# Patient Record
Sex: MALE | Race: BLACK OR AFRICAN AMERICAN | Employment: OTHER | URBAN - METROPOLITAN AREA
[De-identification: names, ages, dates, MRNs, and addresses within clinical notes are randomized per-mention and may not be internally consistent; named-entity substitution may affect disease eponyms.]

---

## 2018-03-30 ENCOUNTER — HOSPITAL ENCOUNTER (EMERGENCY)
Age: 33
Discharge: HOME | End: 2018-03-30
Attending: EMERGENCY MEDICINE
Payer: MEDICAID

## 2018-03-30 VITALS
TEMPERATURE: 97.9 F | HEART RATE: 125 BPM | OXYGEN SATURATION: 98 % | WEIGHT: 105 LBS | DIASTOLIC BLOOD PRESSURE: 125 MMHG | SYSTOLIC BLOOD PRESSURE: 162 MMHG | RESPIRATION RATE: 18 BRPM | BODY MASS INDEX: 16.88 KG/M2 | HEIGHT: 66 IN

## 2018-03-30 DIAGNOSIS — Z20.2 EXPOSURE TO STD: Primary | ICD-10-CM

## 2018-03-30 DIAGNOSIS — R07.89 ATYPICAL CHEST PAIN: ICD-10-CM

## 2018-03-30 LAB
ALBUMIN SERPL-MCNC: 4.4 G/DL (ref 3.5–5.2)
ALP BLD-CCNC: 187 U/L (ref 40–129)
ALT SERPL-CCNC: 13 U/L (ref 0–40)
ANION GAP SERPL CALCULATED.3IONS-SCNC: 17 MMOL/L (ref 7–16)
AST SERPL-CCNC: 25 U/L (ref 0–39)
BASOPHILS ABSOLUTE: 0.03 E9/L (ref 0–0.2)
BASOPHILS RELATIVE PERCENT: 0.6 % (ref 0–2)
BILIRUB SERPL-MCNC: 0.3 MG/DL (ref 0–1.2)
BUN BLDV-MCNC: 27 MG/DL (ref 6–20)
CALCIUM SERPL-MCNC: 9 MG/DL (ref 8.6–10.2)
CHLORIDE BLD-SCNC: 95 MMOL/L (ref 98–107)
CO2: 29 MMOL/L (ref 22–29)
CREAT SERPL-MCNC: 7.5 MG/DL (ref 0.7–1.2)
EKG ATRIAL RATE: 98 BPM
EKG P AXIS: 73 DEGREES
EKG P-R INTERVAL: 168 MS
EKG Q-T INTERVAL: 370 MS
EKG QRS DURATION: 90 MS
EKG QTC CALCULATION (BAZETT): 472 MS
EKG R AXIS: 91 DEGREES
EKG T AXIS: 56 DEGREES
EKG VENTRICULAR RATE: 98 BPM
EOSINOPHILS ABSOLUTE: 0.16 E9/L (ref 0.05–0.5)
EOSINOPHILS RELATIVE PERCENT: 3 % (ref 0–6)
GFR AFRICAN AMERICAN: 10
GFR AFRICAN AMERICAN: 7
GFR NON-AFRICAN AMERICAN: 10 ML/MIN/1.73
GFR NON-AFRICAN AMERICAN: 6 ML/MIN/1.73
GLUCOSE BLD-MCNC: 92 MG/DL (ref 74–109)
GLUCOSE BLD-MCNC: 93 MG/DL (ref 74–109)
HCT VFR BLD CALC: 32.6 % (ref 37–54)
HEMOGLOBIN: 10 G/DL (ref 12.5–16.5)
IMMATURE GRANULOCYTES #: 0.03 E9/L
IMMATURE GRANULOCYTES %: 0.6 % (ref 0–5)
LYMPHOCYTES ABSOLUTE: 1.73 E9/L (ref 1.5–4)
LYMPHOCYTES RELATIVE PERCENT: 32.1 % (ref 20–42)
MCH RBC QN AUTO: 30.7 PG (ref 26–35)
MCHC RBC AUTO-ENTMCNC: 30.7 % (ref 32–34.5)
MCV RBC AUTO: 100 FL (ref 80–99.9)
MONOCYTES ABSOLUTE: 0.4 E9/L (ref 0.1–0.95)
MONOCYTES RELATIVE PERCENT: 7.4 % (ref 2–12)
NEUTROPHILS ABSOLUTE: 3.04 E9/L (ref 1.8–7.3)
NEUTROPHILS RELATIVE PERCENT: 56.3 % (ref 43–80)
PDW BLD-RTO: 14.8 FL (ref 11.5–15)
PLATELET # BLD: 236 E9/L (ref 130–450)
PMV BLD AUTO: 8.9 FL (ref 7–12)
POC CHLORIDE: 104 MMOL/L (ref 100–108)
POC CREATININE: 10.2 MG/DL (ref 0.7–1.2)
POC POTASSIUM: 3.8 MMOL/L (ref 3.5–5)
POC SODIUM: 140 MMOL/L (ref 132–146)
POTASSIUM SERPL-SCNC: 4.3 MMOL/L (ref 3.5–5)
RBC # BLD: 3.26 E12/L (ref 3.8–5.8)
SODIUM BLD-SCNC: 141 MMOL/L (ref 132–146)
TOTAL PROTEIN: 8 G/DL (ref 6.4–8.3)
TROPONIN: <0.01 NG/ML (ref 0–0.03)
WBC # BLD: 5.4 E9/L (ref 4.5–11.5)

## 2018-03-30 PROCEDURE — 82947 ASSAY GLUCOSE BLOOD QUANT: CPT

## 2018-03-30 PROCEDURE — 84295 ASSAY OF SERUM SODIUM: CPT

## 2018-03-30 PROCEDURE — 84484 ASSAY OF TROPONIN QUANT: CPT

## 2018-03-30 PROCEDURE — 82435 ASSAY OF BLOOD CHLORIDE: CPT

## 2018-03-30 PROCEDURE — 93005 ELECTROCARDIOGRAM TRACING: CPT | Performed by: PHYSICIAN ASSISTANT

## 2018-03-30 PROCEDURE — 82565 ASSAY OF CREATININE: CPT

## 2018-03-30 PROCEDURE — 84132 ASSAY OF SERUM POTASSIUM: CPT

## 2018-03-30 PROCEDURE — 99284 EMERGENCY DEPT VISIT MOD MDM: CPT

## 2018-03-30 PROCEDURE — 80053 COMPREHEN METABOLIC PANEL: CPT

## 2018-03-30 PROCEDURE — 85025 COMPLETE CBC W/AUTO DIFF WBC: CPT

## 2018-03-30 PROCEDURE — 36415 COLL VENOUS BLD VENIPUNCTURE: CPT

## 2018-03-30 ASSESSMENT — PAIN DESCRIPTION - LOCATION: LOCATION: CHEST

## 2018-03-30 ASSESSMENT — PAIN SCALES - GENERAL: PAINLEVEL_OUTOF10: 5

## 2018-03-30 ASSESSMENT — PAIN DESCRIPTION - PAIN TYPE: TYPE: ACUTE PAIN

## 2018-04-30 ENCOUNTER — OFFICE VISIT (OUTPATIENT)
Dept: VASCULAR SURGERY | Age: 33
End: 2018-04-30
Payer: MEDICAID

## 2018-04-30 VITALS — SYSTOLIC BLOOD PRESSURE: 134 MMHG | DIASTOLIC BLOOD PRESSURE: 86 MMHG | HEART RATE: 72 BPM

## 2018-04-30 DIAGNOSIS — I77.0 AVF (ARTERIOVENOUS FISTULA) (HCC): Chronic | ICD-10-CM

## 2018-04-30 DIAGNOSIS — N18.6 ESRD ON HEMODIALYSIS (HCC): Primary | Chronic | ICD-10-CM

## 2018-04-30 DIAGNOSIS — Z99.2 ESRD ON HEMODIALYSIS (HCC): Primary | Chronic | ICD-10-CM

## 2018-04-30 DIAGNOSIS — N18.6 ENCOUNTER REGARDING VASCULAR ACCESS FOR DIALYSIS FOR ESRD (HCC): Chronic | ICD-10-CM

## 2018-04-30 DIAGNOSIS — Z99.2 ENCOUNTER REGARDING VASCULAR ACCESS FOR DIALYSIS FOR ESRD (HCC): Chronic | ICD-10-CM

## 2018-04-30 PROCEDURE — 99203 OFFICE O/P NEW LOW 30 MIN: CPT | Performed by: SURGERY

## 2018-04-30 PROCEDURE — G8428 CUR MEDS NOT DOCUMENT: HCPCS | Performed by: SURGERY

## 2018-04-30 PROCEDURE — G8419 CALC BMI OUT NRM PARAM NOF/U: HCPCS | Performed by: SURGERY

## 2018-04-30 PROCEDURE — 1036F TOBACCO NON-USER: CPT | Performed by: SURGERY

## 2018-10-02 ENCOUNTER — HOSPITAL ENCOUNTER (EMERGENCY)
Age: 33
Discharge: HOME | End: 2018-10-02
Attending: EMERGENCY MEDICINE
Payer: COMMERCIAL

## 2018-10-02 ENCOUNTER — APPOINTMENT (OUTPATIENT)
Dept: GENERAL RADIOLOGY | Age: 33
End: 2018-10-02
Payer: COMMERCIAL

## 2018-10-02 VITALS
HEART RATE: 82 BPM | WEIGHT: 150 LBS | BODY MASS INDEX: 24.11 KG/M2 | RESPIRATION RATE: 20 BRPM | DIASTOLIC BLOOD PRESSURE: 113 MMHG | TEMPERATURE: 98 F | OXYGEN SATURATION: 98 % | HEIGHT: 66 IN | SYSTOLIC BLOOD PRESSURE: 178 MMHG

## 2018-10-02 DIAGNOSIS — I99.8 POORLY CONTROLLED BLOOD PRESSURE: ICD-10-CM

## 2018-10-02 DIAGNOSIS — N18.6 ESRD (END STAGE RENAL DISEASE) ON DIALYSIS (HCC): Primary | ICD-10-CM

## 2018-10-02 DIAGNOSIS — Z99.2 ESRD (END STAGE RENAL DISEASE) ON DIALYSIS (HCC): Primary | ICD-10-CM

## 2018-10-02 DIAGNOSIS — D64.9 NORMOCYTIC ANEMIA: ICD-10-CM

## 2018-10-02 LAB
ALBUMIN SERPL-MCNC: 3.4 G/DL (ref 3.5–5.2)
ANION GAP SERPL CALCULATED.3IONS-SCNC: 13 MMOL/L (ref 7–16)
BASOPHILS ABSOLUTE: 0.02 E9/L (ref 0–0.2)
BASOPHILS RELATIVE PERCENT: 0.5 % (ref 0–2)
BUN BLDV-MCNC: 54 MG/DL (ref 6–20)
CALCIUM SERPL-MCNC: 6.8 MG/DL (ref 8.6–10.2)
CHLORIDE BLD-SCNC: 99 MMOL/L (ref 98–107)
CHP ED QC CHECK: YES
CO2: 26 MMOL/L (ref 22–29)
CREAT SERPL-MCNC: 13.2 MG/DL (ref 0.7–1.2)
EOSINOPHILS ABSOLUTE: 0.19 E9/L (ref 0.05–0.5)
EOSINOPHILS RELATIVE PERCENT: 4.3 % (ref 0–6)
GFR AFRICAN AMERICAN: 5
GFR NON-AFRICAN AMERICAN: 5 ML/MIN/1.73
GLUCOSE BLD-MCNC: 102 MG/DL
GLUCOSE BLD-MCNC: 104 MG/DL (ref 74–109)
HCT VFR BLD CALC: 28.2 % (ref 37–54)
HEMOGLOBIN: 8.9 G/DL (ref 12.5–16.5)
IMMATURE GRANULOCYTES #: 0.01 E9/L
IMMATURE GRANULOCYTES %: 0.2 % (ref 0–5)
LYMPHOCYTES ABSOLUTE: 1.41 E9/L (ref 1.5–4)
LYMPHOCYTES RELATIVE PERCENT: 32 % (ref 20–42)
MAGNESIUM: 3.2 MG/DL (ref 1.6–2.6)
MCH RBC QN AUTO: 29.3 PG (ref 26–35)
MCHC RBC AUTO-ENTMCNC: 31.6 % (ref 32–34.5)
MCV RBC AUTO: 92.8 FL (ref 80–99.9)
MONOCYTES ABSOLUTE: 0.53 E9/L (ref 0.1–0.95)
MONOCYTES RELATIVE PERCENT: 12 % (ref 2–12)
NEUTROPHILS ABSOLUTE: 2.25 E9/L (ref 1.8–7.3)
NEUTROPHILS RELATIVE PERCENT: 51 % (ref 43–80)
PDW BLD-RTO: 14.4 FL (ref 11.5–15)
PHOSPHORUS: 3.1 MG/DL (ref 2.5–4.5)
PLATELET # BLD: 109 E9/L (ref 130–450)
PMV BLD AUTO: 9.3 FL (ref 7–12)
POC ANION GAP: 18
POC BUN: 49
POC CHLORIDE: 99
POC CO2: 26
POC CREATININE: 13.4
POC POTASSIUM: NORMAL
POC SODIUM: 138
POTASSIUM SERPL-SCNC: 4.8 MMOL/L (ref 3.5–5)
PROCALCITONIN: 0.84 NG/ML (ref 0–0.08)
RBC # BLD: 3.04 E12/L (ref 3.8–5.8)
SODIUM BLD-SCNC: 138 MMOL/L (ref 132–146)
TROPONIN: <0.01 NG/ML (ref 0–0.03)
WBC # BLD: 4.4 E9/L (ref 4.5–11.5)

## 2018-10-02 PROCEDURE — 96375 TX/PRO/DX INJ NEW DRUG ADDON: CPT

## 2018-10-02 PROCEDURE — 84145 PROCALCITONIN (PCT): CPT

## 2018-10-02 PROCEDURE — 6370000000 HC RX 637 (ALT 250 FOR IP): Performed by: NURSE PRACTITIONER

## 2018-10-02 PROCEDURE — 80069 RENAL FUNCTION PANEL: CPT

## 2018-10-02 PROCEDURE — 71046 X-RAY EXAM CHEST 2 VIEWS: CPT

## 2018-10-02 PROCEDURE — 6360000002 HC RX W HCPCS: Performed by: NURSE PRACTITIONER

## 2018-10-02 PROCEDURE — 96374 THER/PROPH/DIAG INJ IV PUSH: CPT

## 2018-10-02 PROCEDURE — 93005 ELECTROCARDIOGRAM TRACING: CPT | Performed by: NURSE PRACTITIONER

## 2018-10-02 PROCEDURE — 85025 COMPLETE CBC W/AUTO DIFF WBC: CPT

## 2018-10-02 PROCEDURE — 2580000003 HC RX 258

## 2018-10-02 PROCEDURE — 84484 ASSAY OF TROPONIN QUANT: CPT

## 2018-10-02 PROCEDURE — 83735 ASSAY OF MAGNESIUM: CPT

## 2018-10-02 PROCEDURE — 99285 EMERGENCY DEPT VISIT HI MDM: CPT

## 2018-10-02 PROCEDURE — 36415 COLL VENOUS BLD VENIPUNCTURE: CPT

## 2018-10-02 PROCEDURE — 87040 BLOOD CULTURE FOR BACTERIA: CPT

## 2018-10-02 RX ORDER — CINACALCET 90 MG/1
180 TABLET, FILM COATED ORAL DAILY
COMMUNITY
End: 2019-01-22 | Stop reason: SDUPTHER

## 2018-10-02 RX ORDER — HYDRALAZINE HYDROCHLORIDE 20 MG/ML
10 INJECTION INTRAMUSCULAR; INTRAVENOUS ONCE
Status: COMPLETED | OUTPATIENT
Start: 2018-10-02 | End: 2018-10-02

## 2018-10-02 RX ORDER — ASPIRIN 81 MG/1
324 TABLET, CHEWABLE ORAL ONCE
Status: COMPLETED | OUTPATIENT
Start: 2018-10-02 | End: 2018-10-02

## 2018-10-02 RX ORDER — DIPHENHYDRAMINE HYDROCHLORIDE 50 MG/ML
25 INJECTION INTRAMUSCULAR; INTRAVENOUS ONCE
Status: COMPLETED | OUTPATIENT
Start: 2018-10-02 | End: 2018-10-02

## 2018-10-02 RX ORDER — SODIUM CHLORIDE 0.9 % (FLUSH) 0.9 %
SYRINGE (ML) INJECTION
Status: DISCONTINUED
Start: 2018-10-02 | End: 2018-10-02 | Stop reason: HOSPADM

## 2018-10-02 RX ORDER — DIPHENHYDRAMINE HCL 25 MG
25 TABLET ORAL ONCE
Status: COMPLETED | OUTPATIENT
Start: 2018-10-02 | End: 2018-10-02

## 2018-10-02 RX ORDER — TEMAZEPAM 30 MG/1
30 CAPSULE ORAL NIGHTLY PRN
COMMUNITY
End: 2019-01-23 | Stop reason: ALTCHOICE

## 2018-10-02 RX ORDER — SODIUM CHLORIDE 0.9 % (FLUSH) 0.9 %
SYRINGE (ML) INJECTION
Status: COMPLETED
Start: 2018-10-02 | End: 2018-10-02

## 2018-10-02 RX ORDER — HYDRALAZINE HYDROCHLORIDE 50 MG/1
50 TABLET, FILM COATED ORAL EVERY 8 HOURS
Status: ON HOLD | COMMUNITY
End: 2019-01-07 | Stop reason: HOSPADM

## 2018-10-02 RX ORDER — LOSARTAN POTASSIUM 50 MG/1
50 TABLET ORAL ONCE
Status: COMPLETED | OUTPATIENT
Start: 2018-10-02 | End: 2018-10-02

## 2018-10-02 RX ORDER — HYDRALAZINE HYDROCHLORIDE 25 MG/1
50 TABLET, FILM COATED ORAL ONCE
Status: COMPLETED | OUTPATIENT
Start: 2018-10-02 | End: 2018-10-02

## 2018-10-02 RX ORDER — DIPHENHYDRAMINE HCL 50 MG
50 CAPSULE ORAL 3 TIMES DAILY PRN
Status: ON HOLD | COMMUNITY
End: 2019-01-07 | Stop reason: HOSPADM

## 2018-10-02 RX ADMIN — CLONIDINE HYDROCHLORIDE 0.3 MG: 0.2 TABLET ORAL at 10:04

## 2018-10-02 RX ADMIN — HYDRALAZINE HYDROCHLORIDE 50 MG: 25 TABLET, FILM COATED ORAL at 10:04

## 2018-10-02 RX ADMIN — DIPHENHYDRAMINE HYDROCHLORIDE 25 MG: 50 INJECTION, SOLUTION INTRAMUSCULAR; INTRAVENOUS at 13:02

## 2018-10-02 RX ADMIN — DIPHENHYDRAMINE HCL 25 MG: 25 TABLET ORAL at 12:19

## 2018-10-02 RX ADMIN — ASPIRIN 81 MG CHEWABLE TABLET 324 MG: 81 TABLET CHEWABLE at 10:03

## 2018-10-02 RX ADMIN — Medication 30 ML: at 13:06

## 2018-10-02 RX ADMIN — LOSARTAN POTASSIUM 50 MG: 50 TABLET ORAL at 10:59

## 2018-10-02 RX ADMIN — HYDRALAZINE HYDROCHLORIDE 10 MG: 20 INJECTION INTRAMUSCULAR; INTRAVENOUS at 12:49

## 2018-10-02 ASSESSMENT — PAIN DESCRIPTION - LOCATION: LOCATION: CHEST

## 2018-10-02 ASSESSMENT — PAIN SCALES - GENERAL: PAINLEVEL_OUTOF10: 2

## 2018-10-05 LAB
EKG ATRIAL RATE: 72 BPM
EKG P AXIS: 65 DEGREES
EKG P-R INTERVAL: 192 MS
EKG Q-T INTERVAL: 456 MS
EKG QRS DURATION: 78 MS
EKG QTC CALCULATION (BAZETT): 499 MS
EKG R AXIS: 23 DEGREES
EKG T AXIS: 62 DEGREES
EKG VENTRICULAR RATE: 72 BPM

## 2018-10-07 LAB
BLOOD CULTURE, ROUTINE: NORMAL
CULTURE, BLOOD 2: NORMAL

## 2018-10-08 ENCOUNTER — APPOINTMENT (OUTPATIENT)
Dept: GENERAL RADIOLOGY | Age: 33
End: 2018-10-08
Payer: COMMERCIAL

## 2018-10-08 ENCOUNTER — HOSPITAL ENCOUNTER (EMERGENCY)
Age: 33
Discharge: HOME | End: 2018-10-08
Attending: EMERGENCY MEDICINE
Payer: COMMERCIAL

## 2018-10-08 VITALS
DIASTOLIC BLOOD PRESSURE: 92 MMHG | TEMPERATURE: 98.6 F | SYSTOLIC BLOOD PRESSURE: 178 MMHG | HEART RATE: 90 BPM | HEIGHT: 66 IN | OXYGEN SATURATION: 100 % | WEIGHT: 149.91 LBS | RESPIRATION RATE: 16 BRPM | BODY MASS INDEX: 24.09 KG/M2

## 2018-10-08 DIAGNOSIS — E87.5 HYPERKALEMIA: Primary | ICD-10-CM

## 2018-10-08 DIAGNOSIS — N18.6 ESRD (END STAGE RENAL DISEASE) (HCC): ICD-10-CM

## 2018-10-08 DIAGNOSIS — R07.9 CHEST PAIN, UNSPECIFIED TYPE: ICD-10-CM

## 2018-10-08 LAB
ANION GAP SERPL CALCULATED.3IONS-SCNC: 17 MMOL/L (ref 7–16)
BASOPHILS ABSOLUTE: 0.02 E9/L (ref 0–0.2)
BASOPHILS RELATIVE PERCENT: 0.3 % (ref 0–2)
BUN BLDV-MCNC: 58 MG/DL (ref 6–20)
CALCIUM SERPL-MCNC: 8.2 MG/DL (ref 8.6–10.2)
CHLORIDE BLD-SCNC: 100 MMOL/L (ref 98–107)
CO2: 25 MMOL/L (ref 22–29)
CREAT SERPL-MCNC: 20.5 MG/DL (ref 0.7–1.2)
EOSINOPHILS ABSOLUTE: 0.16 E9/L (ref 0.05–0.5)
EOSINOPHILS RELATIVE PERCENT: 2.7 % (ref 0–6)
GFR AFRICAN AMERICAN: 3
GFR AFRICAN AMERICAN: 5
GFR AFRICAN AMERICAN: 9
GFR NON-AFRICAN AMERICAN: 3 ML/MIN/1.73
GFR NON-AFRICAN AMERICAN: 4 ML/MIN/1.73
GFR NON-AFRICAN AMERICAN: 7 ML/MIN/1.73
GLUCOSE BLD-MCNC: 102 MG/DL (ref 74–109)
GLUCOSE BLD-MCNC: 92 MG/DL (ref 74–109)
GLUCOSE BLD-MCNC: 98 MG/DL (ref 74–109)
HCT VFR BLD CALC: 29.4 % (ref 37–54)
HEMOGLOBIN: 8.9 G/DL (ref 12.5–16.5)
IMMATURE GRANULOCYTES #: 0.02 E9/L
IMMATURE GRANULOCYTES %: 0.3 % (ref 0–5)
LYMPHOCYTES ABSOLUTE: 1.88 E9/L (ref 1.5–4)
LYMPHOCYTES RELATIVE PERCENT: 32.1 % (ref 20–42)
MCH RBC QN AUTO: 29.5 PG (ref 26–35)
MCHC RBC AUTO-ENTMCNC: 30.3 % (ref 32–34.5)
MCV RBC AUTO: 97.4 FL (ref 80–99.9)
MONOCYTES ABSOLUTE: 0.4 E9/L (ref 0.1–0.95)
MONOCYTES RELATIVE PERCENT: 6.8 % (ref 2–12)
NEUTROPHILS ABSOLUTE: 3.37 E9/L (ref 1.8–7.3)
NEUTROPHILS RELATIVE PERCENT: 57.8 % (ref 43–80)
PDW BLD-RTO: 14.6 FL (ref 11.5–15)
PLATELET # BLD: 201 E9/L (ref 130–450)
PMV BLD AUTO: 9.3 FL (ref 7–12)
POC CHLORIDE: 104 MMOL/L (ref 100–108)
POC CHLORIDE: 111 MMOL/L (ref 100–108)
POC CREATININE: 8.5 MG/DL (ref 0.7–1.2)
POC CREATININE: >15 MG/DL (ref 0.7–1.2)
POC POTASSIUM: 3.9 MMOL/L (ref 3.5–5)
POC POTASSIUM: 6.8 MMOL/L (ref 3.5–5)
POC SODIUM: 140 MMOL/L (ref 132–146)
POC SODIUM: 142 MMOL/L (ref 132–146)
POTASSIUM SERPL-SCNC: 6.5 MMOL/L (ref 3.5–5)
RBC # BLD: 3.02 E12/L (ref 3.8–5.8)
SODIUM BLD-SCNC: 142 MMOL/L (ref 132–146)
TROPONIN: <0.01 NG/ML (ref 0–0.03)
WBC # BLD: 5.9 E9/L (ref 4.5–11.5)

## 2018-10-08 PROCEDURE — 82947 ASSAY GLUCOSE BLOOD QUANT: CPT

## 2018-10-08 PROCEDURE — 84132 ASSAY OF SERUM POTASSIUM: CPT

## 2018-10-08 PROCEDURE — 6370000000 HC RX 637 (ALT 250 FOR IP): Performed by: INTERNAL MEDICINE

## 2018-10-08 PROCEDURE — 85025 COMPLETE CBC W/AUTO DIFF WBC: CPT

## 2018-10-08 PROCEDURE — 80074 ACUTE HEPATITIS PANEL: CPT

## 2018-10-08 PROCEDURE — 71045 X-RAY EXAM CHEST 1 VIEW: CPT

## 2018-10-08 PROCEDURE — 84484 ASSAY OF TROPONIN QUANT: CPT

## 2018-10-08 PROCEDURE — 96365 THER/PROPH/DIAG IV INF INIT: CPT

## 2018-10-08 PROCEDURE — 94664 DEMO&/EVAL PT USE INHALER: CPT

## 2018-10-08 PROCEDURE — 6360000002 HC RX W HCPCS: Performed by: EMERGENCY MEDICINE

## 2018-10-08 PROCEDURE — 96366 THER/PROPH/DIAG IV INF ADDON: CPT

## 2018-10-08 PROCEDURE — 36415 COLL VENOUS BLD VENIPUNCTURE: CPT

## 2018-10-08 PROCEDURE — 86706 HEP B SURFACE ANTIBODY: CPT

## 2018-10-08 PROCEDURE — 2580000003 HC RX 258: Performed by: EMERGENCY MEDICINE

## 2018-10-08 PROCEDURE — 99285 EMERGENCY DEPT VISIT HI MDM: CPT

## 2018-10-08 PROCEDURE — 82435 ASSAY OF BLOOD CHLORIDE: CPT

## 2018-10-08 PROCEDURE — 94640 AIRWAY INHALATION TREATMENT: CPT

## 2018-10-08 PROCEDURE — 80048 BASIC METABOLIC PNL TOTAL CA: CPT

## 2018-10-08 PROCEDURE — 84295 ASSAY OF SERUM SODIUM: CPT

## 2018-10-08 PROCEDURE — 82565 ASSAY OF CREATININE: CPT

## 2018-10-08 PROCEDURE — G0257 UNSCHED DIALYSIS ESRD PT HOS: HCPCS

## 2018-10-08 PROCEDURE — 90935 HEMODIALYSIS ONE EVALUATION: CPT | Performed by: INTERNAL MEDICINE

## 2018-10-08 RX ORDER — DEXTROSE MONOHYDRATE 25 G/50ML
12.5 INJECTION, SOLUTION INTRAVENOUS PRN
Status: DISCONTINUED | OUTPATIENT
Start: 2018-10-08 | End: 2018-10-08 | Stop reason: HOSPADM

## 2018-10-08 RX ORDER — SODIUM POLYSTYRENE SULFONATE 15 G/60ML
15 SUSPENSION ORAL; RECTAL ONCE
Status: DISCONTINUED | OUTPATIENT
Start: 2018-10-08 | End: 2018-10-08 | Stop reason: HOSPADM

## 2018-10-08 RX ORDER — HYDRALAZINE HYDROCHLORIDE 25 MG/1
50 TABLET, FILM COATED ORAL EVERY 8 HOURS SCHEDULED
Status: DISCONTINUED | OUTPATIENT
Start: 2018-10-08 | End: 2018-10-08 | Stop reason: HOSPADM

## 2018-10-08 RX ORDER — LOSARTAN POTASSIUM 50 MG/1
50 TABLET ORAL DAILY
Status: DISCONTINUED | OUTPATIENT
Start: 2018-10-08 | End: 2018-10-08 | Stop reason: HOSPADM

## 2018-10-08 RX ORDER — DEXTROSE MONOHYDRATE 25 G/50ML
25 INJECTION, SOLUTION INTRAVENOUS ONCE
Status: DISCONTINUED | OUTPATIENT
Start: 2018-10-08 | End: 2018-10-08 | Stop reason: HOSPADM

## 2018-10-08 RX ORDER — NICOTINE POLACRILEX 4 MG
15 LOZENGE BUCCAL PRN
Status: DISCONTINUED | OUTPATIENT
Start: 2018-10-08 | End: 2018-10-08 | Stop reason: HOSPADM

## 2018-10-08 RX ORDER — ALBUTEROL SULFATE 2.5 MG/3ML
10 SOLUTION RESPIRATORY (INHALATION) ONCE
Status: COMPLETED | OUTPATIENT
Start: 2018-10-08 | End: 2018-10-08

## 2018-10-08 RX ORDER — CLONIDINE HYDROCHLORIDE 0.1 MG/1
0.2 TABLET ORAL 2 TIMES DAILY
Status: DISCONTINUED | OUTPATIENT
Start: 2018-10-08 | End: 2018-10-08 | Stop reason: HOSPADM

## 2018-10-08 RX ORDER — DEXTROSE MONOHYDRATE 50 MG/ML
100 INJECTION, SOLUTION INTRAVENOUS PRN
Status: DISCONTINUED | OUTPATIENT
Start: 2018-10-08 | End: 2018-10-08 | Stop reason: HOSPADM

## 2018-10-08 RX ADMIN — CLONIDINE HYDROCHLORIDE 0.2 MG: 0.1 TABLET ORAL at 20:13

## 2018-10-08 RX ADMIN — CALCIUM GLUCONATE 1 G: 98 INJECTION, SOLUTION INTRAVENOUS at 12:19

## 2018-10-08 RX ADMIN — ALBUTEROL SULFATE 10 MG: 2.5 SOLUTION RESPIRATORY (INHALATION) at 20:38

## 2018-10-08 ASSESSMENT — ENCOUNTER SYMPTOMS
COUGH: 0
BACK PAIN: 0
VOMITING: 0
SHORTNESS OF BREATH: 1
NAUSEA: 1
SORE THROAT: 0
ABDOMINAL PAIN: 0

## 2018-10-08 ASSESSMENT — PAIN SCALES - GENERAL
PAINLEVEL_OUTOF10: 0
PAINLEVEL_OUTOF10: 0

## 2018-10-08 NOTE — ED PROVIDER NOTES
to person, place, and time. He appears well-developed and well-nourished. HENT:   Head: Normocephalic and atraumatic. Eyes: EOM are normal.   Neck: Normal range of motion. Neck supple. No JVD present. Cardiovascular: Normal rate, regular rhythm and intact distal pulses. 2/6 systolic murmur   Pulmonary/Chest: Effort normal and breath sounds normal. No respiratory distress. He has no wheezes. He has no rales. Abdominal: Soft. There is no tenderness. There is no rebound and no guarding. Musculoskeletal:   Fistula in left UE   Lymphadenopathy:     He has no cervical adenopathy. Neurological: He is alert and oriented to person, place, and time. No cranial nerve deficit. Skin: Skin is warm and dry. Psychiatric: He has a normal mood and affect. His behavior is normal. Thought content normal.   Nursing note and vitals reviewed. Procedures    MDM  Number of Diagnoses or Management Options  Diagnosis management comments: This is a 26year old male with a PMH of ESRD who presented to the ED with a complaint of chest pain. The patient had not been to dialysis for several days and was resting comfortably on examination. The patient had a wide differential include hyperkalemia, anemia and ACS to name just a few. The patient was taken to the Dialysis floor and had dialysis performed. The patient had negative cardiac enzymes and had a heart score of 3. The patient was PERC negative and the case was discussed with Dr. Frantz Hudson. The patient was appropriate for discharge and advised to return to the ED if he developed any increasing pain, shortness of breath or any other concerns. He was agreeable.       ED Course as of Oct 08 2027   Mon Oct 08, 2018   1311 Spoke with Dr. Frantz Hudson regarding patient's presentation  [BP]      ED Course User Index  [BP] Betsy Postal, DO         ED Course as of Oct 08 2027   Mon Oct 08, 2018   Raheem Rajeloisa 71 Spoke with Dr. Frantz Hudson regarding patient's presentation  [BP]      ED Course User

## 2018-10-08 NOTE — CARE COORDINATION
CM contacted Mary Lopez from [employer] to determine patient's dialysis schedule. Mr. Fern Reddy stated that patient received dialysis at [facility] on Wednesday, 10/03 at the [facility], and was scheduled to receive dialysis on 10/05 and 10/08 @ 6:25AM at [place] and the patient did not show up for his scheduled time. Mr. Fern Reddy stated that his staff will follow up with this patient to ensure that he is aware of his dialysis time and place. Patient is to receive dialysis on M-W-F at [facility] at 6:25am. Mr. Fern Reddy also stated that if the current schedule is not feasible for patient, they will work with him to set up a more convenient time and place.    Marlys Koch RN

## 2018-10-08 NOTE — CONSULTS
dm  Derm: no rash   Heme: no epistaxis, bruising  All other sx negative     Physical Exam:    Patient Vitals for the past 24 hrs:   BP Temp Pulse Resp SpO2 Height Weight   10/08/18 1530 (!) 183/118 - 73 - - - -   10/08/18 1501 (!) 187/117 - 69 - - - -   10/08/18 1435 (!) 196/117 - 71 - - - -   10/08/18 1400 (!) 201/123 - 81 - - - -   10/08/18 1321 (!) 203/118 - 77 - - - 149 lb 14.6 oz (68 kg)   10/08/18 1104 (!) 163/110 98.3 °F (36.8 °C) 87 16 91 % 5' 6\" (1.676 m) 150 lb (68 kg)       No intake or output data in the 24 hours ending 10/08/18 1549    Constitutional: Patient in no acute distress   Head: normocephalic, atraumatic   Neck: supple, no jvd  Cardiovascular: regular rate and rhythm, no murmurs, gallops, or rubs   Respiratory: Clear, no rales, rhochi, or wheezes,   Gastrointestinal: soft, nontender, nondistended, no hepatosplenomegaly  Ext: no edema  Neuro: aaox3  Skin: dry, no rash   Back: nontender    Data:    Recent Labs      10/08/18   1155   WBC  5.9   HGB  8.9*   HCT  29.4*   MCV  97.4   PLT  201       Recent Labs      10/08/18   1155  10/08/18   1203   NA  142   --    K  6.5*   --    CL  100   --    CO2  25   --    CREATININE  20.5*  >15.0*   BUN  58*   --    LABGLOM  3  4   GLUCOSE  102   --    CALCIUM  8.2*   --        No results for input(s): ALT, AST, ALKPHOS, BILITOT, BILIDIR in the last 72 hours. Lab Results   Component Value Date    FERRITIN 898 09/18/2015    IRON 86 09/18/2015    TIBC 166 (L) 09/18/2015       Lab Results   Component Value Date    MG 3.2 (H) 10/02/2018    PHOS 3.1 10/02/2018       Lab Results   Component Value Date    LABALBU 3.4 (L) 10/02/2018    LABALBU 4.4 03/30/2018    LABALBU 4.5 03/17/2017       Assessment and Plan:    1. esrd  Resume hd mwf  Dialysis noncompliance    2. Hyperkalemia  Emergent hd now  Renal diet  Due to dialysis noncompliance    3. htn  Not controlled  Resume outpt meds  Unclear if he was on them    4. Anemia  Start sierra    Erroll Button.  Ebony Hashimoto, MD

## 2018-10-09 LAB
HAV IGM SER IA-ACNC: NORMAL
HBV SURFACE AB TITR SER: REACTIVE {TITER}
HEPATITIS B CORE IGM ANTIBODY: NORMAL
HEPATITIS B SURFACE ANTIGEN INTERPRETATION: NORMAL
HEPATITIS C ANTIBODY INTERPRETATION: NORMAL

## 2018-10-12 LAB
EKG ATRIAL RATE: 94 BPM
EKG P AXIS: 77 DEGREES
EKG P-R INTERVAL: 180 MS
EKG Q-T INTERVAL: 388 MS
EKG QRS DURATION: 88 MS
EKG QTC CALCULATION (BAZETT): 485 MS
EKG R AXIS: 93 DEGREES
EKG T AXIS: 80 DEGREES
EKG VENTRICULAR RATE: 94 BPM

## 2018-10-18 ENCOUNTER — APPOINTMENT (OUTPATIENT)
Dept: GENERAL RADIOLOGY | Age: 33
End: 2018-10-18
Payer: COMMERCIAL

## 2018-10-18 ENCOUNTER — HOSPITAL ENCOUNTER (EMERGENCY)
Age: 33
Discharge: HOME | End: 2018-10-18
Attending: EMERGENCY MEDICINE
Payer: COMMERCIAL

## 2018-10-18 VITALS
HEART RATE: 92 BPM | OXYGEN SATURATION: 99 % | WEIGHT: 149 LBS | TEMPERATURE: 98.6 F | SYSTOLIC BLOOD PRESSURE: 130 MMHG | BODY MASS INDEX: 24.05 KG/M2 | DIASTOLIC BLOOD PRESSURE: 95 MMHG | RESPIRATION RATE: 20 BRPM

## 2018-10-18 DIAGNOSIS — N18.6 ESRD (END STAGE RENAL DISEASE) (HCC): Primary | ICD-10-CM

## 2018-10-18 LAB
ALBUMIN SERPL-MCNC: 3.9 G/DL (ref 3.5–5.2)
ALP BLD-CCNC: 191 U/L (ref 40–129)
ALT SERPL-CCNC: 12 U/L (ref 0–40)
ANION GAP SERPL CALCULATED.3IONS-SCNC: 20 MMOL/L (ref 7–16)
AST SERPL-CCNC: 15 U/L (ref 0–39)
BASOPHILS ABSOLUTE: 0.02 E9/L (ref 0–0.2)
BASOPHILS RELATIVE PERCENT: 0.3 % (ref 0–2)
BILIRUB SERPL-MCNC: 0.4 MG/DL (ref 0–1.2)
BUN BLDV-MCNC: 83 MG/DL (ref 6–20)
CALCIUM SERPL-MCNC: 7.8 MG/DL (ref 8.6–10.2)
CHLORIDE BLD-SCNC: 93 MMOL/L (ref 98–107)
CO2: 24 MMOL/L (ref 22–29)
CREAT SERPL-MCNC: 20.9 MG/DL (ref 0.7–1.2)
EOSINOPHILS ABSOLUTE: 0.19 E9/L (ref 0.05–0.5)
EOSINOPHILS RELATIVE PERCENT: 3.2 % (ref 0–6)
GFR AFRICAN AMERICAN: 3
GFR NON-AFRICAN AMERICAN: 3 ML/MIN/1.73
GLUCOSE BLD-MCNC: 88 MG/DL (ref 74–109)
HCT VFR BLD CALC: 29.6 % (ref 37–54)
HEMOGLOBIN: 9.3 G/DL (ref 12.5–16.5)
IMMATURE GRANULOCYTES #: 0.02 E9/L
IMMATURE GRANULOCYTES %: 0.3 % (ref 0–5)
LYMPHOCYTES ABSOLUTE: 2.21 E9/L (ref 1.5–4)
LYMPHOCYTES RELATIVE PERCENT: 36.9 % (ref 20–42)
MCH RBC QN AUTO: 29.5 PG (ref 26–35)
MCHC RBC AUTO-ENTMCNC: 31.4 % (ref 32–34.5)
MCV RBC AUTO: 94 FL (ref 80–99.9)
MONOCYTES ABSOLUTE: 0.56 E9/L (ref 0.1–0.95)
MONOCYTES RELATIVE PERCENT: 9.3 % (ref 2–12)
NEUTROPHILS ABSOLUTE: 2.99 E9/L (ref 1.8–7.3)
NEUTROPHILS RELATIVE PERCENT: 50 % (ref 43–80)
PDW BLD-RTO: 16 FL (ref 11.5–15)
PLATELET # BLD: 286 E9/L (ref 130–450)
PMV BLD AUTO: 8.7 FL (ref 7–12)
POTASSIUM SERPL-SCNC: 4.6 MMOL/L (ref 3.5–5)
RBC # BLD: 3.15 E12/L (ref 3.8–5.8)
SODIUM BLD-SCNC: 137 MMOL/L (ref 132–146)
TOTAL PROTEIN: 7.2 G/DL (ref 6.4–8.3)
TROPONIN: <0.01 NG/ML (ref 0–0.03)
WBC # BLD: 6 E9/L (ref 4.5–11.5)

## 2018-10-18 PROCEDURE — 71045 X-RAY EXAM CHEST 1 VIEW: CPT

## 2018-10-18 PROCEDURE — 84484 ASSAY OF TROPONIN QUANT: CPT

## 2018-10-18 PROCEDURE — 80053 COMPREHEN METABOLIC PANEL: CPT

## 2018-10-18 PROCEDURE — 90935 HEMODIALYSIS ONE EVALUATION: CPT | Performed by: INTERNAL MEDICINE

## 2018-10-18 PROCEDURE — 36415 COLL VENOUS BLD VENIPUNCTURE: CPT

## 2018-10-18 PROCEDURE — G0257 UNSCHED DIALYSIS ESRD PT HOS: HCPCS

## 2018-10-18 PROCEDURE — 99283 EMERGENCY DEPT VISIT LOW MDM: CPT

## 2018-10-18 PROCEDURE — 85025 COMPLETE CBC W/AUTO DIFF WBC: CPT

## 2018-10-18 ASSESSMENT — PAIN SCALES - GENERAL
PAINLEVEL_OUTOF10: 0
PAINLEVEL_OUTOF10: 0

## 2018-10-18 NOTE — ED PROVIDER NOTES
uvular edema  Neck: Supple, full ROM, non tender to palpation in the midline, no stridor, no crepitus, no meningeal signs  Respiratory: Lungs clear to auscultation bilaterally, no wheezes, rales, or rhonchi. Not in respiratory distress  Cardiovascular:  Regular rate. Regular rhythm. No murmurs, gallops, or rubs. 2+ distal pulses  Chest: No chest wall tenderness  GI:  Abdomen Soft, Non tender, Non distended. +BS. No organomegaly, no palpable masses,  No rebound, guarding, or rigidity. Musculoskeletal: Moves all extremities x 4. Warm and well perfused, no clubbing, cyanosis, or edema. Capillary refill <3 seconds  Integument: skin warm and dry. No rashes. Lymphatic: no lymphadenopathy noted  Neurologic: GCS 15, no focal deficits, symmetric strength 5/5 in the upper and lower extremities bilaterally  Psychiatric: Normal Affect    -------------------------------------------------- RESULTS -------------------------------------------------  I have personally reviewed all laboratory and imaging results for this patient. Results are listed below.      LABS:  Results for orders placed or performed during the hospital encounter of 10/18/18   CBC Auto Differential   Result Value Ref Range    WBC 6.0 4.5 - 11.5 E9/L    RBC 3.15 (L) 3.80 - 5.80 E12/L    Hemoglobin 9.3 (L) 12.5 - 16.5 g/dL    Hematocrit 29.6 (L) 37.0 - 54.0 %    MCV 94.0 80.0 - 99.9 fL    MCH 29.5 26.0 - 35.0 pg    MCHC 31.4 (L) 32.0 - 34.5 %    RDW 16.0 (H) 11.5 - 15.0 fL    Platelets 343 663 - 670 E9/L    MPV 8.7 7.0 - 12.0 fL    Neutrophils % 50.0 43.0 - 80.0 %    Immature Granulocytes % 0.3 0.0 - 5.0 %    Lymphocytes % 36.9 20.0 - 42.0 %    Monocytes % 9.3 2.0 - 12.0 %    Eosinophils % 3.2 0.0 - 6.0 %    Basophils % 0.3 0.0 - 2.0 %    Neutrophils # 2.99 1.80 - 7.30 E9/L    Immature Granulocytes # 0.02 E9/L    Lymphocytes # 2.21 1.50 - 4.00 E9/L    Monocytes # 0.56 0.10 - 0.95 E9/L    Eosinophils # 0.19 0.05 - 0.50 E9/L    Basophils # 0.02 0.00 - 0.20 E9/L Comprehensive Metabolic Panel   Result Value Ref Range    Sodium 137 132 - 146 mmol/L    Potassium 4.6 3.5 - 5.0 mmol/L    Chloride 93 (L) 98 - 107 mmol/L    CO2 24 22 - 29 mmol/L    Anion Gap 20 (H) 7 - 16 mmol/L    Glucose 88 74 - 109 mg/dL    BUN 83 (H) 6 - 20 mg/dL    CREATININE 20.9 (HH) 0.7 - 1.2 mg/dL    GFR Non-African American 3 >=60 mL/min/1.73    GFR African American 3     Calcium 7.8 (L) 8.6 - 10.2 mg/dL    Total Protein 7.2 6.4 - 8.3 g/dL    Alb 3.9 3.5 - 5.2 g/dL    Total Bilirubin 0.4 0.0 - 1.2 mg/dL    Alkaline Phosphatase 191 (H) 40 - 129 U/L    ALT 12 0 - 40 U/L    AST 15 0 - 39 U/L   Troponin   Result Value Ref Range    Troponin <0.01 0.00 - 0.03 ng/mL       RADIOLOGY:  Interpreted by Radiologist.  XR CHEST PORTABLE    (Results Pending)       EKG: This EKG is signed and interpreted by the EP.          ------------------------- NURSING NOTES AND VITALS REVIEWED ---------------------------   The nursing notes within the ED encounter and vital signs as below have been reviewed by myself. BP (!) 157/103   Pulse 99   Temp 98.6 °F (37 °C)   Resp 18   Wt 149 lb (67.6 kg)   SpO2 97%   BMI 24.05 kg/m²   Oxygen Saturation Interpretation: Normal    The patients available past medical records and past encounters were reviewed. ------------------------------ ED COURSE/MEDICAL DECISION MAKING----------------------  Medications - No data to display      ED COURSE:       Medical Decision Making:    Labs and imaging reviewed. Patient was dialyzed, reevaluation, had no complaints. We arranged for him to have outpatient dialysis as opposed to coming to the ER as he has done this multiple times. He voices understanding. He is educated on signs and symptoms that require emergent evaluation. Counseling:    The emergency provider has spoken with the patient and discussed todays results, in addition to providing specific details for the plan of care and counseling regarding the diagnosis and

## 2018-10-18 NOTE — CARE COORDINATION
CM contacted Amilcar Rocha from [employer] to verify patient's chair time. He is scheduled on M-W-F at 6:25AM at [facility]. Patient states he misses his dialysis d/t he does not have transportation. CM contacted patient's insurance company. The insurance company Agnieszka Mccallkristyn) stated they still have the patient's address as [place]. She stated that the patient can still receive transportation if he calls member services and ask for dialysis transport to be set up while he is getting his plan changed from [place] to [place]. CM did educate the patient on this process.    Flo Barnes, RN

## 2018-11-09 ENCOUNTER — TELEPHONE (OUTPATIENT)
Dept: VASCULAR SURGERY | Age: 33
End: 2018-11-09

## 2018-11-11 ENCOUNTER — HOSPITAL ENCOUNTER (INPATIENT)
Age: 33
LOS: 2 days | Discharge: HOME | DRG: 391 | End: 2018-11-14
Attending: EMERGENCY MEDICINE | Admitting: STUDENT IN AN ORGANIZED HEALTH CARE EDUCATION/TRAINING PROGRAM
Payer: COMMERCIAL

## 2018-11-11 ENCOUNTER — APPOINTMENT (OUTPATIENT)
Dept: GENERAL RADIOLOGY | Age: 33
DRG: 391 | End: 2018-11-11
Payer: COMMERCIAL

## 2018-11-11 ENCOUNTER — APPOINTMENT (OUTPATIENT)
Dept: CT IMAGING | Age: 33
DRG: 391 | End: 2018-11-11
Payer: COMMERCIAL

## 2018-11-11 DIAGNOSIS — R11.2 NAUSEA AND VOMITING, INTRACTABILITY OF VOMITING NOT SPECIFIED, UNSPECIFIED VOMITING TYPE: ICD-10-CM

## 2018-11-11 DIAGNOSIS — Z99.2 ESRD ON DIALYSIS (HCC): ICD-10-CM

## 2018-11-11 DIAGNOSIS — I10 ESSENTIAL HYPERTENSION: ICD-10-CM

## 2018-11-11 DIAGNOSIS — K52.9 COLITIS: Primary | ICD-10-CM

## 2018-11-11 DIAGNOSIS — R10.84 GENERALIZED ABDOMINAL PAIN: ICD-10-CM

## 2018-11-11 DIAGNOSIS — N18.6 ESRD ON DIALYSIS (HCC): ICD-10-CM

## 2018-11-11 LAB
ALBUMIN SERPL-MCNC: 4.2 G/DL (ref 3.5–5.2)
ALP BLD-CCNC: 276 U/L (ref 40–129)
ALT SERPL-CCNC: 10 U/L (ref 0–40)
ANION GAP SERPL CALCULATED.3IONS-SCNC: 19 MMOL/L (ref 7–16)
AST SERPL-CCNC: 17 U/L (ref 0–39)
BASOPHILS ABSOLUTE: 0.03 E9/L (ref 0–0.2)
BASOPHILS RELATIVE PERCENT: 0.5 % (ref 0–2)
BILIRUB SERPL-MCNC: 0.3 MG/DL (ref 0–1.2)
BUN BLDV-MCNC: 42 MG/DL (ref 6–20)
CALCIUM SERPL-MCNC: 8.6 MG/DL (ref 8.6–10.2)
CHLORIDE BLD-SCNC: 95 MMOL/L (ref 98–107)
CO2: 27 MMOL/L (ref 22–29)
CREAT SERPL-MCNC: 11.1 MG/DL (ref 0.7–1.2)
EKG ATRIAL RATE: 94 BPM
EKG P AXIS: 78 DEGREES
EKG P-R INTERVAL: 188 MS
EKG Q-T INTERVAL: 370 MS
EKG QRS DURATION: 92 MS
EKG QTC CALCULATION (BAZETT): 462 MS
EKG R AXIS: 79 DEGREES
EKG T AXIS: 77 DEGREES
EKG VENTRICULAR RATE: 94 BPM
EOSINOPHILS ABSOLUTE: 0.25 E9/L (ref 0.05–0.5)
EOSINOPHILS RELATIVE PERCENT: 4.2 % (ref 0–6)
GFR AFRICAN AMERICAN: 6
GFR NON-AFRICAN AMERICAN: 6 ML/MIN/1.73
GLUCOSE BLD-MCNC: 93 MG/DL (ref 74–99)
HCT VFR BLD CALC: 29.2 % (ref 37–54)
HEMOGLOBIN: 9.1 G/DL (ref 12.5–16.5)
IMMATURE GRANULOCYTES #: 0.03 E9/L
IMMATURE GRANULOCYTES %: 0.5 % (ref 0–5)
LACTIC ACID: 0.7 MMOL/L (ref 0.5–2.2)
LIPASE: 80 U/L (ref 13–60)
LYMPHOCYTES ABSOLUTE: 2.02 E9/L (ref 1.5–4)
LYMPHOCYTES RELATIVE PERCENT: 33.8 % (ref 20–42)
MCH RBC QN AUTO: 29.3 PG (ref 26–35)
MCHC RBC AUTO-ENTMCNC: 31.2 % (ref 32–34.5)
MCV RBC AUTO: 93.9 FL (ref 80–99.9)
MONOCYTES ABSOLUTE: 0.57 E9/L (ref 0.1–0.95)
MONOCYTES RELATIVE PERCENT: 9.5 % (ref 2–12)
NEUTROPHILS ABSOLUTE: 3.08 E9/L (ref 1.8–7.3)
NEUTROPHILS RELATIVE PERCENT: 51.5 % (ref 43–80)
PDW BLD-RTO: 16.6 FL (ref 11.5–15)
PHOSPHORUS: 1.5 MG/DL (ref 2.5–4.5)
PLATELET # BLD: 285 E9/L (ref 130–450)
PMV BLD AUTO: 9.2 FL (ref 7–12)
POTASSIUM REFLEX MAGNESIUM: 3.9 MMOL/L (ref 3.5–5)
RBC # BLD: 3.11 E12/L (ref 3.8–5.8)
SODIUM BLD-SCNC: 141 MMOL/L (ref 132–146)
TOTAL PROTEIN: 7.7 G/DL (ref 6.4–8.3)
TROPONIN: <0.01 NG/ML (ref 0–0.03)
WBC # BLD: 6 E9/L (ref 4.5–11.5)

## 2018-11-11 PROCEDURE — 36415 COLL VENOUS BLD VENIPUNCTURE: CPT

## 2018-11-11 PROCEDURE — G0378 HOSPITAL OBSERVATION PER HR: HCPCS

## 2018-11-11 PROCEDURE — 2580000003 HC RX 258: Performed by: EMERGENCY MEDICINE

## 2018-11-11 PROCEDURE — 84100 ASSAY OF PHOSPHORUS: CPT

## 2018-11-11 PROCEDURE — 96367 TX/PROPH/DG ADDL SEQ IV INF: CPT

## 2018-11-11 PROCEDURE — 6370000000 HC RX 637 (ALT 250 FOR IP): Performed by: EMERGENCY MEDICINE

## 2018-11-11 PROCEDURE — 96365 THER/PROPH/DIAG IV INF INIT: CPT

## 2018-11-11 PROCEDURE — 6370000000 HC RX 637 (ALT 250 FOR IP): Performed by: INTERNAL MEDICINE

## 2018-11-11 PROCEDURE — 96372 THER/PROPH/DIAG INJ SC/IM: CPT

## 2018-11-11 PROCEDURE — 80053 COMPREHEN METABOLIC PANEL: CPT

## 2018-11-11 PROCEDURE — 71045 X-RAY EXAM CHEST 1 VIEW: CPT

## 2018-11-11 PROCEDURE — 74176 CT ABD & PELVIS W/O CONTRAST: CPT

## 2018-11-11 PROCEDURE — 6360000002 HC RX W HCPCS: Performed by: STUDENT IN AN ORGANIZED HEALTH CARE EDUCATION/TRAINING PROGRAM

## 2018-11-11 PROCEDURE — 96374 THER/PROPH/DIAG INJ IV PUSH: CPT

## 2018-11-11 PROCEDURE — 87040 BLOOD CULTURE FOR BACTERIA: CPT

## 2018-11-11 PROCEDURE — 84484 ASSAY OF TROPONIN QUANT: CPT

## 2018-11-11 PROCEDURE — 93005 ELECTROCARDIOGRAM TRACING: CPT | Performed by: STUDENT IN AN ORGANIZED HEALTH CARE EDUCATION/TRAINING PROGRAM

## 2018-11-11 PROCEDURE — 94760 N-INVAS EAR/PLS OXIMETRY 1: CPT

## 2018-11-11 PROCEDURE — 96375 TX/PRO/DX INJ NEW DRUG ADDON: CPT

## 2018-11-11 PROCEDURE — 2500000003 HC RX 250 WO HCPCS: Performed by: EMERGENCY MEDICINE

## 2018-11-11 PROCEDURE — 6360000002 HC RX W HCPCS: Performed by: EMERGENCY MEDICINE

## 2018-11-11 PROCEDURE — 2580000003 HC RX 258: Performed by: STUDENT IN AN ORGANIZED HEALTH CARE EDUCATION/TRAINING PROGRAM

## 2018-11-11 PROCEDURE — 83605 ASSAY OF LACTIC ACID: CPT

## 2018-11-11 PROCEDURE — 99285 EMERGENCY DEPT VISIT HI MDM: CPT

## 2018-11-11 PROCEDURE — 85025 COMPLETE CBC W/AUTO DIFF WBC: CPT

## 2018-11-11 PROCEDURE — 83690 ASSAY OF LIPASE: CPT

## 2018-11-11 PROCEDURE — 6370000000 HC RX 637 (ALT 250 FOR IP): Performed by: STUDENT IN AN ORGANIZED HEALTH CARE EDUCATION/TRAINING PROGRAM

## 2018-11-11 RX ORDER — LOSARTAN POTASSIUM 50 MG/1
50 TABLET ORAL EVERY EVENING
Status: DISCONTINUED | OUTPATIENT
Start: 2018-11-11 | End: 2018-11-14 | Stop reason: HOSPADM

## 2018-11-11 RX ORDER — METOCLOPRAMIDE HYDROCHLORIDE 5 MG/ML
10 INJECTION INTRAMUSCULAR; INTRAVENOUS ONCE
Status: COMPLETED | OUTPATIENT
Start: 2018-11-11 | End: 2018-11-11

## 2018-11-11 RX ORDER — 0.9 % SODIUM CHLORIDE 0.9 %
500 INTRAVENOUS SOLUTION INTRAVENOUS ONCE
Status: COMPLETED | OUTPATIENT
Start: 2018-11-11 | End: 2018-11-11

## 2018-11-11 RX ORDER — DIPHENHYDRAMINE HCL 25 MG
12.5 TABLET ORAL ONCE
Status: COMPLETED | OUTPATIENT
Start: 2018-11-11 | End: 2018-11-11

## 2018-11-11 RX ORDER — METOCLOPRAMIDE HYDROCHLORIDE 5 MG/ML
10 INJECTION INTRAMUSCULAR; INTRAVENOUS ONCE
Status: DISCONTINUED | OUTPATIENT
Start: 2018-11-11 | End: 2018-11-14 | Stop reason: HOSPADM

## 2018-11-11 RX ORDER — SODIUM CHLORIDE 0.9 % (FLUSH) 0.9 %
10 SYRINGE (ML) INJECTION PRN
Status: DISCONTINUED | OUTPATIENT
Start: 2018-11-11 | End: 2018-11-14 | Stop reason: HOSPADM

## 2018-11-11 RX ORDER — CINACALCET 30 MG/1
180 TABLET, FILM COATED ORAL DAILY
Status: DISCONTINUED | OUTPATIENT
Start: 2018-11-12 | End: 2018-11-14 | Stop reason: HOSPADM

## 2018-11-11 RX ORDER — ONDANSETRON 2 MG/ML
4 INJECTION INTRAMUSCULAR; INTRAVENOUS ONCE
Status: COMPLETED | OUTPATIENT
Start: 2018-11-11 | End: 2018-11-11

## 2018-11-11 RX ORDER — ONDANSETRON 2 MG/ML
4 INJECTION INTRAMUSCULAR; INTRAVENOUS EVERY 6 HOURS PRN
Status: DISCONTINUED | OUTPATIENT
Start: 2018-11-11 | End: 2018-11-14 | Stop reason: HOSPADM

## 2018-11-11 RX ORDER — DEXTROSE AND SODIUM CHLORIDE 5; .45 G/100ML; G/100ML
INJECTION, SOLUTION INTRAVENOUS CONTINUOUS
Status: DISCONTINUED | OUTPATIENT
Start: 2018-11-11 | End: 2018-11-12

## 2018-11-11 RX ORDER — DIPHENHYDRAMINE HYDROCHLORIDE 50 MG/ML
25 INJECTION INTRAMUSCULAR; INTRAVENOUS ONCE
Status: COMPLETED | OUTPATIENT
Start: 2018-11-11 | End: 2018-11-11

## 2018-11-11 RX ORDER — FAMOTIDINE 20 MG/1
20 TABLET, FILM COATED ORAL ONCE
Status: COMPLETED | OUTPATIENT
Start: 2018-11-11 | End: 2018-11-11

## 2018-11-11 RX ORDER — SODIUM CHLORIDE 0.9 % (FLUSH) 0.9 %
10 SYRINGE (ML) INJECTION EVERY 12 HOURS SCHEDULED
Status: DISCONTINUED | OUTPATIENT
Start: 2018-11-11 | End: 2018-11-14 | Stop reason: HOSPADM

## 2018-11-11 RX ORDER — HYDRALAZINE HYDROCHLORIDE 20 MG/ML
10 INJECTION INTRAMUSCULAR; INTRAVENOUS ONCE
Status: COMPLETED | OUTPATIENT
Start: 2018-11-11 | End: 2018-11-11

## 2018-11-11 RX ORDER — HYDRALAZINE HYDROCHLORIDE 50 MG/1
50 TABLET, FILM COATED ORAL EVERY 8 HOURS
Status: DISCONTINUED | OUTPATIENT
Start: 2018-11-11 | End: 2018-11-14 | Stop reason: HOSPADM

## 2018-11-11 RX ADMIN — Medication 10 ML: at 21:58

## 2018-11-11 RX ADMIN — DIPHENHYDRAMINE HYDROCHLORIDE 25 MG: 50 INJECTION INTRAMUSCULAR; INTRAVENOUS at 14:33

## 2018-11-11 RX ADMIN — LOSARTAN POTASSIUM 50 MG: 50 TABLET, FILM COATED ORAL at 21:55

## 2018-11-11 RX ADMIN — METRONIDAZOLE 500 MG: 500 INJECTION, SOLUTION INTRAVENOUS at 18:29

## 2018-11-11 RX ADMIN — FAMOTIDINE 20 MG: 20 TABLET ORAL at 15:17

## 2018-11-11 RX ADMIN — HYDRALAZINE HYDROCHLORIDE 50 MG: 50 TABLET, FILM COATED ORAL at 21:56

## 2018-11-11 RX ADMIN — DEXTROSE AND SODIUM CHLORIDE: 5; 450 INJECTION, SOLUTION INTRAVENOUS at 22:09

## 2018-11-11 RX ADMIN — ONDANSETRON 4 MG: 2 INJECTION INTRAMUSCULAR; INTRAVENOUS at 15:17

## 2018-11-11 RX ADMIN — CEFTRIAXONE SODIUM 2 G: 2 INJECTION, POWDER, FOR SOLUTION INTRAMUSCULAR; INTRAVENOUS at 17:26

## 2018-11-11 RX ADMIN — METOCLOPRAMIDE 10 MG: 5 INJECTION, SOLUTION INTRAMUSCULAR; INTRAVENOUS at 13:37

## 2018-11-11 RX ADMIN — SODIUM CHLORIDE 500 ML: 9 INJECTION, SOLUTION INTRAVENOUS at 15:17

## 2018-11-11 RX ADMIN — SODIUM CHLORIDE 500 ML: 9 INJECTION, SOLUTION INTRAVENOUS at 13:37

## 2018-11-11 RX ADMIN — TRIMETHOBENZAMIDE HYDROCHLORIDE 200 MG: 100 INJECTION INTRAMUSCULAR at 17:31

## 2018-11-11 RX ADMIN — HYDRALAZINE HYDROCHLORIDE 10 MG: 20 INJECTION INTRAMUSCULAR; INTRAVENOUS at 13:37

## 2018-11-11 RX ADMIN — DIPHENHYDRAMINE HCL 12.5 MG: 25 TABLET ORAL at 21:56

## 2018-11-11 ASSESSMENT — PAIN DESCRIPTION - PAIN TYPE
TYPE: ACUTE PAIN
TYPE: ACUTE PAIN

## 2018-11-11 ASSESSMENT — PAIN SCALES - GENERAL
PAINLEVEL_OUTOF10: 9
PAINLEVEL_OUTOF10: 9

## 2018-11-11 ASSESSMENT — PAIN DESCRIPTION - DESCRIPTORS
DESCRIPTORS: SHARP
DESCRIPTORS: DISCOMFORT

## 2018-11-11 ASSESSMENT — PAIN DESCRIPTION - LOCATION
LOCATION: CHEST
LOCATION: ABDOMEN

## 2018-11-11 NOTE — ED PROVIDER NOTES
HPI:  11/11/18, Time: 12:39 PM       Coleman Linares is a 26 y.o. male w/ a hx of ESRD on HD presenting to the ED for chest pain, nausea vomiting, beginning a week ago. The complaint has been persistent, moderate in severity, and worsened by PO. No other changes but has not felt well and has had decreased PO. Patient denies fever/chills, cough, congestion, chest pain, shortness of breath, edema, headache, visual disturbances, focal paresthesias, focal weakness, diarrhea, constipation, dysuria, hematuria, trauma, neck or back pain or other complaints. ROS:   Review of Systems - Negative except as mentioned in HPI    --------------------------------------------- PAST HISTORY ---------------------------------------------  Past Medical History:  has a past medical history of Anxiety; AVF (arteriovenous fistula) ([address] Chronic kidney disease; Depression; Encounter regarding vascular access for dialysis for ESRD ([facility] Hypertension; Hypothyroidism; Intracranial hemorrhage ([facility] and Noncompliance w/medication treatment due to intermit use of medication. Past Surgical History:  has a past surgical history that includes Brain aneurysm surgery (Right, 3-4 years ago) and Dialysis fistula creation (Left, 11 years ago). Social History:  reports that he has quit smoking. His smoking use included Cigarettes. He has never used smokeless tobacco. He reports that he uses drugs, including Marijuana. He reports that he does not drink alcohol. Family History: family history includes Kidney Disease in his paternal grandmother. The patients home medications have been reviewed. Allergies: Tylenol [acetaminophen]    ---------------------------------------------------PHYSICAL EXAM--------------------------------------  Physical Exam   Constitutional: He is oriented to person, place, and time. He appears well-developed and well-nourished. He appears distressed (Retching on exam).    HENT:   Head: Normocephalic and E9/L    MPV 9.2 7.0 - 12.0 fL    Neutrophils % 51.5 43.0 - 80.0 %    Immature Granulocytes % 0.5 0.0 - 5.0 %    Lymphocytes % 33.8 20.0 - 42.0 %    Monocytes % 9.5 2.0 - 12.0 %    Eosinophils % 4.2 0.0 - 6.0 %    Basophils % 0.5 0.0 - 2.0 %    Neutrophils # 3.08 1.80 - 7.30 E9/L    Immature Granulocytes # 0.03 E9/L    Lymphocytes # 2.02 1.50 - 4.00 E9/L    Monocytes # 0.57 0.10 - 0.95 E9/L    Eosinophils # 0.25 0.05 - 0.50 E9/L    Basophils # 0.03 0.00 - 0.20 E9/L   Troponin   Result Value Ref Range    Troponin <0.01 0.00 - 0.03 ng/mL   Lipase   Result Value Ref Range    Lipase 80 (H) 13 - 60 U/L   Lactic Acid, Plasma   Result Value Ref Range    Lactic Acid 0.7 0.5 - 2.2 mmol/L   Phosphorus   Result Value Ref Range    Phosphorus 1.5 (L) 2.5 - 4.5 mg/dL   EKG 12 Lead   Result Value Ref Range    Ventricular Rate 94 BPM    Atrial Rate 94 BPM    P-R Interval 188 ms    QRS Duration 92 ms    Q-T Interval 370 ms    QTc Calculation (Bazett) 462 ms    P Axis 78 degrees    R Axis 79 degrees    T Axis 77 degrees       RADIOLOGY:  Interpreted by Radiologist.  CT ABDOMEN PELVIS WO CONTRAST Additional Contrast? None   Final Result      Possible colonic wall thickening/edema of the ascending and proximal   transverse colon which can be seen in the setting of colitis, though   evaluation is extremely limited without the use of intravenous   contrast.      Otherwise, no acute intra-abdominal/pelvic process. Renal osteodystrophy.         ==========               XR CHEST PORTABLE   Final Result      Lines, tubes, and devices:  Vascular stent in the left axilla. Lungs and pleura:  No consolidation. No lung mass. No pleural   effusion. Cardiomediastinal silhouette:  Unchanged, enlarged cardiomediastinal   silhouette. Other:  Bony structures are unremarkable.                  EKG Interpretation  Time: 1234  Rhythm: normal sinus   Rate: normal  Axis: normal  Conduction: normal  ST Segments: no acute change  T Waves: no acute change  Clinical Impression: no acute changes  Comparison to prior EKG: stable as compared to patient's most recent EKG      ------------------------- NURSING NOTES AND VITALS REVIEWED ---------------------------   The nursing notes within the ED encounter and vital signs as below have been reviewed by myself. BP (!) 151/84   Pulse 110   Temp 98.7 °F (37.1 °C) (Temporal)   Resp 16   Ht 5' 6\" (1.676 m)   Wt 149 lb (67.6 kg)   SpO2 98%   BMI 24.05 kg/m²   Oxygen Saturation Interpretation: Normal    The patients available past medical records and past encounters were reviewed.         ------------------------------ ED COURSE/MEDICAL DECISION MAKING----------------------  Medications   metoclopramide (REGLAN) injection 10 mg (10 mg Intravenous Not Given 11/11/18 1339)   metronidazole (FLAGYL) 500 mg in NaCl 100 mL IVPB premix (not administered)   0.9 % sodium chloride bolus (0 mLs Intravenous Stopped 11/11/18 1414)   hydrALAZINE (APRESOLINE) injection 10 mg (10 mg Intravenous Given 11/11/18 1337)   metoclopramide (REGLAN) injection 10 mg (10 mg Intravenous Given 11/11/18 1337)   diphenhydrAMINE (BENADRYL) injection 25 mg (25 mg Intravenous Given 11/11/18 1433)   famotidine (PEPCID) tablet 20 mg (20 mg Oral Given 11/11/18 1517)   ondansetron (ZOFRAN) injection 4 mg (4 mg Intravenous Given 11/11/18 1517)   0.9 % sodium chloride bolus (0 mLs Intravenous Stopped 11/11/18 1616)   trimethobenzamide (TIGAN) injection 200 mg (200 mg Intramuscular Given 11/11/18 1731)   cefTRIAXone (ROCEPHIN) 2 g IVPB in D5W 50ml minibag (0 g Intravenous Stopped 11/11/18 1801)     Procedures:  None     Medical Decision Making:   ED Course as of Nov 11 1815   Sun Nov 11, 2018   1750 Nausea, vomiting for a week with CT findings of possible colitis, started IV abx and will admit to obvs for further monitoring, will need dialysis tomorrow, nausea improved somewhat with zofran   [AO]      ED Course User Index  [AO] Mayito Hoskins MD     This patient's ED course included: IV medications, complex medical decision making and emergency management and a personal history and physicial eaxmination    This patient has remained hemodynamically stable during their ED course. Consultations:             hospitalist- spoke with Dr. Lauren Coughlin, will admit    nephro- made aware due to dialysis for tomorrow     Critical Care: none     Counseling: The emergency provider has spoken with the patient and discussed todays results, in addition to providing specific details for the plan of care and counseling regarding the diagnosis and prognosis. Questions are answered at this time and they are agreeable with the plan.       --------------------------------- IMPRESSION AND DISPOSITION ---------------------------------    IMPRESSION  1. Colitis    2. Generalized abdominal pain    3. ESRD on dialysis ([address]    4. Nausea and vomiting, intractability of vomiting not specified, unspecified vomiting type    5. Essential hypertension        DISPOSITION  Disposition: Admit to telemetry  Patient condition is stable                 Nestor Doan MD  Resident  11/11/18 9263      ATTENDING PROVIDER ATTESTATION:     Sonido Stapleton presented to the emergency department for evaluation of Chest Pain (chest pain and sob starting 1 week ago, pt is also dizzy )    I have reviewed and discussed the case, including pertinent history (medical, surgical, family and social) and exam findings with the Resident and the Nurse assigned to Sonido Baltazarce. I have personally performed and/or participated in the history, exam, medical decision making, and procedures and agree with all pertinent clinical information. I have reviewed my findings and recommendations with Sonido Stapleton and members of family present at the time of disposition. My findings/plan: The primary encounter diagnosis was Colitis.  Diagnoses of Generalized abdominal pain, ESRD on dialysis ([address] Nausea and

## 2018-11-11 NOTE — H&P
rate and rhythm with normal S1/S2 without murmurs, rubs or gallops. Brisk capillary refill. 2+ lower extremity pulses (dorsalis pedis). Abdomen: Soft, non-tender, non-distended with normal bowel sounds. Musculoskeletal: No clubbing, cyanosis or edema bilaterally. Full range of motion without deformity. Brisk capillary refill. 2+ lower extremity pulses (dorsalis pedis). Left arm fistula. Skin: Normal skin color. No rashes or lesions. Neurologic:  Neurovascularly intact without any focal sensory/motor deficits. Cranial nerves: II-XII intact, grossly non-focal.  Psychiatric: Alert and oriented, thought content appropriate, normal insight          Labs:     Recent Labs      11/11/18   1330   WBC  6.0   HGB  9.1*   HCT  29.2*   PLT  285     Recent Labs      11/11/18   1330   NA  141   K  3.9   CL  95*   CO2  27   BUN  42*   CREATININE  11.1*   CALCIUM  8.6   PHOS  1.5*     Recent Labs      11/11/18   1330   AST  17   ALT  10   BILITOT  0.3   ALKPHOS  276*     No results for input(s): INR in the last 72 hours. Recent Labs      11/11/18   1330   TROPONINI  <0.01       Urinalysis:    No results found for: NITRU, [address], BACTERIA, RBCUA, BLOODU, SPECGRAV, GLUCOSEU    Imaging:  CT ABDOMEN PELVIS WO CONTRAST Additional Contrast? None   Final Result      Possible colonic wall thickening/edema of the ascending and proximal   transverse colon which can be seen in the setting of colitis, though   evaluation is extremely limited without the use of intravenous   contrast.      Otherwise, no acute intra-abdominal/pelvic process. Renal osteodystrophy.         ==========               XR CHEST PORTABLE   Final Result      Lines, tubes, and devices:  Vascular stent in the left axilla. Lungs and pleura:  No consolidation. No lung mass. No pleural   effusion. Cardiomediastinal silhouette:  Unchanged, enlarged cardiomediastinal   silhouette. Other:  Bony structures are unremarkable.

## 2018-11-12 LAB
ALBUMIN SERPL-MCNC: 3.9 G/DL (ref 3.5–5.2)
ALP BLD-CCNC: 272 U/L (ref 40–129)
ALT SERPL-CCNC: 13 U/L (ref 0–40)
ANION GAP SERPL CALCULATED.3IONS-SCNC: 21 MMOL/L (ref 7–16)
AST SERPL-CCNC: 27 U/L (ref 0–39)
BILIRUB SERPL-MCNC: 0.4 MG/DL (ref 0–1.2)
BILIRUBIN DIRECT: <0.2 MG/DL (ref 0–0.3)
BILIRUBIN, INDIRECT: ABNORMAL MG/DL (ref 0–1)
BUN BLDV-MCNC: 46 MG/DL (ref 6–20)
CALCIUM SERPL-MCNC: 8.2 MG/DL (ref 8.6–10.2)
CHLORIDE BLD-SCNC: 95 MMOL/L (ref 98–107)
CO2: 24 MMOL/L (ref 22–29)
CREAT SERPL-MCNC: 12.9 MG/DL (ref 0.7–1.2)
GFR AFRICAN AMERICAN: 5
GFR NON-AFRICAN AMERICAN: 5 ML/MIN/1.73
GLUCOSE BLD-MCNC: 81 MG/DL (ref 74–99)
HCT VFR BLD CALC: 29.5 % (ref 37–54)
HEMOGLOBIN: 9.1 G/DL (ref 12.5–16.5)
MCH RBC QN AUTO: 29.4 PG (ref 26–35)
MCHC RBC AUTO-ENTMCNC: 30.8 % (ref 32–34.5)
MCV RBC AUTO: 95.5 FL (ref 80–99.9)
PDW BLD-RTO: 17.1 FL (ref 11.5–15)
PLATELET # BLD: 268 E9/L (ref 130–450)
PMV BLD AUTO: 9 FL (ref 7–12)
POTASSIUM REFLEX MAGNESIUM: 4 MMOL/L (ref 3.5–5)
RBC # BLD: 3.09 E12/L (ref 3.8–5.8)
SODIUM BLD-SCNC: 140 MMOL/L (ref 132–146)
TOTAL PROTEIN: 7.5 G/DL (ref 6.4–8.3)
WBC # BLD: 5.5 E9/L (ref 4.5–11.5)

## 2018-11-12 PROCEDURE — 6370000000 HC RX 637 (ALT 250 FOR IP): Performed by: STUDENT IN AN ORGANIZED HEALTH CARE EDUCATION/TRAINING PROGRAM

## 2018-11-12 PROCEDURE — 2140000000 HC CCU INTERMEDIATE R&B

## 2018-11-12 PROCEDURE — 2500000003 HC RX 250 WO HCPCS: Performed by: STUDENT IN AN ORGANIZED HEALTH CARE EDUCATION/TRAINING PROGRAM

## 2018-11-12 PROCEDURE — 2580000003 HC RX 258: Performed by: STUDENT IN AN ORGANIZED HEALTH CARE EDUCATION/TRAINING PROGRAM

## 2018-11-12 PROCEDURE — 6370000000 HC RX 637 (ALT 250 FOR IP): Performed by: INTERNAL MEDICINE

## 2018-11-12 PROCEDURE — 6360000002 HC RX W HCPCS: Performed by: STUDENT IN AN ORGANIZED HEALTH CARE EDUCATION/TRAINING PROGRAM

## 2018-11-12 PROCEDURE — 96366 THER/PROPH/DIAG IV INF ADDON: CPT

## 2018-11-12 PROCEDURE — 80076 HEPATIC FUNCTION PANEL: CPT

## 2018-11-12 PROCEDURE — 6370000000 HC RX 637 (ALT 250 FOR IP): Performed by: FAMILY MEDICINE

## 2018-11-12 PROCEDURE — 6360000002 HC RX W HCPCS: Performed by: INTERNAL MEDICINE

## 2018-11-12 PROCEDURE — 80048 BASIC METABOLIC PNL TOTAL CA: CPT

## 2018-11-12 PROCEDURE — 5A1D70Z PERFORMANCE OF URINARY FILTRATION, INTERMITTENT, LESS THAN 6 HOURS PER DAY: ICD-10-PCS | Performed by: INTERNAL MEDICINE

## 2018-11-12 PROCEDURE — 90935 HEMODIALYSIS ONE EVALUATION: CPT | Performed by: INTERNAL MEDICINE

## 2018-11-12 PROCEDURE — 36415 COLL VENOUS BLD VENIPUNCTURE: CPT

## 2018-11-12 PROCEDURE — 96376 TX/PRO/DX INJ SAME DRUG ADON: CPT

## 2018-11-12 PROCEDURE — 85027 COMPLETE CBC AUTOMATED: CPT

## 2018-11-12 RX ORDER — HYDRALAZINE HYDROCHLORIDE 20 MG/ML
10 INJECTION INTRAMUSCULAR; INTRAVENOUS
Status: COMPLETED | OUTPATIENT
Start: 2018-11-12 | End: 2018-11-12

## 2018-11-12 RX ORDER — CLONIDINE HYDROCHLORIDE 0.2 MG/1
0.2 TABLET ORAL 3 TIMES DAILY
Status: DISCONTINUED | OUTPATIENT
Start: 2018-11-12 | End: 2018-11-13

## 2018-11-12 RX ORDER — DIPHENHYDRAMINE HCL 25 MG
25 TABLET ORAL EVERY 6 HOURS PRN
Status: DISCONTINUED | OUTPATIENT
Start: 2018-11-12 | End: 2018-11-13

## 2018-11-12 RX ORDER — HYDROMORPHONE HYDROCHLORIDE 2 MG/1
0.5 TABLET ORAL ONCE
Status: COMPLETED | OUTPATIENT
Start: 2018-11-12 | End: 2018-11-12

## 2018-11-12 RX ORDER — HYDRALAZINE HYDROCHLORIDE 20 MG/ML
5 INJECTION INTRAMUSCULAR; INTRAVENOUS ONCE
Status: COMPLETED | OUTPATIENT
Start: 2018-11-12 | End: 2018-11-12

## 2018-11-12 RX ORDER — AZITHROMYCIN 500 MG/1
500 TABLET, FILM COATED ORAL DAILY
Qty: 3 TABLET | Refills: 0 | Status: SHIPPED | OUTPATIENT
Start: 2018-11-12 | End: 2018-11-15

## 2018-11-12 RX ORDER — AZITHROMYCIN 250 MG/1
500 TABLET, FILM COATED ORAL DAILY
Status: COMPLETED | OUTPATIENT
Start: 2018-11-12 | End: 2018-11-14

## 2018-11-12 RX ADMIN — CINACALCET HYDROCHLORIDE 180 MG: 30 TABLET, COATED ORAL at 08:25

## 2018-11-12 RX ADMIN — HYDRALAZINE HYDROCHLORIDE 50 MG: 50 TABLET, FILM COATED ORAL at 22:32

## 2018-11-12 RX ADMIN — DIPHENHYDRAMINE HCL 25 MG: 25 TABLET ORAL at 13:17

## 2018-11-12 RX ADMIN — HYDRALAZINE HYDROCHLORIDE 10 MG: 20 INJECTION INTRAMUSCULAR; INTRAVENOUS at 16:35

## 2018-11-12 RX ADMIN — METRONIDAZOLE 500 MG: 500 INJECTION, SOLUTION INTRAVENOUS at 20:12

## 2018-11-12 RX ADMIN — CALCIUM ACETATE 1334 MG: 667 CAPSULE ORAL at 08:25

## 2018-11-12 RX ADMIN — CLONIDINE HYDROCHLORIDE 0.2 MG: 0.2 TABLET ORAL at 19:46

## 2018-11-12 RX ADMIN — HYDRALAZINE HYDROCHLORIDE 5 MG: 20 INJECTION INTRAMUSCULAR; INTRAVENOUS at 11:47

## 2018-11-12 RX ADMIN — AZITHROMYCIN 500 MG: 250 TABLET, FILM COATED ORAL at 10:32

## 2018-11-12 RX ADMIN — METRONIDAZOLE 500 MG: 500 INJECTION, SOLUTION INTRAVENOUS at 02:38

## 2018-11-12 RX ADMIN — HYDROMORPHONE HYDROCHLORIDE 0.5 MG: 2 TABLET ORAL at 04:16

## 2018-11-12 RX ADMIN — CALCIUM ACETATE 1334 MG: 667 CAPSULE ORAL at 11:48

## 2018-11-12 RX ADMIN — Medication 1 MG: at 04:18

## 2018-11-12 RX ADMIN — ONDANSETRON 4 MG: 2 INJECTION INTRAMUSCULAR; INTRAVENOUS at 02:40

## 2018-11-12 RX ADMIN — HYDRALAZINE HYDROCHLORIDE 50 MG: 50 TABLET, FILM COATED ORAL at 16:18

## 2018-11-12 RX ADMIN — ONDANSETRON 4 MG: 2 INJECTION INTRAMUSCULAR; INTRAVENOUS at 11:47

## 2018-11-12 RX ADMIN — LOSARTAN POTASSIUM 50 MG: 50 TABLET, FILM COATED ORAL at 19:46

## 2018-11-12 RX ADMIN — HYDRALAZINE HYDROCHLORIDE 10 MG: 20 INJECTION INTRAMUSCULAR; INTRAVENOUS at 15:51

## 2018-11-12 RX ADMIN — HYDRALAZINE HYDROCHLORIDE 50 MG: 50 TABLET, FILM COATED ORAL at 06:36

## 2018-11-12 RX ADMIN — Medication 10 ML: at 20:15

## 2018-11-12 ASSESSMENT — PAIN DESCRIPTION - LOCATION: LOCATION: HEAD

## 2018-11-12 ASSESSMENT — PAIN DESCRIPTION - ONSET: ONSET: GRADUAL

## 2018-11-12 ASSESSMENT — PAIN SCALES - GENERAL
PAINLEVEL_OUTOF10: 9
PAINLEVEL_OUTOF10: 0
PAINLEVEL_OUTOF10: 10

## 2018-11-12 ASSESSMENT — PAIN DESCRIPTION - PAIN TYPE: TYPE: ACUTE PAIN

## 2018-11-12 ASSESSMENT — PAIN DESCRIPTION - DESCRIPTORS: DESCRIPTORS: ACHING

## 2018-11-12 ASSESSMENT — PAIN DESCRIPTION - FREQUENCY: FREQUENCY: CONTINUOUS

## 2018-11-12 NOTE — CONSULTS
11/12/18 0025 (!) 156/100 97.4 °F (36.3 °C) Temporal 91 15 - - -   11/11/18 2015 (!) 151/96 98.8 °F (37.1 °C) Temporal 93 16 96 % - -   11/11/18 1915 (!) 165/95 98.4 °F (36.9 °C) Temporal 102 16 97 % - -   11/11/18 1434 (!) 151/84 - - - - - - -   11/11/18 1414 (!) 151/91 - - 110 16 98 % - -   11/11/18 1229 (!) 164/116 98.7 °F (37.1 °C) Temporal 101 18 98 % 5' 6\" (1.676 m) 149 lb (67.6 kg)       No intake or output data in the 24 hours ending 11/12/18 1147    Constitutional: Patient in no acute distress   Head: normocephalic, atraumatic   Neck: supple, no jvd  Cardiovascular: regular rate and rhythm, no murmurs, gallops, or rubs   Respiratory: Clear, no rales, rhochi, or wheezes,   Gastrointestinal: soft, nontender, nondistended, no hepatosplenomegaly  Ext: no edema  Neuro: aaox3  Skin: dry, no rash   Back: nontender    Data:    Recent Labs      11/11/18   1330  11/12/18   0440   WBC  6.0  5.5   HGB  9.1*  9.1*   HCT  29.2*  29.5*   MCV  93.9  95.5   PLT  285  268       Recent Labs      11/11/18   1330  11/12/18   0440   NA  141  140   K  3.9  4.0   CL  95*  95*   CO2  27  24   CREATININE  11.1*  12.9*   BUN  42*  46*   LABGLOM  6  5   GLUCOSE  93  81   CALCIUM  8.6  8.2*       Recent Labs      11/11/18   1330  11/12/18   0440   ALT  10  13   AST  17  27   ALKPHOS  276*  272*   BILITOT  0.3  0.4   BILIDIR   --   <0.2       Lab Results   Component Value Date    FERRITIN 898 09/18/2015    IRON 86 09/18/2015    TIBC 166 (L) 09/18/2015       Lab Results   Component Value Date    MG 3.2 (H) 10/02/2018    PHOS 1.5 (L) 11/11/2018       Lab Results   Component Value Date    LABALBU 3.9 11/12/2018    LABALBU 4.2 11/11/2018    LABALBU 3.9 10/18/2018       Assessment and Plan:    1. esrd  For hd mwf per his usual schedule     2. Nausea. Vomiting  Colitis on ct  On empiric abx    3. htn  Follow on outpt meds including hydralazine, losartan    4. Hypophosphatemia  Hold phos binder    5. Anemia  Dose sierra    Caryl Marx.  Yash,

## 2018-11-12 NOTE — DISCHARGE SUMMARY
wall thickening/edema of the ascending and proximal   transverse colon which can be seen in the setting of colitis, though   evaluation is extremely limited without the use of intravenous   contrast.      Otherwise, no acute intra-abdominal/pelvic process. Renal osteodystrophy.         ==========               XR CHEST PORTABLE   Final Result      Lines, tubes, and devices:  Vascular stent in the left axilla. Lungs and pleura:  No consolidation. No lung mass. No pleural   effusion. Cardiomediastinal silhouette:  Unchanged, enlarged cardiomediastinal   silhouette. Other:  Bony structures are unremarkable. Discharge Medications:     Current Discharge Medication List           Details   azithromycin (ZITHROMAX) 500 MG tablet Take 1 tablet by mouth daily for 3 days  Qty: 3 tablet, Refills: 0      influenza quadrivalent split vaccine (FLUZONE;FLUARIX;FLULAVAL;AFLURIA) 0.5 ML injection Inject 0.5 mLs into the muscle once for 1 dose  Qty: 0.5 mL, Refills: 0              Details   hydrALAZINE (APRESOLINE) 50 MG tablet Take 50 mg by mouth every 8 hours      cinacalcet (SENSIPAR) 90 MG tablet Take 180 mg by mouth daily      temazepam (RESTORIL) 30 MG capsule Take 30 mg by mouth nightly as needed for Sleep. .      diphenhydrAMINE (BENADRYL) 50 MG capsule Take 50 mg by mouth 3 times daily as needed for Itching      calcium acetate (PHOSLO) 667 MG capsule Take 1,334 mg by mouth 3 times daily (with meals)      losartan (COZAAR) 50 MG tablet Take 50 mg by mouth every evening       nitroGLYCERIN (NITROSTAT) 0.4 MG SL tablet Place 0.4 mg under the tongue every 5 minutes as needed for Chest pain up to max of 3 total doses. If no relief after 1 dose, call 911. Time Spent on discharge is more than 31 min in the examination, evaluation, counseling and review of medications and discharge plan. Deepali Mcnair MD   11/12/2018      Thank you No primary care provider on file.

## 2018-11-12 NOTE — PROGRESS NOTES
non-tender, non-distended with normal bowel sounds. Musculoskeletal: No clubbing, cyanosis or edema bilaterally. Full range of motion without deformity. Brisk capillary refill. 2+ lower extremity pulses (dorsalis pedis). Left arm fistula. Skin: Normal skin color. No rashes or lesions. Neurologic:  Neurovascularly intact without any focal sensory/motor deficits. Cranial nerves: II-XII intact, grossly non-focal.  Psychiatric: Alert and oriented, thought content appropriate, normal insight      Labs:   Recent Labs      11/11/18   1330  11/12/18   0440   WBC  6.0  5.5   HGB  9.1*  9.1*   HCT  29.2*  29.5*   PLT  285  268     Recent Labs      11/11/18   1330  11/12/18   0440   NA  141  140   K  3.9  4.0   CL  95*  95*   CO2  27  24   BUN  42*  46*   CREATININE  11.1*  12.9*   CALCIUM  8.6  8.2*   PHOS  1.5*   --      Recent Labs      11/11/18   1330  11/12/18   0440   AST  17  27   ALT  10  13   BILIDIR   --   <0.2   BILITOT  0.3  0.4   ALKPHOS  276*  272*     No results for input(s): INR in the last 72 hours. Recent Labs      11/11/18   1330   TROPONINI  <0.01       Imaging:  CT ABDOMEN PELVIS WO CONTRAST Additional Contrast? None   Final Result      Possible colonic wall thickening/edema of the ascending and proximal   transverse colon which can be seen in the setting of colitis, though   evaluation is extremely limited without the use of intravenous   contrast.      Otherwise, no acute intra-abdominal/pelvic process. Renal osteodystrophy.         ==========               XR CHEST PORTABLE   Final Result      Lines, tubes, and devices:  Vascular stent in the left axilla. Lungs and pleura:  No consolidation. No lung mass. No pleural   effusion. Cardiomediastinal silhouette:  Unchanged, enlarged cardiomediastinal   silhouette. Other:  Bony structures are unremarkable.                      Assessment/Plan:  Active Hospital Problems    Diagnosis Date Noted    Colitis [K52.9] 11/11/2018

## 2018-11-12 NOTE — PROGRESS NOTES
Arely served Dr. Lavonne Schwartz regarding patients allergy to Tylenol, patient still complaining of pain, and requesting home medication Restoril.

## 2018-11-13 LAB — TROPONIN: <0.01 NG/ML (ref 0–0.03)

## 2018-11-13 PROCEDURE — 2580000003 HC RX 258: Performed by: STUDENT IN AN ORGANIZED HEALTH CARE EDUCATION/TRAINING PROGRAM

## 2018-11-13 PROCEDURE — 6370000000 HC RX 637 (ALT 250 FOR IP): Performed by: INTERNAL MEDICINE

## 2018-11-13 PROCEDURE — 36415 COLL VENOUS BLD VENIPUNCTURE: CPT

## 2018-11-13 PROCEDURE — 6370000000 HC RX 637 (ALT 250 FOR IP): Performed by: STUDENT IN AN ORGANIZED HEALTH CARE EDUCATION/TRAINING PROGRAM

## 2018-11-13 PROCEDURE — 2500000003 HC RX 250 WO HCPCS: Performed by: STUDENT IN AN ORGANIZED HEALTH CARE EDUCATION/TRAINING PROGRAM

## 2018-11-13 PROCEDURE — 2060000000 HC ICU INTERMEDIATE R&B

## 2018-11-13 PROCEDURE — 84484 ASSAY OF TROPONIN QUANT: CPT

## 2018-11-13 PROCEDURE — 6360000002 HC RX W HCPCS: Performed by: STUDENT IN AN ORGANIZED HEALTH CARE EDUCATION/TRAINING PROGRAM

## 2018-11-13 RX ORDER — PANTOPRAZOLE SODIUM 40 MG/1
40 TABLET, DELAYED RELEASE ORAL
Status: DISCONTINUED | OUTPATIENT
Start: 2018-11-13 | End: 2018-11-14 | Stop reason: HOSPADM

## 2018-11-13 RX ORDER — CLONIDINE HYDROCHLORIDE 0.1 MG/1
0.1 TABLET ORAL 3 TIMES DAILY
Status: DISCONTINUED | OUTPATIENT
Start: 2018-11-13 | End: 2018-11-14 | Stop reason: HOSPADM

## 2018-11-13 RX ORDER — DIPHENHYDRAMINE HYDROCHLORIDE 50 MG/ML
10 INJECTION INTRAMUSCULAR; INTRAVENOUS EVERY 6 HOURS PRN
Status: DISCONTINUED | OUTPATIENT
Start: 2018-11-13 | End: 2018-11-14 | Stop reason: HOSPADM

## 2018-11-13 RX ADMIN — DIPHENHYDRAMINE HCL 25 MG: 25 TABLET ORAL at 01:32

## 2018-11-13 RX ADMIN — CINACALCET HYDROCHLORIDE 180 MG: 30 TABLET, COATED ORAL at 11:00

## 2018-11-13 RX ADMIN — METRONIDAZOLE 500 MG: 500 INJECTION, SOLUTION INTRAVENOUS at 20:40

## 2018-11-13 RX ADMIN — ONDANSETRON 4 MG: 2 INJECTION INTRAMUSCULAR; INTRAVENOUS at 12:21

## 2018-11-13 RX ADMIN — DIPHENHYDRAMINE HYDROCHLORIDE 10 MG: 50 INJECTION, SOLUTION INTRAMUSCULAR; INTRAVENOUS at 11:25

## 2018-11-13 RX ADMIN — CLONIDINE HYDROCHLORIDE 0.1 MG: 0.1 TABLET ORAL at 11:00

## 2018-11-13 RX ADMIN — Medication 10 ML: at 20:40

## 2018-11-13 RX ADMIN — PANTOPRAZOLE SODIUM 40 MG: 40 TABLET, DELAYED RELEASE ORAL at 13:45

## 2018-11-13 RX ADMIN — HYDRALAZINE HYDROCHLORIDE 50 MG: 50 TABLET, FILM COATED ORAL at 20:39

## 2018-11-13 RX ADMIN — HYDRALAZINE HYDROCHLORIDE 50 MG: 50 TABLET, FILM COATED ORAL at 13:45

## 2018-11-13 RX ADMIN — CLONIDINE HYDROCHLORIDE 0.1 MG: 0.1 TABLET ORAL at 20:39

## 2018-11-13 RX ADMIN — METRONIDAZOLE 500 MG: 500 INJECTION, SOLUTION INTRAVENOUS at 11:01

## 2018-11-13 RX ADMIN — POTASSIUM & SODIUM PHOSPHATES POWDER PACK 280-160-250 MG 250 MG: 280-160-250 PACK at 14:02

## 2018-11-13 RX ADMIN — LOSARTAN POTASSIUM 50 MG: 50 TABLET, FILM COATED ORAL at 17:32

## 2018-11-13 RX ADMIN — AZITHROMYCIN 500 MG: 250 TABLET, FILM COATED ORAL at 11:00

## 2018-11-13 RX ADMIN — HYDRALAZINE HYDROCHLORIDE 50 MG: 50 TABLET, FILM COATED ORAL at 06:43

## 2018-11-13 RX ADMIN — POTASSIUM & SODIUM PHOSPHATES POWDER PACK 280-160-250 MG 250 MG: 280-160-250 PACK at 17:32

## 2018-11-13 RX ADMIN — METRONIDAZOLE 500 MG: 500 INJECTION, SOLUTION INTRAVENOUS at 03:42

## 2018-11-13 RX ADMIN — Medication 10 ML: at 11:25

## 2018-11-13 RX ADMIN — DIPHENHYDRAMINE HYDROCHLORIDE 10 MG: 50 INJECTION, SOLUTION INTRAMUSCULAR; INTRAVENOUS at 20:40

## 2018-11-13 RX ADMIN — Medication 10 ML: at 11:00

## 2018-11-13 RX ADMIN — Medication 1 MG: at 02:53

## 2018-11-13 ASSESSMENT — PAIN DESCRIPTION - PROGRESSION
CLINICAL_PROGRESSION: GRADUALLY IMPROVING
CLINICAL_PROGRESSION: GRADUALLY IMPROVING

## 2018-11-13 ASSESSMENT — PAIN SCALES - GENERAL
PAINLEVEL_OUTOF10: 8
PAINLEVEL_OUTOF10: 0
PAINLEVEL_OUTOF10: 0
PAINLEVEL_OUTOF10: 9

## 2018-11-13 ASSESSMENT — PAIN DESCRIPTION - FREQUENCY
FREQUENCY: CONTINUOUS
FREQUENCY: CONTINUOUS

## 2018-11-13 ASSESSMENT — PAIN DESCRIPTION - PAIN TYPE
TYPE: ACUTE PAIN

## 2018-11-13 ASSESSMENT — PAIN DESCRIPTION - DESCRIPTORS
DESCRIPTORS: PRESSURE
DESCRIPTORS: PRESSURE

## 2018-11-13 ASSESSMENT — PAIN DESCRIPTION - ORIENTATION: ORIENTATION: LEFT;UPPER;MID

## 2018-11-13 ASSESSMENT — PAIN DESCRIPTION - LOCATION
LOCATION: CHEST;ARM;HEAD
LOCATION: CHEST

## 2018-11-13 ASSESSMENT — PAIN DESCRIPTION - ONSET
ONSET: GRADUAL
ONSET: GRADUAL

## 2018-11-13 NOTE — PROGRESS NOTES
Department of Internal Medicine  Nephrology Attending Progress Note    SUBJECTIVE:  We are following this patient for end-stage renal failure .      11/13: sp hd yesterday, bp was running high    PROBLEM LIST:    Patient Active Problem List   Diagnosis    Hypertensive urgency    [facility] ([facility]    H/O intracranial hemorrhage and aneurysm clipping on the right side    Anxiety    History of ruptured [place] aneurysm ([facility]    Elevated C-reactive protein (CRP)    Fever and chills    Bradycardia    Noncompliance w/medication treatment due to intermit use of medication    Hypertensive urgency    Seizure ([facility]    [facility] for [facility]    AVF (arteriovenous fistula) ([facility]    Colitis        PAST MEDICAL HISTORY:    Past Medical History:   Diagnosis Date    Anxiety 9/19/2015    AVF (arteriovenous fistula) ([facility] 4/30/2018    Chronic kidney disease     CKD since early childhood per pt and on HD ~ 11 years    Depression     Encounter regarding vascular access for dialysis for ESRD ([facility] 4/30/2018    Hypertension     on meds for ~ 11 years    Hypothyroidism     Intracranial hemorrhage ([facility]     was d/t ruptured aneurysm - pt underwent neurosurgery for clipping of the aneurysm    Noncompliance w/medication treatment due to intermit use of medication 11/3/2015       MEDS (scheduled):   cloNIDine  0.1 mg Oral TID    azithromycin  500 mg Oral Daily    metoclopramide  10 mg Intravenous Once    cinacalcet  180 mg Oral Daily    hydrALAZINE  50 mg Oral Q8H    losartan  50 mg Oral QPM    sodium chloride flush  10 mL Intravenous 2 times per day    metroNIDAZOLE  500 mg Intravenous Q8H    influenza virus vaccine  0.5 mL Intramuscular Prior to discharge       MEDS (infusions):      MEDS (prn):  diphenhydrAMINE, sodium chloride flush, ondansetron    DIET:    DIET RENAL;      PHYSICAL EXAM:      Patient Vitals for the past 24 hrs:   BP Temp Temp src Pulse Resp SpO2 Weight   11/13/18 1045 (!) 134/94 97.6 °F (36.4 °C) Oral 88 18 99 % -   11/13/18 0745 126/70 99.4 °F (37.4 °C) Temporal 92 14 96 % -   11/13/18 0417 108/61 98.1 °F (36.7 °C) Temporal 80 16 - -   11/12/18 2230 (!) 138/90 - - - - - -   11/12/18 1951 - 99.4 °F (37.4 °C) Temporal - 16 95 % -   11/12/18 1900 (!) 160/100 - - - - - -   11/12/18 1732 (!) 188/116 98.5 °F (36.9 °C) - 93 - - 104 lb 4.4 oz (47.3 kg)   11/12/18 1703 (!) 161/104 - - 112 - - -   11/12/18 1631 (!) 201/126 - - 93 - - -   11/12/18 1602 (!) 180/121 - - 106 - - -   11/12/18 1530 (!) 179/118 - - 89 - - -   11/12/18 1500 (!) 176/111 - - 87 - - -   11/12/18 1430 (!) 172/104 - - 90 - - -   11/12/18 1406 (!) 184/108 - - 90 - - -   11/12/18 1332 (!) 185/111 - - 86 - - -   11/12/18 1327 (!) 174/109 98.1 °F (36.7 °C) - 92 - - 111 lb 1.8 oz (50.4 kg)          Intake/Output Summary (Last 24 hours) at 11/13/18 1242  Last data filed at 11/13/18 0855   Gross per 24 hour   Intake              540 ml   Output             3500 ml   Net            -2960 ml       Wt Readings from Last 3 Encounters:   11/12/18 104 lb 4.4 oz (47.3 kg)   10/18/18 149 lb (67.6 kg)   10/08/18 149 lb 14.6 oz (68 kg)       Constitutional:  Patient in no acute distress  Head: normocephalic, atraumatic  Cardiovascular: regular rate and rhythm, no murmurs, gallops, or rubs  Respiratory:  Clear, no rales, rhochi, or wheezes  Gastrointestinal: soft, nontender, nondistended  Ext: no edema  Neuro: aaox3  Skin: dry, no rash      DATA:      Recent Labs      11/11/18   1330  11/12/18   0440   WBC  6.0  5.5   HGB  9.1*  9.1*   HCT  29.2*  29.5*   MCV  93.9  95.5   PLT  285  268     Recent Labs      11/11/18   1330  11/12/18   0440   NA  141  140   K  3.9  4.0   CL  95*  95*   CO2  27  24   BUN  42*  46*   CREATININE  11.1*  12.9*   LABGLOM  6  5   GLUCOSE  93  81   CALCIUM  8.6  8.2*     Recent Labs      11/11/18   1330  11/12/18   0440   ALT  10  13       Iron studies:  Lab Results   Component Value Date    FERRITIN 898 09/18/2015    IRON 86 09/18/2015    TIBC 166 (L) 09/18/2015     Bone disease:  Lab Results   Component Value Date    MG 3.2 (H) 10/02/2018    PHOS 1.5 (L) 11/11/2018     Nutrition:  Lab Results   Component Value Date    LABALBU 3.9 11/12/2018    LABALBU 4.2 11/11/2018    LABALBU 3.9 10/18/2018         IMPRESSION/RECOMMENDATIONS:      1. esrd  For hd mwf per his usual schedule      2. Nausea. Vomiting  Colitis on ct  On empiric abx     3. htn  Follow on outpt meds including hydralazine, losartan     4. Hypophosphatemia  Hold phos binder  Replace prn     5. Anemia  Dose sierra      Pheobe Orange.  Eric Torres MD

## 2018-11-13 NOTE — PROGRESS NOTES
Hospitalist Progress Note      PCP: No primary care provider on file. Date of Admission: 11/11/2018  Days in the hospital: 1    Chief Complaint: vomiting     Hospital Course:     Pt was admitted for abdominal pain and possible colitis. His abdominal pain improved and he was not having no vomiting or nausea or diarrhea. Diet was advanced and patient was tolerating his food. He received dialysis but his BP remained elevated. Clonidine was added. Subjective  Patient seen and examined at bedside. He feels better, no pain, no changes in vision no nausea or vomiting or diarrhea. Patient denies any fevers, chills, chest pain, shortness of breath, nausea, vomiting. BP better controlled. Medications:  Reviewed    Infusion Medications   Scheduled Medications    cloNIDine  0.1 mg Oral TID    azithromycin  500 mg Oral Daily    metoclopramide  10 mg Intravenous Once    cinacalcet  180 mg Oral Daily    hydrALAZINE  50 mg Oral Q8H    losartan  50 mg Oral QPM    sodium chloride flush  10 mL Intravenous 2 times per day    metroNIDAZOLE  500 mg Intravenous Q8H    influenza virus vaccine  0.5 mL Intramuscular Prior to discharge     PRN Meds: diphenhydrAMINE, sodium chloride flush, ondansetron      Intake/Output Summary (Last 24 hours) at 11/13/18 0921  Last data filed at 11/13/18 0855   Gross per 24 hour   Intake              540 ml   Output             3500 ml   Net            -2960 ml       Exam:    /70 Comment: Manual  Pulse 92   Temp 99.4 °F (37.4 °C) (Temporal)   Resp 14   Ht 5' 6\" (1.676 m)   Wt 104 lb 4.4 oz (47.3 kg)   SpO2 96%   BMI 16.83 kg/m²     General appearance: No apparent distress, appears stated age and cooperative. HEENT: Normal cephalic, atraumatic without obvious deformity. Pupils equal, round, and reactive to light. Extra ocular muscles intact. Conjunctivae/corneas clear. Neck: Supple, with full range of motion. No jugular venous distention.  Trachea lung mass. No pleural   effusion. Cardiomediastinal silhouette:  Unchanged, enlarged cardiomediastinal   silhouette. Other:  Bony structures are unremarkable. Assessment/Plan:  Active Hospital Problems    Diagnosis Date Noted    Hypertensive urgency [I16.0] 09/17/2015     Priority: High    Colitis [K52.9] 11/11/2018     ·     Plan:  colitis  - IV antibitoics   - spetic work up   - follow up cultures  - IV fluids  -ABD/Pelvis CT - Possible colonic wall thickening/edema of the ascending and proximal  transverse colon which can be seen in the setting of colitis. - no diarrhea     ESRD on HD (M,W,F)  - nephrology for HD     HTN urgency   - continue home meds  - clonidine   - monitor      · DVT Prophylaxis  ? lovenox  · Diet  ? NPO  · Code Status  ? Prior  · PT/OT Eval Status  ? N/A  · Disposition  · inpt-tele         Marshall Hayden MD  [employer]  Please contact me through perfect serve    NOTE: This report was transcribed using voice recognition software. Every effort was made to ensure accuracy; however, inadvertent computerized transcription errors may be present.

## 2018-11-14 VITALS
WEIGHT: 104.28 LBS | TEMPERATURE: 97.9 F | RESPIRATION RATE: 18 BRPM | OXYGEN SATURATION: 96 % | HEART RATE: 108 BPM | SYSTOLIC BLOOD PRESSURE: 157 MMHG | BODY MASS INDEX: 16.76 KG/M2 | DIASTOLIC BLOOD PRESSURE: 101 MMHG | HEIGHT: 66 IN

## 2018-11-14 PROCEDURE — 90686 IIV4 VACC NO PRSV 0.5 ML IM: CPT | Performed by: STUDENT IN AN ORGANIZED HEALTH CARE EDUCATION/TRAINING PROGRAM

## 2018-11-14 PROCEDURE — 2580000003 HC RX 258: Performed by: STUDENT IN AN ORGANIZED HEALTH CARE EDUCATION/TRAINING PROGRAM

## 2018-11-14 PROCEDURE — 6370000000 HC RX 637 (ALT 250 FOR IP): Performed by: INTERNAL MEDICINE

## 2018-11-14 PROCEDURE — G0008 ADMIN INFLUENZA VIRUS VAC: HCPCS | Performed by: STUDENT IN AN ORGANIZED HEALTH CARE EDUCATION/TRAINING PROGRAM

## 2018-11-14 PROCEDURE — 2500000003 HC RX 250 WO HCPCS: Performed by: STUDENT IN AN ORGANIZED HEALTH CARE EDUCATION/TRAINING PROGRAM

## 2018-11-14 PROCEDURE — 90935 HEMODIALYSIS ONE EVALUATION: CPT | Performed by: INTERNAL MEDICINE

## 2018-11-14 PROCEDURE — 6360000002 HC RX W HCPCS: Performed by: STUDENT IN AN ORGANIZED HEALTH CARE EDUCATION/TRAINING PROGRAM

## 2018-11-14 PROCEDURE — 6370000000 HC RX 637 (ALT 250 FOR IP): Performed by: STUDENT IN AN ORGANIZED HEALTH CARE EDUCATION/TRAINING PROGRAM

## 2018-11-14 RX ORDER — PANTOPRAZOLE SODIUM 40 MG/1
40 TABLET, DELAYED RELEASE ORAL
Qty: 14 TABLET | Refills: 0 | Status: ON HOLD | OUTPATIENT
Start: 2018-11-15 | End: 2019-01-07 | Stop reason: HOSPADM

## 2018-11-14 RX ORDER — CLONIDINE HYDROCHLORIDE 0.1 MG/1
0.1 TABLET ORAL 3 TIMES DAILY
Qty: 60 TABLET | Refills: 0 | Status: ON HOLD | OUTPATIENT
Start: 2018-11-14 | End: 2019-01-07 | Stop reason: HOSPADM

## 2018-11-14 RX ADMIN — DIPHENHYDRAMINE HYDROCHLORIDE 10 MG: 50 INJECTION, SOLUTION INTRAMUSCULAR; INTRAVENOUS at 11:50

## 2018-11-14 RX ADMIN — PANTOPRAZOLE SODIUM 40 MG: 40 TABLET, DELAYED RELEASE ORAL at 06:33

## 2018-11-14 RX ADMIN — Medication 10 ML: at 11:51

## 2018-11-14 RX ADMIN — CINACALCET HYDROCHLORIDE 180 MG: 30 TABLET, COATED ORAL at 11:50

## 2018-11-14 RX ADMIN — AZITHROMYCIN 500 MG: 250 TABLET, FILM COATED ORAL at 11:50

## 2018-11-14 RX ADMIN — HYDRALAZINE HYDROCHLORIDE 50 MG: 50 TABLET, FILM COATED ORAL at 06:33

## 2018-11-14 RX ADMIN — POTASSIUM & SODIUM PHOSPHATES POWDER PACK 280-160-250 MG 250 MG: 280-160-250 PACK at 11:51

## 2018-11-14 RX ADMIN — DIPHENHYDRAMINE HYDROCHLORIDE 10 MG: 50 INJECTION, SOLUTION INTRAMUSCULAR; INTRAVENOUS at 03:53

## 2018-11-14 RX ADMIN — INFLUENZA A VIRUS A/MICHIGAN/45/2015 X-275 (H1N1) ANTIGEN (FORMALDEHYDE INACTIVATED), INFLUENZA A VIRUS A/SINGAPORE/INFIMH-16-0019/2016 IVR-186 (H3N2) ANTIGEN (FORMALDEHYDE INACTIVATED), INFLUENZA B VIRUS B/PHUKET/3073/2013 ANTIGEN (FORMALDEHYDE INACTIVATED), AND INFLUENZA B VIRUS B/MARYLAND/15/2016 BX-69A ANTIGEN (FORMALDEHYDE INACTIVATED) 0.5 ML: 15; 15; 15; 15 INJECTION, SUSPENSION INTRAMUSCULAR at 14:10

## 2018-11-14 RX ADMIN — CLONIDINE HYDROCHLORIDE 0.1 MG: 0.1 TABLET ORAL at 11:50

## 2018-11-14 RX ADMIN — METRONIDAZOLE 500 MG: 500 INJECTION, SOLUTION INTRAVENOUS at 11:50

## 2018-11-14 RX ADMIN — METRONIDAZOLE 500 MG: 500 INJECTION, SOLUTION INTRAVENOUS at 03:51

## 2018-11-14 ASSESSMENT — PAIN SCALES - GENERAL
PAINLEVEL_OUTOF10: 0

## 2018-11-14 NOTE — PROGRESS NOTES
flush, ondansetron    DIET:    DIET RENAL;      PHYSICAL EXAM:      Patient Vitals for the past 24 hrs:   BP Temp Temp src Pulse Resp SpO2 Weight   11/14/18 1122 (!) 157/101 97.9 °F (36.6 °C) - 108 - - 104 lb 4.4 oz (47.3 kg)   11/14/18 1030 (!) 144/88 - - 96 - - -   11/14/18 1000 (!) 156/92 - - 96 - - -   11/14/18 0933 (!) 165/105 - - 93 - - -   11/14/18 0930 (!) 165/105 - - 93 - - -   11/14/18 0900 (!) 147/98 - - 90 - - -   11/14/18 0834 (!) 159/101 - - 92 - - -   11/14/18 0805 (!) 144/94 - - 75 - - -   11/14/18 0714 (!) 151/103 98.1 °F (36.7 °C) - 73 - - -   11/14/18 0709 (!) 148/99 98.1 °F (36.7 °C) - 77 - - 110 lb 14.3 oz (50.3 kg)   11/13/18 2315 (!) 141/86 97.8 °F (36.6 °C) Temporal 91 18 96 % -   11/13/18 2039 (!) 141/89 - - - - - -   11/13/18 1530 123/83 99.4 °F (37.4 °C) Temporal 82 18 97 % -   11/13/18 1345 124/81 - - - - - -          Intake/Output Summary (Last 24 hours) at 11/14/18 1257  Last data filed at 11/14/18 1122   Gross per 24 hour   Intake             1700 ml   Output             3500 ml   Net            -1800 ml       Wt Readings from Last 3 Encounters:   11/14/18 104 lb 4.4 oz (47.3 kg)   10/18/18 149 lb (67.6 kg)   10/08/18 149 lb 14.6 oz (68 kg)       Constitutional:  Patient in no acute distress  Head: normocephalic, atraumatic  Cardiovascular: regular rate and rhythm, no murmurs, gallops, or rubs  Respiratory:  Clear, no rales, rhochi, or wheezes  Gastrointestinal: soft, nontender, nondistended  Ext: no edema  Neuro: aaox3  Skin: dry, no rash      DATA:      Recent Labs      11/11/18   1330  11/12/18   0440   WBC  6.0  5.5   HGB  9.1*  9.1*   HCT  29.2*  29.5*   MCV  93.9  95.5   PLT  285  268     Recent Labs      11/11/18   1330  11/12/18   0440   NA  141  140   K  3.9  4.0   CL  95*  95*   CO2  27  24   BUN  42*  46*   CREATININE  11.1*  12.9*   LABGLOM  6  5   GLUCOSE  93  81   CALCIUM  8.6  8.2*     Recent Labs      11/11/18   1330  11/12/18   0440   ALT  10  13       Iron

## 2018-11-14 NOTE — PLAN OF CARE
Problem: Fluid Volume:  Goal: Maintenance of adequate hydration will improve  Maintenance of adequate hydration will improve   Outcome: Met This Shift

## 2018-11-16 LAB
BLOOD CULTURE, ROUTINE: NORMAL
CULTURE, BLOOD 2: NORMAL

## 2019-01-01 ENCOUNTER — APPOINTMENT (OUTPATIENT)
Dept: CT IMAGING | Age: 34
DRG: 241 | End: 2019-01-01
Payer: MEDICAID

## 2019-01-01 ENCOUNTER — APPOINTMENT (OUTPATIENT)
Dept: GENERAL RADIOLOGY | Age: 34
DRG: 241 | End: 2019-01-01
Payer: MEDICAID

## 2019-01-01 ENCOUNTER — HOSPITAL ENCOUNTER (INPATIENT)
Age: 34
LOS: 5 days | Discharge: HOME | DRG: 241 | End: 2019-01-07
Attending: EMERGENCY MEDICINE | Admitting: HOSPITALIST
Payer: MEDICAID

## 2019-01-01 DIAGNOSIS — K81.9 CHOLECYSTITIS: ICD-10-CM

## 2019-01-01 DIAGNOSIS — R11.2 NAUSEA AND VOMITING, INTRACTABILITY OF VOMITING NOT SPECIFIED, UNSPECIFIED VOMITING TYPE: Primary | ICD-10-CM

## 2019-01-01 DIAGNOSIS — I10 HYPERTENSION, UNSPECIFIED TYPE: ICD-10-CM

## 2019-01-01 DIAGNOSIS — R07.9 CHEST PAIN, UNSPECIFIED TYPE: ICD-10-CM

## 2019-01-01 DIAGNOSIS — R10.13 ABDOMINAL PAIN, EPIGASTRIC: ICD-10-CM

## 2019-01-01 LAB
EKG ATRIAL RATE: 70 BPM
EKG P AXIS: 63 DEGREES
EKG P-R INTERVAL: 170 MS
EKG Q-T INTERVAL: 418 MS
EKG QRS DURATION: 82 MS
EKG QTC CALCULATION (BAZETT): 451 MS
EKG R AXIS: 52 DEGREES
EKG T AXIS: 71 DEGREES
EKG VENTRICULAR RATE: 70 BPM

## 2019-01-01 PROCEDURE — 93005 ELECTROCARDIOGRAM TRACING: CPT | Performed by: EMERGENCY MEDICINE

## 2019-01-01 PROCEDURE — 6360000002 HC RX W HCPCS: Performed by: EMERGENCY MEDICINE

## 2019-01-01 PROCEDURE — 83690 ASSAY OF LIPASE: CPT

## 2019-01-01 PROCEDURE — 6370000000 HC RX 637 (ALT 250 FOR IP): Performed by: EMERGENCY MEDICINE

## 2019-01-01 PROCEDURE — 87502 INFLUENZA DNA AMP PROBE: CPT

## 2019-01-01 PROCEDURE — 80053 COMPREHEN METABOLIC PANEL: CPT

## 2019-01-01 PROCEDURE — 70450 CT HEAD/BRAIN W/O DYE: CPT

## 2019-01-01 PROCEDURE — 71045 X-RAY EXAM CHEST 1 VIEW: CPT

## 2019-01-01 PROCEDURE — 84484 ASSAY OF TROPONIN QUANT: CPT

## 2019-01-01 PROCEDURE — 85027 COMPLETE CBC AUTOMATED: CPT

## 2019-01-01 PROCEDURE — 36415 COLL VENOUS BLD VENIPUNCTURE: CPT

## 2019-01-01 PROCEDURE — 74176 CT ABD & PELVIS W/O CONTRAST: CPT

## 2019-01-01 PROCEDURE — 99285 EMERGENCY DEPT VISIT HI MDM: CPT

## 2019-01-01 PROCEDURE — 96374 THER/PROPH/DIAG INJ IV PUSH: CPT

## 2019-01-01 PROCEDURE — 87040 BLOOD CULTURE FOR BACTERIA: CPT

## 2019-01-01 PROCEDURE — 96375 TX/PRO/DX INJ NEW DRUG ADDON: CPT

## 2019-01-01 RX ORDER — DIPHENHYDRAMINE HYDROCHLORIDE 50 MG/ML
INJECTION INTRAMUSCULAR; INTRAVENOUS
Status: COMPLETED
Start: 2019-01-01 | End: 2019-01-02

## 2019-01-01 RX ORDER — IBUPROFEN 400 MG/1
400 TABLET ORAL ONCE
Status: COMPLETED | OUTPATIENT
Start: 2019-01-01 | End: 2019-01-01

## 2019-01-01 RX ORDER — DIPHENHYDRAMINE HYDROCHLORIDE 50 MG/ML
25 INJECTION INTRAMUSCULAR; INTRAVENOUS ONCE
Status: COMPLETED | OUTPATIENT
Start: 2019-01-02 | End: 2019-01-02

## 2019-01-01 RX ORDER — ONDANSETRON 2 MG/ML
4 INJECTION INTRAMUSCULAR; INTRAVENOUS ONCE
Status: COMPLETED | OUTPATIENT
Start: 2019-01-01 | End: 2019-01-01

## 2019-01-01 RX ORDER — MORPHINE SULFATE 2 MG/ML
2 INJECTION, SOLUTION INTRAMUSCULAR; INTRAVENOUS ONCE
Status: COMPLETED | OUTPATIENT
Start: 2019-01-01 | End: 2019-01-01

## 2019-01-01 RX ORDER — HYDRALAZINE HYDROCHLORIDE 20 MG/ML
10 INJECTION INTRAMUSCULAR; INTRAVENOUS ONCE
Status: COMPLETED | OUTPATIENT
Start: 2019-01-01 | End: 2019-01-01

## 2019-01-01 RX ADMIN — MORPHINE SULFATE 2 MG: 2 INJECTION, SOLUTION INTRAMUSCULAR; INTRAVENOUS at 23:41

## 2019-01-01 RX ADMIN — IBUPROFEN 400 MG: 400 TABLET ORAL at 23:42

## 2019-01-01 RX ADMIN — ONDANSETRON 4 MG: 2 INJECTION INTRAMUSCULAR; INTRAVENOUS at 23:41

## 2019-01-01 RX ADMIN — HYDRALAZINE HYDROCHLORIDE 10 MG: 20 INJECTION INTRAMUSCULAR; INTRAVENOUS at 23:42

## 2019-01-01 ASSESSMENT — PAIN SCALES - GENERAL: PAINLEVEL_OUTOF10: 10

## 2019-01-01 ASSESSMENT — PAIN DESCRIPTION - LOCATION: LOCATION: ABDOMEN

## 2019-01-01 ASSESSMENT — PAIN DESCRIPTION - DESCRIPTORS: DESCRIPTORS: ACHING

## 2019-01-01 ASSESSMENT — PAIN DESCRIPTION - PAIN TYPE: TYPE: ACUTE PAIN

## 2019-01-02 ENCOUNTER — APPOINTMENT (OUTPATIENT)
Dept: ULTRASOUND IMAGING | Age: 34
DRG: 241 | End: 2019-01-02
Payer: MEDICAID

## 2019-01-02 PROBLEM — K81.9 CHOLECYSTITIS: Status: ACTIVE | Noted: 2019-01-02

## 2019-01-02 LAB
ALBUMIN SERPL-MCNC: 4 G/DL (ref 3.5–5.2)
ALP BLD-CCNC: 244 U/L (ref 40–129)
ALT SERPL-CCNC: 80 U/L (ref 0–40)
ANION GAP SERPL CALCULATED.3IONS-SCNC: 23 MMOL/L (ref 7–16)
AST SERPL-CCNC: 70 U/L (ref 0–39)
BILIRUB SERPL-MCNC: 0.4 MG/DL (ref 0–1.2)
BUN BLDV-MCNC: 70 MG/DL (ref 6–20)
CALCIUM SERPL-MCNC: 9.2 MG/DL (ref 8.6–10.2)
CHLORIDE BLD-SCNC: 99 MMOL/L (ref 98–107)
CO2: 22 MMOL/L (ref 22–29)
CREAT SERPL-MCNC: 11.1 MG/DL (ref 0.7–1.2)
GFR AFRICAN AMERICAN: 6
GFR NON-AFRICAN AMERICAN: 6 ML/MIN/1.73
GLUCOSE BLD-MCNC: 87 MG/DL (ref 74–99)
HCT VFR BLD CALC: 27.8 % (ref 37–54)
HEMOGLOBIN: 8.5 G/DL (ref 12.5–16.5)
INFLUENZA A BY PCR: NOT DETECTED
INFLUENZA B BY PCR: NOT DETECTED
LIPASE: 28 U/L (ref 13–60)
MCH RBC QN AUTO: 30.9 PG (ref 26–35)
MCHC RBC AUTO-ENTMCNC: 30.6 % (ref 32–34.5)
MCV RBC AUTO: 101.1 FL (ref 80–99.9)
PDW BLD-RTO: 17.8 FL (ref 11.5–15)
PLATELET # BLD: 229 E9/L (ref 130–450)
PMV BLD AUTO: 9.8 FL (ref 7–12)
POTASSIUM SERPL-SCNC: 5.9 MMOL/L (ref 3.5–5)
RBC # BLD: 2.75 E12/L (ref 3.8–5.8)
SODIUM BLD-SCNC: 144 MMOL/L (ref 132–146)
TOTAL PROTEIN: 7.4 G/DL (ref 6.4–8.3)
TROPONIN: <0.01 NG/ML (ref 0–0.03)
WBC # BLD: 8.1 E9/L (ref 4.5–11.5)

## 2019-01-02 PROCEDURE — 6360000002 HC RX W HCPCS: Performed by: HOSPITALIST

## 2019-01-02 PROCEDURE — 6370000000 HC RX 637 (ALT 250 FOR IP): Performed by: INTERNAL MEDICINE

## 2019-01-02 PROCEDURE — 6370000000 HC RX 637 (ALT 250 FOR IP): Performed by: HOSPITALIST

## 2019-01-02 PROCEDURE — 2140000000 HC CCU INTERMEDIATE R&B

## 2019-01-02 PROCEDURE — 76705 ECHO EXAM OF ABDOMEN: CPT

## 2019-01-02 PROCEDURE — 96375 TX/PRO/DX INJ NEW DRUG ADDON: CPT

## 2019-01-02 PROCEDURE — 90935 HEMODIALYSIS ONE EVALUATION: CPT | Performed by: INTERNAL MEDICINE

## 2019-01-02 PROCEDURE — 6360000002 HC RX W HCPCS

## 2019-01-02 PROCEDURE — 5A1D70Z PERFORMANCE OF URINARY FILTRATION, INTERMITTENT, LESS THAN 6 HOURS PER DAY: ICD-10-PCS | Performed by: INTERNAL MEDICINE

## 2019-01-02 PROCEDURE — 6360000002 HC RX W HCPCS: Performed by: SURGERY

## 2019-01-02 PROCEDURE — 6360000002 HC RX W HCPCS: Performed by: INTERNAL MEDICINE

## 2019-01-02 PROCEDURE — 6360000002 HC RX W HCPCS: Performed by: STUDENT IN AN ORGANIZED HEALTH CARE EDUCATION/TRAINING PROGRAM

## 2019-01-02 PROCEDURE — 2580000003 HC RX 258: Performed by: HOSPITALIST

## 2019-01-02 PROCEDURE — 6360000002 HC RX W HCPCS: Performed by: EMERGENCY MEDICINE

## 2019-01-02 PROCEDURE — 2580000003 HC RX 258: Performed by: SURGERY

## 2019-01-02 PROCEDURE — 2500000003 HC RX 250 WO HCPCS: Performed by: EMERGENCY MEDICINE

## 2019-01-02 PROCEDURE — C9113 INJ PANTOPRAZOLE SODIUM, VIA: HCPCS | Performed by: SURGERY

## 2019-01-02 PROCEDURE — 2500000003 HC RX 250 WO HCPCS: Performed by: INTERNAL MEDICINE

## 2019-01-02 RX ORDER — PANTOPRAZOLE SODIUM 40 MG/1
40 TABLET, DELAYED RELEASE ORAL
Status: DISCONTINUED | OUTPATIENT
Start: 2019-01-02 | End: 2019-01-02 | Stop reason: ALTCHOICE

## 2019-01-02 RX ORDER — METRONIDAZOLE 500 MG/1
500 TABLET ORAL EVERY 8 HOURS SCHEDULED
Status: DISCONTINUED | OUTPATIENT
Start: 2019-01-02 | End: 2019-01-03

## 2019-01-02 RX ORDER — PANTOPRAZOLE SODIUM 40 MG/10ML
40 INJECTION, POWDER, LYOPHILIZED, FOR SOLUTION INTRAVENOUS 2 TIMES DAILY
Status: DISCONTINUED | OUTPATIENT
Start: 2019-01-02 | End: 2019-01-03

## 2019-01-02 RX ORDER — HYDRALAZINE HYDROCHLORIDE 20 MG/ML
10 INJECTION INTRAMUSCULAR; INTRAVENOUS 4 TIMES DAILY
Status: DISCONTINUED | OUTPATIENT
Start: 2019-01-02 | End: 2019-01-02 | Stop reason: SDUPTHER

## 2019-01-02 RX ORDER — CINACALCET 30 MG/1
180 TABLET, FILM COATED ORAL DAILY
Status: DISCONTINUED | OUTPATIENT
Start: 2019-01-02 | End: 2019-01-05

## 2019-01-02 RX ORDER — LABETALOL HYDROCHLORIDE 5 MG/ML
10 INJECTION, SOLUTION INTRAVENOUS 4 TIMES DAILY
Status: DISCONTINUED | OUTPATIENT
Start: 2019-01-02 | End: 2019-01-07

## 2019-01-02 RX ORDER — LORAZEPAM 1 MG/1
0.5 TABLET ORAL NIGHTLY PRN
Status: DISCONTINUED | OUTPATIENT
Start: 2019-01-02 | End: 2019-01-07 | Stop reason: HOSPADM

## 2019-01-02 RX ORDER — SODIUM CHLORIDE 0.9 % (FLUSH) 0.9 %
10 SYRINGE (ML) INJECTION EVERY 12 HOURS SCHEDULED
Status: DISCONTINUED | OUTPATIENT
Start: 2019-01-02 | End: 2019-01-07 | Stop reason: HOSPADM

## 2019-01-02 RX ORDER — HYDRALAZINE HYDROCHLORIDE 20 MG/ML
10 INJECTION INTRAMUSCULAR; INTRAVENOUS EVERY 6 HOURS PRN
Status: DISCONTINUED | OUTPATIENT
Start: 2019-01-02 | End: 2019-01-07 | Stop reason: HOSPADM

## 2019-01-02 RX ORDER — HYDRALAZINE HYDROCHLORIDE 20 MG/ML
10 INJECTION INTRAMUSCULAR; INTRAVENOUS 4 TIMES DAILY
Status: DISCONTINUED | OUTPATIENT
Start: 2019-01-02 | End: 2019-01-03

## 2019-01-02 RX ORDER — LOSARTAN POTASSIUM 50 MG/1
50 TABLET ORAL EVERY EVENING
Status: DISCONTINUED | OUTPATIENT
Start: 2019-01-02 | End: 2019-01-04

## 2019-01-02 RX ORDER — CLONIDINE HYDROCHLORIDE 0.1 MG/1
0.1 TABLET ORAL
Status: COMPLETED | OUTPATIENT
Start: 2019-01-02 | End: 2019-01-02

## 2019-01-02 RX ORDER — 0.9 % SODIUM CHLORIDE 0.9 %
10 VIAL (ML) INJECTION 2 TIMES DAILY
Status: DISCONTINUED | OUTPATIENT
Start: 2019-01-02 | End: 2019-01-03

## 2019-01-02 RX ORDER — HYDRALAZINE HYDROCHLORIDE 20 MG/ML
10 INJECTION INTRAMUSCULAR; INTRAVENOUS ONCE
Status: COMPLETED | OUTPATIENT
Start: 2019-01-02 | End: 2019-01-02

## 2019-01-02 RX ORDER — SODIUM CHLORIDE 0.9 % (FLUSH) 0.9 %
10 SYRINGE (ML) INJECTION EVERY 12 HOURS SCHEDULED
Status: DISCONTINUED | OUTPATIENT
Start: 2019-01-02 | End: 2019-01-03

## 2019-01-02 RX ORDER — DIPHENHYDRAMINE HYDROCHLORIDE 50 MG/ML
25 INJECTION INTRAMUSCULAR; INTRAVENOUS EVERY 6 HOURS PRN
Status: DISCONTINUED | OUTPATIENT
Start: 2019-01-02 | End: 2019-01-06

## 2019-01-02 RX ORDER — SODIUM CHLORIDE 0.9 % (FLUSH) 0.9 %
10 SYRINGE (ML) INJECTION PRN
Status: DISCONTINUED | OUTPATIENT
Start: 2019-01-02 | End: 2019-01-07 | Stop reason: HOSPADM

## 2019-01-02 RX ORDER — ONDANSETRON 2 MG/ML
4 INJECTION INTRAMUSCULAR; INTRAVENOUS EVERY 6 HOURS PRN
Status: DISCONTINUED | OUTPATIENT
Start: 2019-01-02 | End: 2019-01-07 | Stop reason: HOSPADM

## 2019-01-02 RX ORDER — SODIUM CHLORIDE 0.9 % (FLUSH) 0.9 %
10 SYRINGE (ML) INJECTION PRN
Status: DISCONTINUED | OUTPATIENT
Start: 2019-01-02 | End: 2019-01-03

## 2019-01-02 RX ORDER — DIPHENHYDRAMINE HCL 25 MG
50 TABLET ORAL 3 TIMES DAILY PRN
Status: DISCONTINUED | OUTPATIENT
Start: 2019-01-02 | End: 2019-01-02

## 2019-01-02 RX ORDER — PANTOPRAZOLE SODIUM 40 MG/1
40 TABLET, DELAYED RELEASE ORAL
Status: DISCONTINUED | OUTPATIENT
Start: 2019-01-03 | End: 2019-01-02

## 2019-01-02 RX ORDER — HYDRALAZINE HYDROCHLORIDE 25 MG/1
50 TABLET, FILM COATED ORAL EVERY 8 HOURS
Status: DISCONTINUED | OUTPATIENT
Start: 2019-01-02 | End: 2019-01-02

## 2019-01-02 RX ORDER — CLONIDINE HYDROCHLORIDE 0.1 MG/1
0.1 TABLET ORAL 3 TIMES DAILY
Status: DISCONTINUED | OUTPATIENT
Start: 2019-01-02 | End: 2019-01-06

## 2019-01-02 RX ORDER — CIPROFLOXACIN 2 MG/ML
400 INJECTION, SOLUTION INTRAVENOUS ONCE
Status: COMPLETED | OUTPATIENT
Start: 2019-01-02 | End: 2019-01-02

## 2019-01-02 RX ORDER — NITROGLYCERIN 0.4 MG/1
0.4 TABLET SUBLINGUAL EVERY 5 MIN PRN
Status: DISCONTINUED | OUTPATIENT
Start: 2019-01-02 | End: 2019-01-07 | Stop reason: HOSPADM

## 2019-01-02 RX ADMIN — ONDANSETRON HYDROCHLORIDE 4 MG: 2 INJECTION, SOLUTION INTRAMUSCULAR; INTRAVENOUS at 08:48

## 2019-01-02 RX ADMIN — CLONIDINE HYDROCHLORIDE 0.1 MG: 0.1 TABLET ORAL at 09:45

## 2019-01-02 RX ADMIN — Medication 10 ML: at 14:08

## 2019-01-02 RX ADMIN — DIPHENHYDRAMINE HYDROCHLORIDE 25 MG: 50 INJECTION, SOLUTION INTRAMUSCULAR; INTRAVENOUS at 14:02

## 2019-01-02 RX ADMIN — CIPROFLOXACIN 400 MG: 2 INJECTION INTRAVENOUS at 02:25

## 2019-01-02 RX ADMIN — HYDRALAZINE HYDROCHLORIDE 50 MG: 25 TABLET, FILM COATED ORAL at 16:03

## 2019-01-02 RX ADMIN — HYDRALAZINE HYDROCHLORIDE 10 MG: 20 INJECTION INTRAMUSCULAR; INTRAVENOUS at 21:39

## 2019-01-02 RX ADMIN — HYDRALAZINE HYDROCHLORIDE 10 MG: 20 INJECTION INTRAMUSCULAR; INTRAVENOUS at 13:58

## 2019-01-02 RX ADMIN — DIPHENHYDRAMINE HCL 50 MG: 25 TABLET ORAL at 05:33

## 2019-01-02 RX ADMIN — HYDRALAZINE HYDROCHLORIDE 10 MG: 20 INJECTION INTRAMUSCULAR; INTRAVENOUS at 08:48

## 2019-01-02 RX ADMIN — LOSARTAN POTASSIUM 50 MG: 50 TABLET, FILM COATED ORAL at 19:07

## 2019-01-02 RX ADMIN — CLONIDINE HYDROCHLORIDE 0.1 MG: 0.1 TABLET ORAL at 21:39

## 2019-01-02 RX ADMIN — Medication 10 ML: at 17:11

## 2019-01-02 RX ADMIN — LABETALOL HYDROCHLORIDE 10 MG: 5 INJECTION, SOLUTION INTRAVENOUS at 17:12

## 2019-01-02 RX ADMIN — CLONIDINE HYDROCHLORIDE 0.1 MG: 0.1 TABLET ORAL at 11:46

## 2019-01-02 RX ADMIN — CLONIDINE HYDROCHLORIDE 0.1 MG: 0.1 TABLET ORAL at 14:24

## 2019-01-02 RX ADMIN — HYDRALAZINE HYDROCHLORIDE 10 MG: 20 INJECTION INTRAMUSCULAR; INTRAVENOUS at 03:24

## 2019-01-02 RX ADMIN — METRONIDAZOLE 500 MG: 500 TABLET, FILM COATED ORAL at 21:41

## 2019-01-02 RX ADMIN — HYDRALAZINE HYDROCHLORIDE 50 MG: 25 TABLET, FILM COATED ORAL at 05:33

## 2019-01-02 RX ADMIN — METRONIDAZOLE 500 MG: 500 INJECTION, SOLUTION INTRAVENOUS at 03:24

## 2019-01-02 RX ADMIN — SODIUM CHLORIDE 10 ML: 9 INJECTION, SOLUTION INTRAMUSCULAR; INTRAVENOUS; SUBCUTANEOUS at 21:39

## 2019-01-02 RX ADMIN — METRONIDAZOLE 500 MG: 500 TABLET, FILM COATED ORAL at 14:24

## 2019-01-02 RX ADMIN — Medication 10 ML: at 21:40

## 2019-01-02 RX ADMIN — DIPHENHYDRAMINE HYDROCHLORIDE 25 MG: 50 INJECTION, SOLUTION INTRAMUSCULAR; INTRAVENOUS at 00:20

## 2019-01-02 RX ADMIN — DIPHENHYDRAMINE HYDROCHLORIDE 25 MG: 50 INJECTION INTRAMUSCULAR; INTRAVENOUS at 00:20

## 2019-01-02 RX ADMIN — PANTOPRAZOLE SODIUM 40 MG: 40 INJECTION, POWDER, FOR SOLUTION INTRAVENOUS at 21:39

## 2019-01-02 RX ADMIN — CALCIUM ACETATE 1334 MG: 667 CAPSULE ORAL at 17:10

## 2019-01-02 ASSESSMENT — PAIN SCALES - GENERAL
PAINLEVEL_OUTOF10: 0

## 2019-01-03 ENCOUNTER — ANESTHESIA (OUTPATIENT)
Dept: ENDOSCOPY | Age: 34
DRG: 241 | End: 2019-01-03
Payer: MEDICAID

## 2019-01-03 ENCOUNTER — ANESTHESIA EVENT (OUTPATIENT)
Dept: ENDOSCOPY | Age: 34
DRG: 241 | End: 2019-01-03
Payer: MEDICAID

## 2019-01-03 VITALS
SYSTOLIC BLOOD PRESSURE: 163 MMHG | OXYGEN SATURATION: 100 % | RESPIRATION RATE: 24 BRPM | DIASTOLIC BLOOD PRESSURE: 97 MMHG

## 2019-01-03 LAB
ANION GAP SERPL CALCULATED.3IONS-SCNC: 20 MMOL/L (ref 7–16)
BUN BLDV-MCNC: 33 MG/DL (ref 6–20)
C-REACTIVE PROTEIN: 9 MG/DL (ref 0–0.4)
CALCIUM SERPL-MCNC: 8.7 MG/DL (ref 8.6–10.2)
CHLORIDE BLD-SCNC: 93 MMOL/L (ref 98–107)
CO2: 26 MMOL/L (ref 22–29)
CREAT SERPL-MCNC: 7.1 MG/DL (ref 0.7–1.2)
FERRITIN: 1967 NG/ML
FOLATE: 12.3 NG/ML (ref 4.8–24.2)
GFR AFRICAN AMERICAN: 11
GFR NON-AFRICAN AMERICAN: 11 ML/MIN/1.73
GLUCOSE BLD-MCNC: 90 MG/DL (ref 74–99)
HCT VFR BLD CALC: 27.5 % (ref 37–54)
HEMOGLOBIN: 8.5 G/DL (ref 12.5–16.5)
IRON SATURATION: 27 % (ref 20–55)
IRON: 41 MCG/DL (ref 59–158)
MAGNESIUM: 2.4 MG/DL (ref 1.6–2.6)
MCH RBC QN AUTO: 30.6 PG (ref 26–35)
MCHC RBC AUTO-ENTMCNC: 30.9 % (ref 32–34.5)
MCV RBC AUTO: 98.9 FL (ref 80–99.9)
PDW BLD-RTO: 17.5 FL (ref 11.5–15)
PLATELET # BLD: 206 E9/L (ref 130–450)
PMV BLD AUTO: 9.6 FL (ref 7–12)
POTASSIUM REFLEX MAGNESIUM: 4.7 MMOL/L (ref 3.5–5)
PROCALCITONIN: 1.43 NG/ML (ref 0–0.08)
RBC # BLD: 2.78 E12/L (ref 3.8–5.8)
SODIUM BLD-SCNC: 139 MMOL/L (ref 132–146)
TOTAL IRON BINDING CAPACITY: 152 MCG/DL (ref 250–450)
VITAMIN B-12: 723 PG/ML (ref 211–946)
WBC # BLD: 5.8 E9/L (ref 4.5–11.5)

## 2019-01-03 PROCEDURE — 88342 IMHCHEM/IMCYTCHM 1ST ANTB: CPT

## 2019-01-03 PROCEDURE — 6360000002 HC RX W HCPCS: Performed by: ANESTHESIOLOGIST ASSISTANT

## 2019-01-03 PROCEDURE — 6370000000 HC RX 637 (ALT 250 FOR IP): Performed by: HOSPITALIST

## 2019-01-03 PROCEDURE — 3700000001 HC ADD 15 MINUTES (ANESTHESIA): Performed by: SURGERY

## 2019-01-03 PROCEDURE — 2580000003 HC RX 258

## 2019-01-03 PROCEDURE — 6370000000 HC RX 637 (ALT 250 FOR IP): Performed by: INTERNAL MEDICINE

## 2019-01-03 PROCEDURE — 7100000001 HC PACU RECOVERY - ADDTL 15 MIN: Performed by: SURGERY

## 2019-01-03 PROCEDURE — 87040 BLOOD CULTURE FOR BACTERIA: CPT

## 2019-01-03 PROCEDURE — 3609012400 HC EGD TRANSORAL BIOPSY SINGLE/MULTIPLE: Performed by: SURGERY

## 2019-01-03 PROCEDURE — 36415 COLL VENOUS BLD VENIPUNCTURE: CPT

## 2019-01-03 PROCEDURE — 88305 TISSUE EXAM BY PATHOLOGIST: CPT

## 2019-01-03 PROCEDURE — 2580000003 HC RX 258: Performed by: HOSPITALIST

## 2019-01-03 PROCEDURE — 83550 IRON BINDING TEST: CPT

## 2019-01-03 PROCEDURE — 83735 ASSAY OF MAGNESIUM: CPT

## 2019-01-03 PROCEDURE — 43239 EGD BIOPSY SINGLE/MULTIPLE: CPT | Performed by: SURGERY

## 2019-01-03 PROCEDURE — 2709999900 HC NON-CHARGEABLE SUPPLY: Performed by: SURGERY

## 2019-01-03 PROCEDURE — 2140000000 HC CCU INTERMEDIATE R&B

## 2019-01-03 PROCEDURE — 2580000003 HC RX 258: Performed by: ANESTHESIOLOGIST ASSISTANT

## 2019-01-03 PROCEDURE — 6360000002 HC RX W HCPCS: Performed by: HOSPITALIST

## 2019-01-03 PROCEDURE — 6370000000 HC RX 637 (ALT 250 FOR IP): Performed by: FAMILY MEDICINE

## 2019-01-03 PROCEDURE — 82746 ASSAY OF FOLIC ACID SERUM: CPT

## 2019-01-03 PROCEDURE — 82728 ASSAY OF FERRITIN: CPT

## 2019-01-03 PROCEDURE — 0DB68ZX EXCISION OF STOMACH, VIA NATURAL OR ARTIFICIAL OPENING ENDOSCOPIC, DIAGNOSTIC: ICD-10-PCS | Performed by: SURGERY

## 2019-01-03 PROCEDURE — 6360000002 HC RX W HCPCS: Performed by: INTERNAL MEDICINE

## 2019-01-03 PROCEDURE — 85027 COMPLETE CBC AUTOMATED: CPT

## 2019-01-03 PROCEDURE — 7100000000 HC PACU RECOVERY - FIRST 15 MIN: Performed by: SURGERY

## 2019-01-03 PROCEDURE — 2500000003 HC RX 250 WO HCPCS: Performed by: INTERNAL MEDICINE

## 2019-01-03 PROCEDURE — 84145 PROCALCITONIN (PCT): CPT

## 2019-01-03 PROCEDURE — 2580000003 HC RX 258: Performed by: INTERNAL MEDICINE

## 2019-01-03 PROCEDURE — 83540 ASSAY OF IRON: CPT

## 2019-01-03 PROCEDURE — 80048 BASIC METABOLIC PNL TOTAL CA: CPT

## 2019-01-03 PROCEDURE — 3700000000 HC ANESTHESIA ATTENDED CARE: Performed by: SURGERY

## 2019-01-03 PROCEDURE — 82607 VITAMIN B-12: CPT

## 2019-01-03 PROCEDURE — 6370000000 HC RX 637 (ALT 250 FOR IP): Performed by: SURGERY

## 2019-01-03 PROCEDURE — 86140 C-REACTIVE PROTEIN: CPT

## 2019-01-03 RX ORDER — PROPOFOL 10 MG/ML
INJECTION, EMULSION INTRAVENOUS PRN
Status: DISCONTINUED | OUTPATIENT
Start: 2019-01-03 | End: 2019-01-03 | Stop reason: SDUPTHER

## 2019-01-03 RX ORDER — MEPERIDINE HYDROCHLORIDE 50 MG/ML
12.5 INJECTION INTRAMUSCULAR; INTRAVENOUS; SUBCUTANEOUS EVERY 5 MIN PRN
Status: DISCONTINUED | OUTPATIENT
Start: 2019-01-03 | End: 2019-01-03 | Stop reason: HOSPADM

## 2019-01-03 RX ORDER — FENTANYL CITRATE 50 UG/ML
25 INJECTION, SOLUTION INTRAMUSCULAR; INTRAVENOUS EVERY 5 MIN PRN
Status: DISCONTINUED | OUTPATIENT
Start: 2019-01-03 | End: 2019-01-03 | Stop reason: HOSPADM

## 2019-01-03 RX ORDER — ONDANSETRON 2 MG/ML
4 INJECTION INTRAMUSCULAR; INTRAVENOUS
Status: DISCONTINUED | OUTPATIENT
Start: 2019-01-03 | End: 2019-01-03 | Stop reason: HOSPADM

## 2019-01-03 RX ORDER — SODIUM CHLORIDE 9 MG/ML
INJECTION, SOLUTION INTRAVENOUS CONTINUOUS PRN
Status: DISCONTINUED | OUTPATIENT
Start: 2019-01-03 | End: 2019-01-03 | Stop reason: SDUPTHER

## 2019-01-03 RX ORDER — PANTOPRAZOLE SODIUM 40 MG/1
40 TABLET, DELAYED RELEASE ORAL
Status: DISCONTINUED | OUTPATIENT
Start: 2019-01-03 | End: 2019-01-07 | Stop reason: HOSPADM

## 2019-01-03 RX ORDER — DIPHENHYDRAMINE HYDROCHLORIDE 50 MG/ML
12.5 INJECTION INTRAMUSCULAR; INTRAVENOUS
Status: DISCONTINUED | OUTPATIENT
Start: 2019-01-03 | End: 2019-01-03 | Stop reason: HOSPADM

## 2019-01-03 RX ORDER — METRONIDAZOLE 500 MG/1
500 TABLET ORAL EVERY 8 HOURS SCHEDULED
Status: DISCONTINUED | OUTPATIENT
Start: 2019-01-03 | End: 2019-01-04

## 2019-01-03 RX ORDER — TRAMADOL HYDROCHLORIDE 50 MG/1
50 TABLET ORAL EVERY 6 HOURS PRN
Status: DISCONTINUED | OUTPATIENT
Start: 2019-01-03 | End: 2019-01-07 | Stop reason: HOSPADM

## 2019-01-03 RX ORDER — HYDRALAZINE HYDROCHLORIDE 25 MG/1
25 TABLET, FILM COATED ORAL EVERY 8 HOURS SCHEDULED
Status: DISCONTINUED | OUTPATIENT
Start: 2019-01-03 | End: 2019-01-04

## 2019-01-03 RX ORDER — LABETALOL HYDROCHLORIDE 5 MG/ML
5 INJECTION, SOLUTION INTRAVENOUS EVERY 10 MIN PRN
Status: DISCONTINUED | OUTPATIENT
Start: 2019-01-03 | End: 2019-01-03 | Stop reason: HOSPADM

## 2019-01-03 RX ORDER — MORPHINE SULFATE 2 MG/ML
2 INJECTION, SOLUTION INTRAMUSCULAR; INTRAVENOUS EVERY 4 HOURS PRN
Status: DISCONTINUED | OUTPATIENT
Start: 2019-01-03 | End: 2019-01-07 | Stop reason: HOSPADM

## 2019-01-03 RX ORDER — FENTANYL CITRATE 50 UG/ML
50 INJECTION, SOLUTION INTRAMUSCULAR; INTRAVENOUS EVERY 5 MIN PRN
Status: DISCONTINUED | OUTPATIENT
Start: 2019-01-03 | End: 2019-01-03 | Stop reason: HOSPADM

## 2019-01-03 RX ADMIN — HYDRALAZINE HYDROCHLORIDE 10 MG: 20 INJECTION INTRAMUSCULAR; INTRAVENOUS at 02:52

## 2019-01-03 RX ADMIN — PROPOFOL 50 MG: 10 INJECTION, EMULSION INTRAVENOUS at 08:49

## 2019-01-03 RX ADMIN — CALCIUM ACETATE 1334 MG: 667 CAPSULE ORAL at 10:35

## 2019-01-03 RX ADMIN — Medication 10 ML: at 10:36

## 2019-01-03 RX ADMIN — HYDRALAZINE HYDROCHLORIDE 10 MG: 20 INJECTION INTRAMUSCULAR; INTRAVENOUS at 09:30

## 2019-01-03 RX ADMIN — LABETALOL HYDROCHLORIDE 10 MG: 5 INJECTION, SOLUTION INTRAVENOUS at 12:34

## 2019-01-03 RX ADMIN — METRONIDAZOLE 500 MG: 500 TABLET, FILM COATED ORAL at 05:25

## 2019-01-03 RX ADMIN — Medication 10 ML: at 12:36

## 2019-01-03 RX ADMIN — CALCIUM ACETATE 1334 MG: 667 CAPSULE ORAL at 16:26

## 2019-01-03 RX ADMIN — CLONIDINE HYDROCHLORIDE 0.1 MG: 0.1 TABLET ORAL at 14:41

## 2019-01-03 RX ADMIN — LABETALOL HYDROCHLORIDE 10 MG: 5 INJECTION, SOLUTION INTRAVENOUS at 18:36

## 2019-01-03 RX ADMIN — CEFTRIAXONE SODIUM 2 G: 2 INJECTION, POWDER, FOR SOLUTION INTRAMUSCULAR; INTRAVENOUS at 16:27

## 2019-01-03 RX ADMIN — Medication 10 ML: at 16:29

## 2019-01-03 RX ADMIN — LOSARTAN POTASSIUM 50 MG: 50 TABLET, FILM COATED ORAL at 18:36

## 2019-01-03 RX ADMIN — Medication 10 ML: at 20:05

## 2019-01-03 RX ADMIN — PROPOFOL 50 MG: 10 INJECTION, EMULSION INTRAVENOUS at 08:48

## 2019-01-03 RX ADMIN — Medication 10 ML: at 17:26

## 2019-01-03 RX ADMIN — PROPOFOL 20 MG: 10 INJECTION, EMULSION INTRAVENOUS at 08:56

## 2019-01-03 RX ADMIN — WATER 10 ML: 1 INJECTION INTRAMUSCULAR; INTRAVENOUS; SUBCUTANEOUS at 18:54

## 2019-01-03 RX ADMIN — LABETALOL HYDROCHLORIDE 10 MG: 5 INJECTION, SOLUTION INTRAVENOUS at 00:26

## 2019-01-03 RX ADMIN — HYDRALAZINE HYDROCHLORIDE 25 MG: 25 TABLET, FILM COATED ORAL at 20:05

## 2019-01-03 RX ADMIN — SODIUM CHLORIDE: 9 INJECTION, SOLUTION INTRAVENOUS at 08:37

## 2019-01-03 RX ADMIN — PROPOFOL 50 MG: 10 INJECTION, EMULSION INTRAVENOUS at 08:52

## 2019-01-03 RX ADMIN — TRAMADOL HYDROCHLORIDE 50 MG: 50 TABLET, FILM COATED ORAL at 21:49

## 2019-01-03 RX ADMIN — LABETALOL HYDROCHLORIDE 10 MG: 5 INJECTION, SOLUTION INTRAVENOUS at 05:25

## 2019-01-03 RX ADMIN — METRONIDAZOLE 500 MG: 500 TABLET, FILM COATED ORAL at 16:27

## 2019-01-03 RX ADMIN — HYDRALAZINE HYDROCHLORIDE 25 MG: 25 TABLET, FILM COATED ORAL at 14:41

## 2019-01-03 RX ADMIN — CLONIDINE HYDROCHLORIDE 0.1 MG: 0.1 TABLET ORAL at 20:04

## 2019-01-03 RX ADMIN — PROPOFOL 100 MG: 10 INJECTION, EMULSION INTRAVENOUS at 08:47

## 2019-01-03 RX ADMIN — CLONIDINE HYDROCHLORIDE 0.1 MG: 0.1 TABLET ORAL at 10:35

## 2019-01-03 RX ADMIN — METRONIDAZOLE 500 MG: 500 TABLET, FILM COATED ORAL at 20:05

## 2019-01-03 RX ADMIN — PANTOPRAZOLE SODIUM 40 MG: 40 TABLET, DELAYED RELEASE ORAL at 16:27

## 2019-01-03 RX ADMIN — CINACALCET HYDROCHLORIDE 180 MG: 30 TABLET, COATED ORAL at 10:35

## 2019-01-03 RX ADMIN — DIPHENHYDRAMINE HYDROCHLORIDE 25 MG: 50 INJECTION, SOLUTION INTRAMUSCULAR; INTRAVENOUS at 10:36

## 2019-01-03 RX ADMIN — DIPHENHYDRAMINE HYDROCHLORIDE 25 MG: 50 INJECTION, SOLUTION INTRAMUSCULAR; INTRAVENOUS at 17:24

## 2019-01-03 RX ADMIN — Medication 10 ML: at 18:43

## 2019-01-03 RX ADMIN — PROPOFOL 20 MG: 10 INJECTION, EMULSION INTRAVENOUS at 08:54

## 2019-01-03 ASSESSMENT — PAIN SCALES - GENERAL
PAINLEVEL_OUTOF10: 0
PAINLEVEL_OUTOF10: 0
PAINLEVEL_OUTOF10: 8
PAINLEVEL_OUTOF10: 6
PAINLEVEL_OUTOF10: 0
PAINLEVEL_OUTOF10: 9
PAINLEVEL_OUTOF10: 10

## 2019-01-03 ASSESSMENT — PAIN DESCRIPTION - DESCRIPTORS
DESCRIPTORS: SHARP
DESCRIPTORS: ACHING;DISCOMFORT;DULL

## 2019-01-03 ASSESSMENT — PAIN DESCRIPTION - LOCATION
LOCATION: ARM
LOCATION: ARM

## 2019-01-03 ASSESSMENT — PAIN DESCRIPTION - ORIENTATION: ORIENTATION: RIGHT

## 2019-01-03 ASSESSMENT — PAIN DESCRIPTION - PAIN TYPE
TYPE: ACUTE PAIN
TYPE: ACUTE PAIN

## 2019-01-04 LAB
ALBUMIN SERPL-MCNC: 3.5 G/DL (ref 3.5–5.2)
ALP BLD-CCNC: 190 U/L (ref 40–129)
ALT SERPL-CCNC: 39 U/L (ref 0–40)
ANION GAP SERPL CALCULATED.3IONS-SCNC: 17 MMOL/L (ref 7–16)
AST SERPL-CCNC: 27 U/L (ref 0–39)
BILIRUB SERPL-MCNC: 0.2 MG/DL (ref 0–1.2)
BUN BLDV-MCNC: 50 MG/DL (ref 6–20)
CALCIUM SERPL-MCNC: 6.7 MG/DL (ref 8.6–10.2)
CHLORIDE BLD-SCNC: 95 MMOL/L (ref 98–107)
CO2: 25 MMOL/L (ref 22–29)
CREAT SERPL-MCNC: 10.6 MG/DL (ref 0.7–1.2)
GFR AFRICAN AMERICAN: 7
GFR NON-AFRICAN AMERICAN: 7 ML/MIN/1.73
GLUCOSE BLD-MCNC: 132 MG/DL (ref 74–99)
HCT VFR BLD CALC: 27 % (ref 37–54)
HEMOGLOBIN: 8.2 G/DL (ref 12.5–16.5)
MCH RBC QN AUTO: 30 PG (ref 26–35)
MCHC RBC AUTO-ENTMCNC: 30.4 % (ref 32–34.5)
MCV RBC AUTO: 98.9 FL (ref 80–99.9)
PDW BLD-RTO: 17.4 FL (ref 11.5–15)
PLATELET # BLD: 193 E9/L (ref 130–450)
PMV BLD AUTO: 9.6 FL (ref 7–12)
POTASSIUM SERPL-SCNC: 4.7 MMOL/L (ref 3.5–5)
RBC # BLD: 2.73 E12/L (ref 3.8–5.8)
SODIUM BLD-SCNC: 137 MMOL/L (ref 132–146)
TOTAL PROTEIN: 6.7 G/DL (ref 6.4–8.3)
WBC # BLD: 5 E9/L (ref 4.5–11.5)

## 2019-01-04 PROCEDURE — 6370000000 HC RX 637 (ALT 250 FOR IP): Performed by: SURGERY

## 2019-01-04 PROCEDURE — 2140000000 HC CCU INTERMEDIATE R&B

## 2019-01-04 PROCEDURE — 6370000000 HC RX 637 (ALT 250 FOR IP): Performed by: INTERNAL MEDICINE

## 2019-01-04 PROCEDURE — 6370000000 HC RX 637 (ALT 250 FOR IP): Performed by: HOSPITALIST

## 2019-01-04 PROCEDURE — 6360000002 HC RX W HCPCS: Performed by: STUDENT IN AN ORGANIZED HEALTH CARE EDUCATION/TRAINING PROGRAM

## 2019-01-04 PROCEDURE — 80053 COMPREHEN METABOLIC PANEL: CPT

## 2019-01-04 PROCEDURE — 2500000003 HC RX 250 WO HCPCS: Performed by: INTERNAL MEDICINE

## 2019-01-04 PROCEDURE — 6360000002 HC RX W HCPCS: Performed by: INTERNAL MEDICINE

## 2019-01-04 PROCEDURE — 2580000003 HC RX 258: Performed by: HOSPITALIST

## 2019-01-04 PROCEDURE — 2580000003 HC RX 258: Performed by: INTERNAL MEDICINE

## 2019-01-04 PROCEDURE — 6360000002 HC RX W HCPCS: Performed by: HOSPITALIST

## 2019-01-04 PROCEDURE — 36415 COLL VENOUS BLD VENIPUNCTURE: CPT

## 2019-01-04 PROCEDURE — 90935 HEMODIALYSIS ONE EVALUATION: CPT

## 2019-01-04 PROCEDURE — 85027 COMPLETE CBC AUTOMATED: CPT

## 2019-01-04 RX ORDER — HYDRALAZINE HYDROCHLORIDE 50 MG/1
50 TABLET, FILM COATED ORAL EVERY 8 HOURS SCHEDULED
Status: DISCONTINUED | OUTPATIENT
Start: 2019-01-04 | End: 2019-01-05

## 2019-01-04 RX ORDER — LOSARTAN POTASSIUM 50 MG/1
100 TABLET ORAL EVERY EVENING
Status: DISCONTINUED | OUTPATIENT
Start: 2019-01-04 | End: 2019-01-07 | Stop reason: HOSPADM

## 2019-01-04 RX ADMIN — LABETALOL HYDROCHLORIDE 10 MG: 5 INJECTION, SOLUTION INTRAVENOUS at 01:17

## 2019-01-04 RX ADMIN — CALCIUM GLUCONATE 1 G: 98 INJECTION, SOLUTION INTRAVENOUS at 11:59

## 2019-01-04 RX ADMIN — DIPHENHYDRAMINE HYDROCHLORIDE 25 MG: 50 INJECTION, SOLUTION INTRAMUSCULAR; INTRAVENOUS at 01:15

## 2019-01-04 RX ADMIN — CALCIUM ACETATE 1334 MG: 667 CAPSULE ORAL at 11:57

## 2019-01-04 RX ADMIN — HYDRALAZINE HYDROCHLORIDE 10 MG: 20 INJECTION INTRAMUSCULAR; INTRAVENOUS at 01:16

## 2019-01-04 RX ADMIN — DIPHENHYDRAMINE HYDROCHLORIDE 25 MG: 50 INJECTION, SOLUTION INTRAMUSCULAR; INTRAVENOUS at 12:00

## 2019-01-04 RX ADMIN — PANTOPRAZOLE SODIUM 40 MG: 40 TABLET, DELAYED RELEASE ORAL at 17:33

## 2019-01-04 RX ADMIN — HYDRALAZINE HYDROCHLORIDE 50 MG: 50 TABLET, FILM COATED ORAL at 14:44

## 2019-01-04 RX ADMIN — LOSARTAN POTASSIUM 100 MG: 50 TABLET, FILM COATED ORAL at 17:23

## 2019-01-04 RX ADMIN — METRONIDAZOLE 500 MG: 500 TABLET, FILM COATED ORAL at 06:33

## 2019-01-04 RX ADMIN — LABETALOL HYDROCHLORIDE 10 MG: 5 INJECTION, SOLUTION INTRAVENOUS at 06:24

## 2019-01-04 RX ADMIN — CINACALCET HYDROCHLORIDE 180 MG: 30 TABLET, COATED ORAL at 11:57

## 2019-01-04 RX ADMIN — CLONIDINE HYDROCHLORIDE 0.1 MG: 0.1 TABLET ORAL at 09:34

## 2019-01-04 RX ADMIN — HYDRALAZINE HYDROCHLORIDE 10 MG: 20 INJECTION INTRAMUSCULAR; INTRAVENOUS at 06:23

## 2019-01-04 RX ADMIN — HYDRALAZINE HYDROCHLORIDE 10 MG: 20 INJECTION INTRAMUSCULAR; INTRAVENOUS at 10:47

## 2019-01-04 RX ADMIN — Medication 10 ML: at 01:19

## 2019-01-04 RX ADMIN — CLONIDINE HYDROCHLORIDE 0.1 MG: 0.1 TABLET ORAL at 14:45

## 2019-01-04 RX ADMIN — CALCIUM ACETATE 1334 MG: 667 CAPSULE ORAL at 17:23

## 2019-01-04 RX ADMIN — HYDRALAZINE HYDROCHLORIDE 50 MG: 50 TABLET, FILM COATED ORAL at 22:05

## 2019-01-04 RX ADMIN — DIPHENHYDRAMINE HYDROCHLORIDE 25 MG: 50 INJECTION, SOLUTION INTRAMUSCULAR; INTRAVENOUS at 17:33

## 2019-01-04 RX ADMIN — LABETALOL HYDROCHLORIDE 10 MG: 5 INJECTION, SOLUTION INTRAVENOUS at 17:33

## 2019-01-04 RX ADMIN — CLONIDINE HYDROCHLORIDE 0.1 MG: 0.1 TABLET ORAL at 22:05

## 2019-01-04 RX ADMIN — HYDRALAZINE HYDROCHLORIDE 25 MG: 25 TABLET, FILM COATED ORAL at 09:35

## 2019-01-04 RX ADMIN — Medication 10 ML: at 11:59

## 2019-01-04 RX ADMIN — LABETALOL HYDROCHLORIDE 10 MG: 5 INJECTION, SOLUTION INTRAVENOUS at 11:58

## 2019-01-05 LAB
ALBUMIN SERPL-MCNC: 3.6 G/DL (ref 3.5–5.2)
ALP BLD-CCNC: 194 U/L (ref 40–129)
ALT SERPL-CCNC: 35 U/L (ref 0–40)
ANION GAP SERPL CALCULATED.3IONS-SCNC: 17 MMOL/L (ref 7–16)
AST SERPL-CCNC: 26 U/L (ref 0–39)
BILIRUB SERPL-MCNC: 0.3 MG/DL (ref 0–1.2)
BUN BLDV-MCNC: 21 MG/DL (ref 6–20)
CALCIUM SERPL-MCNC: 6.8 MG/DL (ref 8.6–10.2)
CHLORIDE BLD-SCNC: 95 MMOL/L (ref 98–107)
CO2: 28 MMOL/L (ref 22–29)
CREAT SERPL-MCNC: 6.7 MG/DL (ref 0.7–1.2)
GFR AFRICAN AMERICAN: 11
GFR NON-AFRICAN AMERICAN: 11 ML/MIN/1.73
GLUCOSE BLD-MCNC: 92 MG/DL (ref 74–99)
HCT VFR BLD CALC: 27.7 % (ref 37–54)
HEMOGLOBIN: 8.6 G/DL (ref 12.5–16.5)
MCH RBC QN AUTO: 30.6 PG (ref 26–35)
MCHC RBC AUTO-ENTMCNC: 31 % (ref 32–34.5)
MCV RBC AUTO: 98.6 FL (ref 80–99.9)
PARATHYROID HORMONE INTACT: 418 PG/ML (ref 15–65)
PDW BLD-RTO: 17.3 FL (ref 11.5–15)
PLATELET # BLD: 210 E9/L (ref 130–450)
PMV BLD AUTO: 9.7 FL (ref 7–12)
POTASSIUM SERPL-SCNC: 4.1 MMOL/L (ref 3.5–5)
RBC # BLD: 2.81 E12/L (ref 3.8–5.8)
SODIUM BLD-SCNC: 140 MMOL/L (ref 132–146)
TOTAL PROTEIN: 6.9 G/DL (ref 6.4–8.3)
WBC # BLD: 4.5 E9/L (ref 4.5–11.5)

## 2019-01-05 PROCEDURE — 6360000002 HC RX W HCPCS: Performed by: INTERNAL MEDICINE

## 2019-01-05 PROCEDURE — 6370000000 HC RX 637 (ALT 250 FOR IP): Performed by: HOSPITALIST

## 2019-01-05 PROCEDURE — 80053 COMPREHEN METABOLIC PANEL: CPT

## 2019-01-05 PROCEDURE — 83970 ASSAY OF PARATHORMONE: CPT

## 2019-01-05 PROCEDURE — 6370000000 HC RX 637 (ALT 250 FOR IP): Performed by: INTERNAL MEDICINE

## 2019-01-05 PROCEDURE — 6370000000 HC RX 637 (ALT 250 FOR IP): Performed by: FAMILY MEDICINE

## 2019-01-05 PROCEDURE — 36415 COLL VENOUS BLD VENIPUNCTURE: CPT

## 2019-01-05 PROCEDURE — 2580000003 HC RX 258: Performed by: HOSPITALIST

## 2019-01-05 PROCEDURE — 6360000002 HC RX W HCPCS: Performed by: HOSPITALIST

## 2019-01-05 PROCEDURE — 2140000000 HC CCU INTERMEDIATE R&B

## 2019-01-05 PROCEDURE — 6370000000 HC RX 637 (ALT 250 FOR IP): Performed by: SURGERY

## 2019-01-05 PROCEDURE — 85027 COMPLETE CBC AUTOMATED: CPT

## 2019-01-05 PROCEDURE — 2500000003 HC RX 250 WO HCPCS: Performed by: INTERNAL MEDICINE

## 2019-01-05 PROCEDURE — 6360000002 HC RX W HCPCS: Performed by: STUDENT IN AN ORGANIZED HEALTH CARE EDUCATION/TRAINING PROGRAM

## 2019-01-05 PROCEDURE — 2580000003 HC RX 258: Performed by: INTERNAL MEDICINE

## 2019-01-05 RX ADMIN — HYDRALAZINE HYDROCHLORIDE 75 MG: 50 TABLET, FILM COATED ORAL at 14:11

## 2019-01-05 RX ADMIN — Medication 10 ML: at 06:12

## 2019-01-05 RX ADMIN — LORAZEPAM 0.5 MG: 1 TABLET ORAL at 00:55

## 2019-01-05 RX ADMIN — PANTOPRAZOLE SODIUM 40 MG: 40 TABLET, DELAYED RELEASE ORAL at 06:12

## 2019-01-05 RX ADMIN — LABETALOL HYDROCHLORIDE 10 MG: 5 INJECTION, SOLUTION INTRAVENOUS at 23:00

## 2019-01-05 RX ADMIN — CALCIUM ACETATE 1334 MG: 667 CAPSULE ORAL at 08:45

## 2019-01-05 RX ADMIN — CALCIUM ACETATE 1334 MG: 667 CAPSULE ORAL at 16:11

## 2019-01-05 RX ADMIN — TRAMADOL HYDROCHLORIDE 50 MG: 50 TABLET, FILM COATED ORAL at 08:44

## 2019-01-05 RX ADMIN — DIPHENHYDRAMINE HYDROCHLORIDE 25 MG: 50 INJECTION, SOLUTION INTRAMUSCULAR; INTRAVENOUS at 12:59

## 2019-01-05 RX ADMIN — CALCIUM ACETATE 1334 MG: 667 CAPSULE ORAL at 12:01

## 2019-01-05 RX ADMIN — Medication 10 ML: at 21:36

## 2019-01-05 RX ADMIN — LABETALOL HYDROCHLORIDE 10 MG: 5 INJECTION, SOLUTION INTRAVENOUS at 17:15

## 2019-01-05 RX ADMIN — LABETALOL HYDROCHLORIDE 10 MG: 5 INJECTION, SOLUTION INTRAVENOUS at 06:11

## 2019-01-05 RX ADMIN — LABETALOL HYDROCHLORIDE 10 MG: 5 INJECTION, SOLUTION INTRAVENOUS at 00:53

## 2019-01-05 RX ADMIN — LOSARTAN POTASSIUM 100 MG: 50 TABLET, FILM COATED ORAL at 17:32

## 2019-01-05 RX ADMIN — DIPHENHYDRAMINE HYDROCHLORIDE 25 MG: 50 INJECTION, SOLUTION INTRAMUSCULAR; INTRAVENOUS at 06:12

## 2019-01-05 RX ADMIN — Medication 10 ML: at 00:53

## 2019-01-05 RX ADMIN — HYDRALAZINE HYDROCHLORIDE 75 MG: 50 TABLET, FILM COATED ORAL at 21:32

## 2019-01-05 RX ADMIN — Medication 10 ML: at 08:44

## 2019-01-05 RX ADMIN — HYDRALAZINE HYDROCHLORIDE 50 MG: 50 TABLET, FILM COATED ORAL at 06:11

## 2019-01-05 RX ADMIN — CLONIDINE HYDROCHLORIDE 0.1 MG: 0.1 TABLET ORAL at 14:11

## 2019-01-05 RX ADMIN — HYDRALAZINE HYDROCHLORIDE 10 MG: 20 INJECTION INTRAMUSCULAR; INTRAVENOUS at 16:26

## 2019-01-05 RX ADMIN — DIPHENHYDRAMINE HYDROCHLORIDE 25 MG: 50 INJECTION, SOLUTION INTRAMUSCULAR; INTRAVENOUS at 21:33

## 2019-01-05 RX ADMIN — CLONIDINE HYDROCHLORIDE 0.1 MG: 0.1 TABLET ORAL at 21:32

## 2019-01-05 RX ADMIN — Medication 10 ML: at 06:17

## 2019-01-05 RX ADMIN — PANTOPRAZOLE SODIUM 40 MG: 40 TABLET, DELAYED RELEASE ORAL at 16:11

## 2019-01-05 RX ADMIN — HYDRALAZINE HYDROCHLORIDE 10 MG: 20 INJECTION INTRAMUSCULAR; INTRAVENOUS at 02:57

## 2019-01-05 RX ADMIN — CALCIUM GLUCONATE 1 G: 98 INJECTION, SOLUTION INTRAVENOUS at 12:08

## 2019-01-05 RX ADMIN — DIPHENHYDRAMINE HYDROCHLORIDE 25 MG: 50 INJECTION, SOLUTION INTRAMUSCULAR; INTRAVENOUS at 00:54

## 2019-01-05 RX ADMIN — LABETALOL HYDROCHLORIDE 10 MG: 5 INJECTION, SOLUTION INTRAVENOUS at 12:01

## 2019-01-05 RX ADMIN — CLONIDINE HYDROCHLORIDE 0.1 MG: 0.1 TABLET ORAL at 08:44

## 2019-01-05 ASSESSMENT — PAIN DESCRIPTION - PAIN TYPE: TYPE: ACUTE PAIN

## 2019-01-05 ASSESSMENT — PAIN SCALES - GENERAL
PAINLEVEL_OUTOF10: 0
PAINLEVEL_OUTOF10: 9
PAINLEVEL_OUTOF10: 0

## 2019-01-05 ASSESSMENT — PAIN DESCRIPTION - DESCRIPTORS: DESCRIPTORS: ACHING;DISCOMFORT;HEADACHE

## 2019-01-05 ASSESSMENT — PAIN DESCRIPTION - LOCATION: LOCATION: HEAD

## 2019-01-06 LAB
ALBUMIN SERPL-MCNC: 3.4 G/DL (ref 3.5–5.2)
ALP BLD-CCNC: 190 U/L (ref 40–129)
ALT SERPL-CCNC: 31 U/L (ref 0–40)
ANION GAP SERPL CALCULATED.3IONS-SCNC: 17 MMOL/L (ref 7–16)
AST SERPL-CCNC: 24 U/L (ref 0–39)
BILIRUB SERPL-MCNC: 0.3 MG/DL (ref 0–1.2)
BUN BLDV-MCNC: 37 MG/DL (ref 6–20)
CALCIUM SERPL-MCNC: 7.5 MG/DL (ref 8.6–10.2)
CHLORIDE BLD-SCNC: 94 MMOL/L (ref 98–107)
CO2: 27 MMOL/L (ref 22–29)
CREAT SERPL-MCNC: 9.8 MG/DL (ref 0.7–1.2)
GFR AFRICAN AMERICAN: 7
GFR NON-AFRICAN AMERICAN: 7 ML/MIN/1.73
GLUCOSE BLD-MCNC: 86 MG/DL (ref 74–99)
POTASSIUM SERPL-SCNC: 4.8 MMOL/L (ref 3.5–5)
SODIUM BLD-SCNC: 138 MMOL/L (ref 132–146)
TOTAL PROTEIN: 6.7 G/DL (ref 6.4–8.3)

## 2019-01-06 PROCEDURE — 6360000002 HC RX W HCPCS: Performed by: STUDENT IN AN ORGANIZED HEALTH CARE EDUCATION/TRAINING PROGRAM

## 2019-01-06 PROCEDURE — 6370000000 HC RX 637 (ALT 250 FOR IP): Performed by: STUDENT IN AN ORGANIZED HEALTH CARE EDUCATION/TRAINING PROGRAM

## 2019-01-06 PROCEDURE — 80053 COMPREHEN METABOLIC PANEL: CPT

## 2019-01-06 PROCEDURE — 2140000000 HC CCU INTERMEDIATE R&B

## 2019-01-06 PROCEDURE — 6370000000 HC RX 637 (ALT 250 FOR IP): Performed by: FAMILY MEDICINE

## 2019-01-06 PROCEDURE — 6370000000 HC RX 637 (ALT 250 FOR IP): Performed by: HOSPITALIST

## 2019-01-06 PROCEDURE — 6370000000 HC RX 637 (ALT 250 FOR IP): Performed by: SURGERY

## 2019-01-06 PROCEDURE — 6360000002 HC RX W HCPCS: Performed by: HOSPITALIST

## 2019-01-06 PROCEDURE — 2580000003 HC RX 258: Performed by: HOSPITALIST

## 2019-01-06 PROCEDURE — 6370000000 HC RX 637 (ALT 250 FOR IP): Performed by: INTERNAL MEDICINE

## 2019-01-06 PROCEDURE — 36415 COLL VENOUS BLD VENIPUNCTURE: CPT

## 2019-01-06 RX ORDER — DIPHENHYDRAMINE HCL 25 MG
25 TABLET ORAL EVERY 6 HOURS PRN
Status: DISCONTINUED | OUTPATIENT
Start: 2019-01-06 | End: 2019-01-07 | Stop reason: HOSPADM

## 2019-01-06 RX ORDER — NIFEDIPINE 90 MG/1
90 TABLET, FILM COATED, EXTENDED RELEASE ORAL DAILY
Status: ON HOLD | COMMUNITY
End: 2019-01-07 | Stop reason: HOSPADM

## 2019-01-06 RX ORDER — HYDRALAZINE HYDROCHLORIDE 50 MG/1
100 TABLET, FILM COATED ORAL EVERY 8 HOURS SCHEDULED
Status: DISCONTINUED | OUTPATIENT
Start: 2019-01-06 | End: 2019-01-06

## 2019-01-06 RX ADMIN — DIPHENHYDRAMINE HYDROCHLORIDE 25 MG: 50 INJECTION, SOLUTION INTRAMUSCULAR; INTRAVENOUS at 04:34

## 2019-01-06 RX ADMIN — Medication 10 ML: at 09:49

## 2019-01-06 RX ADMIN — PANTOPRAZOLE SODIUM 40 MG: 40 TABLET, DELAYED RELEASE ORAL at 06:02

## 2019-01-06 RX ADMIN — CLONIDINE HYDROCHLORIDE 0.3 MG: 0.2 TABLET ORAL at 13:00

## 2019-01-06 RX ADMIN — HYDRALAZINE HYDROCHLORIDE 75 MG: 50 TABLET, FILM COATED ORAL at 22:06

## 2019-01-06 RX ADMIN — CALCIUM ACETATE 1334 MG: 667 CAPSULE ORAL at 11:00

## 2019-01-06 RX ADMIN — LABETALOL HYDROCHLORIDE 10 MG: 5 INJECTION, SOLUTION INTRAVENOUS at 04:34

## 2019-01-06 RX ADMIN — TRAMADOL HYDROCHLORIDE 50 MG: 50 TABLET, FILM COATED ORAL at 09:49

## 2019-01-06 RX ADMIN — PANTOPRAZOLE SODIUM 40 MG: 40 TABLET, DELAYED RELEASE ORAL at 17:41

## 2019-01-06 RX ADMIN — LABETALOL HYDROCHLORIDE 10 MG: 5 INJECTION, SOLUTION INTRAVENOUS at 11:00

## 2019-01-06 RX ADMIN — DIPHENHYDRAMINE HYDROCHLORIDE 25 MG: 50 INJECTION, SOLUTION INTRAMUSCULAR; INTRAVENOUS at 10:44

## 2019-01-06 RX ADMIN — HYDRALAZINE HYDROCHLORIDE 75 MG: 50 TABLET, FILM COATED ORAL at 13:00

## 2019-01-06 RX ADMIN — CLONIDINE HYDROCHLORIDE 0.1 MG: 0.1 TABLET ORAL at 08:36

## 2019-01-06 RX ADMIN — LABETALOL HYDROCHLORIDE 10 MG: 5 INJECTION, SOLUTION INTRAVENOUS at 23:09

## 2019-01-06 RX ADMIN — CLONIDINE HYDROCHLORIDE 0.3 MG: 0.2 TABLET ORAL at 22:07

## 2019-01-06 RX ADMIN — HYDRALAZINE HYDROCHLORIDE 75 MG: 50 TABLET, FILM COATED ORAL at 06:02

## 2019-01-06 RX ADMIN — LORAZEPAM 0.5 MG: 1 TABLET ORAL at 23:09

## 2019-01-06 RX ADMIN — Medication 10 ML: at 22:07

## 2019-01-06 RX ADMIN — HYDRALAZINE HYDROCHLORIDE 10 MG: 20 INJECTION INTRAMUSCULAR; INTRAVENOUS at 15:30

## 2019-01-06 RX ADMIN — HYDRALAZINE HYDROCHLORIDE 10 MG: 20 INJECTION INTRAMUSCULAR; INTRAVENOUS at 05:33

## 2019-01-06 RX ADMIN — DIPHENHYDRAMINE HCL 25 MG: 25 TABLET ORAL at 22:07

## 2019-01-06 RX ADMIN — CALCIUM ACETATE 1334 MG: 667 CAPSULE ORAL at 08:36

## 2019-01-06 ASSESSMENT — PAIN SCALES - GENERAL
PAINLEVEL_OUTOF10: 9
PAINLEVEL_OUTOF10: 0
PAINLEVEL_OUTOF10: 2

## 2019-01-06 ASSESSMENT — PAIN DESCRIPTION - PAIN TYPE: TYPE: ACUTE PAIN

## 2019-01-06 ASSESSMENT — PAIN DESCRIPTION - LOCATION: LOCATION: HEAD

## 2019-01-06 ASSESSMENT — PAIN DESCRIPTION - DESCRIPTORS: DESCRIPTORS: ACHING;DISCOMFORT;HEADACHE

## 2019-01-07 VITALS
TEMPERATURE: 99.8 F | HEART RATE: 80 BPM | OXYGEN SATURATION: 98 % | HEIGHT: 66 IN | BODY MASS INDEX: 17.33 KG/M2 | WEIGHT: 107.81 LBS | SYSTOLIC BLOOD PRESSURE: 160 MMHG | DIASTOLIC BLOOD PRESSURE: 96 MMHG | RESPIRATION RATE: 14 BRPM

## 2019-01-07 LAB
ALBUMIN SERPL-MCNC: 3.6 G/DL (ref 3.5–5.2)
ALP BLD-CCNC: 202 U/L (ref 40–129)
ALT SERPL-CCNC: 26 U/L (ref 0–40)
ANION GAP SERPL CALCULATED.3IONS-SCNC: 20 MMOL/L (ref 7–16)
AST SERPL-CCNC: 23 U/L (ref 0–39)
BILIRUB SERPL-MCNC: 0.3 MG/DL (ref 0–1.2)
BLOOD CULTURE, ROUTINE: NORMAL
BUN BLDV-MCNC: 57 MG/DL (ref 6–20)
CALCIUM SERPL-MCNC: 7.9 MG/DL (ref 8.6–10.2)
CHLORIDE BLD-SCNC: 93 MMOL/L (ref 98–107)
CO2: 25 MMOL/L (ref 22–29)
CREAT SERPL-MCNC: 13 MG/DL (ref 0.7–1.2)
CULTURE, BLOOD 2: NORMAL
GFR AFRICAN AMERICAN: 5
GFR NON-AFRICAN AMERICAN: 5 ML/MIN/1.73
GLUCOSE BLD-MCNC: 89 MG/DL (ref 74–99)
POTASSIUM SERPL-SCNC: 4.7 MMOL/L (ref 3.5–5)
SODIUM BLD-SCNC: 138 MMOL/L (ref 132–146)
TOTAL PROTEIN: 6.8 G/DL (ref 6.4–8.3)

## 2019-01-07 PROCEDURE — 90935 HEMODIALYSIS ONE EVALUATION: CPT

## 2019-01-07 PROCEDURE — 6370000000 HC RX 637 (ALT 250 FOR IP): Performed by: STUDENT IN AN ORGANIZED HEALTH CARE EDUCATION/TRAINING PROGRAM

## 2019-01-07 PROCEDURE — 36415 COLL VENOUS BLD VENIPUNCTURE: CPT

## 2019-01-07 PROCEDURE — 80053 COMPREHEN METABOLIC PANEL: CPT

## 2019-01-07 PROCEDURE — 6370000000 HC RX 637 (ALT 250 FOR IP): Performed by: HOSPITALIST

## 2019-01-07 PROCEDURE — 6370000000 HC RX 637 (ALT 250 FOR IP): Performed by: SURGERY

## 2019-01-07 PROCEDURE — 6370000000 HC RX 637 (ALT 250 FOR IP): Performed by: INTERNAL MEDICINE

## 2019-01-07 PROCEDURE — 2580000003 HC RX 258: Performed by: HOSPITALIST

## 2019-01-07 RX ORDER — PANTOPRAZOLE SODIUM 40 MG/1
40 TABLET, DELAYED RELEASE ORAL
Qty: 30 TABLET | Refills: 0 | Status: SHIPPED | OUTPATIENT
Start: 2019-01-07 | End: 2019-01-22 | Stop reason: SDUPTHER

## 2019-01-07 RX ORDER — LOSARTAN POTASSIUM 100 MG/1
100 TABLET ORAL EVERY EVENING
Qty: 30 TABLET | Refills: 0 | Status: SHIPPED | OUTPATIENT
Start: 2019-01-07 | End: 2019-01-22 | Stop reason: SDUPTHER

## 2019-01-07 RX ORDER — CLONIDINE HYDROCHLORIDE 0.3 MG/1
0.3 TABLET ORAL 3 TIMES DAILY
Qty: 60 TABLET | Refills: 0 | Status: SHIPPED | OUTPATIENT
Start: 2019-01-07 | End: 2019-01-07

## 2019-01-07 RX ORDER — CLONIDINE HYDROCHLORIDE 0.3 MG/1
0.3 TABLET ORAL 3 TIMES DAILY
Qty: 60 TABLET | Refills: 0 | Status: SHIPPED | OUTPATIENT
Start: 2019-01-07 | End: 2019-01-22 | Stop reason: SDUPTHER

## 2019-01-07 RX ORDER — DIPHENHYDRAMINE HCL 25 MG
25 TABLET ORAL EVERY 6 HOURS PRN
Qty: 28 TABLET | Refills: 0 | Status: SHIPPED | OUTPATIENT
Start: 2019-01-07 | End: 2019-01-14

## 2019-01-07 RX ORDER — DIPHENHYDRAMINE HCL 25 MG
25 TABLET ORAL EVERY 6 HOURS PRN
Qty: 28 TABLET | Refills: 0 | Status: SHIPPED | OUTPATIENT
Start: 2019-01-07 | End: 2019-01-07

## 2019-01-07 RX ORDER — HYDRALAZINE HYDROCHLORIDE 25 MG/1
75 TABLET, FILM COATED ORAL EVERY 8 HOURS SCHEDULED
Qty: 90 TABLET | Refills: 0 | Status: SHIPPED | OUTPATIENT
Start: 2019-01-07 | End: 2019-01-07

## 2019-01-07 RX ORDER — HYDRALAZINE HYDROCHLORIDE 25 MG/1
75 TABLET, FILM COATED ORAL EVERY 8 HOURS SCHEDULED
Qty: 90 TABLET | Refills: 0 | Status: SHIPPED | OUTPATIENT
Start: 2019-01-07 | End: 2019-01-22 | Stop reason: DRUGHIGH

## 2019-01-07 RX ORDER — LOSARTAN POTASSIUM 100 MG/1
100 TABLET ORAL EVERY EVENING
Qty: 30 TABLET | Refills: 0 | Status: SHIPPED | OUTPATIENT
Start: 2019-01-07 | End: 2019-01-07

## 2019-01-07 RX ADMIN — PANTOPRAZOLE SODIUM 40 MG: 40 TABLET, DELAYED RELEASE ORAL at 06:19

## 2019-01-07 RX ADMIN — HYDRALAZINE HYDROCHLORIDE 75 MG: 50 TABLET, FILM COATED ORAL at 13:07

## 2019-01-07 RX ADMIN — Medication 10 ML: at 11:34

## 2019-01-07 RX ADMIN — DIPHENHYDRAMINE HCL 25 MG: 25 TABLET ORAL at 06:19

## 2019-01-07 RX ADMIN — CLONIDINE HYDROCHLORIDE 0.3 MG: 0.2 TABLET ORAL at 13:07

## 2019-01-07 RX ADMIN — DIPHENHYDRAMINE HCL 25 MG: 25 TABLET ORAL at 12:37

## 2019-01-07 RX ADMIN — CALCIUM ACETATE 1334 MG: 667 CAPSULE ORAL at 11:34

## 2019-01-07 ASSESSMENT — PAIN SCALES - GENERAL
PAINLEVEL_OUTOF10: 0
PAINLEVEL_OUTOF10: 0

## 2019-01-09 LAB
BLOOD CULTURE, ROUTINE: NORMAL
CULTURE, BLOOD 2: NORMAL

## 2019-01-22 ENCOUNTER — OFFICE VISIT (OUTPATIENT)
Dept: INTERNAL MEDICINE | Age: 34
End: 2019-01-22
Payer: MEDICAID

## 2019-01-22 VITALS
DIASTOLIC BLOOD PRESSURE: 100 MMHG | HEART RATE: 67 BPM | TEMPERATURE: 98.1 F | HEIGHT: 66 IN | RESPIRATION RATE: 18 BRPM | SYSTOLIC BLOOD PRESSURE: 156 MMHG | BODY MASS INDEX: 17.95 KG/M2 | WEIGHT: 111.7 LBS

## 2019-01-22 DIAGNOSIS — Z23 NEED FOR PROPHYLACTIC VACCINATION AGAINST STREPTOCOCCUS PNEUMONIAE (PNEUMOCOCCUS): ICD-10-CM

## 2019-01-22 DIAGNOSIS — N18.6 ESRD ON HEMODIALYSIS (HCC): Primary | Chronic | ICD-10-CM

## 2019-01-22 DIAGNOSIS — R53.82 CHRONIC FATIGUE: ICD-10-CM

## 2019-01-22 DIAGNOSIS — Z11.4 ENCOUNTER FOR SCREENING FOR HIV: ICD-10-CM

## 2019-01-22 DIAGNOSIS — Z99.2 ESRD ON HEMODIALYSIS (HCC): Primary | Chronic | ICD-10-CM

## 2019-01-22 DIAGNOSIS — I10 HYPERTENSION, UNSPECIFIED TYPE: ICD-10-CM

## 2019-01-22 DIAGNOSIS — F41.9 ANXIETY: Chronic | ICD-10-CM

## 2019-01-22 DIAGNOSIS — Z23 NEED FOR PROPHYLACTIC VACCINATION AGAINST DIPHTHERIA-TETANUS-PERTUSSIS (DTP): ICD-10-CM

## 2019-01-22 DIAGNOSIS — K29.00 ACUTE SUPERFICIAL GASTRITIS WITHOUT HEMORRHAGE: ICD-10-CM

## 2019-01-22 DIAGNOSIS — N25.81 SECONDARY HYPERPARATHYROIDISM (HCC): ICD-10-CM

## 2019-01-22 PROCEDURE — 90471 IMMUNIZATION ADMIN: CPT

## 2019-01-22 PROCEDURE — G0009 ADMIN PNEUMOCOCCAL VACCINE: HCPCS

## 2019-01-22 PROCEDURE — 1036F TOBACCO NON-USER: CPT | Performed by: INTERNAL MEDICINE

## 2019-01-22 PROCEDURE — G8427 DOCREV CUR MEDS BY ELIG CLIN: HCPCS | Performed by: INTERNAL MEDICINE

## 2019-01-22 PROCEDURE — G8419 CALC BMI OUT NRM PARAM NOF/U: HCPCS | Performed by: INTERNAL MEDICINE

## 2019-01-22 PROCEDURE — 90715 TDAP VACCINE 7 YRS/> IM: CPT

## 2019-01-22 PROCEDURE — 90670 PCV13 VACCINE IM: CPT

## 2019-01-22 PROCEDURE — 99212 OFFICE O/P EST SF 10 MIN: CPT | Performed by: INTERNAL MEDICINE

## 2019-01-22 PROCEDURE — 6360000002 HC RX W HCPCS

## 2019-01-22 PROCEDURE — G8482 FLU IMMUNIZE ORDER/ADMIN: HCPCS | Performed by: INTERNAL MEDICINE

## 2019-01-22 PROCEDURE — 99214 OFFICE O/P EST MOD 30 MIN: CPT | Performed by: INTERNAL MEDICINE

## 2019-01-22 RX ORDER — CINACALCET 90 MG/1
180 TABLET, FILM COATED ORAL DAILY
Qty: 60 TABLET | Refills: 2 | Status: SHIPPED | OUTPATIENT
Start: 2019-01-22 | End: 2019-01-23 | Stop reason: ALTCHOICE

## 2019-01-22 RX ORDER — PANTOPRAZOLE SODIUM 40 MG/1
40 TABLET, DELAYED RELEASE ORAL
Qty: 30 TABLET | Refills: 2 | Status: SHIPPED | OUTPATIENT
Start: 2019-01-22 | End: 2019-05-30 | Stop reason: SDUPTHER

## 2019-01-22 RX ORDER — DIPHENHYDRAMINE HCL 25 MG
25 CAPSULE ORAL 3 TIMES DAILY PRN
COMMUNITY
End: 2019-08-08 | Stop reason: SDUPTHER

## 2019-01-22 RX ORDER — HYDRALAZINE HYDROCHLORIDE 50 MG/1
50 TABLET, FILM COATED ORAL 3 TIMES DAILY
COMMUNITY
End: 2019-01-22 | Stop reason: SDUPTHER

## 2019-01-22 RX ORDER — CLONIDINE HYDROCHLORIDE 0.3 MG/1
0.3 TABLET ORAL 3 TIMES DAILY
Qty: 90 TABLET | Refills: 2 | Status: SHIPPED | OUTPATIENT
Start: 2019-01-22 | End: 2019-08-08 | Stop reason: SDUPTHER

## 2019-01-22 RX ORDER — HYDRALAZINE HYDROCHLORIDE 100 MG/1
100 TABLET, FILM COATED ORAL 3 TIMES DAILY
Qty: 90 TABLET | Refills: 2 | Status: SHIPPED | OUTPATIENT
Start: 2019-01-22 | End: 2019-08-08 | Stop reason: SDUPTHER

## 2019-01-22 RX ORDER — LOSARTAN POTASSIUM 100 MG/1
100 TABLET ORAL EVERY EVENING
Qty: 30 TABLET | Refills: 2 | Status: SHIPPED | OUTPATIENT
Start: 2019-01-22 | End: 2019-08-08 | Stop reason: SDUPTHER

## 2019-01-22 RX ORDER — NIFEDIPINE 90 MG/1
90 TABLET, FILM COATED, EXTENDED RELEASE ORAL DAILY
COMMUNITY
End: 2019-01-25

## 2019-01-22 ASSESSMENT — PATIENT HEALTH QUESTIONNAIRE - PHQ9
2. FEELING DOWN, DEPRESSED OR HOPELESS: 1
1. LITTLE INTEREST OR PLEASURE IN DOING THINGS: 1
SUM OF ALL RESPONSES TO PHQ QUESTIONS 1-9: 2
SUM OF ALL RESPONSES TO PHQ9 QUESTIONS 1 & 2: 2
SUM OF ALL RESPONSES TO PHQ QUESTIONS 1-9: 2

## 2019-01-22 ASSESSMENT — ENCOUNTER SYMPTOMS
NAUSEA: 0
ABDOMINAL PAIN: 0
VOMITING: 0
COUGH: 0
DIARRHEA: 0
SHORTNESS OF BREATH: 0
SORE THROAT: 0

## 2019-01-23 ENCOUNTER — APPOINTMENT (OUTPATIENT)
Dept: CT IMAGING | Age: 34
End: 2019-01-23
Payer: MEDICAID

## 2019-01-23 ENCOUNTER — HOSPITAL ENCOUNTER (EMERGENCY)
Age: 34
Discharge: HOME | End: 2019-01-23
Attending: EMERGENCY MEDICINE
Payer: MEDICAID

## 2019-01-23 VITALS
HEIGHT: 66 IN | OXYGEN SATURATION: 98 % | RESPIRATION RATE: 16 BRPM | HEART RATE: 80 BPM | WEIGHT: 150 LBS | SYSTOLIC BLOOD PRESSURE: 150 MMHG | DIASTOLIC BLOOD PRESSURE: 99 MMHG | TEMPERATURE: 98.6 F | BODY MASS INDEX: 24.11 KG/M2

## 2019-01-23 DIAGNOSIS — R51.9 NONINTRACTABLE HEADACHE, UNSPECIFIED CHRONICITY PATTERN, UNSPECIFIED HEADACHE TYPE: Primary | ICD-10-CM

## 2019-01-23 PROCEDURE — 99284 EMERGENCY DEPT VISIT MOD MDM: CPT

## 2019-01-23 PROCEDURE — 6370000000 HC RX 637 (ALT 250 FOR IP): Performed by: EMERGENCY MEDICINE

## 2019-01-23 PROCEDURE — 70450 CT HEAD/BRAIN W/O DYE: CPT

## 2019-01-23 RX ORDER — HYDRALAZINE HYDROCHLORIDE 50 MG/1
50 TABLET, FILM COATED ORAL 3 TIMES DAILY
COMMUNITY
End: 2019-07-31

## 2019-01-23 RX ORDER — CLONIDINE HYDROCHLORIDE 0.1 MG/1
0.3 TABLET ORAL ONCE
Status: COMPLETED | OUTPATIENT
Start: 2019-01-23 | End: 2019-01-23

## 2019-01-23 RX ORDER — HYDRALAZINE HYDROCHLORIDE 25 MG/1
50 TABLET, FILM COATED ORAL ONCE
Status: COMPLETED | OUTPATIENT
Start: 2019-01-23 | End: 2019-01-23

## 2019-01-23 RX ADMIN — CLONIDINE HYDROCHLORIDE 0.3 MG: 0.1 TABLET ORAL at 19:23

## 2019-01-23 RX ADMIN — HYDRALAZINE HYDROCHLORIDE 50 MG: 25 TABLET, FILM COATED ORAL at 19:23

## 2019-01-25 ENCOUNTER — HOSPITAL ENCOUNTER (EMERGENCY)
Age: 34
Discharge: HOME | End: 2019-01-25
Attending: EMERGENCY MEDICINE
Payer: MEDICAID

## 2019-01-25 VITALS
BODY MASS INDEX: 24.11 KG/M2 | HEART RATE: 70 BPM | SYSTOLIC BLOOD PRESSURE: 190 MMHG | DIASTOLIC BLOOD PRESSURE: 110 MMHG | RESPIRATION RATE: 14 BRPM | HEIGHT: 66 IN | OXYGEN SATURATION: 98 % | WEIGHT: 150 LBS | TEMPERATURE: 98.3 F

## 2019-01-25 DIAGNOSIS — I10 ESSENTIAL HYPERTENSION: Primary | ICD-10-CM

## 2019-01-25 LAB
ANION GAP SERPL CALCULATED.3IONS-SCNC: 12 MMOL/L (ref 7–16)
BASOPHILS ABSOLUTE: 0.02 E9/L (ref 0–0.2)
BASOPHILS RELATIVE PERCENT: 0.5 % (ref 0–2)
BUN BLDV-MCNC: 7 MG/DL (ref 6–20)
CALCIUM SERPL-MCNC: 8.5 MG/DL (ref 8.6–10.2)
CHLORIDE BLD-SCNC: 93 MMOL/L (ref 98–107)
CO2: 31 MMOL/L (ref 22–29)
CREAT SERPL-MCNC: 3.6 MG/DL (ref 0.7–1.2)
EOSINOPHILS ABSOLUTE: 0.14 E9/L (ref 0.05–0.5)
EOSINOPHILS RELATIVE PERCENT: 3.6 % (ref 0–6)
GFR AFRICAN AMERICAN: 23
GFR NON-AFRICAN AMERICAN: 23 ML/MIN/1.73
GLUCOSE BLD-MCNC: 90 MG/DL (ref 74–99)
HCT VFR BLD CALC: 32.9 % (ref 37–54)
HEMOGLOBIN: 10.5 G/DL (ref 12.5–16.5)
IMMATURE GRANULOCYTES #: 0.03 E9/L
IMMATURE GRANULOCYTES %: 0.8 % (ref 0–5)
LYMPHOCYTES ABSOLUTE: 1.18 E9/L (ref 1.5–4)
LYMPHOCYTES RELATIVE PERCENT: 30.3 % (ref 20–42)
MCH RBC QN AUTO: 30.5 PG (ref 26–35)
MCHC RBC AUTO-ENTMCNC: 31.9 % (ref 32–34.5)
MCV RBC AUTO: 95.6 FL (ref 80–99.9)
MONOCYTES ABSOLUTE: 0.38 E9/L (ref 0.1–0.95)
MONOCYTES RELATIVE PERCENT: 9.7 % (ref 2–12)
NEUTROPHILS ABSOLUTE: 2.15 E9/L (ref 1.8–7.3)
NEUTROPHILS RELATIVE PERCENT: 55.1 % (ref 43–80)
PDW BLD-RTO: 16.9 FL (ref 11.5–15)
PLATELET # BLD: 162 E9/L (ref 130–450)
PMV BLD AUTO: 10.7 FL (ref 7–12)
POTASSIUM REFLEX MAGNESIUM: 3.6 MMOL/L (ref 3.5–5)
RBC # BLD: 3.44 E12/L (ref 3.8–5.8)
SODIUM BLD-SCNC: 136 MMOL/L (ref 132–146)
WBC # BLD: 3.9 E9/L (ref 4.5–11.5)

## 2019-01-25 PROCEDURE — 96376 TX/PRO/DX INJ SAME DRUG ADON: CPT

## 2019-01-25 PROCEDURE — 6370000000 HC RX 637 (ALT 250 FOR IP): Performed by: EMERGENCY MEDICINE

## 2019-01-25 PROCEDURE — 36415 COLL VENOUS BLD VENIPUNCTURE: CPT

## 2019-01-25 PROCEDURE — 85025 COMPLETE CBC W/AUTO DIFF WBC: CPT

## 2019-01-25 PROCEDURE — 80048 BASIC METABOLIC PNL TOTAL CA: CPT

## 2019-01-25 PROCEDURE — 6370000000 HC RX 637 (ALT 250 FOR IP): Performed by: INTERNAL MEDICINE

## 2019-01-25 PROCEDURE — 6360000002 HC RX W HCPCS: Performed by: EMERGENCY MEDICINE

## 2019-01-25 PROCEDURE — 99284 EMERGENCY DEPT VISIT MOD MDM: CPT

## 2019-01-25 PROCEDURE — 96374 THER/PROPH/DIAG INJ IV PUSH: CPT

## 2019-01-25 RX ORDER — HYDRALAZINE HYDROCHLORIDE 20 MG/ML
10 INJECTION INTRAMUSCULAR; INTRAVENOUS ONCE
Status: COMPLETED | OUTPATIENT
Start: 2019-01-25 | End: 2019-01-25

## 2019-01-25 RX ORDER — DIPHENHYDRAMINE HCL 25 MG
25 TABLET ORAL ONCE
Status: COMPLETED | OUTPATIENT
Start: 2019-01-25 | End: 2019-01-25

## 2019-01-25 RX ORDER — NIFEDIPINE 30 MG/1
30 TABLET, FILM COATED, EXTENDED RELEASE ORAL DAILY
Status: DISCONTINUED | OUTPATIENT
Start: 2019-01-25 | End: 2019-01-25 | Stop reason: HOSPADM

## 2019-01-25 RX ORDER — NIFEDIPINE 30 MG/1
30 TABLET, EXTENDED RELEASE ORAL DAILY
Qty: 30 TABLET | Refills: 0 | Status: SHIPPED | OUTPATIENT
Start: 2019-01-25 | End: 2019-02-22

## 2019-01-25 RX ADMIN — HYDRALAZINE HYDROCHLORIDE 10 MG: 20 INJECTION INTRAMUSCULAR; INTRAVENOUS at 14:15

## 2019-01-25 RX ADMIN — HYDRALAZINE HYDROCHLORIDE 10 MG: 20 INJECTION INTRAMUSCULAR; INTRAVENOUS at 13:25

## 2019-01-25 RX ADMIN — DIPHENHYDRAMINE HCL 25 MG: 25 TABLET ORAL at 14:14

## 2019-01-25 RX ADMIN — NIFEDIPINE 30 MG: 30 TABLET, EXTENDED RELEASE ORAL at 16:03

## 2019-01-25 ASSESSMENT — PAIN DESCRIPTION - LOCATION: LOCATION: ABDOMEN

## 2019-01-25 ASSESSMENT — PAIN SCALES - GENERAL: PAINLEVEL_OUTOF10: 8

## 2019-02-19 ENCOUNTER — TELEPHONE (OUTPATIENT)
Dept: INTERNAL MEDICINE | Age: 34
End: 2019-02-19

## 2019-02-22 ENCOUNTER — HOSPITAL ENCOUNTER (EMERGENCY)
Age: 34
Discharge: HOME | End: 2019-02-22
Attending: EMERGENCY MEDICINE
Payer: MEDICAID

## 2019-02-22 ENCOUNTER — APPOINTMENT (OUTPATIENT)
Dept: GENERAL RADIOLOGY | Age: 34
End: 2019-02-22
Payer: MEDICAID

## 2019-02-22 VITALS
OXYGEN SATURATION: 97 % | DIASTOLIC BLOOD PRESSURE: 90 MMHG | HEART RATE: 88 BPM | BODY MASS INDEX: 16.88 KG/M2 | WEIGHT: 105 LBS | SYSTOLIC BLOOD PRESSURE: 147 MMHG | HEIGHT: 66 IN | TEMPERATURE: 98.5 F | RESPIRATION RATE: 16 BRPM

## 2019-02-22 DIAGNOSIS — I16.0 HYPERTENSIVE URGENCY: Primary | ICD-10-CM

## 2019-02-22 LAB
ANION GAP SERPL CALCULATED.3IONS-SCNC: 12 MMOL/L (ref 7–16)
BASOPHILS ABSOLUTE: 0.02 E9/L (ref 0–0.2)
BASOPHILS RELATIVE PERCENT: 0.6 % (ref 0–2)
BUN BLDV-MCNC: 6 MG/DL (ref 6–20)
CALCIUM SERPL-MCNC: 8.1 MG/DL (ref 8.6–10.2)
CHLORIDE BLD-SCNC: 95 MMOL/L (ref 98–107)
CO2: 32 MMOL/L (ref 22–29)
CREAT SERPL-MCNC: 3.6 MG/DL (ref 0.7–1.2)
EOSINOPHILS ABSOLUTE: 0.11 E9/L (ref 0.05–0.5)
EOSINOPHILS RELATIVE PERCENT: 3.5 % (ref 0–6)
GFR AFRICAN AMERICAN: 23
GFR NON-AFRICAN AMERICAN: 23 ML/MIN/1.73
GLUCOSE BLD-MCNC: 79 MG/DL (ref 74–99)
HCT VFR BLD CALC: 40.6 % (ref 37–54)
HEMOGLOBIN: 12.6 G/DL (ref 12.5–16.5)
IMMATURE GRANULOCYTES #: 0.01 E9/L
IMMATURE GRANULOCYTES %: 0.3 % (ref 0–5)
LYMPHOCYTES ABSOLUTE: 1.33 E9/L (ref 1.5–4)
LYMPHOCYTES RELATIVE PERCENT: 42.5 % (ref 20–42)
MCH RBC QN AUTO: 29.4 PG (ref 26–35)
MCHC RBC AUTO-ENTMCNC: 31 % (ref 32–34.5)
MCV RBC AUTO: 94.9 FL (ref 80–99.9)
MONOCYTES ABSOLUTE: 0.26 E9/L (ref 0.1–0.95)
MONOCYTES RELATIVE PERCENT: 8.3 % (ref 2–12)
NEUTROPHILS ABSOLUTE: 1.4 E9/L (ref 1.8–7.3)
NEUTROPHILS RELATIVE PERCENT: 44.8 % (ref 43–80)
PDW BLD-RTO: 17.6 FL (ref 11.5–15)
PLATELET # BLD: 191 E9/L (ref 130–450)
PMV BLD AUTO: 10.3 FL (ref 7–12)
POTASSIUM SERPL-SCNC: 3.2 MMOL/L (ref 3.5–5)
RBC # BLD: 4.28 E12/L (ref 3.8–5.8)
SODIUM BLD-SCNC: 139 MMOL/L (ref 132–146)
TROPONIN: <0.01 NG/ML (ref 0–0.03)
WBC # BLD: 3.1 E9/L (ref 4.5–11.5)

## 2019-02-22 PROCEDURE — 6370000000 HC RX 637 (ALT 250 FOR IP): Performed by: STUDENT IN AN ORGANIZED HEALTH CARE EDUCATION/TRAINING PROGRAM

## 2019-02-22 PROCEDURE — 6360000002 HC RX W HCPCS: Performed by: STUDENT IN AN ORGANIZED HEALTH CARE EDUCATION/TRAINING PROGRAM

## 2019-02-22 PROCEDURE — 71045 X-RAY EXAM CHEST 1 VIEW: CPT

## 2019-02-22 PROCEDURE — 36415 COLL VENOUS BLD VENIPUNCTURE: CPT

## 2019-02-22 PROCEDURE — 84484 ASSAY OF TROPONIN QUANT: CPT

## 2019-02-22 PROCEDURE — 96376 TX/PRO/DX INJ SAME DRUG ADON: CPT

## 2019-02-22 PROCEDURE — 80048 BASIC METABOLIC PNL TOTAL CA: CPT

## 2019-02-22 PROCEDURE — 85025 COMPLETE CBC W/AUTO DIFF WBC: CPT

## 2019-02-22 PROCEDURE — 96375 TX/PRO/DX INJ NEW DRUG ADDON: CPT

## 2019-02-22 PROCEDURE — 99284 EMERGENCY DEPT VISIT MOD MDM: CPT

## 2019-02-22 PROCEDURE — 96374 THER/PROPH/DIAG INJ IV PUSH: CPT

## 2019-02-22 RX ORDER — NIFEDIPINE 30 MG/1
30 TABLET, EXTENDED RELEASE ORAL DAILY
Qty: 30 TABLET | Refills: 0 | Status: ON HOLD | OUTPATIENT
Start: 2019-02-22 | End: 2019-08-01 | Stop reason: HOSPADM

## 2019-02-22 RX ORDER — NIFEDIPINE 30 MG/1
30 TABLET, FILM COATED, EXTENDED RELEASE ORAL ONCE
Status: COMPLETED | OUTPATIENT
Start: 2019-02-22 | End: 2019-02-22

## 2019-02-22 RX ORDER — DIPHENHYDRAMINE HYDROCHLORIDE 50 MG/ML
50 INJECTION INTRAMUSCULAR; INTRAVENOUS ONCE
Status: COMPLETED | OUTPATIENT
Start: 2019-02-22 | End: 2019-02-22

## 2019-02-22 RX ORDER — HYDRALAZINE HYDROCHLORIDE 20 MG/ML
20 INJECTION INTRAMUSCULAR; INTRAVENOUS ONCE
Status: COMPLETED | OUTPATIENT
Start: 2019-02-22 | End: 2019-02-22

## 2019-02-22 RX ORDER — HYDRALAZINE HYDROCHLORIDE 20 MG/ML
10 INJECTION INTRAMUSCULAR; INTRAVENOUS ONCE
Status: COMPLETED | OUTPATIENT
Start: 2019-02-22 | End: 2019-02-22

## 2019-02-22 RX ADMIN — NIFEDIPINE 30 MG: 30 TABLET, EXTENDED RELEASE ORAL at 13:00

## 2019-02-22 RX ADMIN — HYDRALAZINE HYDROCHLORIDE 20 MG: 20 INJECTION INTRAMUSCULAR; INTRAVENOUS at 13:34

## 2019-02-22 RX ADMIN — HYDRALAZINE HYDROCHLORIDE 10 MG: 20 INJECTION INTRAMUSCULAR; INTRAVENOUS at 12:42

## 2019-02-22 RX ADMIN — DIPHENHYDRAMINE HYDROCHLORIDE 50 MG: 50 INJECTION, SOLUTION INTRAMUSCULAR; INTRAVENOUS at 12:41

## 2019-02-22 ASSESSMENT — ENCOUNTER SYMPTOMS
COLOR CHANGE: 0
SHORTNESS OF BREATH: 0
DIARRHEA: 0
COUGH: 0
VOMITING: 0
ABDOMINAL PAIN: 0
WHEEZING: 0
NAUSEA: 0
CONSTIPATION: 0

## 2019-03-02 LAB
EKG ATRIAL RATE: 60 BPM
EKG P AXIS: 65 DEGREES
EKG P-R INTERVAL: 192 MS
EKG Q-T INTERVAL: 462 MS
EKG QRS DURATION: 90 MS
EKG QTC CALCULATION (BAZETT): 462 MS
EKG R AXIS: 41 DEGREES
EKG T AXIS: 79 DEGREES
EKG VENTRICULAR RATE: 60 BPM

## 2019-05-30 ENCOUNTER — TELEPHONE (OUTPATIENT)
Dept: INTERNAL MEDICINE | Age: 34
End: 2019-05-30

## 2019-05-30 DIAGNOSIS — K29.00 ACUTE SUPERFICIAL GASTRITIS WITHOUT HEMORRHAGE: ICD-10-CM

## 2019-05-30 RX ORDER — PANTOPRAZOLE SODIUM 40 MG/1
40 TABLET, DELAYED RELEASE ORAL
Qty: 30 TABLET | Refills: 0 | Status: SHIPPED | OUTPATIENT
Start: 2019-05-30 | End: 2019-08-08 | Stop reason: SDUPTHER

## 2019-05-30 NOTE — TELEPHONE ENCOUNTER
Pt last seen in Jan. He canceled his Feb appointment. Voicemail message left for patient to call and reschedule appt here in the office. Only 30 day supply until seen in office.

## 2019-05-30 NOTE — TELEPHONE ENCOUNTER
Pharmacist from [facility] called in at 2:10. Refill needed of pantoprazole. Pharmacy phone # [phone].

## 2019-06-07 ENCOUNTER — TELEPHONE (OUTPATIENT)
Dept: FAMILY MEDICINE CLINIC | Age: 34
End: 2019-06-07

## 2019-07-31 ENCOUNTER — APPOINTMENT (OUTPATIENT)
Dept: GENERAL RADIOLOGY | Age: 34
DRG: 198 | End: 2019-07-31
Payer: MEDICAID

## 2019-07-31 ENCOUNTER — HOSPITAL ENCOUNTER (INPATIENT)
Age: 34
LOS: 1 days | Discharge: HOME | DRG: 198 | End: 2019-08-02
Attending: EMERGENCY MEDICINE | Admitting: INTERNAL MEDICINE
Payer: MEDICAID

## 2019-07-31 DIAGNOSIS — N18.6 ESRD (END STAGE RENAL DISEASE) ON DIALYSIS (HCC): ICD-10-CM

## 2019-07-31 DIAGNOSIS — R07.9 CHEST PAIN, UNSPECIFIED TYPE: Primary | ICD-10-CM

## 2019-07-31 DIAGNOSIS — Z99.2 ESRD (END STAGE RENAL DISEASE) ON DIALYSIS (HCC): ICD-10-CM

## 2019-07-31 LAB
ALBUMIN SERPL-MCNC: 4.7 G/DL (ref 3.5–5.2)
ALP BLD-CCNC: 148 U/L (ref 40–129)
ALT SERPL-CCNC: 18 U/L (ref 0–40)
ANION GAP SERPL CALCULATED.3IONS-SCNC: 12 MMOL/L (ref 7–16)
AST SERPL-CCNC: 37 U/L (ref 0–39)
BILIRUB SERPL-MCNC: 0.3 MG/DL (ref 0–1.2)
BUN BLDV-MCNC: 8 MG/DL (ref 6–20)
CALCIUM SERPL-MCNC: 10 MG/DL (ref 8.6–10.2)
CHLORIDE BLD-SCNC: 97 MMOL/L (ref 98–107)
CO2: 32 MMOL/L (ref 22–29)
CREAT SERPL-MCNC: 3.2 MG/DL (ref 0.7–1.2)
GFR AFRICAN AMERICAN: 27
GFR NON-AFRICAN AMERICAN: 27 ML/MIN/1.73
GLUCOSE BLD-MCNC: 129 MG/DL (ref 74–99)
HCT VFR BLD CALC: 41.7 % (ref 37–54)
HEMOGLOBIN: 13.2 G/DL (ref 12.5–16.5)
MCH RBC QN AUTO: 30.8 PG (ref 26–35)
MCHC RBC AUTO-ENTMCNC: 31.7 % (ref 32–34.5)
MCV RBC AUTO: 97.2 FL (ref 80–99.9)
PDW BLD-RTO: 17.4 FL (ref 11.5–15)
PLATELET # BLD: 307 E9/L (ref 130–450)
PMV BLD AUTO: 9.7 FL (ref 7–12)
POTASSIUM SERPL-SCNC: 4.1 MMOL/L (ref 3.5–5)
RBC # BLD: 4.29 E12/L (ref 3.8–5.8)
REASON FOR REJECTION: NORMAL
REJECTED TEST: NORMAL
SODIUM BLD-SCNC: 141 MMOL/L (ref 132–146)
TOTAL PROTEIN: 8.6 G/DL (ref 6.4–8.3)
TROPONIN: <0.01 NG/ML (ref 0–0.03)
TROPONIN: <0.01 NG/ML (ref 0–0.03)
WBC # BLD: 7.1 E9/L (ref 4.5–11.5)

## 2019-07-31 PROCEDURE — 6360000002 HC RX W HCPCS: Performed by: INTERNAL MEDICINE

## 2019-07-31 PROCEDURE — 6370000000 HC RX 637 (ALT 250 FOR IP): Performed by: INTERNAL MEDICINE

## 2019-07-31 PROCEDURE — 87040 BLOOD CULTURE FOR BACTERIA: CPT

## 2019-07-31 PROCEDURE — 2580000003 HC RX 258: Performed by: INTERNAL MEDICINE

## 2019-07-31 PROCEDURE — 36415 COLL VENOUS BLD VENIPUNCTURE: CPT

## 2019-07-31 PROCEDURE — 99285 EMERGENCY DEPT VISIT HI MDM: CPT

## 2019-07-31 PROCEDURE — 71045 X-RAY EXAM CHEST 1 VIEW: CPT

## 2019-07-31 PROCEDURE — 96374 THER/PROPH/DIAG INJ IV PUSH: CPT

## 2019-07-31 PROCEDURE — 84484 ASSAY OF TROPONIN QUANT: CPT

## 2019-07-31 PROCEDURE — 6370000000 HC RX 637 (ALT 250 FOR IP): Performed by: NURSE PRACTITIONER

## 2019-07-31 PROCEDURE — 6360000002 HC RX W HCPCS: Performed by: NURSE PRACTITIONER

## 2019-07-31 PROCEDURE — 96376 TX/PRO/DX INJ SAME DRUG ADON: CPT

## 2019-07-31 PROCEDURE — 85027 COMPLETE CBC AUTOMATED: CPT

## 2019-07-31 PROCEDURE — G0378 HOSPITAL OBSERVATION PER HR: HCPCS

## 2019-07-31 PROCEDURE — 80053 COMPREHEN METABOLIC PANEL: CPT

## 2019-07-31 PROCEDURE — 93005 ELECTROCARDIOGRAM TRACING: CPT | Performed by: NURSE PRACTITIONER

## 2019-07-31 PROCEDURE — 2500000003 HC RX 250 WO HCPCS: Performed by: INTERNAL MEDICINE

## 2019-07-31 PROCEDURE — 96375 TX/PRO/DX INJ NEW DRUG ADDON: CPT

## 2019-07-31 RX ORDER — CLONIDINE HYDROCHLORIDE 0.1 MG/1
0.3 TABLET ORAL 3 TIMES DAILY
Status: DISCONTINUED | OUTPATIENT
Start: 2019-07-31 | End: 2019-08-03 | Stop reason: HOSPADM

## 2019-07-31 RX ORDER — ASPIRIN 81 MG/1
324 TABLET, CHEWABLE ORAL ONCE
Status: COMPLETED | OUTPATIENT
Start: 2019-07-31 | End: 2019-07-31

## 2019-07-31 RX ORDER — SODIUM CHLORIDE 0.9 % (FLUSH) 0.9 %
10 SYRINGE (ML) INJECTION EVERY 12 HOURS SCHEDULED
Status: DISCONTINUED | OUTPATIENT
Start: 2019-07-31 | End: 2019-08-03 | Stop reason: HOSPADM

## 2019-07-31 RX ORDER — SODIUM CHLORIDE 0.9 % (FLUSH) 0.9 %
10 SYRINGE (ML) INJECTION PRN
Status: DISCONTINUED | OUTPATIENT
Start: 2019-07-31 | End: 2019-08-03 | Stop reason: HOSPADM

## 2019-07-31 RX ORDER — DIPHENHYDRAMINE HCL 25 MG
25 TABLET ORAL EVERY 6 HOURS PRN
Status: DISCONTINUED | OUTPATIENT
Start: 2019-07-31 | End: 2019-08-03 | Stop reason: HOSPADM

## 2019-07-31 RX ORDER — HEPARIN SODIUM 10000 [USP'U]/ML
5000 INJECTION, SOLUTION INTRAVENOUS; SUBCUTANEOUS EVERY 8 HOURS
Status: DISCONTINUED | OUTPATIENT
Start: 2019-07-31 | End: 2019-08-03 | Stop reason: HOSPADM

## 2019-07-31 RX ORDER — NIFEDIPINE 30 MG/1
30 TABLET, EXTENDED RELEASE ORAL DAILY
Status: DISCONTINUED | OUTPATIENT
Start: 2019-08-01 | End: 2019-08-01

## 2019-07-31 RX ORDER — LOSARTAN POTASSIUM 25 MG/1
100 TABLET ORAL EVERY EVENING
Status: DISCONTINUED | OUTPATIENT
Start: 2019-07-31 | End: 2019-08-03 | Stop reason: HOSPADM

## 2019-07-31 RX ORDER — HYDRALAZINE HYDROCHLORIDE 25 MG/1
100 TABLET, FILM COATED ORAL 3 TIMES DAILY
Status: DISCONTINUED | OUTPATIENT
Start: 2019-07-31 | End: 2019-08-03 | Stop reason: HOSPADM

## 2019-07-31 RX ORDER — ONDANSETRON 2 MG/ML
4 INJECTION INTRAMUSCULAR; INTRAVENOUS EVERY 6 HOURS PRN
Status: DISCONTINUED | OUTPATIENT
Start: 2019-07-31 | End: 2019-08-03 | Stop reason: HOSPADM

## 2019-07-31 RX ORDER — DIPHENHYDRAMINE HYDROCHLORIDE 50 MG/ML
25 INJECTION INTRAMUSCULAR; INTRAVENOUS ONCE
Status: COMPLETED | OUTPATIENT
Start: 2019-07-31 | End: 2019-07-31

## 2019-07-31 RX ORDER — HYDRALAZINE HYDROCHLORIDE 20 MG/ML
10 INJECTION INTRAMUSCULAR; INTRAVENOUS EVERY 4 HOURS PRN
Status: DISCONTINUED | OUTPATIENT
Start: 2019-07-31 | End: 2019-08-01

## 2019-07-31 RX ORDER — PANTOPRAZOLE SODIUM 40 MG/1
40 TABLET, DELAYED RELEASE ORAL
Status: DISCONTINUED | OUTPATIENT
Start: 2019-08-01 | End: 2019-08-03 | Stop reason: HOSPADM

## 2019-07-31 RX ORDER — LABETALOL HYDROCHLORIDE 5 MG/ML
10 INJECTION, SOLUTION INTRAVENOUS ONCE
Status: COMPLETED | OUTPATIENT
Start: 2019-07-31 | End: 2019-07-31

## 2019-07-31 RX ADMIN — ASPIRIN 81 MG 324 MG: 81 TABLET ORAL at 14:30

## 2019-07-31 RX ADMIN — Medication 10 ML: at 21:11

## 2019-07-31 RX ADMIN — CLONIDINE HYDROCHLORIDE 0.3 MG: 0.1 TABLET ORAL at 21:10

## 2019-07-31 RX ADMIN — DIPHENHYDRAMINE HYDROCHLORIDE 25 MG: 50 INJECTION, SOLUTION INTRAMUSCULAR; INTRAVENOUS at 14:45

## 2019-07-31 RX ADMIN — HYDRALAZINE HYDROCHLORIDE 100 MG: 25 TABLET, FILM COATED ORAL at 21:10

## 2019-07-31 RX ADMIN — DIPHENHYDRAMINE HCL 25 MG: 25 TABLET ORAL at 21:10

## 2019-07-31 RX ADMIN — LABETALOL HYDROCHLORIDE 10 MG: 5 INJECTION INTRAVENOUS at 17:02

## 2019-07-31 RX ADMIN — LOSARTAN POTASSIUM 100 MG: 25 TABLET, FILM COATED ORAL at 18:50

## 2019-07-31 RX ADMIN — CALCIUM ACETATE 1334 MG: 667 CAPSULE ORAL at 18:50

## 2019-07-31 RX ADMIN — HYDRALAZINE HYDROCHLORIDE 10 MG: 20 INJECTION INTRAMUSCULAR; INTRAVENOUS at 23:19

## 2019-07-31 RX ADMIN — HYDRALAZINE HYDROCHLORIDE 10 MG: 20 INJECTION INTRAMUSCULAR; INTRAVENOUS at 18:56

## 2019-07-31 RX ADMIN — Medication 10 ML: at 23:19

## 2019-07-31 SDOH — HEALTH STABILITY: MENTAL HEALTH: HOW OFTEN DO YOU HAVE A DRINK CONTAINING ALCOHOL?: NEVER

## 2019-07-31 ASSESSMENT — PAIN DESCRIPTION - LOCATION
LOCATION: CHEST
LOCATION: CHEST

## 2019-07-31 ASSESSMENT — HEART SCORE: ECG: 1

## 2019-07-31 ASSESSMENT — PAIN SCALES - GENERAL
PAINLEVEL_OUTOF10: 0
PAINLEVEL_OUTOF10: 6
PAINLEVEL_OUTOF10: 5

## 2019-07-31 ASSESSMENT — PAIN DESCRIPTION - FREQUENCY: FREQUENCY: CONTINUOUS

## 2019-07-31 ASSESSMENT — PAIN DESCRIPTION - PAIN TYPE: TYPE: ACUTE PAIN

## 2019-07-31 ASSESSMENT — PAIN DESCRIPTION - ORIENTATION: ORIENTATION: MID

## 2019-07-31 ASSESSMENT — PAIN DESCRIPTION - DESCRIPTORS: DESCRIPTORS: HEAVINESS

## 2019-07-31 NOTE — H&P
[facility]  Internal Medicine Residency Program  History and Physical    Patient:  Shaquille De Guzman 26 y.o. male MRN: [id number]     Date of Service: 7/31/2019    Hospital Day: 1      Chief complaint: had concerns including Chest Pain; Hypertension (Had high BP at dialysis; BP is now 149/98); and Fever (said it was 99). History of Present Illness   The patient is a 26 y.o. male with PMH of ESRD on HD, brain aneurysms s/p clipping, hypertension who presented to the ED from his dialysis centre because of elevated BP and chest pain. He doesn't know what his blood pressure were like in the dialysis centre. The patient had states that he had severe chest pain towards the end of his dialysis session with elevated BP. He could not finish the dialysis due to chest pain. He said the pain was substernal,pressure like, no radiation, 9/10 in severity, with no associated SOB or palpitations and not relieved by any posture or anything. He did not complain of blurring of vision, no weakness and numbness anywhere. He is anuric. He says he is compliant with antihypertensives and his dialysis. He gets dialysis 3 days a week and mentions he is compliant with it. ED Course: . In the ED after getting aspirin his BP was 179/112mm of HG. He was given aspirin which relieved his chest pain to some extent. Labs were drawn. Troponin was negative, CXR negative for  pulmonary edema. EKG negative for ischemia and ST elevation.   ED Meds: Patient was given aspirin   ED Fluids: Patient was given     Past Medical History:      Diagnosis Date    Anxiety 9/19/2015    [facility]F [facility]arteriovenous [facility]) ([facility] 4/30/2018    Chronic kidney disease     CKD since early childhood per pt and on HD ~ 11 years    Depression     Encounter regarding vascular access for dialysis for ESRD ([facility] 4/30/2018    Hypertension     on meds for ~ 11 years    Hypothyroidism     Intracranial hemorrhage ([facility])     was d/t ruptured aneurysm - pt underwent neurosurgery for clipping of the aneurysm    Noncompliance w/medication treatment due to intermit use of medication 11/3/2015       Past Surgical History:        Procedure Laterality Date    BRAIN ANEURYSM SURGERY Right 3-4 years ago    Intracranial aneurysm clipping surgery 3-4 years ago, after rupture of aneurysm causing ICH    DIALYSIS FISTULA CREATION Left 11 years ago    UPPER GASTROINTESTINAL ENDOSCOPY N/A 1/3/2019    EGD BIOPSY performed by Saroj Haywood MD at [facility]       Medications Prior to Admission:    Prior to Admission medications    Medication Sig Start Date End Date Taking? Authorizing Provider   pantoprazole (PROTONIX) 40 MG tablet Take 1 tablet by mouth every morning (before breakfast) 5/30/19  Yes Charlotte Maddox MD   NIFEdipine (PROCARDIA XL) 30 MG extended release tablet Take 1 tablet by mouth daily 2/22/19  Yes Alem Bianchi DO   diphenhydrAMINE (BENADRYL) 25 MG capsule Take 25 mg by mouth 3 times daily as needed for Itching   Yes Historical Provider, MD   hydrALAZINE (APRESOLINE) 100 MG tablet Take 1 tablet by mouth 3 times daily 1/22/19  Yes Sapphire Ruelas MD   losartan (COZAAR) 100 MG tablet Take 1 tablet by mouth every evening 1/22/19  Yes Sapphire Ruelas MD   cloNIDine (CATAPRES) 0.3 MG tablet Take 1 tablet by mouth 3 times daily 1/22/19  Yes Sapphire Ruelas MD   calcium acetate (PHOSLO) 667 MG capsule Take 1,334 mg by mouth 3 times daily (with meals)   Yes Historical Provider, MD       Allergies:  Tylenol [acetaminophen]    Social History:   TOBACCO:   reports that he has quit smoking. His smoking use included cigarettes. He has never used smokeless tobacco.  ETOH:   reports that he does not drink alcohol.        Family History:       Problem Relation Age of Onset    Kidney Disease Paternal Grandmother     Cancer Neg Hx     Heart Disease Neg Hx        REVIEW OF SYSTEMS:    · Constitutional: No fever, no chills, no change in weight; good appetite  · HEENT: No blurred vision, no ear problems, no sore throat, no rhinorrhea.   · Respiratory: No cough, no sputum production, no pleuritic chest pain, no shortness of breath  · Cardiology: chest pain 9/10 on presentation,no palpitation  · Gastroenterology: no abdominal pain, bloating sensation, or heartburn  · Genitourinary: Anuric on HD  · Musculoskeletal: no joint pain, no myalgia, no change in range of movement  · Neurology: no focal weakness in extremities, no slurred speech, no double vision, no tingling or numbness sensation      Physical Exam   · Vitals: BP (!) 180/116   Pulse 80   Temp 98.6 °F (37 °C) (Oral)   Resp 20   Ht 5' 6\" (1.676 m)   Wt 99 lb 11.2 oz (45.2 kg)   SpO2 98%   BMI 16.09 kg/m²     · General Appearance: alert and oriented to person, place and time, well-developed and well-nourished, in no acute distress  · Skin: warm and dry, no rash or erythema  · Head: normocephalic and atraumatic  · Eyes: pupils equal, round, and reactive to light, extraocular eye movements intact, conjunctivae normal  · Pulmonary/Chest: clear to auscultation bilaterally- no wheezes, rales or rhonchi, normal air movement, no respiratory distress  · Cardiovascular: normal rate, normal S1 and S2, Systolic murmur present in the tricuspid area and the apex, 3/6, intact distal pulses and no carotid bruits  · Abdomen: soft, non-tender, non-distended, normal bowel sounds, no masses or organomegaly  · Extremities: no edema   · Neurologic: speech normal   Labs and Imaging Studies   Basic Labs  Recent Labs     07/31/19  1320      K 4.1   CL 97*   CO2 32*   BUN 8   CREATININE 3.2*   GLUCOSE 129*   CALCIUM 10.0       Recent Labs     07/31/19  1200   WBC 7.1   RBC 4.29   HGB 13.2   HCT 41.7   MCV 97.2   MCH 30.8   MCHC 31.7*   RDW 17.4*      MPV 9.7       CBC:   Lab Results   Component Value Date    WBC 7.1 07/31/2019    RBC 4.29 07/31/2019    HGB 13.2 07/31/2019    HCT 41.7 07/31/2019    MCV 97.2 07/31/2019    RDW 17.4 07/31/2019     2019     BMP:    Lab Results   Component Value Date     2019    K 4.1 2019    K 3.6 2019    CL 97 2019    CO2 32 2019    BUN 8 2019       Imaging Studies:     Xr Chest Portable    Result Date: 2019  Patient MRN: [id number] : 1985 Age:  26 years Gender: Male Order Date: 2019 11:45 AM Exam: XR CHEST PORTABLE Number of Images: 1 view Indication:   fever fever Comparison: 2019 Findings: The heart is enlarged. The lung fields demonstrate no significant pulmonary vascular congestion or edema. The aorta is tortuous ectatic and calcified. .     Cardiomegaly Findings compatible with atherosclerotic disease of the aorta. EKG:     Resident's Assessment and Plan     Gaurang Fontenot is a 26 y.o. male with PMH of ESRD on HD, brain aneurysms s/p clipping, hypertension who presented to the ED from his dialysis centre because of elevated BP and chest pain. 1. Hypertension Emergency  - BP>180/110 on presentation   -Likely 2/2 to missed home medication   -With atypical anginal pain   -Resumed his home meds- clonodine, hydralazine and procardia  -Added cozaar  -Hydralazine PRN  -Monitor BP  -Follow troponin*2  -Stress test tomorrow       2.  ESRD on HD  -Cause of kidney failure unknown  -Dialysis 3 days/week  -work up for amyloidosis was negative  -Fibrotic kidneys in CT scan      PT/OT evaluation:  DVT prophylaxis/ GI prophylaxis: Heparin   Disposition: Narendra Arthur MD, PGY-1   Attending physician: Dr. Marilyn Luna

## 2019-07-31 NOTE — H&P
4.29   HGB 13.2   HCT 41.7   MCV 97.2   MCH 30.8   MCHC 31.7*   RDW 17.4*      MPV 9.7       CMP:  Lab Results   Component Value Date     2019    K 4.1 2019    K 3.6 2019    CL 97 2019    CO2 32 2019    BUN 8 2019    PROT 8.6 2019       Imaging Studies:     Xr Chest Portable    Result Date: 2019  Patient MRN: [id number] : 1985 Age:  26 years Gender: Male Order Date: 2019 11:45 AM Exam: XR CHEST PORTABLE Number of Images: 1 view Indication:   fever fever Comparison: 2019 Findings: The heart is enlarged. The lung fields demonstrate no significant pulmonary vascular congestion or edema. The aorta is tortuous ectatic and calcified. .     Cardiomegaly Findings compatible with atherosclerotic disease of the aorta. Resident's Assessment and Plan     Issac Bowen is a 26 y.o. male who presented with chest pain and hypertensive emergency.  He has a history of ESRD with dialysis MWF and intracranial bleeding with clipped aneurysm     Atypical Angina   -Troponins <0.01   - Follow with Stress test tomorrow    Hypertensive Emergency   - BP of 180/116 with chest pain, history of hypertensive urgency , presented with /98   - On clonidine, hydralazine, procardia   - added cozaar    - potentially missed medication this morning    - Hx of intracranial hemorrhage with aneurysm, s/p neurosurgical clipping     CKD   - ESRD on dialysis 3 times weekly, MWF    - Creatinine 3.2, baseline is  BUN    - GFR 27     Acid reflux    - will continue Protonix 40mg daily             PT/OT evaluation:  DVT prophylaxis/ GI prophylaxis:   Disposition: home +/- home health / Rafa Alford / Kailee Mccarthy / Chrissy Grant, PGY-1   Attending physician: Dr. Sj Poe

## 2019-07-31 NOTE — PROGRESS NOTES
Message to internal med [address] [place] team 2 re: patients complaints of itchy skin and requesting benadryl.  Awaiting orders

## 2019-08-01 ENCOUNTER — APPOINTMENT (OUTPATIENT)
Dept: NON INVASIVE DIAGNOSTICS | Age: 34
DRG: 198 | End: 2019-08-01
Payer: MEDICAID

## 2019-08-01 ENCOUNTER — APPOINTMENT (OUTPATIENT)
Dept: NUCLEAR MEDICINE | Age: 34
DRG: 198 | End: 2019-08-01
Payer: MEDICAID

## 2019-08-01 PROBLEM — E43 SEVERE PROTEIN-CALORIE MALNUTRITION (HCC): Chronic | Status: ACTIVE | Noted: 2019-08-01

## 2019-08-01 PROBLEM — N18.6 ESRD (END STAGE RENAL DISEASE) (HCC): Status: ACTIVE | Noted: 2019-08-01

## 2019-08-01 LAB
EKG ATRIAL RATE: 99 BPM
EKG P AXIS: 70 DEGREES
EKG P-R INTERVAL: 186 MS
EKG Q-T INTERVAL: 362 MS
EKG QRS DURATION: 86 MS
EKG QTC CALCULATION (BAZETT): 464 MS
EKG R AXIS: 65 DEGREES
EKG T AXIS: 67 DEGREES
EKG VENTRICULAR RATE: 99 BPM
LV EF: 58 %
LVEF MODALITY: NORMAL
TROPONIN: <0.01 NG/ML (ref 0–0.03)
TROPONIN: <0.01 NG/ML (ref 0–0.03)

## 2019-08-01 PROCEDURE — 99219 PR INITIAL OBSERVATION CARE/DAY 50 MINUTES: CPT | Performed by: INTERNAL MEDICINE

## 2019-08-01 PROCEDURE — 2140000000 HC CCU INTERMEDIATE R&B

## 2019-08-01 PROCEDURE — 93016 CV STRESS TEST SUPVJ ONLY: CPT | Performed by: INTERNAL MEDICINE

## 2019-08-01 PROCEDURE — A9500 TC99M SESTAMIBI: HCPCS | Performed by: RADIOLOGY

## 2019-08-01 PROCEDURE — 3430000000 HC RX DIAGNOSTIC RADIOPHARMACEUTICAL: Performed by: RADIOLOGY

## 2019-08-01 PROCEDURE — 2500000003 HC RX 250 WO HCPCS: Performed by: INTERNAL MEDICINE

## 2019-08-01 PROCEDURE — 93017 CV STRESS TEST TRACING ONLY: CPT

## 2019-08-01 PROCEDURE — 6370000000 HC RX 637 (ALT 250 FOR IP): Performed by: INTERNAL MEDICINE

## 2019-08-01 PROCEDURE — 2580000003 HC RX 258: Performed by: INTERNAL MEDICINE

## 2019-08-01 PROCEDURE — 84484 ASSAY OF TROPONIN QUANT: CPT

## 2019-08-01 PROCEDURE — 93010 ELECTROCARDIOGRAM REPORT: CPT | Performed by: INTERNAL MEDICINE

## 2019-08-01 PROCEDURE — G0378 HOSPITAL OBSERVATION PER HR: HCPCS

## 2019-08-01 PROCEDURE — 78452 HT MUSCLE IMAGE SPECT MULT: CPT

## 2019-08-01 PROCEDURE — 93018 CV STRESS TEST I&R ONLY: CPT | Performed by: INTERNAL MEDICINE

## 2019-08-01 PROCEDURE — 6360000002 HC RX W HCPCS: Performed by: INTERNAL MEDICINE

## 2019-08-01 PROCEDURE — 96376 TX/PRO/DX INJ SAME DRUG ADON: CPT

## 2019-08-01 PROCEDURE — 36415 COLL VENOUS BLD VENIPUNCTURE: CPT

## 2019-08-01 RX ORDER — HYDRALAZINE HYDROCHLORIDE 20 MG/ML
20 INJECTION INTRAMUSCULAR; INTRAVENOUS EVERY 4 HOURS PRN
Status: DISCONTINUED | OUTPATIENT
Start: 2019-08-01 | End: 2019-08-01

## 2019-08-01 RX ORDER — NIFEDIPINE 60 MG/1
60 TABLET, FILM COATED, EXTENDED RELEASE ORAL DAILY
Qty: 30 TABLET | Refills: 3 | Status: SHIPPED | OUTPATIENT
Start: 2019-08-02 | End: 2019-11-20 | Stop reason: SDUPTHER

## 2019-08-01 RX ORDER — NIFEDIPINE 60 MG/1
60 TABLET, EXTENDED RELEASE ORAL DAILY
Status: DISCONTINUED | OUTPATIENT
Start: 2019-08-01 | End: 2019-08-03 | Stop reason: HOSPADM

## 2019-08-01 RX ORDER — LABETALOL HYDROCHLORIDE 5 MG/ML
10 INJECTION, SOLUTION INTRAVENOUS EVERY 4 HOURS PRN
Status: DISCONTINUED | OUTPATIENT
Start: 2019-08-01 | End: 2019-08-03 | Stop reason: HOSPADM

## 2019-08-01 RX ORDER — SODIUM CHLORIDE 0.9 % (FLUSH) 0.9 %
10 SYRINGE (ML) INJECTION PRN
Status: DISCONTINUED | OUTPATIENT
Start: 2019-08-01 | End: 2019-08-03 | Stop reason: HOSPADM

## 2019-08-01 RX ORDER — HYDRALAZINE HYDROCHLORIDE 20 MG/ML
10 INJECTION INTRAMUSCULAR; INTRAVENOUS EVERY 4 HOURS PRN
Status: DISCONTINUED | OUTPATIENT
Start: 2019-08-01 | End: 2019-08-03 | Stop reason: HOSPADM

## 2019-08-01 RX ADMIN — HYDRALAZINE HYDROCHLORIDE 10 MG: 20 INJECTION INTRAMUSCULAR; INTRAVENOUS at 14:31

## 2019-08-01 RX ADMIN — CLONIDINE HYDROCHLORIDE 0.3 MG: 0.1 TABLET ORAL at 20:55

## 2019-08-01 RX ADMIN — CALCIUM ACETATE 1334 MG: 667 CAPSULE ORAL at 16:21

## 2019-08-01 RX ADMIN — Medication 10 ML: at 23:52

## 2019-08-01 RX ADMIN — LOSARTAN POTASSIUM 100 MG: 25 TABLET, FILM COATED ORAL at 19:07

## 2019-08-01 RX ADMIN — NIFEDIPINE 60 MG: 60 TABLET, FILM COATED, EXTENDED RELEASE ORAL at 13:00

## 2019-08-01 RX ADMIN — Medication 30 MILLICURIE: at 11:08

## 2019-08-01 RX ADMIN — Medication 10 ML: at 12:58

## 2019-08-01 RX ADMIN — Medication 10 MILLICURIE: at 08:24

## 2019-08-01 RX ADMIN — LABETALOL HYDROCHLORIDE 10 MG: 5 INJECTION INTRAVENOUS at 23:52

## 2019-08-01 RX ADMIN — DIPHENHYDRAMINE HCL 25 MG: 25 TABLET ORAL at 14:31

## 2019-08-01 RX ADMIN — HYDRALAZINE HYDROCHLORIDE 100 MG: 25 TABLET, FILM COATED ORAL at 12:58

## 2019-08-01 RX ADMIN — HYDRALAZINE HYDROCHLORIDE 100 MG: 25 TABLET, FILM COATED ORAL at 20:56

## 2019-08-01 RX ADMIN — PANTOPRAZOLE SODIUM 40 MG: 40 TABLET, DELAYED RELEASE ORAL at 06:59

## 2019-08-01 RX ADMIN — DIPHENHYDRAMINE HCL 25 MG: 25 TABLET ORAL at 20:56

## 2019-08-01 RX ADMIN — Medication 10 ML: at 20:57

## 2019-08-01 RX ADMIN — CALCIUM ACETATE 1334 MG: 667 CAPSULE ORAL at 13:00

## 2019-08-01 RX ADMIN — HYDRALAZINE HYDROCHLORIDE 10 MG: 20 INJECTION INTRAMUSCULAR; INTRAVENOUS at 05:23

## 2019-08-01 RX ADMIN — CLONIDINE HYDROCHLORIDE 0.3 MG: 0.1 TABLET ORAL at 12:59

## 2019-08-01 NOTE — PROCEDURES
Exercise Nuclear Stress Test:    Treadmill stress completed. Achieved 89% of THR and 13.3 METS, DTS 10,  without chest pain; No ischemia on ECG. Hypertensive response. Nuclear SPECT images pending.     Electronically signed by Jacob Telles MD on 8/1/2019 at 11:52 AM

## 2019-08-01 NOTE — PROGRESS NOTES
Found patient taking a shower, he said he was told to wash up. I told him it should have been at bedside, not in shower without physician order.  Ashly Mercado

## 2019-08-01 NOTE — ED PROVIDER NOTES
monitoring  This patient has remained hemodynamically stable during their ED course. Plan   Discharge to home. Patient condition is good. New Medications     Current Discharge Medication List        Electronically signed by KENDRA Walker CNP   DD: 7/31/19  **This report was transcribed using voice recognition software. Every effort was made to ensure accuracy; however, inadvertent computerized transcription errors may be present.   END OF PROVIDER NOTE      KENDRA Peck CNP  07/31/19 [id number]

## 2019-08-01 NOTE — CONSULTS
07/31/2019    ALT 18 07/31/2019    AST 37 07/31/2019    PROT 8.6 07/31/2019    BILITOT 0.3 07/31/2019    BILIDIR <0.2 11/12/2018    IBILI see below 11/12/2018    LABALBU 4.7 07/31/2019     Ionized Calcium:  No results found for: IONCA  Magnesium:    Lab Results   Component Value Date    MG 2.4 01/03/2019     Phosphorus:    Lab Results   Component Value Date    PHOS 1.5 11/11/2018     LDH:  No results found for: LDH  Uric Acid:    Lab Results   Component Value Date    LABURIC 3.8 09/19/2015     Warfarin PT/INR:  No components found for: Rekha Cole  Last 3 Troponin:    Lab Results   Component Value Date    TROPONINI <0.01 08/01/2019    TROPONINI <0.01 08/01/2019    TROPONINI <0.01 07/31/2019     U/A:  No results found for: NITRITE, COLORU, PROTEINU, PHUR, LABCAST, WBCUA, RBCUA, MUCUS, TRICHOMONAS, YEAST, BACTERIA, CLARITYU, SPECGRAV, LEUKOCYTESUR, UROBILINOGEN, BILIRUBINUR, BLOODU, GLUCOSEU, AMORPHOUS  ABG:    Lab Results   Component Value Date    PH 7.325 03/15/2016    PCO2 55.4 03/15/2016    PO2 218.5 03/15/2016    HCO3 28.2 03/15/2016    BE 1.2 03/15/2016    O2SAT 99.5 03/15/2016     HgBA1c:  No results found for: LABA1C  Microalbumen/Creatinine ratio:  No components found for: RUCREAT  FLP:    Lab Results   Component Value Date    TRIG 82 10/21/2015    HDL 52 10/21/2015    LDLCALC 89 10/21/2015    LABVLDL 16 10/21/2015     TSH:    Lab Results   Component Value Date    TSH 1.870 03/20/2016     VITAMIN B12: No components found for: B12  FOLATE:    Lab Results   Component Value Date    FOLATE 12.3 01/03/2019     IRON:    Lab Results   Component Value Date    IRON 41 01/03/2019     Iron Saturation:  No components found for: PERCENTFE  TIBC:    Lab Results   Component Value Date    TIBC 152 01/03/2019     FERRITIN:    Lab Results   Component Value Date    FERRITIN 1,967 01/03/2019     CLAY:  No results found for: ANATITER, CLAY  DSDNA:  No components found for: DNA  AMYLASE:    Lab Results   Component Value Date AMYLASE 243 2017     LIPASE:    Lab Results   Component Value Date    LIPASE 28 2019     Fibrinogen Level:  No components found for: FIB  Urine Toxicology:  No components found for: IAMMENTA, IBARBIT, IBENZO, ICOCAINE, IMARTHC, IOPIATES, IPHENCYC  24 Hour Urine for Protein:  No components found for: RAWUPRO, UHRS3, CBUG94VF, UTV3  24 Hour Urine for Creatinine Clearance:  No components found for: Efrain Tuttle     Imaging:  NM Cardiac Stress Test Nuclear Imaging [[id number]] Resulted: 19 124      Order Status: Completed Updated: 19     Narrative:       Patient MRN:  [id number]  : 1985  Age: 26 years  Gender: Male  Order Date:  2019 4:15 PM  EXAM: NM MYOCARDIAL SPECT REST EXERCISE OR RX  Number of Images: 8 views  INDICATION:  Chest pain. Hypertension. Reason for Exam?->Chest pain  Procedure Type->Exercise   COMPARISON: None    TECHNIQUE:  11 mCi of Tc-99m MIBI was injected intravenously at rest and cardiac  SPECT images were performed. In addition 34.8 mCi of Tc-99m MIBI was  injected intravenously at maximum stress by using treadmill.  Stress  SPECT images and gated study were performed. FINDINGS:  Perfusion images demonstrate no reversible perfusion defect. Wall motion is within normal limits. The end diastolic volume is 929 ml. The end systolic volume is 69 ml. The estimated ejection fraction is 58%.       Impression:       1. No reversible perfusion defect  2. Ejection fraction is 58%. 3. No significant wall motion abnormality     XR CHEST PORTABLE [[id number]] Resulted: 19 1154     Order Status: Completed Updated: 19 115     Narrative:       Patient MRN: [id number]  : 1985  Age:  33 years  Gender: Male  Order Date: 2019 11:45 AM  Exam: XR CHEST PORTABLE  Number of Images: 1 view  Indication:   fever   fever  Comparison: 2019    Findings: The heart is enlarged.   The lung fields demonstrate no significant pulmonary

## 2019-08-01 NOTE — PROGRESS NOTES
usual  -Assessed it- Soft, b/l, no erythema, tenderness, no oral lesions/toothache   -Will assess tomorrow    PT/OT evaluation:  DVT prophylaxis/ GI prophylaxis: protonix  Disposition: Inpatient     Angelica Rush MD, PGY-1  Attending physician: Dr. Susana Carreon

## 2019-08-02 VITALS
OXYGEN SATURATION: 99 % | TEMPERATURE: 98.4 F | HEART RATE: 90 BPM | HEIGHT: 66 IN | BODY MASS INDEX: 16.51 KG/M2 | RESPIRATION RATE: 16 BRPM | SYSTOLIC BLOOD PRESSURE: 168 MMHG | DIASTOLIC BLOOD PRESSURE: 113 MMHG | WEIGHT: 102.73 LBS

## 2019-08-02 LAB
ANION GAP SERPL CALCULATED.3IONS-SCNC: 16 MMOL/L (ref 7–16)
BUN BLDV-MCNC: 41 MG/DL (ref 6–20)
CALCIUM SERPL-MCNC: 9.6 MG/DL (ref 8.6–10.2)
CHLORIDE BLD-SCNC: 91 MMOL/L (ref 98–107)
CO2: 30 MMOL/L (ref 22–29)
CREAT SERPL-MCNC: 9.5 MG/DL (ref 0.7–1.2)
GFR AFRICAN AMERICAN: 8
GFR NON-AFRICAN AMERICAN: 8 ML/MIN/1.73
GLUCOSE BLD-MCNC: 144 MG/DL (ref 74–99)
POTASSIUM SERPL-SCNC: 3.8 MMOL/L (ref 3.5–5)
SODIUM BLD-SCNC: 137 MMOL/L (ref 132–146)

## 2019-08-02 PROCEDURE — G0378 HOSPITAL OBSERVATION PER HR: HCPCS

## 2019-08-02 PROCEDURE — 6370000000 HC RX 637 (ALT 250 FOR IP): Performed by: INTERNAL MEDICINE

## 2019-08-02 PROCEDURE — 90935 HEMODIALYSIS ONE EVALUATION: CPT | Performed by: INTERNAL MEDICINE

## 2019-08-02 PROCEDURE — 2500000003 HC RX 250 WO HCPCS: Performed by: INTERNAL MEDICINE

## 2019-08-02 PROCEDURE — 5A1D70Z PERFORMANCE OF URINARY FILTRATION, INTERMITTENT, LESS THAN 6 HOURS PER DAY: ICD-10-PCS | Performed by: INTERNAL MEDICINE

## 2019-08-02 PROCEDURE — 2580000003 HC RX 258: Performed by: INTERNAL MEDICINE

## 2019-08-02 PROCEDURE — 80048 BASIC METABOLIC PNL TOTAL CA: CPT

## 2019-08-02 PROCEDURE — 96376 TX/PRO/DX INJ SAME DRUG ADON: CPT

## 2019-08-02 PROCEDURE — 99238 HOSP IP/OBS DSCHRG MGMT 30/<: CPT | Performed by: INTERNAL MEDICINE

## 2019-08-02 RX ADMIN — LOSARTAN POTASSIUM 100 MG: 25 TABLET, FILM COATED ORAL at 18:31

## 2019-08-02 RX ADMIN — CALCIUM ACETATE 1334 MG: 667 CAPSULE ORAL at 08:03

## 2019-08-02 RX ADMIN — CALCIUM ACETATE 1334 MG: 667 CAPSULE ORAL at 18:31

## 2019-08-02 RX ADMIN — LABETALOL HYDROCHLORIDE 10 MG: 5 INJECTION INTRAVENOUS at 05:47

## 2019-08-02 RX ADMIN — DIPHENHYDRAMINE HCL 25 MG: 25 TABLET ORAL at 18:32

## 2019-08-02 RX ADMIN — CLONIDINE HYDROCHLORIDE 0.3 MG: 0.1 TABLET ORAL at 08:03

## 2019-08-02 RX ADMIN — PANTOPRAZOLE SODIUM 40 MG: 40 TABLET, DELAYED RELEASE ORAL at 05:43

## 2019-08-02 RX ADMIN — DIPHENHYDRAMINE HCL 25 MG: 25 TABLET ORAL at 05:50

## 2019-08-02 RX ADMIN — NIFEDIPINE 60 MG: 60 TABLET, FILM COATED, EXTENDED RELEASE ORAL at 08:03

## 2019-08-02 RX ADMIN — HYDRALAZINE HYDROCHLORIDE 100 MG: 25 TABLET, FILM COATED ORAL at 08:03

## 2019-08-02 RX ADMIN — CLONIDINE HYDROCHLORIDE 0.3 MG: 0.1 TABLET ORAL at 21:54

## 2019-08-02 RX ADMIN — Medication 10 ML: at 08:02

## 2019-08-02 RX ADMIN — CALCIUM ACETATE 1334 MG: 667 CAPSULE ORAL at 11:37

## 2019-08-02 RX ADMIN — HYDRALAZINE HYDROCHLORIDE 100 MG: 25 TABLET, FILM COATED ORAL at 22:03

## 2019-08-02 ASSESSMENT — PAIN SCALES - GENERAL
PAINLEVEL_OUTOF10: 0

## 2019-08-02 NOTE — PROGRESS NOTES
is normal  · Extremities:  No edema in lower extremeties  · Neurologic: Mental status: Alert; CN II-XII intact,    Pertinent Labs & Imaging Studies     No new labs or imaging studies. Student's Assessment and Plan     Usha Pinto is a 26 y.o. male who presented with chest pain and hypertensive emergency. He has a history of ESRD with dialysis MWF and intercranial bleeding with clipped aneurysm. Atypical Angina -  - Chest pain still remains 5/10, comes and goes throughout the day  - troponin was neg  - exercise stress test was negative  - EKG shows normal sinus rhythm with LV hypertrophy and biatrial enlargement   -  Hypertensive Emergency  - BP of 134/81  -Medications              clonidine 0.03 mg 3 times daily  -           Hydralazine 100mg 3 times daily  -           losartan 100mg once daily  -           nifedipine 60mg once daily  -           Hydralazine injection 10mg every 4 hours PRN  - Labetalol 10mg every 4 hours PRN   - Patient will be discharged later today as blood pressure has come down    ESRD  - dialysis 3 days/week MWF  - will undergo dialysis prior to discharge  - Dr. Kasia Huerta consultation (8/1)   - continue scheduled meds and hydralazine and labetalol PRN   - Low sodium intake, tobacco cessation   -reassess for possible parotitis     Malnutrition  - Dietician consulted   - presence of muscle and fat wasting, 8% weight loss in 7  Months, diet history of poor intake   - will start Nepro BID and monitor    Parotid Swelling  - Parotid glands are swollen bilaterally but patient does not complain of discomfort or tenderness  - parotid glands are soft, non tender    Depression/Anxiety  -has been on medication in the past about 7 years ago  -symptoms are very acute (<24hours)  - not starting him on medication, advised him to discuss with PCP    PT/OT evaluation:  DVT prophylaxis/ GI prophylaxis: Protonix  Disposition: home     Patient will be discharged later today.  Instructions on medication and will be given to the patient.  patient on medication compliance.     Marlin Dent  MS3  Attending physician: Dr. Devora Olivares

## 2019-08-02 NOTE — PROGRESS NOTES
Nephrology Progress Note  Patient's Name: Lacey Chaney  11:47 AM  8/2/2019    History of Present Ilness:    Lacey Chaney is a 26 y.o. male with  ESRD on HD at [facility] MWF, HTN, and anxiety and depression. He came to ED from HD clinic for CP with tachycardiac (+), and elevated BP (180/100+) with associated HA. He was also found to have low grade fever () at HD. He received all but 4 minutes of dialysis session. In ED, EKG SR, LVH, no acute change. Trop <0.01 x4. Na 141, K+ 4.1, Hgb 13.2, /110. He was admitted for further workup, including cardiac stress test    8/1/19: Pt lying in bed talking on phone; no acute distress. He denies current Cp, SOB, HA, blurred vision. BP remains elevated, but pt asymptomatic. He admits to eating high sodium foods. 8/2/19: Pt sitting in bed eating lunch. He reports feeling slightly \"down\"; he admits he recently stopped smoking marijuana, and now he is able to [facility] things that were suppressed. \" Encouraged him to talk to  in dialysis clinic to get set up with mental health routine. Reports current \"mild\" HA, no CP, sob. D/W pt he is liely taking in more sodium than he thinks as it is present in the foods he is eating (microwave popcorn, chips, processed foods) even thought he isnt adding extra salt.  For HD later this afternoon          Past Medical History:   Diagnosis Date    Anxiety 9/19/2015    AVF (arteriovenous fistula) ([address] 4/30/2018    Chronic kidney disease     CKD since early childhood per pt and on HD ~ 11 years    Depression     Encounter regarding vascular access for dialysis for ESRD ([address] 4/30/2018    Hypertension     on meds for ~ 11 years    Hypothyroidism     Intracranial hemorrhage ([address]     was d/t ruptured aneurysm - pt underwent neurosurgery for clipping of the aneurysm    Noncompliance w/medication treatment due to intermit use of medication 11/3/2015       Past Surgical History:   Procedure Laterality 07/31/2019    K 4.1 07/31/2019    K 3.6 01/25/2019    CL 97 07/31/2019    CO2 32 07/31/2019    BUN 8 07/31/2019    LABALBU 4.7 07/31/2019    CREATININE 3.2 07/31/2019    CALCIUM 10.0 07/31/2019    GFRAA 27 07/31/2019    LABGLOM 27 07/31/2019    GLUCOSE 129 07/31/2019     BUN/Creatinine:    Lab Results   Component Value Date    BUN 8 07/31/2019    CREATININE 3.2 07/31/2019     Hepatic Function Panel:    Lab Results   Component Value Date    ALKPHOS 148 07/31/2019    ALT 18 07/31/2019    AST 37 07/31/2019    PROT 8.6 07/31/2019    BILITOT 0.3 07/31/2019    BILIDIR <0.2 11/12/2018    IBILI see below 11/12/2018    LABALBU 4.7 07/31/2019     Ionized Calcium:  No results found for: IONCA  Magnesium:    Lab Results   Component Value Date    MG 2.4 01/03/2019     Phosphorus:    Lab Results   Component Value Date    PHOS 1.5 11/11/2018     LDH:  No results found for: LDH  Uric Acid:    Lab Results   Component Value Date    LABURIC 3.8 09/19/2015     Warfarin PT/INR:  No components found for: Chris Blake  Last 3 Troponin:    Lab Results   Component Value Date    TROPONINI <0.01 08/01/2019    TROPONINI <0.01 08/01/2019    TROPONINI <0.01 07/31/2019     U/A:  No results found for: NITRITE, COLORU, PROTEINU, PHUR, LABCAST, WBCUA, RBCUA, MUCUS, TRICHOMONAS, YEAST, BACTERIA, CLARITYU, SPECGRAV, LEUKOCYTESUR, UROBILINOGEN, BILIRUBINUR, BLOODU, GLUCOSEU, AMORPHOUS  ABG:    Lab Results   Component Value Date    PH 7.325 03/15/2016    PCO2 55.4 03/15/2016    PO2 218.5 03/15/2016    HCO3 28.2 03/15/2016    BE 1.2 03/15/2016    O2SAT 99.5 03/15/2016     HgBA1c:  No results found for: LABA1C  Microalbumen/Creatinine ratio:  No components found for: RUCREAT  FLP:    Lab Results   Component Value Date    TRIG 82 10/21/2015    HDL 52 10/21/2015    LDLCALC 89 10/21/2015    LABVLDL 16 10/21/2015     TSH:    Lab Results   Component Value Date    TSH 1.870 03/20/2016     VITAMIN B12: No components found for: B12  FOLATE:    Lab Results significant wall motion abnormality     XR CHEST PORTABLE [[id number]] Resulted: 19 1154     Order Status: Completed Updated: 19 1156     Narrative:       Patient MRN: [id number]  : 1985  Age:  33 years  Gender: Male  Order Date: 2019 11:45 AM  Exam: XR CHEST PORTABLE  Number of Images: 1 view  Indication:   fever   fever  Comparison: 2019    Findings: The heart is enlarged. The lung fields demonstrate no significant pulmonary vascular  congestion or edema. The aorta is tortuous ectatic and calcified. .     Impression:       Cardiomegaly  Findings compatible with atherosclerotic disease of the aorta. Assessment and Plan     1. ESRD on HD--per chronic orders from [employer] [facility]  --for HD today    2. Hypertensive urgency--BP near goal today, but improved with PRN meds. Follow post HD today  --BP has recently been controlled outpatient  --pt does have some history of medical noncompliance, but has been improving  --low sodium intake, tobacco cessation  --continue scheduled meds; hydralazine and labetalol IV as needed for coverage. 3. Chest Pain--atypical  --Stress test neg, trop neg  --benign cardiac workup aside from LVH    4. Sec HPTH--follow on phoslo    5. Bilateral facial swelling (L>R)--possible parotitis? --per primary    Thank you Dr. Brook Hernandez for allowing us to participate in care of Dc PachecoKENDRA - CNP  19     Patient seen and examined all key components of the physical performed independently , case discussed with NP, all pertinent labs and radiologic tests personally reviewed agree with above.       Darío Saxena MD

## 2019-08-05 LAB
BLOOD CULTURE, ROUTINE: NORMAL
CULTURE, BLOOD 2: NORMAL

## 2019-08-08 ENCOUNTER — OFFICE VISIT (OUTPATIENT)
Dept: INTERNAL MEDICINE | Age: 34
End: 2019-08-08
Payer: MEDICAID

## 2019-08-08 VITALS
HEART RATE: 84 BPM | TEMPERATURE: 98.2 F | DIASTOLIC BLOOD PRESSURE: 74 MMHG | RESPIRATION RATE: 18 BRPM | HEIGHT: 66 IN | SYSTOLIC BLOOD PRESSURE: 117 MMHG | BODY MASS INDEX: 16.65 KG/M2 | WEIGHT: 103.6 LBS

## 2019-08-08 DIAGNOSIS — I10 HYPERTENSION, UNSPECIFIED TYPE: Primary | ICD-10-CM

## 2019-08-08 DIAGNOSIS — N18.6 ESRD ON HEMODIALYSIS (HCC): ICD-10-CM

## 2019-08-08 DIAGNOSIS — L29.9 ITCH OF SKIN: ICD-10-CM

## 2019-08-08 DIAGNOSIS — Z99.2 ESRD ON HEMODIALYSIS (HCC): ICD-10-CM

## 2019-08-08 DIAGNOSIS — H61.21 IMPACTED CERUMEN OF RIGHT EAR: ICD-10-CM

## 2019-08-08 DIAGNOSIS — K21.9 GASTROESOPHAGEAL REFLUX DISEASE WITHOUT ESOPHAGITIS: ICD-10-CM

## 2019-08-08 DIAGNOSIS — K29.00 ACUTE SUPERFICIAL GASTRITIS WITHOUT HEMORRHAGE: ICD-10-CM

## 2019-08-08 PROCEDURE — 69210 REMOVE IMPACTED EAR WAX UNI: CPT | Performed by: INTERNAL MEDICINE

## 2019-08-08 PROCEDURE — 69209 REMOVE IMPACTED EAR WAX UNI: CPT | Performed by: INTERNAL MEDICINE

## 2019-08-08 PROCEDURE — 1111F DSCHRG MED/CURRENT MED MERGE: CPT | Performed by: INTERNAL MEDICINE

## 2019-08-08 PROCEDURE — 99212 OFFICE O/P EST SF 10 MIN: CPT | Performed by: INTERNAL MEDICINE

## 2019-08-08 PROCEDURE — 99213 OFFICE O/P EST LOW 20 MIN: CPT | Performed by: INTERNAL MEDICINE

## 2019-08-08 RX ORDER — DIPHENHYDRAMINE HCL 25 MG
25 CAPSULE ORAL 3 TIMES DAILY PRN
Qty: 90 CAPSULE | Refills: 2 | Status: SHIPPED | OUTPATIENT
Start: 2019-08-08 | End: 2019-10-14 | Stop reason: SDUPTHER

## 2019-08-08 RX ORDER — LOSARTAN POTASSIUM 100 MG/1
100 TABLET ORAL EVERY EVENING
Qty: 30 TABLET | Refills: 2 | Status: SHIPPED | OUTPATIENT
Start: 2019-08-08 | End: 2019-11-20 | Stop reason: SDUPTHER

## 2019-08-08 RX ORDER — CLONIDINE HYDROCHLORIDE 0.2 MG/1
0.2 TABLET ORAL 3 TIMES DAILY
Qty: 90 TABLET | Refills: 2 | Status: SHIPPED | OUTPATIENT
Start: 2019-08-08 | End: 2019-10-14 | Stop reason: SDUPTHER

## 2019-08-08 RX ORDER — HYDRALAZINE HYDROCHLORIDE 50 MG/1
50 TABLET, FILM COATED ORAL 3 TIMES DAILY
Qty: 90 TABLET | Refills: 2 | Status: SHIPPED | OUTPATIENT
Start: 2019-08-08 | End: 2019-11-20 | Stop reason: SDUPTHER

## 2019-08-08 RX ORDER — PANTOPRAZOLE SODIUM 40 MG/1
40 TABLET, DELAYED RELEASE ORAL
Qty: 30 TABLET | Refills: 2 | Status: SHIPPED | OUTPATIENT
Start: 2019-08-08 | End: 2019-10-14 | Stop reason: SDUPTHER

## 2019-08-08 ASSESSMENT — ENCOUNTER SYMPTOMS
PHOTOPHOBIA: 0
SORE THROAT: 0
NAUSEA: 0
DIARRHEA: 0
SHORTNESS OF BREATH: 0
ABDOMINAL PAIN: 0
BACK PAIN: 0
COUGH: 0

## 2019-08-08 NOTE — PROGRESS NOTES
°F (36.8 °C) (Oral)   Resp 18   Ht 5' 6\" (1.676 m)   Wt 103 lb 9.6 oz (47 kg)   BMI 16.72 kg/m²   Physical Exam   Constitutional: He is oriented to person, place, and time. He appears well-developed and well-nourished. No distress. HENT:   Head: Normocephalic and atraumatic. Right ear cerumen impaction   Eyes: Pupils are equal, round, and reactive to light. EOM are normal.   Neck: Normal range of motion. Neck supple. Cardiovascular: Normal rate, regular rhythm and normal heart sounds. Pulmonary/Chest: Effort normal and breath sounds normal. No respiratory distress. He has no wheezes. He has no rales. Abdominal: Soft. Bowel sounds are normal. He exhibits no distension. There is no tenderness. There is no rebound and no guarding. Musculoskeletal: Normal range of motion. He exhibits no edema, tenderness or deformity. LUE fistula, bruit, no sign of infection   Neurological: He is alert and oriented to person, place, and time. No cranial nerve deficit. Skin: Skin is warm and dry. No rash noted. Nursing note and vitals reviewed. ASSESSMENT/PLAN:    I have reviewed all pertient PMHx, PSHx, FamHx, Social Hx, medications, and allergies andupdated history as appropriate. Lisandra Beal was seen today for follow-up from hospital.    Diagnoses and all orders for this visit:    Hypertension-controlled  -     Continue Nifedipine 60 mg daily   -     hydrALAZINE (APRESOLINE) 50 MG tablet; Take 1 tablet by mouth 3 times daily  -     losartan (COZAAR) 100 MG tablet; Take 1 tablet by mouth every evening  -    Reduced cloNIDine (CATAPRES) from 0.3 to 0.2 MG tablet; Take 1 tablet by mouth 3 times daily  -    BP today 117/74 without taking morning medication, afraid of hypotensive events, reduced clonidine dose, asked to keep a BP log and call for high or low BP readings.     Impacted cerumen of right ear  -     92574 - RI REMOVE IMPACTED EAR WAX    Gastroesophageal reflux disease without esophagitis  -

## 2019-09-04 ENCOUNTER — HOSPITAL ENCOUNTER (EMERGENCY)
Age: 34
Discharge: HOME | End: 2019-09-04
Attending: EMERGENCY MEDICINE
Payer: MEDICAID

## 2019-09-04 ENCOUNTER — APPOINTMENT (OUTPATIENT)
Dept: GENERAL RADIOLOGY | Age: 34
End: 2019-09-04
Payer: MEDICAID

## 2019-09-04 VITALS
BODY MASS INDEX: 16.72 KG/M2 | HEART RATE: 80 BPM | DIASTOLIC BLOOD PRESSURE: 133 MMHG | HEIGHT: 66 IN | TEMPERATURE: 97.7 F | RESPIRATION RATE: 17 BRPM | SYSTOLIC BLOOD PRESSURE: 199 MMHG | OXYGEN SATURATION: 99 %

## 2019-09-04 DIAGNOSIS — Z99.2 ESRD ON DIALYSIS (HCC): Primary | ICD-10-CM

## 2019-09-04 DIAGNOSIS — R07.9 CHEST PAIN, UNSPECIFIED TYPE: ICD-10-CM

## 2019-09-04 DIAGNOSIS — I10 HYPERTENSION, UNSPECIFIED TYPE: ICD-10-CM

## 2019-09-04 DIAGNOSIS — N18.6 ESRD ON DIALYSIS (HCC): Primary | ICD-10-CM

## 2019-09-04 LAB
ANION GAP SERPL CALCULATED.3IONS-SCNC: 10 MMOL/L (ref 7–16)
ANISOCYTOSIS: ABNORMAL
BASOPHILS ABSOLUTE: 0.04 E9/L (ref 0–0.2)
BASOPHILS RELATIVE PERCENT: 0.9 % (ref 0–2)
BUN BLDV-MCNC: 46 MG/DL (ref 6–20)
CALCIUM SERPL-MCNC: 9.5 MG/DL (ref 8.6–10.2)
CHLORIDE BLD-SCNC: 99 MMOL/L (ref 98–107)
CO2: 33 MMOL/L (ref 22–29)
CREAT SERPL-MCNC: 10.1 MG/DL (ref 0.7–1.2)
EKG ATRIAL RATE: 90 BPM
EKG P AXIS: 77 DEGREES
EKG P-R INTERVAL: 192 MS
EKG Q-T INTERVAL: 376 MS
EKG QRS DURATION: 92 MS
EKG QTC CALCULATION (BAZETT): 459 MS
EKG R AXIS: 86 DEGREES
EKG T AXIS: 69 DEGREES
EKG VENTRICULAR RATE: 90 BPM
EOSINOPHILS ABSOLUTE: 0.07 E9/L (ref 0.05–0.5)
EOSINOPHILS RELATIVE PERCENT: 1.7 % (ref 0–6)
GFR AFRICAN AMERICAN: 7
GFR AFRICAN AMERICAN: 7
GFR NON-AFRICAN AMERICAN: 6 ML/MIN/1.73
GFR NON-AFRICAN AMERICAN: 7 ML/MIN/1.73
GLUCOSE BLD-MCNC: 83 MG/DL (ref 74–99)
GLUCOSE BLD-MCNC: 84 MG/DL (ref 74–99)
HCT VFR BLD CALC: 40.8 % (ref 37–54)
HEMOGLOBIN: 12.6 G/DL (ref 12.5–16.5)
LYMPHOCYTES ABSOLUTE: 1.63 E9/L (ref 1.5–4)
LYMPHOCYTES RELATIVE PERCENT: 37.1 % (ref 20–42)
MAGNESIUM: 3.7 MG/DL (ref 1.6–2.6)
MCH RBC QN AUTO: 30.9 PG (ref 26–35)
MCHC RBC AUTO-ENTMCNC: 30.9 % (ref 32–34.5)
MCV RBC AUTO: 100 FL (ref 80–99.9)
MONOCYTES ABSOLUTE: 0.26 E9/L (ref 0.1–0.95)
MONOCYTES RELATIVE PERCENT: 6 % (ref 2–12)
NEUTROPHILS ABSOLUTE: 2.38 E9/L (ref 1.8–7.3)
NEUTROPHILS RELATIVE PERCENT: 54.3 % (ref 43–80)
OVALOCYTES: ABNORMAL
PDW BLD-RTO: 14.8 FL (ref 11.5–15)
PERFORMED ON: ABNORMAL
PLATELET # BLD: 90 E9/L (ref 130–450)
PLATELET CONFIRMATION: NORMAL
PMV BLD AUTO: 11 FL (ref 7–12)
POC CHLORIDE: 109 MMOL/L (ref 100–108)
POC CREATININE: 10.2 MG/DL (ref 0.7–1.2)
POC POTASSIUM: 4.2 MMOL/L (ref 3.5–5)
POC SODIUM: 144 MMOL/L (ref 132–146)
POIKILOCYTES: ABNORMAL
POTASSIUM SERPL-SCNC: 4.7 MMOL/L (ref 3.5–5)
RBC # BLD: 4.08 E12/L (ref 3.8–5.8)
SODIUM BLD-SCNC: 142 MMOL/L (ref 132–146)
TROPONIN: <0.01 NG/ML (ref 0–0.03)
WBC # BLD: 4.4 E9/L (ref 4.5–11.5)

## 2019-09-04 PROCEDURE — 6370000000 HC RX 637 (ALT 250 FOR IP): Performed by: EMERGENCY MEDICINE

## 2019-09-04 PROCEDURE — 84132 ASSAY OF SERUM POTASSIUM: CPT

## 2019-09-04 PROCEDURE — 99285 EMERGENCY DEPT VISIT HI MDM: CPT

## 2019-09-04 PROCEDURE — 96374 THER/PROPH/DIAG INJ IV PUSH: CPT

## 2019-09-04 PROCEDURE — 36415 COLL VENOUS BLD VENIPUNCTURE: CPT

## 2019-09-04 PROCEDURE — 80048 BASIC METABOLIC PNL TOTAL CA: CPT

## 2019-09-04 PROCEDURE — 71045 X-RAY EXAM CHEST 1 VIEW: CPT

## 2019-09-04 PROCEDURE — 85025 COMPLETE CBC W/AUTO DIFF WBC: CPT

## 2019-09-04 PROCEDURE — 6360000002 HC RX W HCPCS: Performed by: EMERGENCY MEDICINE

## 2019-09-04 PROCEDURE — 93010 ELECTROCARDIOGRAM REPORT: CPT | Performed by: INTERNAL MEDICINE

## 2019-09-04 PROCEDURE — 84295 ASSAY OF SERUM SODIUM: CPT

## 2019-09-04 PROCEDURE — 96375 TX/PRO/DX INJ NEW DRUG ADDON: CPT

## 2019-09-04 PROCEDURE — 82435 ASSAY OF BLOOD CHLORIDE: CPT

## 2019-09-04 PROCEDURE — 82565 ASSAY OF CREATININE: CPT

## 2019-09-04 PROCEDURE — 83735 ASSAY OF MAGNESIUM: CPT

## 2019-09-04 PROCEDURE — 93005 ELECTROCARDIOGRAM TRACING: CPT | Performed by: EMERGENCY MEDICINE

## 2019-09-04 PROCEDURE — 82947 ASSAY GLUCOSE BLOOD QUANT: CPT

## 2019-09-04 PROCEDURE — 84484 ASSAY OF TROPONIN QUANT: CPT

## 2019-09-04 RX ORDER — CLONIDINE HYDROCHLORIDE 0.1 MG/1
0.1 TABLET ORAL ONCE
Status: COMPLETED | OUTPATIENT
Start: 2019-09-04 | End: 2019-09-04

## 2019-09-04 RX ORDER — ONDANSETRON 2 MG/ML
4 INJECTION INTRAMUSCULAR; INTRAVENOUS ONCE
Status: COMPLETED | OUTPATIENT
Start: 2019-09-04 | End: 2019-09-04

## 2019-09-04 RX ORDER — CLONIDINE HYDROCHLORIDE 0.1 MG/1
0.1 TABLET ORAL ONCE
Status: DISCONTINUED | OUTPATIENT
Start: 2019-09-04 | End: 2019-09-04 | Stop reason: HOSPADM

## 2019-09-04 RX ORDER — DIPHENHYDRAMINE HYDROCHLORIDE 50 MG/ML
12.5 INJECTION INTRAMUSCULAR; INTRAVENOUS ONCE
Status: COMPLETED | OUTPATIENT
Start: 2019-09-04 | End: 2019-09-04

## 2019-09-04 RX ORDER — HYDRALAZINE HYDROCHLORIDE 20 MG/ML
10 INJECTION INTRAMUSCULAR; INTRAVENOUS ONCE
Status: COMPLETED | OUTPATIENT
Start: 2019-09-04 | End: 2019-09-04

## 2019-09-04 RX ORDER — HYDRALAZINE HYDROCHLORIDE 20 MG/ML
10 INJECTION INTRAMUSCULAR; INTRAVENOUS ONCE
Status: DISCONTINUED | OUTPATIENT
Start: 2019-09-04 | End: 2019-09-04 | Stop reason: HOSPADM

## 2019-09-04 RX ADMIN — CLONIDINE HYDROCHLORIDE 0.1 MG: 0.1 TABLET ORAL at 08:21

## 2019-09-04 RX ADMIN — HYDRALAZINE HYDROCHLORIDE 10 MG: 20 INJECTION, SOLUTION INTRAMUSCULAR; INTRAVENOUS at 09:48

## 2019-09-04 RX ADMIN — DIPHENHYDRAMINE HYDROCHLORIDE 12.5 MG: 50 INJECTION, SOLUTION INTRAMUSCULAR; INTRAVENOUS at 09:46

## 2019-09-04 RX ADMIN — ONDANSETRON 4 MG: 2 INJECTION INTRAMUSCULAR; INTRAVENOUS at 09:46

## 2019-09-04 ASSESSMENT — PAIN SCALES - GENERAL: PAINLEVEL_OUTOF10: 6

## 2019-09-04 ASSESSMENT — PAIN DESCRIPTION - PAIN TYPE: TYPE: ACUTE PAIN

## 2019-09-04 ASSESSMENT — PAIN DESCRIPTION - DESCRIPTORS: DESCRIPTORS: DISCOMFORT

## 2019-09-04 ASSESSMENT — PAIN DESCRIPTION - LOCATION: LOCATION: CHEST

## 2019-09-04 ASSESSMENT — PAIN DESCRIPTION - ORIENTATION: ORIENTATION: LEFT

## 2019-09-04 NOTE — ED PROVIDER NOTES
Platelet Confirmation   Result Value Ref Range    Platelet Confirmation CONFIRMED    POCT Venous   Result Value Ref Range    POC Sodium 144 132 - 146 mmol/L    POC Potassium 4.2 3.5 - 5.0 mmol/L    POC Chloride 109 (H) 100 - 108 mmol/L    POC Glucose 83 74 - 99 mg/dl    POC Creatinine 10.2 (HH) 0.7 - 1.2 mg/dL    GFR Non-African American 6 >=60 mL/min/1.73    GFR  7     Performed on SEE BELOW    EKG 12 Lead   Result Value Ref Range    Ventricular Rate 90 BPM    Atrial Rate 90 BPM    P-R Interval 192 ms    QRS Duration 92 ms    Q-T Interval 376 ms    QTc Calculation (Bazett) 459 ms    P Axis 77 degrees    R Axis 86 degrees    T Axis 69 degrees       RADIOLOGY:  Interpreted by Radiologist.  XR CHEST PORTABLE   Final Result      Cardiomegaly. No pulmonary airspace consolidation.                ------------------------- NURSING NOTES AND VITALS REVIEWED ---------------------------   The nursing notes within the ED encounter and vital signs as below have been reviewed. BP (!) 199/133   Pulse 80   Temp 97.7 °F (36.5 °C)   Resp 17   Ht 5' 6\" (1.676 m)   SpO2 99%   BMI 16.72 kg/m²   Oxygen Saturation Interpretation: Normal      ---------------------------------------------------PHYSICAL EXAM--------------------------------------       Constitutional/General: Alert and oriented x3,    Head: NC/AT  Eyes: PERRL, EOMI  Mouth: Oropharynx clear, handling secretions, no trismus  Neck: Supple, full ROM, no meningeal signs left sided JVD   Pulmonary: Lungs clear to auscultation bilaterally,  Not in respiratory distress  Cardiovascular:  Regular rate and rhythm,   2+ distal pulses  Abdomen: Soft, non tender, non distended,   Extremities: Moves all extremities x 4. Warm and well perfused there is an AV fistula on the left upper extremity with good bruit and thrill.  Good distal pulse There is no pretibial edema nor calf tenderness bilaterally     Skin: warm and dry without rash  Neurologic: GCS 15,  Psych:

## 2019-09-04 NOTE — ED NOTES
IV team in room at this time unable to obtain bp due to only accessible arm being used      Kasie Abdul RN  09/04/19 [id number]

## 2019-10-14 DIAGNOSIS — N18.6 ESRD ON HEMODIALYSIS (HCC): ICD-10-CM

## 2019-10-14 DIAGNOSIS — I10 HYPERTENSION, UNSPECIFIED TYPE: ICD-10-CM

## 2019-10-14 DIAGNOSIS — L29.9 ITCH OF SKIN: ICD-10-CM

## 2019-10-14 DIAGNOSIS — K21.9 GASTROESOPHAGEAL REFLUX DISEASE WITHOUT ESOPHAGITIS: ICD-10-CM

## 2019-10-14 DIAGNOSIS — Z99.2 ESRD ON HEMODIALYSIS (HCC): ICD-10-CM

## 2019-10-14 RX ORDER — DIPHENHYDRAMINE HYDROCHLORIDE 25 MG/1
CAPSULE ORAL
Qty: 90 CAPSULE | Refills: 1 | Status: SHIPPED | OUTPATIENT
Start: 2019-10-14 | End: 2019-12-10 | Stop reason: SDUPTHER

## 2019-10-14 RX ORDER — CLONIDINE HYDROCHLORIDE 0.2 MG/1
TABLET ORAL
Qty: 90 TABLET | Refills: 1 | Status: SHIPPED | OUTPATIENT
Start: 2019-10-14 | End: 2019-11-20 | Stop reason: SDUPTHER

## 2019-10-14 RX ORDER — PANTOPRAZOLE SODIUM 40 MG/1
TABLET, DELAYED RELEASE ORAL
Qty: 30 TABLET | Refills: 1 | Status: SHIPPED | OUTPATIENT
Start: 2019-10-14 | End: 2019-12-12 | Stop reason: SDUPTHER

## 2019-11-20 ENCOUNTER — OFFICE VISIT (OUTPATIENT)
Dept: INTERNAL MEDICINE | Age: 34
End: 2019-11-20
Payer: MEDICAID

## 2019-11-20 VITALS
SYSTOLIC BLOOD PRESSURE: 125 MMHG | TEMPERATURE: 98.6 F | HEART RATE: 79 BPM | BODY MASS INDEX: 16.71 KG/M2 | WEIGHT: 104 LBS | DIASTOLIC BLOOD PRESSURE: 82 MMHG | HEIGHT: 66 IN | OXYGEN SATURATION: 98 % | RESPIRATION RATE: 16 BRPM

## 2019-11-20 DIAGNOSIS — Z23 NEED FOR 23-POLYVALENT PNEUMOCOCCAL POLYSACCHARIDE VACCINE: ICD-10-CM

## 2019-11-20 DIAGNOSIS — Z11.4 ENCOUNTER FOR SCREENING FOR HIV: ICD-10-CM

## 2019-11-20 DIAGNOSIS — Z23 NEEDS FLU SHOT: ICD-10-CM

## 2019-11-20 DIAGNOSIS — I10 HYPERTENSION, UNSPECIFIED TYPE: Primary | ICD-10-CM

## 2019-11-20 PROCEDURE — 90686 IIV4 VACC NO PRSV 0.5 ML IM: CPT

## 2019-11-20 PROCEDURE — 90732 PPSV23 VACC 2 YRS+ SUBQ/IM: CPT

## 2019-11-20 PROCEDURE — 99213 OFFICE O/P EST LOW 20 MIN: CPT | Performed by: INTERNAL MEDICINE

## 2019-11-20 PROCEDURE — 4004F PT TOBACCO SCREEN RCVD TLK: CPT | Performed by: INTERNAL MEDICINE

## 2019-11-20 PROCEDURE — G8482 FLU IMMUNIZE ORDER/ADMIN: HCPCS | Performed by: INTERNAL MEDICINE

## 2019-11-20 PROCEDURE — 6360000002 HC RX W HCPCS

## 2019-11-20 PROCEDURE — G8419 CALC BMI OUT NRM PARAM NOF/U: HCPCS | Performed by: INTERNAL MEDICINE

## 2019-11-20 PROCEDURE — G8427 DOCREV CUR MEDS BY ELIG CLIN: HCPCS | Performed by: INTERNAL MEDICINE

## 2019-11-20 PROCEDURE — G0009 ADMIN PNEUMOCOCCAL VACCINE: HCPCS

## 2019-11-20 PROCEDURE — G0008 ADMIN INFLUENZA VIRUS VAC: HCPCS

## 2019-11-20 RX ORDER — HYDRALAZINE HYDROCHLORIDE 50 MG/1
50 TABLET, FILM COATED ORAL 3 TIMES DAILY
Qty: 270 TABLET | Refills: 0 | Status: SHIPPED
Start: 2019-11-20 | End: 2020-03-03 | Stop reason: SDUPTHER

## 2019-11-20 RX ORDER — CLONIDINE HYDROCHLORIDE 0.2 MG/1
TABLET ORAL
Qty: 270 TABLET | Refills: 0 | Status: SHIPPED | OUTPATIENT
Start: 2019-11-20 | End: 2019-12-11 | Stop reason: SDUPTHER

## 2019-11-20 RX ORDER — LIDOCAINE AND PRILOCAINE 25; 25 MG/G; MG/G
1 CREAM TOPICAL PRN
COMMUNITY
Start: 2019-11-18 | End: 2021-02-01

## 2019-11-20 RX ORDER — LEVETIRACETAM 500 MG/1
500 TABLET ORAL
COMMUNITY
End: 2019-11-20 | Stop reason: ALTCHOICE

## 2019-11-20 RX ORDER — SEVELAMER CARBONATE 800 MG/1
2 TABLET, FILM COATED ORAL 3 TIMES DAILY
COMMUNITY

## 2019-11-20 RX ORDER — LOSARTAN POTASSIUM 100 MG/1
100 TABLET ORAL EVERY EVENING
Qty: 90 TABLET | Refills: 0 | Status: SHIPPED
Start: 2019-11-20 | End: 2020-03-03 | Stop reason: SDUPTHER

## 2019-11-20 RX ORDER — CINACALCET 30 MG/1
90 TABLET, FILM COATED ORAL 2 TIMES DAILY
Status: ON HOLD | COMMUNITY
End: 2020-01-21

## 2019-11-20 RX ORDER — NIFEDIPINE 60 MG/1
60 TABLET, FILM COATED, EXTENDED RELEASE ORAL DAILY
Qty: 90 TABLET | Refills: 0 | Status: ON HOLD | OUTPATIENT
Start: 2019-11-20 | End: 2020-01-21

## 2019-11-20 RX ORDER — MINOXIDIL 2.5 MG/1
2.5 TABLET ORAL
Status: ON HOLD | COMMUNITY
End: 2020-01-21

## 2019-11-20 RX ORDER — CALCIUM ACETATE 667 MG
TABLET ORAL
Refills: 2 | Status: ON HOLD | COMMUNITY
Start: 2019-09-02 | End: 2020-01-21

## 2019-11-20 ASSESSMENT — ENCOUNTER SYMPTOMS
BACK PAIN: 0
SHORTNESS OF BREATH: 0
SORE THROAT: 0
COUGH: 0
NAUSEA: 0
DIARRHEA: 0
PHOTOPHOBIA: 0
ABDOMINAL PAIN: 0

## 2019-12-12 DIAGNOSIS — K21.9 GASTROESOPHAGEAL REFLUX DISEASE WITHOUT ESOPHAGITIS: ICD-10-CM

## 2019-12-13 RX ORDER — PANTOPRAZOLE SODIUM 40 MG/1
TABLET, DELAYED RELEASE ORAL
Qty: 90 TABLET | Refills: 0 | Status: SHIPPED
Start: 2019-12-13 | End: 2020-03-23 | Stop reason: SDUPTHER

## 2020-01-16 ENCOUNTER — TELEPHONE (OUTPATIENT)
Dept: INTERNAL MEDICINE | Age: 35
End: 2020-01-16

## 2020-01-16 RX ORDER — DIPHENHYDRAMINE HCL 25 MG
CAPSULE ORAL
Qty: 90 CAPSULE | Refills: 0 | Status: SHIPPED | OUTPATIENT
Start: 2020-01-16 | End: 2020-01-29 | Stop reason: SDUPTHER

## 2020-01-16 NOTE — TELEPHONE ENCOUNTER
Pharmacist LM at 10:47. Refill needed Benadryl capsules 27 mg.   [facility] [phone]

## 2020-01-20 ENCOUNTER — APPOINTMENT (OUTPATIENT)
Dept: CT IMAGING | Age: 35
DRG: 199 | End: 2020-01-20
Payer: MEDICAID

## 2020-01-20 ENCOUNTER — APPOINTMENT (OUTPATIENT)
Dept: ULTRASOUND IMAGING | Age: 35
DRG: 199 | End: 2020-01-20
Payer: MEDICAID

## 2020-01-20 ENCOUNTER — HOSPITAL ENCOUNTER (INPATIENT)
Age: 35
LOS: 2 days | Discharge: HOME | DRG: 199 | End: 2020-01-22
Attending: EMERGENCY MEDICINE | Admitting: INTERNAL MEDICINE
Payer: MEDICAID

## 2020-01-20 ENCOUNTER — APPOINTMENT (OUTPATIENT)
Dept: GENERAL RADIOLOGY | Age: 35
DRG: 199 | End: 2020-01-20
Payer: MEDICAID

## 2020-01-20 LAB
ANION GAP SERPL CALCULATED.3IONS-SCNC: 13 MMOL/L (ref 7–16)
BASOPHILS ABSOLUTE: 0.02 E9/L (ref 0–0.2)
BASOPHILS RELATIVE PERCENT: 0.2 % (ref 0–2)
BUN BLDV-MCNC: 18 MG/DL (ref 6–20)
CALCIUM SERPL-MCNC: 8.4 MG/DL (ref 8.6–10.2)
CHLORIDE BLD-SCNC: 95 MMOL/L (ref 98–107)
CO2: 33 MMOL/L (ref 22–29)
CREAT SERPL-MCNC: 4.5 MG/DL (ref 0.7–1.2)
EKG ATRIAL RATE: 98 BPM
EKG P AXIS: 77 DEGREES
EKG P-R INTERVAL: 182 MS
EKG Q-T INTERVAL: 372 MS
EKG QRS DURATION: 84 MS
EKG QTC CALCULATION (BAZETT): 474 MS
EKG R AXIS: 45 DEGREES
EKG T AXIS: 82 DEGREES
EKG VENTRICULAR RATE: 98 BPM
EOSINOPHILS ABSOLUTE: 0.16 E9/L (ref 0.05–0.5)
EOSINOPHILS RELATIVE PERCENT: 1.8 % (ref 0–6)
GFR AFRICAN AMERICAN: 18
GFR NON-AFRICAN AMERICAN: 18 ML/MIN/1.73
GLUCOSE BLD-MCNC: 87 MG/DL (ref 74–99)
HCT VFR BLD CALC: 30.2 % (ref 37–54)
HEMOGLOBIN: 9.4 G/DL (ref 12.5–16.5)
IMMATURE GRANULOCYTES #: 0.04 E9/L
IMMATURE GRANULOCYTES %: 0.4 % (ref 0–5)
INFLUENZA A BY PCR: NOT DETECTED
INFLUENZA B BY PCR: NOT DETECTED
LIPASE: 35 U/L (ref 13–60)
LYMPHOCYTES ABSOLUTE: 1.6 E9/L (ref 1.5–4)
LYMPHOCYTES RELATIVE PERCENT: 17.5 % (ref 20–42)
MAGNESIUM: 2.7 MG/DL (ref 1.6–2.6)
MCH RBC QN AUTO: 30.7 PG (ref 26–35)
MCHC RBC AUTO-ENTMCNC: 31.1 % (ref 32–34.5)
MCV RBC AUTO: 98.7 FL (ref 80–99.9)
MONOCYTES ABSOLUTE: 0.79 E9/L (ref 0.1–0.95)
MONOCYTES RELATIVE PERCENT: 8.7 % (ref 2–12)
NEUTROPHILS ABSOLUTE: 6.52 E9/L (ref 1.8–7.3)
NEUTROPHILS RELATIVE PERCENT: 71.4 % (ref 43–80)
PDW BLD-RTO: 15.4 FL (ref 11.5–15)
PLATELET # BLD: 282 E9/L (ref 130–450)
PMV BLD AUTO: 9.3 FL (ref 7–12)
POTASSIUM SERPL-SCNC: 3.4 MMOL/L (ref 3.5–5)
RBC # BLD: 3.06 E12/L (ref 3.8–5.8)
SODIUM BLD-SCNC: 141 MMOL/L (ref 132–146)
STREP GRP A PCR: NEGATIVE
TROPONIN: 0.02 NG/ML (ref 0–0.03)
WBC # BLD: 9.1 E9/L (ref 4.5–11.5)

## 2020-01-20 PROCEDURE — 71275 CT ANGIOGRAPHY CHEST: CPT

## 2020-01-20 PROCEDURE — 83690 ASSAY OF LIPASE: CPT

## 2020-01-20 PROCEDURE — 96366 THER/PROPH/DIAG IV INF ADDON: CPT

## 2020-01-20 PROCEDURE — 2580000003 HC RX 258: Performed by: EMERGENCY MEDICINE

## 2020-01-20 PROCEDURE — 71046 X-RAY EXAM CHEST 2 VIEWS: CPT

## 2020-01-20 PROCEDURE — 6360000002 HC RX W HCPCS: Performed by: EMERGENCY MEDICINE

## 2020-01-20 PROCEDURE — 93010 ELECTROCARDIOGRAM REPORT: CPT | Performed by: INTERNAL MEDICINE

## 2020-01-20 PROCEDURE — 36415 COLL VENOUS BLD VENIPUNCTURE: CPT

## 2020-01-20 PROCEDURE — 83735 ASSAY OF MAGNESIUM: CPT

## 2020-01-20 PROCEDURE — 87081 CULTURE SCREEN ONLY: CPT

## 2020-01-20 PROCEDURE — 94760 N-INVAS EAR/PLS OXIMETRY 1: CPT

## 2020-01-20 PROCEDURE — 2580000003 HC RX 258: Performed by: GENERAL PRACTICE

## 2020-01-20 PROCEDURE — 84484 ASSAY OF TROPONIN QUANT: CPT

## 2020-01-20 PROCEDURE — 2500000003 HC RX 250 WO HCPCS: Performed by: GENERAL PRACTICE

## 2020-01-20 PROCEDURE — 87502 INFLUENZA DNA AMP PROBE: CPT

## 2020-01-20 PROCEDURE — 6370000000 HC RX 637 (ALT 250 FOR IP): Performed by: INTERNAL MEDICINE

## 2020-01-20 PROCEDURE — 85025 COMPLETE CBC W/AUTO DIFF WBC: CPT

## 2020-01-20 PROCEDURE — 6360000002 HC RX W HCPCS: Performed by: INTERNAL MEDICINE

## 2020-01-20 PROCEDURE — 93005 ELECTROCARDIOGRAM TRACING: CPT | Performed by: EMERGENCY MEDICINE

## 2020-01-20 PROCEDURE — 6370000000 HC RX 637 (ALT 250 FOR IP): Performed by: EMERGENCY MEDICINE

## 2020-01-20 PROCEDURE — 96375 TX/PRO/DX INJ NEW DRUG ADDON: CPT

## 2020-01-20 PROCEDURE — 2580000003 HC RX 258

## 2020-01-20 PROCEDURE — 87880 STREP A ASSAY W/OPTIC: CPT

## 2020-01-20 PROCEDURE — 99291 CRITICAL CARE FIRST HOUR: CPT | Performed by: INTERNAL MEDICINE

## 2020-01-20 PROCEDURE — 6360000004 HC RX CONTRAST MEDICATION: Performed by: RADIOLOGY

## 2020-01-20 PROCEDURE — 87040 BLOOD CULTURE FOR BACTERIA: CPT

## 2020-01-20 PROCEDURE — 2000000000 HC ICU R&B

## 2020-01-20 PROCEDURE — 80048 BASIC METABOLIC PNL TOTAL CA: CPT

## 2020-01-20 PROCEDURE — 2500000003 HC RX 250 WO HCPCS: Performed by: EMERGENCY MEDICINE

## 2020-01-20 PROCEDURE — 93971 EXTREMITY STUDY: CPT

## 2020-01-20 PROCEDURE — 96365 THER/PROPH/DIAG IV INF INIT: CPT

## 2020-01-20 PROCEDURE — 70450 CT HEAD/BRAIN W/O DYE: CPT

## 2020-01-20 PROCEDURE — 99285 EMERGENCY DEPT VISIT HI MDM: CPT

## 2020-01-20 RX ORDER — ACETAMINOPHEN 325 MG/1
650 TABLET ORAL EVERY 4 HOURS PRN
Status: DISCONTINUED | OUTPATIENT
Start: 2020-01-20 | End: 2020-01-22 | Stop reason: HOSPADM

## 2020-01-20 RX ORDER — HYDRALAZINE HYDROCHLORIDE 50 MG/1
50 TABLET, FILM COATED ORAL 3 TIMES DAILY
Status: DISCONTINUED | OUTPATIENT
Start: 2020-01-20 | End: 2020-01-21

## 2020-01-20 RX ORDER — DIPHENHYDRAMINE HYDROCHLORIDE 50 MG/ML
25 INJECTION INTRAMUSCULAR; INTRAVENOUS ONCE
Status: COMPLETED | OUTPATIENT
Start: 2020-01-20 | End: 2020-01-20

## 2020-01-20 RX ORDER — LABETALOL HYDROCHLORIDE 5 MG/ML
20 INJECTION, SOLUTION INTRAVENOUS ONCE
Status: DISCONTINUED | OUTPATIENT
Start: 2020-01-20 | End: 2020-01-22 | Stop reason: HOSPADM

## 2020-01-20 RX ORDER — HEPARIN SODIUM 10000 [USP'U]/ML
5000 INJECTION, SOLUTION INTRAVENOUS; SUBCUTANEOUS EVERY 8 HOURS SCHEDULED
Status: DISCONTINUED | OUTPATIENT
Start: 2020-01-20 | End: 2020-01-22 | Stop reason: HOSPADM

## 2020-01-20 RX ORDER — LABETALOL HYDROCHLORIDE 5 MG/ML
10 INJECTION, SOLUTION INTRAVENOUS ONCE
Status: COMPLETED | OUTPATIENT
Start: 2020-01-20 | End: 2020-01-20

## 2020-01-20 RX ORDER — POTASSIUM CHLORIDE 20 MEQ/1
20 TABLET, EXTENDED RELEASE ORAL ONCE
Status: COMPLETED | OUTPATIENT
Start: 2020-01-20 | End: 2020-01-20

## 2020-01-20 RX ORDER — POTASSIUM CHLORIDE 750 MG/1
10 TABLET, EXTENDED RELEASE ORAL ONCE
Status: COMPLETED | OUTPATIENT
Start: 2020-01-20 | End: 2020-01-20

## 2020-01-20 RX ORDER — ACETAMINOPHEN 325 MG/1
650 TABLET ORAL ONCE
Status: COMPLETED | OUTPATIENT
Start: 2020-01-20 | End: 2020-01-20

## 2020-01-20 RX ORDER — SODIUM CHLORIDE 0.9 % (FLUSH) 0.9 %
10 SYRINGE (ML) INJECTION PRN
Status: DISCONTINUED | OUTPATIENT
Start: 2020-01-20 | End: 2020-01-22 | Stop reason: HOSPADM

## 2020-01-20 RX ORDER — LOSARTAN POTASSIUM 50 MG/1
100 TABLET ORAL EVERY EVENING
Status: DISCONTINUED | OUTPATIENT
Start: 2020-01-20 | End: 2020-01-22 | Stop reason: HOSPADM

## 2020-01-20 RX ORDER — MORPHINE SULFATE 2 MG/ML
1 INJECTION, SOLUTION INTRAMUSCULAR; INTRAVENOUS EVERY 4 HOURS PRN
Status: DISCONTINUED | OUTPATIENT
Start: 2020-01-20 | End: 2020-01-22 | Stop reason: HOSPADM

## 2020-01-20 RX ORDER — MORPHINE SULFATE 4 MG/ML
4 INJECTION, SOLUTION INTRAMUSCULAR; INTRAVENOUS ONCE
Status: COMPLETED | OUTPATIENT
Start: 2020-01-20 | End: 2020-01-20

## 2020-01-20 RX ORDER — PANTOPRAZOLE SODIUM 40 MG/1
40 TABLET, DELAYED RELEASE ORAL
Status: DISCONTINUED | OUTPATIENT
Start: 2020-01-21 | End: 2020-01-22 | Stop reason: HOSPADM

## 2020-01-20 RX ORDER — ONDANSETRON 2 MG/ML
4 INJECTION INTRAMUSCULAR; INTRAVENOUS EVERY 6 HOURS PRN
Status: DISCONTINUED | OUTPATIENT
Start: 2020-01-20 | End: 2020-01-22 | Stop reason: HOSPADM

## 2020-01-20 RX ORDER — SODIUM CHLORIDE 0.9 % (FLUSH) 0.9 %
SYRINGE (ML) INJECTION
Status: COMPLETED
Start: 2020-01-20 | End: 2020-01-20

## 2020-01-20 RX ORDER — CLONIDINE HYDROCHLORIDE 0.2 MG/1
0.2 TABLET ORAL 2 TIMES DAILY
Status: DISCONTINUED | OUTPATIENT
Start: 2020-01-20 | End: 2020-01-22 | Stop reason: HOSPADM

## 2020-01-20 RX ORDER — HYDRALAZINE HYDROCHLORIDE 20 MG/ML
10 INJECTION INTRAMUSCULAR; INTRAVENOUS ONCE
Status: COMPLETED | OUTPATIENT
Start: 2020-01-20 | End: 2020-01-20

## 2020-01-20 RX ORDER — HYDRALAZINE HYDROCHLORIDE 20 MG/ML
10 INJECTION INTRAMUSCULAR; INTRAVENOUS EVERY 4 HOURS PRN
Status: DISCONTINUED | OUTPATIENT
Start: 2020-01-20 | End: 2020-01-22 | Stop reason: HOSPADM

## 2020-01-20 RX ORDER — SODIUM CHLORIDE 0.9 % (FLUSH) 0.9 %
10 SYRINGE (ML) INJECTION EVERY 12 HOURS SCHEDULED
Status: DISCONTINUED | OUTPATIENT
Start: 2020-01-20 | End: 2020-01-22 | Stop reason: HOSPADM

## 2020-01-20 RX ORDER — LABETALOL HYDROCHLORIDE 5 MG/ML
10 INJECTION, SOLUTION INTRAVENOUS EVERY 4 HOURS PRN
Status: DISCONTINUED | OUTPATIENT
Start: 2020-01-20 | End: 2020-01-22 | Stop reason: HOSPADM

## 2020-01-20 RX ORDER — LIDOCAINE HYDROCHLORIDE 10 MG/ML
INJECTION, SOLUTION INFILTRATION; PERINEURAL
Status: DISPENSED
Start: 2020-01-20 | End: 2020-01-21

## 2020-01-20 RX ADMIN — LABETALOL HYDROCHLORIDE 1 MG/MIN: 5 INJECTION, SOLUTION INTRAVENOUS at 16:38

## 2020-01-20 RX ADMIN — Medication 10 ML: at 22:59

## 2020-01-20 RX ADMIN — MORPHINE SULFATE 1 MG: 2 INJECTION, SOLUTION INTRAMUSCULAR; INTRAVENOUS at 21:26

## 2020-01-20 RX ADMIN — ONDANSETRON 4 MG: 2 INJECTION INTRAMUSCULAR; INTRAVENOUS at 23:07

## 2020-01-20 RX ADMIN — CLONIDINE HYDROCHLORIDE 0.2 MG: 0.2 TABLET ORAL at 22:58

## 2020-01-20 RX ADMIN — SODIUM CHLORIDE 5 MG/HR: 9 INJECTION, SOLUTION INTRAVENOUS at 19:55

## 2020-01-20 RX ADMIN — HYDRALAZINE HYDROCHLORIDE 50 MG: 50 TABLET, FILM COATED ORAL at 22:58

## 2020-01-20 RX ADMIN — POTASSIUM CHLORIDE 10 MEQ: 10 TABLET, EXTENDED RELEASE ORAL at 14:29

## 2020-01-20 RX ADMIN — CEFEPIME HYDROCHLORIDE 2 G: 2 INJECTION, POWDER, FOR SOLUTION INTRAVENOUS at 19:58

## 2020-01-20 RX ADMIN — MORPHINE SULFATE 4 MG: 4 INJECTION, SOLUTION INTRAMUSCULAR; INTRAVENOUS at 14:44

## 2020-01-20 RX ADMIN — VANCOMYCIN HYDROCHLORIDE 1250 MG: 10 INJECTION, POWDER, LYOPHILIZED, FOR SOLUTION INTRAVENOUS at 22:00

## 2020-01-20 RX ADMIN — POTASSIUM CHLORIDE 20 MEQ: 20 TABLET, EXTENDED RELEASE ORAL at 22:58

## 2020-01-20 RX ADMIN — IOPAMIDOL 80 ML: 755 INJECTION, SOLUTION INTRAVENOUS at 19:25

## 2020-01-20 RX ADMIN — LOSARTAN POTASSIUM 100 MG: 50 TABLET, FILM COATED ORAL at 22:58

## 2020-01-20 RX ADMIN — DIPHENHYDRAMINE HYDROCHLORIDE 25 MG: 50 INJECTION, SOLUTION INTRAMUSCULAR; INTRAVENOUS at 18:37

## 2020-01-20 RX ADMIN — ACETAMINOPHEN 650 MG: 325 TABLET ORAL at 14:29

## 2020-01-20 RX ADMIN — HYDRALAZINE HYDROCHLORIDE 10 MG: 20 INJECTION INTRAMUSCULAR; INTRAVENOUS at 13:20

## 2020-01-20 RX ADMIN — DIPHENHYDRAMINE HYDROCHLORIDE 25 MG: 50 INJECTION, SOLUTION INTRAMUSCULAR; INTRAVENOUS at 13:20

## 2020-01-20 RX ADMIN — LABETALOL HYDROCHLORIDE 10 MG: 5 INJECTION INTRAVENOUS at 14:44

## 2020-01-20 ASSESSMENT — ENCOUNTER SYMPTOMS
EYE REDNESS: 0
SORE THROAT: 0
SINUS PRESSURE: 0
EYE DISCHARGE: 0
VOMITING: 0
BLOOD IN STOOL: 0
DIARRHEA: 0
RHINORRHEA: 0
HEARTBURN: 0
BACK PAIN: 0
CONSTIPATION: 0
WHEEZING: 0
COUGH: 1
SHORTNESS OF BREATH: 0
NAUSEA: 0
EYE PAIN: 0
ABDOMINAL PAIN: 0

## 2020-01-20 ASSESSMENT — PAIN DESCRIPTION - PAIN TYPE
TYPE: ACUTE PAIN
TYPE: ACUTE PAIN

## 2020-01-20 ASSESSMENT — PAIN SCALES - GENERAL
PAINLEVEL_OUTOF10: 10
PAINLEVEL_OUTOF10: 9
PAINLEVEL_OUTOF10: 10

## 2020-01-20 ASSESSMENT — PAIN DESCRIPTION - LOCATION: LOCATION: CHEST;HEAD

## 2020-01-20 NOTE — CONSULTS
Diagnosis Date    Anxiety 9/19/2015    AVF [facility]) [address] 4/30/2018    Chronic kidney disease     CKD since early childhood per pt and on HD ~ 11 years    Depression     [facility] 4[facility]30/[facility]    Hypertension     on meds for ~ 11 years    Hypothyroidism     Intracranial hemorrhage ([facility]     was d/t ruptured aneurysm - pt underwent neurosurgery for clipping of the aneurysm    Noncompliance w/medication treatment due to intermit use of medication 11/3/2015        Past Surgical History           Procedure Laterality Date    BRAIN ANEURYSM SURGERY Right 3-4 years ago    Intracranial aneurysm clipping surgery 3-4 years ago, after rupture of aneurysm causing ICH    DIALYSIS FISTULA CREATION Left 11 years ago    UPPER GASTROINTESTINAL ENDOSCOPY N/A 1/3/2019    EGD BIOPSY performed by Blossom Bullard MD at [facility] ENDOSCOPY       Current Medications   Current Medications    lidocaine        labetalol  20 mg Intravenous Once    vancomycin  1,250 mg Intravenous Once    cefepime  2 g Intravenous Once     iopamidol  IV Drips/Infusions   niCARdipene (CARDENE) 50 mg in 100 mL 0.9 % sodium chloride infusion       Home Medications  Not in a hospital admission. Diet/Nutrition   No diet orders on file    Allergies   Patient has no known allergies. Social History   Tobacco   reports that he has been smoking cigars. He has never used smokeless tobacco.    Alcohol     reports no history of alcohol use.     Occupational history :    Family History         Problem Relation Age of Onset    Kidney Disease Paternal Grandmother     Cancer Neg Hx     Heart Disease Neg Hx        ROS     REVIEW OF SYSTEMS:  CONSTITUTIONAL:  positive for  Headache, negative for  fevers, chills and sweats  EYES:  negative for  double vision, blurred vision, blind spots and visual disturbance  HEENT: negative for  hearing loss, nasal congestion and epistaxis  RESPIRATORY: 98 % 5' 6\" (1.676 m) --     No intake or output data in the 24 hours ending 01/20/20 1716  No intake/output data recorded. Patient Vitals for the past 96 hrs (Last 3 readings):   Weight   01/20/20 1519 110 lb (49.9 kg)         Drains/Tubes Outputs    Exam         PHYSICAL EXAM:  CONSTITUTIONAL:  awake, alert, cooperative, no apparent distress, and appears stated age  EYES:  Lids and lashes normal, pupils equal, round and reactive to light, extra ocular muscles intact, sclera clear, conjunctiva normal  ENT:  Normocephalic, without obvious abnormality, atraumatic, sinuses nontender on palpation, external ears without lesions, oral pharynx with moist mucus membranes, tonsils without erythema or exudates, gums normal and good dentition. NECK:  Supple, symmetrical, trachea midline, no adenopathy, thyroid symmetric, not enlarged and no tenderness, skin normal  HEMATOLOGIC/LYMPHATICS:  no cervical lymphadenopathy  LUNGS:  No increased work of breathing, good air exchange, clear to auscultation bilaterally, no crackles or wheezing  CARDIOVASCULAR:  Normal apical impulse, regular rate and rhythm, normal S1 and S2, no S3 or S4, and no murmur noted  ABDOMEN:  No scars, normal bowel sounds, soft, non-distended, non-tender, no masses palpated, no hepatosplenomegally  CHEST/BREASTS:  Breasts symmetrical, skin without lesion(s), no nipple retraction or dimpling, no nipple discharge, no masses palpated, no axillary or supraclavicular adenopathy  MUSCULOSKELETAL:  There is no redness, warmth, or swelling of the joints. Full range of motion noted. Motor strength is 5 out of 5 all extremities bilaterally. Tone is normal.  NEUROLOGIC:  Awake, alert, oriented to name, place and time. Cranial nerves II-XII are grossly intact. Motor is 5 out of 5 bilaterally. Cerebellar finger to nose, heel to shin intact. Sensory is intact.   Babinski down going, Romberg negative, and gait is normal.    Data   Old records and images have been reviewed    Lab Results   CBC     Lab Results   Component Value Date    WBC 9.1 2020    RBC 3.06 2020    HGB 9.4 2020    HCT 30.2 2020     2020    MCV 98.7 2020    MCH 30.7 2020    MCHC 31.1 2020    RDW 15.4 2020    LYMPHOPCT 17.5 2020    MONOPCT 8.7 2020    BASOPCT 0.2 2020    MONOSABS 0.79 2020    LYMPHSABS 1.60 2020    EOSABS 0.16 2020    BASOSABS 0.02 2020       BMP   Lab Results   Component Value Date     2020    K 3.4 2020    K 3.6 2019    CL 95 2020    CO2 33 2020    BUN 18 2020    CREATININE 4.5 2020    GLUCOSE 87 2020    CALCIUM 8.4 2020       LFTS  Lab Results   Component Value Date    ALKPHOS 148 2019    ALT 18 2019    AST 37 2019    PROT 8.6 2019    BILITOT 0.3 2019    BILIDIR <0.2 2018    IBILI see below 2018    LABALBU 4.7 2019       INR  No results for input(s): PROTIME, INR in the last 72 hours. APTT  No results for input(s): APTT in the last 72 hours. Lactic Acid  Lab Results   Component Value Date    LACTA 0.7 2018    LACTA 0.7 2017    LACTA 0.9 03/15/2016        BNP   No results for input(s): BNP in the last 72 hours. Cultures     No results for input(s): BC in the last 72 hours. No results for input(s): Nuno Saint in the last 72 hours. No results for input(s): LABURIN in the last 72 hours. Radiology   Xr Chest Standard (2 Vw)    Result Date: 2020  Patient MRN: [id number] : 1985 Age:  37 years Gender: Male Order Date: 2020 12:15 PM Exam: XR CHEST (2 VW) Number of Images: 2 view Indication:  Chest pain and cough Comparison: Previous anterior upright chest studies 2019 and 2019 Findings: The lungs are symmetrically expanded, and are hyperinflated. There is no definite evidence of effusion or consolidation.  Cardiovascular shadows show slightly increased prominence of the left ventricular cardiac border and indistinct congestion of the pulmonary vasculature in the central and dependent chest, suggesting early cardiac decompensation and mild pulmonary edema. There is no prominent septal edema. . Skeletal structures show no evidence of acute pathology. The lower thoracic vertebrae have a somewhat dense appearance, which may be normal physiologic change, but can also be seen in hyperparathyroidism. Increasing cardiomegaly and central vascular congestion is suspicious for early cardiac decompensation in this young patient. Density in the spine appears slightly increased, and may be physiologic, such as with hyperparathyroidism. Us Dup Upper Extremity Left Venous    Result Date: 2020  Patient MRN:  [id number] : 1985 Age: 37 years Gender: Male Order Date:  2020 12:45 PM EXAM: US DUP UPPER EXTREMITY LEFT VENOUS NUMBER OF IMAGES:  22 INDICATION: Left upper extremity pain and swelling COMPARISON: None TECHNIQUE: Venous structures were evaluated for patency with color Doppler imaging, and compressibility was evaluated with 2-D grayscale ultrasound imaging. Response to augmentation maneuvers and respiratory variation was assessed with spectral Doppler. FINDINGS: The left upper extremity was evaluated ultrasonographically. No intraluminal thrombus was identified, and there is good compressibility, appropriate response to augmentation maneuvers and respiratory variation, and color flow confirm patency of venous structures. Negative for evidence of deep venous thrombosis in the  left upper extremity by color and spectral Doppler, as well as 2-D grayscale ultrasound imaging.      SYSTEMS ASSESSMENT    Neuro   Alert and Oriented  History of ruptured intracranial aneurysm now with hypertension and headache  Monitor for mental status changes  Avoid sedating agents  Will decrease blood pressure    Respiratory   No hypoxia or

## 2020-01-20 NOTE — ED NOTES
Bed: 25  Expected date:   Expected time:   Means of arrival:   Comments:  Triage, ruth ann Barron RN  01/20/20 [id number]

## 2020-01-20 NOTE — ED PROVIDER NOTES
Patient is a 37years old male on hemodialysis on Mondays, Wednesdays, and Fridays here with left arm and generalized chest pain. Patient states pain is sharp in nature and worse with movement. Patient states yesterday he had pain in his left arm where he receives dialysis. Patient states today after dialysis, he had some pain in his chest as well. Patient states he has dull headache and intermittent dizziness which have improved but not resolved. Patient states he has had some productive cough for the past few days. He denies taking anything for his symptoms. He denies shortness of breath, fever, chills, sweats, falls, injuries, lightheadedness, LOC,  nausea/vomiting, neck pain/stiffness, change in vision/hearing, numbness/tingling, focal weakness, abdominal pain, diarrhea, constipation, blood in stool, or use of recreational drugs/alcohol. Chest Pain   Pain location:  Unable to specify  Pain quality: sharp    Chronicity:  New  Associated symptoms: cough, dizziness and headache    Associated symptoms: no abdominal pain, no back pain, no diaphoresis, no fever, no heartburn, no nausea, no near-syncope, no numbness, no shortness of breath, no syncope, no vomiting and no weakness         Review of Systems   Constitutional: Negative for chills, diaphoresis and fever. HENT: Negative for ear pain, hearing loss, rhinorrhea, sinus pressure and sore throat. Eyes: Negative for pain, discharge, redness and visual disturbance. Respiratory: Positive for cough. Negative for shortness of breath and wheezing. Cardiovascular: Positive for chest pain. Negative for syncope and near-syncope. Gastrointestinal: Negative for abdominal pain, blood in stool, constipation, diarrhea, heartburn, nausea and vomiting. Genitourinary: Negative for dysuria, flank pain, frequency and hematuria. Musculoskeletal: Positive for myalgias. Negative for arthralgias, back pain, neck pain and neck stiffness.    Skin: Negative Due to loss of peripheral IVs, and necessity for dual peripheral IVs for admission to the ICU, a central line was placed in the right femoral vein for appropriate access. CTA was completed at request of admitting physician to evaluate for possible dissection, which was negative. After confirmation of negative CTA the patient was then admitted to the ICU for hypertensive issues. [KS]      ED Course User Index  [KS] Read Speak, DO      EKG: This EKG is signed and interpreted by me. Rate: 98  Rhythm: Sinus  Interpretation: non-specific T-wave changes, prolonged QT interval and 1st degree AV block  Comparison: changes compared to previous EKG      Radiology Procedure Waiver   Name: Perla Jackson  : 1985  MRN: [id number]    Date:  20    Time: 4:07 PM    Benefits of immediately proceeding with Radiology exam(s) without pre-testing outweigh the risks or are not indicated as specified below and therefore the following is/are being waived:    [] Pregnancy test   [] Patients LMP on-time and regular.   [] Patient had Tubal Ligation or has other Contraception Device. [] Patient  is Menopausal or Premenarcheal.    [] Patient had Full or Partial Hysterectomy. [] Protocol for Iodine allergy    [] MRI Questionnaire     [x] BUN/Creatinine   [] Patient age w/no hx of renal dysfunction. [x] Patient on Dialysis. [] Recent Normal Labs. Electronically signed by Lakshmi Mcmahon DO on 20 at 4:07 PM          --------------------------------------------- PAST HISTORY ---------------------------------------------  Past Medical History:  has a past medical history of Anxiety, AVF (arteriovenous fistula) ([facility] [address] Chronic kidney disease, Depression, Encounter regarding vascular access for dialysis for ESRD ([facility] [address] Hypertension, Hypothyroidism, Intracranial hemorrhage ([facility] [address] and Noncompliance w/medication treatment due to intermit use of medication.     Past Surgical History:  has a past surgical history that (L) 98 - 107 mmol/L    CO2 33 (H) 22 - 29 mmol/L    Anion Gap 13 7 - 16 mmol/L    Glucose 87 74 - 99 mg/dL    BUN 18 6 - 20 mg/dL    CREATININE 4.5 (H) 0.7 - 1.2 mg/dL    GFR Non-African American 18 >=60 mL/min/1.73    GFR African American 18     Calcium 8.4 (L) 8.6 - 10.2 mg/dL   Lipase   Result Value Ref Range    Lipase 35 13 - 60 U/L   Troponin   Result Value Ref Range    Troponin 0.02 0.00 - 0.03 ng/mL   EKG 12 Lead   Result Value Ref Range    Ventricular Rate 98 BPM    Atrial Rate 98 BPM    P-R Interval 182 ms    QRS Duration 84 ms    Q-T Interval 372 ms    QTc Calculation (Bazett) 474 ms    P Axis 77 degrees    R Axis 45 degrees    T Axis 82 degrees       RADIOLOGY:  Xr Chest Standard (2 Vw)    Result Date: 2020  Patient MRN: [id number] : 1985 Age:  37 years Gender: Male Order Date: 2020 12:15 PM Exam: XR CHEST (2 VW) Number of Images: 2 view Indication:  Chest pain and cough Comparison: Previous anterior upright chest studies 2019 and 2019 Findings: The lungs are symmetrically expanded, and are hyperinflated. There is no definite evidence of effusion or consolidation. Cardiovascular shadows show slightly increased prominence of the left ventricular cardiac border and indistinct congestion of the pulmonary vasculature in the central and dependent chest, suggesting early cardiac decompensation and mild pulmonary edema. There is no prominent septal edema. . Skeletal structures show no evidence of acute pathology. The lower thoracic vertebrae have a somewhat dense appearance, which may be normal physiologic change, but can also be seen in hyperparathyroidism. Increasing cardiomegaly and central vascular congestion is suspicious for early cardiac decompensation in this young patient. Density in the spine appears slightly increased, and may be physiologic, such as with hyperparathyroidism.     Cta Chest W Contrast    Result Date: 2020  Patient MRN:  [id number] : 01/20/20 1311 (!) 216/130 -- -- 117 -- 98 % -- --   01/20/20 1158 (!) 193/121 -- -- 99 14 99 % -- --   01/20/20 1136 (!) 189/120 99.3 °F (37.4 °C) Oral 76 18 98 % 5' 6\" (1.676 m) --       Oxygen Saturation Interpretation: Normal    ------------------------------------------ PROGRESS NOTES ------------------------------------------  ED Course as of Jan 20 2008 Mon Jan 20, 2020   1824 Central Line Placement Procedure Note    Indication: vascular access, poor peripheral access, and centrally administered medications    Consent: The patient was counseled regarding the procedure, its indications, risks, potential complications and alternatives, and any questions were answered. Consent was obtained to proceed. Procedure: The patient was positioned appropriately and the skin over the right femoral vein was prepped with betadine and draped in a sterile fashion. Local anesthesia was obtained by infiltration using 1% Lidocaine without epinephrine. A large bore needle was used to identify the vein. A guide wire was then inserted into the vein through the needle. A triple lumen catheter was then inserted into the vessel over the guide wire using the Seldinger technique. All ports showed good, free flowing blood return and were flushed with saline solution. The catheter was then securely fastened to the skin with suture at 18 cm. Two sutures were placed into the kit included tube clamp\", \"proximal eyelets\", \"a suture end from each of the securing sutures was extended around the catheter and tied to the proximal eyelets as an added measure to prevent dislodgement. An antibiotic disk was placed and the site was then covered with a sterile dressing. A post procedure X-ray was not indicated. The patient tolerated the procedure well.     Complications: None          [KS]   1900 Due to loss of peripheral IVs, and necessity for dual peripheral IVs for admission to the ICU, a central line was placed in the right femoral

## 2020-01-21 ENCOUNTER — APPOINTMENT (OUTPATIENT)
Dept: GENERAL RADIOLOGY | Age: 35
DRG: 199 | End: 2020-01-21
Payer: MEDICAID

## 2020-01-21 PROBLEM — T82.590A DIALYSIS AV FISTULA MALFUNCTION (HCC): Status: ACTIVE | Noted: 2020-01-21

## 2020-01-21 LAB
ANION GAP SERPL CALCULATED.3IONS-SCNC: 12 MMOL/L (ref 7–16)
BASOPHILS ABSOLUTE: 0.03 E9/L (ref 0–0.2)
BASOPHILS RELATIVE PERCENT: 0.4 % (ref 0–2)
BUN BLDV-MCNC: 31 MG/DL (ref 6–20)
CALCIUM SERPL-MCNC: 8.5 MG/DL (ref 8.6–10.2)
CHLORIDE BLD-SCNC: 94 MMOL/L (ref 98–107)
CO2: 32 MMOL/L (ref 22–29)
CREAT SERPL-MCNC: 7.2 MG/DL (ref 0.7–1.2)
EOSINOPHILS ABSOLUTE: 0.1 E9/L (ref 0.05–0.5)
EOSINOPHILS RELATIVE PERCENT: 1.2 % (ref 0–6)
GFR AFRICAN AMERICAN: 10
GFR NON-AFRICAN AMERICAN: 10 ML/MIN/1.73
GLUCOSE BLD-MCNC: 90 MG/DL (ref 74–99)
HCT VFR BLD CALC: 25.4 % (ref 37–54)
HEMOGLOBIN: 7.8 G/DL (ref 12.5–16.5)
IMMATURE GRANULOCYTES #: 0.05 E9/L
IMMATURE GRANULOCYTES %: 0.6 % (ref 0–5)
LV EF: 58 %
LVEF MODALITY: NORMAL
LYMPHOCYTES ABSOLUTE: 1.58 E9/L (ref 1.5–4)
LYMPHOCYTES RELATIVE PERCENT: 19.3 % (ref 20–42)
MAGNESIUM: 2.5 MG/DL (ref 1.6–2.6)
MCH RBC QN AUTO: 31.2 PG (ref 26–35)
MCHC RBC AUTO-ENTMCNC: 30.7 % (ref 32–34.5)
MCV RBC AUTO: 101.6 FL (ref 80–99.9)
MONOCYTES ABSOLUTE: 0.92 E9/L (ref 0.1–0.95)
MONOCYTES RELATIVE PERCENT: 11.2 % (ref 2–12)
NEUTROPHILS ABSOLUTE: 5.5 E9/L (ref 1.8–7.3)
NEUTROPHILS RELATIVE PERCENT: 67.3 % (ref 43–80)
PDW BLD-RTO: 15.8 FL (ref 11.5–15)
PHOSPHORUS: 5.2 MG/DL (ref 2.5–4.5)
PLATELET # BLD: 221 E9/L (ref 130–450)
PMV BLD AUTO: 9.3 FL (ref 7–12)
POTASSIUM SERPL-SCNC: 4.4 MMOL/L (ref 3.5–5)
RBC # BLD: 2.5 E12/L (ref 3.8–5.8)
SODIUM BLD-SCNC: 138 MMOL/L (ref 132–146)
WBC # BLD: 8.2 E9/L (ref 4.5–11.5)

## 2020-01-21 PROCEDURE — 6370000000 HC RX 637 (ALT 250 FOR IP): Performed by: INTERNAL MEDICINE

## 2020-01-21 PROCEDURE — 2580000003 HC RX 258: Performed by: INTERNAL MEDICINE

## 2020-01-21 PROCEDURE — 2700000000 HC OXYGEN THERAPY PER DAY

## 2020-01-21 PROCEDURE — 36415 COLL VENOUS BLD VENIPUNCTURE: CPT

## 2020-01-21 PROCEDURE — 5A1D70Z PERFORMANCE OF URINARY FILTRATION, INTERMITTENT, LESS THAN 6 HOURS PER DAY: ICD-10-PCS | Performed by: INTERNAL MEDICINE

## 2020-01-21 PROCEDURE — 6360000002 HC RX W HCPCS: Performed by: INTERNAL MEDICINE

## 2020-01-21 PROCEDURE — 99221 1ST HOSP IP/OBS SF/LOW 40: CPT | Performed by: SURGERY

## 2020-01-21 PROCEDURE — 84100 ASSAY OF PHOSPHORUS: CPT

## 2020-01-21 PROCEDURE — 2500000003 HC RX 250 WO HCPCS: Performed by: INTERNAL MEDICINE

## 2020-01-21 PROCEDURE — 2060000000 HC ICU INTERMEDIATE R&B

## 2020-01-21 PROCEDURE — 90935 HEMODIALYSIS ONE EVALUATION: CPT

## 2020-01-21 PROCEDURE — 85025 COMPLETE CBC W/AUTO DIFF WBC: CPT

## 2020-01-21 PROCEDURE — 83735 ASSAY OF MAGNESIUM: CPT

## 2020-01-21 PROCEDURE — 80048 BASIC METABOLIC PNL TOTAL CA: CPT

## 2020-01-21 PROCEDURE — 99233 SBSQ HOSP IP/OBS HIGH 50: CPT | Performed by: INTERNAL MEDICINE

## 2020-01-21 PROCEDURE — 93306 TTE W/DOPPLER COMPLETE: CPT

## 2020-01-21 RX ORDER — CLONIDINE 0.2 MG/24H
1 PATCH, EXTENDED RELEASE TRANSDERMAL WEEKLY
Status: DISCONTINUED | OUTPATIENT
Start: 2020-01-21 | End: 2020-01-22 | Stop reason: HOSPADM

## 2020-01-21 RX ORDER — NIFEDIPINE 90 MG/1
90 TABLET, EXTENDED RELEASE ORAL DAILY
Status: DISCONTINUED | OUTPATIENT
Start: 2020-01-22 | End: 2020-01-21

## 2020-01-21 RX ORDER — NIFEDIPINE 60 MG/1
60 TABLET, FILM COATED, EXTENDED RELEASE ORAL DAILY
COMMUNITY
End: 2020-02-10 | Stop reason: ALTCHOICE

## 2020-01-21 RX ORDER — DIPHENHYDRAMINE HCL 25 MG
25 TABLET ORAL EVERY 8 HOURS PRN
Status: DISCONTINUED | OUTPATIENT
Start: 2020-01-21 | End: 2020-01-21

## 2020-01-21 RX ORDER — DIPHENHYDRAMINE HYDROCHLORIDE 50 MG/ML
12.5 INJECTION INTRAMUSCULAR; INTRAVENOUS EVERY 6 HOURS PRN
Status: DISCONTINUED | OUTPATIENT
Start: 2020-01-21 | End: 2020-01-22 | Stop reason: HOSPADM

## 2020-01-21 RX ORDER — NIFEDIPINE 60 MG/1
60 TABLET, EXTENDED RELEASE ORAL DAILY
Status: DISCONTINUED | OUTPATIENT
Start: 2020-01-21 | End: 2020-01-21

## 2020-01-21 RX ORDER — HYDRALAZINE HYDROCHLORIDE 50 MG/1
100 TABLET, FILM COATED ORAL 3 TIMES DAILY
Status: DISCONTINUED | OUTPATIENT
Start: 2020-01-21 | End: 2020-01-21

## 2020-01-21 RX ORDER — MINOXIDIL 2.5 MG/1
5 TABLET ORAL 2 TIMES DAILY
Status: DISCONTINUED | OUTPATIENT
Start: 2020-01-21 | End: 2020-01-22

## 2020-01-21 RX ORDER — NIFEDIPINE 30 MG/1
90 TABLET, EXTENDED RELEASE ORAL DAILY
Status: DISCONTINUED | OUTPATIENT
Start: 2020-01-21 | End: 2020-01-22

## 2020-01-21 RX ADMIN — CALCIUM ACETATE 667 MG: 667 CAPSULE ORAL at 18:21

## 2020-01-21 RX ADMIN — Medication 10 ML: at 21:30

## 2020-01-21 RX ADMIN — HYDRALAZINE HYDROCHLORIDE 50 MG: 50 TABLET, FILM COATED ORAL at 08:01

## 2020-01-21 RX ADMIN — MORPHINE SULFATE 1 MG: 2 INJECTION, SOLUTION INTRAMUSCULAR; INTRAVENOUS at 13:25

## 2020-01-21 RX ADMIN — LABETALOL HYDROCHLORIDE 10 MG: 5 INJECTION, SOLUTION INTRAVENOUS at 15:52

## 2020-01-21 RX ADMIN — HEPARIN SODIUM 5000 UNITS: 10000 INJECTION, SOLUTION INTRAVENOUS; SUBCUTANEOUS at 13:18

## 2020-01-21 RX ADMIN — DIPHENHYDRAMINE HCL 25 MG: 25 TABLET ORAL at 11:13

## 2020-01-21 RX ADMIN — MELATONIN 3 MG ORAL TABLET 10 MG: 3 TABLET ORAL at 22:52

## 2020-01-21 RX ADMIN — DIPHENHYDRAMINE HCL 25 MG: 25 TABLET ORAL at 18:49

## 2020-01-21 RX ADMIN — LOSARTAN POTASSIUM 100 MG: 50 TABLET, FILM COATED ORAL at 18:21

## 2020-01-21 RX ADMIN — NIFEDIPINE 90 MG: 30 TABLET, EXTENDED RELEASE ORAL at 13:18

## 2020-01-21 RX ADMIN — MORPHINE SULFATE 1 MG: 2 INJECTION, SOLUTION INTRAMUSCULAR; INTRAVENOUS at 18:49

## 2020-01-21 RX ADMIN — MINOXIDIL 5 MG: 2.5 TABLET ORAL at 21:25

## 2020-01-21 RX ADMIN — PANTOPRAZOLE SODIUM 40 MG: 40 TABLET, DELAYED RELEASE ORAL at 08:01

## 2020-01-21 RX ADMIN — MINOXIDIL 5 MG: 2.5 TABLET ORAL at 15:36

## 2020-01-21 RX ADMIN — CLONIDINE HYDROCHLORIDE 0.2 MG: 0.2 TABLET ORAL at 21:25

## 2020-01-21 RX ADMIN — HYDRALAZINE HYDROCHLORIDE 100 MG: 50 TABLET, FILM COATED ORAL at 13:17

## 2020-01-21 RX ADMIN — MELATONIN 3 MG ORAL TABLET 10 MG: 3 TABLET ORAL at 00:11

## 2020-01-21 RX ADMIN — CLONIDINE HYDROCHLORIDE 0.2 MG: 0.2 TABLET ORAL at 09:02

## 2020-01-21 RX ADMIN — DARBEPOETIN ALFA 40 MCG: 40 INJECTION, SOLUTION INTRAVENOUS; SUBCUTANEOUS at 17:15

## 2020-01-21 RX ADMIN — MORPHINE SULFATE 1 MG: 2 INJECTION, SOLUTION INTRAMUSCULAR; INTRAVENOUS at 01:47

## 2020-01-21 ASSESSMENT — PAIN SCALES - GENERAL
PAINLEVEL_OUTOF10: 0
PAINLEVEL_OUTOF10: 0
PAINLEVEL_OUTOF10: 6
PAINLEVEL_OUTOF10: 0
PAINLEVEL_OUTOF10: 10
PAINLEVEL_OUTOF10: 8

## 2020-01-21 NOTE — PROGRESS NOTES
Medical Intensive Care Unit      Ghada Choudhuryjasondarvin [address]  Resident Progress Note    Date and time: 1/21/2020 9:34 AM  Patient's name:  Kristal Ruiz  Medical Record Number: [id number]  Patient's YOB: 1985  Age: 37 y.o. Date of Admission: 1/20/2020 11:33 AM  Length of stay during current admission: 1  Primary Care Physician: Yovana Crow MD    Code Status: Full Code    Reason for ICU admission: hypertensive urgency    SUBJECTIVE:     Pt is a 37 y.o man with hx of ESRD who presented with increased BP and chest pain which started after emotional stress during HD. Also c/o swelling of L arm whre his fistula is. In ED no sign of DVT, PE, aortic dissection or brain bleed. OVERNIGHT EVENTS:         Patient was started on nicardipine drip and bp was controlled. No more CP or SOB  Scheduled to get HD today. HTN meds needs to be reviewed. Po meds started.     CURRENT VENTILATION STATUS:     [] Ventilator  [] BIPAP  [] Nasal Cannula [x] Room Air      IF INTUBATED, ET TUBE MARKING AT LOWER LIP:       cms    SECRETIONS   Amount:  [] Small [] Moderate  [] Large [x] None    Color:     [] White [] Colored  [] Bloody    SEDATION:  RAAS Score:  [] Propofol gtt  [] Versed gtt  [] Ativan gtt   [x] No Sedation    PARALYZED:  [x] No    [] Yes    VASOPRESSORS:  [x] No    [] Yes    If yes -   [] Levophed       [] Dopamine     [] Vasopressin       [] Dobutamine  [] Phenylephrine         [] Epinephrine    CENTRAL LINES:     [] No   [x] Yes   (Date of Insertion:   )           If yes -     [] Right IJ     [] Left IJ [x] Right Femoral [] Left Femoral                   [] Right Subclavian [] Left Subclavian       LEAL'S CATHETER:   [x] No   [] Yes  (Date of Insertion:   )     URINE OUTPUT:            [] Good   [] Low              [] Anuric      OBJECTIVE:     VITAL SIGNS:  BP (!) 167/112 Comment: arm is bent talking on phone  Pulse 97   Temp 98.8 °F (37.1 °C) (Oral)   Resp 24   Ht 5' 6\" (1.676 m)   Wt 110 lb (49.9 kg)   SpO2 98%   BMI 17.75 kg/m²   Tmax over 24 hours:  Temp (24hrs), Av.7 °F (37.6 °C), Min:98.8 °F (37.1 °C), Max:100.5 °F (38.1 °C)      Patient Vitals for the past 6 hrs:   BP Temp Temp src Pulse Resp SpO2   20 0800 -- 98.8 °F (37.1 °C) Oral -- -- --   20 0633 (!) 167/112 -- -- 97 24 98 %   20 0603 (!) 153/102 -- -- 98 23 100 %   20 0533 (!) 138/90 -- -- 85 13 --   20 0503 (!) 144/88 100.5 °F (38.1 °C) Oral 92 18 (!) 83 %   20 0436 -- -- -- -- 13 --   20 0433 137/85 -- -- 91 13 --   20 0403 128/78 -- -- 92 16 100 %       No intake or output data in the 24 hours ending 20 0934  Wt Readings from Last 2 Encounters:   20 110 lb (49.9 kg)   19 104 lb (47.2 kg)     Body mass index is 17.75 kg/m². PHYSICAL EXAMINATION:    CONSTITUTIONAL:  awake, alert, cooperative, no apparent distress, and appears stated age  EYES:  Lids and lashes normal, pupils equal, round and reactive to light, extra ocular muscles intact, sclera clear, conjunctiva normal  ENT:  Normocephalic, without obvious abnormality, atraumatic, sinuses nontender on palpation, external ears without lesions, oral pharynx with moist mucus membranes, tonsils without erythema or exudates, gums normal and good dentition. NECK:  Supple, symmetrical, trachea midline, no adenopathy, thyroid symmetric, not enlarged and no tenderness, skin normal  LUNGS: crackles at the base bilat, normal air entery and work of breathing  CARDIOVASCULAR:  Normal apical impulse, regular rate and rhythm, normal S1 and S2, no S3 or S4, and no murmur noted  ABDOMEN:  No scars, normal bowel sounds, soft, non-distended, non-tender, no masses palpated, no hepatosplenomegally  CHEST/BREASTS:  no axillary or supraclavicular adenopathy  MUSCULOSKELETAL:  there is no redness, warmth, or swelling of the joints  NEUROLOGIC:  Awake, alert, oriented to name, place and time. Cranial nerves II-XII are grossly intact. Motor is 5 out of 5 bilaterally. Cerebellar finger to nose, heel to shin intact. Sensory is intact. Babinski down going, Romberg negative, and gait is normal.     Any additional physical findings:    MEDICATIONS:    Scheduled Meds:   hydrALAZINE  100 mg Oral TID    NIFEdipine  60 mg Oral Daily    labetalol  20 mg Intravenous Once    cloNIDine  0.2 mg Oral BID    losartan  100 mg Oral QPM    pantoprazole  40 mg Oral QAM AC    sodium chloride flush  10 mL Intravenous 2 times per day    heparin (porcine)  5,000 Units Subcutaneous 3 times per day     Continuous Infusions:    PRN Meds:   diphenhydrAMINE, 25 mg, Q8H PRN  sodium chloride flush, 10 mL, PRN  magnesium hydroxide, 30 mL, Daily PRN  ondansetron, 4 mg, Q6H PRN  acetaminophen, 650 mg, Q4H PRN  hydrALAZINE, 10 mg, Q4H PRN  labetalol, 10 mg, Q4H PRN  perflutren lipid microspheres, 1.5 mL, ONCE PRN  morphine, 1 mg, Q4H PRN  melatonin, 10 mg, Nightly PRN          VENT SETTINGS (Comprehensive) (if applicable): Additional Respiratory  Assessments  Pulse: 97  Resp: 24  SpO2: 98 %    ABGs:   No results for input(s): PH, PCO2, PO2, HCO3, BE, O2SAT in the last 72 hours.     Laboratory findings:    Complete Blood Count:   Recent Labs     01/20/20  1310 01/21/20  0610   WBC 9.1 8.2   HGB 9.4* 7.8*   HCT 30.2* 25.4*    221        Last 3 Blood Glucose:   Recent Labs     01/20/20  1310 01/21/20  0610   GLUCOSE 87 90        PT/INR:    Lab Results   Component Value Date    PROTIME 11.6 03/15/2016    INR 1.1 03/15/2016     PTT:    Lab Results   Component Value Date    APTT 34.3 03/15/2016       Comprehensive Metabolic Profile:   Recent Labs     01/20/20  1310 01/21/20  0610    138   K 3.4* 4.4   CL 95* 94*   CO2 33* 32*   BUN 18 31*   CREATININE 4.5* 7.2*   GLUCOSE 87 90   CALCIUM 8.4* 8.5*      Magnesium:   Lab Results   Component Value Date    MG 2.5 01/21/2020     Phosphorus:   Lab Results   Component Value Date    PHOS 5.2 01/21/2020     Ionized Calcium:   Lab Results   Component Value Date    CAION 1.15 09/22/2015          Troponin:   Recent Labs     01/20/20  1310   TROPONINI 0.02       Microbiology:  Cultures during this admission:     Blood cultures:                 [x] None drawn      [] Negative             []  Positive (Details:  )  Urine Culture:                   [] None drawn      [] Negative             []  Positive (Details:  )  Sputum Culture:               [] None drawn       [] Negative             []  Positive (Details:  )   Endotracheal aspirate:     [] None drawn       [] Negative             []  Positive (Details:  )          ASSESSMENT  and PLAN:       1. Hypertensive urgency  - controlled  - stop nicardipine drip  - review and resume home meds    2. ESRD   - MWF HD  - follows dr Hong, consulted    3. LUE swelling  - likely related to HD and fistula  - vascular consulted  - No DVT on US    4. Acute CP and dyspnea  - no EKG or troponin change  - resolved  - CTA negative for PE or dissection  - V/Q not indicated since CXR is abnormal      ICU PROPHYLAXIS:  Stress ulcer:  [x] PPI Agent  [] Q3Tplbg [] Sucralfate  [] Other:  VTE:   [] Enoxaparin  [x] Unfract. Heparin Subcut  [] EPC Cuffs    NUTRITION:  [] NPO [] Tube Feeding (Specify: ) [] TPN  [x] PO (Diet: DIET LOW SODIUM 2 GM;  Diet NPO, After Midnight)    HOME MEDICATIONS RECONCILED: [] No  [x] Yes    INSULIN DRIP:   [x] No   [] Yes    CONSULTATION NEEDED:  [] No   [x] Yes    FAMILY UPDATED:    [x] No   [] Yes    TRANSFER OUT OF ICU:   [] No   [x] Yes        Jaimee Becerra M.D. Internal Medicine, PGY-2            1/21/2020, 9:34 AM    I personally saw, examined and provided care for the patient. Radiographs, labs and medication list were reviewed by me independently. I spoke with bedside nursing, therapists and consultants. Critical care services and times documented are independent of procedures and multidisciplinary rounds with Residents.  Additionally comprehensive, multidisciplinary rounds were conducted with the MICU team. The case was discussed in detail and plans for care were established. Review of Residents documentation was conducted and revisions were made as appropriate.  I agree with the above documented exam, problem list and plan of care with the following additions:    Please see my addendum to the consult note    Electronically signed by Franchesca Rios MD on 1/21/2020 at 6:23 PM

## 2020-01-21 NOTE — CONSULTS
nausea, vomiting and abdominal pain. A CT showed gallbladder wall thickening seen by Dr Nilda Curiel for cholecystitis/ascending cholangitis. An ultrasound did not reveal pericholecystic fluid. He was treated with Ceftriaxone and Metronidazole. Antibiotics were stopped by surgery so ID deferred to further treatment or surgery hand signed off. Past Surgical History:        Procedure Laterality Date    BRAIN ANEURYSM SURGERY Right 3-4 years ago    Intracranial aneurysm clipping surgery 3-4 years ago, after rupture of aneurysm causing ICH    DIALYSIS FISTULA CREATION Left 11 years ago    UPPER GASTROINTESTINAL ENDOSCOPY N/A 1/3/2019    EGD BIOPSY performed by Marlo Fleischer, MD at [facility]     Current Medications:   Scheduled Meds:   NIFEdipine  90 mg Oral Daily    calcium acetate  667 mg Oral TID WC    darbepoetin katlyn-polysorbate  40 mcg Subcutaneous Weekly    cloNIDine  1 patch Transdermal Weekly    minoxidil  5 mg Oral BID    labetalol  20 mg Intravenous Once    cloNIDine  0.2 mg Oral BID    losartan  100 mg Oral QPM    pantoprazole  40 mg Oral QAM AC    sodium chloride flush  10 mL Intravenous 2 times per day    heparin (porcine)  5,000 Units Subcutaneous 3 times per day     Continuous Infusions:  PRN Meds:diphenhydrAMINE, sodium chloride flush, magnesium hydroxide, ondansetron, acetaminophen, hydrALAZINE, labetalol, perflutren lipid microspheres, morphine, melatonin    Allergies:  Patient has no known allergies.     Social History:   Social History     Socioeconomic History    Marital status:      Spouse name: None    Number of children: None    Years of education: None    Highest education level: None   Occupational History    None   Social Needs    Financial resource strain: None    Food insecurity:     Worry: None     Inability: None    Transportation needs:     Medical: None     Non-medical: None   Tobacco Use    Smoking status: Current Every Day Smoker     Types:

## 2020-01-21 NOTE — ED NOTES
Albert Montoya from ICU was given report.  Will come down to get pt in approx 10 min     Nita Mcpherson RN  01/20/20 2017

## 2020-01-21 NOTE — PROGRESS NOTES
Attention Dr Tyrel Dangelo:  The patient's current CrCl is 15.948 mL/min and is currently ordered Lovenox 40 mg daily. According to official package labeling, Lovenox is not recommended in patient's on dialysis or in end stage renal disease (ESRD). Pharmacy recommends using sub-q Heparin in these patients for DVT prophylaxis.   Dunia Rai RPh  1/20/2020  9:05 PM

## 2020-01-21 NOTE — CONSULTS
Nephrology Consult Note  Patient's Name: Markie Fuentes  9:38 AM  1/21/2020    Nephrologist: Dr. Oj Pink    Reason for Consult: ESRD on dialysis   Requesting Physician:  Karmen Simmons MD    Chief Complaint: Left arm and chest pain     History Obtained From:  EMR    History of Present Ilness:    Markie Fuentes is a 37 y.o. male with a PMHx of intracranial hemorrhage secondary to aneurysm, hypothyroidism, HTN, ESRD on dialysis M/W/F, anxiety, and depression who presented to [facility] ED on 01/20/2020 for left-sided arm pain and chest pain. Patient reported to ED physicians that left arm pain was located at his AV fistula, which he had placed about 10 years ago for dialysis. Patient receives dialysis M/W/F. His Nephrologist is Dr. Oj Pink. Patient also reported dizziness, headache, and chest pain in the ED. Upon arrival to the ED, patient's BP was 189/120. Highest BP was 225/136. EKG showed NSR with left atrial enlargement. CXR showed cardiomegaly and central vascular congestion. A CTA chest was done to r/o aortic dissection, and results showed an enlarged pulmonary artery concerning for pulmonary HTN. Bibasilar infiltrates vs CHF was noted along with enlarged mediastinal and axillary lymph nodes. Incidentally, atrophy of the kidney was noted as well. CT head was negative for acute pathology. Labs were notable for K+ 3.4, CO2 33, BUN 18, Cr 4.5, Mg++ 2.7, Ca++ 8.4, troponin 0.02, Hgb 9.4. He received 10 mg hydralazine and multiple doses of labetalol in the ED with little improvement in his BP. He was started on a nicardipine drip and admitted to the ICU for hypertensive urgency. Today, patient reports that he still has a headache, lightheadedness, dizziness, and left arm pain. Reports fevers overnight. Admits to blurry vision. Denies chest pain and abdominal pain.         Past Medical History:   Diagnosis Date    Anxiety 9/19/2015    AVF (arteriovenous fistula) ([facility] [address] 4/30/2018    Chronic kidney disease     CKD since early childhood per pt and on HD ~ 11 years    Depression     Encounter regarding vascular access for dialysis for ESRD ([facility] [address] 4/30/2018    Hypertension     on meds for ~ 11 years    Hypothyroidism     Intracranial hemorrhage (HCC)     was d/t ruptured aneurysm - pt underwent neurosurgery for clipping of the aneurysm    Noncompliance w/medication treatment due to intermit use of medication 11/3/2015       Past Surgical History:   Procedure Laterality Date    BRAIN ANEURYSM SURGERY Right 3-4 years ago    Intracranial aneurysm clipping surgery 3-4 years ago, after rupture of aneurysm causing ICH    DIALYSIS FISTULA CREATION Left 11 years ago    UPPER GASTROINTESTINAL ENDOSCOPY N/A 1/3/2019    [facility]       Family History   Problem Relation Age of Onset    Kidney Disease Paternal Grandmother     Cancer Neg Hx     Heart Disease Neg Hx         reports that he has been smoking cigars. He has never used smokeless tobacco. He reports current drug use. Drug: Marijuana. He reports that he does not drink alcohol. Allergies:  Patient has no known allergies.     Current Medications:    diphenhydrAMINE (BENADRYL) tablet 25 mg, Q8H PRN  hydrALAZINE (APRESOLINE) tablet 100 mg, TID  NIFEdipine (PROCARDIA XL) extended release tablet 60 mg, Daily  labetalol (NORMODYNE;TRANDATE) injection 20 mg, Once  cloNIDine (CATAPRES) tablet 0.2 mg, BID  losartan (COZAAR) tablet 100 mg, QPM  pantoprazole (PROTONIX) tablet 40 mg, QAM AC  sodium chloride flush 0.9 % injection 10 mL, 2 times per day  sodium chloride flush 0.9 % injection 10 mL, PRN  magnesium hydroxide (MILK OF MAGNESIA) 400 MG/5ML suspension 30 mL, Daily PRN  ondansetron (ZOFRAN) injection 4 mg, Q6H PRN  acetaminophen (TYLENOL) tablet 650 mg, Q4H PRN  hydrALAZINE (APRESOLINE) injection 10 mg, Q4H PRN  labetalol (NORMODYNE;TRANDATE) injection 10 mg, Q4H PRN  perflutren lipid microspheres (DEFINITY) injection 1.65 mg, ONCE

## 2020-01-21 NOTE — H&P
Hospitalist Group   History and Physical      CHIEF COMPLAINT: Chest pain    History of Present Illness:  37 y.o. male with a history of hypertension, end-stage renal disease on dialysis, brain aneurysm rupture status post clipping presents with chest pain. Patient was having dialysis earlier today when he started having severe chest pain and headache. His symptoms started after a stressing phone call with his brother. Pain has been continuous on the left side. Patient blood pressure was checked in dialysis and was over 980 systolic. Patient was complaining of mild shortness of breath but no cough. He denied nausea or vomiting. No abdominal pain. Patient mentioned that he noticed swelling in his left arm where his fistula is 3 days ago. He is complaining of pain in the area. He denies fever. No history of blood clots. Patient states that he has been taking his medication regularly. He also said that he changed his diet and now is eating low-salt diet. REVIEW OF SYSTEMS:  no fevers, chills, has cp, has sob, no n/v, has ha, no vision/hearing changes, wt changes, hot/cold flashes, other open skin lesions, diarrhea, constipation, dysuria/hematuria unless noted in HPI. Complete ROS performed with the patient and is otherwise negative.       PMH:  Past Medical History:   Diagnosis Date    Anxiety 9/19/2015    AVF (arteriovenous fistula) ([facility] 4/30/2018    Chronic kidney disease     CKD since early childhood per pt and on HD ~ 11 years    Depression     Encounter regarding vascular access for dialysis for ESRD ([facility] [facility] 4/30/2018    Hypertension     on meds for ~ 11 years    Hypothyroidism     Intracranial hemorrhage ([address]     was d/t ruptured aneurysm - pt underwent neurosurgery for clipping of the aneurysm    Noncompliance w/medication treatment due to intermit use of medication 11/3/2015       Surgical History:  Past Surgical History:   Procedure Laterality Date    BRAIN ANEURYSM SURGERY Right 3-4 years ago    Intracranial aneurysm clipping surgery 3-4 years ago, after rupture of aneurysm causing ICH    DIALYSIS FISTULA CREATION Left 11 years ago    UPPER GASTROINTESTINAL ENDOSCOPY N/A 1/3/2019    EGD BIOPSY performed by Bassam Denis MD at [address] [place] [address]       Medications Prior to Admission:    Prior to Admission medications    Medication Sig Start Date End Date Taking? Authorizing Provider   diphenhydrAMINE (BANOPHEN) 25 MG capsule TAKE 1 CAPSULE BY MOUTH THREE TIMES DAILY AS NEEDED FOR ITCHING 1/16/20   Marcela Salinas MD   pantoprazole (PROTONIX) 40 MG tablet TAKE 1 TABLET BY MOUTH EVERY MORNING BEFORE BREAKFAST 12/13/19   Lashonda Judge DO   cloNIDine (CATAPRES) 0.2 MG tablet Take 1 tablet by mouth 2 times daily 12/11/19   Rosey Gordon DO   CALPHRON 667 MG TABS TAKE 2 TABLETS BY MOUTH THREE TIMES DAILY WITH MEALS 9/2/19   Historical Provider, MD   cinacalcet (SENSIPAR) 30 MG tablet Take 30 mg by mouth    Historical Provider, MD   lidocaine-prilocaine (EMLA) 2.5-2.5 % cream  11/18/19   Historical Provider, MD   minoxidil (LONITEN) 2.5 MG tablet Take 2.5 mg by mouth    Historical Provider, MD   sevelamer (RENVELA) 800 MG tablet Take 800 mg by mouth    Historical Provider, MD   hydrALAZINE (APRESOLINE) 50 MG tablet Take 1 tablet by mouth 3 times daily 11/20/19   Roberta Boogie MD   losartan (COZAAR) 100 MG tablet Take 1 tablet by mouth every evening 11/20/19   Roberta Boogie MD   NIFEdipine (ADALAT CC) 60 MG extended release tablet Take 1 tablet by mouth daily 11/20/19   Roberta Boogie MD       Allergies:    Patient has no known allergies. Social History:    reports that he has been smoking cigars. He has never used smokeless tobacco. He reports current drug use. Drug: Marijuana. He reports that he does not drink alcohol.     Family History:       Problem Relation Age of Onset    Kidney Disease Paternal Grandmother     Cancer Neg Hx     Heart Disease Neg Hx        PHYSICAL EXAM:  Vitals:  BP [facility] ) 212/131   Pulse 118   Temp 99.3 °F (37.4 °C) (Oral)   Resp 18   Ht 5' 6\" (1.676 m)   Wt 110 lb (49.9 kg)   SpO2 92%   BMI 17.75 kg/m²   General Appearance: alert and oriented to person, place and time, well developed and well- nourished, in no acute distress  Skin: warm and dry  Head: normocephalic and atraumatic  Eyes: pupils equal, round, and reactive to light, extraocular eye movements intact, conjunctivae normal  ENT: tympanic membrane, external ear and ear canal normal bilaterally, nose without deformity, nasal mucosa and turbinates normal without polyps  Neck: supple and non-tender without mass, no thyromegaly or thyroid nodules, no cervical lymphadenopathy  Pulmonary/Chest: clear to auscultation bilaterally- no wheezes   Cardiovascular: normal rate, regular rhythm, normal S1 and S2, no murmurs, positive for gallop  Extremities: no cyanosis, clubbing, left arm swelling, tenderness, warmth  Musculoskeletal: normal range of motion, no joint swelling, fistula on the left arm, left arm swelling, tenderness, warmth  Neurologic: reflexes normal and symmetric, no cranial nerve deficit, coordination and speech normal      LABS:  Recent Labs     01/20/20  1310      K 3.4*   CL 95*   CO2 33*   BUN 18   CREATININE 4.5*   GLUCOSE 87   CALCIUM 8.4*       Recent Labs     01/20/20  1310   WBC 9.1   RBC 3.06*   HGB 9.4*   HCT 30.2*   MCV 98.7   MCH 30.7   MCHC 31.1*   RDW 15.4*      MPV 9.3       No results for input(s): POCGLU in the last 72 hours.     CBC with Differential:    Lab Results   Component Value Date    WBC 9.1 01/20/2020    RBC 3.06 01/20/2020    HGB 9.4 01/20/2020    HCT 30.2 01/20/2020     01/20/2020    MCV 98.7 01/20/2020    MCH 30.7 01/20/2020    MCHC 31.1 01/20/2020    RDW 15.4 01/20/2020    LYMPHOPCT 17.5 01/20/2020    MONOPCT 8.7 01/20/2020    BASOPCT 0.2 01/20/2020    MONOSABS 0.79 01/20/2020    LYMPHSABS 1.60 01/20/2020    EOSABS 0.16 01/20/2020    BASOSABS 0.02 01/20/2020 CMP:    Lab Results   Component Value Date     2020    K 3.4 2020    K 3.6 2019    CL 95 2020    CO2 33 2020    BUN 18 2020    CREATININE 4.5 2020    GFRAA 18 2020    LABGLOM 18 2020    GLUCOSE 87 2020    PROT 8.6 2019    LABALBU 4.7 2019    CALCIUM 8.4 2020    BILITOT 0.3 2019    ALKPHOS 148 2019    AST 37 2019    ALT 18 2019       Radiology: Xr Chest Standard (2 Vw)    Result Date: 2020  Patient MRN: [id number] : 1985 Age:  37 years Gender: Male Order Date: 2020 12:15 PM Exam: XR CHEST (2 VW) Number of Images: 2 view Indication:  Chest pain and cough Comparison: Previous anterior upright chest studies 2019 and 2019 Findings: The lungs are symmetrically expanded, and are hyperinflated. There is no definite evidence of effusion or consolidation. Cardiovascular shadows show slightly increased prominence of the left ventricular cardiac border and indistinct congestion of the pulmonary vasculature in the central and dependent chest, suggesting early cardiac decompensation and mild pulmonary edema. There is no prominent septal edema. . Skeletal structures show no evidence of acute pathology. The lower thoracic vertebrae have a somewhat dense appearance, which may be normal physiologic change, but can also be seen in hyperparathyroidism. Increasing cardiomegaly and central vascular congestion is suspicious for early cardiac decompensation in this young patient. Density in the spine appears slightly increased, and may be physiologic, such as with hyperparathyroidism. Cta Chest W Contrast    Result Date: 2020  Patient MRN:  [id number] : 1985 Age: 37 years Gender: Male Order Date:  2020 4:15 PM EXAM: CTA CHEST W CONTRAST number of images 559 including MIP coronal and sagittal reconstruction images, and 3-D reconstruction images the aorta. . Contrast. Isovue-370, 80 mL intravenously. Technique: Low-dose CT  acquisition technique included one of following options; 1 . Automated exposure control, 2. Adjustment of MA and or KV according to patient's size or 3. Use of iterative reconstruction. INDICATION:  r/o Dissection r/o Dissection COMPARISON: None FINDINGS: There is significant cardiomegaly. The great vessels are unremarkable. Moderately enlarged lymph nodes are identified in the mediastinum with the lymph nodes measuring up to 1.1 cm in the paratracheal region, AP window and  subcarinal region. Moderately enlarged axillary lymph nodes also noted more on the left-sided with the lymph nodes measuring up to 1.2 cm. The trachea and major bronchi are patent. There is patchy and hazy bibasilar infiltrates more on the right side. Tiny pleural effusions are present. 3 to 5 mm nonspecific pulmonary nodules are identified in the upper lobes bilaterally. The liver is of normal architecture. There is atrophic left kidney with a large cystic lesion measuring up to 3 cm. There is diffuse sclerosis of the thoracolumbar spine likely due to renal osteodystrophy. CTA. The thoracic aorta is normal with ascending thoracic aorta measuring 3.1 x 3.1 cm and descending thoracic aorta measuring 2.2 cm without aneurysm or dissection. The origin of the great vessels appear normal. There is  enlarged pulmonary artery concerning for pulmonary artery hypertension. There is no central pulmonary embolism. Normal aorta without aortic dissection or aneurysm. Enlarged pulmonary artery concerning for pulmonary artery hypertension. There is no pulmonary embolism. Significant cardiomegaly with  patchy bibasilar infiltrates and pleural effusion likely CHF and/or pneumonia. Moderately enlarged mediastinal and axillary lymph nodes likely inflammatory and 3 to 5 mm nonspecific pulmonary nodules. Consider surveillance as clinically indicated.  Atrophy of kidney with  suggestion of renal osteodystrophy. Us Dup Upper Extremity Left Venous    Result Date: 2020  Patient MRN:  [id number] : 1985 Age: 37 years Gender: Male Order Date:  2020 12:45 PM EXAM: US DUP UPPER EXTREMITY LEFT VENOUS NUMBER OF IMAGES:  22 INDICATION: Left upper extremity pain and swelling COMPARISON: None TECHNIQUE: Venous structures were evaluated for patency with color Doppler imaging, and compressibility was evaluated with 2-D grayscale ultrasound imaging. Response to augmentation maneuvers and respiratory variation was assessed with spectral Doppler. FINDINGS: The left upper extremity was evaluated ultrasonographically. No intraluminal thrombus was identified, and there is good compressibility, appropriate response to augmentation maneuvers and respiratory variation, and color flow confirm patency of venous structures. Negative for evidence of deep venous thrombosis in the  left upper extremity by color and spectral Doppler, as well as 2-D grayscale ultrasound imaging. EKG: Normal sinus rhythm with signs of left ventricular hypertrophy    ASSESSMENT/ PLAN[de-identified]      Active Problems:    Hypertensive urgency  Resolved Problems:    * No resolved hospital problems. *      1. Hypertensive urgency   History of resistant hypertension on multiple medication   Patient said he is compliant with his medication   Symptoms of chest pain and headache   CTA ruled out dissection   Received labetalol and hydralazine in the ER    Blood pressure over 626 systolic on nicardipine   Plan for ICU admission   Continue nicardipine drip   Hydralazine and labetalol PRN   Resume home medication   2. Chest pain   Severe left-sided chest pain nonradiating and nonexertional   Stress test negative done 2 months ago   Troponin 0 0.02-we will cycle troponin   EKG with no changes from prior   CTA rule out PE/dissection   Repeat EKG   Monitor under telemetry    Control blood pressure  3.  Left arm edema   Fistula site   DVT ultrasound negative    Questionable infection versus vascular problem   Infectious disease and vascular surgery consulted   Blood culture sent   Given a dose of vancomycin and cefepime   We will hold antibiotic until seen by ID   4. Shortness of breath   Mild and worsening after the chest pain   CTA ruled out PE. However, concern for pulmonary hypertension and significant cardiomegaly   Given the left arm swelling and patient symptoms, there is risk for chronic PE which might not be diagnosed with CTA. Will order VQ scan   We will also order echo to assess the cardiomegaly and the pulmonary hypertension  5. Headache   History of brain aneurysm rupture status post clipping   Hypertensive urgency with headache   Will do CT scan stat   Control blood pressure  6. Hypokalemia   Will check magnesium and replace  7. End-stage renal disease on dialysis   Dialysis Monday, Wednesday, Friday   Patient received contrast for CTA   Nephrology consulted   Patient is going to need dialysis in the morning     Code Status: Full  DVT prophylaxis: Heparin subcu    I spent 40 minutes reviewing the case and taking care of the patient not including any billable procedures. Electronically signed by Markie Buckley MD on 1/20/2020 at 8:45 PM      NOTE: This report was transcribed using voice recognition software. Every effort was made to ensure accuracy; however, inadvertent computerized transcription errors may be present.

## 2020-01-21 NOTE — PROGRESS NOTES
organomegaly  Extremities: no cyanosis, no clubbing and no edema  Neurologic: no cranial nerve deficit and speech normal        Recent Labs     01/20/20  1310 01/21/20  0610    138   K 3.4* 4.4   CL 95* 94*   CO2 33* 32*   BUN 18 31*   CREATININE 4.5* 7.2*   GLUCOSE 87 90   CALCIUM 8.4* 8.5*       Recent Labs     01/20/20  1310 01/21/20  0610   WBC 9.1 8.2   RBC 3.06* 2.50*   HGB 9.4* 7.8*   HCT 30.2* 25.4*   MCV 98.7 101.6*   MCH 30.7 31.2   MCHC 31.1* 30.7*   RDW 15.4* 15.8*    221   MPV 9.3 9.3         Assessment:    Active Problems:    Hypertensive urgency    Dialysis AV fistula malfunction (HCC)  Resolved Problems:    * No resolved hospital problems. *      Plan:  1. Hypertensive urgency- Initially,he was in nicardipine drip, now off from the drip. Now Losartan 100 mg daily and Nifedipine 90mg PO daily, Clonidine 0.2 mg PO BID. monitor BP. 2.  Left  Arm A-V fistula redness and hot-- vascular surgery saw the patient and recommended to use it for dialysis,  Fistula gram  Is the next step to evaluate it. 3. ESRD- Nephrology consult appreciated        NOTE: This report was transcribed using voice recognition software. Every effort was made to ensure accuracy; however, inadvertent computerized transcription errors may be present.   Electronically signed by Esther Flower MD on 1/21/2020 at 2:29 PM

## 2020-01-21 NOTE — CONSULTS
Once, Mac Tinoco DO    niCARdipine (CARDENE) 50 mg in sodium chloride 0.9 % 100 mL infusion, 5 mg/hr, Intravenous, Continuous, Shalom Oakes DO, Stopped at 01/21/20 0218    cloNIDine (CATAPRES) tablet 0.2 mg, 0.2 mg, Oral, BID, Maikel Galeano MD, 0.2 mg at 01/21/20 0902    hydrALAZINE (APRESOLINE) tablet 50 mg, 50 mg, Oral, TID, Maikel Galeano MD, 50 mg at 01/21/20 0801    losartan (COZAAR) tablet 100 mg, 100 mg, Oral, QPM, Maikel Galeano MD, 100 mg at 01/20/20 2258    pantoprazole (PROTONIX) tablet 40 mg, 40 mg, Oral, QAM AC, Maikel Galeano MD, 40 mg at 01/21/20 0801    sodium chloride flush 0.9 % injection 10 mL, 10 mL, Intravenous, 2 times per day, Maikel Galeano MD, 10 mL at 01/20/20 2259    sodium chloride flush 0.9 % injection 10 mL, 10 mL, Intravenous, PRN, Maikel Galeano MD    magnesium hydroxide (MILK OF MAGNESIA) 400 MG/5ML suspension 30 mL, 30 mL, Oral, Daily PRN, Maikel Galeano MD    ondansetron [facility]) injection 4 mg, 4 mg, Intravenous, Q6H PRN, Maikel Galeano MD, 4 mg at 01/20/20 2307    acetaminophen (TYLENOL) tablet 650 mg, 650 mg, Oral, Q4H PRN, Maikel Galeano MD    hydrALAZINE (APRESOLINE) injection 10 mg, 10 mg, Intravenous, Q4H PRN, Maikel Galeano MD    labetalol (NORMODYNE;TRANDATE) injection 10 mg, 10 mg, Intravenous, Q4H PRN, Maikel Galeano MD    perflutren lipid microspheres (DEFINITY) injection 1.65 mg, 1.5 mL, Intravenous, ONCE PRN, Maikel Galeano MD    heparin (porcine) injection 5,000 Units, 5,000 Units, Subcutaneous, 3 times per day, Maikel Galeano MD, Stopped at 01/21/20 0600    morphine (PF) injection 1 mg, 1 mg, Intravenous, Q4H PRN, Maikel Galeano MD, 1 mg at 01/21/20 0147    melatonin tablet 10 mg, 10 mg, Oral, Nightly PRN, Maikel Galeano MD, 10 mg at 01/21/20 0011    Allergies:  Patient has no known allergies.     Social History     Socioeconomic History    Marital status:      Spouse name: Not on file    Number of children: Not on file    Years of dysuria, hematuria, increased frequency or urgency  Endo: Negative for polydipsia, polyuria, or heat or cold intolerances. Heme: Negative leg swelling, blood or bleeding disorders  Psych: Negative for Depression or anxiety  Ortho: Negative for pain in the joints, arthritis or gout  Vascular: See history of present illness        PHYSICAL EXAM:    Vitals:    01/21/20 0800   BP:    Pulse:    Resp:    Temp: 98.8 °F (37.1 °C)   SpO2:      GENERAL APPEARANCE:  Well-developed well-nourished alert and oriented answers questions appropriately the patient does not appear in any acute distress. HEAD: Head is normocephalic atraumatic, with Normal range of motion. EYES: Inspection of the conjunctiva and lids demonstrated no abnormalities, no jaundice, no scleral icterus, PERRL, EOMI, and vision are grossly intact. EARS:  External auditory canals demonstrate no abnormalities. Ears are well set hearing is grossly intact. SKIN:, Left arm is swollen. There is no thinning of the skin there is no signs of cellulitis normal in color, texture, and turgor, no visible lesions, no jaundice. NECK: Supple, nontender no lymphadenopathy trachea is midline no jugular venous distention no carotid bruits auscultated. LUNGS:  Clear to auscultation bilaterally no wheezes rales or rhonchi good respiratory changes noted. CARDIOVASCULAR: Currently regular rate and rhythm no murmur rub or gallop that I could appreciate. ABDOMEN: Soft nontender no rebound or guarding, positive bowel sounds no pulsatile abdominal masses. No organosplenomegaly that I can appreciate. EXTREMITIES: Bilateral palpable brachial and radial pulses. Left radial pulses slightly decreased compared to contralateral right side. There is also thrill that present in the left arm arteriovenous access. The left arm is swollen consistent with venous hypertension.   Lower extremities demonstrate palpable DP and PTs at 1+  MUSKULOSKELETAL: See above adequately aligned color and spectral Doppler, as well as 2-D grayscale   ultrasound imaging. XR CHEST STANDARD (2 VW)   Final Result   Increasing cardiomegaly and central vascular congestion is suspicious   for early cardiac decompensation in this young patient. Density in the spine appears slightly increased, and may be   physiologic, such as with hyperparathyroidism. NM LUNG VENT/PERFUSION W QUANTITATIVE DIFF FUNCTION    (Results Pending)       IMPRESSION:   Active Hospital Problems    Diagnosis    Hypertensive urgency [I16.0]     Priority: High    Dialysis AV fistula malfunction ([address] [T82.590A]       PLAN: #1 left arm venous hypertension. At this point they can use his access. He has not had any significant prolonged bleeding other than left arm swelling. We will schedule him for a fistulogram and be transferred down to [address] [place] [address] once he is improving from his hypertensive crisis. I reviewed the procedure with the patient. I discussed the risks, benefits, and alternatives of the procedure. The patient understands and consents. All questions were answered.       Electronically signed by José Miguel Burgos MD on 1/21/2020 at 9:13 AM

## 2020-01-21 NOTE — FLOWSHEET NOTE
Patient admitted from ER bed 25 to ICU bed 217, placed on monitor, patient oriented to room and unit visiting hours. Patient guide at bedside, reviewed patient rights and responsibilities. MRSA nasal swab obtained. Bed alarm on. Call light within reach.

## 2020-01-22 ENCOUNTER — APPOINTMENT (OUTPATIENT)
Dept: GENERAL RADIOLOGY | Age: 35
DRG: 182 | End: 2020-01-22
Attending: INTERNAL MEDICINE
Payer: MEDICAID

## 2020-01-22 ENCOUNTER — HOSPITAL ENCOUNTER (INPATIENT)
Age: 35
LOS: 2 days | Discharge: HOME | DRG: 182 | End: 2020-01-24
Attending: INTERNAL MEDICINE | Admitting: INTERNAL MEDICINE
Payer: MEDICAID

## 2020-01-22 VITALS
TEMPERATURE: 98.6 F | OXYGEN SATURATION: 94 % | BODY MASS INDEX: 17.61 KG/M2 | RESPIRATION RATE: 18 BRPM | DIASTOLIC BLOOD PRESSURE: 83 MMHG | HEIGHT: 66 IN | WEIGHT: 109.57 LBS | HEART RATE: 128 BPM | SYSTOLIC BLOOD PRESSURE: 141 MMHG

## 2020-01-22 PROBLEM — N19 RENAL FAILURE: Status: ACTIVE | Noted: 2020-01-22

## 2020-01-22 LAB
ALBUMIN SERPL-MCNC: 3.5 G/DL (ref 3.5–5.2)
ALP BLD-CCNC: 146 U/L (ref 40–129)
ALT SERPL-CCNC: 11 U/L (ref 0–40)
ANION GAP SERPL CALCULATED.3IONS-SCNC: 12 MMOL/L (ref 7–16)
AST SERPL-CCNC: 18 U/L (ref 0–39)
BILIRUB SERPL-MCNC: 0.3 MG/DL (ref 0–1.2)
BUN BLDV-MCNC: 23 MG/DL (ref 6–20)
CALCIUM SERPL-MCNC: 8.9 MG/DL (ref 8.6–10.2)
CHLORIDE BLD-SCNC: 97 MMOL/L (ref 98–107)
CO2: 30 MMOL/L (ref 22–29)
CREAT SERPL-MCNC: 5.9 MG/DL (ref 0.7–1.2)
GFR AFRICAN AMERICAN: 13
GFR NON-AFRICAN AMERICAN: 13 ML/MIN/1.73
GLUCOSE BLD-MCNC: 87 MG/DL (ref 74–99)
HCT VFR BLD CALC: 25.4 % (ref 37–54)
HEMOGLOBIN: 7.7 G/DL (ref 12.5–16.5)
MAGNESIUM: 2.5 MG/DL (ref 1.6–2.6)
MCH RBC QN AUTO: 30.9 PG (ref 26–35)
MCHC RBC AUTO-ENTMCNC: 30.3 % (ref 32–34.5)
MCV RBC AUTO: 102 FL (ref 80–99.9)
MRSA CULTURE ONLY: NORMAL
PDW BLD-RTO: 15.9 FL (ref 11.5–15)
PHOSPHORUS: 3.5 MG/DL (ref 2.5–4.5)
PLATELET # BLD: 243 E9/L (ref 130–450)
PMV BLD AUTO: 9 FL (ref 7–12)
POTASSIUM SERPL-SCNC: 4.6 MMOL/L (ref 3.5–5)
RBC # BLD: 2.49 E12/L (ref 3.8–5.8)
SODIUM BLD-SCNC: 139 MMOL/L (ref 132–146)
TOTAL PROTEIN: 7 G/DL (ref 6.4–8.3)
WBC # BLD: 10.3 E9/L (ref 4.5–11.5)

## 2020-01-22 PROCEDURE — 80053 COMPREHEN METABOLIC PANEL: CPT

## 2020-01-22 PROCEDURE — 84100 ASSAY OF PHOSPHORUS: CPT

## 2020-01-22 PROCEDURE — 6360000002 HC RX W HCPCS: Performed by: INTERNAL MEDICINE

## 2020-01-22 PROCEDURE — 6370000000 HC RX 637 (ALT 250 FOR IP): Performed by: INTERNAL MEDICINE

## 2020-01-22 PROCEDURE — 2500000003 HC RX 250 WO HCPCS: Performed by: INTERNAL MEDICINE

## 2020-01-22 PROCEDURE — 85027 COMPLETE CBC AUTOMATED: CPT

## 2020-01-22 PROCEDURE — 71045 X-RAY EXAM CHEST 1 VIEW: CPT

## 2020-01-22 PROCEDURE — 99239 HOSP IP/OBS DSCHRG MGMT >30: CPT | Performed by: INTERNAL MEDICINE

## 2020-01-22 PROCEDURE — 36415 COLL VENOUS BLD VENIPUNCTURE: CPT

## 2020-01-22 PROCEDURE — 2580000003 HC RX 258

## 2020-01-22 PROCEDURE — 83735 ASSAY OF MAGNESIUM: CPT

## 2020-01-22 PROCEDURE — 1200000000 HC SEMI PRIVATE

## 2020-01-22 PROCEDURE — 90935 HEMODIALYSIS ONE EVALUATION: CPT

## 2020-01-22 RX ORDER — ONDANSETRON 2 MG/ML
4 INJECTION INTRAMUSCULAR; INTRAVENOUS EVERY 6 HOURS PRN
Status: CANCELLED | OUTPATIENT
Start: 2020-01-22

## 2020-01-22 RX ORDER — NIFEDIPINE 60 MG/1
60 TABLET, EXTENDED RELEASE ORAL
Status: DISCONTINUED | OUTPATIENT
Start: 2020-01-23 | End: 2020-01-22 | Stop reason: HOSPADM

## 2020-01-22 RX ORDER — LABETALOL 100 MG/1
50 TABLET, FILM COATED ORAL EVERY 12 HOURS SCHEDULED
Status: DISCONTINUED | OUTPATIENT
Start: 2020-01-22 | End: 2020-01-22

## 2020-01-22 RX ORDER — LABETALOL 200 MG/1
200 TABLET, FILM COATED ORAL EVERY 12 HOURS SCHEDULED
Status: DISCONTINUED | OUTPATIENT
Start: 2020-01-22 | End: 2020-01-23

## 2020-01-22 RX ORDER — HEPARIN SODIUM 10000 [USP'U]/ML
5000 INJECTION, SOLUTION INTRAVENOUS; SUBCUTANEOUS EVERY 8 HOURS SCHEDULED
Status: CANCELLED | OUTPATIENT
Start: 2020-01-22

## 2020-01-22 RX ORDER — MORPHINE SULFATE 2 MG/ML
1 INJECTION, SOLUTION INTRAMUSCULAR; INTRAVENOUS EVERY 4 HOURS PRN
Status: CANCELLED | OUTPATIENT
Start: 2020-01-22

## 2020-01-22 RX ORDER — HEPARIN SODIUM 10000 [USP'U]/ML
5000 INJECTION, SOLUTION INTRAVENOUS; SUBCUTANEOUS EVERY 8 HOURS SCHEDULED
Status: DISCONTINUED | OUTPATIENT
Start: 2020-01-22 | End: 2020-01-24 | Stop reason: HOSPADM

## 2020-01-22 RX ORDER — MORPHINE SULFATE 2 MG/ML
1 INJECTION, SOLUTION INTRAMUSCULAR; INTRAVENOUS EVERY 4 HOURS PRN
Status: DISCONTINUED | OUTPATIENT
Start: 2020-01-22 | End: 2020-01-24 | Stop reason: HOSPADM

## 2020-01-22 RX ORDER — LABETALOL HYDROCHLORIDE 5 MG/ML
10 INJECTION, SOLUTION INTRAVENOUS EVERY 4 HOURS PRN
Status: CANCELLED | OUTPATIENT
Start: 2020-01-22

## 2020-01-22 RX ORDER — ACETAMINOPHEN 325 MG/1
650 TABLET ORAL EVERY 4 HOURS PRN
Status: DISCONTINUED | OUTPATIENT
Start: 2020-01-22 | End: 2020-01-24 | Stop reason: HOSPADM

## 2020-01-22 RX ORDER — PANTOPRAZOLE SODIUM 40 MG/1
40 TABLET, DELAYED RELEASE ORAL
Status: CANCELLED | OUTPATIENT
Start: 2020-01-23

## 2020-01-22 RX ORDER — LABETALOL HYDROCHLORIDE 5 MG/ML
10 INJECTION, SOLUTION INTRAVENOUS EVERY 4 HOURS PRN
Status: DISCONTINUED | OUTPATIENT
Start: 2020-01-22 | End: 2020-01-24 | Stop reason: HOSPADM

## 2020-01-22 RX ORDER — MINOXIDIL 2.5 MG/1
2.5 TABLET ORAL 2 TIMES DAILY
Status: DISCONTINUED | OUTPATIENT
Start: 2020-01-22 | End: 2020-01-22 | Stop reason: HOSPADM

## 2020-01-22 RX ORDER — MINOXIDIL 2.5 MG/1
2.5 TABLET ORAL 2 TIMES DAILY
Status: DISCONTINUED | OUTPATIENT
Start: 2020-01-22 | End: 2020-01-23

## 2020-01-22 RX ORDER — LABETALOL 200 MG/1
200 TABLET, FILM COATED ORAL EVERY 12 HOURS SCHEDULED
Status: CANCELLED | OUTPATIENT
Start: 2020-01-22

## 2020-01-22 RX ORDER — CHOLECALCIFEROL (VITAMIN D3) 125 MCG
10 CAPSULE ORAL NIGHTLY PRN
Status: DISCONTINUED | OUTPATIENT
Start: 2020-01-22 | End: 2020-01-24 | Stop reason: HOSPADM

## 2020-01-22 RX ORDER — NIFEDIPINE 60 MG/1
60 TABLET, EXTENDED RELEASE ORAL
Status: CANCELLED | OUTPATIENT
Start: 2020-01-23

## 2020-01-22 RX ORDER — LOSARTAN POTASSIUM 50 MG/1
100 TABLET ORAL EVERY EVENING
Status: CANCELLED | OUTPATIENT
Start: 2020-01-22

## 2020-01-22 RX ORDER — HYDRALAZINE HYDROCHLORIDE 20 MG/ML
10 INJECTION INTRAMUSCULAR; INTRAVENOUS EVERY 4 HOURS PRN
Status: CANCELLED | OUTPATIENT
Start: 2020-01-22

## 2020-01-22 RX ORDER — HYDRALAZINE HYDROCHLORIDE 20 MG/ML
10 INJECTION INTRAMUSCULAR; INTRAVENOUS EVERY 4 HOURS PRN
Status: DISCONTINUED | OUTPATIENT
Start: 2020-01-22 | End: 2020-01-24 | Stop reason: HOSPADM

## 2020-01-22 RX ORDER — LABETALOL 200 MG/1
200 TABLET, FILM COATED ORAL EVERY 12 HOURS SCHEDULED
Status: DISCONTINUED | OUTPATIENT
Start: 2020-01-22 | End: 2020-01-22 | Stop reason: HOSPADM

## 2020-01-22 RX ORDER — LOSARTAN POTASSIUM 50 MG/1
100 TABLET ORAL EVERY EVENING
Status: DISCONTINUED | OUTPATIENT
Start: 2020-01-22 | End: 2020-01-24 | Stop reason: HOSPADM

## 2020-01-22 RX ORDER — POTASSIUM CHLORIDE 7.45 MG/ML
10 INJECTION INTRAVENOUS
Status: DISCONTINUED | OUTPATIENT
Start: 2020-01-22 | End: 2020-01-22

## 2020-01-22 RX ORDER — CLONIDINE 0.2 MG/24H
1 PATCH, EXTENDED RELEASE TRANSDERMAL WEEKLY
Status: DISCONTINUED | OUTPATIENT
Start: 2020-01-28 | End: 2020-01-24 | Stop reason: HOSPADM

## 2020-01-22 RX ORDER — ACETAMINOPHEN 325 MG/1
650 TABLET ORAL EVERY 4 HOURS PRN
Status: CANCELLED | OUTPATIENT
Start: 2020-01-22

## 2020-01-22 RX ORDER — ONDANSETRON 2 MG/ML
4 INJECTION INTRAMUSCULAR; INTRAVENOUS EVERY 6 HOURS PRN
Status: DISCONTINUED | OUTPATIENT
Start: 2020-01-22 | End: 2020-01-24 | Stop reason: HOSPADM

## 2020-01-22 RX ORDER — DIPHENHYDRAMINE HYDROCHLORIDE 50 MG/ML
12.5 INJECTION INTRAMUSCULAR; INTRAVENOUS EVERY 6 HOURS PRN
Status: DISCONTINUED | OUTPATIENT
Start: 2020-01-22 | End: 2020-01-24 | Stop reason: HOSPADM

## 2020-01-22 RX ORDER — SODIUM CHLORIDE 0.9 % (FLUSH) 0.9 %
SYRINGE (ML) INJECTION
Status: COMPLETED
Start: 2020-01-22 | End: 2020-01-22

## 2020-01-22 RX ORDER — PANTOPRAZOLE SODIUM 40 MG/1
40 TABLET, DELAYED RELEASE ORAL
Status: DISCONTINUED | OUTPATIENT
Start: 2020-01-23 | End: 2020-01-24 | Stop reason: HOSPADM

## 2020-01-22 RX ORDER — NIFEDIPINE 60 MG/1
60 TABLET, EXTENDED RELEASE ORAL
Status: DISCONTINUED | OUTPATIENT
Start: 2020-01-23 | End: 2020-01-24 | Stop reason: HOSPADM

## 2020-01-22 RX ORDER — MINOXIDIL 2.5 MG/1
2.5 TABLET ORAL 2 TIMES DAILY
Status: CANCELLED | OUTPATIENT
Start: 2020-01-22

## 2020-01-22 RX ORDER — CLONIDINE 0.2 MG/24H
1 PATCH, EXTENDED RELEASE TRANSDERMAL WEEKLY
Status: CANCELLED | OUTPATIENT
Start: 2020-01-28

## 2020-01-22 RX ORDER — DIPHENHYDRAMINE HYDROCHLORIDE 50 MG/ML
12.5 INJECTION INTRAMUSCULAR; INTRAVENOUS EVERY 6 HOURS PRN
Status: CANCELLED | OUTPATIENT
Start: 2020-01-22

## 2020-01-22 RX ADMIN — HEPARIN SODIUM 5000 UNITS: 10000 INJECTION, SOLUTION INTRAVENOUS; SUBCUTANEOUS at 23:00

## 2020-01-22 RX ADMIN — CLONIDINE HYDROCHLORIDE 0.2 MG: 0.2 TABLET ORAL at 09:52

## 2020-01-22 RX ADMIN — DIPHENHYDRAMINE HYDROCHLORIDE 12.5 MG: 50 INJECTION, SOLUTION INTRAMUSCULAR; INTRAVENOUS at 19:54

## 2020-01-22 RX ADMIN — MINOXIDIL 5 MG: 2.5 TABLET ORAL at 09:52

## 2020-01-22 RX ADMIN — HEPARIN SODIUM 5000 UNITS: 10000 INJECTION, SOLUTION INTRAVENOUS; SUBCUTANEOUS at 14:47

## 2020-01-22 RX ADMIN — Medication 10 MG: at 23:03

## 2020-01-22 RX ADMIN — CALCIUM ACETATE 667 MG: 667 CAPSULE ORAL at 18:22

## 2020-01-22 RX ADMIN — DIPHENHYDRAMINE HYDROCHLORIDE 12.5 MG: 50 INJECTION, SOLUTION INTRAMUSCULAR; INTRAVENOUS at 00:11

## 2020-01-22 RX ADMIN — LABETALOL HYDROCHLORIDE 10 MG: 5 INJECTION, SOLUTION INTRAVENOUS at 07:42

## 2020-01-22 RX ADMIN — DIPHENHYDRAMINE HYDROCHLORIDE 12.5 MG: 50 INJECTION, SOLUTION INTRAMUSCULAR; INTRAVENOUS at 11:52

## 2020-01-22 RX ADMIN — MINOXIDIL 2.5 MG: 2.5 TABLET ORAL at 19:53

## 2020-01-22 RX ADMIN — HEPARIN SODIUM 5000 UNITS: 10000 INJECTION, SOLUTION INTRAVENOUS; SUBCUTANEOUS at 06:07

## 2020-01-22 RX ADMIN — PANTOPRAZOLE SODIUM 40 MG: 40 TABLET, DELAYED RELEASE ORAL at 09:51

## 2020-01-22 RX ADMIN — MORPHINE SULFATE 1 MG: 2 INJECTION, SOLUTION INTRAMUSCULAR; INTRAVENOUS at 17:01

## 2020-01-22 RX ADMIN — CALCIUM ACETATE 667 MG: 667 CAPSULE ORAL at 11:55

## 2020-01-22 RX ADMIN — LABETALOL HYDROCHLORIDE 200 MG: 200 TABLET, FILM COATED ORAL at 19:54

## 2020-01-22 RX ADMIN — LOSARTAN POTASSIUM 100 MG: 50 TABLET, FILM COATED ORAL at 18:22

## 2020-01-22 RX ADMIN — NIFEDIPINE 90 MG: 30 TABLET, EXTENDED RELEASE ORAL at 09:51

## 2020-01-22 RX ADMIN — MORPHINE SULFATE 1 MG: 2 INJECTION, SOLUTION INTRAMUSCULAR; INTRAVENOUS at 23:00

## 2020-01-22 RX ADMIN — CALCIUM ACETATE 667 MG: 667 CAPSULE ORAL at 09:51

## 2020-01-22 RX ADMIN — DIPHENHYDRAMINE HYDROCHLORIDE 12.5 MG: 50 INJECTION, SOLUTION INTRAMUSCULAR; INTRAVENOUS at 06:08

## 2020-01-22 RX ADMIN — MORPHINE SULFATE 1 MG: 2 INJECTION, SOLUTION INTRAMUSCULAR; INTRAVENOUS at 06:08

## 2020-01-22 RX ADMIN — SODIUM CHLORIDE, PRESERVATIVE FREE 10 ML: 5 INJECTION INTRAVENOUS at 20:02

## 2020-01-22 RX ADMIN — ACETAMINOPHEN 650 MG: 325 TABLET ORAL at 11:52

## 2020-01-22 ASSESSMENT — PAIN DESCRIPTION - PAIN TYPE
TYPE: ACUTE PAIN
TYPE: ACUTE PAIN

## 2020-01-22 ASSESSMENT — PAIN SCALES - GENERAL
PAINLEVEL_OUTOF10: 5
PAINLEVEL_OUTOF10: 0
PAINLEVEL_OUTOF10: 10
PAINLEVEL_OUTOF10: 0
PAINLEVEL_OUTOF10: 8
PAINLEVEL_OUTOF10: 4
PAINLEVEL_OUTOF10: 0
PAINLEVEL_OUTOF10: 0
PAINLEVEL_OUTOF10: 5
PAINLEVEL_OUTOF10: 4
PAINLEVEL_OUTOF10: 0

## 2020-01-22 ASSESSMENT — PAIN - FUNCTIONAL ASSESSMENT
PAIN_FUNCTIONAL_ASSESSMENT: PREVENTS OR INTERFERES SOME ACTIVE ACTIVITIES AND ADLS
PAIN_FUNCTIONAL_ASSESSMENT: PREVENTS OR INTERFERES SOME ACTIVE ACTIVITIES AND ADLS

## 2020-01-22 ASSESSMENT — PAIN DESCRIPTION - DESCRIPTORS
DESCRIPTORS: THROBBING;CONSTANT;DISCOMFORT
DESCRIPTORS: THROBBING;CONSTANT;DISCOMFORT

## 2020-01-22 ASSESSMENT — PAIN DESCRIPTION - PROGRESSION
CLINICAL_PROGRESSION: NOT CHANGED
CLINICAL_PROGRESSION: NOT CHANGED

## 2020-01-22 ASSESSMENT — PAIN DESCRIPTION - ONSET
ONSET: ON-GOING
ONSET: ON-GOING

## 2020-01-22 ASSESSMENT — PAIN DESCRIPTION - LOCATION
LOCATION: ARM
LOCATION: ARM

## 2020-01-22 ASSESSMENT — PAIN DESCRIPTION - FREQUENCY
FREQUENCY: CONTINUOUS
FREQUENCY: CONTINUOUS

## 2020-01-22 ASSESSMENT — PAIN DESCRIPTION - ORIENTATION
ORIENTATION: LEFT
ORIENTATION: LEFT

## 2020-01-22 NOTE — PROGRESS NOTES
Progress Note  1/22/2020 1:32 PM  Subjective:   Admit Date: 1/20/2020  PCP: Martin Brooks MD  Interval History: Pt being seen for ESRD and HTN    1/22/20: Pt states yesterday initially when the BP came down he felt lightheaded and dizzy as well as nauseated, but today feels well eating lunch at the time fo my visit    Diet: DIET LOW SODIUM 2 GM;  Diet NPO, After Midnight    Data:   Scheduled Meds:   labetalol  50 mg Oral 2 times per day    NIFEdipine  90 mg Oral Daily    calcium acetate  667 mg Oral TID WC    darbepoetin katlyn-polysorbate  40 mcg Subcutaneous Weekly    cloNIDine  1 patch Transdermal Weekly    minoxidil  5 mg Oral BID    labetalol  20 mg Intravenous Once    cloNIDine  0.2 mg Oral BID    losartan  100 mg Oral QPM    pantoprazole  40 mg Oral QAM AC    sodium chloride flush  10 mL Intravenous 2 times per day    heparin (porcine)  5,000 Units Subcutaneous 3 times per day     Continuous Infusions:  PRN Meds:diphenhydrAMINE, sodium chloride flush, magnesium hydroxide, ondansetron, acetaminophen, hydrALAZINE, labetalol, perflutren lipid microspheres, morphine, melatonin  I/O last 3 completed shifts: In: 300   Out: 1800   I/O this shift:  In: 200   Out: 1900     Intake/Output Summary (Last 24 hours) at 1/22/2020 1332  Last data filed at 1/22/2020 1005  Gross per 24 hour   Intake 500 ml   Output 3700 ml   Net -3200 ml     CBC:   Recent Labs     01/20/20  1310 01/21/20  0610 01/22/20  0600   WBC 9.1 8.2 10.3   HGB 9.4* 7.8* 7.7*    221 243     BMP:    Recent Labs     01/20/20  1310 01/21/20  0610 01/22/20  0600    138 139   K 3.4* 4.4 4.6   CL 95* 94* 97*   CO2 33* 32* 30*   BUN 18 31* 23*   CREATININE 4.5* 7.2* 5.9*   GLUCOSE 87 90 87     Hepatic:   Recent Labs     01/22/20  0600   AST 18   ALT 11   BILITOT 0.3   ALKPHOS 146*     Troponin:   Recent Labs     01/20/20  1310   TROPONINI 0.02     BNP: No results for input(s): BNP in the last 72 hours.   Lipids: No results for input(s): CHOL, HDL in the last 72 hours. Invalid input(s): LDLCALCU  ABGs: No results found for: PHART, PO2ART, INP1EHV  INR: No results for input(s): INR in the last 72 hours. -----------------------------------------------------------------  RAD:   Order Date:  1/20/2020 12:45 PM       EXAM: US DUP UPPER EXTREMITY LEFT VENOUS       NUMBER OF IMAGES:  22       INDICATION: Left upper extremity pain and swelling       COMPARISON: None       TECHNIQUE: Venous structures were evaluated for patency with color   Doppler imaging, and compressibility was evaluated with 2-D grayscale   ultrasound imaging. Response to augmentation maneuvers and respiratory   variation was assessed with spectral Doppler.       FINDINGS:   The left upper extremity was evaluated ultrasonographically. No intraluminal thrombus was identified, and there is good   compressibility, appropriate response to augmentation maneuvers and   respiratory variation, and color flow confirm patency of venous   structures.           Impression   Negative for evidence of deep venous thrombosis in the  left upper   extremity by color and spectral Doppler, as well as 2-D grayscale   ultrasound imaging. Order Date: 1/20/2020 12:15 PM       Exam: XR CHEST (2 VW)       Number of Images: 2 view       Indication:  Chest pain and cough       Comparison: Previous anterior upright chest studies September 4, 2019   and July 31, 2019       Findings: The lungs are symmetrically expanded, and are hyperinflated. There is   no definite evidence of effusion or consolidation.       Cardiovascular shadows show slightly increased prominence of the left   ventricular cardiac border and indistinct congestion of the pulmonary   vasculature in the central and dependent chest, suggesting early   cardiac decompensation and mild pulmonary edema. There is no prominent   septal edema. .       Skeletal structures show no evidence of acute pathology.  The lower   thoracic vertebrae have a somewhat dense appearance, which may be   normal physiologic change, but can also be seen in   hyperparathyroidism.           Impression   Increasing cardiomegaly and central vascular congestion is suspicious   for early cardiac decompensation in this young patient.       Density in the spine appears slightly increased, and may be   physiologic, such as with hyperparathyroidism. Order Date:  1/20/2020 4:15 PM       EXAM: CTA CHEST W CONTRAST number of images 559 including MIP coronal   and sagittal reconstruction images, and 3-D reconstruction images the   aorta. .       Contrast. Isovue-370, 80 mL intravenously.       Technique: Low-dose CT  acquisition technique included one of   following options; 1 . Automated exposure control, 2. Adjustment of MA   and or KV according to patient's size or 3. Use of iterative   reconstruction.       INDICATION:  r/o Dissection    r/o Dissection        COMPARISON: None       FINDINGS:   There is significant cardiomegaly. The great vessels are unremarkable. Moderately enlarged lymph nodes are identified in the mediastinum with   the lymph nodes measuring up to 1.1 cm in the paratracheal region, AP   window and  subcarinal region. Moderately enlarged axillary lymph   nodes also noted more on the left-sided with the lymph nodes measuring   up to 1.2 cm. The trachea and major bronchi are patent. There is   patchy and hazy bibasilar infiltrates more on the right side. Tiny   pleural effusions are present. 3 to 5 mm nonspecific pulmonary nodules   are identified in the upper lobes bilaterally. The liver is of normal   architecture. There is atrophic left kidney with a large cystic lesion   measuring up to 3 cm. There is diffuse sclerosis of the thoracolumbar   spine likely due to renal osteodystrophy.       CTA. The thoracic aorta is normal with ascending thoracic aorta   measuring 3.1 x 3.1 cm and descending thoracic aorta measuring 2.2 cm   without aneurysm or dissection.  The origin of the great vessels appear   normal. There is  enlarged pulmonary artery concerning for pulmonary   artery hypertension. There is no central pulmonary embolism.           Impression   Normal aorta without aortic dissection or aneurysm.       Enlarged pulmonary artery concerning for pulmonary artery   hypertension. There is no pulmonary embolism.       Significant cardiomegaly with  patchy bibasilar infiltrates and   pleural effusion likely CHF and/or pneumonia.       Moderately enlarged mediastinal and axillary lymph nodes likely   inflammatory and 3 to 5 mm nonspecific pulmonary nodules. Consider   surveillance as clinically indicated.       Atrophy of kidney with  suggestion of renal osteodystrophy. Objective:   Vitals: BP (!) 141/83   Pulse 128   Temp 98.6 °F (37 °C) (Oral)   Resp 18   Ht 5' 6\" (1.676 m)   Wt 109 lb 9.1 oz (49.7 kg)   SpO2 94%   BMI 17.68 kg/m²   Skin: No rashes. No ecchymosis. Cardiovascular: Tachy. Normal S1 and S2. No murmur. Respiratory: Faint inspiratory wheezing. No rhonci or rales. Abdomen: Bowel sounds normoactive. Soft. Nontender. Nondistended. Ext: No lower extremity edema. Left upper extremity more edematous than right upper extremity. LUE AVF good bruit and thrill  Psychiatric: Mood and affect appropriate.  Speech is normal.     Assessment:   Patient Active Problem List:     Hypertensive urgency     ESRD on hemodialysis ([address]     H/O intracranial hemorrhage and aneurysm clipping on the right side     Anxiety     History of ruptured Berry aneurysm (HCC)     Elevated C-reactive protein (CRP)     Fever and chills     Bradycardia     Noncompliance w/medication treatment due to intermit use of medication     Hypertensive urgency     Seizure ([address]     ESRD (end stage renal disease) on dialysis ([address]     AVF (arteriovenous fistula) ([address]     Colitis     Cholecystitis     Chest pain     Renovascular hypertension     Severe protein-calorie malnutrition ([address]     ESRD (end stage renal disease) [facility]     Dialysis AV fistula malfunction (HCC)     Renal failure    Plan:   1. ESRD on MWF Schedule -S/P IHD this AM with 1.5L vol removal     2. Edema of the LUE possible venous HTN vs outflow obstruction for fistulogram later this week     3. HTN with CKD G5/ESRD-with the titration nifedipine to 90mg QD  and start of  minoxidil 5mg and po bid now with tachycardia which maybe a reflex tachycardia-will reduce the minoxidil and  Add BB     4. Sec HPTH of Renal Origin- with HyperphosphatemiaPO4 WNL on  binder     5.  Anemia in CKD-HgB down from 9.4-->7.8-->7.7 last 24hr-started PREETI     Thank you for allowing me to participate in in [place]'s care    Bijan Oliva

## 2020-01-22 NOTE — H&P
surgery 3-4 years ago, after rupture of aneurysm causing ICH    DIALYSIS FISTULA CREATION Left 11 years ago    UPPER GASTROINTESTINAL ENDOSCOPY N/A 1/3/2019    EGD BIOPSY performed by Rick Thurston MD at [address] [place] [address]     Medications Prior to Admission:      Prior to Admission medications    Medication Sig Start Date End Date Taking? Authorizing Provider   NIFEdipine (ADALAT CC) 60 MG extended release tablet Take 60 mg by mouth daily    Historical Provider, MD   diphenhydrAMINE (BANOPHEN) 25 MG capsule TAKE 1 CAPSULE BY MOUTH THREE TIMES DAILY AS NEEDED FOR ITCHING 1/16/20   Marcela Wu MD   pantoprazole (PROTONIX) 40 MG tablet TAKE 1 TABLET BY MOUTH EVERY MORNING BEFORE BREAKFAST 12/13/19   Joysimoshe Mac,    cloNIDine (CATAPRES) 0.2 MG tablet Take 1 tablet by mouth 2 times daily 12/11/19   Rosey Gordon,    lidocaine-prilocaine (EMLA) 2.5-2.5 % cream Apply to AVF site prior to dialysis 11/18/19   Historical Provider, MD   sevelamer (RENVELA) 800 MG tablet Take 1,600 mg by mouth 3 times daily     Historical Provider, MD   hydrALAZINE (APRESOLINE) 50 MG tablet Take 1 tablet by mouth 3 times daily 11/20/19   Landon Gamble MD   losartan (COZAAR) 100 MG tablet Take 1 tablet by mouth every evening 11/20/19   Landon Gamble MD     Allergies:  Tylenol [acetaminophen]    Social History:      The patient currently lives home     TOBACCO:   reports that he has been smoking cigars. He has never used smokeless tobacco.  ETOH:   reports no history of alcohol use. Family History:      Reviewed in detail and negative for DM, CAD, Cancer, CVA. Positive as follows:        Problem Relation Age of Onset    Kidney Disease Paternal Grandmother     Cancer Neg Hx     Heart Disease Neg Hx        REVIEW OF SYSTEMS:   Pertinent positives as noted in the HPI. All other systems reviewed and negative.     PHYSICAL EXAM:    BP (!) 144/60 Comment: manual  Pulse 114   Temp 98.7 °F (37.1 °C) (Temporal)   Resp 16   Ht 5' 6\" (1.676 m)   Wt 111 lb 6.4 oz (50.5 kg)   SpO2 97%   BMI 17.98 kg/m²     General appearance:  No apparent distress, appears stated age and cooperative. HEENT:  Normal cephalic, atraumatic without obvious deformity. Pupils equal, round, and reactive to light. Extra ocular muscles intact. Conjunctivae/corneas clear. Neck: Supple, with full range of motion. No jugular venous distention. Trachea midline. Respiratory:  Normal respiratory effort. Clear to auscultation, bilaterally without Rales/Wheezes/Rhonchi. Cardiovascular:  Regular rate and rhythm with normal S1/S2 without murmurs, rubs or gallops. Abdomen: Soft, non-tender, non-distended with normal bowel sounds. Musculoskeletal:  No clubbing, cyanosis or edema bilaterally. Full range of motion without deformity. Skin: Skin color, texture, turgor normal.  No rashes or lesions. Neurologic:  Neurovascularly intact without any focal sensory/motor deficits. Psychiatric:  Alert and oriented, thought content appropriate, normal insight  Capillary Refill: Brisk,< 3 seconds   Peripheral Pulses: +2 palpable, equal bilaterally     Xr Chest Standard (2 Vw)  Result Date: 1/20/2020  Increasing cardiomegaly and central vascular congestion is suspicious for early cardiac decompensation in this young patient. Density in the spine appears slightly increased, and may be physiologic, such as with hyperparathyroidism. Ct Head Wo Contrast  Result Date: 1/20/2020  1. There is IV contrast enhancement of the vessels in the meningeal surfaces of the patient had a recent CTA of the chest. 2. There appears to be no significant interval changes since the previous study of January 2023 2018. 3. No indication for an acute intracranial event accounts some limitations to the previous IV contrast as above discussed. Cta Chest W Contrast  Result Date: 1/20/2020  Normal aorta without aortic dissection or aneurysm.  Enlarged pulmonary artery concerning for pulmonary artery hypertension. There is no pulmonary embolism. Significant cardiomegaly with  patchy bibasilar infiltrates and pleural effusion likely CHF and/or pneumonia. Moderately enlarged mediastinal and axillary lymph nodes likely inflammatory and 3 to 5 mm nonspecific pulmonary nodules. Consider surveillance as clinically indicated. Atrophy of kidney with  suggestion of renal osteodystrophy. Us Dup Upper Extremity Left Venous  Result Date: 1/20/2020  Negative for evidence of deep venous thrombosis in the  left upper extremity by color and spectral Doppler, as well as 2-D grayscale ultrasound imaging. Labs:     Recent Labs     01/20/20  1310 01/21/20  0610 01/22/20  0600   WBC 9.1 8.2 10.3   HGB 9.4* 7.8* 7.7*   HCT 30.2* 25.4* 25.4*    221 243     Recent Labs     01/20/20  1310 01/21/20  0610 01/22/20  0600    138 139   K 3.4* 4.4 4.6   CL 95* 94* 97*   CO2 33* 32* 30*   BUN 18 31* 23*   CREATININE 4.5* 7.2* 5.9*   CALCIUM 8.4* 8.5* 8.9   PHOS  --  5.2* 3.5     Recent Labs     01/22/20  0600   AST 18   ALT 11   BILITOT 0.3   ALKPHOS 146*     No results for input(s): INR in the last 72 hours.   Recent Labs     01/20/20  1310   TROPONINI 0.02       Urinalysis:    No results found for: NITRU, [address], BACTERIA, RBCUA, BLOODU, SPECGRAV, GLUCOSEU      ASSESSMENT:    Active Hospital Problems    Diagnosis Date Noted    ESRD on hemodialysis ([facility] [N18.6, Z99.2] 09/17/2015     Priority: Medium    H/O intracranial hemorrhage and aneurysm clipping on the right side [Z86.79] 09/17/2015     Priority: Medium    Renal failure [N19] 01/22/2020    Dialysis AV fistula malfunction ([facility] [T82.590A] 01/21/2020    ESRD (end stage renal disease) ([facility] [N18.6] 08/01/2019    Noncompliance w/medication treatment due to intermit use of medication [Z91.14] 11/03/2015       PLAN:  BP control  Monitor anemia   meds continued from previous stay  Consult nephrology and vascular     DVT Prophylaxis: heaprin SQ   Diet: DIET LOW SODIUM 2 GM;  Diet NPO, After Midnight  Code Status: Full Code    PT/OT Eval Status: na     Dispo - general floor admit     Brittany Lam  01/22/20  10:26 PM    Thank you Miguel Elias MD for the opportunity to be involved in this patient's care. If you have any questions or concerns please feel free to contact me through [employer].

## 2020-01-22 NOTE — FLOWSHEET NOTE
01/22/20 1005   Vital Signs   BP (!) 140/90   Temp 99.2 °F (37.3 °C)   Pulse 101   Resp 18   Weight 109 lb 9.1 oz (49.7 kg)   Weight Method Bed scale   Percent Weight Change -2.74   Pain Assessment   Pain Assessment 0-10   Pain Level 0   Post-Hemodialysis Assessment   Post-Treatment Procedures Blood returned; Access bleeding time > 10 minutes   Machine Disinfection Process Acid/Vinegar Clean;Exterior Machine Disinfection; Heat Disinfect   Rinseback Volume (ml) 200 ml   Total Liters Processed (l/min) 53 l/min   Dialyzer Clearance Clear   Duration of Treatment (minutes) 180 minutes   Heparin amount administered during treatment (units) 0 units   Hemodialysis Intake (ml) 200 ml   Hemodialysis Output (ml) 1900 ml   NET Removed (ml) 1500 ml   Tolerated Treatment Good   Patient Response to Treatment gabriele hd well eating breakfast now   Bilateral Breath Sounds Clear   Edema None   LUE Edema +3;Non-pitting   Physician Notified?  No

## 2020-01-22 NOTE — DISCHARGE SUMMARY
[facility] Physician Discharge Summary       No follow-up provider specified. Activity level:  As tolerated    Dispo: [facility]    Condition on discharge: Stable    Patient ID:  Ramandeep Phipps  [id number]  9 y.o.  1985    Admit date: 1/22/2020    Discharge date and time:  1/22/2020  4:18 PM    Admission Diagnoses: Active Problems:    Renal failure  Resolved Problems:    * No resolved hospital problems. *      Discharge Diagnoses: Active Problems:    Renal failure  Resolved Problems:    * No resolved hospital problems. *      Consults:  None    Procedures:  None    Hospital Course:   Patient Ramandeep Phipps is a 37 y.o. presented with Renal failure [N19]   This is a  37 y.o. male with a history of hypertension, end-stage renal disease on dialysis, brain aneurysm rupture status post clipping presents with chest pain. Patient was having dialysis earlier today when he started having severe chest pain and headache. His symptoms started after a stressing phone call with his brother. Pain has been continuous on the left side. Patient blood pressure was checked in dialysis and was over 238 systolic. He is started on Nicardipine drip in ICU. His Left arm A-V fistula was red and tender. His BP controlled  And reconciled oral medication. Vascular surgery consulted  And Recommended for A-V fistulagram. He is transferred to [facility]. His Hear rate was high, now started on Labetalol 200mg PO BID.     Discharge Exam:    General Appearance: alert and oriented to person, place and time and in no acute distress  Skin: warm and dry  Head: normocephalic and atraumatic  Eyes: pupils equal, round, and reactive to light, extraocular eye movements intact, conjunctivae normal  Neck: neck supple and non tender without mass   Pulmonary/Chest: clear to auscultation bilaterally- no wheezes, rales or rhonchi, normal air movement, no respiratory distress  Cardiovascular: normal rate, normal S1 and S2 and no carotid bruits  Abdomen: soft, non-tender, non-distended, normal bowel sounds, no masses or organomegaly  Extremities: no cyanosis, no clubbing and no edema  Neurologic: no cranial nerve deficit and speech normal    No intake/output data recorded. No intake/output data recorded. LABS:  Recent Labs     01/20/20  1310 01/21/20  0610 01/22/20  0600    138 139   K 3.4* 4.4 4.6   CL 95* 94* 97*   CO2 33* 32* 30*   BUN 18 31* 23*   CREATININE 4.5* 7.2* 5.9*   GLUCOSE 87 90 87   CALCIUM 8.4* 8.5* 8.9       Recent Labs     01/20/20  1310 01/21/20  0610 01/22/20  0600   WBC 9.1 8.2 10.3   RBC 3.06* 2.50* 2.49*   HGB 9.4* 7.8* 7.7*   HCT 30.2* 25.4* 25.4*   MCV 98.7 101.6* 102.0*   MCH 30.7 31.2 30.9   MCHC 31.1* 30.7* 30.3*   RDW 15.4* 15.8* 15.9*    221 243   MPV 9.3 9.3 9.0       No results for input(s): POCGLU in the last 72 hours. Imaging:  No results found.     Patient Instructions:      Medication List      ASK your doctor about these medications    cloNIDine 0.2 MG tablet  Commonly known as:  CATAPRES  Take 1 tablet by mouth 2 times daily     diphenhydrAMINE 25 MG capsule  Commonly known as:  Banophen  TAKE 1 CAPSULE BY MOUTH THREE TIMES DAILY AS NEEDED FOR ITCHING     hydrALAZINE 50 MG tablet  Commonly known as:  APRESOLINE  Take 1 tablet by mouth 3 times daily     lidocaine-prilocaine 2.5-2.5 % cream  Commonly known as:  EMLA     losartan 100 MG tablet  Commonly known as:  COZAAR  Take 1 tablet by mouth every evening     NIFEdipine 60 MG extended release tablet  Commonly known as:  ADALAT CC     pantoprazole 40 MG tablet  Commonly known as:  PROTONIX  TAKE 1 TABLET BY MOUTH EVERY MORNING BEFORE BREAKFAST     sevelamer 800 MG tablet  Commonly known as:  RENVELA              Note that more than 30 minutes was spent in preparing discharge papers, discussing discharge with patient, medication review, etc.    Signed:  Electronically signed by Paul Jennings MD on 1/22/2020 at 4:18 PM

## 2020-01-22 NOTE — FLOWSHEET NOTE
01/21/20 1750   Vital Signs   BP (!) 168/112   Temp 98 °F (36.7 °C)   Pulse 111   Resp 20   Weight 107 lb 12.9 oz (48.9 kg)   Weight Method Bed scale   Percent Weight Change -2   Pain Assessment   Pain Level 0   Post-Hemodialysis Assessment   Post-Treatment Procedures Blood returned; Access bleeding time < 10 minutes   Machine Disinfection Process Acid/Vinegar Clean;Exterior Machine Disinfection; Heat Disinfect   Rinseback Volume (ml) 300 ml   Total Liters Processed (l/min) 66 l/min   Dialyzer Clearance Heavily streaked   Duration of Treatment (minutes) 180 minutes   Hemodialysis Intake (ml) 300 ml   Hemodialysis Output (ml) 1800 ml   NET Removed (ml) 1500 ml   Tolerated Treatment Good   Patient Response to Treatment tolerated tx well   Bilateral Breath Sounds Diminished   LUE Edema +2

## 2020-01-23 LAB
ALBUMIN SERPL-MCNC: 3.4 G/DL (ref 3.5–5.2)
ALP BLD-CCNC: 146 U/L (ref 40–129)
ALT SERPL-CCNC: 10 U/L (ref 0–40)
ANION GAP SERPL CALCULATED.3IONS-SCNC: 15 MMOL/L (ref 7–16)
AST SERPL-CCNC: 18 U/L (ref 0–39)
BILIRUB SERPL-MCNC: 0.3 MG/DL (ref 0–1.2)
BUN BLDV-MCNC: 27 MG/DL (ref 6–20)
CALCIUM SERPL-MCNC: 8.8 MG/DL (ref 8.6–10.2)
CHLORIDE BLD-SCNC: 97 MMOL/L (ref 98–107)
CO2: 26 MMOL/L (ref 22–29)
CREAT SERPL-MCNC: 5.8 MG/DL (ref 0.7–1.2)
GFR AFRICAN AMERICAN: 13
GFR NON-AFRICAN AMERICAN: 13 ML/MIN/1.73
GLUCOSE BLD-MCNC: 91 MG/DL (ref 74–99)
HCT VFR BLD CALC: 26.5 % (ref 37–54)
HEMOGLOBIN: 8.1 G/DL (ref 12.5–16.5)
MAGNESIUM: 2.7 MG/DL (ref 1.6–2.6)
MCH RBC QN AUTO: 30.1 PG (ref 26–35)
MCHC RBC AUTO-ENTMCNC: 30.6 % (ref 32–34.5)
MCV RBC AUTO: 98.5 FL (ref 80–99.9)
PDW BLD-RTO: 15.8 FL (ref 11.5–15)
PHOSPHORUS: 3.1 MG/DL (ref 2.5–4.5)
PLATELET # BLD: 280 E9/L (ref 130–450)
PMV BLD AUTO: 8.9 FL (ref 7–12)
POTASSIUM SERPL-SCNC: 4.2 MMOL/L (ref 3.5–5)
RBC # BLD: 2.69 E12/L (ref 3.8–5.8)
SODIUM BLD-SCNC: 138 MMOL/L (ref 132–146)
TOTAL PROTEIN: 7.1 G/DL (ref 6.4–8.3)
WBC # BLD: 8.2 E9/L (ref 4.5–11.5)

## 2020-01-23 PROCEDURE — 84100 ASSAY OF PHOSPHORUS: CPT

## 2020-01-23 PROCEDURE — 2500000003 HC RX 250 WO HCPCS

## 2020-01-23 PROCEDURE — 6360000002 HC RX W HCPCS

## 2020-01-23 PROCEDURE — 36901 INTRO CATH DIALYSIS CIRCUIT: CPT | Performed by: SURGERY

## 2020-01-23 PROCEDURE — 2580000003 HC RX 258: Performed by: INTERNAL MEDICINE

## 2020-01-23 PROCEDURE — 6360000002 HC RX W HCPCS: Performed by: INTERNAL MEDICINE

## 2020-01-23 PROCEDURE — 6370000000 HC RX 637 (ALT 250 FOR IP): Performed by: INTERNAL MEDICINE

## 2020-01-23 PROCEDURE — 99231 SBSQ HOSP IP/OBS SF/LOW 25: CPT | Performed by: SURGERY

## 2020-01-23 PROCEDURE — 80053 COMPREHEN METABOLIC PANEL: CPT

## 2020-01-23 PROCEDURE — 2709999900 HC NON-CHARGEABLE SUPPLY

## 2020-01-23 PROCEDURE — 85027 COMPLETE CBC AUTOMATED: CPT

## 2020-01-23 PROCEDURE — 83735 ASSAY OF MAGNESIUM: CPT

## 2020-01-23 PROCEDURE — B51W1ZZ FLUOROSCOPY OF DIALYSIS SHUNT/FISTULA USING LOW OSMOLAR CONTRAST: ICD-10-PCS | Performed by: SURGERY

## 2020-01-23 PROCEDURE — 05763ZZ DILATION OF LEFT SUBCLAVIAN VEIN, PERCUTANEOUS APPROACH: ICD-10-PCS | Performed by: SURGERY

## 2020-01-23 PROCEDURE — 36907 BALO ANGIOP CTR DIALYSIS SEG: CPT | Performed by: SURGERY

## 2020-01-23 PROCEDURE — 36415 COLL VENOUS BLD VENIPUNCTURE: CPT

## 2020-01-23 PROCEDURE — 2580000003 HC RX 258

## 2020-01-23 PROCEDURE — C1769 GUIDE WIRE: HCPCS

## 2020-01-23 PROCEDURE — 6360000002 HC RX W HCPCS: Performed by: SURGERY

## 2020-01-23 PROCEDURE — C1725 CATH, TRANSLUMIN NON-LASER: HCPCS

## 2020-01-23 PROCEDURE — C1894 INTRO/SHEATH, NON-LASER: HCPCS

## 2020-01-23 PROCEDURE — 2500000003 HC RX 250 WO HCPCS: Performed by: INTERNAL MEDICINE

## 2020-01-23 PROCEDURE — 1200000000 HC SEMI PRIVATE

## 2020-01-23 RX ORDER — SODIUM CHLORIDE 0.9 % (FLUSH) 0.9 %
10 SYRINGE (ML) INJECTION PRN
Status: DISCONTINUED | OUTPATIENT
Start: 2020-01-23 | End: 2020-01-24 | Stop reason: HOSPADM

## 2020-01-23 RX ORDER — DIPHENHYDRAMINE HYDROCHLORIDE 50 MG/ML
50 INJECTION INTRAMUSCULAR; INTRAVENOUS ONCE
Status: COMPLETED | OUTPATIENT
Start: 2020-01-23 | End: 2020-01-23

## 2020-01-23 RX ORDER — LABETALOL 200 MG/1
200 TABLET, FILM COATED ORAL EVERY 8 HOURS SCHEDULED
Status: DISCONTINUED | OUTPATIENT
Start: 2020-01-23 | End: 2020-01-24 | Stop reason: HOSPADM

## 2020-01-23 RX ORDER — SODIUM CHLORIDE 0.9 % (FLUSH) 0.9 %
SYRINGE (ML) INJECTION
Status: COMPLETED
Start: 2020-01-23 | End: 2020-01-23

## 2020-01-23 RX ORDER — MINOXIDIL 2.5 MG/1
5 TABLET ORAL 2 TIMES DAILY
Status: DISCONTINUED | OUTPATIENT
Start: 2020-01-23 | End: 2020-01-24 | Stop reason: HOSPADM

## 2020-01-23 RX ADMIN — Medication 10 MG: at 21:19

## 2020-01-23 RX ADMIN — CALCIUM ACETATE 667 MG: 667 CAPSULE ORAL at 18:07

## 2020-01-23 RX ADMIN — ONDANSETRON HYDROCHLORIDE 4 MG: 2 INJECTION, SOLUTION INTRAMUSCULAR; INTRAVENOUS at 10:24

## 2020-01-23 RX ADMIN — Medication: at 05:07

## 2020-01-23 RX ADMIN — MORPHINE SULFATE 1 MG: 2 INJECTION, SOLUTION INTRAMUSCULAR; INTRAVENOUS at 06:51

## 2020-01-23 RX ADMIN — LABETALOL HYDROCHLORIDE 200 MG: 200 TABLET, FILM COATED ORAL at 21:17

## 2020-01-23 RX ADMIN — DIPHENHYDRAMINE HYDROCHLORIDE 12.5 MG: 50 INJECTION, SOLUTION INTRAMUSCULAR; INTRAVENOUS at 03:23

## 2020-01-23 RX ADMIN — HYDRALAZINE HYDROCHLORIDE 10 MG: 20 INJECTION INTRAMUSCULAR; INTRAVENOUS at 12:08

## 2020-01-23 RX ADMIN — LABETALOL HYDROCHLORIDE 10 MG: 5 INJECTION INTRAVENOUS at 14:54

## 2020-01-23 RX ADMIN — LABETALOL HYDROCHLORIDE 200 MG: 200 TABLET, FILM COATED ORAL at 08:26

## 2020-01-23 RX ADMIN — SODIUM CHLORIDE, PRESERVATIVE FREE 10 ML: 5 INJECTION INTRAVENOUS at 08:26

## 2020-01-23 RX ADMIN — LOSARTAN POTASSIUM 100 MG: 50 TABLET, FILM COATED ORAL at 18:07

## 2020-01-23 RX ADMIN — DIPHENHYDRAMINE HYDROCHLORIDE 50 MG: 50 INJECTION, SOLUTION INTRAMUSCULAR; INTRAVENOUS at 11:25

## 2020-01-23 RX ADMIN — LABETALOL HYDROCHLORIDE 10 MG: 5 INJECTION INTRAVENOUS at 08:26

## 2020-01-23 RX ADMIN — DIPHENHYDRAMINE HYDROCHLORIDE 12.5 MG: 50 INJECTION, SOLUTION INTRAMUSCULAR; INTRAVENOUS at 18:06

## 2020-01-23 RX ADMIN — MORPHINE SULFATE 1 MG: 2 INJECTION, SOLUTION INTRAMUSCULAR; INTRAVENOUS at 18:06

## 2020-01-23 RX ADMIN — NIFEDIPINE 60 MG: 60 TABLET, FILM COATED, EXTENDED RELEASE ORAL at 18:07

## 2020-01-23 RX ADMIN — MORPHINE SULFATE 1 MG: 2 INJECTION, SOLUTION INTRAMUSCULAR; INTRAVENOUS at 12:08

## 2020-01-23 RX ADMIN — MINOXIDIL 5 MG: 2.5 TABLET ORAL at 21:17

## 2020-01-23 ASSESSMENT — PAIN DESCRIPTION - PROGRESSION: CLINICAL_PROGRESSION: GRADUALLY WORSENING

## 2020-01-23 ASSESSMENT — PAIN SCALES - GENERAL
PAINLEVEL_OUTOF10: 10
PAINLEVEL_OUTOF10: 10
PAINLEVEL_OUTOF10: 6
PAINLEVEL_OUTOF10: 10
PAINLEVEL_OUTOF10: 10
PAINLEVEL_OUTOF10: 5
PAINLEVEL_OUTOF10: 0

## 2020-01-23 ASSESSMENT — PAIN DESCRIPTION - PAIN TYPE: TYPE: ACUTE PAIN

## 2020-01-23 ASSESSMENT — PAIN DESCRIPTION - LOCATION: LOCATION: ARM

## 2020-01-23 ASSESSMENT — PAIN DESCRIPTION - ONSET: ONSET: ON-GOING

## 2020-01-23 ASSESSMENT — PAIN DESCRIPTION - DESCRIPTORS: DESCRIPTORS: THROBBING;CONSTANT;DISCOMFORT

## 2020-01-23 ASSESSMENT — PAIN DESCRIPTION - FREQUENCY: FREQUENCY: CONTINUOUS

## 2020-01-23 ASSESSMENT — PAIN DESCRIPTION - ORIENTATION: ORIENTATION: LEFT

## 2020-01-23 NOTE — PROGRESS NOTES
Order Date:  1/20/2020 12:45 PM       EXAM: US DUP UPPER EXTREMITY LEFT VENOUS       NUMBER OF IMAGES:  22       INDICATION: Left upper extremity pain and swelling       COMPARISON: None       TECHNIQUE: Venous structures were evaluated for patency with color   Doppler imaging, and compressibility was evaluated with 2-D grayscale   ultrasound imaging. Response to augmentation maneuvers and respiratory   variation was assessed with spectral Doppler.       FINDINGS:   The left upper extremity was evaluated ultrasonographically. No intraluminal thrombus was identified, and there is good   compressibility, appropriate response to augmentation maneuvers and   respiratory variation, and color flow confirm patency of venous   structures.           Impression   Negative for evidence of deep venous thrombosis in the  left upper   extremity by color and spectral Doppler, as well as 2-D grayscale   ultrasound imaging. Order Date: 1/20/2020 12:15 PM       Exam: XR CHEST (2 VW)       Number of Images: 2 view       Indication:  Chest pain and cough       Comparison: Previous anterior upright chest studies September 4, 2019   and July 31, 2019       Findings: The lungs are symmetrically expanded, and are hyperinflated. There is   no definite evidence of effusion or consolidation.       Cardiovascular shadows show slightly increased prominence of the left   ventricular cardiac border and indistinct congestion of the pulmonary   vasculature in the central and dependent chest, suggesting early   cardiac decompensation and mild pulmonary edema. There is no prominent   septal edema. .       Skeletal structures show no evidence of acute pathology.  The lower   thoracic vertebrae have a somewhat dense appearance, which may be   normal physiologic change, but can also be seen in   hyperparathyroidism.           Impression   Increasing cardiomegaly and central vascular congestion is suspicious   for early cardiac decompensation in this young patient.       Density in the spine appears slightly increased, and may be   physiologic, such as with hyperparathyroidism. Order Date:  1/20/2020 4:15 PM       EXAM: CTA CHEST W CONTRAST number of images 559 including MIP coronal   and sagittal reconstruction images, and 3-D reconstruction images the   aorta. .       Contrast. Isovue-370, 80 mL intravenously.       Technique: Low-dose CT  acquisition technique included one of   following options; 1 . Automated exposure control, 2. Adjustment of MA   and or KV according to patient's size or 3. Use of iterative   reconstruction.       INDICATION:  r/o Dissection    r/o Dissection        COMPARISON: None       FINDINGS:   There is significant cardiomegaly. The great vessels are unremarkable. Moderately enlarged lymph nodes are identified in the mediastinum with   the lymph nodes measuring up to 1.1 cm in the paratracheal region, AP   window and  subcarinal region. Moderately enlarged axillary lymph   nodes also noted more on the left-sided with the lymph nodes measuring   up to 1.2 cm. The trachea and major bronchi are patent. There is   patchy and hazy bibasilar infiltrates more on the right side. Tiny   pleural effusions are present. 3 to 5 mm nonspecific pulmonary nodules   are identified in the upper lobes bilaterally. The liver is of normal   architecture. There is atrophic left kidney with a large cystic lesion   measuring up to 3 cm. There is diffuse sclerosis of the thoracolumbar   spine likely due to renal osteodystrophy.       CTA. The thoracic aorta is normal with ascending thoracic aorta   measuring 3.1 x 3.1 cm and descending thoracic aorta measuring 2.2 cm   without aneurysm or dissection. The origin of the great vessels appear   normal. There is  enlarged pulmonary artery concerning for pulmonary   artery hypertension.  There is no central pulmonary embolism.           Impression   Normal aorta without aortic dissection or aneurysm.     Enlarged pulmonary artery concerning for pulmonary artery   hypertension. There is no pulmonary embolism.       Significant cardiomegaly with  patchy bibasilar infiltrates and   pleural effusion likely CHF and/or pneumonia.       Moderately enlarged mediastinal and axillary lymph nodes likely   inflammatory and 3 to 5 mm nonspecific pulmonary nodules. Consider   surveillance as clinically indicated.       Atrophy of kidney with  suggestion of renal osteodystrophy. Objective:   Vitals: BP (!) 190/119 Comment: manual!!  Pulse 100   Temp 99.1 °F (37.3 °C) (Temporal)   Resp 16   Ht 5' 6\" (1.676 m)   Wt 113 lb 3.2 oz (51.3 kg)   SpO2 98%   BMI 18.27 kg/m²   Skin: No rashes. No ecchymosis. Cardiovascular: Tachy. Normal S1 and S2. No murmur. Respiratory: Faint inspiratory wheezing. No rhonci or rales. Abdomen: Bowel sounds normoactive. Soft. Nontender. Nondistended. Ext: No lower extremity edema. Left upper extremity more edematous than right upper extremity. LUE AVF good bruit and thrill  Psychiatric: Mood and affect appropriate. Speech is normal.     Assessment:   Patient Active Problem List:     Hypertensive urgency     ESRD on hemodialysis ([address]     H/O intracranial hemorrhage and aneurysm clipping on the right side     Anxiety     History of ruptured Berry aneurysm (HCC)     Elevated C-reactive protein (CRP)     Fever and chills     Bradycardia     Noncompliance w/medication treatment due to intermit use of medication     Hypertensive urgency     Seizure ([address]     ESRD (end stage renal disease) on dialysis ([address]     AVF (arteriovenous fistula) (HCC)     Colitis     Cholecystitis     Chest pain     Renovascular hypertension     Severe protein-calorie malnutrition (HCC)     ESRD (end stage renal disease) ([address]     Dialysis AV fistula malfunction ([address]     Renal failure    Plan:   1. ESRD on MWF Schedule -For HD in AM     2.  Edema of the LUE possible venous HTN vs outflow obstruction-s/p fistulagram and angioplasty today.      3. HTN with CKD G5/ESRD- nifedipine to 60mg QD, minoxidil 2.5mg bid now BB due to tachycardia- will follow as pt did not get some of his medications this AM before surgery     4. Sec HPTH of Renal Origin- with Hyperphosphatemia- PO4 WNL on  binder     5. Anemia in CKD-HgB down from 9.4-->7.8-->7.7-->8.1 last 24hr-started PREETI       Magaly Fishman NP-C    Patient seen and examined all key components of the physical performed independently , case discussed with NP, all pertinent labs and radiologic tests personally reviewed agree with above.   -OK to discharge tomorrow once BP improves    Hiram Rebolledo MD

## 2020-01-23 NOTE — OP NOTE
Date of procedure: 1/23/2020    Preoperative diagnosis: Left upper extremity venous hypertension with central venous stenosis    Postoperative diagnosis: Same with identification of a subclavian vein stenosis of the left upper extremity. Findings: Tortuous left aneurysmal fistula. There is an indwelling what appears to be distal brachial vein versus axillary stent this appears to be occluded. There is patency of the proximal axillary vein subclavian vein up to the level of the internal jugular vein where there is a 80% stenosis. There is refluxing into the jugular system and down into the left arm    Surgeon: Carmen Black MD    Anesthesia: Local lidocaine and fentanyl    Estimated contrast: Approximately 50 cc    Estimated blood loss: Less than 20 cc    Procedure:  #1 left arm fistulogram with central venous imaging  #2 balloon angioplasty of the subclavian vein with a 9 x 4 balloon x2. Description of the procedure: After risk benefits and alternatives were discussed with the patient preoperatively and consent was achieved. The day of the procedure is brought to the Cath Lab and placed in supine position. He was prepped and draped in the standard sterile fashion. A timeout was taken to verify the person the the operating site as well as the procedure. Lidocaine was used infiltrate the skin and subcutaneous tissue overlying the left arm arteriovenous fistula. A micropuncture needle was inserted followed by micropuncture wire and a micropuncture sheath. Images were taken demonstrating central venous stenosis. There is refluxing into the left upper extremity. There is also refluxing into the jugular vein. Collaterals were noted throughout the neck. At this point we then used a Glidewire advantage to cross over the stenosis and placed a Glidewire advantage into the inferior vena cava. We then upsized to a 6 Kathleen Riis sheath flashed and flushed with heparinized saline solution.   A 9 x 4 balloon was delivered to the stenosis. Balloon angioplasty was performed at the subclavian x2. Long inflations were performed. Completion images demonstrating near resolution of the stenosis. There was still some refluxing into the jugular venous system. This had restored the thrill in the left arm arteriovenous access. At this point all the catheters and wires were removed. Figure-of-eight stitch was placed at the exit site. And pressure was held. There was no evidence of complicating features and he had a palpable radial pulse at the end of the case. Please note reflux in the arterial system was not performed secondary to the size of the fistula.

## 2020-01-23 NOTE — CARE COORDINATION
Patient admitted from [facility] for an A-V fistulagram for central venous stenosis versus occlusion. Per Ivis , he [facility]- 7:25 am chair time confirmed w/ [facility] @ [employer] [phone]. Pt. does not drive- needed transportation is provided by [employer]. Cm/Paul following for any discharge needs.   Gianna Ye RN CM

## 2020-01-23 NOTE — PROGRESS NOTES
WBC 8.2 01/23/2020    HGB 8.1 (L) 01/23/2020    HCT 26.5 (L) 01/23/2020     01/23/2020    PROTIME 11.6 03/15/2016    INR 1.1 03/15/2016    APTT 34.3 03/15/2016    K 4.2 01/23/2020    BUN 27 (H) 01/23/2020    CREATININE 5.8 (H) 01/23/2020       RADIOLOGY:  XR CHEST PORTABLE   Final Result   Resolving edema or pneumonia in the right greater than left base when   compared to prior CT from 1/20/2020. No new focal consolidation. ASSESSMENT/PLAN:   · #1 left arm venous hypertension. He has had prior interventions of the left arm. He has what appears to be a cephalic vein that possibly has had a cephalic vein turndown. He has a pulsatile access. He has venous hypertension and distended veins on the chest.  My suspicion is most likely has central venous stenosis versus occlusion. I discussed risk benefits and alternatives with the patient including but not limited to bleeding, infection, arteriovenous nerve injury, myocardial infarction, respiratory failure, anesthetic reaction, pain swelling or tenderness, emergency surgery, distal embolization loss of the access limb loss and or death.   He understands and wishes to proceed        Electronically signed by José Miguel Burgos MD on 1/23/2020 at 10:38 AM

## 2020-01-24 VITALS
HEIGHT: 66 IN | RESPIRATION RATE: 18 BRPM | WEIGHT: 108.69 LBS | OXYGEN SATURATION: 97 % | DIASTOLIC BLOOD PRESSURE: 78 MMHG | TEMPERATURE: 99.7 F | BODY MASS INDEX: 17.47 KG/M2 | SYSTOLIC BLOOD PRESSURE: 142 MMHG | HEART RATE: 95 BPM

## 2020-01-24 LAB
ALBUMIN SERPL-MCNC: 3.9 G/DL (ref 3.5–5.2)
ALP BLD-CCNC: 159 U/L (ref 40–129)
ALT SERPL-CCNC: 11 U/L (ref 0–40)
ANION GAP SERPL CALCULATED.3IONS-SCNC: 21 MMOL/L (ref 7–16)
AST SERPL-CCNC: 21 U/L (ref 0–39)
BILIRUB SERPL-MCNC: 0.3 MG/DL (ref 0–1.2)
BUN BLDV-MCNC: 51 MG/DL (ref 6–20)
CALCIUM SERPL-MCNC: 9.5 MG/DL (ref 8.6–10.2)
CHLORIDE BLD-SCNC: 94 MMOL/L (ref 98–107)
CO2: 23 MMOL/L (ref 22–29)
CREAT SERPL-MCNC: 8.9 MG/DL (ref 0.7–1.2)
GFR AFRICAN AMERICAN: 8
GFR NON-AFRICAN AMERICAN: 8 ML/MIN/1.73
GLUCOSE BLD-MCNC: 107 MG/DL (ref 74–99)
HCT VFR BLD CALC: 27 % (ref 37–54)
HEMOGLOBIN: 8.5 G/DL (ref 12.5–16.5)
MAGNESIUM: 3.1 MG/DL (ref 1.6–2.6)
MCH RBC QN AUTO: 31.1 PG (ref 26–35)
MCHC RBC AUTO-ENTMCNC: 31.5 % (ref 32–34.5)
MCV RBC AUTO: 98.9 FL (ref 80–99.9)
METER GLUCOSE: 126 MG/DL (ref 74–99)
PDW BLD-RTO: 15.9 FL (ref 11.5–15)
PHOSPHORUS: 3.5 MG/DL (ref 2.5–4.5)
PLATELET # BLD: 358 E9/L (ref 130–450)
PMV BLD AUTO: 9 FL (ref 7–12)
POTASSIUM SERPL-SCNC: 5.1 MMOL/L (ref 3.5–5)
RBC # BLD: 2.73 E12/L (ref 3.8–5.8)
SODIUM BLD-SCNC: 138 MMOL/L (ref 132–146)
TOTAL PROTEIN: 8 G/DL (ref 6.4–8.3)
WBC # BLD: 11.6 E9/L (ref 4.5–11.5)

## 2020-01-24 PROCEDURE — 6360000002 HC RX W HCPCS: Performed by: INTERNAL MEDICINE

## 2020-01-24 PROCEDURE — 90935 HEMODIALYSIS ONE EVALUATION: CPT | Performed by: INTERNAL MEDICINE

## 2020-01-24 PROCEDURE — 5A1D70Z PERFORMANCE OF URINARY FILTRATION, INTERMITTENT, LESS THAN 6 HOURS PER DAY: ICD-10-PCS | Performed by: INTERNAL MEDICINE

## 2020-01-24 PROCEDURE — 80053 COMPREHEN METABOLIC PANEL: CPT

## 2020-01-24 PROCEDURE — 6370000000 HC RX 637 (ALT 250 FOR IP): Performed by: INTERNAL MEDICINE

## 2020-01-24 PROCEDURE — 85027 COMPLETE CBC AUTOMATED: CPT

## 2020-01-24 PROCEDURE — 83735 ASSAY OF MAGNESIUM: CPT

## 2020-01-24 PROCEDURE — 82962 GLUCOSE BLOOD TEST: CPT

## 2020-01-24 PROCEDURE — 36415 COLL VENOUS BLD VENIPUNCTURE: CPT

## 2020-01-24 PROCEDURE — 84100 ASSAY OF PHOSPHORUS: CPT

## 2020-01-24 PROCEDURE — 2500000003 HC RX 250 WO HCPCS: Performed by: INTERNAL MEDICINE

## 2020-01-24 RX ORDER — LABETALOL 200 MG/1
200 TABLET, FILM COATED ORAL EVERY 8 HOURS SCHEDULED
Qty: 60 TABLET | Refills: 3 | Status: SHIPPED | OUTPATIENT
Start: 2020-01-24 | End: 2020-02-10 | Stop reason: ALTCHOICE

## 2020-01-24 RX ORDER — MINOXIDIL 2.5 MG/1
5 TABLET ORAL 2 TIMES DAILY
Qty: 30 TABLET | Refills: 3 | Status: SHIPPED | OUTPATIENT
Start: 2020-01-24 | End: 2020-02-05 | Stop reason: SDUPTHER

## 2020-01-24 RX ADMIN — PANTOPRAZOLE SODIUM 40 MG: 40 TABLET, DELAYED RELEASE ORAL at 06:51

## 2020-01-24 RX ADMIN — MORPHINE SULFATE 1 MG: 2 INJECTION, SOLUTION INTRAMUSCULAR; INTRAVENOUS at 00:48

## 2020-01-24 RX ADMIN — DIPHENHYDRAMINE HYDROCHLORIDE 12.5 MG: 50 INJECTION, SOLUTION INTRAMUSCULAR; INTRAVENOUS at 10:22

## 2020-01-24 RX ADMIN — LABETALOL HYDROCHLORIDE 200 MG: 200 TABLET, FILM COATED ORAL at 06:50

## 2020-01-24 RX ADMIN — LABETALOL HYDROCHLORIDE 200 MG: 200 TABLET, FILM COATED ORAL at 13:50

## 2020-01-24 RX ADMIN — MORPHINE SULFATE 1 MG: 2 INJECTION, SOLUTION INTRAMUSCULAR; INTRAVENOUS at 06:51

## 2020-01-24 RX ADMIN — LABETALOL HYDROCHLORIDE 10 MG: 5 INJECTION INTRAVENOUS at 00:46

## 2020-01-24 RX ADMIN — DIPHENHYDRAMINE HYDROCHLORIDE 12.5 MG: 50 INJECTION, SOLUTION INTRAMUSCULAR; INTRAVENOUS at 00:43

## 2020-01-24 RX ADMIN — MORPHINE SULFATE 1 MG: 2 INJECTION, SOLUTION INTRAMUSCULAR; INTRAVENOUS at 12:30

## 2020-01-24 RX ADMIN — ACETAMINOPHEN 650 MG: 325 TABLET, FILM COATED ORAL at 12:30

## 2020-01-24 RX ADMIN — HYDRALAZINE HYDROCHLORIDE 10 MG: 20 INJECTION INTRAMUSCULAR; INTRAVENOUS at 07:53

## 2020-01-24 RX ADMIN — CALCIUM ACETATE 667 MG: 667 CAPSULE ORAL at 12:30

## 2020-01-24 ASSESSMENT — PAIN SCALES - GENERAL
PAINLEVEL_OUTOF10: 10
PAINLEVEL_OUTOF10: 10
PAINLEVEL_OUTOF10: 0
PAINLEVEL_OUTOF10: 0
PAINLEVEL_OUTOF10: 10
PAINLEVEL_OUTOF10: 9

## 2020-01-24 ASSESSMENT — PAIN DESCRIPTION - PROGRESSION: CLINICAL_PROGRESSION: GRADUALLY WORSENING

## 2020-01-24 NOTE — CARE COORDINATION
Discharge order noted. I attempted to meet with patient in room to explain role and discuss transition of care but he was out of room at dialysis. He had a fistulagram and angioplasty to ELIU AVF done yesterday. Per Ivis , he receives dialysis [facility] @ [facility]- 7:25 am chair time confirmed w/ Larue Epley @ [employer] [phone]. Pt. does not drive- needed transportation is provided by [employer]. Mika Romero from [employer] notified of patient discharging today.    Tommy Wogn RN CM

## 2020-01-24 NOTE — FLOWSHEET NOTE
SNF Clinical Update faxed to [facility].   TLC removed   Pressure held for 10 minutes   No complications   Pressure dressing applied

## 2020-01-24 NOTE — PROGRESS NOTES
Impression   Normal aorta without aortic dissection or aneurysm.       Enlarged pulmonary artery concerning for pulmonary artery   hypertension. There is no pulmonary embolism.       Significant cardiomegaly with  patchy bibasilar infiltrates and   pleural effusion likely CHF and/or pneumonia.       Moderately enlarged mediastinal and axillary lymph nodes likely   inflammatory and 3 to 5 mm nonspecific pulmonary nodules. Consider   surveillance as clinically indicated.       Atrophy of kidney with  suggestion of renal osteodystrophy. Objective:   Vitals: BP (!) 189/106   Pulse 106   Temp 98.9 °F (37.2 °C) (Oral)   Resp 16   Ht 5' 6\" (1.676 m)   Wt 113 lb 3.2 oz (51.3 kg)   SpO2 97%   BMI 18.27 kg/m²   Skin: No rashes. No ecchymosis. Cardiovascular: Tachy. Normal S1 and S2. No murmur. Respiratory: Faint inspiratory wheezing. No rhonci or rales. Abdomen: Bowel sounds normoactive. Soft. Nontender. Nondistended. Ext: No lower extremity edema. Left upper extremity more edematous than right upper extremity. LUE AVF good bruit and thrill  Psychiatric: Mood and affect appropriate. Speech is normal.        Patient Active Problem List:     Hypertensive urgency     ESRD on hemodialysis ([address]     H/O intracranial hemorrhage and aneurysm clipping on the right side     Anxiety     History of ruptured Berry aneurysm (HCC)     Elevated C-reactive protein (CRP)     Fever and chills     Bradycardia     Noncompliance w/medication treatment due to intermit use of medication     Hypertensive urgency     Seizure ([address]     ESRD (end stage renal disease) on dialysis ([address]     AVF (arteriovenous fistula) (HCC)     Colitis     Cholecystitis     Chest pain     Renovascular hypertension     Severe protein-calorie malnutrition (HCC)     ESRD (end stage renal disease) ([address]     Dialysis AV fistula malfunction (HCC)     Renal failure    Assessment/Plans:   1.  ESRD on MWF Schedule -HD in progress     2. Edema of the LUE possible venous HTN vs outflow obstruction-s/p fistulagram and angioplasty with improvement     3. HTN with CKD G5/ESRD- minoxidil dose increased     4. Sec HPTH of Renal Origin- with Hyperphosphatemia- PO4 WNL on  binder     5. Anemia in CKD-HgB down from 9.4-->7.8-->7.7-->8.1 last 24hr-started PREETI    OK to discharge from a Renal standpoint, later today , observe FANNIE after he takes his meds post dialysis       Jose D Culver MD     Department of Internal Medicine  Section of Nephrology  Dialysis Note        PROCEDURE:  Patient seen on hemodialysis at 7:58 AM    PHYSICIAN:  Radha Ramirez M.D., [facility]    INDICATION:  End-stage renal disease    RX:  See dialysis flowsheet for specifics on access, blood flow rate, dialysate baths, duration of dialysis, anticoagulation and other technical information.     COMMENTS:  Procedure in progress and tolerated       Jose D Culver MD

## 2020-01-24 NOTE — PROGRESS NOTES
Patient still off floor for dialysis will obtain patient's vitals and give medications when patient is on the floor.

## 2020-01-24 NOTE — PROGRESS NOTES
All discharge instructions reviewed with patient at bedside. Patient verbalizes understanding of all medications and importance of follow up appointments. Patient's TLC removed. Dressing and pressure applied.

## 2020-01-25 LAB
BLOOD CULTURE, ROUTINE: NORMAL
CULTURE, BLOOD 2: NORMAL

## 2020-01-27 ENCOUNTER — TELEPHONE (OUTPATIENT)
Dept: INTERNAL MEDICINE | Age: 35
End: 2020-01-27

## 2020-01-28 ENCOUNTER — TELEPHONE (OUTPATIENT)
Dept: INTERNAL MEDICINE | Age: 35
End: 2020-01-28

## 2020-01-29 ENCOUNTER — TELEPHONE (OUTPATIENT)
Dept: INTERNAL MEDICINE | Age: 35
End: 2020-01-29

## 2020-01-29 RX ORDER — DIPHENHYDRAMINE HCL 25 MG
CAPSULE ORAL
Qty: 90 CAPSULE | Refills: 1 | Status: SHIPPED
Start: 2020-01-29 | End: 2020-02-10 | Stop reason: ALTCHOICE

## 2020-02-04 NOTE — CONSULTS
Facility-Administered Medications:     labetalol (NORMODYNE;TRANDATE) injection 20 mg, 20 mg, Intravenous, Once, Mac Tinoco DO    niCARdipine (CARDENE) 50 mg in sodium chloride 0.9 % 100 mL infusion, 5 mg/hr, Intravenous, Continuous, Shalom Oakes DO, Stopped at 01/21/20 0218    cloNIDine (CATAPRES) tablet 0.2 mg, 0.2 mg, Oral, BID, Daquan Beckford MD, 0.2 mg at 01/21/20 0902    hydrALAZINE (APRESOLINE) tablet 50 mg, 50 mg, Oral, TID, Daquan Beckford MD, 50 mg at 01/21/20 0801    losartan (COZAAR) tablet 100 mg, 100 mg, Oral, QPM, Daquan Beckford MD, 100 mg at 01/20/20 2258    pantoprazole (PROTONIX) tablet 40 mg, 40 mg, Oral, QAM AC, Daquan Beckford MD, 40 mg at 01/21/20 0801    sodium chloride flush 0.9 % injection 10 mL, 10 mL, Intravenous, 2 times per day, Daquan Beckford MD, 10 mL at 01/20/20 2259    sodium chloride flush 0.9 % injection 10 mL, 10 mL, Intravenous, PRN, Daquan Beckford MD    magnesium hydroxide (MILK OF MAGNESIA) 400 MG/5ML suspension 30 mL, 30 mL, Oral, Daily PRN, Daquan Beckford MD    ondansetron [facility]) injection 4 mg, 4 mg, Intravenous, Q6H PRN, Daquan Beckford MD, 4 mg at 01/20/20 2307    acetaminophen (TYLENOL) tablet 650 mg, 650 mg, Oral, Q4H PRN, Daquan Beckford MD    hydrALAZINE (APRESOLINE) injection 10 mg, 10 mg, Intravenous, Q4H PRN, Daquan Beckford MD    labetalol (NORMODYNE;TRANDATE) injection 10 mg, 10 mg, Intravenous, Q4H PRN, Daquan Beckford MD    perflutren lipid microspheres (DEFINITY) injection 1.65 mg, 1.5 mL, Intravenous, ONCE PRN, Daquan Beckford MD    heparin (porcine) injection 5,000 Units, 5,000 Units, Subcutaneous, 3 times per day, Daquan Beckford MD, Stopped at 01/21/20 0600    morphine (PF) injection 1 mg, 1 mg, Intravenous, Q4H PRN, Daquan Beckford MD, 1 mg at 01/21/20 0147    melatonin tablet 10 mg, 10 mg, Oral, Nightly PRN, Daquan Beckford MD, 10 mg at 01/21/20 0011        Allergies:  Patient has no known allergies.     Social History        Socioeconomic History    Marital status:        Spouse name: Not on file    Number of children: Not on file    Years of education: Not on file    Highest education level: Not on file   Occupational History    Not on file   Social Needs    Financial resource strain: Not on file    Food insecurity:       Worry: Not on file       Inability: Not on file    Transportation needs:       Medical: Not on file       Non-medical: Not on file   Tobacco Use    Smoking status: Current Every Day Smoker       Types: Cigars    Smokeless tobacco: Never Used    Tobacco comment: only smokes marijuana daily   Substance and Sexual Activity    Alcohol use: No       Frequency: Never    Drug use:  Yes       Types: Marijuana       Comment: smokes daily    Sexual activity: Yes       Partners: Female   Lifestyle    Physical activity:       Days per week: Not on file       Minutes per session: Not on file    Stress: Not on file   Relationships    Social connections:       Talks on phone: Not on file       Gets together: Not on file       Attends Jewish service: Not on file       Active member of club or organization: Not on file       Attends meetings of clubs or organizations: Not on file       Relationship status: Not on file    Intimate partner violence:       Fear of current or ex partner: Not on file       Emotionally abused: Not on file       Physically abused: Not on file       Forced sexual activity: Not on file   Other Topics Concern    Not on file   Social History Narrative    Not on file            Family History         Family History   Problem Relation Age of Onset    Kidney Disease Paternal Grandmother      Cancer Neg Hx      Heart Disease Neg Hx                 REVIEW OF SYSTEMS:    Gen: Negative for nausea, vomiting, diarrhea, fever, chills, night sweats, no weight loss or weight gain  HEENT: Negative for double vision, blurred vision, sore throat   Heart: Negative for HTN, palpitations, chest pain, or pain radiating to the arm, jaw or teeth  Lungs: Negative for wheezes, shortness of breath while at rest or lying down  GI: Negative for nausea, vomiting, diarrhea, or constipation  : Negative for dysuria, hematuria, increased frequency or urgency  Endo: Negative for polydipsia, polyuria, or heat or cold intolerances. Heme: Negative leg swelling, blood or bleeding disorders  Psych: Negative for Depression or anxiety  Ortho: Negative for pain in the joints, arthritis or gout  Vascular: See history of present illness           PHYSICAL EXAM:        Vitals:     01/21/20 0800   BP:     Pulse:     Resp:     Temp: 98.8 °F (37.1 °C)   SpO2:        GENERAL APPEARANCE:  Well-developed well-nourished alert and oriented answers questions appropriately the patient does not appear in any acute distress. HEAD: Head is normocephalic atraumatic, with Normal range of motion. EYES: Inspection of the conjunctiva and lids demonstrated no abnormalities, no jaundice, no scleral icterus, PERRL, EOMI, and vision are grossly intact. EARS:  External auditory canals demonstrate no abnormalities. Ears are well set hearing is grossly intact. SKIN:, Left arm is swollen. There is no thinning of the skin there is no signs of cellulitis normal in color, texture, and turgor, no visible lesions, no jaundice. NECK: Supple, nontender no lymphadenopathy trachea is midline no jugular venous distention no carotid bruits auscultated. LUNGS:  Clear to auscultation bilaterally no wheezes rales or rhonchi good respiratory changes noted. CARDIOVASCULAR: Currently regular rate and rhythm no murmur rub or gallop that I could appreciate. ABDOMEN: Soft nontender no rebound or guarding, positive bowel sounds no pulsatile abdominal masses. No organosplenomegaly that I can appreciate. EXTREMITIES: Bilateral palpable brachial and radial pulses. Left radial pulses slightly decreased compared to contralateral right side.   There is also thrill that present in the left arm arteriovenous access. The left arm is swollen consistent with venous hypertension. Lower extremities demonstrate palpable DP and PTs at 1+  MUSKULOSKELETAL: See above adequately aligned spine, range of motion appears to be intact with regards to the spine and upper and lower extremities. No joint tenderness or erythema. Normal muscular development. NEURO: Cranial nerves II through XII grossly intact. Strength and sensation are symmetric and intact throughout. Psychiatric: Mental examination revealed the patient was oriented to person, place, and time. The patient was able to demonstrate adequate judgment and reason, without any hallucinations abnormal affect or abnormal behavior on today's exam.        LABS:          Lab Results   Component Value Date     WBC 8.2 01/21/2020     HGB 7.8 (L) 01/21/2020     HCT 25.4 (L) 01/21/2020      01/21/2020     PROTIME 11.6 03/15/2016     INR 1.1 03/15/2016     APTT 34.3 03/15/2016     K 4.4 01/21/2020     BUN 31 (H) 01/21/2020     CREATININE 7.2 (H) 01/21/2020         RADIOLOGY:  CT HEAD WO CONTRAST   Final Result   1. There is IV contrast enhancement of the vessels in the meningeal   surfaces of the patient had a recent CTA of the chest.       2. There appears to be no significant interval changes since the   previous study of January 2023 2018.       3. No indication for an acute intracranial event accounts some   limitations to the previous IV contrast as above discussed.       CTA CHEST W CONTRAST   Final Result   Normal aorta without aortic dissection or aneurysm.       Enlarged pulmonary artery concerning for pulmonary artery   hypertension. There is no pulmonary embolism.       Significant cardiomegaly with  patchy bibasilar infiltrates and   pleural effusion likely CHF and/or pneumonia.       Moderately enlarged mediastinal and axillary lymph nodes likely   inflammatory and 3 to 5 mm nonspecific pulmonary nodules.  Consider surveillance as clinically indicated.       Atrophy of kidney with  suggestion of renal osteodystrophy.               US DUP UPPER EXTREMITY LEFT VENOUS   Final Result   Negative for evidence of deep venous thrombosis in the  left upper   extremity by color and spectral Doppler, as well as 2-D grayscale   ultrasound imaging.                   XR CHEST STANDARD (2 VW)   Final Result   Increasing cardiomegaly and central vascular congestion is suspicious   for early cardiac decompensation in this young patient.       Density in the spine appears slightly increased, and may be   physiologic, such as with hyperparathyroidism.       NM LUNG VENT/PERFUSION W QUANTITATIVE DIFF FUNCTION    (Results Pending)         IMPRESSION:        Active Hospital Problems     Diagnosis    Hypertensive urgency [I16.0]       Priority: High    Dialysis AV fistula malfunction ([facility] [address] [T82.590A]         PLAN: #1 left arm venous hypertension. At this point they can use his access. He has not had any significant prolonged bleeding other than left arm swelling. We will schedule him for a fistulogram and be transferred down to [address] [place] [address] once he is improving from his hypertensive crisis.     I reviewed the procedure with the patient. I discussed the risks, benefits, and alternatives of the procedure. The patient understands and consents.   All questions were answered.        Electronically signed by Mary Warner MD on 1/21/2020 at 9:13 AM

## 2020-02-05 ENCOUNTER — OFFICE VISIT (OUTPATIENT)
Dept: INTERNAL MEDICINE | Age: 35
End: 2020-02-05
Payer: MEDICAID

## 2020-02-05 VITALS
RESPIRATION RATE: 16 BRPM | WEIGHT: 112.1 LBS | HEIGHT: 67 IN | BODY MASS INDEX: 17.6 KG/M2 | SYSTOLIC BLOOD PRESSURE: 140 MMHG | TEMPERATURE: 97.5 F | HEART RATE: 94 BPM | DIASTOLIC BLOOD PRESSURE: 66 MMHG

## 2020-02-05 PROCEDURE — G8419 CALC BMI OUT NRM PARAM NOF/U: HCPCS | Performed by: INTERNAL MEDICINE

## 2020-02-05 PROCEDURE — 99212 OFFICE O/P EST SF 10 MIN: CPT | Performed by: INTERNAL MEDICINE

## 2020-02-05 PROCEDURE — 1111F DSCHRG MED/CURRENT MED MERGE: CPT | Performed by: INTERNAL MEDICINE

## 2020-02-05 PROCEDURE — 4004F PT TOBACCO SCREEN RCVD TLK: CPT | Performed by: INTERNAL MEDICINE

## 2020-02-05 PROCEDURE — G8482 FLU IMMUNIZE ORDER/ADMIN: HCPCS | Performed by: INTERNAL MEDICINE

## 2020-02-05 PROCEDURE — G8427 DOCREV CUR MEDS BY ELIG CLIN: HCPCS | Performed by: INTERNAL MEDICINE

## 2020-02-05 RX ORDER — MINOXIDIL 2.5 MG/1
5 TABLET ORAL 2 TIMES DAILY
Qty: 30 TABLET | Refills: 3 | Status: SHIPPED
Start: 2020-02-05 | End: 2020-03-10 | Stop reason: ALTCHOICE

## 2020-02-05 RX ORDER — HYDRALAZINE HYDROCHLORIDE 50 MG/1
50 TABLET, FILM COATED ORAL 3 TIMES DAILY
Qty: 270 TABLET | Refills: 0 | Status: CANCELLED | OUTPATIENT
Start: 2020-02-05

## 2020-02-05 ASSESSMENT — PATIENT HEALTH QUESTIONNAIRE - PHQ9
SUM OF ALL RESPONSES TO PHQ QUESTIONS 1-9: 0
SUM OF ALL RESPONSES TO PHQ9 QUESTIONS 1 & 2: 0
1. LITTLE INTEREST OR PLEASURE IN DOING THINGS: 0
SUM OF ALL RESPONSES TO PHQ QUESTIONS 1-9: 0
2. FEELING DOWN, DEPRESSED OR HOPELESS: 0

## 2020-02-05 ASSESSMENT — ENCOUNTER SYMPTOMS
NAUSEA: 0
SHORTNESS OF BREATH: 0
SINUS PAIN: 0
CONSTIPATION: 0
RHINORRHEA: 0
VOMITING: 0
STRIDOR: 0
COUGH: 0
DIARRHEA: 0
ABDOMINAL PAIN: 0
WHEEZING: 0
BLOOD IN STOOL: 0

## 2020-02-05 NOTE — PROGRESS NOTES
All instructions reviewed with pt by dr Gaurang Bella  Pt instructed to stop at the  to schedule his fu appt and  his printed avs
MD Ghada Hernandez [facility]  [facility]    Attending Physician's Statement  I have discussed the case, including pertinent history and physical exam findings with the medical resident. I agree with the assessment, plan and orders as documented by the resident. I have reviewed all the pertinent PMHx, PSHx, Family Hx, Social Hx, medications and allergies, and updated history as appropriate. Patient here for follow up of recent hospitalization for hypertensive urgency and malfunctioning AV fistula. He was found to have left subclavian vein stenosis on - underwent balloon angioplasty. Vital signs, pertinent lab results and visit related imaging studies have been reviewed. Patient doing fine now. Blood pressure well controlled. Patient gets HD three times a week. Medical problems, physical exam, assessment and plan per medical resident's note.     Shen Ni MD  [facility]  2/5/2020
hours             lidocaine-prilocaine (EMLA) 2.5-2.5 % cream  Apply to AVF site prior to dialysis             losartan (COZAAR) 100 MG tablet  Take 1 tablet by mouth every evening             minoxidil (LONITEN) 2.5 MG tablet  Take 2 tablets by mouth 2 times daily             NIFEdipine (ADALAT CC) 60 MG extended release tablet  Take 60 mg by mouth daily             pantoprazole (PROTONIX) 40 MG tablet  TAKE 1 TABLET BY MOUTH EVERY MORNING BEFORE BREAKFAST             sevelamer (RENVELA) 800 MG tablet  Take 1,600 mg by mouth 3 times daily                    Medications marked \"taking\" at this time  Outpatient Medications Marked as Taking for the 2/5/20 encounter (Office Visit) with Guilherme Rich MD   Medication Sig Dispense Refill    diphenhydrAMINE (BANOPHEN) 25 MG capsule TAKE 1 CAPSULE BY MOUTH THREE TIMES DAILY AS NEEDED FOR ITCHING 90 capsule 1    minoxidil (LONITEN) 2.5 MG tablet Take 2 tablets by mouth 2 times daily 30 tablet 3    labetalol (NORMODYNE) 200 MG tablet Take 1 tablet by mouth every 8 hours 60 tablet 3    pantoprazole (PROTONIX) 40 MG tablet TAKE 1 TABLET BY MOUTH EVERY MORNING BEFORE BREAKFAST 90 tablet 0    cloNIDine (CATAPRES) 0.2 MG tablet Take 1 tablet by mouth 2 times daily 180 tablet 1    lidocaine-prilocaine (EMLA) 2.5-2.5 % cream Apply to AVF site prior to dialysis      sevelamer (RENVELA) 800 MG tablet Take 1,600 mg by mouth 3 times daily       hydrALAZINE (APRESOLINE) 50 MG tablet Take 1 tablet by mouth 3 times daily 270 tablet 0        Medications patient taking as of now reconciled against medications ordered at time of hospital discharge: Yes    Chief Complaint   Patient presents with    Follow-Up from [facility] Hypertension       [facility]    Inpatient course: Discharge summary reviewed- see chart. Patient Matilde Mc is a 37 y. o. male with a history HTN, ESRD on HD, brain aneurysm rupture s/p clipping. Admitted 1/20/20 -1/24/20 with chest pains and headache.  BP was 200

## 2020-02-05 NOTE — PATIENT INSTRUCTIONS
Patient Education   Discharge instructions    Continue current medication  Have lab work done before next visit  Follow up with us in 3 months  Follow up with Dr. Amie Rudolph: Care Instructions  Your Care Instructions    Getting around your home safely can be a challenge if you have injuries or health problems that make it easy for you to fall. Loose rugs and furniture in walkways are among the dangers for many older people who have problems walking or who have poor eyesight. People who have conditions such as arthritis, osteoporosis, or dementia also have to be careful not to fall. You can make your home safer with a few simple measures. Follow-up care is a key part of your treatment and safety. Be sure to make and go to all appointments, and call your doctor if you are having problems. It's also a good idea to know your test results and keep a list of the medicines you take. How can you care for yourself at home? Taking care of yourself  · You may get dizzy if you do not drink enough water. To prevent dehydration, drink plenty of fluids, enough so that your urine is light yellow or clear like water. Choose water and other caffeine-free clear liquids. If you have kidney, heart, or liver disease and have to limit fluids, talk with your doctor before you increase the amount of fluids you drink. · Exercise regularly to improve your strength, muscle tone, and balance. Walk if you can. Swimming may be a good choice if you cannot walk easily. · Have your vision and hearing checked each year or any time you notice a change. If you have trouble seeing and hearing, you might not be able to avoid objects and could lose your balance. · Know the side effects of the medicines you take. Ask your doctor or pharmacist whether the medicines you take can affect your balance. Sleeping pills or sedatives can affect your balance. · Limit the amount of alcohol you drink.  Alcohol can impair your balance and other

## 2020-02-10 ENCOUNTER — APPOINTMENT (OUTPATIENT)
Dept: ULTRASOUND IMAGING | Age: 35
DRG: 466 | End: 2020-02-10
Payer: MEDICAID

## 2020-02-10 ENCOUNTER — TELEPHONE (OUTPATIENT)
Dept: OTHER | Facility: CLINIC | Age: 35
End: 2020-02-10

## 2020-02-10 ENCOUNTER — APPOINTMENT (OUTPATIENT)
Dept: CT IMAGING | Age: 35
DRG: 466 | End: 2020-02-10
Payer: MEDICAID

## 2020-02-10 ENCOUNTER — APPOINTMENT (OUTPATIENT)
Dept: GENERAL RADIOLOGY | Age: 35
DRG: 466 | End: 2020-02-10
Payer: MEDICAID

## 2020-02-10 ENCOUNTER — HOSPITAL ENCOUNTER (INPATIENT)
Age: 35
LOS: 4 days | Discharge: HOME | DRG: 466 | End: 2020-02-14
Attending: EMERGENCY MEDICINE | Admitting: INTERNAL MEDICINE
Payer: MEDICAID

## 2020-02-10 PROBLEM — J96.01 ACUTE RESPIRATORY FAILURE WITH HYPOXEMIA (HCC): Status: ACTIVE | Noted: 2020-02-10

## 2020-02-10 LAB
ABO/RH: NORMAL
ALBUMIN SERPL-MCNC: 4.1 G/DL (ref 3.5–5.2)
ALP BLD-CCNC: 148 U/L (ref 40–129)
ALT SERPL-CCNC: 26 U/L (ref 0–40)
ANION GAP SERPL CALCULATED.3IONS-SCNC: 10 MMOL/L (ref 7–16)
ANTIBODY SCREEN: NORMAL
APTT: 36.9 SEC (ref 24.5–35.1)
AST SERPL-CCNC: 44 U/L (ref 0–39)
BASOPHILS ABSOLUTE: 0.02 E9/L (ref 0–0.2)
BASOPHILS RELATIVE PERCENT: 0.3 % (ref 0–2)
BILIRUB SERPL-MCNC: 0.5 MG/DL (ref 0–1.2)
BUN BLDV-MCNC: 31 MG/DL (ref 6–20)
CALCIUM SERPL-MCNC: 9.1 MG/DL (ref 8.6–10.2)
CHLORIDE BLD-SCNC: 98 MMOL/L (ref 98–107)
CO2: 32 MMOL/L (ref 22–29)
CREAT SERPL-MCNC: 7 MG/DL (ref 0.7–1.2)
EKG ATRIAL RATE: 96 BPM
EKG P AXIS: 79 DEGREES
EKG P-R INTERVAL: 182 MS
EKG Q-T INTERVAL: 356 MS
EKG QRS DURATION: 86 MS
EKG QTC CALCULATION (BAZETT): 449 MS
EKG R AXIS: 79 DEGREES
EKG T AXIS: 91 DEGREES
EKG VENTRICULAR RATE: 96 BPM
EOSINOPHILS ABSOLUTE: 0.14 E9/L (ref 0.05–0.5)
EOSINOPHILS RELATIVE PERCENT: 2 % (ref 0–6)
GFR AFRICAN AMERICAN: 11
GFR NON-AFRICAN AMERICAN: 11 ML/MIN/1.73
GLUCOSE BLD-MCNC: 87 MG/DL (ref 74–99)
HCT VFR BLD CALC: 30.9 % (ref 37–54)
HEMOGLOBIN: 9.3 G/DL (ref 12.5–16.5)
IMMATURE GRANULOCYTES #: 0.02 E9/L
IMMATURE GRANULOCYTES %: 0.3 % (ref 0–5)
INR BLD: 1.1
LACTIC ACID, SEPSIS: 1.1 MMOL/L (ref 0.5–1.9)
LYMPHOCYTES ABSOLUTE: 1.2 E9/L (ref 1.5–4)
LYMPHOCYTES RELATIVE PERCENT: 16.9 % (ref 20–42)
MCH RBC QN AUTO: 30.7 PG (ref 26–35)
MCHC RBC AUTO-ENTMCNC: 30.1 % (ref 32–34.5)
MCV RBC AUTO: 102 FL (ref 80–99.9)
MONOCYTES ABSOLUTE: 0.47 E9/L (ref 0.1–0.95)
MONOCYTES RELATIVE PERCENT: 6.6 % (ref 2–12)
NEUTROPHILS ABSOLUTE: 5.24 E9/L (ref 1.8–7.3)
NEUTROPHILS RELATIVE PERCENT: 73.9 % (ref 43–80)
PDW BLD-RTO: 17.7 FL (ref 11.5–15)
PLATELET # BLD: 212 E9/L (ref 130–450)
PMV BLD AUTO: 9.6 FL (ref 7–12)
POTASSIUM SERPL-SCNC: 6.1 MMOL/L (ref 3.5–5)
PRO-BNP: ABNORMAL PG/ML (ref 0–125)
PROCALCITONIN: 0.58 NG/ML (ref 0–0.08)
PROTHROMBIN TIME: 12.6 SEC (ref 9.3–12.4)
RBC # BLD: 3.03 E12/L (ref 3.8–5.8)
SODIUM BLD-SCNC: 140 MMOL/L (ref 132–146)
TOTAL PROTEIN: 8.3 G/DL (ref 6.4–8.3)
TROPONIN: <0.01 NG/ML (ref 0–0.03)
WBC # BLD: 7.1 E9/L (ref 4.5–11.5)

## 2020-02-10 PROCEDURE — 6370000000 HC RX 637 (ALT 250 FOR IP): Performed by: EMERGENCY MEDICINE

## 2020-02-10 PROCEDURE — 2500000003 HC RX 250 WO HCPCS: Performed by: INTERNAL MEDICINE

## 2020-02-10 PROCEDURE — 85730 THROMBOPLASTIN TIME PARTIAL: CPT

## 2020-02-10 PROCEDURE — 86901 BLOOD TYPING SEROLOGIC RH(D): CPT

## 2020-02-10 PROCEDURE — 6370000000 HC RX 637 (ALT 250 FOR IP): Performed by: INTERNAL MEDICINE

## 2020-02-10 PROCEDURE — 6360000004 HC RX CONTRAST MEDICATION: Performed by: RADIOLOGY

## 2020-02-10 PROCEDURE — 5A1D70Z PERFORMANCE OF URINARY FILTRATION, INTERMITTENT, LESS THAN 6 HOURS PER DAY: ICD-10-PCS | Performed by: INTERNAL MEDICINE

## 2020-02-10 PROCEDURE — 84145 PROCALCITONIN (PCT): CPT

## 2020-02-10 PROCEDURE — 2580000003 HC RX 258: Performed by: EMERGENCY MEDICINE

## 2020-02-10 PROCEDURE — 87040 BLOOD CULTURE FOR BACTERIA: CPT

## 2020-02-10 PROCEDURE — 96365 THER/PROPH/DIAG IV INF INIT: CPT

## 2020-02-10 PROCEDURE — 71275 CT ANGIOGRAPHY CHEST: CPT

## 2020-02-10 PROCEDURE — 82746 ASSAY OF FOLIC ACID SERUM: CPT

## 2020-02-10 PROCEDURE — 85025 COMPLETE CBC W/AUTO DIFF WBC: CPT

## 2020-02-10 PROCEDURE — 2500000003 HC RX 250 WO HCPCS: Performed by: EMERGENCY MEDICINE

## 2020-02-10 PROCEDURE — 96368 THER/DIAG CONCURRENT INF: CPT

## 2020-02-10 PROCEDURE — 84484 ASSAY OF TROPONIN QUANT: CPT

## 2020-02-10 PROCEDURE — 80053 COMPREHEN METABOLIC PANEL: CPT

## 2020-02-10 PROCEDURE — 2060000000 HC ICU INTERMEDIATE R&B

## 2020-02-10 PROCEDURE — 93971 EXTREMITY STUDY: CPT

## 2020-02-10 PROCEDURE — 96366 THER/PROPH/DIAG IV INF ADDON: CPT

## 2020-02-10 PROCEDURE — 6360000002 HC RX W HCPCS: Performed by: EMERGENCY MEDICINE

## 2020-02-10 PROCEDURE — 36415 COLL VENOUS BLD VENIPUNCTURE: CPT

## 2020-02-10 PROCEDURE — 2580000003 HC RX 258: Performed by: INTERNAL MEDICINE

## 2020-02-10 PROCEDURE — 83605 ASSAY OF LACTIC ACID: CPT

## 2020-02-10 PROCEDURE — 99222 1ST HOSP IP/OBS MODERATE 55: CPT | Performed by: SURGERY

## 2020-02-10 PROCEDURE — 99285 EMERGENCY DEPT VISIT HI MDM: CPT

## 2020-02-10 PROCEDURE — 96375 TX/PRO/DX INJ NEW DRUG ADDON: CPT

## 2020-02-10 PROCEDURE — 71045 X-RAY EXAM CHEST 1 VIEW: CPT

## 2020-02-10 PROCEDURE — 82607 VITAMIN B-12: CPT

## 2020-02-10 PROCEDURE — 86850 RBC ANTIBODY SCREEN: CPT

## 2020-02-10 PROCEDURE — 6360000002 HC RX W HCPCS: Performed by: INTERNAL MEDICINE

## 2020-02-10 PROCEDURE — 86900 BLOOD TYPING SEROLOGIC ABO: CPT

## 2020-02-10 PROCEDURE — 83880 ASSAY OF NATRIURETIC PEPTIDE: CPT

## 2020-02-10 PROCEDURE — 93005 ELECTROCARDIOGRAM TRACING: CPT | Performed by: EMERGENCY MEDICINE

## 2020-02-10 PROCEDURE — 85610 PROTHROMBIN TIME: CPT

## 2020-02-10 PROCEDURE — 90935 HEMODIALYSIS ONE EVALUATION: CPT

## 2020-02-10 RX ORDER — MINOXIDIL 2.5 MG/1
5 TABLET ORAL 2 TIMES DAILY
Status: DISCONTINUED | OUTPATIENT
Start: 2020-02-10 | End: 2020-02-14

## 2020-02-10 RX ORDER — ONDANSETRON 2 MG/ML
4 INJECTION INTRAMUSCULAR; INTRAVENOUS EVERY 6 HOURS PRN
Status: DISCONTINUED | OUTPATIENT
Start: 2020-02-10 | End: 2020-02-14 | Stop reason: HOSPADM

## 2020-02-10 RX ORDER — HYDRALAZINE HYDROCHLORIDE 50 MG/1
50 TABLET, FILM COATED ORAL 3 TIMES DAILY
Status: DISCONTINUED | OUTPATIENT
Start: 2020-02-10 | End: 2020-02-14 | Stop reason: HOSPADM

## 2020-02-10 RX ORDER — CLONIDINE HYDROCHLORIDE 0.3 MG/1
0.3 TABLET ORAL 3 TIMES DAILY
COMMUNITY
End: 2021-03-30 | Stop reason: SDUPTHER

## 2020-02-10 RX ORDER — LABETALOL HYDROCHLORIDE 5 MG/ML
20 INJECTION, SOLUTION INTRAVENOUS ONCE
Status: COMPLETED | OUTPATIENT
Start: 2020-02-10 | End: 2020-02-10

## 2020-02-10 RX ORDER — DIPHENHYDRAMINE HYDROCHLORIDE 50 MG/ML
12.5 INJECTION INTRAMUSCULAR; INTRAVENOUS ONCE
Status: COMPLETED | OUTPATIENT
Start: 2020-02-10 | End: 2020-02-10

## 2020-02-10 RX ORDER — DIPHENHYDRAMINE HCL 25 MG
25 CAPSULE ORAL EVERY 6 HOURS PRN
COMMUNITY
End: 2020-03-17

## 2020-02-10 RX ORDER — LANOLIN ALCOHOL/MO/W.PET/CERES
3 CREAM (GRAM) TOPICAL NIGHTLY PRN
Status: DISCONTINUED | OUTPATIENT
Start: 2020-02-10 | End: 2020-02-14 | Stop reason: HOSPADM

## 2020-02-10 RX ORDER — CLONIDINE HYDROCHLORIDE 0.1 MG/1
0.2 TABLET ORAL EVERY 4 HOURS PRN
Status: DISCONTINUED | OUTPATIENT
Start: 2020-02-10 | End: 2020-02-14 | Stop reason: HOSPADM

## 2020-02-10 RX ORDER — SODIUM CHLORIDE 0.9 % (FLUSH) 0.9 %
10 SYRINGE (ML) INJECTION EVERY 12 HOURS SCHEDULED
Status: DISCONTINUED | OUTPATIENT
Start: 2020-02-10 | End: 2020-02-14 | Stop reason: HOSPADM

## 2020-02-10 RX ORDER — HYDRALAZINE HYDROCHLORIDE 20 MG/ML
10 INJECTION INTRAMUSCULAR; INTRAVENOUS EVERY 4 HOURS PRN
Status: DISCONTINUED | OUTPATIENT
Start: 2020-02-10 | End: 2020-02-10

## 2020-02-10 RX ORDER — NIFEDIPINE 60 MG/1
60 TABLET, EXTENDED RELEASE ORAL DAILY
Status: DISCONTINUED | OUTPATIENT
Start: 2020-02-11 | End: 2020-02-14 | Stop reason: HOSPADM

## 2020-02-10 RX ORDER — HEPARIN SODIUM 10000 [USP'U]/ML
5000 INJECTION, SOLUTION INTRAVENOUS; SUBCUTANEOUS EVERY 12 HOURS SCHEDULED
Status: DISCONTINUED | OUTPATIENT
Start: 2020-02-10 | End: 2020-02-14 | Stop reason: HOSPADM

## 2020-02-10 RX ORDER — LABETALOL HYDROCHLORIDE 5 MG/ML
10 INJECTION, SOLUTION INTRAVENOUS EVERY 4 HOURS PRN
Status: DISCONTINUED | OUTPATIENT
Start: 2020-02-10 | End: 2020-02-14 | Stop reason: HOSPADM

## 2020-02-10 RX ORDER — CALCITRIOL 0.25 UG/1
1 CAPSULE, LIQUID FILLED ORAL
COMMUNITY
End: 2021-03-15

## 2020-02-10 RX ORDER — CLONIDINE HYDROCHLORIDE 0.2 MG/1
0.2 TABLET ORAL 2 TIMES DAILY
Status: DISCONTINUED | OUTPATIENT
Start: 2020-02-10 | End: 2020-02-14 | Stop reason: HOSPADM

## 2020-02-10 RX ORDER — SODIUM CHLORIDE 0.9 % (FLUSH) 0.9 %
10 SYRINGE (ML) INJECTION PRN
Status: DISCONTINUED | OUTPATIENT
Start: 2020-02-10 | End: 2020-02-14 | Stop reason: HOSPADM

## 2020-02-10 RX ORDER — ASPIRIN 325 MG
325 TABLET ORAL ONCE
Status: COMPLETED | OUTPATIENT
Start: 2020-02-10 | End: 2020-02-10

## 2020-02-10 RX ORDER — IBUPROFEN 400 MG/1
400 TABLET ORAL EVERY 8 HOURS PRN
Status: DISCONTINUED | OUTPATIENT
Start: 2020-02-10 | End: 2020-02-14 | Stop reason: HOSPADM

## 2020-02-10 RX ORDER — CALCIUM CARBONATE 200(500)MG
1 TABLET,CHEWABLE ORAL 2 TIMES DAILY
COMMUNITY
End: 2021-02-01

## 2020-02-10 RX ORDER — LABETALOL 100 MG/1
200 TABLET, FILM COATED ORAL EVERY 8 HOURS SCHEDULED
Status: DISCONTINUED | OUTPATIENT
Start: 2020-02-10 | End: 2020-02-12

## 2020-02-10 RX ORDER — PANTOPRAZOLE SODIUM 40 MG/1
40 TABLET, DELAYED RELEASE ORAL
Status: DISCONTINUED | OUTPATIENT
Start: 2020-02-11 | End: 2020-02-14 | Stop reason: HOSPADM

## 2020-02-10 RX ORDER — DIPHENHYDRAMINE HYDROCHLORIDE 50 MG/ML
25 INJECTION INTRAMUSCULAR; INTRAVENOUS ONCE
Status: COMPLETED | OUTPATIENT
Start: 2020-02-10 | End: 2020-02-10

## 2020-02-10 RX ORDER — HYDRALAZINE HYDROCHLORIDE 20 MG/ML
20 INJECTION INTRAMUSCULAR; INTRAVENOUS EVERY 4 HOURS PRN
Status: DISCONTINUED | OUTPATIENT
Start: 2020-02-10 | End: 2020-02-14 | Stop reason: HOSPADM

## 2020-02-10 RX ORDER — FENTANYL CITRATE 50 UG/ML
25 INJECTION, SOLUTION INTRAMUSCULAR; INTRAVENOUS ONCE
Status: COMPLETED | OUTPATIENT
Start: 2020-02-10 | End: 2020-02-10

## 2020-02-10 RX ORDER — IPRATROPIUM BROMIDE AND ALBUTEROL SULFATE 2.5; .5 MG/3ML; MG/3ML
1 SOLUTION RESPIRATORY (INHALATION)
Status: DISCONTINUED | OUTPATIENT
Start: 2020-02-11 | End: 2020-02-13

## 2020-02-10 RX ADMIN — ASPIRIN 325 MG: 325 TABLET, FILM COATED ORAL at 10:41

## 2020-02-10 RX ADMIN — SODIUM CHLORIDE, PRESERVATIVE FREE 10 ML: 5 INJECTION INTRAVENOUS at 23:21

## 2020-02-10 RX ADMIN — DIPHENHYDRAMINE HYDROCHLORIDE 25 MG: 50 INJECTION, SOLUTION INTRAMUSCULAR; INTRAVENOUS at 10:39

## 2020-02-10 RX ADMIN — DIPHENHYDRAMINE HYDROCHLORIDE 12.5 MG: 50 INJECTION INTRAMUSCULAR; INTRAVENOUS at 18:29

## 2020-02-10 RX ADMIN — IBUPROFEN 400 MG: 400 TABLET ORAL at 20:01

## 2020-02-10 RX ADMIN — CLONIDINE HYDROCHLORIDE 0.2 MG: 0.1 TABLET ORAL at 22:09

## 2020-02-10 RX ADMIN — IOPAMIDOL 60 ML: 755 INJECTION, SOLUTION INTRAVENOUS at 12:20

## 2020-02-10 RX ADMIN — LABETALOL HYDROCHLORIDE 200 MG: 100 TABLET, FILM COATED ORAL at 16:31

## 2020-02-10 RX ADMIN — FENTANYL CITRATE 25 MCG: 50 INJECTION, SOLUTION INTRAMUSCULAR; INTRAVENOUS at 10:39

## 2020-02-10 RX ADMIN — DOXYCYCLINE 100 MG: 100 INJECTION, POWDER, LYOPHILIZED, FOR SOLUTION INTRAVENOUS at 10:40

## 2020-02-10 RX ADMIN — CEFTRIAXONE SODIUM 1 G: 1 INJECTION, POWDER, FOR SOLUTION INTRAMUSCULAR; INTRAVENOUS at 10:39

## 2020-02-10 RX ADMIN — LABETALOL HYDROCHLORIDE 20 MG: 5 INJECTION INTRAVENOUS at 18:20

## 2020-02-10 RX ADMIN — LABETALOL HYDROCHLORIDE 20 MG: 5 INJECTION INTRAVENOUS at 23:20

## 2020-02-10 RX ADMIN — HYDRALAZINE HYDROCHLORIDE 20 MG: 20 INJECTION INTRAMUSCULAR; INTRAVENOUS at 19:27

## 2020-02-10 RX ADMIN — HYDRALAZINE HYDROCHLORIDE 50 MG: 50 TABLET, FILM COATED ORAL at 22:56

## 2020-02-10 ASSESSMENT — PAIN SCALES - GENERAL
PAINLEVEL_OUTOF10: 10
PAINLEVEL_OUTOF10: 0
PAINLEVEL_OUTOF10: 0
PAINLEVEL_OUTOF10: 10
PAINLEVEL_OUTOF10: 10

## 2020-02-10 ASSESSMENT — PAIN DESCRIPTION - LOCATION: LOCATION: CHEST

## 2020-02-10 ASSESSMENT — PAIN DESCRIPTION - PAIN TYPE: TYPE: ACUTE PAIN

## 2020-02-10 NOTE — ED PROVIDER NOTES
drink alcohol. Family History: family history includes Kidney Disease in his paternal grandmother. . Unless otherwise noted, family history is non contributory    The patients home medications have been reviewed. Allergies: Tylenol [acetaminophen]        ---------------------------------------------------PHYSICAL EXAM--------------------------------------    Constitutional/General: Alert and oriented x3  Head: Normocephalic and atraumatic  Eyes: PERRL, EOMI, sclera non icteric  Mouth: Oropharynx clear, handling secretions  Neck: Supple, full ROM, no stridor, no meningeal signs  Respiratory: Lungs with rales and rhonchi bilaterally. Not in respiratory distress  Cardiovascular:  Regular rate. Regular rhythm. No murmurs, no aortic murmurs, no gallops, or rubs. 2+ distal pulses. Equal extremity pulses. Chest: No chest wall tenderness  Gastrointestinal:  Abdomen Soft, Non tender, Non distended. No rebound, guarding, or rigidity. No pulsatile masses. Musculoskeletal: Moves all extremities x 4. Warm and well perfused, no clubbing, cyanosis, or edema. Capillary refill <3 seconds. Left upper extremity with swelling compared to the right. Dialysis fistula in place with a positive bruit and thrill. Normal palpable pulses. Neurovascularly intact distally. 2+ radial pulses. Capillary refill <3 secondary. Warm and well perfused. Median, radial, ulnar nerves intact. Sensation intact to light touch. 5/5 strength. Cardinal movements of the hand intact. No evidence of compartment syndrome. All flexor and extensor mechanisms intact. Skin: skin warm and dry. No rashes. Neurologic: GCS 15, no focal deficits,        -------------------------------------------------- RESULTS -------------------------------------------------  I have personally reviewed all laboratory and imaging results for this patient. Results are listed below.      LABS: (Lab results interpreted by me)  Results for orders placed or performed during the hospital encounter of 02/10/20   CBC Auto Differential   Result Value Ref Range    WBC 7.1 4.5 - 11.5 E9/L    RBC 3.03 (L) 3.80 - 5.80 E12/L    Hemoglobin 9.3 (L) 12.5 - 16.5 g/dL    Hematocrit 30.9 (L) 37.0 - 54.0 %    .0 (H) 80.0 - 99.9 fL    MCH 30.7 26.0 - 35.0 pg    MCHC 30.1 (L) 32.0 - 34.5 %    RDW 17.7 (H) 11.5 - 15.0 fL    Platelets 123 505 - 459 E9/L    MPV 9.6 7.0 - 12.0 fL    Neutrophils % 73.9 43.0 - 80.0 %    Immature Granulocytes % 0.3 0.0 - 5.0 %    Lymphocytes % 16.9 (L) 20.0 - 42.0 %    Monocytes % 6.6 2.0 - 12.0 %    Eosinophils % 2.0 0.0 - 6.0 %    Basophils % 0.3 0.0 - 2.0 %    Neutrophils Absolute 5.24 1.80 - 7.30 E9/L    Immature Granulocytes # 0.02 E9/L    Lymphocytes Absolute 1.20 (L) 1.50 - 4.00 E9/L    Monocytes Absolute 0.47 0.10 - 0.95 E9/L    Eosinophils Absolute 0.14 0.05 - 0.50 E9/L    Basophils Absolute 0.02 0.00 - 0.20 E9/L   Comprehensive Metabolic Panel   Result Value Ref Range    Sodium 140 132 - 146 mmol/L    Potassium 6.1 (H) 3.5 - 5.0 mmol/L    Chloride 98 98 - 107 mmol/L    CO2 32 (H) 22 - 29 mmol/L    Anion Gap 10 7 - 16 mmol/L    Glucose 87 74 - 99 mg/dL    BUN 31 (H) 6 - 20 mg/dL    CREATININE 7.0 (H) 0.7 - 1.2 mg/dL    GFR Non-African American 11 >=60 mL/min/1.73    GFR African American 11     Calcium 9.1 8.6 - 10.2 mg/dL    Total Protein 8.3 6.4 - 8.3 g/dL    Alb 4.1 3.5 - 5.2 g/dL    Total Bilirubin 0.5 0.0 - 1.2 mg/dL    Alkaline Phosphatase 148 (H) 40 - 129 U/L    ALT 26 0 - 40 U/L    AST 44 (H) 0 - 39 U/L   Troponin   Result Value Ref Range    Troponin <0.01 0.00 - 0.03 ng/mL   Protime-INR   Result Value Ref Range    Protime 12.6 (H) 9.3 - 12.4 sec    INR 1.1    APTT   Result Value Ref Range    aPTT 36.9 (H) 24.5 - 35.1 sec   Lactate, Sepsis   Result Value Ref Range    Lactic Acid, Sepsis 1.1 0.5 - 1.9 mmol/L   Procalcitonin   Result Value Ref Range    Procalcitonin 0.58 (H) 0.00 - 0.08 ng/mL   Brain Natriuretic Peptide   Result Value Ref Range    Pro-BNP >70,000 (H) 0 - 125 pg/mL   EKG 12 Lead   Result Value Ref Range    Ventricular Rate 96 BPM    Atrial Rate 96 BPM    P-R Interval 182 ms    QRS Duration 86 ms    Q-T Interval 356 ms    QTc Calculation (Bazett) 449 ms    P Axis 79 degrees    R Axis 79 degrees    T Axis 91 degrees   TYPE AND SCREEN   Result Value Ref Range    ABO/Rh A POS     Antibody Screen NEG    ,       RADIOLOGY:  Interpreted by Radiologist unless otherwise specified  CTA PULMONARY W CONTRAST   Final Result   There is no central pulmonary embolism or aortic dissection. Cardiomegaly with pericardial effusion with  patchy bilateral   infiltrates and pleural effusion likely CHF and/or pneumonia. Atrophic kidneys with renal osteodystrophy. US DUP UPPER EXTREMITY LEFT VENOUS   Final Result   No evidence for deep venous thrombosis               XR CHEST PORTABLE   Final Result   Tortuous ectatic aorta   Cardiomegaly   Airspace disease compatible with pneumonia, which appears to be   patchy, worse at the lung bases on both the right and the left.  Less   likely edema would give this appearance                           EKG Interpretation  Interpreted by emergency department physician, Dr. Shadi Kowalski     Date of EK/10/20  Time: 927    Rhythm: normal sinus   Rate: normal  Axis: normal  Conduction: normal  ST Segments: no acute change  T Waves: no acute change    Clinical Impression: Sinus rhythm, no acute ischemic changes    Comparison to prior EKG: stable as compared to patient's most recent EKG      ------------------------- NURSING NOTES AND VITALS REVIEWED ---------------------------   The nursing notes within the ED encounter and vital signs as below have been reviewed by myself  BP (!) 120/97   Pulse 99   Temp 98.3 °F (36.8 °C) (Infrared)   Resp 20   Ht 5' 6\" (1.676 m)   Wt 119 lb 0.8 oz (54 kg)   SpO2 96%   BMI 19.21 kg/m²     Oxygen Saturation Interpretation: Normal    The patients available past medical records and past encounters were reviewed. ------------------------------ ED COURSE/MEDICAL DECISION MAKING----------------------  Medications   aspirin tablet 325 mg (325 mg Oral Given 2/10/20 1041)   doxycycline (VIBRAMYCIN) 100 mg in dextrose 5 % 100 mL IVPB (0 mg Intravenous Stopped 2/10/20 1211)   cefTRIAXone (ROCEPHIN) 1 g in dextrose 5 % 50 mL IVPB (vial-mate) (0 g Intravenous Stopped 2/10/20 1211)   fentaNYL (SUBLIMAZE) injection 25 mcg (25 mcg Intravenous Given 2/10/20 1039)   diphenhydrAMINE (BENADRYL) injection 25 mg (25 mg Intravenous Given 2/10/20 1039)   iopamidol (ISOVUE-370) 76 % injection 60 mL (60 mLs Intravenous Given 2/10/20 1220)           The cardiac monitor revealed normal sinus rhythm with a heart rate in the 90s as interpreted by me. The cardiac monitor was ordered secondary to the patient's chest pain and shortness of breath and to monitor the patient for dysrhythmia. CPT [id number]         Medical Decision Making:   Chest pain and shortness of breath. Volume overloaded as well as infiltrate concerning for pneumonia. Nephrology consulted, vascular consulted, medicine to admit. Re-Evaluations:       improving      This patient's ED course included: a personal history and physicial examination, re-evaluation prior to disposition, multiple bedside re-evaluations, IV medications, cardiac monitoring, continuous pulse oximetry and complex medical decision making and emergency management    This patient has remained hemodynamically stable during their ED course. Consultations:  Dr. Suzanne Almanza    Counseling: The emergency provider has spoken with the patient and discussed todays results, in addition to providing specific details for the plan of care and counseling regarding the diagnosis and prognosis.   Questions are answered at this time and they are agreeable with the plan.       --------------------------------- IMPRESSION AND DISPOSITION ---------------------------------    IMPRESSION  1. Acute respiratory failure with hypoxia ([address]    2. Acute pulmonary edema (HCC)    3. Complication of arteriovenous dialysis fistula, initial encounter        DISPOSITION  Disposition: Admit to telemetry  Patient condition is stable        NOTE: This report was transcribed using voice recognition software.  Every effort was made to ensure accuracy; however, inadvertent computerized transcription errors may be present        Bao Garcia MD  02/10/20 1528

## 2020-02-10 NOTE — PROGRESS NOTES
w/medication treatment due to intermit use of medication 11/3/2015       Past Surgical History:        Procedure Laterality Date    BRAIN ANEURYSM SURGERY Right 3-4 years ago    Intracranial aneurysm clipping surgery 3-4 years ago, after rupture of aneurysm causing ICH    DIALYSIS FISTULA CREATION Left 11 years ago    UPPER GASTROINTESTINAL ENDOSCOPY N/A 1/3/2019    EGD BIOPSY performed by Adama Dacosta MD at [address] [place] [address]       Medications Prior to Admission:    Prior to Admission medications    Medication Sig Start Date End Date Taking? Authorizing Provider   cloNIDine (CATAPRES) 0.2 MG tablet Take 0.2 mg by mouth 3 times daily   Yes Historical Provider, MD   minoxidil (LONITEN) 2.5 MG tablet Take 2 tablets by mouth 2 times daily 2/5/20  Yes Mika Santiago MD   pantoprazole (PROTONIX) 40 MG tablet TAKE 1 TABLET BY MOUTH EVERY MORNING BEFORE BREAKFAST  Patient taking differently: Take 40 mg by mouth daily  12/13/19  Yes Montana Horton DO   lidocaine-prilocaine (EMLA) 2.5-2.5 % cream Apply 1 each topically as needed Apply to AVF site prior to dialysis 11/18/19  Yes Historical Provider, MD   sevelamer (RENVELA) 800 MG tablet Take 1,600 mg by mouth 3 times daily    Yes Historical Provider, MD   hydrALAZINE (APRESOLINE) 50 MG tablet Take 1 tablet by mouth 3 times daily 11/20/19   Jn Wen MD   losartan (COZAAR) 100 MG tablet Take 1 tablet by mouth every evening  Patient not taking: Reported on 2/5/2020 11/20/19   Jn Wen MD       Allergies:  Tylenol [acetaminophen]    Social History:   TOBACCO:   reports that he has been smoking cigars. He has never used smokeless tobacco.  ETOH:   reports no history of alcohol use. Family History:       Problem Relation Age of Onset    Kidney Disease Paternal Grandmother     Cancer Neg Hx     Heart Disease Neg Hx        REVIEW OF SYSTEMS:     Constitutional: (-) fever, (-) chills (-) weight loss.     Head: (-) headache, (-) light headedness and (-) dizziness.  Eyes: (-) changes in vision.  Mouth: (-) difficulty swallowing.  Neck: (-) stiffness, (-) swelling and (-) pain.  Respiratory: (+) SOB and (+) cough.  Cardiovascular: (+) chest pain and (-) palpitations.  GI:  (+) nausea, (-) vomiting, (+) diarrhea, (-) constipation and (-) abdomen pain.  Urology: (-) pain with urination, (-) difficulty urinating, (-) urinary incontinence and (-) increased urination.  Musculoskeletal: (-) muscle weakness (-) new joint pain.  Hematology: (-) bruising (-) bleeding.  Neurologic: (-) tingling, (-) numbness (-) focal neurological deficits. Physical Exam     · Vitals: BP (!) 192/129   Pulse 103   Temp 98.3 °F (36.8 °C) (Infrared)   Resp 24   Ht 5' 6\" (1.676 m)   Wt 119 lb 0.8 oz (54 kg)   SpO2 92%   BMI 19.21 kg/m²       · Constitutional: Alert, AaO x3. Follows commands. In no apparent distress. · Head: Normocephalic and atraumatic. · Eyes: PERRL, (-) conjunctivitis (-) scleral icterus. Mucus membranes moist.  · Mouth: Mucus membranes moist. Oropharynx clear. No deviation of the tongue or uvula. Normal dentition. · Neck: (-)  swelling, (-) JVD    · Respiratory: (+) bilateral wheezes, (+) bilateral rales at base of lung  (-)  rhonchi. · Cardiovascular: RRR. (-)  murmurs, (-) gallops,  (-) rubs. S1 and S2 were normal.   · GI:  Abdomen soft, (-) tenderness, (-)distention. (+) BS. (-)  rebound, (-) guarding, (-) rigidity. · Extremities: Warm and well perfused. (-) clubbing, (-) cyanosis. 2+ distal pulses. +swelling of the left arm, Palpable thrill over avf    · Neurologic:  Cranial nerves II-XII grossly intact. No focal neurological deficits.      Labs and Imaging Studies   Basic Labs  Recent Labs     02/10/20  0940      K 6.1*   CL 98   CO2 32*   BUN 31*   CREATININE 7.0*   GLUCOSE 87   CALCIUM 9.1       Recent Labs     02/10/20  0940   WBC 7.1   RBC 3.03*   HGB 9.3*   HCT 30.9*   .0*   MCH 30.7   MCHC 30.1*   RDW 17.7*   PLT 212   MPV 9.6       CBC:   Lab Results   Component Value Date    WBC 7.1 02/10/2020    RBC 3.03 02/10/2020    HGB 9.3 02/10/2020    HCT 30.9 02/10/2020    .0 02/10/2020    RDW 17.7 02/10/2020     02/10/2020     BMP:    Lab Results   Component Value Date     02/10/2020    K 6.1 02/10/2020    K 3.6 2019    CL 98 02/10/2020    CO2 32 02/10/2020    BUN 31 02/10/2020       Imaging Studies:     Xr Chest Standard (2 Vw)    Result Date: 2020  Patient MRN: [id number] : 1985 Age:  37 years Gender: Male Order Date: 2020 12:15 PM Exam: XR CHEST (2 VW) Number of Images: 2 view Indication:  Chest pain and cough Comparison: Previous anterior upright chest studies 2019 and 2019 Findings: The lungs are symmetrically expanded, and are hyperinflated. There is no definite evidence of effusion or consolidation. Cardiovascular shadows show slightly increased prominence of the left ventricular cardiac border and indistinct congestion of the pulmonary vasculature in the central and dependent chest, suggesting early cardiac decompensation and mild pulmonary edema. There is no prominent septal edema. . Skeletal structures show no evidence of acute pathology. The lower thoracic vertebrae have a somewhat dense appearance, which may be normal physiologic change, but can also be seen in hyperparathyroidism. Increasing cardiomegaly and central vascular congestion is suspicious for early cardiac decompensation in this young patient. Density in the spine appears slightly increased, and may be physiologic, such as with hyperparathyroidism. Ct Head Wo Contrast    Result Date: 2020  Patient MRN:  [id number] : 1985 Age: 37 years Gender: Male Order Date:  2020 9:00 PM TECHNIQUE/NUMBER OF IMAGES/COMPARISON/CLINICAL HISTORY: CT brain Axial images were obtained sagittal and coronal reconstructions Comparison study of 2019.  Clinical history bleeding aneurysm with a ruptured. FINDINGS: This study is done after CT of the chest, there is contrast enhancement of the vessels in the frontal meningeal surface of the brain. As observed previously patient had an extensive a right craniectomy with a flap been placed. There is no focal mass effect or midline shift. The areas of hypodensity in the white matter of both cerebral hemispheres more somewhat seen previously particularly in the posterior frontal area. With the widening of the right lateral ventricle particularly towards the temporal horn is an atrial is an old finding. There is no indication that there is an acute intracranial hemorrhagic process to the use of IV contrast more areas of subarachnoid hemorrhage are more difficult to be evaluated at meningeal interfaces of the previous IV contrast. There is no evidence for a new sizable acute or recent insult in progression to the brain parenchyma. 1. There is IV contrast enhancement of the vessels in the meningeal surfaces of the patient had a recent CTA of the chest. 2. There appears to be no significant interval changes since the previous study of . 3. No indication for an acute intracranial event accounts some limitations to the previous IV contrast as above discussed. Xr Chest Portable    Result Date: 2/10/2020  Patient MRN: [id number] : 1985 Age:  37 years Gender: Male Order Date: 2/10/2020 9:30 AM Exam: XR CHEST PORTABLE Number of Images: 1 view Indication:   chest pain chest pain Comparison: 2020 Findings: The heart is enlarged. The lung fields demonstrate evidence for airspace disease. The aorta is tortuous and ectatic. Tortuous ectatic aorta Cardiomegaly Airspace disease compatible with pneumonia, which appears to be patchy, worse at the lung bases on both the right and the left.  Less likely edema would give this appearance     Xr Chest Portable    Result Date: 2020  Patient MRN: [id number] : 1985 Age:  37 years spectral Doppler, as well as 2-D grayscale ultrasound imaging. EKG: normal sinus rhythm, unchanged from previous tracings. Resident's Assessment and Plan     Luke Galeano is a 37 y.o. male presented to the ED with ESRD on HD (11 years) intracranial hemorrhage , hypothyroidism, HTN and anxiety and was admitted for AVF mal function and volume overload     Malfunctioning of AVF in ESRD on HD   Base line Cr 5-7    Vascular surgery consulted for vascular access  [facility] Nephrology consulted for HD   Pulmonary edema 2/2 ESRD with lose of vascular access    PE with bilateral rales and wheezes   Started on Duoneb    Follow up HD   Follow up repeat cxr   Will reassess tommorow  HFpEF   Last Echo 1/2020: normal EF with stage III diastolic dysfunction   ProBNP >70,000   CTA chest shoes bilateral non consolidating infiltrates   Pneumonia?     Afebrile without leucocytosis   Elevated procal: chronically elevated   Follow up repeat procal   No indications for antibiotics at this time      H/o HTN  · Will continue home minoxidil, labetalol, hydralazine    PT/OT evaluation: Not indicated at this time   DVT prophylaxis/ GI prophylaxis: Heparin / Protonix   Disposition: Continue current medical management      Xochitl Monahan MD, PGY-1  Attending physician: Dr. Bernardino Ayala

## 2020-02-10 NOTE — TELEPHONE ENCOUNTER
Writer contacted Dr. Kasandra Ruelas,  ED provider to inform of 30 day readmission risk. Writer's attempt to contact ED provider was unsuccessful. In with pt at this time.

## 2020-02-10 NOTE — H&P
[facility]  Internal Medicine Residency Program  History and Physical    Patient:  Aaron Lawton 37 y.o. male MRN: [id number]     Date of Service: 2/10/2020    Hospital Day: 1      Chief complaint: had concerns including Chest Pain (from dialysis) and Arm Pain (left arm significantly larger than right). History of Present Illness   History was obtained from EMR and patient      The patient is a 37 y.o. male with PMH of intreacranial hemorrage, hypothyroidism, HTN, CKD on HD with AVF and anxiety , presented to the ED complaining of arm swelling and chest pain. For the past week patient complains of increasing arm swelling and chest pain. He also complains of SOB, orthopnea and paroxsymal dyspnea for 1 week. Received HD on 2/7/20 and swelling was noticed at that time. Received half of HD then began having chest pain and HD was stopped. No additional HD was preformed.     ED Course: Initial vitals showed Afebril RR 20 HR 99 /119. Initial labs showed K 6.1 Bicarb 32, Cr 7 (baseline 5-8) procal 0.58, alk phos 148, ast 44, Hg 9.3 and . CXR Airspace disease compatible with pneumonia, which appears to be patchy, worse at the lung bases on both the right and the left. CTA chest showed Cardiomegaly with pericardial effusion with  patchy bilateral infiltrates and pleural effusion likely CHF and/or pneumonia.   ED Meds: Patient was given 325mg asprin, ceftriaxone, benydral   ED Fluids: Patient was given none    Past Medical History:      Diagnosis Date    Anxiety 9/19/2015    AVF (arteriovenous fistula) ([address] 4/30/2018    Chronic kidney disease     CKD since early childhood per pt and on HD ~ 11 years    Depression     Encounter regarding vascular access for dialysis for ESRD ([address] 4/30/2018    Hypertension     on meds for ~ 11 years    Hypothyroidism     Intracranial hemorrhage ([address]     was d/t ruptured aneurysm - pt underwent neurosurgery for clipping of the aneurysm    Noncompliance w/medication treatment due to intermit use of medication 11/3/2015       Past Surgical History:        Procedure Laterality Date    BRAIN ANEURYSM SURGERY Right 3-4 years ago    Intracranial aneurysm clipping surgery 3-4 years ago, after rupture of aneurysm causing ICH    DIALYSIS FISTULA CREATION Left 11 years ago    UPPER GASTROINTESTINAL ENDOSCOPY N/A 1/3/2019    EGD BIOPSY performed by Morgan Alvarado MD at [address] [place] [address]       Medications Prior to Admission:    Prior to Admission medications    Medication Sig Start Date End Date Taking?  Authorizing Provider   cloNIDine (CATAPRES) 0.2 MG tablet Take 0.2 mg by mouth 3 times daily   Yes Historical Provider, MD   diphenhydrAMINE (BENADRYL) 25 MG capsule Take 25 mg by mouth every 6 hours as needed for Itching   Yes Historical Provider, MD   calcium carbonate (TUMS) 500 MG chewable tablet Take 1 tablet by mouth 2 times daily   Yes Historical Provider, MD   calcitRIOL (ROCALTROL) 0.25 MCG capsule Take 1 mcg by mouth three times a week Given at dialysis on Monday, Wednesday,Friday   Yes Historical Provider, MD   Methoxy PEG-Epoetin Beta (MIRCERA) 75 MCG/0.3ML SOSY Inject 75 mcg as directed every 14 days Last Dose= 01/31/20  Next Dose - 02/14/20   Yes Historical Provider, MD   iron sucrose (VENOFER) 20 MG/ML injection Infuse 100 mg intravenously three times a week Given in dialysis every Monday ,Wednesday ,Friday   Yes Historical Provider, MD   minoxidil (LONITEN) 2.5 MG tablet Take 2 tablets by mouth 2 times daily 2/5/20  Yes Armin Santana MD   pantoprazole (PROTONIX) 40 MG tablet TAKE 1 TABLET BY MOUTH EVERY MORNING BEFORE BREAKFAST  Patient taking differently: Take 40 mg by mouth daily  12/13/19  Yes Katelyn Eason DO   lidocaine-prilocaine (EMLA) 2.5-2.5 % cream Apply 1 each topically as needed Apply to AVF site prior to dialysis 11/18/19  Yes Historical Provider, MD   sevelamer (RENVELA) 800 MG tablet Take 1,600 mg by mouth 3 times daily Yes Historical Provider, MD   hydrALAZINE (APRESOLINE) 50 MG tablet Take 1 tablet by mouth 3 times daily  Patient taking differently: Take 100 mg by mouth 3 times daily  11/20/19  Yes Alba Damon MD   losartan (COZAAR) 100 MG tablet Take 1 tablet by mouth every evening  Patient not taking: Reported on 2/5/2020 11/20/19   Alba Damon MD       Allergies:  Tylenol [acetaminophen]    Social History:   TOBACCO:   reports that he has been smoking cigars. He has never used smokeless tobacco.  ETOH:   reports no history of alcohol use. Family History:       Problem Relation Age of Onset    Kidney Disease Paternal Grandmother     Cancer Neg Hx     Heart Disease Neg Hx        REVIEW OF SYSTEMS:    · Constitutional: (-) fever, (-) chills (-) weight loss. · Head: (-) headache, (-) light headedness and (-) dizziness. · Eyes: (-) changes in vision. · Mouth: (-) difficulty swallowing. · Neck: (-) stiffness, (-) swelling and (-) pain. · Respiratory: (+) SOB and (+) cough. · Cardiovascular: (+) chest pain and (-) palpitations. · GI:  (+) nausea, (-) vomiting, (+) diarrhea, (-) constipation and (-) abdomen pain. · Urology: (-) pain with urination, (-) difficulty urinating, (-) urinary incontinence and (-) increased urination. · Musculoskeletal: (-) muscle weakness (-) new joint pain. · Hematology: (-) bruising (-) bleeding. · Neurologic: (-) tingling, (-) numbness (-) focal neurological deficits. Physical Exam     · Vitals: BP (!) 192/129   Pulse 103   Temp 98.3 °F (36.8 °C) (Infrared)   Resp 24   Ht 5' 6\" (1.676 m)   Wt 119 lb 0.8 oz (54 kg)   SpO2 92%   BMI 19.21 kg/m²       · Constitutional: Alert, AaO x3. Follows commands. In no apparent distress. · Head: Normocephalic and atraumatic. · Eyes: PERRL, (-) conjunctivitis (-) scleral icterus. Mucus membranes moist.  · Mouth: Mucus membranes moist. Oropharynx clear. No deviation of the tongue or uvula. Normal dentition.    · Neck: (-)  swelling, (-) JVD · Respiratory: (+) bilateral wheezes, (+) bilateral rales at base of lung  (-)  rhonchi. · Cardiovascular: RRR. (-)  murmurs, (-) gallops,  (-) rubs. S1 and S2 were normal.   · GI:  Abdomen soft, (-) tenderness, (-)distention. (+) BS. (-)  rebound, (-) guarding, (-) rigidity.     · Extremities: Warm and well perfused. (-) clubbing, (-) cyanosis. 2+ distal pulses. +swelling of the left arm, Palpable thrill over avf    · Neurologic:  Cranial nerves II-XII grossly intact. No focal neurological deficits.        Labs and Imaging Studies   Basic Labs  Recent Labs     02/10/20  0940      K 6.1*   CL 98   CO2 32*   BUN 31*   CREATININE 7.0*   GLUCOSE 87   CALCIUM 9.1       Recent Labs     02/10/20  0940   WBC 7.1   RBC 3.03*   HGB 9.3*   HCT 30.9*   .0*   MCH 30.7   MCHC 30.1*   RDW 17.7*      MPV 9.6      Imaging Studies:     Xr Chest Standard (2 Vw)    Result Date: 2020  Patient MRN: [id number] : 1985 Age:  37 years Gender: Male Order Date: 2020 12:15 PM Exam: XR CHEST (2 VW) Number of Images: 2 view Indication:  Chest pain and cough Comparison: Previous anterior upright chest studies 2019 and 2019 Findings: The lungs are symmetrically expanded, and are hyperinflated. There is no definite evidence of effusion or consolidation. Cardiovascular shadows show slightly increased prominence of the left ventricular cardiac border and indistinct congestion of the pulmonary vasculature in the central and dependent chest, suggesting early cardiac decompensation and mild pulmonary edema. There is no prominent septal edema. . Skeletal structures show no evidence of acute pathology. The lower thoracic vertebrae have a somewhat dense appearance, which may be normal physiologic change, but can also be seen in hyperparathyroidism. Increasing cardiomegaly and central vascular congestion is suspicious for early cardiac decompensation in this young patient.  Density in the spine The heart is enlarged. The lung fields demonstrate evidence for airspace disease. The aorta is tortuous and ectatic. Tortuous ectatic aorta Cardiomegaly Airspace disease compatible with pneumonia, which appears to be patchy, worse at the lung bases on both the right and the left. Less likely edema would give this appearance     Xr Chest Portable    Result Date: 2020  Patient MRN: [id number] : 1985 Age:  37 years Gender: Male Order Date: 2020 4:15 PM Exam: XR CHEST PORTABLE Number of Images: 1 view Indication:   please do CXR AFTER dialysis, check to make sure dialysis complete please do CXR AFTER dialysis, check to make sure dialysis complete Comparison: Chest CT 2020. FINDINGS: Stable cardiomegaly. Sutures distributed normally. Interstitial opacities in the right greater than left bases, likely residual edema seen on prior CT. No new focal opacification. No sizable effusion or pneumothorax. Resolving edema or pneumonia in the right greater than left base when compared to prior CT from 2020. No new focal consolidation. Cta Chest W Contrast    Result Date: 2020  Patient MRN:  [id number] : 1985 Age: 37 years Gender: Male Order Date:  2020 4:15 PM EXAM: CTA CHEST W CONTRAST number of images 559 including MIP coronal and sagittal reconstruction images, and 3-D reconstruction images the aorta. . Contrast. Isovue-370, 80 mL intravenously. Technique: Low-dose CT  acquisition technique included one of following options; 1 . Automated exposure control, 2. Adjustment of MA and or KV according to patient's size or 3. Use of iterative reconstruction. INDICATION:  r/o Dissection r/o Dissection COMPARISON: None FINDINGS: There is significant cardiomegaly. The great vessels are unremarkable. Moderately enlarged lymph nodes are identified in the mediastinum with the lymph nodes measuring up to 1.1 cm in the paratracheal region, AP window and  subcarinal region.  Moderately maneuvers and respiratory variation was assessed with spectral Doppler. FINDINGS: The left upper extremity was evaluated ultrasonographically. No intraluminal thrombus was identified, and there is good compressibility, appropriate response to augmentation maneuvers and respiratory variation, and color flow confirm patency of venous structures. Negative for evidence of deep venous thrombosis in the  left upper extremity by color and spectral Doppler, as well as 2-D grayscale ultrasound imaging. EKG: normal sinus rhythm, unchanged from previous tracings. Resident's Assessment and Plan     Aaron Lawton is a 37 y.o. male presented to the [address] [place] [address] on HD (11 years) intracranial hemorrhage , hypothyroidism, HTN and anxiety and was admitted for AVF mal function and volume overload      Malfunctioning of AVF in ESRD on HD  · Base line Cr 5-7   · Vascular surgery consulted for vascular access  · Nephrology consulted for HD   Pulmonary edema 2/2 ESRD with lose of vascular access   · PE with bilateral rales and wheezes  · Started on Duoneb   · Follow up HD  · Follow up repeat cxr  · Will reassess tommorow  HFpEF  · Last Echo 1/2020: normal EF with stage III diastolic dysfunction  · ProBNP >70,000  · CTA chest shoes bilateral non consolidating infiltrates   Pneumonia?    · Afebrile without leucocytosis  · Elevated procal: chronically elevated  · Follow up repeat procal  · No indications for antibiotics at this time   ·    H/o HTN  § Will continue home minoxidil, labetalol, hydralazine     PT/OT evaluation: Not indicated at this time   DVT prophylaxis/ GI prophylaxis: Heparin / Protonix   Disposition: Continue current medical management       Joshua Marinelli MD, PGY-1  Attending physician: Dr. Joselin Mendoza

## 2020-02-10 NOTE — CONSULTS
Vascular Surgery Consult Note      Chief Complaint: Patient with a history of a left arm arteriovenous access and left upper extremity swelling    HISTORY OF PRESENT ILLNESS:                The patient is a 37 y.o. male who presents to the hospital with multiple issues. He presented to the emergency room complaining of left arm swelling and intermittent chest pain. This is been present for approximately 1 week. He has had a recent intervention of the left upper extremity and told me that he did well and the arm swelling had improved. He has a history of multiple interventions of the left upper extremity. He is currently on dialysis. He has a left arm arteriovenous fistula. He has multiple additional comorbidities. He now presents to the emergency room with a mass currently on and they believe that he may have the flu.     Past Medical History:   Diagnosis Date    Anxiety 9/19/2015    AVF (arteriovenous fistula) ([facility] 4/30/2018    Chronic kidney disease     CKD since early childhood per pt and on HD ~ 11 years    Depression     Encounter regarding vascular access for dialysis for ESRD ([facility] 4/30/2018    Hypertension     on meds for ~ 11 years    Hypothyroidism     Intracranial hemorrhage ([facility]     was d/t ruptured aneurysm - pt underwent neurosurgery for clipping of the aneurysm    Noncompliance w/medication treatment due to intermit use of medication 11/3/2015        Past Surgical History:   Procedure Laterality Date    BRAIN ANEURYSM SURGERY Right 3-4 years ago    Intracranial aneurysm clipping surgery 3-4 years ago, after rupture of aneurysm causing ICH    DIALYSIS FISTULA CREATION Left 11 years ago    UPPER GASTROINTESTINAL ENDOSCOPY N/A 1/3/2019    EGD BIOPSY performed by Lala Hicks MD at [address] [place] [address]       Current Medications:     Current Facility-Administered Medications:     labetalol (NORMODYNE) tablet 200 mg, 200 mg, Oral, 3 times per day, Emelia Horton MD, 200 mg at 02/10/20 has motor and sensation of the bilateral upper and bilateral lower extremities. There is no gross signs of vascular ischemia. There is signs of venous hypertension of his left arm. MUSKULOSKELETAL: Adequately aligned spine, range of motion appears to be intact with regards to the spine and upper and lower extremities. No joint tenderness or erythema. Normal muscular development. NEURO: Cranial nerves II through XII grossly intact. Strength and sensation are symmetric and intact throughout. Psychiatric: Mental examination revealed the patient was oriented to person, place, and time. The patient was able to demonstrate adequate judgment and reason, without any hallucinations abnormal affect or abnormal behavior on today's exam.      LABS:    Lab Results   Component Value Date    WBC 7.1 02/10/2020    HGB 9.3 (L) 02/10/2020    HCT 30.9 (L) 02/10/2020     02/10/2020    PROTIME 12.6 (H) 02/10/2020    INR 1.1 02/10/2020    APTT 36.9 (H) 02/10/2020    K 6.1 (H) 02/10/2020    BUN 31 (H) 02/10/2020    CREATININE 7.0 (H) 02/10/2020       RADIOLOGY:  CTA PULMONARY W CONTRAST   Final Result   There is no central pulmonary embolism or aortic dissection. Cardiomegaly with pericardial effusion with  patchy bilateral   infiltrates and pleural effusion likely CHF and/or pneumonia. Atrophic kidneys with renal osteodystrophy. US DUP UPPER EXTREMITY LEFT VENOUS   Final Result   No evidence for deep venous thrombosis               XR CHEST PORTABLE   Final Result   Tortuous ectatic aorta   Cardiomegaly   Airspace disease compatible with pneumonia, which appears to be   patchy, worse at the lung bases on both the right and the left.  Less   likely edema would give this appearance                         IMPRESSION:   Active Hospital Problems    Diagnosis    Acute respiratory failure with hypoxemia ([address] [J96.01]       PLAN: #1 left upper extremity arteriovenous access with left upper extremity venous hypertension. We will plan on a fistulogram in the next couple of days. Right now he extremely high blood pressure I will defer to medicine for medical management. They can use his access for dialysis. He will be admitted under medicine we will see him in consultation and plan for a fistulogram possibly on Thursday.           Electronically signed by Sandra Diez MD on 2/10/2020 at 6:00 PM

## 2020-02-11 ENCOUNTER — APPOINTMENT (OUTPATIENT)
Dept: GENERAL RADIOLOGY | Age: 35
DRG: 466 | End: 2020-02-11
Payer: MEDICAID

## 2020-02-11 PROBLEM — I87.302: Chronic | Status: ACTIVE | Noted: 2020-02-11

## 2020-02-11 LAB
ALBUMIN SERPL-MCNC: 3.3 G/DL (ref 3.5–5.2)
ALP BLD-CCNC: 133 U/L (ref 40–129)
ALT SERPL-CCNC: 19 U/L (ref 0–40)
ANION GAP SERPL CALCULATED.3IONS-SCNC: 16 MMOL/L (ref 7–16)
AST SERPL-CCNC: 21 U/L (ref 0–39)
BASOPHILS ABSOLUTE: 0.02 E9/L (ref 0–0.2)
BASOPHILS RELATIVE PERCENT: 0.3 % (ref 0–2)
BILIRUB SERPL-MCNC: 0.4 MG/DL (ref 0–1.2)
BILIRUBIN DIRECT: <0.2 MG/DL (ref 0–0.3)
BILIRUBIN, INDIRECT: ABNORMAL MG/DL (ref 0–1)
BUN BLDV-MCNC: 18 MG/DL (ref 6–20)
CALCIUM SERPL-MCNC: 8.9 MG/DL (ref 8.6–10.2)
CHLORIDE BLD-SCNC: 96 MMOL/L (ref 98–107)
CO2: 27 MMOL/L (ref 22–29)
CREAT SERPL-MCNC: 4.2 MG/DL (ref 0.7–1.2)
EOSINOPHILS ABSOLUTE: 0.14 E9/L (ref 0.05–0.5)
EOSINOPHILS RELATIVE PERCENT: 2.2 % (ref 0–6)
FOLATE: 10.5 NG/ML (ref 4.8–24.2)
GFR AFRICAN AMERICAN: 19
GFR NON-AFRICAN AMERICAN: 19 ML/MIN/1.73
GLUCOSE BLD-MCNC: 87 MG/DL (ref 74–99)
HCT VFR BLD CALC: 28.3 % (ref 37–54)
HEMOGLOBIN: 8.5 G/DL (ref 12.5–16.5)
IMMATURE GRANULOCYTES #: 0.03 E9/L
IMMATURE GRANULOCYTES %: 0.5 % (ref 0–5)
LYMPHOCYTES ABSOLUTE: 1.07 E9/L (ref 1.5–4)
LYMPHOCYTES RELATIVE PERCENT: 17 % (ref 20–42)
MAGNESIUM: 2.5 MG/DL (ref 1.6–2.6)
MCH RBC QN AUTO: 30.4 PG (ref 26–35)
MCHC RBC AUTO-ENTMCNC: 30 % (ref 32–34.5)
MCV RBC AUTO: 101.1 FL (ref 80–99.9)
MONOCYTES ABSOLUTE: 0.64 E9/L (ref 0.1–0.95)
MONOCYTES RELATIVE PERCENT: 10.2 % (ref 2–12)
NEUTROPHILS ABSOLUTE: 4.39 E9/L (ref 1.8–7.3)
NEUTROPHILS RELATIVE PERCENT: 69.8 % (ref 43–80)
PDW BLD-RTO: 17 FL (ref 11.5–15)
PHOSPHORUS: 4.5 MG/DL (ref 2.5–4.5)
PLATELET # BLD: 166 E9/L (ref 130–450)
PMV BLD AUTO: 9.7 FL (ref 7–12)
POTASSIUM SERPL-SCNC: 4.4 MMOL/L (ref 3.5–5)
PROCALCITONIN: 1.78 NG/ML (ref 0–0.08)
RBC # BLD: 2.8 E12/L (ref 3.8–5.8)
SODIUM BLD-SCNC: 139 MMOL/L (ref 132–146)
TOTAL PROTEIN: 7.1 G/DL (ref 6.4–8.3)
TROPONIN: 0.02 NG/ML (ref 0–0.03)
TROPONIN: 0.02 NG/ML (ref 0–0.03)
VITAMIN B-12: 830 PG/ML (ref 211–946)
WBC # BLD: 6.3 E9/L (ref 4.5–11.5)

## 2020-02-11 PROCEDURE — 85025 COMPLETE CBC W/AUTO DIFF WBC: CPT

## 2020-02-11 PROCEDURE — 94640 AIRWAY INHALATION TREATMENT: CPT

## 2020-02-11 PROCEDURE — 2060000000 HC ICU INTERMEDIATE R&B

## 2020-02-11 PROCEDURE — 36415 COLL VENOUS BLD VENIPUNCTURE: CPT

## 2020-02-11 PROCEDURE — 6360000002 HC RX W HCPCS: Performed by: INTERNAL MEDICINE

## 2020-02-11 PROCEDURE — 71045 X-RAY EXAM CHEST 1 VIEW: CPT

## 2020-02-11 PROCEDURE — 6370000000 HC RX 637 (ALT 250 FOR IP): Performed by: INTERNAL MEDICINE

## 2020-02-11 PROCEDURE — 80048 BASIC METABOLIC PNL TOTAL CA: CPT

## 2020-02-11 PROCEDURE — 2580000003 HC RX 258: Performed by: INTERNAL MEDICINE

## 2020-02-11 PROCEDURE — 84100 ASSAY OF PHOSPHORUS: CPT

## 2020-02-11 PROCEDURE — 80076 HEPATIC FUNCTION PANEL: CPT

## 2020-02-11 PROCEDURE — 84484 ASSAY OF TROPONIN QUANT: CPT

## 2020-02-11 PROCEDURE — 83735 ASSAY OF MAGNESIUM: CPT

## 2020-02-11 PROCEDURE — 99223 1ST HOSP IP/OBS HIGH 75: CPT | Performed by: INTERNAL MEDICINE

## 2020-02-11 PROCEDURE — 2700000000 HC OXYGEN THERAPY PER DAY

## 2020-02-11 PROCEDURE — 99232 SBSQ HOSP IP/OBS MODERATE 35: CPT | Performed by: SURGERY

## 2020-02-11 RX ORDER — DIPHENHYDRAMINE HYDROCHLORIDE 50 MG/ML
25 INJECTION INTRAMUSCULAR; INTRAVENOUS DAILY PRN
Status: DISCONTINUED | OUTPATIENT
Start: 2020-02-11 | End: 2020-02-13

## 2020-02-11 RX ADMIN — LABETALOL HYDROCHLORIDE 200 MG: 100 TABLET, FILM COATED ORAL at 14:16

## 2020-02-11 RX ADMIN — MELATONIN 3 MG ORAL TABLET 3 MG: 3 TABLET ORAL at 00:06

## 2020-02-11 RX ADMIN — HYDRALAZINE HYDROCHLORIDE 50 MG: 50 TABLET, FILM COATED ORAL at 08:44

## 2020-02-11 RX ADMIN — IPRATROPIUM BROMIDE AND ALBUTEROL SULFATE 1 AMPULE: 2.5; .5 SOLUTION RESPIRATORY (INHALATION) at 20:01

## 2020-02-11 RX ADMIN — ONDANSETRON 4 MG: 2 INJECTION INTRAMUSCULAR; INTRAVENOUS at 00:09

## 2020-02-11 RX ADMIN — LABETALOL HYDROCHLORIDE 200 MG: 100 TABLET, FILM COATED ORAL at 05:00

## 2020-02-11 RX ADMIN — SODIUM CHLORIDE, PRESERVATIVE FREE 10 ML: 5 INJECTION INTRAVENOUS at 00:09

## 2020-02-11 RX ADMIN — LABETALOL HYDROCHLORIDE 200 MG: 100 TABLET, FILM COATED ORAL at 00:06

## 2020-02-11 RX ADMIN — SODIUM CHLORIDE, PRESERVATIVE FREE 10 ML: 5 INJECTION INTRAVENOUS at 20:23

## 2020-02-11 RX ADMIN — NIFEDIPINE 60 MG: 60 TABLET, FILM COATED, EXTENDED RELEASE ORAL at 08:44

## 2020-02-11 RX ADMIN — MELATONIN 3 MG ORAL TABLET 3 MG: 3 TABLET ORAL at 20:23

## 2020-02-11 RX ADMIN — DIPHENHYDRAMINE HYDROCHLORIDE 25 MG: 50 INJECTION, SOLUTION INTRAMUSCULAR; INTRAVENOUS at 10:39

## 2020-02-11 RX ADMIN — HYDRALAZINE HYDROCHLORIDE 50 MG: 50 TABLET, FILM COATED ORAL at 14:16

## 2020-02-11 RX ADMIN — MINOXIDIL 5 MG: 2.5 TABLET ORAL at 08:44

## 2020-02-11 RX ADMIN — CLONIDINE HYDROCHLORIDE 0.2 MG: 0.2 TABLET ORAL at 08:44

## 2020-02-11 RX ADMIN — MINOXIDIL 5 MG: 2.5 TABLET ORAL at 00:05

## 2020-02-11 RX ADMIN — PANTOPRAZOLE SODIUM 40 MG: 40 TABLET, DELAYED RELEASE ORAL at 05:00

## 2020-02-11 RX ADMIN — SODIUM CHLORIDE, PRESERVATIVE FREE 10 ML: 5 INJECTION INTRAVENOUS at 08:44

## 2020-02-11 RX ADMIN — IPRATROPIUM BROMIDE AND ALBUTEROL SULFATE 1 AMPULE: 2.5; .5 SOLUTION RESPIRATORY (INHALATION) at 16:05

## 2020-02-11 ASSESSMENT — PAIN SCALES - GENERAL
PAINLEVEL_OUTOF10: 0

## 2020-02-11 NOTE — PROGRESS NOTES
Vascular Surgery Progress Note    Pt is being seen in f/u today regarding left arm swelling. Subjective:  Pratibha Mars is a 37 y.o. male patient has a history of left arm swelling. He has had multiple interventions of his left upper extremity. He describes left arm swelling with some discomfort. But otherwise states he is doing well. He told me after the balloon angioplasty he had improved symptoms. Currently his arm is swollen but not nearly as severe as it was when we had seen him prior to the angioplasty. He denies any chest pain shortness of breath or discomfort. His fistula is still functioning. Were planning a fistulogram in the near future. Current Medications:      diphenhydrAMINE, sodium chloride flush, magnesium hydroxide, ondansetron, ibuprofen, hydrALAZINE, labetalol, cloNIDine, melatonin    cloNIDine  0.2 mg Oral BID    labetalol  200 mg Oral 3 times per day    hydrALAZINE  50 mg Oral TID    minoxidil  5 mg Oral BID    NIFEdipine  60 mg Oral Daily    pantoprazole  40 mg Oral QAM AC    sodium chloride flush  10 mL Intravenous 2 times per day    heparin (porcine)  5,000 Units Subcutaneous 2 times per day    ipratropium-albuterol  1 ampule Inhalation Q4H WA        PHYSICAL EXAM:    BP (!) 140/86   Pulse 94   Temp 98.5 °F (36.9 °C) (Oral)   Resp 20   Ht 5' 6\" (1.676 m)   Wt 109 lb 3.2 oz (49.5 kg)   SpO2 100%   BMI 17.63 kg/m²     Intake/Output Summary (Last 24 hours) at 2/11/2020 0944  Last data filed at 2/11/2020 0505  Gross per 24 hour   Intake 400 ml   Output 3918 ml   Net -3518 ml          General: Alert oriented answers questions appropriately no acute distress  Skin: Warm and dry no change in turgor no jaundice no scleral icterus. Left arm is swollen. It is decreased from prior examination  HEENT: Normocephalic atraumatic trachea is midline there is no gross jugular venous distention.   CVS: Currently regular rate and rhythm  Resp: No labored breathing no audible wheezing. Abd: Soft nontender no rebound or guarding positive bowel sounds  Extremities: Left arm with swelling. Palpable brachial radial pulse. The left arm has a palpable thrill that is present. Neuro: Grossly intact bilateral any gross cranial nerve deficit    LABS:    Lab Results   Component Value Date    WBC 6.3 02/11/2020    HGB 8.5 (L) 02/11/2020    HCT 28.3 (L) 02/11/2020     02/11/2020    PROTIME 12.6 (H) 02/10/2020    INR 1.1 02/10/2020    APTT 36.9 (H) 02/10/2020    K 4.4 02/11/2020    BUN 18 02/11/2020    CREATININE 4.2 (H) 02/11/2020       RADIOLOGY:  XR CHEST PORTABLE   Final Result   Persistent but improving perihilar and bilateral patchy and nodular   infiltrates concerning for improving edema or diffuse pneumonia. CTA PULMONARY W CONTRAST   Final Result   There is no central pulmonary embolism or aortic dissection. Cardiomegaly with pericardial effusion with  patchy bilateral   infiltrates and pleural effusion likely CHF and/or pneumonia. Atrophic kidneys with renal osteodystrophy. US DUP UPPER EXTREMITY LEFT VENOUS   Final Result   No evidence for deep venous thrombosis               XR CHEST PORTABLE   Final Result   Tortuous ectatic aorta   Cardiomegaly   Airspace disease compatible with pneumonia, which appears to be   patchy, worse at the lung bases on both the right and the left. Less   likely edema would give this appearance                         ASSESSMENT/PLAN:   · #1 left arm swelling and venous hypertension. At this point he is doing okay. He has no current complaints other than persistent arm swelling but improved from prior exams. Were planning a fistulogram  in the next couple of days.         Electronically signed by Robbert Galeazzi, MD on 2/11/2020 at 9:44 AM

## 2020-02-11 NOTE — PLAN OF CARE
Problem: Falls - Risk of:  Goal: Will remain free from falls  Description  Will remain free from falls  Outcome: Met This Shift     Problem: Fluid Volume:  Goal: Hemodynamic stability will improve  Description  Hemodynamic stability will improve  Outcome: Met This Shift     Problem: Fluid Volume:  Goal: Will show no signs or symptoms of fluid imbalance  Description  Will show no signs or symptoms of fluid imbalance  Outcome: Not Met This Shift

## 2020-02-11 NOTE — CARE COORDINATION
Patient from home, lives alone. He goes to [facility] [employer] with 7am chairtime. His insurance covers transportation through [employer]. He tells me he has a card to use [employer] when he discharges home. No assistive devices, currently wearing oxygen, no oxygen at home. PCP is Dr. Lyndon Santana with internal medicine. Plan is home at discharge with no additional needs. No current homecare. Spoke with [facility] , Capri Russell, [phone].  She will verify patient's blister pack meds at discharge and would like notified when patient is released from hospital.

## 2020-02-11 NOTE — CONSULTS
02/10/2020     2020     2020    K 6.1 (H) 02/10/2020    K 5.1 (H) 2020    K 4.2 2020    CL 98 02/10/2020    CO2 32 (H) 02/10/2020    CO2 23 2020    CO2 26 2020    CREATININE 7.0 (H) 02/10/2020    CREATININE 8.9 (HH) 2020    CREATININE 5.8 (H) 2020    BUN 31 (H) 02/10/2020    BUN 51 (H) 2020    BUN 27 (H) 2020    GLUCOSE 87 02/10/2020    GLUCOSE 107 (H) 2020    GLUCOSE 91 2020    PHOS 3.5 2020    PHOS 3.1 2020    PHOS 3.5 2020    WBC 7.1 02/10/2020    WBC 11.6 (H) 2020    WBC 8.2 2020    HGB 9.3 (L) 02/10/2020    HGB 8.5 (L) 2020    HGB 8.1 (L) 2020    HCT 30.9 (L) 02/10/2020    HCT 27.0 (L) 2020    HCT 26.5 (L) 2020    .0 (H) 02/10/2020     02/10/2020         Imaging:  Xr Chest Portable    Result Date: 2/10/2020  Patient MRN: [id number] : 1985 Age:  37 years Gender: Male Order Date: 2/10/2020 9:30 AM Exam: XR CHEST PORTABLE Number of Images: 1 view Indication:   chest pain chest pain Comparison: 2020 Findings: The heart is enlarged. The lung fields demonstrate evidence for airspace disease. The aorta is tortuous and ectatic. Tortuous ectatic aorta Cardiomegaly Airspace disease compatible with pneumonia, which appears to be patchy, worse at the lung bases on both the right and the left. Less likely edema would give this appearance     Cta Pulmonary W Contrast    Result Date: 2/10/2020  Patient MRN:  [id number] : 1985 Age: 37 years Gender: Male Order Date:  2/10/2020 9:30 AM EXAM: CTA PULMONARY W CONTRAST number of images 349 including MIP coronal and sagittal reconstruction images Contrast. Isovue-370, 60 mL IV Technique: Low-dose CT  acquisition technique included one of following options; 1 . Automated exposure control, 2. Adjustment of MA and or KV according to patient's size or 3. Use of iterative reconstruction.  INDICATION:  sob, r/o PE sob,

## 2020-02-11 NOTE — PROGRESS NOTES
[facility]  Internal Medicine Residency Program  Progress Note - House Team 1    Patient:  Ana Guy 37 y.o. male MRN: [id number]     Date of Service: 2/11/2020     CC: HTN urgency  Subjective   Overnight events: Underwent HD    Pt seen, met lying in bed, chest pain, sob much improved today. Seen by Vascular yesterday who plan for fistulogram of LUE on Thursday. BP control improved. Objective     Physical Exam:  Vitals:    02/11/20 0733   BP: (!) 140/86   Pulse: 94   Resp: 20   Temp: 98.5 °F (36.9 °C)   SpO2: 100%   ·     Physical Exam  Constitutional:       General: He is not in acute distress. Appearance: He is well-developed. He is not diaphoretic. HENT:      Head: Normocephalic and atraumatic. Eyes:      General: No scleral icterus. Conjunctiva/sclera: Conjunctivae normal.      Pupils: Pupils are equal, round, and reactive to light. Cardiovascular:      Rate and Rhythm: Normal rate and regular rhythm. Heart sounds: S1 normal and S2 normal. Murmur present. Comments: 3/6 OLIVE heard likely transmitted for left AVF  Pulmonary:      Effort: Pulmonary effort is normal. No respiratory distress. Breath sounds: Normal breath sounds. No wheezing or rales. Abdominal:      General: Bowel sounds are normal. There is no distension. Palpations: Abdomen is soft. There is no mass. Tenderness: There is no abdominal tenderness. There is no guarding. Musculoskeletal:         General: No tenderness. Comments: Left AVF with palpable thrill and audible bruit. No erythema, non-tender, increased swelling of LUE  Skin:     General: Skin is warm and dry. Neurological:      Mental Status: He is alert and oriented to person, place, and time. Cranial Nerves: No cranial nerve deficit.  ct  Pertinent Labs & Imaging Studies   Basic Labs  CBC:   Recent Labs     02/10/20  0940 02/11/20  0437   WBC 7.1 6.3   RBC 3.03* 2.80*   HGB 9.3* 8.5*   HCT 30.9* 28.3*   MCV

## 2020-02-11 NOTE — PROGRESS NOTES
diagnosis and treatment of central vein stenosis is reviewed separately [89]. (See \"Central vein obstruction associated with upper extremity hemodialysis access\". )  Swelling can also be due to venous valvular incompetence, which results in chronic elevation of the venous pressure in the extremity. The resulting venous hypertension can lead to skin discoloration, access dysfunction, and, potentially, ischemic changes of the skin.            Also steal syndrome-possible  Placement of an AV graft can reduce perfusion of the more distal extremity as a result of shunting (\"steal\") of arterial blood flow into the graft [90]. This is also called dialysis access-associated steal syndrome (JEROME). Symptomatic steal occurs in up to 20 percent of patients receiving an upper extremity access [91,92], with severe manifestations requiring intervention in 4 percent [93]. Although the presence of a significant arterial stenosis increases the likelihood, steal can occur in the absence of stenosis. (See \"Dialysis access steal syndrome\". )  Vascular steal typically manifests a median of two days after the placement of an AV graft [93]. Severe ischemic symptoms, characterized by loss of sensation or weakness, may be more common among patients with diabetes and those of advanced age [15,68]. Less severe symptoms and signs, such as paresthesias and a sense of coolness with retained pulses, are more common and usually improve over a period of weeks with the development of collateral blood flow. Careful, frequent clinical evaluation and an alert nursing staff are necessary in this setting. Symptoms often worsen during dialysis sessions. Physical examination can confirm a diagnosis of vascular steal by manually compressing the access and noting an improvement in the patient's symptoms. However, physical examination alone may not be accurate in identifying the anatomic cause of the vascular steal [94].  Thus, among patients presenting with

## 2020-02-11 NOTE — PROGRESS NOTES
GLUCOSE 87 02/10/2020    GLUCOSE 107 (H) 01/24/2020    PHOS 4.5 02/11/2020    PHOS 3.5 01/24/2020    PHOS 3.1 01/23/2020    WBC 6.3 02/11/2020    WBC 7.1 02/10/2020    WBC 11.6 (H) 01/24/2020    HGB 8.5 (L) 02/11/2020    HGB 9.3 (L) 02/10/2020    HGB 8.5 (L) 01/24/2020    HCT 28.3 (L) 02/11/2020    HCT 30.9 (L) 02/10/2020    HCT 27.0 (L) 01/24/2020    .1 (H) 02/11/2020     02/11/2020             Assessment  1. Hypertensive urgency; BP much improved on current antihypertensive regimen   2. Left upper arm swelling and pain most likely due to recurrent central vein stenosis; seen by Vascular surgery; plan is fistulogram  3. ESRD, HD dependent; next HD in am  4. Hyperkalemia, resolved with HD  5. Volume overload, improved with HD   6.  Anemia due to CKD, stable HD; will resume PREETI    Discharge planning      Guerda Dawkins MD  12:11 PM  2/11/2020

## 2020-02-12 LAB
ANION GAP SERPL CALCULATED.3IONS-SCNC: 17 MMOL/L (ref 7–16)
BASOPHILS ABSOLUTE: 0.02 E9/L (ref 0–0.2)
BASOPHILS RELATIVE PERCENT: 0.6 % (ref 0–2)
BUN BLDV-MCNC: 36 MG/DL (ref 6–20)
CALCIUM SERPL-MCNC: 8.4 MG/DL (ref 8.6–10.2)
CHLORIDE BLD-SCNC: 97 MMOL/L (ref 98–107)
CO2: 26 MMOL/L (ref 22–29)
CREAT SERPL-MCNC: 7.1 MG/DL (ref 0.7–1.2)
EOSINOPHILS ABSOLUTE: 0.29 E9/L (ref 0.05–0.5)
EOSINOPHILS RELATIVE PERCENT: 9.2 % (ref 0–6)
GFR AFRICAN AMERICAN: 11
GFR NON-AFRICAN AMERICAN: 11 ML/MIN/1.73
GLUCOSE BLD-MCNC: 85 MG/DL (ref 74–99)
HCT VFR BLD CALC: 28 % (ref 37–54)
HCT VFR BLD CALC: 31.8 % (ref 37–54)
HEMOGLOBIN: 8.5 G/DL (ref 12.5–16.5)
HEMOGLOBIN: 9.5 G/DL (ref 12.5–16.5)
IMMATURE GRANULOCYTES #: 0.01 E9/L
IMMATURE GRANULOCYTES %: 0.3 % (ref 0–5)
LYMPHOCYTES ABSOLUTE: 0.94 E9/L (ref 1.5–4)
LYMPHOCYTES RELATIVE PERCENT: 29.8 % (ref 20–42)
MAGNESIUM: 2.9 MG/DL (ref 1.6–2.6)
MCH RBC QN AUTO: 29.5 PG (ref 26–35)
MCH RBC QN AUTO: 30.5 PG (ref 26–35)
MCHC RBC AUTO-ENTMCNC: 29.9 % (ref 32–34.5)
MCHC RBC AUTO-ENTMCNC: 30.4 % (ref 32–34.5)
MCV RBC AUTO: 100.4 FL (ref 80–99.9)
MCV RBC AUTO: 98.8 FL (ref 80–99.9)
MONOCYTES ABSOLUTE: 0.49 E9/L (ref 0.1–0.95)
MONOCYTES RELATIVE PERCENT: 15.6 % (ref 2–12)
NEUTROPHILS ABSOLUTE: 1.4 E9/L (ref 1.8–7.3)
NEUTROPHILS RELATIVE PERCENT: 44.5 % (ref 43–80)
PDW BLD-RTO: 16.2 FL (ref 11.5–15)
PDW BLD-RTO: 16.5 FL (ref 11.5–15)
PHOSPHORUS: 5.5 MG/DL (ref 2.5–4.5)
PLATELET # BLD: 162 E9/L (ref 130–450)
PLATELET # BLD: 180 E9/L (ref 130–450)
PMV BLD AUTO: 9.6 FL (ref 7–12)
PMV BLD AUTO: 9.7 FL (ref 7–12)
POTASSIUM SERPL-SCNC: 4.2 MMOL/L (ref 3.5–5)
RBC # BLD: 2.79 E12/L (ref 3.8–5.8)
RBC # BLD: 3.22 E12/L (ref 3.8–5.8)
SODIUM BLD-SCNC: 140 MMOL/L (ref 132–146)
WBC # BLD: 3.2 E9/L (ref 4.5–11.5)
WBC # BLD: 3.3 E9/L (ref 4.5–11.5)

## 2020-02-12 PROCEDURE — 2580000003 HC RX 258: Performed by: INTERNAL MEDICINE

## 2020-02-12 PROCEDURE — 84100 ASSAY OF PHOSPHORUS: CPT

## 2020-02-12 PROCEDURE — 2060000000 HC ICU INTERMEDIATE R&B

## 2020-02-12 PROCEDURE — 99233 SBSQ HOSP IP/OBS HIGH 50: CPT | Performed by: INTERNAL MEDICINE

## 2020-02-12 PROCEDURE — 94640 AIRWAY INHALATION TREATMENT: CPT

## 2020-02-12 PROCEDURE — 36415 COLL VENOUS BLD VENIPUNCTURE: CPT

## 2020-02-12 PROCEDURE — 6370000000 HC RX 637 (ALT 250 FOR IP): Performed by: INTERNAL MEDICINE

## 2020-02-12 PROCEDURE — 2700000000 HC OXYGEN THERAPY PER DAY

## 2020-02-12 PROCEDURE — 90935 HEMODIALYSIS ONE EVALUATION: CPT

## 2020-02-12 PROCEDURE — 80048 BASIC METABOLIC PNL TOTAL CA: CPT

## 2020-02-12 PROCEDURE — 6360000002 HC RX W HCPCS: Performed by: INTERNAL MEDICINE

## 2020-02-12 PROCEDURE — 85027 COMPLETE CBC AUTOMATED: CPT

## 2020-02-12 PROCEDURE — 85025 COMPLETE CBC W/AUTO DIFF WBC: CPT

## 2020-02-12 PROCEDURE — 83735 ASSAY OF MAGNESIUM: CPT

## 2020-02-12 RX ADMIN — SODIUM CHLORIDE, PRESERVATIVE FREE 10 ML: 5 INJECTION INTRAVENOUS at 21:36

## 2020-02-12 RX ADMIN — CLONIDINE HYDROCHLORIDE 0.2 MG: 0.2 TABLET ORAL at 11:32

## 2020-02-12 RX ADMIN — LABETALOL HYDROCHLORIDE 200 MG: 100 TABLET, FILM COATED ORAL at 05:08

## 2020-02-12 RX ADMIN — NIFEDIPINE 60 MG: 60 TABLET, FILM COATED, EXTENDED RELEASE ORAL at 11:32

## 2020-02-12 RX ADMIN — HYDRALAZINE HYDROCHLORIDE 20 MG: 20 INJECTION INTRAMUSCULAR; INTRAVENOUS at 21:36

## 2020-02-12 RX ADMIN — IPRATROPIUM BROMIDE AND ALBUTEROL SULFATE 1 AMPULE: 2.5; .5 SOLUTION RESPIRATORY (INHALATION) at 12:11

## 2020-02-12 RX ADMIN — SODIUM CHLORIDE, PRESERVATIVE FREE 10 ML: 5 INJECTION INTRAVENOUS at 11:33

## 2020-02-12 RX ADMIN — PANTOPRAZOLE SODIUM 40 MG: 40 TABLET, DELAYED RELEASE ORAL at 05:08

## 2020-02-12 RX ADMIN — IPRATROPIUM BROMIDE AND ALBUTEROL SULFATE 1 AMPULE: 2.5; .5 SOLUTION RESPIRATORY (INHALATION) at 15:56

## 2020-02-12 RX ADMIN — CLONIDINE HYDROCHLORIDE 0.2 MG: 0.2 TABLET ORAL at 21:35

## 2020-02-12 RX ADMIN — DIPHENHYDRAMINE HYDROCHLORIDE 25 MG: 50 INJECTION, SOLUTION INTRAMUSCULAR; INTRAVENOUS at 11:33

## 2020-02-12 RX ADMIN — IPRATROPIUM BROMIDE AND ALBUTEROL SULFATE 1 AMPULE: 2.5; .5 SOLUTION RESPIRATORY (INHALATION) at 20:01

## 2020-02-12 RX ADMIN — HYDRALAZINE HYDROCHLORIDE 50 MG: 50 TABLET, FILM COATED ORAL at 14:06

## 2020-02-12 RX ADMIN — MELATONIN 3 MG ORAL TABLET 3 MG: 3 TABLET ORAL at 21:35

## 2020-02-12 RX ADMIN — LABETALOL HYDROCHLORIDE 300 MG: 200 TABLET, FILM COATED ORAL at 21:35

## 2020-02-12 RX ADMIN — MINOXIDIL 5 MG: 2.5 TABLET ORAL at 21:35

## 2020-02-12 RX ADMIN — MINOXIDIL 5 MG: 2.5 TABLET ORAL at 11:32

## 2020-02-12 ASSESSMENT — PAIN SCALES - GENERAL
PAINLEVEL_OUTOF10: 0

## 2020-02-12 NOTE — PROGRESS NOTES
IM resident Dr. Radha Velásquez notified of WBC and ANC drop.   Electronically signed by Kristi Iniguez RN on 2/12/2020 at 5:48 AM

## 2020-02-12 NOTE — PROGRESS NOTES
(NORMODYNE) tablet 200 mg, 3 times per day  hydrALAZINE (APRESOLINE) tablet 50 mg, TID  minoxidil (LONITEN) tablet 5 mg, BID  NIFEdipine (PROCARDIA XL) extended release tablet 60 mg, Daily  pantoprazole (PROTONIX) tablet 40 mg, QAM AC  sodium chloride flush 0.9 % injection 10 mL, 2 times per day  sodium chloride flush 0.9 % injection 10 mL, PRN  magnesium hydroxide (MILK OF MAGNESIA) 400 MG/5ML suspension 30 mL, Daily PRN  ondansetron (ZOFRAN) injection 4 mg, Q6H PRN  heparin (porcine) injection 5,000 Units, 2 times per day  ipratropium-albuterol (DUONEB) nebulizer solution 1 ampule, Q4H WA  ibuprofen (ADVIL;MOTRIN) tablet 400 mg, Q8H PRN  hydrALAZINE (APRESOLINE) injection 20 mg, Q4H PRN  labetalol (NORMODYNE;TRANDATE) injection 10 mg, Q4H PRN  cloNIDine (CATAPRES) tablet 0.2 mg, Q4H PRN  melatonin tablet 3 mg, Nightly PRN          Physical exam:  Vitals:    02/12/20 0908   BP: 133/79   Pulse: 82   Resp:    Temp:    SpO2:           General: No distress. Alert. Eyes: PERRL. No sclera icterus. No conjunctival injection. ENT: No discharge. Pharynx clear. Neck: Trachea midline. Normal thyroid. Lungs:  coarse breath sounds  CV: Regular rate. Regular rhythm. No murmur or rub. .   Abd: Non-tender. Non-distended. No masses. No organmegaly. Normal bowel sounds. Skin: Warm and dry. No nodule on exposed extremities. No rash on exposed extremities. Ext: No cyanosis, clubbing, MICHAEL AVF good thrill and bruit, arm  swelling  Neuro: Awake. Follows commands. Positive pupils/gag/corneals. Normal pain response.       Data:   Labs:  Lab Results   Component Value Date     02/12/2020     02/11/2020     02/10/2020    K 4.2 02/12/2020    K 4.4 02/11/2020    K 6.1 (H) 02/10/2020    CL 97 (L) 02/12/2020    CO2 26 02/12/2020    CO2 27 02/11/2020    CO2 32 (H) 02/10/2020    CREATININE 7.1 (H) 02/12/2020    CREATININE 4.2 (H) 02/11/2020    CREATININE 7.0 (H) 02/10/2020    BUN 36 (H) 02/12/2020    BUN 18 02/11/2020

## 2020-02-13 ENCOUNTER — APPOINTMENT (OUTPATIENT)
Dept: GENERAL RADIOLOGY | Age: 35
DRG: 466 | End: 2020-02-13
Payer: MEDICAID

## 2020-02-13 LAB
ANION GAP SERPL CALCULATED.3IONS-SCNC: 18 MMOL/L (ref 7–16)
BASOPHILS ABSOLUTE: 0.03 E9/L (ref 0–0.2)
BASOPHILS RELATIVE PERCENT: 1 % (ref 0–2)
BUN BLDV-MCNC: 30 MG/DL (ref 6–20)
CALCIUM SERPL-MCNC: 9.2 MG/DL (ref 8.6–10.2)
CHLORIDE BLD-SCNC: 96 MMOL/L (ref 98–107)
CO2: 26 MMOL/L (ref 22–29)
CREAT SERPL-MCNC: 5.1 MG/DL (ref 0.7–1.2)
EOSINOPHILS ABSOLUTE: 0.25 E9/L (ref 0.05–0.5)
EOSINOPHILS RELATIVE PERCENT: 8.7 % (ref 0–6)
GFR AFRICAN AMERICAN: 16
GFR NON-AFRICAN AMERICAN: 16 ML/MIN/1.73
GLUCOSE BLD-MCNC: 94 MG/DL (ref 74–99)
HCT VFR BLD CALC: 29.8 % (ref 37–54)
HEMOGLOBIN: 9 G/DL (ref 12.5–16.5)
IMMATURE GRANULOCYTES #: 0.01 E9/L
IMMATURE GRANULOCYTES %: 0.3 % (ref 0–5)
LYMPHOCYTES ABSOLUTE: 0.9 E9/L (ref 1.5–4)
LYMPHOCYTES RELATIVE PERCENT: 31.4 % (ref 20–42)
MAGNESIUM: 2.8 MG/DL (ref 1.6–2.6)
MCH RBC QN AUTO: 30.3 PG (ref 26–35)
MCHC RBC AUTO-ENTMCNC: 30.2 % (ref 32–34.5)
MCV RBC AUTO: 100.3 FL (ref 80–99.9)
MONOCYTES ABSOLUTE: 0.49 E9/L (ref 0.1–0.95)
MONOCYTES RELATIVE PERCENT: 17.1 % (ref 2–12)
NEUTROPHILS ABSOLUTE: 1.19 E9/L (ref 1.8–7.3)
NEUTROPHILS RELATIVE PERCENT: 41.5 % (ref 43–80)
PDW BLD-RTO: 16.3 FL (ref 11.5–15)
PHOSPHORUS: 4.2 MG/DL (ref 2.5–4.5)
PLATELET # BLD: 196 E9/L (ref 130–450)
PMV BLD AUTO: 9.5 FL (ref 7–12)
POTASSIUM SERPL-SCNC: 4.2 MMOL/L (ref 3.5–5)
PRO-BNP: ABNORMAL PG/ML (ref 0–125)
RBC # BLD: 2.97 E12/L (ref 3.8–5.8)
SODIUM BLD-SCNC: 140 MMOL/L (ref 132–146)
WBC # BLD: 2.9 E9/L (ref 4.5–11.5)

## 2020-02-13 PROCEDURE — 71045 X-RAY EXAM CHEST 1 VIEW: CPT

## 2020-02-13 PROCEDURE — 85025 COMPLETE CBC W/AUTO DIFF WBC: CPT

## 2020-02-13 PROCEDURE — 36415 COLL VENOUS BLD VENIPUNCTURE: CPT

## 2020-02-13 PROCEDURE — 84100 ASSAY OF PHOSPHORUS: CPT

## 2020-02-13 PROCEDURE — 6360000002 HC RX W HCPCS: Performed by: STUDENT IN AN ORGANIZED HEALTH CARE EDUCATION/TRAINING PROGRAM

## 2020-02-13 PROCEDURE — 6370000000 HC RX 637 (ALT 250 FOR IP): Performed by: INTERNAL MEDICINE

## 2020-02-13 PROCEDURE — 2060000000 HC ICU INTERMEDIATE R&B

## 2020-02-13 PROCEDURE — 83880 ASSAY OF NATRIURETIC PEPTIDE: CPT

## 2020-02-13 PROCEDURE — 80048 BASIC METABOLIC PNL TOTAL CA: CPT

## 2020-02-13 PROCEDURE — 99232 SBSQ HOSP IP/OBS MODERATE 35: CPT | Performed by: INTERNAL MEDICINE

## 2020-02-13 PROCEDURE — 83735 ASSAY OF MAGNESIUM: CPT

## 2020-02-13 PROCEDURE — 2580000003 HC RX 258: Performed by: INTERNAL MEDICINE

## 2020-02-13 PROCEDURE — 6360000002 HC RX W HCPCS: Performed by: INTERNAL MEDICINE

## 2020-02-13 PROCEDURE — 94640 AIRWAY INHALATION TREATMENT: CPT

## 2020-02-13 RX ORDER — IPRATROPIUM BROMIDE AND ALBUTEROL SULFATE 2.5; .5 MG/3ML; MG/3ML
1 SOLUTION RESPIRATORY (INHALATION) EVERY 4 HOURS PRN
Status: DISCONTINUED | OUTPATIENT
Start: 2020-02-13 | End: 2020-02-14 | Stop reason: HOSPADM

## 2020-02-13 RX ORDER — DIPHENHYDRAMINE HYDROCHLORIDE 50 MG/ML
25 INJECTION INTRAMUSCULAR; INTRAVENOUS EVERY 12 HOURS PRN
Status: DISCONTINUED | OUTPATIENT
Start: 2020-02-13 | End: 2020-02-14

## 2020-02-13 RX ADMIN — LABETALOL HYDROCHLORIDE 300 MG: 200 TABLET, FILM COATED ORAL at 08:51

## 2020-02-13 RX ADMIN — DIPHENHYDRAMINE HYDROCHLORIDE 25 MG: 50 INJECTION, SOLUTION INTRAMUSCULAR; INTRAVENOUS at 20:10

## 2020-02-13 RX ADMIN — MINOXIDIL 5 MG: 2.5 TABLET ORAL at 08:51

## 2020-02-13 RX ADMIN — MELATONIN 3 MG ORAL TABLET 3 MG: 3 TABLET ORAL at 20:10

## 2020-02-13 RX ADMIN — DIPHENHYDRAMINE HYDROCHLORIDE 25 MG: 50 INJECTION, SOLUTION INTRAMUSCULAR; INTRAVENOUS at 08:51

## 2020-02-13 RX ADMIN — HYDRALAZINE HYDROCHLORIDE 50 MG: 50 TABLET, FILM COATED ORAL at 15:38

## 2020-02-13 RX ADMIN — LABETALOL HYDROCHLORIDE 300 MG: 200 TABLET, FILM COATED ORAL at 20:03

## 2020-02-13 RX ADMIN — IPRATROPIUM BROMIDE AND ALBUTEROL SULFATE 1 AMPULE: 2.5; .5 SOLUTION RESPIRATORY (INHALATION) at 07:54

## 2020-02-13 RX ADMIN — HYDRALAZINE HYDROCHLORIDE 50 MG: 50 TABLET, FILM COATED ORAL at 08:51

## 2020-02-13 RX ADMIN — SODIUM CHLORIDE, PRESERVATIVE FREE 10 ML: 5 INJECTION INTRAVENOUS at 20:03

## 2020-02-13 RX ADMIN — SODIUM CHLORIDE, PRESERVATIVE FREE 10 ML: 5 INJECTION INTRAVENOUS at 08:51

## 2020-02-13 RX ADMIN — HYDRALAZINE HYDROCHLORIDE 50 MG: 50 TABLET, FILM COATED ORAL at 20:03

## 2020-02-13 RX ADMIN — CLONIDINE HYDROCHLORIDE 0.2 MG: 0.2 TABLET ORAL at 08:51

## 2020-02-13 RX ADMIN — CLONIDINE HYDROCHLORIDE 0.2 MG: 0.2 TABLET ORAL at 20:03

## 2020-02-13 RX ADMIN — NIFEDIPINE 60 MG: 60 TABLET, FILM COATED, EXTENDED RELEASE ORAL at 08:51

## 2020-02-13 RX ADMIN — IPRATROPIUM BROMIDE AND ALBUTEROL SULFATE 1 AMPULE: 2.5; .5 SOLUTION RESPIRATORY (INHALATION) at 11:35

## 2020-02-13 RX ADMIN — MINOXIDIL 5 MG: 2.5 TABLET ORAL at 20:03

## 2020-02-13 ASSESSMENT — PAIN SCALES - GENERAL
PAINLEVEL_OUTOF10: 0

## 2020-02-13 NOTE — PROGRESS NOTES
Nephrology Progress Note  Patient's Name: King Womack  11:34 AM  2/13/2020        Reason for Consult:  ESRD, hypertensive urgency  Requesting Physician:  Miguel Elias MD    Chief Complaint:  Left arm pain and swelling  History Obtained From:  patient and EHR    History of Present Ilness:    King Womack is a 37 y.o. male with a history of hypertension, ESRD HD dependent MWF, recurrent left-sided central vein stenosis and intracranial hemorrhage. Patient presented for his usual hemodialysis treatment on the day of admission and complaint of increasing swelling of and pain of his left arm which has an AV fistula. This was associated with chest pain as well. He reports a cough productive of whitish phlegm. He denied fever or chills. No headache or blurred vision. Hemodialysis was initiated but discontinued shortly after starting and patient transferred to ED for further evaluation. Upon arrival to ED blood pressure was markedly elevated with a peak of 229/136, pulse 104, and patient was afebrile. Lab data was significant for proBNP in excess of 70,000 WBC 7.1, hemoglobin 9.3, potassium 6.1 (hemolyzed) BUN 31 and creatinine 7. CT of the lungs performed demonstrated no evidence of pulmonary embolism or aortic dissection. Patient received clonidine, Benadryl, ceftriaxone, and doxycycline. He also received IV Apresoline. He was then taken to the acute hemodialysis suite for treatment followed by admission to telemetry he was admitted here 3 weeks ago. With similar presentation. Fistulogram and angioplasty of the left main thing was performed at the time with improvement in his arm swelling.     Subjective    2/11/20: No new problems reported; no further chest pain    2/12: Seen during HD; he gives no c/o; left arm pain much mproved   2/13: no c/o, awaiting fistulagram      Allergies:  Tylenol [acetaminophen]    Current Medications:    labetalol (NORMODYNE) tablet 300 mg, 2 times per day  diphenhydrAMINE (BENADRYL) injection 25 mg, Daily PRN  cloNIDine (CATAPRES) tablet 0.2 mg, BID  hydrALAZINE (APRESOLINE) tablet 50 mg, TID  minoxidil (LONITEN) tablet 5 mg, BID  NIFEdipine (PROCARDIA XL) extended release tablet 60 mg, Daily  pantoprazole (PROTONIX) tablet 40 mg, QAM AC  sodium chloride flush 0.9 % injection 10 mL, 2 times per day  sodium chloride flush 0.9 % injection 10 mL, PRN  magnesium hydroxide (MILK OF MAGNESIA) 400 MG/5ML suspension 30 mL, Daily PRN  ondansetron (ZOFRAN) injection 4 mg, Q6H PRN  heparin (porcine) injection 5,000 Units, 2 times per day  ipratropium-albuterol (DUONEB) nebulizer solution 1 ampule, Q4H WA  ibuprofen (ADVIL;MOTRIN) tablet 400 mg, Q8H PRN  hydrALAZINE (APRESOLINE) injection 20 mg, Q4H PRN  labetalol (NORMODYNE;TRANDATE) injection 10 mg, Q4H PRN  cloNIDine (CATAPRES) tablet 0.2 mg, Q4H PRN  melatonin tablet 3 mg, Nightly PRN          Physical exam:  Vitals:    02/13/20 0804   BP: 130/74   Pulse: 87   Resp: 16   Temp: 98.3 °F (36.8 °C)   SpO2: 97%          General: No distress. Alert. Eyes: PERRL. No sclera icterus. No conjunctival injection. ENT: No discharge. Pharynx clear. Neck: Trachea midline. Normal thyroid. Lungs:  coarse breath sounds  CV: Regular rate. Regular rhythm. No murmur or rub. .   Abd: Non-tender. Non-distended. No masses. No organmegaly. Normal bowel sounds. Skin: Warm and dry. No nodule on exposed extremities. No rash on exposed extremities. Ext: No cyanosis, clubbing, MICHAEL AVF good thrill and bruit, arm  swelling  Neuro: Awake. Follows commands. Positive pupils/gag/corneals. Normal pain response.       Data:   Labs:  Lab Results   Component Value Date     02/13/2020     02/12/2020     02/11/2020    K 4.2 02/13/2020    K 4.2 02/12/2020    K 4.4 02/11/2020    CL 96 (L) 02/13/2020    CO2 26 02/13/2020    CO2 26 02/12/2020    CO2 27 02/11/2020    CREATININE 5.1 (H) 02/13/2020    CREATININE 7.1 (H) 02/12/2020    CREATININE 4.2 (H) 02/11/2020

## 2020-02-13 NOTE — PROGRESS NOTES
[facility]  Internal Medicine Residency Program  Progress Note - House Team 1    Patient:  Andree Carter 37 y.o. male MRN: [id number]     Date of Service: 2/13/2020     CC: chest pressure and left arm swelling  Overnight events: None     Subjective     Patient was seen and observed at bedside this morning. He denies any chest pain or pressure. He also states that his left arm swelling has resolved. Vascular has been following and they will do a fistulogram of the left upper extremity on Thursday. BP control is improved. He denies any shortness of breath, nausea, vomiting, diarrhea, fevers, or chills. Objective     Physical Exam:  · Vitals: /74   Pulse 87   Temp 98.3 °F (36.8 °C) (Temporal)   Resp 16   Ht 5' 6\" (1.676 m)   Wt 106 lb 1.6 oz (48.1 kg)   SpO2 97%   BMI 17.12 kg/m²     · I & O - 24hr: I/O this shift:  · In: 240 [P.O.:240]  · Out: -    · General Appearance: alert, appears stated age, cooperative and no distress  · HEENT:  Head: Normocephalic, no lesions, without obvious abnormality. · Eye: Normal external eye, conjunctiva, lids cornea, NOEMI. · Lung: clear to auscultation bilaterally  · Heart: Normal rate and rhythm, normal S1 and S2, systolic ejection murmur heard likely due to left AVF  · Abdomen: soft, non-tender; bowel sounds normal; no masses,  no organomegaly  · Extremities: AV fistula in left upper extremity and palpable thrill over fistula, all ofther extremities normal, atraumatic, no cyanosis or edema. · Musculokeletal: No joint swelling, no muscle tenderness. ROM normal in all joints of extremities.    · Neurologic: Mental status: Alert, oriented, thought content appropriate  Subject  Pertinent Labs & Imaging Studies   denice  CBC with Differential:    Lab Results   Component Value Date    WBC 2.9 02/13/2020    RBC 2.97 02/13/2020    HGB 9.0 02/13/2020    HCT 29.8 02/13/2020     02/13/2020    .3 02/13/2020    MCH 30.3 02/13/2020    MCHC 30.2 Hypertensive urgency  -resolved  -BP peak of 229/136 on admission. Hypotensive episode on 2/11/2019 after HD and antihypertensives were held   -BP of 121/72 today. Restarted Labetalol 300 mg BID, Clonidine 0.2 mg BID, Hydralazine 50 mg TID, Nifedipine 60 mg daily  -continue to monitor BP    3. Pulmonary edema secondary to ESRD  -Pulmonary edema with bilateral rales and wheezes on admission  -Continue duoneb  -CXR improved  -no wheezes or rales heard today on exam    4. Leukopenia with neutropenia  -Likely due to ethnicity.  Americans tend to have lower WBC counts  -WBC count 2.9, down from 3.2 yesterday. Baseline 4-5. -ANC count 1.19, down from 1.4 yesterday. Baseline 2-3.  -Afebrile, no fevers or chills    5. HFpEF  -Last echo 1/2020.  Showed normal EF with Stage 3 diastolic dysfunction  -ProBNP >70,000 today and on admission       PT/OT evaluation: None  DVT prophylaxis/ GI prophylaxis: Heparin/Protonix  Disposition: continue current care    Jamir Nails, Medical Student  Attending physician: Dr. Coretta Waite

## 2020-02-14 ENCOUNTER — TELEPHONE (OUTPATIENT)
Dept: VASCULAR SURGERY | Age: 35
End: 2020-02-14

## 2020-02-14 VITALS
TEMPERATURE: 98 F | OXYGEN SATURATION: 98 % | DIASTOLIC BLOOD PRESSURE: 71 MMHG | HEART RATE: 80 BPM | BODY MASS INDEX: 17.01 KG/M2 | WEIGHT: 105.82 LBS | HEIGHT: 66 IN | SYSTOLIC BLOOD PRESSURE: 135 MMHG | RESPIRATION RATE: 20 BRPM

## 2020-02-14 LAB
ANION GAP SERPL CALCULATED.3IONS-SCNC: 21 MMOL/L (ref 7–16)
BASOPHILS ABSOLUTE: 0.03 E9/L (ref 0–0.2)
BASOPHILS RELATIVE PERCENT: 1 % (ref 0–2)
BUN BLDV-MCNC: 53 MG/DL (ref 6–20)
CALCIUM SERPL-MCNC: 8.8 MG/DL (ref 8.6–10.2)
CHLORIDE BLD-SCNC: 96 MMOL/L (ref 98–107)
CO2: 23 MMOL/L (ref 22–29)
CREAT SERPL-MCNC: 8.6 MG/DL (ref 0.7–1.2)
EOSINOPHILS ABSOLUTE: 0.24 E9/L (ref 0.05–0.5)
EOSINOPHILS RELATIVE PERCENT: 8.2 % (ref 0–6)
GFR AFRICAN AMERICAN: 9
GFR NON-AFRICAN AMERICAN: 9 ML/MIN/1.73
GLUCOSE BLD-MCNC: 84 MG/DL (ref 74–99)
HCT VFR BLD CALC: 27 % (ref 37–54)
HEMOGLOBIN: 8.1 G/DL (ref 12.5–16.5)
IMMATURE GRANULOCYTES #: 0.01 E9/L
IMMATURE GRANULOCYTES %: 0.3 % (ref 0–5)
LYMPHOCYTES ABSOLUTE: 1.01 E9/L (ref 1.5–4)
LYMPHOCYTES RELATIVE PERCENT: 34.6 % (ref 20–42)
MAGNESIUM: 2.9 MG/DL (ref 1.6–2.6)
MCH RBC QN AUTO: 29.9 PG (ref 26–35)
MCHC RBC AUTO-ENTMCNC: 30 % (ref 32–34.5)
MCV RBC AUTO: 99.6 FL (ref 80–99.9)
MONOCYTES ABSOLUTE: 0.55 E9/L (ref 0.1–0.95)
MONOCYTES RELATIVE PERCENT: 18.8 % (ref 2–12)
NEUTROPHILS ABSOLUTE: 1.08 E9/L (ref 1.8–7.3)
NEUTROPHILS RELATIVE PERCENT: 37.1 % (ref 43–80)
PDW BLD-RTO: 16.3 FL (ref 11.5–15)
PHOSPHORUS: 5.8 MG/DL (ref 2.5–4.5)
PLATELET # BLD: 188 E9/L (ref 130–450)
PMV BLD AUTO: 9.6 FL (ref 7–12)
POTASSIUM SERPL-SCNC: 4.6 MMOL/L (ref 3.5–5)
RBC # BLD: 2.71 E12/L (ref 3.8–5.8)
SODIUM BLD-SCNC: 140 MMOL/L (ref 132–146)
VITAMIN D 25-HYDROXY: 15 NG/ML (ref 30–100)
WBC # BLD: 2.9 E9/L (ref 4.5–11.5)

## 2020-02-14 PROCEDURE — 36415 COLL VENOUS BLD VENIPUNCTURE: CPT

## 2020-02-14 PROCEDURE — 6370000000 HC RX 637 (ALT 250 FOR IP): Performed by: INTERNAL MEDICINE

## 2020-02-14 PROCEDURE — 84100 ASSAY OF PHOSPHORUS: CPT

## 2020-02-14 PROCEDURE — 90935 HEMODIALYSIS ONE EVALUATION: CPT | Performed by: INTERNAL MEDICINE

## 2020-02-14 PROCEDURE — 6360000002 HC RX W HCPCS: Performed by: STUDENT IN AN ORGANIZED HEALTH CARE EDUCATION/TRAINING PROGRAM

## 2020-02-14 PROCEDURE — 80048 BASIC METABOLIC PNL TOTAL CA: CPT

## 2020-02-14 PROCEDURE — 99232 SBSQ HOSP IP/OBS MODERATE 35: CPT | Performed by: NURSE PRACTITIONER

## 2020-02-14 PROCEDURE — 83735 ASSAY OF MAGNESIUM: CPT

## 2020-02-14 PROCEDURE — 99232 SBSQ HOSP IP/OBS MODERATE 35: CPT | Performed by: INTERNAL MEDICINE

## 2020-02-14 PROCEDURE — 82306 VITAMIN D 25 HYDROXY: CPT

## 2020-02-14 PROCEDURE — 85025 COMPLETE CBC W/AUTO DIFF WBC: CPT

## 2020-02-14 RX ORDER — MINOXIDIL 2.5 MG/1
2.5 TABLET ORAL 2 TIMES DAILY
Status: DISCONTINUED | OUTPATIENT
Start: 2020-02-14 | End: 2020-02-14

## 2020-02-14 RX ORDER — LABETALOL 200 MG/1
200 TABLET, FILM COATED ORAL EVERY 8 HOURS SCHEDULED
Qty: 60 TABLET | Refills: 3 | Status: SHIPPED
Start: 2020-02-14 | End: 2020-03-03 | Stop reason: SDUPTHER

## 2020-02-14 RX ORDER — NIFEDIPINE 60 MG/1
60 TABLET, FILM COATED, EXTENDED RELEASE ORAL DAILY
Qty: 30 TABLET | Refills: 3 | Status: SHIPPED
Start: 2020-02-14 | End: 2020-03-03 | Stop reason: SDUPTHER

## 2020-02-14 RX ORDER — DIPHENHYDRAMINE HCL 25 MG
25 TABLET ORAL 2 TIMES DAILY
Status: DISCONTINUED | OUTPATIENT
Start: 2020-02-14 | End: 2020-02-14 | Stop reason: HOSPADM

## 2020-02-14 RX ADMIN — DIPHENHYDRAMINE HYDROCHLORIDE 25 MG: 50 INJECTION, SOLUTION INTRAMUSCULAR; INTRAVENOUS at 11:33

## 2020-02-14 RX ADMIN — PANTOPRAZOLE SODIUM 40 MG: 40 TABLET, DELAYED RELEASE ORAL at 05:45

## 2020-02-14 RX ADMIN — DIPHENHYDRAMINE HCL 25 MG: 25 TABLET ORAL at 12:21

## 2020-02-14 RX ADMIN — LABETALOL HYDROCHLORIDE 300 MG: 200 TABLET, FILM COATED ORAL at 11:46

## 2020-02-14 RX ADMIN — HYDRALAZINE HYDROCHLORIDE 50 MG: 50 TABLET, FILM COATED ORAL at 11:47

## 2020-02-14 RX ADMIN — NIFEDIPINE 60 MG: 60 TABLET, FILM COATED, EXTENDED RELEASE ORAL at 11:46

## 2020-02-14 RX ADMIN — MINOXIDIL 2.5 MG: 2.5 TABLET ORAL at 11:46

## 2020-02-14 RX ADMIN — CLONIDINE HYDROCHLORIDE 0.2 MG: 0.2 TABLET ORAL at 11:47

## 2020-02-14 ASSESSMENT — PAIN SCALES - GENERAL
PAINLEVEL_OUTOF10: 0

## 2020-02-14 NOTE — PROGRESS NOTES
[facility]  Internal Medicine Residency Program  Progress Note - House Team 2    Patient:  Leia Berrios 37 y.o. male MRN: [id number]     Date of Service: 2/14/2020     CC: Arm swelling  Overnight events: Low BP     Subjective   Leia Berrios is a 37 y.o. male with PMH of ESRD on HD (11 years) intracranial hemorrhage , hypothyroidism, HTN and anxiety on day 4 of hospital admission for  AVF mal function and volume overload      Patient was seen in the am on HD   Currently tolerating HD well    They are awake alert and respond to questions appropriately.  They have no new complaints overnight.  Will have AVF fistula gram as per vascular today   Will discharge today if no complications      Objective     Physical Exam:  · Vitals: /65   Pulse 79   Temp 97.9 °F (36.6 °C)   Resp 18   Ht 5' 6\" (1.676 m)   Wt 110 lb 3.7 oz (50 kg)   SpO2 95%   BMI 17.79 kg/m²       · Constitutional: Alert, AaO x3. Follows commands. In no apparent distress. · Head: Normocephalic and atraumatic. · Eyes: PERRL, (-) conjunctivitis (-) scleral icterus. Mucus membranes moist.  · Mouth: Mucus membranes moist. Oropharynx clear. No deviation of the tongue or uvula. Normal dentition. · Neck: (-)  swelling, (-) JVD    · Respiratory: (-)   wheezes, (-) bilateral rales  (-)  rhonchi. · Cardiovascular: RRR. (-)  murmurs, (-) gallops,  (-) rubs. S1 and S2 were normal.   · GI:  Abdomen soft, (-) tenderness, (-)distention. (+) BS. (-)  rebound, (-) guarding, (-) rigidity.     · Extremities: Warm and well perfused. (-) clubbing, (-) cyanosis. 2+ distal pulses. Palpable thrill over avf    · Neurologic:  Cranial nerves II-XII grossly intact. No focal neurological deficits.     Pertinent Labs & Imaging Studies   denice  CBC:   Lab Results   Component Value Date    WBC 2.9 02/14/2020    RBC 2.71 02/14/2020    HGB 8.1 02/14/2020    HCT 27.0 02/14/2020    MCV 99.6 02/14/2020    MCH 29.9 02/14/2020    MCHC 30.0 02/14/2020 RDW 16.3 02/14/2020     02/14/2020    MPV 9.6 02/14/2020     BMP:    Lab Results   Component Value Date     02/14/2020    K 4.6 02/14/2020    K 3.6 01/25/2019    CL 96 02/14/2020    CO2 23 02/14/2020    BUN 53 02/14/2020    LABALBU 3.3 02/11/2020    CREATININE 8.6 02/14/2020    CALCIUM 8.8 02/14/2020    GFRAA 9 02/14/2020    LABGLOM 9 02/14/2020    GLUCOSE 84 02/14/2020       Resident's Assessment and Plan      Juan José Lin is a 37 y.o. male presented to the [address] [place] [address] on HD (11 years) intracranial hemorrhage , hypothyroidism, HTN and anxiety and was admitted for AVF mal function and volume overload     Malfunctioning of AVF in ESRD on HD  · Base line Cr 5-7  · Tolerating HD well  · Vascular surgery consulted for AV fistula gram  · Nephrology consulted for HD  · Will receive fistulagram today  Pulmonary edema 2/2 ESRD with lose of vascular access (resolved)  · Bilateral rales and wheezes resolved  · Continue Duoneb PRN  HFpEF  · Last Echo 1/2020: normal EF with stage III diastolic dysfunction  · ProBNP >70,000  · CTA chest shoes bilateral non consolidating infiltrates   Pneumonia?    · Afebrile without leucocytosis  · Elevated procal: chronically elevated  · CXR improving  · No indications for antibiotics at this time  H/o HTN  § Continue nifedipine 60 mg clonidine 0.2mg labetalol 300 mg and hydralazine 50 mg   § No further episodes of Hypotension   §  Will continue to monitor BP closely   Neutropenia  · Absolute neutrophil count 1.08  · Afebrile  · WBCs 2.9  · No complaints currently  · Will continue to monitor     PT/OT evaluation: Not indicated at this time   DVT prophylaxis/ GI prophylaxis: Heparin / Protonix   Disposition: Continue current medical management       Debbi Sullivan, PGY-1  Attending physician: Dr. Mercado66 [facility]  Internal Medicine Clinic     Attending Physician Statement:  Tiara Messina M.D., F.A.C.P.     I have discussed the case, including pertinent history and exam findings with the resident/NP. I have seen and examined the patient and the key elements of the encounter have been performed by me.  I agree with the resident ROS, PMHx, PSHx, meds reviewed and assessment, plan and orders as documented by the resident/NP [facility] charts reviewed, including other providers notes, relevant labs and imaging.   He didn't get BP meds in ER  And BP very high  acclerated HTN noted in ER, BP this am better  Sp BP meds and HD     Overnight dropping BP-- BP meds     So adjusting BP meds to dec day after HD, and inc regiment day before HD  HD was successful priro night- in helping pulm edema and chest pain    (vascular access seemed to work- sent in from HD center-access seems to work)  \"Chest pain\" - he claims he feels like this every time his BP very high  Trop noted to be less than baseline  At that time - very SOB<   Chf/pulm edema exac causing some of this chest discomfort  Less pulm edema, no more sob or chest pain  cxr--  Persistent but improving perihilar and bilateral patchy and nodular  infiltrates concerning for improving edema or diffuse pneumonia\"     I doubt pneumonia,  Doubt PE- likley pulm congestion sp 4 liters HD removal.     New leukopenia and lymphopenia-- AA Male-- usually this range is OK  >1200 neutrophil  Chronically on day of DC usually WBC around 2.2 -3  On his prior admission.     New %%%eoisonophilia inc (bc drop lymph and N)-- is a new allergy developing? Or 2.29 E still normal range     Also sent in bc arm swelling-- chronic- 2011- now new developing subclavian stenosis 2 weeks ago-- sp angioplasty 2 weeks ago --now new edema  US for DVT negative  Arm swelling recent fistula- doesn't feel cool, intact sensation- feels heavy     Per vascular doc:   Today  planning a fistulogram    ?petra del cid     Discussed timing of antihypertensive meds-- optimal around HD     NOT seen but literature review:   Extremity swelling or swelling of the shoulder, chest wall, or breast can occur following AV access surgery. Mild-to-moderate extremity swelling is common initially following AV graft placement, and it usually subsides. If the problem persists beyond two weeks, an underlying problem will be found in approximately 25 percent of cases. This is most commonly central vein stenosis, which may not have been appreciated preoperatively. The diagnosis and treatment of central vein stenosis is reviewed separately [89]. (See \"Central vein obstruction associated with upper extremity hemodialysis access\". )  Swelling can also be due to venous valvular incompetence, which results in chronic elevation of the venous pressure in the extremity. The resulting venous hypertension can lead to skin discoloration, access dysfunction, and, potentially, ischemic changes of the skin.            Less likely Also steal syndrome-possible  Placement of an AV graft can reduce perfusion of the more distal extremity as a result of shunting (\"steal\") of arterial blood flow into the graft [90]. This is also called dialysis access-associated steal syndrome (JEROME). Symptomatic steal occurs in up to 20 percent of patients receiving an upper extremity access [91,92], with severe manifestations requiring intervention in 4 percent [93]. Although the presence of a significant arterial stenosis increases the likelihood, steal can occur in the absence of stenosis. (See \"Dialysis access steal syndrome\". )  Vascular steal typically manifests a median of two days after the placement of an AV graft [93]. Severe ischemic symptoms, characterized by loss of sensation or weakness, may be more common among patients with diabetes and those of advanced age [15,68]. Less severe symptoms and signs, such as paresthesias and a sense of coolness with retained pulses, are more common and usually improve over a period of weeks with the development of collateral blood flow.  Careful, frequent clinical evaluation and an alert nursing staff are necessary in this setting. Symptoms often worsen during dialysis sessions. Physical examination can confirm a diagnosis of vascular steal by manually compressing the access and noting an improvement in the patient's symptoms. However, physical examination alone may not be accurate in identifying the anatomic cause of the vascular steal [94]. Thus, among patients presenting with symptoms of extremity ischemia, there should be a low threshold to obtain noninvasive vascular evaluation using duplex ultrasound, which may identify the presence of arterial stenosis and/or flow reversal, and physiologic tests such as digital waveforms and pressures with and without fistula compression, which are sensitive for a diagnosis of steal [95-97].  If noninvasive studies are unrevealing and ischemic symptoms persist, arteriography of the extremity may identify an anatomic problem [94].      >50% of time spent coordinating care with other providers and/or counseling patient/family  Remainder of medical problems as per resident note.

## 2020-02-14 NOTE — DISCHARGE SUMMARY
[address]  Discharge Summary    PCP: Aristeo Culver MD    Admit Date:2/10/2020  Discharge Date: 2/16/2020    Admission Diagnosis:   1. AV fistula malfunction  2. ESRD on HD  3. HFpEF  4. Hypertension  5. Hypothyroidism    Discharge Diagnosis:  1. ESRD on HD  2. HFpEF  3. Hypertension  4. Hypothyroidism       Hospital Course: The patient is a 41 y. o. male with PMH of intreacranial hemorrage, hypothyroidism, HTN, CKD on HD with AVF and anxiety , presented to the ED complaining of arm swelling and chest pain. For the past week patient complains of increasing arm swelling and chest pain. He also complains of SOB, orthopnea and paroxsymal dyspnea for 1 week. Received HD on 2/7/20 and swelling was noticed at that time. Received half of HD then began having chest pain and HD was stopped. No additional HD was preformed.     ED Course: Initial vitals showed Afebril RR 20 HR 99 /119. Initial labs showed K 6.1 Bicarb 32, Cr 7 (baseline 5-8) procal 0.58, alk phos 148, ast 44, Hg 9.3 and . CXR Airspace disease compatible with pneumonia, which appears to be patchy, worse at the lung bases on both the right and the left.  CTA chest showed Cardiomegaly with pericardial effusion with  patchy bilateral infiltrates and pleural effusion likely CHF and/or pneumonia. Patient was admitted received HD Monday Wednesday Friday. Was well-tolerated with resolution of symptoms. Arm swelling resolved. As per vascular surgery no indication for AV fistulogram at this time, patient is to be discharged    Significant findings (history and exam, laboratory, radiological, pathology, other tests):   · Constitutional: Alert, AaO x3. Follows commands. In no apparent distress. · Head: Normocephalic and atraumatic. · Eyes: PERRL, (-) conjunctivitis (-) scleral icterus. Mucus membranes moist.  · Mouth: Mucus membranes moist. Oropharynx clear. No deviation of the tongue or uvula.  Normal

## 2020-02-14 NOTE — ADT AUTH CERT
02/11/20   0437    139   K 6.1* 4.4   CL 98 96*   CO2 32* 27   BUN 31* 18   CREATININE 7.0* 4.2*   GLUCOSE 87 87   CALCIUM 9.1 8.9   PROT 8.3 7.1   LABALBU 4.1 3.3*   BILITOT 0.5 0.4   ALKPHOS 148* 133*   AST 44* 21      Assessment:   1. Hypertensive urgency, blood pressure better controlled   2. Acute pulmonary edema 2/2 volume overload   3. Left upper extremity swelling, recent hx of left subclavian stenosis s/p balloon angioplasty   4. ESRD on HD M-W-F (Follows Dr. Radha Cho)   5. AOCD               Plan:       1. Continue current antiHTN medication   2. Vascular consulted, planning for fistulogram on Thursday, per Dr. Douglas Kowalski   3. S/p HD yesterday   4. Daily CBC, BMP   5.  Daily CXR           DVT/GI Prophylaxis: Heparin/Protonix

## 2020-02-14 NOTE — FLOWSHEET NOTE
02/14/20 1101   Vital Signs   /76   Temp 97.9 °F (36.6 °C)   Pulse 86   Resp 18   Weight 105 lb 13.1 oz (48 kg)   Weight Method Bed scale   Percent Weight Change -4   Pain Assessment   Pain Assessment 0-10   Pain Level 0   Post-Hemodialysis Assessment   Post-Treatment Procedures Blood returned; Access bleeding time < 10 minutes   Machine Disinfection Process Exterior Machine Disinfection   Rinseback Volume (ml) 300 ml   Total Liters Processed (l/min) 89.9 l/min   Dialyzer Clearance Lightly streaked   Duration of Treatment (minutes) 240 minutes   Heparin amount administered during treatment (units) 0 units   Hemodialysis Intake (ml) 300 ml   Hemodialysis Output (ml) 2300 ml   NET Removed (ml) 2000 ml   Tolerated Treatment Good   Patient Response to Treatment tolerated well, profile B, refill noted, 2L fluid removal   Bilateral Breath Sounds Diminished   Edema Left upper extremity   Physician Notified?  No

## 2020-02-15 LAB
BLOOD CULTURE, ROUTINE: NORMAL
CULTURE, BLOOD 2: NORMAL

## 2020-02-17 ENCOUNTER — TELEPHONE (OUTPATIENT)
Dept: INTERNAL MEDICINE | Age: 35
End: 2020-02-17

## 2020-03-03 ENCOUNTER — OFFICE VISIT (OUTPATIENT)
Dept: INTERNAL MEDICINE | Age: 35
End: 2020-03-03
Payer: MEDICAID

## 2020-03-03 ENCOUNTER — CARE COORDINATION (OUTPATIENT)
Dept: CARE COORDINATION | Age: 35
End: 2020-03-03

## 2020-03-03 VITALS
HEART RATE: 84 BPM | WEIGHT: 112.6 LBS | HEIGHT: 66 IN | SYSTOLIC BLOOD PRESSURE: 123 MMHG | BODY MASS INDEX: 18.09 KG/M2 | TEMPERATURE: 98.3 F | RESPIRATION RATE: 16 BRPM | DIASTOLIC BLOOD PRESSURE: 66 MMHG

## 2020-03-03 PROBLEM — K81.9 CHOLECYSTITIS: Status: RESOLVED | Noted: 2019-01-02 | Resolved: 2020-03-03

## 2020-03-03 PROBLEM — N18.6 ESRD (END STAGE RENAL DISEASE) (HCC): Status: RESOLVED | Noted: 2019-08-01 | Resolved: 2020-03-03

## 2020-03-03 PROBLEM — N19 RENAL FAILURE: Status: RESOLVED | Noted: 2020-01-22 | Resolved: 2020-03-03

## 2020-03-03 PROBLEM — R07.9 CHEST PAIN: Status: RESOLVED | Noted: 2019-07-31 | Resolved: 2020-03-03

## 2020-03-03 PROBLEM — N18.6 ESRD (END STAGE RENAL DISEASE) ON DIALYSIS (HCC): Chronic | Status: RESOLVED | Noted: 2018-04-30 | Resolved: 2020-03-03

## 2020-03-03 PROBLEM — E61.1 IRON DEFICIENCY: Status: ACTIVE | Noted: 2020-03-03

## 2020-03-03 PROBLEM — T82.590A DIALYSIS AV FISTULA MALFUNCTION (HCC): Status: RESOLVED | Noted: 2020-01-21 | Resolved: 2020-03-03

## 2020-03-03 PROBLEM — J96.01 ACUTE RESPIRATORY FAILURE WITH HYPOXIA (HCC): Status: RESOLVED | Noted: 2020-02-10 | Resolved: 2020-03-03

## 2020-03-03 PROBLEM — Z99.2 ESRD (END STAGE RENAL DISEASE) ON DIALYSIS (HCC): Chronic | Status: RESOLVED | Noted: 2018-04-30 | Resolved: 2020-03-03

## 2020-03-03 PROCEDURE — G8427 DOCREV CUR MEDS BY ELIG CLIN: HCPCS | Performed by: INTERNAL MEDICINE

## 2020-03-03 PROCEDURE — 99212 OFFICE O/P EST SF 10 MIN: CPT | Performed by: INTERNAL MEDICINE

## 2020-03-03 PROCEDURE — G8419 CALC BMI OUT NRM PARAM NOF/U: HCPCS | Performed by: INTERNAL MEDICINE

## 2020-03-03 PROCEDURE — 1111F DSCHRG MED/CURRENT MED MERGE: CPT | Performed by: INTERNAL MEDICINE

## 2020-03-03 PROCEDURE — 99213 OFFICE O/P EST LOW 20 MIN: CPT | Performed by: INTERNAL MEDICINE

## 2020-03-03 PROCEDURE — 99212 OFFICE O/P EST SF 10 MIN: CPT

## 2020-03-03 PROCEDURE — 4004F PT TOBACCO SCREEN RCVD TLK: CPT | Performed by: INTERNAL MEDICINE

## 2020-03-03 PROCEDURE — G8482 FLU IMMUNIZE ORDER/ADMIN: HCPCS | Performed by: INTERNAL MEDICINE

## 2020-03-03 RX ORDER — LABETALOL 200 MG/1
200 TABLET, FILM COATED ORAL EVERY 8 HOURS SCHEDULED
Qty: 270 TABLET | Refills: 1 | Status: SHIPPED
Start: 2020-03-03 | End: 2020-11-24

## 2020-03-03 RX ORDER — LOSARTAN POTASSIUM 100 MG/1
100 TABLET ORAL EVERY EVENING
Qty: 90 TABLET | Refills: 1 | Status: SHIPPED
Start: 2020-03-03 | End: 2020-07-28

## 2020-03-03 RX ORDER — LANOLIN ALCOHOL/MO/W.PET/CERES
3 CREAM (GRAM) TOPICAL NIGHTLY PRN
Qty: 90 TABLET | Refills: 3 | Status: SHIPPED
Start: 2020-03-03 | End: 2020-10-20 | Stop reason: SDUPTHER

## 2020-03-03 RX ORDER — HYDRALAZINE HYDROCHLORIDE 50 MG/1
50 TABLET, FILM COATED ORAL 3 TIMES DAILY
Qty: 270 TABLET | Refills: 1 | Status: SHIPPED
Start: 2020-03-03 | End: 2020-07-28

## 2020-03-03 RX ORDER — NIFEDIPINE 60 MG/1
60 TABLET, FILM COATED, EXTENDED RELEASE ORAL DAILY
Qty: 90 TABLET | Refills: 1 | Status: SHIPPED
Start: 2020-03-03 | End: 2020-07-28

## 2020-03-03 SDOH — SOCIAL STABILITY: SOCIAL NETWORK: HOW OFTEN DO YOU GET TOGETHER WITH FRIENDS OR RELATIVES?: ONCE A WEEK

## 2020-03-03 SDOH — ECONOMIC STABILITY: TRANSPORTATION INSECURITY
IN THE PAST 12 MONTHS, HAS LACK OF TRANSPORTATION KEPT YOU FROM MEETINGS, WORK, OR FROM GETTING THINGS NEEDED FOR DAILY LIVING?: NO

## 2020-03-03 SDOH — ECONOMIC STABILITY: TRANSPORTATION INSECURITY
IN THE PAST 12 MONTHS, HAS THE LACK OF TRANSPORTATION KEPT YOU FROM MEDICAL APPOINTMENTS OR FROM GETTING MEDICATIONS?: NO

## 2020-03-03 SDOH — SOCIAL STABILITY: SOCIAL NETWORK
DO YOU BELONG TO ANY CLUBS OR ORGANIZATIONS SUCH AS CHURCH GROUPS UNIONS, FRATERNAL OR ATHLETIC GROUPS, OR SCHOOL GROUPS?: NO

## 2020-03-03 SDOH — ECONOMIC STABILITY: FOOD INSECURITY: WITHIN THE PAST 12 MONTHS, THE FOOD YOU BOUGHT JUST DIDN'T LAST AND YOU DIDN'T HAVE MONEY TO GET MORE.: NEVER TRUE

## 2020-03-03 SDOH — ECONOMIC STABILITY: INCOME INSECURITY: HOW HARD IS IT FOR YOU TO PAY FOR THE VERY BASICS LIKE FOOD, HOUSING, MEDICAL CARE, AND HEATING?: NOT HARD AT ALL

## 2020-03-03 SDOH — SOCIAL STABILITY: SOCIAL NETWORK: ARE YOU MARRIED, WIDOWED, DIVORCED, SEPARATED, NEVER MARRIED, OR LIVING WITH A PARTNER?: DIVORCED

## 2020-03-03 SDOH — HEALTH STABILITY: PHYSICAL HEALTH: ON AVERAGE, HOW MANY MINUTES DO YOU ENGAGE IN EXERCISE AT THIS LEVEL?: 0 MIN

## 2020-03-03 SDOH — SOCIAL STABILITY: SOCIAL NETWORK: HOW OFTEN DO YOU ATTENT MEETINGS OF THE CLUB OR ORGANIZATION YOU BELONG TO?: NEVER

## 2020-03-03 SDOH — HEALTH STABILITY: PHYSICAL HEALTH: ON AVERAGE, HOW MANY DAYS PER WEEK DO YOU ENGAGE IN MODERATE TO STRENUOUS EXERCISE (LIKE A BRISK WALK)?: 0 DAYS

## 2020-03-03 SDOH — HEALTH STABILITY: MENTAL HEALTH
STRESS IS WHEN SOMEONE FEELS TENSE, NERVOUS, ANXIOUS, OR CAN'T SLEEP AT NIGHT BECAUSE THEIR MIND IS TROUBLED. HOW STRESSED ARE YOU?: NOT AT ALL

## 2020-03-03 SDOH — SOCIAL STABILITY: SOCIAL NETWORK: HOW OFTEN DO YOU ATTEND CHURCH OR RELIGIOUS SERVICES?: 1 TO 4 TIMES PER YEAR

## 2020-03-03 SDOH — SOCIAL STABILITY: SOCIAL NETWORK: IN A TYPICAL WEEK, HOW MANY TIMES DO YOU TALK ON THE PHONE WITH FAMILY, FRIENDS, OR NEIGHBORS?: ONCE A WEEK

## 2020-03-03 SDOH — ECONOMIC STABILITY: FOOD INSECURITY: WITHIN THE PAST 12 MONTHS, YOU WORRIED THAT YOUR FOOD WOULD RUN OUT BEFORE YOU GOT MONEY TO BUY MORE.: SOMETIMES TRUE

## 2020-03-03 ASSESSMENT — PATIENT HEALTH QUESTIONNAIRE - PHQ9
SUM OF ALL RESPONSES TO PHQ QUESTIONS 1-9: 0
SUM OF ALL RESPONSES TO PHQ QUESTIONS 1-9: 0

## 2020-03-03 ASSESSMENT — ENCOUNTER SYMPTOMS
NAUSEA: 0
ABDOMINAL PAIN: 0
BACK PAIN: 0
COUGH: 0
SORE THROAT: 0
PHOTOPHOBIA: 0
DIARRHEA: 0
SHORTNESS OF BREATH: 0

## 2020-03-03 NOTE — CARE COORDINATION
Ambulatory Care Coordination Note  3/3/2020  CM Risk Score: 8  Charlson 10 Year Mortality Risk Score: 23%     ACC: Pat Escobar RN    Summary Note:   ACM met with Laisha Lockwood at his office visit to invite him to care coordination and he is in agreement. He has an AV fistula in his left arm and has dialysis M-W-F at [facility] in [facility]. He lives alone and does not drive. He utilizes transportation provided by his insurance. He reports he stopped smoking a couple of years ago and does not drink alcohol. He expressed interest in the [employer] program.  Medications were reviewed and he reports he takes them as prescribed. PLAN  Consult LSW about eligibility for [employer]. Follow up in 1 week and review medications and care coordination needs with next interaction. Ambulatory Care Coordination Assessment    Care Coordination Protocol  Program Enrollment:  Complex Care  Referral from Primary Care Provider:  No  Week 1 - Initial Assessment     Do you have all of your prescriptions and are they filled?:  Yes  Barriers to medication adherence:  None  Are you able to afford your medications?:  Yes  How often do you have trouble taking your medications the way you have been told to take them?:  I always take them as prescribed. Do you have Home O2 Therapy?:  No      Ability to seek help/take action for Emergent Urgent situations i.e. fire, crime, inclement weather or health crisis. :  Independent  Ability to ambulate to restroom:  Independent  Ability handle personal hygeine needs (bathing/dressing/grooming): Independent  Ability to manage Medications: Independent  Ability to prepare Food Preparation:  Independent  Ability to maintain home (clean home, laundry): Independent  Ability to drive and/or has transportation:  Dependent  Ability to do shopping:  Independent  Ability to manage finances:   Independent  Is patient able to live independently?:  Yes     Current Housing:  Apartment Per the Fall Risk Screening, did the patient have 2 or more falls or 1 fall with injury in the past year?:  No     Frequent urination at night?:  No  Do you use rails/bars?:  No  Do you have a non-slip tub mat?:  No     Are you experiencing loss of meaning?:  No  Are you experiencing loss of hope and peace?:  No     Suggested Interventions and Community Resources   Other Services or Interventions:  [employer] program?   Medi Set or Pill Pack:  Completed   Other Therapy Services:  Completed   Transportation Services:  Completed         Set up/Review an Education Plan, Set up/Review Goals              Prior to Admission medications    Medication Sig Start Date End Date Taking?  Authorizing Provider   cloNIDine (CATAPRES) 0.2 MG tablet Take 0.2 mg by mouth 3 times daily   Yes Historical Provider, MD   diphenhydrAMINE (BENADRYL) 25 MG capsule Take 25 mg by mouth every 6 hours as needed for Itching   Yes Historical Provider, MD   calcium carbonate (TUMS) 500 MG chewable tablet Take 1 tablet by mouth 2 times daily   Yes Historical Provider, MD   calcitRIOL (ROCALTROL) 0.25 MCG capsule Take 1 mcg by mouth three times a week Given at dialysis on Monday, Wednesday,Friday   Yes Historical Provider, MD   Methoxy PEG-Epoetin Beta (MIRCERA) 75 MCG/0.3ML SOSY Inject 75 mcg as directed every 14 days Last Dose= 01/31/20  Next Dose - 02/14/20   Yes Historical Provider, MD   iron sucrose (VENOFER) 20 MG/ML injection Infuse 100 mg intravenously three times a week Given in dialysis every Monday ,Wednesday ,Friday   Yes Historical Provider, MD   minoxidil (LONITEN) 2.5 MG tablet Take 2 tablets by mouth 2 times daily 2/5/20  Yes Heike Gilman MD   pantoprazole (PROTONIX) 40 MG tablet TAKE 1 TABLET BY MOUTH EVERY MORNING BEFORE BREAKFAST  Patient taking differently: Take 40 mg by mouth daily  12/13/19  Yes Quyen Garcia DO   lidocaine-prilocaine (EMLA) 2.5-2.5 % cream Apply 1 each topically as needed Apply to AVF site prior to dialysis 11/18/19  Yes Historical Provider, MD   sevelamer (RENVELA) 800 MG tablet Take 1,600 mg by mouth 3 times daily    Yes Historical Provider, MD   hydrALAZINE (APRESOLINE) 50 MG tablet Take 1 tablet by mouth 3 times daily 3/3/20   Andrzej West MD   losartan (COZAAR) 100 MG tablet Take 1 tablet by mouth every evening 3/3/20   Andrzej West MD   NIFEdipine (ADALAT CC) 60 MG extended release tablet Take 1 tablet by mouth daily 3/3/20   Andrzej West MD   labetalol (NORMODYNE) 200 MG tablet Take 1 tablet by mouth every 8 hours 3/3/20   Andrzej West MD   melatonin (RA MELATONIN) 3 MG TABS tablet Take 1 tablet by mouth nightly as needed (insomnia) 3/3/20   Andrzej West MD       Future Appointments   Date Time Provider Jean Joshi   3/11/2020  2:00 PM Robbert Galeazzi, MD [facility]   4/22/2020  8:00 AM [facility]      and   General Assessment    Do you have any symptoms that are causing concern?:  No

## 2020-03-03 NOTE — PROGRESS NOTES
[facility]  Internal Medicine Clinic    Attending Physician Statement  I have discussed the case, including pertinent history and exam findings with the resident. I agree with the assessment, plan and orders as documented by the resident. I have reviewed all pertinent PMHx, PSHx, FamHx, SocialHx, medications, and allergies and updated history as appropriate. Patient here for routine follow up of medical problems. ESRD on iHD -- recent admission to [facility] for volume overload/hypoxia and improved with 4L removed by dialysis -- symptoms resolved and follows with [facility] for ongoing transplant consideration    Hypertension --- controlled on losartan, hydralazine, nifedipine, clonidine, and recent addition of minoxidil 5 mg BID per nephrology    ACD with [facility] -- on iron injection and Mircera per nephrology    Insomnia -- discussed sleep hygiene and ok for  trial melatonin    Remainder of medical problems as per resident note.   Teddy Spurling D.O.  3/3/2020 2:42 PM
mouth 3 times daily      Methoxy PEG-Epoetin Beta (MIRCERA) 75 MCG/0.3ML SOSY Inject 75 mcg as directed every 14 days Last Dose= 01/31/20  Next Dose - 02/14/20      iron sucrose (VENOFER) 20 MG/ML injection Infuse 100 mg intravenously three times a week Given in dialysis every Monday ,Wednesday ,Friday      minoxidil (LONITEN) 2.5 MG tablet Take 2 tablets by mouth 2 times daily 30 tablet 3    pantoprazole (PROTONIX) 40 MG tablet TAKE 1 TABLET BY MOUTH EVERY MORNING BEFORE BREAKFAST (Patient taking differently: Take 40 mg by mouth daily ) 90 tablet 0    lidocaine-prilocaine (EMLA) 2.5-2.5 % cream Apply 1 each topically as needed Apply to AVF site prior to dialysis      hydrALAZINE (APRESOLINE) 50 MG tablet Take 1 tablet by mouth 3 times daily 270 tablet 0    losartan (COZAAR) 100 MG tablet Take 1 tablet by mouth every evening 90 tablet 0    diphenhydrAMINE (BENADRYL) 25 MG capsule Take 25 mg by mouth every 6 hours as needed for Itching      calcium carbonate (TUMS) 500 MG chewable tablet Take 1 tablet by mouth 2 times daily      calcitRIOL (ROCALTROL) 0.25 MCG capsule Take 1 mcg by mouth three times a week Given at dialysis on Monday, Wednesday,Friday      sevelamer (RENVELA) 800 MG tablet Take 1,600 mg by mouth 3 times daily        No current facility-administered medications on file prior to visit. OBJECTIVE:    VS: /66 (Site: Right Upper Arm, Position: Sitting, Cuff Size: Small Adult)   Pulse 84   Temp 98.3 °F (36.8 °C) (Oral)   Resp 16   Ht 5' 6\" (1.676 m)   Wt 112 lb 9.6 oz (51.1 kg)   BMI 18.17 kg/m²   Physical Exam  Vitals signs and nursing note reviewed. Constitutional:       General: He is not in acute distress. Appearance: He is well-developed. HENT:      Head: Normocephalic and atraumatic. Eyes:      Pupils: Pupils are equal, round, and reactive to light. Neck:      Musculoskeletal: Normal range of motion and neck supple.    Cardiovascular:      Rate and Rhythm:

## 2020-03-09 ENCOUNTER — TELEPHONE (OUTPATIENT)
Dept: INTERNAL MEDICINE | Age: 35
End: 2020-03-09

## 2020-03-09 NOTE — TELEPHONE ENCOUNTER
Pharmacist LM at 3:40. Is the Hydralazine replacing the minoxidil? The have a prescription for the minoxidil also.   [facility] [phone]

## 2020-03-10 NOTE — TELEPHONE ENCOUNTER
From last hospital note by Dr. Campbell Gordon, the patient is supposed to have stopped minoxidil. I have discontinued this on med list and we need to confirm next nephrology follow up appointment.     Electronically signed by Carlos Mccabe DO on 3/10/2020 at 10:01 AM

## 2020-03-17 ENCOUNTER — CARE COORDINATION (OUTPATIENT)
Dept: CARE COORDINATION | Age: 35
End: 2020-03-17

## 2020-03-17 RX ORDER — PANTOPRAZOLE SODIUM 40 MG/1
TABLET, DELAYED RELEASE ORAL
Qty: 90 TABLET | Refills: 3 | OUTPATIENT
Start: 2020-03-17

## 2020-03-17 RX ORDER — PANTOPRAZOLE SODIUM 40 MG/1
TABLET, DELAYED RELEASE ORAL
Qty: 90 TABLET | Refills: 0 | Status: CANCELLED | OUTPATIENT
Start: 2020-03-17

## 2020-03-17 NOTE — CARE COORDINATION
Ambulatory Care Coordination Note  3/17/2020  CM Risk Score: 8  Charlson 10 Year Mortality Risk Score: 23%     ACC: Dana Samuels, RN    Summary Note:   JOANN spoke with [facility] for care coordination follow up. He reports he is feeling well and he is self isolated. He reports wearing a mask at dialysis and practicing standard precautions. He discussed the signs and symptoms of COVID-19 and if what he had heard on television was accurate. Medications were reviewed and he reports he is taking them as prescribed. JOANN discussed the SNAP/EBT match program and it's locations as well as the Fruits and Vegetable program and that he can apply and get vouchers at his next office visit. He does not have an office visit scheduled but state he is due in May. PLAN  Discuss SNAP/EBT program.  Follow up in 2 weeks and review medications and appointments with next interaction. Care Coordination Interventions    Program Enrollment:  Complex Care  Referral from Primary Care Provider:  No  Suggested Interventions and Community Resources  Medi Set or Pill Pack:  Completed (Comment: Exact care scripts pre packaged)  Other Therapy Services:  Completed (Comment: Dialysis M-W-F [facility] Re[facility]o)  Transportation Support:  Completed  Other Services or Interventions:  Fruits and Veggies program?         Goals Addressed                 This Visit's Progress     Conditions and Symptoms   No change     I will schedule office visits, as directed by my provider. I will keep my appointment or reschedule if I have to cancel. I will notify my provider of any barriers to my plan of care. I will notify my provider of any symptoms that indicate a worsening of my condition. Barriers: overwhelmed by complexity of regimen and lack of education  Plan for overcoming my barriers: care coordination  Confidence: 8/10  Anticipated Goal Completion Date: 6/3/2020    3/17/2020 states he is due in May but has not yet scheduled.             Prior to Admission medications    Medication Sig Start Date End Date Taking?  Authorizing Provider   hydrALAZINE (APRESOLINE) 50 MG tablet Take 1 tablet by mouth 3 times daily 3/3/20  Yes Jewel Graham MD   losartan (COZAAR) 100 MG tablet Take 1 tablet by mouth every evening 3/3/20  Yes Jewel Graham MD   NIFEdipine (ADALAT CC) 60 MG extended release tablet Take 1 tablet by mouth daily 3/3/20  Yes Jewel Graham MD   labetalol (NORMODYNE) 200 MG tablet Take 1 tablet by mouth every 8 hours 3/3/20  Yes Jewel Graham MD   melatonin (RA MELATONIN) 3 MG TABS tablet Take 1 tablet by mouth nightly as needed (insomnia) 3/3/20  Yes Jewel Graham MD   cloNIDine (CATAPRES) 0.2 MG tablet Take 0.2 mg by mouth 3 times daily   Yes Historical Provider, MD   diphenhydrAMINE (BENADRYL) 25 MG capsule Take 25 mg by mouth every 6 hours as needed for Itching   Yes Historical Provider, MD   calcium carbonate (TUMS) 500 MG chewable tablet Take 1 tablet by mouth 2 times daily   Yes Historical Provider, MD   calcitRIOL (ROCALTROL) 0.25 MCG capsule Take 1 mcg by mouth three times a week Given at dialysis on Monday, Wednesday,Friday   Yes Historical Provider, MD   Methoxy PEG-Epoetin Beta (MIRCERA) 75 MCG/0.3ML SOSY Inject 75 mcg as directed every 14 days Last Dose= 01/31/20  Next Dose - 02/14/20   Yes Historical Provider, MD   iron sucrose (VENOFER) 20 MG/ML injection Infuse 100 mg intravenously three times a week Given in dialysis every Monday ,Wednesday ,Friday   Yes Historical Provider, MD   pantoprazole (PROTONIX) 40 MG tablet TAKE 1 TABLET BY MOUTH EVERY MORNING BEFORE BREAKFAST  Patient taking differently: Take 40 mg by mouth daily  12/13/19  Yes Juanita Smith DO   lidocaine-prilocaine (EMLA) 2.5-2.5 % cream Apply 1 each topically as needed Apply to AVF site prior to dialysis 11/18/19  Yes Historical Provider, MD   sevelamer (RENVELA) 800 MG tablet Take 1,600 mg by mouth 3 times daily    Yes Historical Provider, MD       Future Appointments   Date Time Provider Jean Joshi   4/22/2020  8:00 AM Curt Miranda MD [facility]      and   General Assessment    Do you have any symptoms that are causing concern?:  No

## 2020-03-23 ENCOUNTER — TELEPHONE (OUTPATIENT)
Dept: INTERNAL MEDICINE | Age: 35
End: 2020-03-23

## 2020-03-23 RX ORDER — PANTOPRAZOLE SODIUM 40 MG/1
TABLET, DELAYED RELEASE ORAL
Qty: 90 TABLET | Refills: 0 | Status: SHIPPED
Start: 2020-03-23 | End: 2020-06-04

## 2020-03-24 RX ORDER — DIPHENHYDRAMINE HYDROCHLORIDE 25 MG/1
CAPSULE ORAL
Qty: 90 CAPSULE | Refills: 1 | Status: SHIPPED
Start: 2020-03-24 | End: 2020-09-03 | Stop reason: SDUPTHER

## 2020-04-05 ENCOUNTER — TELEPHONE (OUTPATIENT)
Dept: PRIMARY CARE CLINIC | Age: 35
End: 2020-04-05

## 2020-04-08 ENCOUNTER — TELEPHONE (OUTPATIENT)
Dept: VASCULAR SURGERY | Age: 35
End: 2020-04-08

## 2020-04-08 NOTE — TELEPHONE ENCOUNTER
Message left on pt's voicemail that, due to COVID-19, his 4/22 appointment with Dr. Froilan Dennison has been postponed to 5/27.

## 2020-04-17 ENCOUNTER — CARE COORDINATION (OUTPATIENT)
Dept: CARE COORDINATION | Age: 35
End: 2020-04-17

## 2020-04-17 NOTE — CARE COORDINATION
Ambulatory Care Coordination Note  4/17/2020  CM Risk Score: 8  Charlson 10 Year Mortality Risk Score: 23%     ACC: Soraya Summers, RN    Summary Note: Introduced self to [facility], as ACM making CC outreach today for David Smith, he verbalized understanding. He is currently at dialysis, schedule remains M-W-F, with chair time from 7-11. His transportation is provided by his insurance. He states he is feeling \"pretty good. \" He continues to practice social distancing and wears a mask at dialysis. No questions or concerns regarding COVID-19. ACM was unable to review medication list d/t [facility] being at dialysis at the time of call. He stated there hasn't been any changes since he last spoke with David Smith, but I can't confirm. Discussed the scheduled appointment with Dr. Basilio Cunha on 5/27/20. PLAN  Follow up in 2 weeks to review medications and inquire regarding any concerns. Care Coordination Interventions    Program Enrollment:  Complex Care  Referral from Primary Care Provider:  No  Suggested Interventions and Community Resources  Medi Set or Pill Pack:  Completed (Comment: Exact care scripts pre packaged)  Other Therapy Services:  Completed (Comment: Dialysis M-W-F [facility] Enter)  Transportation Support:  Completed  Other Services or Interventions:  Fruits and Veggies program?         Goals Addressed                 This Visit's Progress     Conditions and Symptoms   On track     I will schedule office visits, as directed by my provider. I will keep my appointment or reschedule if I have to cancel. I will notify my provider of any barriers to my plan of care. I will notify my provider of any symptoms that indicate a worsening of my condition.     Barriers: overwhelmed by complexity of regimen and lack of education  Plan for overcoming my barriers: care coordination  Confidence: 8/10  Anticipated Goal Completion Date: 6/3/2020    Has scheduled appointment with Dr. Basilio Cunha on 5/27/20            Prior Assessment    Do you have any symptoms that are causing concern?:  No

## 2020-06-04 RX ORDER — PANTOPRAZOLE SODIUM 40 MG/1
TABLET, DELAYED RELEASE ORAL
Qty: 90 TABLET | Refills: 0 | Status: SHIPPED
Start: 2020-06-04 | End: 2020-06-30

## 2020-06-16 ENCOUNTER — CARE COORDINATION (OUTPATIENT)
Dept: CARE COORDINATION | Age: 35
End: 2020-06-16

## 2020-07-01 RX ORDER — PANTOPRAZOLE SODIUM 40 MG/1
TABLET, DELAYED RELEASE ORAL
Qty: 30 TABLET | Refills: 0 | Status: SHIPPED
Start: 2020-07-01 | End: 2020-09-24

## 2020-07-28 RX ORDER — NIFEDIPINE 60 MG/1
60 TABLET, FILM COATED, EXTENDED RELEASE ORAL DAILY
Qty: 90 TABLET | Refills: 3 | Status: SHIPPED | OUTPATIENT
Start: 2020-07-28 | End: 2021-03-30 | Stop reason: SDUPTHER

## 2020-07-28 RX ORDER — LOSARTAN POTASSIUM 100 MG/1
100 TABLET ORAL EVERY EVENING
Qty: 90 TABLET | Refills: 3 | Status: SHIPPED
Start: 2020-07-28 | End: 2021-02-01

## 2020-07-28 RX ORDER — HYDRALAZINE HYDROCHLORIDE 50 MG/1
TABLET, FILM COATED ORAL
Qty: 270 TABLET | Refills: 3 | Status: SHIPPED
Start: 2020-07-28 | End: 2020-10-20 | Stop reason: SDUPTHER

## 2020-09-03 RX ORDER — DIPHENHYDRAMINE HCL 25 MG
CAPSULE ORAL
Qty: 90 CAPSULE | Refills: 0 | Status: SHIPPED
Start: 2020-09-03 | End: 2020-10-20 | Stop reason: SDUPTHER

## 2020-09-23 RX ORDER — DIPHENHYDRAMINE HCL 25 MG
CAPSULE ORAL
Qty: 90 CAPSULE | Refills: 10 | OUTPATIENT
Start: 2020-09-23

## 2020-09-24 RX ORDER — PANTOPRAZOLE SODIUM 40 MG/1
TABLET, DELAYED RELEASE ORAL
Qty: 30 TABLET | Refills: 0 | Status: SHIPPED
Start: 2020-09-24 | End: 2020-10-20 | Stop reason: ALTCHOICE

## 2020-10-20 ENCOUNTER — OFFICE VISIT (OUTPATIENT)
Dept: INTERNAL MEDICINE | Age: 35
End: 2020-10-20
Payer: MEDICAID

## 2020-10-20 VITALS
TEMPERATURE: 97.3 F | HEIGHT: 66 IN | BODY MASS INDEX: 19.53 KG/M2 | HEART RATE: 74 BPM | RESPIRATION RATE: 20 BRPM | OXYGEN SATURATION: 98 % | DIASTOLIC BLOOD PRESSURE: 80 MMHG | WEIGHT: 121.5 LBS | SYSTOLIC BLOOD PRESSURE: 160 MMHG

## 2020-10-20 PROBLEM — N25.81 SECONDARY HYPERPARATHYROIDISM (HCC): Status: ACTIVE | Noted: 2020-10-20

## 2020-10-20 PROCEDURE — 99212 OFFICE O/P EST SF 10 MIN: CPT | Performed by: INTERNAL MEDICINE

## 2020-10-20 PROCEDURE — G8427 DOCREV CUR MEDS BY ELIG CLIN: HCPCS | Performed by: INTERNAL MEDICINE

## 2020-10-20 PROCEDURE — G8484 FLU IMMUNIZE NO ADMIN: HCPCS | Performed by: INTERNAL MEDICINE

## 2020-10-20 PROCEDURE — G8420 CALC BMI NORM PARAMETERS: HCPCS | Performed by: INTERNAL MEDICINE

## 2020-10-20 PROCEDURE — 99213 OFFICE O/P EST LOW 20 MIN: CPT | Performed by: INTERNAL MEDICINE

## 2020-10-20 PROCEDURE — 1036F TOBACCO NON-USER: CPT | Performed by: INTERNAL MEDICINE

## 2020-10-20 RX ORDER — LANOLIN ALCOHOL/MO/W.PET/CERES
3 CREAM (GRAM) TOPICAL NIGHTLY PRN
Qty: 90 TABLET | Refills: 3 | Status: SHIPPED
Start: 2020-10-20 | End: 2021-02-01

## 2020-10-20 RX ORDER — PANTOPRAZOLE SODIUM 40 MG/1
TABLET, DELAYED RELEASE ORAL
Qty: 90 TABLET | Refills: 0 | Status: CANCELLED | OUTPATIENT
Start: 2020-10-20

## 2020-10-20 RX ORDER — HYDRALAZINE HYDROCHLORIDE 50 MG/1
TABLET, FILM COATED ORAL
Qty: 270 TABLET | Refills: 3 | Status: SHIPPED
Start: 2020-10-20 | End: 2021-02-01

## 2020-10-20 RX ORDER — FAMOTIDINE 20 MG/1
20 TABLET, FILM COATED ORAL 2 TIMES DAILY PRN
Qty: 60 TABLET | Refills: 0 | Status: SHIPPED
Start: 2020-10-20 | End: 2021-02-01

## 2020-10-20 RX ORDER — DIPHENHYDRAMINE HCL 25 MG
CAPSULE ORAL
Qty: 90 CAPSULE | Refills: 0 | Status: SHIPPED
Start: 2020-10-20 | End: 2021-02-01

## 2020-10-20 ASSESSMENT — ENCOUNTER SYMPTOMS
SINUS PAIN: 0
WHEEZING: 0
VOMITING: 0
BACK PAIN: 0
SINUS PRESSURE: 0
NAUSEA: 0
COUGH: 0
SHORTNESS OF BREATH: 0
CONSTIPATION: 0
DIARRHEA: 0

## 2020-10-20 NOTE — PROGRESS NOTES
[address]  InternalMedicine Residency Program  ACC Note      SUBJECTIVE:  CC: had concerns including Hypertension and Other (medical clearance for dental work). HPI:Abdelrahman Ferguson presented to the [facility] for a routine visit. PMHx of  has a past medical history of (HFpEF) heart failure with preserved ejection fraction ([address] Anxiety, AVF (arteriovenous fistula) ([address] Chronic kidney disease, Depression, Encounter regarding vascular access for dialysis for ESRD ([address] Hypertension, Hypothyroidism, Intracranial hemorrhage ([address] and Noncompliance w/medication treatment due to intermit use of medication. Patient presents for regular follow follow up, meds refill, results review and medical clearance for dental procedure. Since last visit he reports doing well in general.  HTN: BP today 160/80 (repeat); did not take meds this am. Gave him his meds and will recheck. Needs meds refilled. Last blood work in 2/2020 - reviewed with patient. Dental procedure: getting root plan/scale on 10/22 and fillings 1/21, 2/21. Need medical clearance. Reaching out to dental clinic for verification and forms to fill. ESRD on HD: complaint with HD, MWF    Denies current smoking, alcohol or illegal drugs. Review of Systems   Constitutional: Negative for activity change, appetite change, chills and fever. HENT: Negative for sinus pressure, sinus pain and sneezing. Respiratory: Negative for cough, shortness of breath and wheezing. Cardiovascular: Negative for chest pain, palpitations and leg swelling. Gastrointestinal: Negative for constipation, diarrhea, nausea and vomiting. Endocrine: Negative for polydipsia, polyphagia and polyuria. Genitourinary: Negative for difficulty urinating, dysuria and hematuria. Musculoskeletal: Negative for arthralgias, back pain and myalgias. Neurological: Negative for dizziness, seizures, syncope and light-headedness.    Psychiatric/Behavioral: Negative for dysphoric mood and sleep disturbance. The patient is not nervous/anxious. Current Outpatient Medications on File Prior to Visit   Medication Sig Dispense Refill    NIFEdipine (ADALAT CC) 60 MG extended release tablet TAKE 1 TABLET BY MOUTH DAILY 90 tablet 3    labetalol (NORMODYNE) 200 MG tablet Take 1 tablet by mouth every 8 hours 270 tablet 1    calcitRIOL (ROCALTROL) 0.25 MCG capsule Take 1 mcg by mouth three times a week Given at dialysis on Monday, Wednesday,Friday      iron sucrose (VENOFER) 20 MG/ML injection Infuse 100 mg intravenously three times a week Given in dialysis every Monday ,Wednesday ,Friday      lidocaine-prilocaine (EMLA) 2.5-2.5 % cream Apply 1 each topically as needed Apply to AVF site prior to dialysis      sevelamer (RENVELA) 800 MG tablet Take 1,600 mg by mouth 3 times daily       losartan (COZAAR) 100 MG tablet TAKE 1 TABLET BY MOUTH EVERY EVENING (Patient not taking: Reported on 10/20/2020) 90 tablet 3    cloNIDine (CATAPRES) 0.2 MG tablet Take 0.2 mg by mouth 3 times daily      calcium carbonate (TUMS) 500 MG chewable tablet Take 1 tablet by mouth 2 times daily      Methoxy PEG-Epoetin Beta (MIRCERA) 75 MCG/0.3ML SOSY Inject 75 mcg as directed every 14 days Last Dose= 01/31/20  Next Dose - 02/14/20       No current facility-administered medications on file prior to visit. OBJECTIVE:    VS: BP (!) 160/80 (Site: Right Upper Arm, Position: Sitting, Cuff Size: Medium Adult) Comment: manual  Pulse 74   Temp 97.3 °F (36.3 °C) (Oral)   Resp 20   Ht 5' 6\" (1.676 m)   Wt 121 lb 8 oz (55.1 kg)   SpO2 98%   BMI 19.61 kg/m²   Physical Exam  Constitutional:       Appearance: Normal appearance. He is normal weight. HENT:      Head: Normocephalic and atraumatic. Mouth/Throat:      Mouth: Mucous membranes are moist.   Eyes:      Extraocular Movements: Extraocular movements intact. Pupils: Pupils are equal, round, and reactive to light.    Neck: Musculoskeletal: Normal range of motion. No neck rigidity or muscular tenderness. Cardiovascular:      Rate and Rhythm: Normal rate and regular rhythm. Heart sounds: No murmur. Pulmonary:      Effort: Pulmonary effort is normal.      Breath sounds: Normal breath sounds. No wheezing. Abdominal:      General: Bowel sounds are normal.      Palpations: Abdomen is soft. There is no mass. Tenderness: There is no abdominal tenderness. Musculoskeletal: Normal range of motion. Right lower leg: No edema. Left lower leg: No edema. Neurological:      General: No focal deficit present. Mental Status: He is alert and oriented to person, place, and time. Cranial Nerves: No cranial nerve deficit. Psychiatric:         Mood and Affect: Mood normal.         Behavior: Behavior normal.         Thought Content: Thought content normal.         ASSESSMENT/PLAN:    I have reviewed all pertient PMHx, PSHx, FamHx, Social Hx, medications, and allergies andupdated history as appropriate. Lashawn Cordero was seen today for hypertension and other. Diagnoses and all orders for this visit:    Hypertension, unspecified type  -     hydrALAZINE (APRESOLINE) 50 MG tablet; TAKE 1 TABLET BY MOUTH THREE TIMES DAILY    Insomnia, unspecified type  -     melatonin (RA MELATONIN) 3 MG TABS tablet; Take 1 tablet by mouth nightly as needed (insomnia)    Gastroesophageal reflux disease without esophagitis  -     famotidine (PEPCID) 20 MG tablet; Take 1 tablet by mouth 2 times daily as needed (acid reflux)    Other pruritus  -     diphenhydrAMINE (BANOPHEN) 25 MG capsule; TAKE ONE (1) CAPSULE BY MOUTH THREE TIMES A DAY AS NEEDED FOR ITCHING    Secondary hyperparathyroidism ([address]    ESRD on hemodialysis ([address]      Needs medical clearance for dental procedure. He is clear from medical point of view. Will clear when we receive paper work from [employer].     RTC:     I have reviewed my findings andrecommendations with Lashawn Cordero

## 2020-10-20 NOTE — PROGRESS NOTES
Attending Physician Statement  I have discussed the case, including pertinent history and exam findings with the resident. I agree with the assessment, plan and orders as documented by the resident. Pt presented for the follow up of ESRD related congenital kidney underdevelopment. Pt has been having pruritis, OK to continue antihistamine and moisturizing cream.  Pending root canal and scaling in the near future. Follow up in regularly scheduled clinic.   Eura Cure

## 2020-11-24 RX ORDER — LABETALOL 200 MG/1
200 TABLET, FILM COATED ORAL EVERY 8 HOURS SCHEDULED
Qty: 90 TABLET | Refills: 1 | Status: SHIPPED
Start: 2020-11-24 | End: 2021-02-01

## 2020-11-24 RX ORDER — DIPHENHYDRAMINE HCL 25 MG
CAPSULE ORAL
Qty: 90 CAPSULE | Status: CANCELLED | OUTPATIENT
Start: 2020-11-24

## 2021-01-21 RX ORDER — PANTOPRAZOLE SODIUM 40 MG/1
TABLET, DELAYED RELEASE ORAL
Qty: 90 TABLET | Refills: 0 | Status: SHIPPED
Start: 2021-01-21 | End: 2021-02-01

## 2021-02-01 ENCOUNTER — HOSPITAL ENCOUNTER (INPATIENT)
Age: 36
LOS: 1 days | Discharge: HOME | DRG: 139 | End: 2021-02-03
Attending: EMERGENCY MEDICINE | Admitting: INTERNAL MEDICINE
Payer: MEDICAID

## 2021-02-01 ENCOUNTER — APPOINTMENT (OUTPATIENT)
Dept: GENERAL RADIOLOGY | Age: 36
DRG: 139 | End: 2021-02-01
Payer: MEDICAID

## 2021-02-01 DIAGNOSIS — I10 HYPERTENSION, UNSPECIFIED TYPE: ICD-10-CM

## 2021-02-01 DIAGNOSIS — G47.00 INSOMNIA, UNSPECIFIED TYPE: ICD-10-CM

## 2021-02-01 DIAGNOSIS — Z99.2 ESRD ON HEMODIALYSIS (HCC): ICD-10-CM

## 2021-02-01 DIAGNOSIS — R07.9 CHEST PAIN, UNSPECIFIED TYPE: Primary | ICD-10-CM

## 2021-02-01 DIAGNOSIS — N18.6 ESRD ON HEMODIALYSIS (HCC): ICD-10-CM

## 2021-02-01 LAB
ALBUMIN SERPL-MCNC: 4.1 G/DL (ref 3.5–5.2)
ALP BLD-CCNC: 87 U/L (ref 40–129)
ALT SERPL-CCNC: 15 U/L (ref 0–40)
ANION GAP SERPL CALCULATED.3IONS-SCNC: 13 MMOL/L (ref 7–16)
AST SERPL-CCNC: 21 U/L (ref 0–39)
BASOPHILS ABSOLUTE: 0.02 E9/L (ref 0–0.2)
BASOPHILS RELATIVE PERCENT: 0.3 % (ref 0–2)
BILIRUB SERPL-MCNC: 0.7 MG/DL (ref 0–1.2)
BUN BLDV-MCNC: 18 MG/DL (ref 6–20)
C-REACTIVE PROTEIN: 6.6 MG/DL (ref 0–0.4)
CALCIUM SERPL-MCNC: 8.5 MG/DL (ref 8.6–10.2)
CHLORIDE BLD-SCNC: 99 MMOL/L (ref 98–107)
CK MB: <1 NG/ML (ref 0–7.7)
CO2: 29 MMOL/L (ref 22–29)
CREAT SERPL-MCNC: 5.4 MG/DL (ref 0.7–1.2)
EKG ATRIAL RATE: 85 BPM
EKG P AXIS: 48 DEGREES
EKG P-R INTERVAL: 196 MS
EKG Q-T INTERVAL: 394 MS
EKG QRS DURATION: 80 MS
EKG QTC CALCULATION (BAZETT): 468 MS
EKG R AXIS: 84 DEGREES
EKG T AXIS: 35 DEGREES
EKG VENTRICULAR RATE: 85 BPM
EOSINOPHILS ABSOLUTE: 0.07 E9/L (ref 0.05–0.5)
EOSINOPHILS RELATIVE PERCENT: 0.9 % (ref 0–6)
GFR AFRICAN AMERICAN: 14
GFR NON-AFRICAN AMERICAN: 14 ML/MIN/1.73
GLUCOSE BLD-MCNC: 78 MG/DL (ref 74–99)
HCT VFR BLD CALC: 26.6 % (ref 37–54)
HEMOGLOBIN: 8.4 G/DL (ref 12.5–16.5)
IMMATURE GRANULOCYTES #: 0.03 E9/L
IMMATURE GRANULOCYTES %: 0.4 % (ref 0–5)
LIPASE: 26 U/L (ref 13–60)
LYMPHOCYTES ABSOLUTE: 1.06 E9/L (ref 1.5–4)
LYMPHOCYTES RELATIVE PERCENT: 14.3 % (ref 20–42)
MAGNESIUM: 2.3 MG/DL (ref 1.6–2.6)
MCH RBC QN AUTO: 30.7 PG (ref 26–35)
MCHC RBC AUTO-ENTMCNC: 31.6 % (ref 32–34.5)
MCV RBC AUTO: 97.1 FL (ref 80–99.9)
MONOCYTES ABSOLUTE: 0.56 E9/L (ref 0.1–0.95)
MONOCYTES RELATIVE PERCENT: 7.6 % (ref 2–12)
NEUTROPHILS ABSOLUTE: 5.65 E9/L (ref 1.8–7.3)
NEUTROPHILS RELATIVE PERCENT: 76.5 % (ref 43–80)
PDW BLD-RTO: 16.5 FL (ref 11.5–15)
PHOSPHORUS: 2.6 MG/DL (ref 2.5–4.5)
PLATELET # BLD: 168 E9/L (ref 130–450)
PMV BLD AUTO: 9.8 FL (ref 7–12)
POTASSIUM SERPL-SCNC: 3.4 MMOL/L (ref 3.5–5)
PRO-BNP: ABNORMAL PG/ML (ref 0–125)
PROCALCITONIN: 0.49 NG/ML (ref 0–0.08)
RBC # BLD: 2.74 E12/L (ref 3.8–5.8)
SARS-COV-2, NAAT: NOT DETECTED
SEDIMENTATION RATE, ERYTHROCYTE: 39 MM/HR (ref 0–15)
SODIUM BLD-SCNC: 141 MMOL/L (ref 132–146)
TOTAL CK: 166 U/L (ref 20–200)
TOTAL PROTEIN: 7.1 G/DL (ref 6.4–8.3)
TROPONIN: 0.02 NG/ML (ref 0–0.03)
TROPONIN: 0.03 NG/ML (ref 0–0.03)
WBC # BLD: 7.4 E9/L (ref 4.5–11.5)

## 2021-02-01 PROCEDURE — 96375 TX/PRO/DX INJ NEW DRUG ADDON: CPT

## 2021-02-01 PROCEDURE — 82553 CREATINE MB FRACTION: CPT

## 2021-02-01 PROCEDURE — 6370000000 HC RX 637 (ALT 250 FOR IP): Performed by: INTERNAL MEDICINE

## 2021-02-01 PROCEDURE — 6370000000 HC RX 637 (ALT 250 FOR IP): Performed by: EMERGENCY MEDICINE

## 2021-02-01 PROCEDURE — 84145 PROCALCITONIN (PCT): CPT

## 2021-02-01 PROCEDURE — 2580000003 HC RX 258: Performed by: INTERNAL MEDICINE

## 2021-02-01 PROCEDURE — 85025 COMPLETE CBC W/AUTO DIFF WBC: CPT

## 2021-02-01 PROCEDURE — G0378 HOSPITAL OBSERVATION PER HR: HCPCS

## 2021-02-01 PROCEDURE — 86140 C-REACTIVE PROTEIN: CPT

## 2021-02-01 PROCEDURE — 84484 ASSAY OF TROPONIN QUANT: CPT

## 2021-02-01 PROCEDURE — 99285 EMERGENCY DEPT VISIT HI MDM: CPT

## 2021-02-01 PROCEDURE — 83880 ASSAY OF NATRIURETIC PEPTIDE: CPT

## 2021-02-01 PROCEDURE — 6370000000 HC RX 637 (ALT 250 FOR IP): Performed by: STUDENT IN AN ORGANIZED HEALTH CARE EDUCATION/TRAINING PROGRAM

## 2021-02-01 PROCEDURE — 83735 ASSAY OF MAGNESIUM: CPT

## 2021-02-01 PROCEDURE — 2500000003 HC RX 250 WO HCPCS: Performed by: INTERNAL MEDICINE

## 2021-02-01 PROCEDURE — 71045 X-RAY EXAM CHEST 1 VIEW: CPT

## 2021-02-01 PROCEDURE — U0002 COVID-19 LAB TEST NON-CDC: HCPCS

## 2021-02-01 PROCEDURE — 36415 COLL VENOUS BLD VENIPUNCTURE: CPT

## 2021-02-01 PROCEDURE — 83690 ASSAY OF LIPASE: CPT

## 2021-02-01 PROCEDURE — 80053 COMPREHEN METABOLIC PANEL: CPT

## 2021-02-01 PROCEDURE — 93010 ELECTROCARDIOGRAM REPORT: CPT | Performed by: INTERNAL MEDICINE

## 2021-02-01 PROCEDURE — 82550 ASSAY OF CK (CPK): CPT

## 2021-02-01 PROCEDURE — 96376 TX/PRO/DX INJ SAME DRUG ADON: CPT

## 2021-02-01 PROCEDURE — 84100 ASSAY OF PHOSPHORUS: CPT

## 2021-02-01 PROCEDURE — 6360000002 HC RX W HCPCS: Performed by: STUDENT IN AN ORGANIZED HEALTH CARE EDUCATION/TRAINING PROGRAM

## 2021-02-01 PROCEDURE — 96374 THER/PROPH/DIAG INJ IV PUSH: CPT

## 2021-02-01 PROCEDURE — 85651 RBC SED RATE NONAUTOMATED: CPT

## 2021-02-01 PROCEDURE — 6360000002 HC RX W HCPCS: Performed by: INTERNAL MEDICINE

## 2021-02-01 PROCEDURE — 93005 ELECTROCARDIOGRAM TRACING: CPT | Performed by: STUDENT IN AN ORGANIZED HEALTH CARE EDUCATION/TRAINING PROGRAM

## 2021-02-01 RX ORDER — AZITHROMYCIN 250 MG/1
500 TABLET, FILM COATED ORAL ONCE
Status: COMPLETED | OUTPATIENT
Start: 2021-02-01 | End: 2021-02-01

## 2021-02-01 RX ORDER — LOSARTAN POTASSIUM 100 MG/1
100 TABLET ORAL EVERY EVENING
COMMUNITY
End: 2021-03-30 | Stop reason: SDUPTHER

## 2021-02-01 RX ORDER — HYDRALAZINE HYDROCHLORIDE 20 MG/ML
5 INJECTION INTRAMUSCULAR; INTRAVENOUS ONCE
Status: COMPLETED | OUTPATIENT
Start: 2021-02-01 | End: 2021-02-01

## 2021-02-01 RX ORDER — PROMETHAZINE HYDROCHLORIDE 25 MG/1
12.5 TABLET ORAL EVERY 6 HOURS PRN
Status: DISCONTINUED | OUTPATIENT
Start: 2021-02-01 | End: 2021-02-03 | Stop reason: HOSPADM

## 2021-02-01 RX ORDER — SODIUM CHLORIDE 0.9 % (FLUSH) 0.9 %
10 SYRINGE (ML) INJECTION EVERY 12 HOURS SCHEDULED
Status: DISCONTINUED | OUTPATIENT
Start: 2021-02-01 | End: 2021-02-03 | Stop reason: HOSPADM

## 2021-02-01 RX ORDER — DIPHENHYDRAMINE HYDROCHLORIDE 50 MG/ML
25 INJECTION INTRAMUSCULAR; INTRAVENOUS ONCE
Status: COMPLETED | OUTPATIENT
Start: 2021-02-01 | End: 2021-02-01

## 2021-02-01 RX ORDER — HYDRALAZINE HYDROCHLORIDE 20 MG/ML
10 INJECTION INTRAMUSCULAR; INTRAVENOUS ONCE
Status: COMPLETED | OUTPATIENT
Start: 2021-02-01 | End: 2021-02-01

## 2021-02-01 RX ORDER — LANOLIN ALCOHOL/MO/W.PET/CERES
3 CREAM (GRAM) TOPICAL NIGHTLY PRN
Status: DISCONTINUED | OUTPATIENT
Start: 2021-02-02 | End: 2021-02-02

## 2021-02-01 RX ORDER — HYDRALAZINE HYDROCHLORIDE 50 MG/1
50 TABLET, FILM COATED ORAL 3 TIMES DAILY
COMMUNITY
End: 2021-03-30 | Stop reason: SDUPTHER

## 2021-02-01 RX ORDER — CLONIDINE HYDROCHLORIDE 0.1 MG/1
0.2 TABLET ORAL ONCE
Status: COMPLETED | OUTPATIENT
Start: 2021-02-01 | End: 2021-02-01

## 2021-02-01 RX ORDER — HEPARIN SODIUM 10000 [USP'U]/ML
5000 INJECTION, SOLUTION INTRAVENOUS; SUBCUTANEOUS EVERY 8 HOURS
Status: DISCONTINUED | OUTPATIENT
Start: 2021-02-01 | End: 2021-02-03 | Stop reason: HOSPADM

## 2021-02-01 RX ORDER — SODIUM CHLORIDE 0.9 % (FLUSH) 0.9 %
10 SYRINGE (ML) INJECTION PRN
Status: DISCONTINUED | OUTPATIENT
Start: 2021-02-01 | End: 2021-02-03 | Stop reason: HOSPADM

## 2021-02-01 RX ORDER — PANTOPRAZOLE SODIUM 40 MG/10ML
40 INJECTION, POWDER, LYOPHILIZED, FOR SOLUTION INTRAVENOUS ONCE
Status: DISCONTINUED | OUTPATIENT
Start: 2021-02-01 | End: 2021-02-03 | Stop reason: HOSPADM

## 2021-02-01 RX ORDER — AZITHROMYCIN 250 MG/1
500 TABLET, FILM COATED ORAL DAILY
Status: DISCONTINUED | OUTPATIENT
Start: 2021-02-02 | End: 2021-02-01 | Stop reason: SDUPTHER

## 2021-02-01 RX ORDER — DIPHENHYDRAMINE HCL 25 MG
25 TABLET ORAL ONCE
Status: COMPLETED | OUTPATIENT
Start: 2021-02-01 | End: 2021-02-01

## 2021-02-01 RX ORDER — LIDOCAINE 4 G/G
1 PATCH TOPICAL DAILY
Status: DISCONTINUED | OUTPATIENT
Start: 2021-02-01 | End: 2021-02-03 | Stop reason: HOSPADM

## 2021-02-01 RX ORDER — AZITHROMYCIN 250 MG/1
250 TABLET, FILM COATED ORAL DAILY
Status: DISCONTINUED | OUTPATIENT
Start: 2021-02-02 | End: 2021-02-03 | Stop reason: HOSPADM

## 2021-02-01 RX ORDER — LABETALOL HYDROCHLORIDE 5 MG/ML
10 INJECTION, SOLUTION INTRAVENOUS EVERY 6 HOURS PRN
Status: DISCONTINUED | OUTPATIENT
Start: 2021-02-01 | End: 2021-02-03 | Stop reason: HOSPADM

## 2021-02-01 RX ORDER — ONDANSETRON 2 MG/ML
4 INJECTION INTRAMUSCULAR; INTRAVENOUS EVERY 6 HOURS PRN
Status: DISCONTINUED | OUTPATIENT
Start: 2021-02-01 | End: 2021-02-03 | Stop reason: HOSPADM

## 2021-02-01 RX ORDER — LABETALOL HYDROCHLORIDE 5 MG/ML
10 INJECTION, SOLUTION INTRAVENOUS ONCE
Status: COMPLETED | OUTPATIENT
Start: 2021-02-01 | End: 2021-02-01

## 2021-02-01 RX ORDER — LANOLIN ALCOHOL/MO/W.PET/CERES
3 CREAM (GRAM) TOPICAL NIGHTLY PRN
COMMUNITY
End: 2021-02-04 | Stop reason: SDUPTHER

## 2021-02-01 RX ORDER — PANTOPRAZOLE SODIUM 40 MG/1
40 TABLET, DELAYED RELEASE ORAL DAILY
Status: DISCONTINUED | OUTPATIENT
Start: 2021-02-02 | End: 2021-02-03 | Stop reason: HOSPADM

## 2021-02-01 RX ORDER — AZITHROMYCIN 250 MG/1
250 TABLET, FILM COATED ORAL DAILY
Status: DISCONTINUED | OUTPATIENT
Start: 2021-02-02 | End: 2021-02-01 | Stop reason: SDUPTHER

## 2021-02-01 RX ORDER — PANTOPRAZOLE SODIUM 40 MG/1
40 TABLET, DELAYED RELEASE ORAL DAILY
COMMUNITY
End: 2021-03-30 | Stop reason: SDUPTHER

## 2021-02-01 RX ORDER — DIPHENHYDRAMINE HCL 25 MG
25 TABLET ORAL EVERY 8 HOURS PRN
COMMUNITY
End: 2021-04-13 | Stop reason: ALTCHOICE

## 2021-02-01 RX ORDER — SEVELAMER CARBONATE 800 MG/1
1600 TABLET, FILM COATED ORAL 3 TIMES DAILY
Status: DISCONTINUED | OUTPATIENT
Start: 2021-02-01 | End: 2021-02-03 | Stop reason: HOSPADM

## 2021-02-01 RX ORDER — POLYETHYLENE GLYCOL 3350 17 G/17G
17 POWDER, FOR SOLUTION ORAL DAILY PRN
Status: DISCONTINUED | OUTPATIENT
Start: 2021-02-01 | End: 2021-02-03 | Stop reason: HOSPADM

## 2021-02-01 RX ORDER — LABETALOL 200 MG/1
200 TABLET, FILM COATED ORAL 3 TIMES DAILY
COMMUNITY
End: 2021-02-04 | Stop reason: SDUPTHER

## 2021-02-01 RX ADMIN — AZITHROMYCIN 500 MG: 250 TABLET, FILM COATED ORAL at 23:03

## 2021-02-01 RX ADMIN — CEFTRIAXONE SODIUM 1000 MG: 1 INJECTION, POWDER, FOR SOLUTION INTRAMUSCULAR; INTRAVENOUS at 23:03

## 2021-02-01 RX ADMIN — DIPHENHYDRAMINE HYDROCHLORIDE 25 MG: 25 TABLET ORAL at 16:26

## 2021-02-01 RX ADMIN — Medication 10 ML: at 23:41

## 2021-02-01 RX ADMIN — SEVELAMER CARBONATE 1600 MG: 800 TABLET, FILM COATED ORAL at 23:02

## 2021-02-01 RX ADMIN — DIPHENHYDRAMINE HYDROCHLORIDE 25 MG: 50 INJECTION, SOLUTION INTRAMUSCULAR; INTRAVENOUS at 13:21

## 2021-02-01 RX ADMIN — CLONIDINE HYDROCHLORIDE 0.2 MG: 0.1 TABLET ORAL at 18:14

## 2021-02-01 RX ADMIN — HYDRALAZINE HYDROCHLORIDE 5 MG: 20 INJECTION, SOLUTION INTRAMUSCULAR; INTRAVENOUS at 16:30

## 2021-02-01 RX ADMIN — HYDRALAZINE HYDROCHLORIDE 10 MG: 20 INJECTION, SOLUTION INTRAMUSCULAR; INTRAVENOUS at 17:04

## 2021-02-01 RX ADMIN — LABETALOL HYDROCHLORIDE 10 MG: 5 INJECTION, SOLUTION INTRAVENOUS at 18:33

## 2021-02-01 ASSESSMENT — ENCOUNTER SYMPTOMS
COUGH: 0
DIARRHEA: 0
NAUSEA: 1
CONSTIPATION: 0
CHEST TIGHTNESS: 0
VOMITING: 0
ABDOMINAL PAIN: 0
SHORTNESS OF BREATH: 0
TROUBLE SWALLOWING: 0

## 2021-02-01 ASSESSMENT — PAIN DESCRIPTION - LOCATION
LOCATION: CHEST
LOCATION: CHEST

## 2021-02-01 ASSESSMENT — PAIN SCALES - GENERAL
PAINLEVEL_OUTOF10: 7
PAINLEVEL_OUTOF10: 9

## 2021-02-01 ASSESSMENT — PAIN DESCRIPTION - PAIN TYPE: TYPE: ACUTE PAIN

## 2021-02-01 ASSESSMENT — PAIN DESCRIPTION - DESCRIPTORS: DESCRIPTORS: CONSTANT;CRAMPING;DISCOMFORT

## 2021-02-01 NOTE — ED PROVIDER NOTES
This is a 77-year-old male with history of end-stage renal disease, heart failure with preserved ejection fraction presenting to the emergency department for right-sided shoulder and chest pain. He had some right-sided shoulder pain last night, with some nausea and dry heaves but no vomiting, but felt better this morning until he was at dialysis, he was almost group home through his normal 4-hour dialysis appointment when he started getting chest pain with associated shortness of breath, he describes the pain as achy, like a sore muscle in the right side of his chest and his right shoulder. There was no associated diaphoresis, nausea, vomiting, syncope. There was associated lightheadedness. He has not had pain like this before. The pain was severe, improved by nitroglycerin, worsened by nothing. He was given 2 nitro by EMS, 324 mg of aspirin. The nitro completely relieved his chest pain, he notes no chest pain at this time but does now have a headache. He is a patient of Dr. Maile Rhoades for nephrology, dialysis Monday Wednesday and Friday. The history is provided by the patient and the EMS personnel. Review of Systems   Constitutional: Negative for chills, diaphoresis and fever. HENT: Negative for trouble swallowing. Eyes: Negative for visual disturbance. Respiratory: Negative for cough, chest tightness and shortness of breath. Cardiovascular: Positive for chest pain. Negative for leg swelling. Gastrointestinal: Positive for nausea. Negative for abdominal pain, constipation, diarrhea and vomiting. Genitourinary: Negative for dysuria. Musculoskeletal: Negative for neck pain. Skin: Negative for rash. Neurological: Negative for syncope, weakness, light-headedness and numbness. Physical Exam  Vitals signs and nursing note reviewed. Constitutional:       Appearance: He is not ill-appearing or diaphoretic. Comments: Laying in bed comfortably under multiple blankets.    HENT: Head: Normocephalic and atraumatic. Right Ear: External ear normal.      Left Ear: External ear normal.      Nose: Nose normal.      Mouth/Throat:      Mouth: Mucous membranes are moist.      Pharynx: Oropharynx is clear. Eyes:      Extraocular Movements: Extraocular movements intact. Conjunctiva/sclera: Conjunctivae normal.      Pupils: Pupils are equal, round, and reactive to light. Neck:      Musculoskeletal: Normal range of motion and neck supple. Cardiovascular:      Rate and Rhythm: Normal rate and regular rhythm. Pulses: Normal pulses. Heart sounds: Normal heart sounds. Pulmonary:      Effort: Pulmonary effort is normal. No respiratory distress. Breath sounds: No stridor. No wheezing or rhonchi. Comments: Scattered Rales in the left lower lobe  Chest:      Chest wall: No tenderness. Abdominal:      General: Abdomen is flat. Palpations: Abdomen is soft. Tenderness: There is no abdominal tenderness. There is no guarding. Musculoskeletal:      Right lower leg: No edema. Skin:     General: Skin is warm and dry. Capillary Refill: Capillary refill takes less than 2 seconds. Comments: Left upper extremity AV fistula with palpable thrill   Neurological:      General: No focal deficit present. Mental Status: He is alert. Psychiatric:         Mood and Affect: Mood normal.         Behavior: Behavior normal.         Thought Content:  Thought content normal.          Procedures     MDM  Number of Diagnoses or Management Options  Chest pain, unspecified type  ESRD on hemodialysis ([facility] [address]  Hypertension, unspecified type Diagnosis management comments: This is a 38-year-old male with history of end-stage renal disease presenting to the emergency department for chest pain that began during dialysis appointment. Chest pain resolved with nitro, is also given aspirin prior to arrival emergency department. Work-up remarkable for patchy bilateral lung infiltrates, for this reason COVID-19 test was obtained which was negative. Patient has not had any fever or cough. Suspect opacities likely related to mild fluid overload. Troponin at baseline. Patient also has significant hypertension, was given IV doses of hydralazine in the emergency department. Given his history of hypertension, concerning nature of his chest pain today, he will be admitted to the hospital for observation, further monitoring work-up. He did have an echocardiogram done last year which showed heart failure with preserved ejection fraction. Patient is comfortable with plan for admission, all questions were answered. ED Course as of Feb 01 1814   Mon Feb 01, 2021   1557 Reevaluated, is resting bed comfortably, has not had any chest pain since arrival in the emergency department. He was updated on results of labs, imaging, current plan for admission. He is agreeable with this plan, all questions were answered. [RH]   0896 Discussed case with Dr. Ernesto Cleveland, he agreed to admit the patient for observation, residents will evaluate and discuss case further.     [RH]      ED Course User Index  [RH] [address],         ED Course as of Feb 01 1814   Mon Feb 01, 2021   1557 Reevaluated, is resting bed comfortably, has not had any chest pain since arrival in the emergency department. He was updated on results of labs, imaging, current plan for admission. He is agreeable with this plan, all questions were answered.     [RH] 12 Discussed case with Dr. Sariah Gan, he agreed to admit the patient for observation, residents will evaluate and discuss case further.     [RH]      ED Course User Index  [RH] [address], DO       --------------------------------------------- PAST HISTORY ---------------------------------------------  Past Medical History:  has a past medical history of (HFpEF) heart failure with preserved ejection fraction ([facility] Anxiety, AVF (arteriovenous fistula) ([facility] [facility] Chronic kidney disease, Depression, Encounter regarding vascular access for dialysis for ESRD ([facility] [address] Hypertension, Hypothyroidism, Intracranial hemorrhage ([facility] [address] and Noncompliance w/medication treatment due to intermit use of medication. Past Surgical History:  has a past surgical history that includes Brain aneurysm surgery (Right, 3-4 years ago); Dialysis fistula creation (Left, 11 years ago); and Upper gastrointestinal endoscopy (N/A, 1/3/2019). Social History:  reports that he has quit smoking. His smoking use included cigars. He has never used smokeless tobacco. He reports previous drug use. Drug: Marijuana. He reports that he does not drink alcohol. Family History: family history includes Kidney Disease in his paternal grandmother. The patients home medications have been reviewed.     Allergies: Tylenol [acetaminophen]    -------------------------------------------------- RESULTS -------------------------------------------------    LABS:  Results for orders placed or performed during the hospital encounter of 02/01/21   Troponin   Result Value Ref Range    Troponin 0.02 0.00 - 0.03 ng/mL   CBC Auto Differential   Result Value Ref Range    WBC 7.4 4.5 - 11.5 E9/L    RBC 2.74 (L) 3.80 - 5.80 E12/L    Hemoglobin 8.4 (L) 12.5 - 16.5 g/dL    Hematocrit 26.6 (L) 37.0 - 54.0 %    MCV 97.1 80.0 - 99.9 fL    MCH 30.7 26.0 - 35.0 pg    MCHC 31.6 (L) 32.0 - 34.5 %    RDW 16.5 (H) 11.5 - 15.0 fL    Platelets 415 304 - 692 E9/L MPV 9.8 7.0 - 12.0 fL    Neutrophils % 76.5 43.0 - 80.0 %    Immature Granulocytes % 0.4 0.0 - 5.0 %    Lymphocytes % 14.3 (L) 20.0 - 42.0 %    Monocytes % 7.6 2.0 - 12.0 %    Eosinophils % 0.9 0.0 - 6.0 %    Basophils % 0.3 0.0 - 2.0 %    Neutrophils Absolute 5.65 1.80 - 7.30 E9/L    Immature Granulocytes # 0.03 E9/L    Lymphocytes Absolute 1.06 (L) 1.50 - 4.00 E9/L    Monocytes Absolute 0.56 0.10 - 0.95 E9/L    Eosinophils Absolute 0.07 0.05 - 0.50 E9/L    Basophils Absolute 0.02 0.00 - 0.20 E9/L   Comprehensive metabolic panel   Result Value Ref Range    Sodium 141 132 - 146 mmol/L    Potassium 3.4 (L) 3.5 - 5.0 mmol/L    Chloride 99 98 - 107 mmol/L    CO2 29 22 - 29 mmol/L    Anion Gap 13 7 - 16 mmol/L    Glucose 78 74 - 99 mg/dL    BUN 18 6 - 20 mg/dL    CREATININE 5.4 (H) 0.7 - 1.2 mg/dL    GFR Non-African American 14 >=60 mL/min/1.73    GFR African American 14     Calcium 8.5 (L) 8.6 - 10.2 mg/dL    Total Protein 7.1 6.4 - 8.3 g/dL    Albumin 4.1 3.5 - 5.2 g/dL    Total Bilirubin 0.7 0.0 - 1.2 mg/dL    Alkaline Phosphatase 87 40 - 129 U/L    ALT 15 0 - 40 U/L    AST 21 0 - 39 U/L   Magnesium   Result Value Ref Range    Magnesium 2.3 1.6 - 2.6 mg/dL   Phosphorus   Result Value Ref Range    Phosphorus 2.6 2.5 - 4.5 mg/dL   COVID-19   Result Value Ref Range    SARS-CoV-2, NAAT Not Detected Not Detected   Brain Natriuretic Peptide   Result Value Ref Range    Pro-BNP >70,000 (H) 0 - 125 pg/mL   Procalcitonin   Result Value Ref Range    Procalcitonin 0.49 (H) 0.00 - 0.08 ng/mL   EKG 12 Lead   Result Value Ref Range    Ventricular Rate 85 BPM    Atrial Rate 85 BPM    P-R Interval 196 ms    QRS Duration 80 ms    Q-T Interval 394 ms    QTc Calculation (Bazett) 468 ms    P Axis 48 degrees    R Axis 84 degrees    T Axis 35 degrees       RADIOLOGY:  XR CHEST PORTABLE   Final Result   Patchy bibasilar infiltrates concerning for pneumonia. EKG: This EKG is signed and interpreted by me.     Rate: 85

## 2021-02-02 LAB
ACETAMINOPHEN LEVEL: <5 MCG/ML (ref 10–30)
ADENOVIRUS BY PCR: NOT DETECTED
ANION GAP SERPL CALCULATED.3IONS-SCNC: 13 MMOL/L (ref 7–16)
BASOPHILS ABSOLUTE: 0.02 E9/L (ref 0–0.2)
BASOPHILS RELATIVE PERCENT: 0.4 % (ref 0–2)
BORDETELLA PARAPERTUSSIS BY PCR: NOT DETECTED
BORDETELLA PERTUSSIS BY PCR: NOT DETECTED
BUN BLDV-MCNC: 35 MG/DL (ref 6–20)
CALCIUM SERPL-MCNC: 8.8 MG/DL (ref 8.6–10.2)
CHLAMYDOPHILIA PNEUMONIAE BY PCR: NOT DETECTED
CHLORIDE BLD-SCNC: 95 MMOL/L (ref 98–107)
CO2: 31 MMOL/L (ref 22–29)
CORONAVIRUS 229E BY PCR: NOT DETECTED
CORONAVIRUS HKU1 BY PCR: NOT DETECTED
CORONAVIRUS NL63 BY PCR: NOT DETECTED
CORONAVIRUS OC43 BY PCR: NOT DETECTED
CREAT SERPL-MCNC: 7.8 MG/DL (ref 0.7–1.2)
EOSINOPHILS ABSOLUTE: 0.11 E9/L (ref 0.05–0.5)
EOSINOPHILS RELATIVE PERCENT: 2.1 % (ref 0–6)
ETHANOL: <10 MG/DL (ref 0–0.08)
GFR AFRICAN AMERICAN: 9
GFR NON-AFRICAN AMERICAN: 9 ML/MIN/1.73
GLUCOSE BLD-MCNC: 91 MG/DL (ref 74–99)
HCT VFR BLD CALC: 27 % (ref 37–54)
HEMOGLOBIN: 8.4 G/DL (ref 12.5–16.5)
HUMAN METAPNEUMOVIRUS BY PCR: NOT DETECTED
HUMAN RHINOVIRUS/ENTEROVIRUS BY PCR: NOT DETECTED
IMMATURE GRANULOCYTES #: 0.01 E9/L
IMMATURE GRANULOCYTES %: 0.2 % (ref 0–5)
INFLUENZA A BY PCR: NOT DETECTED
INFLUENZA B BY PCR: NOT DETECTED
LYMPHOCYTES ABSOLUTE: 1.24 E9/L (ref 1.5–4)
LYMPHOCYTES RELATIVE PERCENT: 23.4 % (ref 20–42)
MAGNESIUM: 2.8 MG/DL (ref 1.6–2.6)
MCH RBC QN AUTO: 30.3 PG (ref 26–35)
MCHC RBC AUTO-ENTMCNC: 31.1 % (ref 32–34.5)
MCV RBC AUTO: 97.5 FL (ref 80–99.9)
MONOCYTES ABSOLUTE: 0.6 E9/L (ref 0.1–0.95)
MONOCYTES RELATIVE PERCENT: 11.3 % (ref 2–12)
MYCOPLASMA PNEUMONIAE BY PCR: NOT DETECTED
NEUTROPHILS ABSOLUTE: 3.33 E9/L (ref 1.8–7.3)
NEUTROPHILS RELATIVE PERCENT: 62.6 % (ref 43–80)
PARAINFLUENZA VIRUS 1 BY PCR: NOT DETECTED
PARAINFLUENZA VIRUS 2 BY PCR: NOT DETECTED
PARAINFLUENZA VIRUS 3 BY PCR: NOT DETECTED
PARAINFLUENZA VIRUS 4 BY PCR: NOT DETECTED
PDW BLD-RTO: 16.2 FL (ref 11.5–15)
PHOSPHORUS: 4.5 MG/DL (ref 2.5–4.5)
PLATELET # BLD: 166 E9/L (ref 130–450)
PMV BLD AUTO: 9.9 FL (ref 7–12)
POTASSIUM SERPL-SCNC: 3.8 MMOL/L (ref 3.5–5)
RBC # BLD: 2.77 E12/L (ref 3.8–5.8)
RESPIRATORY SYNCYTIAL VIRUS BY PCR: NOT DETECTED
SALICYLATE, SERUM: <0.3 MG/DL (ref 0–30)
SARS-COV-2, PCR: NOT DETECTED
SODIUM BLD-SCNC: 139 MMOL/L (ref 132–146)
TRICYCLIC ANTIDEPRESSANTS SCREEN SERUM: NEGATIVE NG/ML
TROPONIN: 0.03 NG/ML (ref 0–0.03)
WBC # BLD: 5.3 E9/L (ref 4.5–11.5)

## 2021-02-02 PROCEDURE — 87081 CULTURE SCREEN ONLY: CPT

## 2021-02-02 PROCEDURE — 36415 COLL VENOUS BLD VENIPUNCTURE: CPT

## 2021-02-02 PROCEDURE — 84100 ASSAY OF PHOSPHORUS: CPT

## 2021-02-02 PROCEDURE — 85025 COMPLETE CBC W/AUTO DIFF WBC: CPT

## 2021-02-02 PROCEDURE — 80307 DRUG TEST PRSMV CHEM ANLYZR: CPT

## 2021-02-02 PROCEDURE — 82077 ASSAY SPEC XCP UR&BREATH IA: CPT

## 2021-02-02 PROCEDURE — 6370000000 HC RX 637 (ALT 250 FOR IP): Performed by: STUDENT IN AN ORGANIZED HEALTH CARE EDUCATION/TRAINING PROGRAM

## 2021-02-02 PROCEDURE — 6370000000 HC RX 637 (ALT 250 FOR IP): Performed by: FAMILY MEDICINE

## 2021-02-02 PROCEDURE — 80143 DRUG ASSAY ACETAMINOPHEN: CPT

## 2021-02-02 PROCEDURE — 1200000000 HC SEMI PRIVATE

## 2021-02-02 PROCEDURE — 6370000000 HC RX 637 (ALT 250 FOR IP): Performed by: INTERNAL MEDICINE

## 2021-02-02 PROCEDURE — 80179 DRUG ASSAY SALICYLATE: CPT

## 2021-02-02 PROCEDURE — 84484 ASSAY OF TROPONIN QUANT: CPT

## 2021-02-02 PROCEDURE — 6370000000 HC RX 637 (ALT 250 FOR IP): Performed by: CLINICAL NURSE SPECIALIST

## 2021-02-02 PROCEDURE — 0202U NFCT DS 22 TRGT SARS-COV-2: CPT

## 2021-02-02 PROCEDURE — 99231 SBSQ HOSP IP/OBS SF/LOW 25: CPT | Performed by: INTERNAL MEDICINE

## 2021-02-02 PROCEDURE — 83735 ASSAY OF MAGNESIUM: CPT

## 2021-02-02 PROCEDURE — 80048 BASIC METABOLIC PNL TOTAL CA: CPT

## 2021-02-02 PROCEDURE — 2580000003 HC RX 258: Performed by: INTERNAL MEDICINE

## 2021-02-02 RX ORDER — DIPHENHYDRAMINE HCL 25 MG
25 TABLET ORAL ONCE
Status: COMPLETED | OUTPATIENT
Start: 2021-02-02 | End: 2021-02-02

## 2021-02-02 RX ORDER — LANOLIN ALCOHOL/MO/W.PET/CERES
3 CREAM (GRAM) TOPICAL NIGHTLY
Status: DISCONTINUED | OUTPATIENT
Start: 2021-02-02 | End: 2021-02-03 | Stop reason: HOSPADM

## 2021-02-02 RX ORDER — LOSARTAN POTASSIUM 50 MG/1
100 TABLET ORAL NIGHTLY
Status: DISCONTINUED | OUTPATIENT
Start: 2021-02-02 | End: 2021-02-03 | Stop reason: HOSPADM

## 2021-02-02 RX ORDER — NIFEDIPINE 60 MG/1
60 TABLET, EXTENDED RELEASE ORAL DAILY
Status: DISCONTINUED | OUTPATIENT
Start: 2021-02-02 | End: 2021-02-03 | Stop reason: HOSPADM

## 2021-02-02 RX ADMIN — Medication 3 MG: at 21:28

## 2021-02-02 RX ADMIN — PANTOPRAZOLE SODIUM 40 MG: 40 TABLET, DELAYED RELEASE ORAL at 08:40

## 2021-02-02 RX ADMIN — Medication 10 ML: at 21:30

## 2021-02-02 RX ADMIN — LOSARTAN POTASSIUM 100 MG: 50 TABLET, FILM COATED ORAL at 21:27

## 2021-02-02 RX ADMIN — SEVELAMER CARBONATE 1600 MG: 800 TABLET, FILM COATED ORAL at 21:27

## 2021-02-02 RX ADMIN — Medication 10 ML: at 08:40

## 2021-02-02 RX ADMIN — AZITHROMYCIN 250 MG: 250 TABLET, FILM COATED ORAL at 08:40

## 2021-02-02 RX ADMIN — SEVELAMER CARBONATE 1600 MG: 800 TABLET, FILM COATED ORAL at 13:35

## 2021-02-02 RX ADMIN — SEVELAMER CARBONATE 1600 MG: 800 TABLET, FILM COATED ORAL at 08:40

## 2021-02-02 RX ADMIN — DIPHENHYDRAMINE HYDROCHLORIDE 25 MG: 25 TABLET ORAL at 10:23

## 2021-02-02 RX ADMIN — NIFEDIPINE 60 MG: 60 TABLET, EXTENDED RELEASE ORAL at 13:36

## 2021-02-02 ASSESSMENT — PAIN SCALES - GENERAL: PAINLEVEL_OUTOF10: 0

## 2021-02-02 NOTE — PROGRESS NOTES
Reason for Consult:  ESRD on HD      History of Present Ilness: The patient is well known to our practice as we follow his ESRD on HD, therefore a full consult is deferred. The patient dialyzes at [facility] on MWF. He was sent to the ED yesterday from dialysis for c/o chest pain and shortness of breath. His chest pain was relieved after given 2 NTG and ASA per EMS. In the ED he was given IV hydralazine for BP of 185/102 and 201/114. The patient does have a history of uncontrolled hypertension. EKG showed Normal sinus rhythm with sinus arrhythmia. Troponin is WNL. Upon assessment today the patient is feeling better, he denies chest pain and shortness of breath.       Past Medical History:      Past Medical History:   Diagnosis Date    (HFpEF) heart failure with preserved ejection fraction ([address]     stage III    Anxiety 9/19/2015    AVF (arteriovenous fistula) ([facility] 4/30/2018    Chronic kidney disease     CKD since early childhood per pt and on HD ~ 11 years    Depression     Encounter regarding vascular access for dialysis for ESRD ([facility] 4/30/2018    Hypertension     on meds for ~ 11 years    Hypothyroidism     Intracranial hemorrhage (HCC)     was d/t ruptured aneurysm - pt underwent neurosurgery for clipping of the aneurysm    Noncompliance w/medication treatment due to intermit use of medication 11/3/2015       Past Surgical History:     Past Surgical History:   Procedure Laterality Date    BRAIN ANEURYSM SURGERY Right 3-4 years ago    Intracranial aneurysm clipping surgery 3-4 years ago, after rupture of aneurysm causing ICH    DIALYSIS FISTULA CREATION Left 11 years ago    UPPER GASTROINTESTINAL ENDOSCOPY N/A 1/3/2019    EGD BIOPSY performed by Kassy Clements MD at [facility] History:      Social History     Socioeconomic History    Marital status:      Spouse name: Not on file    Number of children: 4    Years of education: Not on file  Highest education level: High school graduate   Occupational History    Not on file   Social Needs    Financial resource strain: Not hard at all    Food insecurity     Worry: Sometimes true     Inability: Never true   [employer] needs     Medical: No     Non-medical: No   Tobacco Use    Smoking status: Former Smoker     Types: Cigars    Smokeless tobacco: Never Used    Tobacco comment: only smokes marijuana daily   Substance and Sexual Activity    Alcohol use: No     Frequency: Never     Binge frequency: Never    Drug use: Not Currently     Types: Marijuana     Comment: smokes daily    Sexual activity: Yes     Partners: Female   Lifestyle    Physical activity     Days per week: 0 days     Minutes per session: 0 min    Stress: Not at all   Relationships    Social connections     Talks on phone: Once a week     Gets together: Once a week     Attends Evangelical service: 1 to 4 times per year     Active member of club or organization: No     Attends meetings of clubs or organizations: Never     Relationship status:     Intimate partner violence     Fear of current or ex partner: Not on file     Emotionally abused: Not on file     Physically abused: Not on file     Forced sexual activity: Not on file   Other Topics Concern    Not on file   Social History Narrative    Not on file       Family History:     Family History   Problem Relation Age of Onset    Kidney Disease Paternal Grandmother     Cancer Neg Hx     Heart Disease Neg Hx        Current Medications:        Current Facility-Administered Medications:     lidocaine 4 % external patch 1 patch, 1 patch, Transdermal, Daily, Denzel Pacheco DO, 1 patch at 02/02/21 0839    pantoprazole (PROTONIX) injection 40 mg, 40 mg, Intravenous, Once, Peter Gan MD    sevelamer (RENVELA) tablet 1,600 mg, 1,600 mg, Oral, TID, Denzel Pacheco DO, 1,600 mg at 02/02/21 0840   sodium chloride flush 0.9 % injection 10 mL, 10 mL, Intravenous, 2 times per day, Leellen Every, DO, 10 mL at 02/02/21 0840    sodium chloride flush 0.9 % injection 10 mL, 10 mL, Intravenous, PRN, Leellen Every, DO    promethazine (PHENERGAN) tablet 12.5 mg, 12.5 mg, Oral, Q6H PRN **OR** ondansetron (ZOFRAN) injection 4 mg, 4 mg, Intravenous, Q6H PRN, Leellen Every, DO    polyethylene glycol (GLYCOLAX) packet 17 g, 17 g, Oral, Daily PRN, Leellen Every, DO    heparin (porcine) injection 5,000 Units, 5,000 Units, Subcutaneous, Q8H, Kenzie Grant, DO    pantoprazole (PROTONIX) tablet 40 mg, 40 mg, Oral, Daily, Leellen Every, DO, 40 mg at 02/02/21 0840    labetalol (NORMODYNE;TRANDATE) injection 10 mg, 10 mg, Intravenous, Q6H PRN, Leellen Every, DO    influenza quadrivalent split vaccine (FLUZONE;FLUARIX;FLULAVAL;AFLURIA) injection 0.5 mL, 0.5 mL, Intramuscular, Prior to discharge, Tirstan Washington DO    cefTRIAXone (ROCEPHIN) 1,000 mg in sterile water 10 mL IV syringe, 1,000 mg, Intravenous, Q24H, Leellen Every, DO, 1,000 mg at 02/01/21 2303    [COMPLETED] azithromycin (ZITHROMAX) tablet 500 mg, 500 mg, Oral, Once, 500 mg at 02/01/21 2303 **FOLLOWED BY** azithromycin (ZITHROMAX) tablet 250 mg, 250 mg, Oral, Daily, Leellen Every, DO, 250 mg at 02/02/21 0840    melatonin tablet 3 mg, 3 mg, Oral, Nightly PRN, Brian Melara MD    Allergies:  Tylenol [acetaminophen]    Review of Systems:   Pertinent positives stated above in HPI. All other systems were reviewed and were negative. Physical exam:   No intake/output data recorded.      Constitutional:  BP (!) 166/94   Pulse 84   Temp 99.4 °F (37.4 °C) (Oral)   Resp 20   Ht 5' 6\" (1.676 m)   Wt 105 lb (47.6 kg)   SpO2 95%   BMI 16.95 kg/m²   Gen: alert, awake, nad  Skin: no rash, turgor wnl  Neck: no jvd noted  Cardiovascular:  RRR, no rub  Respiratory: clear throughout  Abdomen:  +bs, soft, nt, nd  Ext: neg. lower extremity edema, left arm AVF +thrill, +bruit HgB 8.4 below target of =/>10    5.  Chest pain vs GERD  Troponin WNL  EKG - Normal sinus rhythm with sinus arrhythmia       Thank you for toe opportunity to participate in in Abdelrahman's care    KENDRA Bobo-CNS    Pt seen and examined agree with above  Cp resolved  He thinks it was from orange soda  For hd mwf  Kirsten Powell for discharge  Juan Diego Etienne

## 2021-02-02 NOTE — PROGRESS NOTES
[address]   Internal Medicine Residency / [address]    Attending Physician Statement  I have discussed the case, including pertinent history and exam findings with the resident and the team.  I have seen and examined the patient and the key elements of the encounter have been performed by me. I agree with the assessment, plan and orders as documented by the resident. Hx of CRF since birth  Chest pain markedly better this AM  Question of GERD   VS stable  Chronic dialysis patient in [place]  Uremic pericarditis unlikely  Plan: Follow for now    Remainder of medical problems as per resident note.       Lazaro Pagan MD FRCP([facility])  Internal Medicine Residency Faculty

## 2021-02-02 NOTE — CARE COORDINATION
Social Work Discharge/Planning:    Patient status observation dx chest pain. SW met with patient to explain role and discuss transition of care. Patient reports he is independent and able to drive prior to admission. Patient is from home. Patient lives alone in a 2nd story apartment that has about 20 steps to enter in to living space. Patient has no DME. Patient does not wear oxygen at home. Patient denies any EDUAR/SNF hx. Patient's PCP is Dr. Brittny Proctor. Patient's Nephrologist is Dr. Tomasa Keen. Patient receives dialysis at [facility], chair time is at 7am on MWF. Patient has a  through [facility] [phone]). Patient's insurance also covers transportation through [employer]. Patient reports plan at this time is to return home with no anticipated needs. Patient reports he is able to take a taxi home through [employer] upon discharge through insurance coverage. LONA/TERESA to follow.     GALA Marquez  [phone]

## 2021-02-02 NOTE — H&P
[address]  Internal Medicine Residency Program  History and Physical    Patient:  Juan Pablo Rosales 38 y.o. male MRN: [id number]     Date of Service: 2/1/2021    Hospital Day: 1      Chief complaint: had concerns including Chest Pain (starting at dialysis, 2 hours 40 minutes out of 4 hour tx completed, htn, clonidine given at dialysis, 324 ASA and 2 nitro per ems). History of Present Illness   Juan Pablo Rosales is a 38 y.o. male who presented with CC of R sided shoulder pain and CP, epigastric pain. Patient notes his symptoms began one day prior to coming to the emergency room. He notes yesterday he was experiencing nausea (however no vomiting), dry heaves. His appetite was also reduced. He was feeling ill. He noted his mother told him that his face looked swollen. Patient states he decided to sleep off his symptoms. This morning, patient went to hemodialysis. Towards the end of his hemodialysis treatment, patient started experiencing chest pain and shortness of breath. He notes he started feeling a \"achy, right sided pain\" associated with shortness of breath. He says it was limited to the right side only. EMS arrived and gave the patient 2 nitro and 325 mg of aspirin which provided minimal relief to the patient. This is different than what is written in the EMR. Patient reports during our interview the nitro and aspirin did not help his symptoms. He continues to complain of right upper/ mid back pain, right-sided chest pain, right arm pain, epigastric pain. Patient is concerned he has fluid in his lungs. He keeps asking about why he feels short of breath while laying flat. Patient reports his shortness of breath and chest pain is alleviated with sitting up and leaning forward. Denies any palpitations. Notes he has felt feverish and chills lately. Reports he has had a runny nose, however denies any cough or sputum production recently. Denies any changes in bowel habits. He does not he has been eating chinese food lately which usually gives him acid reflux. Reports epigastric pain feels similar to his GERD symptoms. 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 ED Course/ Meds/ Fluids: In the ED, patient was found to be hypertensive to 197/111. He was given hydralazine 15 mg, clonidine 0.2 milligrams, labetalol 10 mg IV. Patient labs were significant for K 3.4, creatinine 5.4 , hemoglobin 8.4 (baseline 8-9), troponin 0.02 (At baseline), proBNP greater than 70,000 (At baseline). EKG showed normal sinus rhythm, LVH, LAE. Chest x-ray significant for bibasilar infiltrate significant for pneumonia versus fluid overload.      Past Medical History:      Diagnosis Date    (HFpEF) heart failure with preserved ejection fraction ([address]     stage III    Anxiety 9/19/2015    AVF (arteriovenous fistula) ([address] 4/30/2018    Chronic kidney disease     CKD since early childhood per pt and on HD ~ 11 years    Depression     Encounter regarding vascular access for dialysis for ESRD ([address] 4/30/2018    Hypertension     on meds for ~ 11 years    Hypothyroidism     Intracranial hemorrhage ([address]     was d/t ruptured aneurysm - pt underwent neurosurgery for clipping of the aneurysm    Noncompliance w/medication treatment due to intermit use of medication 11/3/2015       Past Surgical History:        Procedure Laterality Date  BRAIN ANEURYSM SURGERY Right 3-4 years ago    Intracranial aneurysm clipping surgery 3-4 years ago, after rupture of aneurysm causing ICH    DIALYSIS FISTULA CREATION Left 11 years ago    UPPER GASTROINTESTINAL ENDOSCOPY N/A 1/3/2019    EGD BIOPSY performed by Jennifer Francis MD at [address] [place] [address]       Medications Prior to Admission:    Prior to Admission medications    Medication Sig Start Date End Date Taking? Authorizing Provider   diphenhydrAMINE (BENADRYL) 25 MG tablet Take 25 mg by mouth every 8 hours as needed for Itching   Yes Historical Provider, MD   hydrALAZINE (APRESOLINE) 50 MG tablet Take 50 mg by mouth 3 times daily   Yes Historical Provider, MD   labetalol (NORMODYNE) 200 MG tablet Take 200 mg by mouth three times daily   Yes Historical Provider, MD   losartan (COZAAR) 100 MG tablet Take 100 mg by mouth every evening   Yes Historical Provider, MD   melatonin 3 MG TABS tablet Take 3 mg by mouth nightly as needed (sleep)   Yes Historical Provider, MD   pantoprazole (PROTONIX) 40 MG tablet Take 40 mg by mouth daily   Yes Historical Provider, MD   NIFEdipine (ADALAT CC) 60 MG extended release tablet TAKE 1 TABLET BY MOUTH DAILY 7/28/20  Yes Michael Ware MD   cloNIDine (CATAPRES) 0.2 MG tablet Take 0.2 mg by mouth 3 times daily   Yes Historical Provider, MD   calcitRIOL (ROCALTROL) 0.25 MCG capsule Take 1 mcg by mouth three times a week Given at dialysis on Monday, Wednesday,Friday   Yes Historical Provider, MD   sevelamer (RENVELA) 800 MG tablet Take 2 tablets by mouth 3 times daily    Yes Historical Provider, MD   iron sucrose (VENOFER) 20 MG/ML injection Infuse 100 mg intravenously three times a week Given in dialysis every Monday ,Wednesday ,Friday    Historical Provider, MD       Allergies:  Tylenol [acetaminophen]    Social History:   TOBACCO:   reports that he has quit smoking. His smoking use included cigars. He has never used smokeless tobacco.  ETOH:   reports no history of alcohol use. Family History:       Problem Relation Age of Onset    Kidney Disease Paternal Grandmother     Cancer Neg Hx     Heart Disease Neg Hx        REVIEW OF SYSTEMS:    · Constitutional: +fever, +chills, no change in weight; poor appetite  · HEENT: No blurred vision, no ear problems, +sore throat, +rhinorrhea. · Respiratory: +cough, +sputum production, +pleuritic chest pain, no shortness of breath  · Cardiology: No angina, no dyspnea on exertion, no paroxysmal nocturnal dyspnea, no orthopnea, no palpitation, no leg swelling. · Gastroenterology: No dysphagia, +reflux; +abdominal pain, +nausea or vomiting; no constipation or diarrhea.  No hematochezia   · Genitourinary: No dysuria, no frequency, hesitancy; no hematuria  · Musculoskeletal: no joint pain, +myalgia, no change in range of movement  · Neurology: no focal weakness in extremities, no slurred speech, no double vision, no tingling or numbness sensation  · Endocrinology: no temperature intolerance, no polyphagia, polydipsia or polyuria  · Hematology: no increased bleeding, no bruising, no lymphadenopathy  · Skin: no skin changes noticed by patient  · Psychology: no depressed mood, no suicidal ideation    Physical Exam   · Vitals: BP (!) 160/94   Pulse 86   Temp 100.2 °F (37.9 °C) (Temporal)   Resp 18   Ht 5' 6\" (1.676 m)   Wt 105 lb (47.6 kg)   SpO2 95%   BMI 16.95 kg/m²     · General Appearance: alert and oriented to person, place and time, well developed and well- nourished, in no acute distress  · Skin: warm and dry, no rash or erythema  · Head: normocephalic and atraumatic  · Eyes: pupils equal, round, and reactive to light, extraocular eye movements intact, conjunctivae normal  · ENT: tympanic membrane, external ear and ear canal normal bilaterally, nose without deformity, nasal mucosa and turbinates normal without polyps  · Neck: supple and non-tender without mass, no thyromegaly or thyroid nodules, no cervical lymphadenopathy · Pulmonary/Chest: crackles appreciated diffusely b/l lung fields,   · Cardiovascular: normal rate, regular rhythm, normal S1 and S2, no murmurs, rubs, clicks, or gallops, distal pulses intact, no carotid bruits  · Abdomen: soft, epigastric region tender to palpation, non-distended, normal bowel sounds, no masses or organomegaly, -Reinoso's sign. · Extremities: no cyanosis, clubbing or edema  · Musculoskeletal: normal range of motion, no joint swelling. Tissue texture changes are present at the R lower trapezius border. · Neurologic: reflexes normal and symmetric, no cranial nerve deficit, gait, coordination and speech normal   Labs and Imaging Studies   Basic Labs  Recent Labs     02/01/21  1327      K 3.4*   CL 99   CO2 29   BUN 18   CREATININE 5.4*   GLUCOSE 78   CALCIUM 8.5*       Recent Labs     02/01/21  1327   WBC 7.4   RBC 2.74*   HGB 8.4*   HCT 26.6*   MCV 97.1   MCH 30.7   MCHC 31.6*   RDW 16.5*      MPV 9.8       CBC:   Lab Results   Component Value Date    WBC 7.4 02/01/2021    RBC 2.74 02/01/2021    HGB 8.4 02/01/2021    HCT 26.6 02/01/2021    MCV 97.1 02/01/2021    RDW 16.5 02/01/2021     02/01/2021     CMP:  Lab Results   Component Value Date     02/01/2021    K 3.4 02/01/2021    K 3.6 01/25/2019    CL 99 02/01/2021    CO2 29 02/01/2021    BUN 18 02/01/2021    PROT 7.1 02/01/2021       Imaging Studies:     Xr Chest Portable    Result Date: 2/1/2021  EXAMINATION: ONE XRAY VIEW OF THE CHEST 2/1/2021 1:57 pm COMPARISON: 02/13/2020 HISTORY: ORDERING SYSTEM PROVIDED HISTORY: Right sided chest/shoulder pain, rales in LLL. TECHNOLOGIST PROVIDED HISTORY: Reason for exam:->Right sided chest/shoulder pain, rales in LLL. What reading provider will be dictating this exam?->CRC FINDINGS: There is cardiomegaly. There is patchy bibasilar infiltrates concerning for pneumonia. Vascular stent is noted in the left axilla. Patchy bibasilar infiltrates concerning for pneumonia. EKG: normal sinus rhythm, LVH And LAE    Resident's Assessment and Plan     Alida Ma is a 38 y.o. 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 Currently being treated for:     Hospital Day: 1    1. Chest pain, doubt cardiac, reproducible, likely combination of msk and pleuritic 2/2 PNA? , less likely volume overload/ ADHF on HFpEF  · Reproducible R lower trapezius back pain with tissue texture changes on palpation, Chest pain is reproducible with coughing. Alleviation with sitting forward. · Crackles b/l lung fields. · CXR: bibasilar infiltrates; concerning for PNA vs fluid overload. · EKG NSR, LVH and LAE. Follow Troponin x3. Troponin 0.02 (baseline). ProBNP>70,000 (baseline)  · procalcitonin 0.49. Febrile to 100.2 degrees F. No leukocytosis during admission. Follow ESR And CRP. · Follow cultures: Respiratory; Follow gram stain. Follow Respiratory Panel. · Started on Ceftriaxone and Azithromycin. · If worsening crackles or wheezing, recommend DuoNebs. · For msk pain, recommend lidocaine patch, analgesic balm. · Last Echo 1/20/2021: EF 55% +/-0% Grade 3 Diastolic dysfunction, severe LVH,Global longitudinal strain abnormal -15.4%. Strain predominantly abnormal in the basal segments with relative sparing of the apex. A similar pattern may be seen with cardiac amyloidosis. Moderately dilated right ventricle. Right ventricle global systolic function is normal. Severe biatrial dilation. Mild-moderate mitral regurgitation. Mild tricuspid regurgitation. RVSP is 58 mmHg. Estimated PA pressure 58/15 mmHg. There is a trivial circumferential pericardial effusion noted with no  evidence of hemodynamic compromise. 2. Hypertensive urgency vs emergency; Hx of HTN- uncontrolled, in the setting of ESRD  · Follow Blood pressure closely. · MAP goal of 130 in first 24 hours. · Resume home meds as able. · Doubt Hypertensive emergency as CP is reproducible (no EOD)  · Home BP meds include Nifedipine 60 mg ER, Hydralazine 50 mg TID, Labetalol 200 mg TID, Losartan 100 mg daily, Clonidine 0.2 mg TID. · Pt is on 5 BP medications- states he is compliant with the regimen, however has always had difficulty controlled his BP. He has been admitted in the past for Hypertensive urgency. Pt BP has also been elevated as an outpatient. · Hold Home meds. Resume as able after first 24 hours. 3. Epigastric pain likely 2/2 GERD, doubt pancreatitis   · Significant Epigastric pain, alleviated with leaning forward. Tender to palpation. Reinoso's sign negative. · Doubt pancreatitis, however given N/V and epigastric tenderness, will check lipase. · Continue ppi.     4. ESRD 2/2 congenital kidney disease on HD (M/W/F)  · Pt has been on dialysis since age 14. · Compliant with HD. · Continue Phosphate binder. Pt has Secondary HPTH of renal origin with hyperphosphatemia - PO4 wnl on Renvelva. · Nephrology consult. Reccs appreciated. HD M/W/F.    · Follow BMP, Mg, Phos.      5. Microcytic anemia in setting of ACD   · Hgb at baseline 8.4   · Holding iron in setting of possible bacterial PNA    PT/OT evaluation: not indicated at this time  DVT prophylaxis/ GI prophylaxis: Heparin/ ppi   Disposition: Admit to inpatient     Mika Mccabe DO, PGY-1   Attending physician: Dr. Rafaela Joyner physician: Dr. Rishabh Guzman

## 2021-02-02 NOTE — PLAN OF CARE
Problem: Pain:  Goal: Pain level will decrease  Description: Pain level will decrease  Outcome: Met This Shift  Goal: Control of acute pain  Description: Control of acute pain  Outcome: Met This Shift  Goal: Control of chronic pain  Description: Control of chronic pain  Outcome: Completed     Problem: Falls - Risk of:  Goal: Will remain free from falls  Description: Will remain free from falls  Outcome: Met This Shift  Goal: Absence of physical injury  Description: Absence of physical injury  Outcome: Met This Shift

## 2021-02-03 VITALS
HEART RATE: 76 BPM | TEMPERATURE: 97.8 F | BODY MASS INDEX: 16.33 KG/M2 | RESPIRATION RATE: 18 BRPM | DIASTOLIC BLOOD PRESSURE: 97 MMHG | HEIGHT: 66 IN | OXYGEN SATURATION: 95 % | WEIGHT: 101.63 LBS | SYSTOLIC BLOOD PRESSURE: 156 MMHG

## 2021-02-03 PROBLEM — R07.9 CHEST PAIN: Status: RESOLVED | Noted: 2021-02-01 | Resolved: 2021-02-03

## 2021-02-03 LAB
ANION GAP SERPL CALCULATED.3IONS-SCNC: 14 MMOL/L (ref 7–16)
BASOPHILS ABSOLUTE: 0.01 E9/L (ref 0–0.2)
BASOPHILS RELATIVE PERCENT: 0.2 % (ref 0–2)
BUN BLDV-MCNC: 60 MG/DL (ref 6–20)
CALCIUM SERPL-MCNC: 8.9 MG/DL (ref 8.6–10.2)
CHLORIDE BLD-SCNC: 95 MMOL/L (ref 98–107)
CO2: 29 MMOL/L (ref 22–29)
CREAT SERPL-MCNC: 11 MG/DL (ref 0.7–1.2)
EOSINOPHILS ABSOLUTE: 0.17 E9/L (ref 0.05–0.5)
EOSINOPHILS RELATIVE PERCENT: 3.6 % (ref 0–6)
GFR AFRICAN AMERICAN: 6
GFR NON-AFRICAN AMERICAN: 6 ML/MIN/1.73
GLUCOSE BLD-MCNC: 105 MG/DL (ref 74–99)
HCT VFR BLD CALC: 26.7 % (ref 37–54)
HEMOGLOBIN: 8.7 G/DL (ref 12.5–16.5)
IMMATURE GRANULOCYTES #: 0.01 E9/L
IMMATURE GRANULOCYTES %: 0.2 % (ref 0–5)
LYMPHOCYTES ABSOLUTE: 1 E9/L (ref 1.5–4)
LYMPHOCYTES RELATIVE PERCENT: 21.1 % (ref 20–42)
MAGNESIUM: 3.2 MG/DL (ref 1.6–2.6)
MCH RBC QN AUTO: 31 PG (ref 26–35)
MCHC RBC AUTO-ENTMCNC: 32.6 % (ref 32–34.5)
MCV RBC AUTO: 95 FL (ref 80–99.9)
MONOCYTES ABSOLUTE: 0.5 E9/L (ref 0.1–0.95)
MONOCYTES RELATIVE PERCENT: 10.5 % (ref 2–12)
MRSA CULTURE ONLY: NORMAL
NEUTROPHILS ABSOLUTE: 3.05 E9/L (ref 1.8–7.3)
NEUTROPHILS RELATIVE PERCENT: 64.4 % (ref 43–80)
PDW BLD-RTO: 15.6 FL (ref 11.5–15)
PHOSPHORUS: 5.5 MG/DL (ref 2.5–4.5)
PLATELET # BLD: 158 E9/L (ref 130–450)
PMV BLD AUTO: 9.3 FL (ref 7–12)
POTASSIUM SERPL-SCNC: 4.1 MMOL/L (ref 3.5–5)
RBC # BLD: 2.81 E12/L (ref 3.8–5.8)
SODIUM BLD-SCNC: 138 MMOL/L (ref 132–146)
WBC # BLD: 4.7 E9/L (ref 4.5–11.5)

## 2021-02-03 PROCEDURE — 5A1D70Z PERFORMANCE OF URINARY FILTRATION, INTERMITTENT, LESS THAN 6 HOURS PER DAY: ICD-10-PCS | Performed by: INTERNAL MEDICINE

## 2021-02-03 PROCEDURE — 80048 BASIC METABOLIC PNL TOTAL CA: CPT

## 2021-02-03 PROCEDURE — 85025 COMPLETE CBC W/AUTO DIFF WBC: CPT

## 2021-02-03 PROCEDURE — 6370000000 HC RX 637 (ALT 250 FOR IP): Performed by: CLINICAL NURSE SPECIALIST

## 2021-02-03 PROCEDURE — 36415 COLL VENOUS BLD VENIPUNCTURE: CPT

## 2021-02-03 PROCEDURE — 83735 ASSAY OF MAGNESIUM: CPT

## 2021-02-03 PROCEDURE — 6370000000 HC RX 637 (ALT 250 FOR IP): Performed by: INTERNAL MEDICINE

## 2021-02-03 PROCEDURE — 84100 ASSAY OF PHOSPHORUS: CPT

## 2021-02-03 PROCEDURE — 99231 SBSQ HOSP IP/OBS SF/LOW 25: CPT | Performed by: INTERNAL MEDICINE

## 2021-02-03 PROCEDURE — 6360000002 HC RX W HCPCS: Performed by: INTERNAL MEDICINE

## 2021-02-03 PROCEDURE — G0008 ADMIN INFLUENZA VIRUS VAC: HCPCS | Performed by: INTERNAL MEDICINE

## 2021-02-03 PROCEDURE — 90686 IIV4 VACC NO PRSV 0.5 ML IM: CPT | Performed by: INTERNAL MEDICINE

## 2021-02-03 PROCEDURE — 90935 HEMODIALYSIS ONE EVALUATION: CPT

## 2021-02-03 RX ORDER — LIDOCAINE AND PRILOCAINE 25; 25 MG/G; MG/G
1 CREAM TOPICAL PRN
Qty: 30 TUBE | Refills: 0 | Status: SHIPPED | OUTPATIENT
Start: 2021-02-03 | End: 2021-03-15

## 2021-02-03 RX ORDER — CLONIDINE HYDROCHLORIDE 0.2 MG/1
0.2 TABLET ORAL 3 TIMES DAILY
Status: DISCONTINUED | OUTPATIENT
Start: 2021-02-03 | End: 2021-02-03 | Stop reason: HOSPADM

## 2021-02-03 RX ORDER — LABETALOL 200 MG/1
200 TABLET, FILM COATED ORAL 3 TIMES DAILY
Status: DISCONTINUED | OUTPATIENT
Start: 2021-02-03 | End: 2021-02-03 | Stop reason: HOSPADM

## 2021-02-03 RX ADMIN — LABETALOL HYDROCHLORIDE 200 MG: 200 TABLET, FILM COATED ORAL at 14:36

## 2021-02-03 RX ADMIN — LABETALOL HYDROCHLORIDE 200 MG: 200 TABLET, FILM COATED ORAL at 08:43

## 2021-02-03 RX ADMIN — ONDANSETRON 4 MG: 2 INJECTION INTRAMUSCULAR; INTRAVENOUS at 08:47

## 2021-02-03 RX ADMIN — CLONIDINE HYDROCHLORIDE 0.2 MG: 0.2 TABLET ORAL at 14:36

## 2021-02-03 RX ADMIN — NIFEDIPINE 60 MG: 60 TABLET, EXTENDED RELEASE ORAL at 08:43

## 2021-02-03 RX ADMIN — SEVELAMER CARBONATE 1600 MG: 800 TABLET, FILM COATED ORAL at 14:35

## 2021-02-03 RX ADMIN — CLONIDINE HYDROCHLORIDE 0.2 MG: 0.2 TABLET ORAL at 08:43

## 2021-02-03 RX ADMIN — AZITHROMYCIN 250 MG: 250 TABLET, FILM COATED ORAL at 11:52

## 2021-02-03 RX ADMIN — INFLUENZA A VIRUS A/VICTORIA/2454/2019 IVR-207 (H1N1) ANTIGEN (PROPIOLACTONE INACTIVATED), INFLUENZA A VIRUS A/HONG KONG/2671/2019 IVR-208 (H3N2) ANTIGEN (PROPIOLACTONE INACTIVATED), INFLUENZA B VIRUS B/VICTORIA/705/2018 BVR-11 ANTIGEN (PROPIOLACTONE INACTIVATED), INFLUENZA B VIRUS B/PHUKET/3073/2013 BVR-1B ANTIGEN (PROPIOLACTONE INACTIVATED) 0.5 ML: 15; 15; 15; 15 INJECTION, SUSPENSION INTRAMUSCULAR at 15:02

## 2021-02-03 RX ADMIN — PANTOPRAZOLE SODIUM 40 MG: 40 TABLET, DELAYED RELEASE ORAL at 11:51

## 2021-02-03 ASSESSMENT — PAIN SCALES - GENERAL: PAINLEVEL_OUTOF10: 0

## 2021-02-03 NOTE — CARE COORDINATION
Social Work Discharge/Planning:    Discharge order noted. SW met with patient who confirms plan remains to return home with no needs at this time. Patient reports he is going to be transported home by [employer] today.     GALA Jansen  [phone]

## 2021-02-03 NOTE — DISCHARGE SUMMARY
[address]  Discharge Summary    PCP: Anneliese Damon DO    Admit Date:2/1/2021  Discharge Date: 2/3/2021    Admission Diagnosis:   1. Chest pain, doubt cardiac, reproducible, likely combination of msk and pleuritic 2/2 PNA? , less likely volume overload/ ADHF on HFpEF  2. Hypertensive urgency vs emergency; Hx of HTN- uncontrolled, in the setting of ESRD  3. Epigastric pain likely 2/2 GERD, doubt pancreatitis   4. ESRD 2/2 congenital kidney disease on HD (M/W/F)  5. Microcytic anemia in setting of ACD     Discharge Diagnosis:  1. Reproducible chest pain, musculoskeletal versus pleuritic versus acid refluxresolved  2. Hypertensive urgencyresolved  3. Hypertension, poorly controlled  4. Epigastric pain likely secondary to GERDresolved  5. ESRD secondary to congenital kidney disease on hemodialysis (M/W/F)  6. Microcytic anemia in the setting of anemia of chronic disease    Hospital Course:     Rancho Proctor is 38 y.o. 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 Symptoms began day prior to admission, he initially was experiencing nausea with no vomiting, reduced appetite and overall \"feeling ill\". Pt reports he initially believed he was volume overloaded and needed dialysis so he slept off these symptoms and then went to dialysis the next day. Patient's presenting symptoms of shoulder pain, chest pain, epigastric pain began towards the end of his dialysis treatment on 2/1/2021. Patient was given aspirin and nitro by EMS which minimally relieved his symptoms, however during ED interview patient's chest pain, back pain and epigastric pain had returned. Patient has had multiple admissions in the past for hypertensive urgency. His blood pressure on admission was 197/111. He was given hydralazine 15 mg, clonidine 0.2 mg, labetalol 10 mg IV in the ED. Chest x-ray was significant for bibasilar infiltrate concerning for pneumonia versus fluid overload. On physical exam in the ED, crackles were appreciated diffusely throughout bilateral lung fields. Pt was also had a low grade fever of 100.2. We were concerned for possible pneumonia so patient was started on azithromycin and ceftriaxoneBack pain was noted to be musculoskeletal in nature as there were tissue texture changes present at the right lower trapezius border and was reproducible in nature. Patient's abdominal pain was epigastric and his abdomen was tender to palpation. He was also complaining of acid reflux in the last few weeks. Chest pain was also reproducible on palpation. We recommended a lidocaine patch for musculoskeletal pain. Patient was admitted. We monitored patient's hypertensive urgency closely. Blood pressure medications were confirmed with [facility] pharmacy. Patient last received his 30-day pack of medications on January 26, 2021. Patient states he did take all his medications. Per [address], patient is supposed to be on:  1. nifedipine 60 mg extended release daily  2. losartan 100 mg nightly  3. labetalol 200 mg every 8 hours  4. hydralazine 50 mg every 8 hours. Patient also reports he is taking clonidine 0.2 mg 3 times a day. This is confirmed with the last clinic note on 10/20/2020. Clonidine was continued during admission. Patient's blood pressure at time of discharge is 140/93. There is a discrepancy between patient's blood pressure medication from what he states, [facility] pharmacy and outpatient clinic notes. Given this discrepancy, we are continuing clonidine at the time of discharge as there is a high risk of rebound hypertension if this is stopped abruptly. We are also continuing at this time the nifedipine, losartan, labetalol and hydralazine. Patient is to follow-up in the outpatient clinic on February 9, 2021 for further management. He understands to check his blood pressure at home and to call the office for a sooner appointment if BP is greater than 160/90 or come to the ED if his BP is > 180/110. He understands to be compliant with all medication and dialysis. During the course of admission, patient reports he received a phone call from [facility] from the transplant center to set up care because he is interested in a kidney transplant. Patient has been on dialysis since age 14. He will follow up with [facility]. Please continue to follow outpatient. All of patient's questions were answered at the time of discharge. He is stable for discharge. Vital signs are stable. Patient is afebrile. On physical exam, lung sounds are clear. No signs of fluid overload. He received dialysis prior to discharge on 2/3/2021 where 3 L were removed. During the course of admission, patient also complained of chronic runny nose and itching. He notes he takes Benadryl quite frequently for the itching. He has never been evaluated by dermatologist.  Denies any eczema. I did not see an eczema-like rash on physical exam.  I would like to recommend patient be given a Claritin or Zyrtec trial to be started as an outpatient. Not started at time of discharge. Suspect his allergy symptoms are related to environmental allergies including dust in his home. Please follow-up. Significant findings (history and exam, laboratory, radiological, pathology, other tests):   · General Appearance: alert, appears stated age and cooperative  · HEENT:  Head: Normocephalic, no lesions, without obvious abnormality. · Neck: no adenopathy, no carotid bruit, no JVD, supple, symmetrical, trachea midline and thyroid not enlarged, symmetric, no tenderness/mass/nodules  · Lung: clear to auscultation bilaterally  · Heart: regular rate and rhythm, S1, S2 normal, no murmur, no click, rub or gallop  · Abdomen: soft, non-tender; bowel sounds normal; no masses,  no organomegaly  · Extremities:  extremities normal, atraumatic, no cyanosis or edema. Left upper extremity positive bruit, positive thrill at fistula site. · Musculokeletal: No joint swelling, no muscle tenderness. ROM normal in all joints of extremities. · Neurologic: Mental status: Alert, oriented, thought content appropriate    Pending test results: None    Consults:  1. Nephrology for HD    Procedures:  1.  Received hemodialysis on 2/3/2021    Condition at discharge: Stable    Disposition: home    Discharge Medications:  Current Discharge Medication List      CONTINUE these medications which have CHANGED    Details   lidocaine-prilocaine (EMLA) 2.5-2.5 % cream Apply 1 each topically as needed for Pain Apply to AVF site prior to dialysis  Qty: 30 Tube, Refills: 0         CONTINUE these medications which have NOT CHANGED Details   diphenhydrAMINE (BENADRYL) 25 MG tablet Take 25 mg by mouth every 8 hours as needed for Itching      hydrALAZINE (APRESOLINE) 50 MG tablet Take 50 mg by mouth 3 times daily      labetalol (NORMODYNE) 200 MG tablet Take 200 mg by mouth three times daily      losartan (COZAAR) 100 MG tablet Take 100 mg by mouth every evening      melatonin 3 MG TABS tablet Take 3 mg by mouth nightly as needed (sleep)      pantoprazole (PROTONIX) 40 MG tablet Take 40 mg by mouth daily      NIFEdipine (ADALAT CC) 60 MG extended release tablet TAKE 1 TABLET BY MOUTH DAILY  Qty: 90 tablet, Refills: 3    Associated Diagnoses: Hypertension, unspecified type      cloNIDine (CATAPRES) 0.2 MG tablet Take 0.2 mg by mouth 3 times daily      calcitRIOL (ROCALTROL) 0.25 MCG capsule Take 1 mcg by mouth three times a week Given at dialysis on Monday, Wednesday,Friday      sevelamer (RENVELA) 800 MG tablet Take 2 tablets by mouth 3 times daily       iron sucrose (VENOFER) 20 MG/ML injection Infuse 100 mg intravenously three times a week Given in dialysis every Monday ,Wednesday ,Friday         STOP taking these medications       diphenhydrAMINE (BANOPHEN) 25 MG capsule Comments:   Reason for Stopping:         famotidine (PEPCID) 20 MG tablet Comments:   Reason for Stopping:         calcium carbonate (TUMS) 500 MG chewable tablet Comments:   Reason for Stopping:         Methoxy PEG-Epoetin Beta (MIRCERA) 75 MCG/0.3ML SOSY Comments:   Reason for Stopping:           Discharge to:  home  Diet: common adult  Activity: activity as tolerated   Exercise: As tolerated     Be compliant with medication. Please take all medications for BP as prescribed.      Follow up:   -At [facility] internal medicine Ambulatory [facility] on February 9, 2021 1:45 pm.   -You will call from internal medicine clinic for appointment on 3-4 days for follow up appointment,if not please call [phone] -Please Call [phone] ([facility]) if there are any appointment changes or other issues. It is very important that you keep this appointment.      Leslie Aguilar, DO  PGY 1   2:18 PM 2/3/2021

## 2021-02-03 NOTE — PLAN OF CARE
Problem: Pain:  Description: Pain management should include both nonpharmacologic and pharmacologic interventions. Goal: Pain level will decrease  Description: Pain level will decrease  Outcome: Met This Shift     Problem: Pain:  Description: Pain management should include both nonpharmacologic and pharmacologic interventions.   Goal: Control of acute pain  Description: Control of acute pain  Outcome: Met This Shift     Problem: Falls - Risk of:  Goal: Will remain free from falls  Description: Will remain free from falls  Outcome: Met This Shift     Problem: Falls - Risk of:  Goal: Absence of physical injury  Description: Absence of physical injury  Outcome: Met This Shift

## 2021-02-03 NOTE — FLOWSHEET NOTE
02/03/21 1057   Vital Signs   BP (!) 175/99   Temp 97.7 °F (36.5 °C)   Pulse 73   Resp 16   Weight 101 lb 10.1 oz (46.1 kg)   Weight Method Bed scale   Percent Weight Change -5.53   Post-Hemodialysis Assessment   Post-Treatment Procedures Blood returned; Access bleeding time < 10 minutes   Machine Disinfection Process Exterior Machine Disinfection   Rinseback Volume (ml) 300 ml   Total Liters Processed (l/min) 100.1 l/min   Dialyzer Clearance Lightly streaked   Duration of Treatment (minutes) 240 minutes   Heparin amount administered during treatment (units) 0 units   Hemodialysis Intake (ml) 300 ml   Hemodialysis Output (ml) 3000 ml   NET Removed (ml) 2700 ml   Tolerated Treatment Good   Patient Response to Treatment tolerated well, blood returned, needles pulled, sites held, stasis achieved, bandaids applied, +thrill, +bruit

## 2021-02-03 NOTE — PROGRESS NOTES
 influenza virus vaccine  0.5 mL Intramuscular Prior to discharge    cefTRIAXone (ROCEPHIN) IV  1,000 mg Intravenous Q24H    azithromycin  250 mg Oral Daily       MEDS (infusions):      MEDS (prn):  sodium chloride flush, promethazine **OR** ondansetron, polyethylene glycol, labetalol    DIET:    DIET LOW SODIUM 2 GM;      PHYSICAL EXAM:      Patient Vitals for the past 24 hrs:   BP Temp Temp src Pulse Resp SpO2 Weight   02/03/21 0830 (!) 197/125   74      02/03/21 0800 (!) 205/121   78      02/03/21 0730 (!) 207/113   78      02/03/21 0700 (!) 196/100   77      02/03/21 0656 (!) 197/110   79      02/03/21 0650 (!) 206/114 97.5 °F (36.4 °C)  80 16  107 lb 9.4 oz (48.8 kg)   02/02/21 2000 128/65 98.8 °F (37.1 °C) Temporal 85 19 95 %    02/02/21 0900 (!) 166/94 99.4 °F (37.4 °C) Oral 84 20          No intake or output data in the 24 hours ending 02/03/21 0855    Wt Readings from Last 3 Encounters:   02/03/21 107 lb 9.4 oz (48.8 kg)   10/20/20 121 lb 8 oz (55.1 kg)   03/03/20 112 lb 9.6 oz (51.1 kg)       Constitutional:  Patient in no acute distress  Head: normocephalic, atraumatic  Cardiovascular: S1 S2 no S3 or rub  Respiratory:  Clear  Gastrointestinal: soft, nontender, nondistended  Ext: no edema   Neuro: awake, alert and oriented  Skin: dry, no rash      DATA:      Recent Labs     02/01/21  1327 02/02/21  0327 02/03/21  0650   WBC 7.4 5.3 4.7   HGB 8.4* 8.4* 8.7*   HCT 26.6* 27.0* 26.7*   MCV 97.1 97.5 95.0    166 158     Recent Labs     02/01/21  1327 02/02/21  0327 02/03/21  0650    139 138   K 3.4* 3.8 4.1   CL 99 95* 95*   CO2 29 31* 29   BUN 18 35* 60*   CREATININE 5.4* 7.8* 11.0*   LABGLOM 14 9 6   GLUCOSE 78 91 105*   CALCIUM 8.5* 8.8 8.9     Recent Labs     02/01/21  1327   ALT 15     Lab Results   Component Value Date    LABALBU 4.1 02/01/2021    LABALBU 3.3 (L) 02/11/2020    LABALBU 4.1 02/10/2020       Iron studies:  Lab Results   Component Value Date FERRITIN 1,967 01/03/2019    IRON 41 (L) 01/03/2019    TIBC 152 (L) 01/03/2019     Vitamin B-12   Date Value Ref Range Status   02/10/2020 830 211 - 946 pg/mL Final     Folate   Date Value Ref Range Status   02/10/2020 10.5 4.8 - 24.2 ng/mL Final       Bone disease:  Lab Results   Component Value Date    MG 3.2 (H) 02/03/2021    PHOS 5.5 (H) 02/03/2021     Vit D, 25-Hydroxy   Date Value Ref Range Status   02/14/2020 15 (L) 30 - 100 ng/mL Final     Comment:     <20 ng/mL. ........... Kenzie Heman Deficient  20-30 ng/mL. ......... Kenzie Heman Insufficient   ng/mL. ........ Kenzie Heman Sufficient  >100 ng/mL. .......... Kenzie Heman Toxic       PTH   Date Value Ref Range Status   01/05/2019 418 (H) 15 - 65 pg/mL Final       No components found for: URIC    No results found for: VOL, APPEARANCE, COLORU, LABSPEC, LABPH, LEUKBLD, NITRU, GLUCOSEU, KETUA, UROBILINOGEN, KETUA, UROBILINOGEN, BILIRUBINUR, OCBU    No results found for: LIPIDPAN        IMPRESSION/RECOMMENDATIONS:    1. ESRD on HD  Dialyzes at [facility]  Continue HD on [facility]  Seen on treatment tolerating well. .      2. HTN with CKD G5/ESRD  BP is above target of <140/90  Added nifedipine ER 60 mg once a day and losartan 100 mg at bedtime 2/2/21  Clonidine increased by cardiology 2/3     3. Sec HPTH of Renal Origin  PO4 WNL on  binder     4. Anemia in CKD  HgB 8.7 below target of =/>10  Add PREETI when BP control improved      5.  Chest pain vs GERD  Troponin WNL  Per cardiology    [address] [address] for discharge from a renal standpoint    Paolo Raygoza, APRN - CNP  Pt seen and examined agree with above  Tolerating hd  Discussed low salt, low p, low k diet and fluid restriciton  Hernandez Nicole

## 2021-02-03 NOTE — PROGRESS NOTES
[facility]  Internal Medicine Residency Program  Progress Note - House Team 1    Patient:  Arjun Almazan 38 y.o. male MRN: [id number]     Date of Service: 2/3/2021     CC: ***  Overnight events: ***  Hospital Day: 3    Subjective         Objective     Physical Exam:  Vitals: BP (!) 205/121   Pulse 78   Temp 97.5 °F (36.4 °C)   Resp 16   Ht 5' 6\" (1.676 m)   Wt 107 lb 9.4 oz (48.8 kg)   SpO2 95%   BMI 17.36 kg/m²     I & O - 24hr: No intake or output data in the 24 hours ending 02/03/21 0819    · General Appearance: {Exam; general:80240::\"alert\",\"appears stated age\",\"cooperative\"}  · HEENT:  {Exam; heent:97424}  · Neck: {neck exam:20744::\"no adenopathy\",\"no carotid bruit\",\"no JVD\",\"supple, symmetrical, trachea midline\",\"thyroid not enlarged, symmetric, no tenderness/mass/nodules\"}  · Lung: {lung exam:72302::\"clear to auscultation bilaterally\"}  · Heart: {heart exam:5510::\"regular rate and rhythm, S1, S2 normal, no murmur, click, rub or gallop\"}  · Abdomen: {abdominal exam:54759::\"soft, non-tender; bowel sounds normal; no masses,  no organomegaly\"}  · Extremities:  {extremity exam:5109::\"extremities normal, atraumatic, no cyanosis or edema\"}  · Musculokeletal: No joint swelling, no muscle tenderness. ROM normal in all joints of extremities.    · Neurologic: Mental status: {Exam; neuro mental status:06660::\"Alert, oriented, thought content appropriate\"}    Pertinent Labs & Imaging Studies     CBC with Differential:    Lab Results   Component Value Date    WBC 4.7 02/03/2021    RBC 2.81 02/03/2021    HGB 8.7 02/03/2021    HCT 26.7 02/03/2021     02/03/2021    MCV 95.0 02/03/2021    MCH 31.0 02/03/2021    MCHC 32.6 02/03/2021    RDW 15.6 02/03/2021    LYMPHOPCT 21.1 02/03/2021    MONOPCT 10.5 02/03/2021    BASOPCT 0.2 02/03/2021    MONOSABS 0.50 02/03/2021    LYMPHSABS 1.00 02/03/2021    EOSABS 0.17 02/03/2021    BASOSABS 0.01 02/03/2021     BMP:    Lab Results   Component Value Date  02/03/2021    K 4.1 02/03/2021    K 3.6 01/25/2019    CL 95 02/03/2021    CO2 29 02/03/2021    BUN 60 02/03/2021    LABALBU 4.1 02/01/2021    CREATININE 11.0 02/03/2021    CALCIUM 8.9 02/03/2021    GFRAA 6 02/03/2021    LABGLOM 6 02/03/2021    GLUCOSE 105 02/03/2021     Magnesium:    Lab Results   Component Value Date    MG 3.2 02/03/2021     Phosphorus:    Lab Results   Component Value Date    PHOS 5.5 02/03/2021     Last 3 Troponin:    Lab Results   Component Value Date    TROPONINI 0.03 02/02/2021    TROPONINI 0.03 02/01/2021    TROPONINI 0.02 02/01/2021     HgBA1c:  No results found for: LABA1C  AMYLASE:    Lab Results   Component Value Date    AMYLASE 243 03/17/2017     LIPASE:    Lab Results   Component Value Date    LIPASE 26 02/01/2021       Resident's Assessment and Plan     Scott Briones is a 38 y.o. male    1. Chest pain (reproducible) likely 2/2 MSK etiology vs HF exacerbation r/o PNA   Upon admission, patient presented with chest pain that was reproducible and worsened with breathing. Resolving now, states he does have GERD. ProBNP elevated at >70,000. ESR elevated (39), CRP elevated (6.6), procal elevated (0.49). CXR possibly concerning for pneumonia. ? EKG NSR, possible LA enlargement with LV Hypertrophy   ? Troponin WNL   ? Most recent echo on 1/20/21 showed EF 55%  ? On Heparin 5000u q8hrs  ? Rocephin 1g IV daily   ? Zithromax 250mg PO daily     2. PMHx HTN poorly controlled   Pt born with one kidney. On labetalol 200mg PO TID, Clonidine 0.2 PO TID   ? Nephrology recommends adding Nifedipine ER 60 daily and Losartan 100mg at bedtime     3. Epigastric pain likely 2/2 GERD, vs pericarditis r/o pancreatitis   Pt admitted chest pain alleviated with leaning forward. Lipase was normal,   ? Continue PPI regimen     4. 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 ?  Continue HD in [place]  ? *** phosphate binder ? Hgb 8.4 is below target of 10.0      5. Microcytic anemia in setting of ACD   ? Hgb 8.4, below target of 10   ? Currently holding iron to r/o possible bacterial PNA   ?  Respiratory cultures pending    PT/OT evaluation:   DVT prophylaxis/GI prophylaxis: Heparin/ protonix   Disposition: continue to monitor inpatient     [address] Student, OMS-III  Attending Physician: Dr. Liane Castro

## 2021-02-04 ENCOUNTER — TELEPHONE (OUTPATIENT)
Dept: INTERNAL MEDICINE | Age: 36
End: 2021-02-04

## 2021-02-04 RX ORDER — LANOLIN ALCOHOL/MO/W.PET/CERES
3 CREAM (GRAM) TOPICAL NIGHTLY PRN
Qty: 60 MG | Refills: 0 | Status: SHIPPED
Start: 2021-02-04 | End: 2021-03-23

## 2021-02-04 RX ORDER — LABETALOL 200 MG/1
200 TABLET, FILM COATED ORAL 3 TIMES DAILY
Qty: 60 TABLET | Refills: 2 | Status: SHIPPED | OUTPATIENT
Start: 2021-02-04 | End: 2021-03-30 | Stop reason: SDUPTHER

## 2021-02-04 NOTE — TELEPHONE ENCOUNTER
[facility] Initial Follow Up Call    Outreach made within 2 business days of discharge: Yes    Patient: Lilly De La O Patient : 1985   MRN: [id number]  Reason for Admission: There are no discharge diagnoses documented for the most recent discharge. Discharge Date: 2/3/21       Spoke with: home     Discharge department/facility: Home     TCM Interactive Patient Contact:  Was patient able to fill all prescriptions: No: Nothing new   Was patient instructed to bring all medications to the follow-up visit: Yes  Is patient taking all medications as directed in the discharge summary?  Yes  Does patient understand their discharge instructions: Yes  Does patient have questions or concerns that need addressed prior to 7-14 day follow up office visit: no    Scheduled appointment with PCP within 7-14 days    Follow Up  Future Appointments   Date Time Provider Jean Joshi   2021  1:45 PM Jeffery Aldana MD [address]

## 2021-02-09 ENCOUNTER — TELEPHONE (OUTPATIENT)
Dept: INTERNAL MEDICINE | Age: 36
End: 2021-02-09

## 2021-02-17 ENCOUNTER — APPOINTMENT (OUTPATIENT)
Dept: GENERAL RADIOLOGY | Age: 36
End: 2021-02-17
Payer: MEDICAID

## 2021-02-17 ENCOUNTER — HOSPITAL ENCOUNTER (EMERGENCY)
Age: 36
Discharge: HOME | End: 2021-02-17
Attending: EMERGENCY MEDICINE
Payer: MEDICAID

## 2021-02-17 VITALS
WEIGHT: 101 LBS | RESPIRATION RATE: 16 BRPM | OXYGEN SATURATION: 100 % | SYSTOLIC BLOOD PRESSURE: 160 MMHG | BODY MASS INDEX: 16.23 KG/M2 | HEIGHT: 66 IN | TEMPERATURE: 97.9 F | HEART RATE: 90 BPM | DIASTOLIC BLOOD PRESSURE: 100 MMHG

## 2021-02-17 DIAGNOSIS — R07.9 CHEST PAIN, UNSPECIFIED TYPE: Primary | ICD-10-CM

## 2021-02-17 LAB
ANION GAP SERPL CALCULATED.3IONS-SCNC: 8 MMOL/L (ref 7–16)
BASOPHILS ABSOLUTE: 0.04 E9/L (ref 0–0.2)
BASOPHILS RELATIVE PERCENT: 1.2 % (ref 0–2)
BUN BLDV-MCNC: 9 MG/DL (ref 6–20)
CALCIUM SERPL-MCNC: 8.6 MG/DL (ref 8.6–10.2)
CHLORIDE BLD-SCNC: 97 MMOL/L (ref 98–107)
CO2: 32 MMOL/L (ref 22–29)
CREAT SERPL-MCNC: 2.9 MG/DL (ref 0.7–1.2)
EKG ATRIAL RATE: 88 BPM
EKG P AXIS: 72 DEGREES
EKG P-R INTERVAL: 210 MS
EKG Q-T INTERVAL: 398 MS
EKG QRS DURATION: 82 MS
EKG QTC CALCULATION (BAZETT): 481 MS
EKG R AXIS: 57 DEGREES
EKG T AXIS: 89 DEGREES
EKG VENTRICULAR RATE: 88 BPM
EOSINOPHILS ABSOLUTE: 0.13 E9/L (ref 0.05–0.5)
EOSINOPHILS RELATIVE PERCENT: 3.8 % (ref 0–6)
GFR AFRICAN AMERICAN: 30
GFR NON-AFRICAN AMERICAN: 30 ML/MIN/1.73
GLUCOSE BLD-MCNC: 86 MG/DL (ref 74–99)
HCT VFR BLD CALC: 26.1 % (ref 37–54)
HEMOGLOBIN: 8.3 G/DL (ref 12.5–16.5)
IMMATURE GRANULOCYTES #: 0.01 E9/L
IMMATURE GRANULOCYTES %: 0.3 % (ref 0–5)
LYMPHOCYTES ABSOLUTE: 1.27 E9/L (ref 1.5–4)
LYMPHOCYTES RELATIVE PERCENT: 37 % (ref 20–42)
MAGNESIUM: 2.3 MG/DL (ref 1.6–2.6)
MCH RBC QN AUTO: 30.1 PG (ref 26–35)
MCHC RBC AUTO-ENTMCNC: 31.8 % (ref 32–34.5)
MCV RBC AUTO: 94.6 FL (ref 80–99.9)
MONOCYTES ABSOLUTE: 0.5 E9/L (ref 0.1–0.95)
MONOCYTES RELATIVE PERCENT: 14.6 % (ref 2–12)
NEUTROPHILS ABSOLUTE: 1.48 E9/L (ref 1.8–7.3)
NEUTROPHILS RELATIVE PERCENT: 43.1 % (ref 43–80)
PDW BLD-RTO: 14.9 FL (ref 11.5–15)
PLATELET # BLD: 166 E9/L (ref 130–450)
PMV BLD AUTO: 9.2 FL (ref 7–12)
POTASSIUM REFLEX MAGNESIUM: 3.2 MMOL/L (ref 3.5–5)
PRO-BNP: ABNORMAL PG/ML (ref 0–125)
RBC # BLD: 2.76 E12/L (ref 3.8–5.8)
SODIUM BLD-SCNC: 137 MMOL/L (ref 132–146)
TROPONIN: 0.02 NG/ML (ref 0–0.03)
WBC # BLD: 3.4 E9/L (ref 4.5–11.5)

## 2021-02-17 PROCEDURE — 93005 ELECTROCARDIOGRAM TRACING: CPT | Performed by: STUDENT IN AN ORGANIZED HEALTH CARE EDUCATION/TRAINING PROGRAM

## 2021-02-17 PROCEDURE — 84484 ASSAY OF TROPONIN QUANT: CPT

## 2021-02-17 PROCEDURE — 83735 ASSAY OF MAGNESIUM: CPT

## 2021-02-17 PROCEDURE — 93010 ELECTROCARDIOGRAM REPORT: CPT | Performed by: INTERNAL MEDICINE

## 2021-02-17 PROCEDURE — 99285 EMERGENCY DEPT VISIT HI MDM: CPT

## 2021-02-17 PROCEDURE — 80048 BASIC METABOLIC PNL TOTAL CA: CPT

## 2021-02-17 PROCEDURE — 85025 COMPLETE CBC W/AUTO DIFF WBC: CPT

## 2021-02-17 PROCEDURE — 71045 X-RAY EXAM CHEST 1 VIEW: CPT

## 2021-02-17 PROCEDURE — 83880 ASSAY OF NATRIURETIC PEPTIDE: CPT

## 2021-02-17 PROCEDURE — 6360000002 HC RX W HCPCS: Performed by: STUDENT IN AN ORGANIZED HEALTH CARE EDUCATION/TRAINING PROGRAM

## 2021-02-17 PROCEDURE — 96375 TX/PRO/DX INJ NEW DRUG ADDON: CPT

## 2021-02-17 PROCEDURE — 96374 THER/PROPH/DIAG INJ IV PUSH: CPT

## 2021-02-17 PROCEDURE — 6370000000 HC RX 637 (ALT 250 FOR IP): Performed by: STUDENT IN AN ORGANIZED HEALTH CARE EDUCATION/TRAINING PROGRAM

## 2021-02-17 RX ORDER — POTASSIUM CHLORIDE 20 MEQ/1
40 TABLET, EXTENDED RELEASE ORAL ONCE
Status: COMPLETED | OUTPATIENT
Start: 2021-02-17 | End: 2021-02-17

## 2021-02-17 RX ORDER — ONDANSETRON 2 MG/ML
4 INJECTION INTRAMUSCULAR; INTRAVENOUS ONCE
Status: COMPLETED | OUTPATIENT
Start: 2021-02-17 | End: 2021-02-17

## 2021-02-17 RX ORDER — DIPHENHYDRAMINE HYDROCHLORIDE 50 MG/ML
25 INJECTION INTRAMUSCULAR; INTRAVENOUS ONCE
Status: COMPLETED | OUTPATIENT
Start: 2021-02-17 | End: 2021-02-17

## 2021-02-17 RX ORDER — FENTANYL CITRATE 50 UG/ML
50 INJECTION, SOLUTION INTRAMUSCULAR; INTRAVENOUS ONCE
Status: COMPLETED | OUTPATIENT
Start: 2021-02-17 | End: 2021-02-17

## 2021-02-17 RX ADMIN — POTASSIUM CHLORIDE 40 MEQ: 20 TABLET, EXTENDED RELEASE ORAL at 13:39

## 2021-02-17 RX ADMIN — FENTANYL CITRATE 50 MCG: 50 INJECTION, SOLUTION INTRAMUSCULAR; INTRAVENOUS at 12:33

## 2021-02-17 RX ADMIN — DIPHENHYDRAMINE HYDROCHLORIDE 25 MG: 50 INJECTION, SOLUTION INTRAMUSCULAR; INTRAVENOUS at 12:33

## 2021-02-17 RX ADMIN — ONDANSETRON 4 MG: 2 INJECTION INTRAMUSCULAR; INTRAVENOUS at 12:20

## 2021-02-17 ASSESSMENT — ENCOUNTER SYMPTOMS
WHEEZING: 0
SHORTNESS OF BREATH: 1
EYE PAIN: 0
NAUSEA: 1
ABDOMINAL PAIN: 0
EYE DISCHARGE: 0
VOMITING: 0
BLOOD IN STOOL: 0
SORE THROAT: 0
EYE REDNESS: 0
COUGH: 0
BACK PAIN: 0
DIARRHEA: 0
CONSTIPATION: 0
SINUS PRESSURE: 0

## 2021-02-17 NOTE — ED PROVIDER NOTES
78-year-old male with a past medical history of CKD on hemodialysis with fistula in the left arm presents ED from dialysis with complaint of chest pain. Patient did get his full scheduled dialysis. He states when he started [facility] he started having some right-sided chest pain and had been continuing since he was on dialysis and still having currently. He denies any radiation of the pain describes as a sharp sensation. Does state worsening with deep breathing and better when he breathes air out. He did also receive 1 nitro at dialysis which did not improve his pain. Otherwise patient has no other complaints at this time. The patient presents with chest pain that has been going on for 1 day. These symptoms are moderate in severity. Symptoms are made better by breathing air out. Symptoms are made worse by deep breathing in. Associated symptoms include none. The history is provided by the patient and medical records. Review of Systems   Constitutional: Negative for chills and fever. HENT: Negative for ear pain, sinus pressure and sore throat. Eyes: Negative for pain, discharge and redness. Respiratory: Positive for shortness of breath. Negative for cough and wheezing. Cardiovascular: Positive for chest pain. Gastrointestinal: Positive for nausea. Negative for abdominal pain, blood in stool, constipation, diarrhea and vomiting. Genitourinary: Negative for dysuria and frequency. Musculoskeletal: Negative for arthralgias and back pain. Skin: Negative for rash and wound. Neurological: Negative for weakness and headaches. Hematological: Negative for adenopathy. All other systems reviewed and are negative. Physical Exam  Vitals signs and nursing note reviewed. Constitutional:       Appearance: Normal appearance. He is well-developed and normal weight. HENT:      Head: Normocephalic and atraumatic.    Eyes:      Conjunctiva/sclera: Conjunctivae normal.   Neck: Musculoskeletal: Normal range of motion and neck supple. Cardiovascular:      Rate and Rhythm: Normal rate and regular rhythm. Heart sounds: Normal heart sounds. No murmur. Pulmonary:      Effort: Pulmonary effort is normal. No respiratory distress. Breath sounds: Normal breath sounds. No wheezing or rales. Abdominal:      General: Bowel sounds are normal. There is no distension. Palpations: Abdomen is soft. Tenderness: There is no abdominal tenderness. There is no guarding or rebound. Musculoskeletal:         General: No swelling, tenderness or deformity. Skin:     General: Skin is warm and dry. Coloration: Skin is not jaundiced. Findings: No bruising. Neurological:      General: No focal deficit present. Mental Status: He is alert and oriented to person, place, and time. Psychiatric:         Mood and Affect: Mood normal.         Thought Content: Thought content normal.          Procedures     MDM  Number of Diagnoses or Management Options  Chest pain, unspecified type  Diagnosis management comments: 24-year-old male with a history of chronic kidney disease on dialysis presents ED from dialysis with complaint of right-sided chest pain. To the patient's complaint did get a work-up started on him. Patient's EKG showed no acute ST elevation or depression, troponin within normal limits low concern of acute coronary syndrome at this time. Patient's remaining blood work was significant for hypokalemia which was replaced with oral potassium magnesium was within normal limits. Patient's pain was improved here following medication. He states he thinks they just tried to take off fluid too fast and that is why he got the chest pain. Patient safe to be discharged home at this time. He was agreeable with this decision moving forward. Did advise follow-up with his PCP.        Amount and/or Complexity of Data Reviewed  Clinical lab tests: reviewed  Tests in the radiology section of CPT®: reviewed  Tests in the medicine section of CPT®: reviewed  Decide to obtain previous medical records or to obtain history from someone other than the patient: yes             EKG:  This EKG is signed by emergency department physician. Rate: 88  Rhythm: Sinus  Interpretation: Normal sinus rhythm, normal axis, no acute ST elevations or depressions, patient does have prolonged AR interval.  Was not seen on previous EKG. Comparison: changes compared to previous EKG            --------------------------------------------- PAST HISTORY ---------------------------------------------  Past Medical History:  has a past medical history of (HFpEF) heart failure with preserved ejection fraction ([facility] [address] Anxiety, AVF (arteriovenous fistula) ([facility] [address] Chronic kidney disease, Depression, Encounter regarding vascular access for dialysis for ESRD ([facility] [address] Hypertension, Hypothyroidism, Intracranial hemorrhage ([facility] [address] and Noncompliance w/medication treatment due to intermit use of medication. Past Surgical History:  has a past surgical history that includes Brain aneurysm surgery (Right, 3-4 years ago); Dialysis fistula creation (Left, 11 years ago); and Upper gastrointestinal endoscopy (N/A, 1/3/2019). Social History:  reports that he has quit smoking. His smoking use included cigars. He has never used smokeless tobacco. He reports previous drug use. Drug: Marijuana. He reports that he does not drink alcohol. Family History: family history includes Kidney Disease in his paternal grandmother. The patients home medications have been reviewed.     Allergies: Tylenol [acetaminophen]    -------------------------------------------------- RESULTS -------------------------------------------------  Labs:  Results for orders placed or performed during the hospital encounter of 02/17/21   CBC Auto Differential   Result Value Ref Range    WBC 3.4 (L) 4.5 - 11.5 E9/L    RBC 2.76 (L) 3.80 - 5.80 E12/L    Hemoglobin 8.3 (L) 12.5 - 16.5 g/dL    Hematocrit 26.1 (L) 37.0 - 54.0 %    MCV 94.6 80.0 - 99.9 fL    MCH 30.1 26.0 - 35.0 pg    MCHC 31.8 (L) 32.0 - 34.5 %    RDW 14.9 11.5 - 15.0 fL    Platelets 703 924 - 142 E9/L    MPV 9.2 7.0 - 12.0 fL    Neutrophils % 43.1 43.0 - 80.0 %    Immature Granulocytes % 0.3 0.0 - 5.0 %    Lymphocytes % 37.0 20.0 - 42.0 %    Monocytes % 14.6 (H) 2.0 - 12.0 %    Eosinophils % 3.8 0.0 - 6.0 %    Basophils % 1.2 0.0 - 2.0 %    Neutrophils Absolute 1.48 (L) 1.80 - 7.30 E9/L    Immature Granulocytes # 0.01 E9/L    Lymphocytes Absolute 1.27 (L) 1.50 - 4.00 E9/L    Monocytes Absolute 0.50 0.10 - 0.95 E9/L    Eosinophils Absolute 0.13 0.05 - 0.50 E9/L    Basophils Absolute 0.04 0.00 - 0.20 X4/D   Basic Metabolic Panel w/ Reflex to MG   Result Value Ref Range    Sodium 137 132 - 146 mmol/L    Potassium reflex Magnesium 3.2 (L) 3.5 - 5.0 mmol/L    Chloride 97 (L) 98 - 107 mmol/L    CO2 32 (H) 22 - 29 mmol/L    Anion Gap 8 7 - 16 mmol/L    Glucose 86 74 - 99 mg/dL    BUN 9 6 - 20 mg/dL    CREATININE 2.9 (H) 0.7 - 1.2 mg/dL    GFR Non-African American 30 >=60 mL/min/1.73    GFR African American 30     Calcium 8.6 8.6 - 10.2 mg/dL   Troponin   Result Value Ref Range    Troponin 0.02 0.00 - 0.03 ng/mL   Brain Natriuretic Peptide   Result Value Ref Range    Pro-BNP >70,000 (H) 0 - 125 pg/mL   Magnesium   Result Value Ref Range    Magnesium 2.3 1.6 - 2.6 mg/dL   EKG 12 Lead   Result Value Ref Range    Ventricular Rate 88 BPM    Atrial Rate 88 BPM    P-R Interval 210 ms    QRS Duration 82 ms    Q-T Interval 398 ms    QTc Calculation (Bazett) 481 ms    P Axis 72 degrees    R Axis 57 degrees    T Axis 89 degrees       Radiology:  XR CHEST PORTABLE   Final Result   Small left-sided pleural effusion.    Moderate pulmonary vascular congestion.          ------------------------- NURSING NOTES AND VITALS REVIEWED ---------------------------  Date / Time Roomed:  2/17/2021 11:41 AM  ED Bed Assignment:  02/02    The nursing notes within the ED encounter and vital signs as below have been reviewed. BP (!) 160/100   Pulse 90   Temp 97.9 °F (36.6 °C) (Oral)   Resp 16   Ht 5' 6\" (1.676 m)   Wt 101 lb (45.8 kg)   SpO2 100%   BMI 16.30 kg/m²   Oxygen Saturation Interpretation: Normal      ------------------------------------------ PROGRESS NOTES ------------------------------------------  1:33 PM EST  I have spoken with the patient and discussed todays results, in addition to providing specific details for the plan of care and counseling regarding the diagnosis and prognosis. Their questions are answered at this time and they are agreeable with the plan. I discussed at length with them reasons for immediate return here for re evaluation. They will followup with their primary care physician by calling their office on Monday.      --------------------------------- ADDITIONAL PROVIDER NOTES ---------------------------------  At this time the patient is without objective evidence of an acute process requiring hospitalization or inpatient management. They have remained hemodynamically stable throughout their entire ED visit and are stable for discharge with outpatient follow-up. The plan has been discussed in detail and they are aware of the specific conditions for emergent return, as well as the importance of follow-up. New Prescriptions    No medications on file       Diagnosis:  1. Chest pain, unspecified type        Disposition:  Patient's disposition: Discharge to home  Patient's condition is stable.          Sae Antony MD  Resident  02/17/21 [id number]

## 2021-02-17 NOTE — LETTER
[facility] Internal Medicine   [address] [facility], [address]      February 18, 2021    [address]      Dear Ashley Talley,    This letter is regarding your Emergency Department (ED) visit at [address] [facility] Emergency Department on 2/17/21. Dr.Dhanunjay Terry Daniel wanted to make sure that you understand your discharge instructions and that you were able to fill any prescriptions that may have been ordered for you. Please contact the office at \"[phone]  if the ED advised to you follow up with Dr.Dhanunjay Terry Daniel, or if you have any further questions or needs. Also did you know -   *Visiting the ED for a non-emergency could result in higher co-pays than you would normally be subject to paying? *You can call your doctor even after hours so they can direct you to the most appropriate care. [facility]) practices can often offer you an appointment on the same day that you call. Many [address]  appointments; check our website for availability in your community , www. [email or web address]    Evisits are now available for patients for $36 through [employer] for certain conditions:  * Sinus, cold and or cough       * Diarrhea            * Headache  * Heartburn                                * Poison [place]          * Back pain     * Urinary problems                         Sincerely,     Brittnee Daniel DO and your [facility]

## 2021-03-02 ENCOUNTER — TELEPHONE (OUTPATIENT)
Dept: INTERNAL MEDICINE | Age: 36
End: 2021-03-02

## 2021-03-02 NOTE — TELEPHONE ENCOUNTER
Pharmacy called in requesting a refill for patients Melatonin 3 mg. Patient has not been seen since October 2020 and does not have a future appt. Called patient to schedule an appt, pt did not answer. Left voicemail for patient to call back.

## 2021-03-15 ENCOUNTER — APPOINTMENT (OUTPATIENT)
Dept: GENERAL RADIOLOGY | Age: 36
DRG: 199 | End: 2021-03-15
Payer: MEDICAID

## 2021-03-15 ENCOUNTER — HOSPITAL ENCOUNTER (INPATIENT)
Age: 36
LOS: 1 days | Discharge: HOME | DRG: 199 | End: 2021-03-17
Attending: EMERGENCY MEDICINE | Admitting: INTERNAL MEDICINE
Payer: MEDICAID

## 2021-03-15 DIAGNOSIS — R07.9 CHEST PAIN, UNSPECIFIED TYPE: Primary | ICD-10-CM

## 2021-03-15 DIAGNOSIS — I10 HYPERTENSION, UNSPECIFIED TYPE: ICD-10-CM

## 2021-03-15 DIAGNOSIS — J18.9 PNEUMONIA DUE TO ORGANISM: ICD-10-CM

## 2021-03-15 DIAGNOSIS — N18.6 ESRD ON DIALYSIS (HCC): ICD-10-CM

## 2021-03-15 DIAGNOSIS — Z99.2 ESRD ON DIALYSIS (HCC): ICD-10-CM

## 2021-03-15 LAB
ADENOVIRUS BY PCR: NOT DETECTED
ALBUMIN SERPL-MCNC: 4.2 G/DL (ref 3.5–5.2)
ALP BLD-CCNC: 73 U/L (ref 40–129)
ALT SERPL-CCNC: 14 U/L (ref 0–40)
ANION GAP SERPL CALCULATED.3IONS-SCNC: 17 MMOL/L (ref 7–16)
AST SERPL-CCNC: 20 U/L (ref 0–39)
BASOPHILS ABSOLUTE: 0.04 E9/L (ref 0–0.2)
BASOPHILS RELATIVE PERCENT: 0.6 % (ref 0–2)
BILIRUB SERPL-MCNC: 0.4 MG/DL (ref 0–1.2)
BORDETELLA PARAPERTUSSIS BY PCR: NOT DETECTED
BORDETELLA PERTUSSIS BY PCR: NOT DETECTED
BUN BLDV-MCNC: 53 MG/DL (ref 6–20)
CALCIUM SERPL-MCNC: 9.1 MG/DL (ref 8.6–10.2)
CHLAMYDOPHILIA PNEUMONIAE BY PCR: NOT DETECTED
CHLORIDE BLD-SCNC: 99 MMOL/L (ref 98–107)
CK MB: 2.2 NG/ML (ref 0–7.7)
CO2: 23 MMOL/L (ref 22–29)
CORONAVIRUS 229E BY PCR: NOT DETECTED
CORONAVIRUS HKU1 BY PCR: NOT DETECTED
CORONAVIRUS NL63 BY PCR: NOT DETECTED
CORONAVIRUS OC43 BY PCR: NOT DETECTED
CREAT SERPL-MCNC: 11.8 MG/DL (ref 0.7–1.2)
EKG ATRIAL RATE: 92 BPM
EKG P AXIS: 80 DEGREES
EKG P-R INTERVAL: 224 MS
EKG Q-T INTERVAL: 372 MS
EKG QRS DURATION: 80 MS
EKG QTC CALCULATION (BAZETT): 460 MS
EKG R AXIS: 71 DEGREES
EKG T AXIS: 88 DEGREES
EKG VENTRICULAR RATE: 92 BPM
EOSINOPHILS ABSOLUTE: 0.19 E9/L (ref 0.05–0.5)
EOSINOPHILS RELATIVE PERCENT: 2.8 % (ref 0–6)
GFR AFRICAN AMERICAN: 6
GFR NON-AFRICAN AMERICAN: 6 ML/MIN/1.73
GLUCOSE BLD-MCNC: 79 MG/DL (ref 74–99)
HCT VFR BLD CALC: 26.5 % (ref 37–54)
HEMOGLOBIN: 8.3 G/DL (ref 12.5–16.5)
HUMAN METAPNEUMOVIRUS BY PCR: NOT DETECTED
HUMAN RHINOVIRUS/ENTEROVIRUS BY PCR: NOT DETECTED
IMMATURE GRANULOCYTES #: 0.02 E9/L
IMMATURE GRANULOCYTES %: 0.3 % (ref 0–5)
INFLUENZA A BY PCR: NOT DETECTED
INFLUENZA B BY PCR: NOT DETECTED
INR BLD: 1.1
LYMPHOCYTES ABSOLUTE: 1.31 E9/L (ref 1.5–4)
LYMPHOCYTES RELATIVE PERCENT: 19.6 % (ref 20–42)
MCH RBC QN AUTO: 30.1 PG (ref 26–35)
MCHC RBC AUTO-ENTMCNC: 31.3 % (ref 32–34.5)
MCV RBC AUTO: 96 FL (ref 80–99.9)
MONOCYTES ABSOLUTE: 0.51 E9/L (ref 0.1–0.95)
MONOCYTES RELATIVE PERCENT: 7.6 % (ref 2–12)
MYCOPLASMA PNEUMONIAE BY PCR: NOT DETECTED
NEUTROPHILS ABSOLUTE: 4.63 E9/L (ref 1.8–7.3)
NEUTROPHILS RELATIVE PERCENT: 69.1 % (ref 43–80)
PARAINFLUENZA VIRUS 1 BY PCR: NOT DETECTED
PARAINFLUENZA VIRUS 2 BY PCR: NOT DETECTED
PARAINFLUENZA VIRUS 3 BY PCR: NOT DETECTED
PARAINFLUENZA VIRUS 4 BY PCR: NOT DETECTED
PDW BLD-RTO: 16 FL (ref 11.5–15)
PLATELET # BLD: 162 E9/L (ref 130–450)
PMV BLD AUTO: 10 FL (ref 7–12)
POTASSIUM SERPL-SCNC: 4 MMOL/L (ref 3.5–5)
PRO-BNP: ABNORMAL PG/ML (ref 0–125)
PROCALCITONIN: 0.35 NG/ML (ref 0–0.08)
PROTHROMBIN TIME: 11.6 SEC (ref 9.3–12.4)
RBC # BLD: 2.76 E12/L (ref 3.8–5.8)
RESPIRATORY SYNCYTIAL VIRUS BY PCR: NOT DETECTED
SARS-COV-2, NAAT: NOT DETECTED
SARS-COV-2, PCR: NOT DETECTED
SODIUM BLD-SCNC: 139 MMOL/L (ref 132–146)
TOTAL CK: 143 U/L (ref 20–200)
TOTAL PROTEIN: 7.3 G/DL (ref 6.4–8.3)
TROPONIN: 0.02 NG/ML (ref 0–0.03)
WBC # BLD: 6.7 E9/L (ref 4.5–11.5)

## 2021-03-15 PROCEDURE — 2580000003 HC RX 258: Performed by: INTERNAL MEDICINE

## 2021-03-15 PROCEDURE — 6370000000 HC RX 637 (ALT 250 FOR IP): Performed by: INTERNAL MEDICINE

## 2021-03-15 PROCEDURE — 84145 PROCALCITONIN (PCT): CPT

## 2021-03-15 PROCEDURE — 85610 PROTHROMBIN TIME: CPT

## 2021-03-15 PROCEDURE — 36415 COLL VENOUS BLD VENIPUNCTURE: CPT

## 2021-03-15 PROCEDURE — 6360000002 HC RX W HCPCS: Performed by: INTERNAL MEDICINE

## 2021-03-15 PROCEDURE — 71045 X-RAY EXAM CHEST 1 VIEW: CPT

## 2021-03-15 PROCEDURE — 6370000000 HC RX 637 (ALT 250 FOR IP): Performed by: EMERGENCY MEDICINE

## 2021-03-15 PROCEDURE — G0378 HOSPITAL OBSERVATION PER HR: HCPCS

## 2021-03-15 PROCEDURE — 96375 TX/PRO/DX INJ NEW DRUG ADDON: CPT

## 2021-03-15 PROCEDURE — 96376 TX/PRO/DX INJ SAME DRUG ADON: CPT

## 2021-03-15 PROCEDURE — 2500000003 HC RX 250 WO HCPCS: Performed by: EMERGENCY MEDICINE

## 2021-03-15 PROCEDURE — 73070 X-RAY EXAM OF ELBOW: CPT

## 2021-03-15 PROCEDURE — 5A1D70Z PERFORMANCE OF URINARY FILTRATION, INTERMITTENT, LESS THAN 6 HOURS PER DAY: ICD-10-PCS | Performed by: INTERNAL MEDICINE

## 2021-03-15 PROCEDURE — 85025 COMPLETE CBC W/AUTO DIFF WBC: CPT

## 2021-03-15 PROCEDURE — 96368 THER/DIAG CONCURRENT INF: CPT

## 2021-03-15 PROCEDURE — 99285 EMERGENCY DEPT VISIT HI MDM: CPT

## 2021-03-15 PROCEDURE — 93005 ELECTROCARDIOGRAM TRACING: CPT | Performed by: EMERGENCY MEDICINE

## 2021-03-15 PROCEDURE — 87635 SARS-COV-2 COVID-19 AMP PRB: CPT

## 2021-03-15 PROCEDURE — 80053 COMPREHEN METABOLIC PANEL: CPT

## 2021-03-15 PROCEDURE — 1200000000 HC SEMI PRIVATE

## 2021-03-15 PROCEDURE — 83880 ASSAY OF NATRIURETIC PEPTIDE: CPT

## 2021-03-15 PROCEDURE — 82553 CREATINE MB FRACTION: CPT

## 2021-03-15 PROCEDURE — 2580000003 HC RX 258: Performed by: EMERGENCY MEDICINE

## 2021-03-15 PROCEDURE — 93010 ELECTROCARDIOGRAM REPORT: CPT | Performed by: INTERNAL MEDICINE

## 2021-03-15 PROCEDURE — 84484 ASSAY OF TROPONIN QUANT: CPT

## 2021-03-15 PROCEDURE — 6360000002 HC RX W HCPCS: Performed by: EMERGENCY MEDICINE

## 2021-03-15 PROCEDURE — 82550 ASSAY OF CK (CPK): CPT

## 2021-03-15 PROCEDURE — 0202U NFCT DS 22 TRGT SARS-COV-2: CPT

## 2021-03-15 PROCEDURE — 96365 THER/PROPH/DIAG IV INF INIT: CPT

## 2021-03-15 PROCEDURE — 90935 HEMODIALYSIS ONE EVALUATION: CPT

## 2021-03-15 RX ORDER — PANTOPRAZOLE SODIUM 40 MG/1
40 TABLET, DELAYED RELEASE ORAL DAILY
Status: DISCONTINUED | OUTPATIENT
Start: 2021-03-15 | End: 2021-03-17 | Stop reason: HOSPADM

## 2021-03-15 RX ORDER — HYDRALAZINE HYDROCHLORIDE 25 MG/1
50 TABLET, FILM COATED ORAL 3 TIMES DAILY
Status: DISCONTINUED | OUTPATIENT
Start: 2021-03-15 | End: 2021-03-17

## 2021-03-15 RX ORDER — DIPHENHYDRAMINE HYDROCHLORIDE 50 MG/ML
25 INJECTION INTRAMUSCULAR; INTRAVENOUS ONCE
Status: COMPLETED | OUTPATIENT
Start: 2021-03-15 | End: 2021-03-15

## 2021-03-15 RX ORDER — HEPARIN SODIUM 10000 [USP'U]/ML
5000 INJECTION, SOLUTION INTRAVENOUS; SUBCUTANEOUS EVERY 8 HOURS SCHEDULED
Status: DISCONTINUED | OUTPATIENT
Start: 2021-03-15 | End: 2021-03-17 | Stop reason: HOSPADM

## 2021-03-15 RX ORDER — LOSARTAN POTASSIUM 25 MG/1
100 TABLET ORAL EVERY EVENING
Status: DISCONTINUED | OUTPATIENT
Start: 2021-03-15 | End: 2021-03-17 | Stop reason: HOSPADM

## 2021-03-15 RX ORDER — POLYETHYLENE GLYCOL 3350 17 G/17G
17 POWDER, FOR SOLUTION ORAL DAILY PRN
Status: DISCONTINUED | OUTPATIENT
Start: 2021-03-15 | End: 2021-03-17 | Stop reason: HOSPADM

## 2021-03-15 RX ORDER — SODIUM CHLORIDE 0.9 % (FLUSH) 0.9 %
10 SYRINGE (ML) INJECTION EVERY 12 HOURS SCHEDULED
Status: DISCONTINUED | OUTPATIENT
Start: 2021-03-15 | End: 2021-03-17 | Stop reason: HOSPADM

## 2021-03-15 RX ORDER — ASPIRIN 81 MG/1
324 TABLET, CHEWABLE ORAL ONCE
Status: COMPLETED | OUTPATIENT
Start: 2021-03-15 | End: 2021-03-15

## 2021-03-15 RX ORDER — SODIUM CHLORIDE 0.9 % (FLUSH) 0.9 %
10 SYRINGE (ML) INJECTION PRN
Status: DISCONTINUED | OUTPATIENT
Start: 2021-03-15 | End: 2021-03-17 | Stop reason: HOSPADM

## 2021-03-15 RX ORDER — CLONIDINE HYDROCHLORIDE 0.1 MG/1
0.3 TABLET ORAL 3 TIMES DAILY
Status: DISCONTINUED | OUTPATIENT
Start: 2021-03-15 | End: 2021-03-17 | Stop reason: HOSPADM

## 2021-03-15 RX ORDER — ONDANSETRON 2 MG/ML
4 INJECTION INTRAMUSCULAR; INTRAVENOUS ONCE
Status: COMPLETED | OUTPATIENT
Start: 2021-03-15 | End: 2021-03-15

## 2021-03-15 RX ORDER — ONDANSETRON 2 MG/ML
4 INJECTION INTRAMUSCULAR; INTRAVENOUS EVERY 6 HOURS PRN
Status: DISCONTINUED | OUTPATIENT
Start: 2021-03-15 | End: 2021-03-17 | Stop reason: HOSPADM

## 2021-03-15 RX ORDER — HYDRALAZINE HYDROCHLORIDE 20 MG/ML
10 INJECTION INTRAMUSCULAR; INTRAVENOUS EVERY 6 HOURS PRN
Status: DISCONTINUED | OUTPATIENT
Start: 2021-03-15 | End: 2021-03-16

## 2021-03-15 RX ORDER — NIFEDIPINE 60 MG/1
60 TABLET, EXTENDED RELEASE ORAL DAILY
Status: DISCONTINUED | OUTPATIENT
Start: 2021-03-15 | End: 2021-03-17 | Stop reason: HOSPADM

## 2021-03-15 RX ORDER — PROMETHAZINE HYDROCHLORIDE 25 MG/1
12.5 TABLET ORAL EVERY 6 HOURS PRN
Status: DISCONTINUED | OUTPATIENT
Start: 2021-03-15 | End: 2021-03-17 | Stop reason: HOSPADM

## 2021-03-15 RX ORDER — CLONIDINE HYDROCHLORIDE 0.1 MG/1
0.3 TABLET ORAL 3 TIMES DAILY
Status: DISCONTINUED | OUTPATIENT
Start: 2021-03-15 | End: 2021-03-15

## 2021-03-15 RX ORDER — SEVELAMER CARBONATE 800 MG/1
1600 TABLET, FILM COATED ORAL 3 TIMES DAILY
Status: DISCONTINUED | OUTPATIENT
Start: 2021-03-15 | End: 2021-03-17 | Stop reason: HOSPADM

## 2021-03-15 RX ORDER — LABETALOL 100 MG/1
200 TABLET, FILM COATED ORAL 3 TIMES DAILY
Status: DISCONTINUED | OUTPATIENT
Start: 2021-03-15 | End: 2021-03-17 | Stop reason: HOSPADM

## 2021-03-15 RX ORDER — LANOLIN ALCOHOL/MO/W.PET/CERES
3 CREAM (GRAM) TOPICAL ONCE
Status: COMPLETED | OUTPATIENT
Start: 2021-03-15 | End: 2021-03-15

## 2021-03-15 RX ORDER — HYDRALAZINE HYDROCHLORIDE 20 MG/ML
10 INJECTION INTRAMUSCULAR; INTRAVENOUS ONCE
Status: COMPLETED | OUTPATIENT
Start: 2021-03-15 | End: 2021-03-15

## 2021-03-15 RX ADMIN — LABETALOL HYDROCHLORIDE 200 MG: 100 TABLET, FILM COATED ORAL at 20:25

## 2021-03-15 RX ADMIN — NIFEDIPINE 60 MG: 60 TABLET, FILM COATED, EXTENDED RELEASE ORAL at 20:25

## 2021-03-15 RX ADMIN — PANTOPRAZOLE SODIUM 40 MG: 40 TABLET, DELAYED RELEASE ORAL at 20:24

## 2021-03-15 RX ADMIN — CEFTRIAXONE SODIUM 1000 MG: 1 INJECTION, POWDER, FOR SOLUTION INTRAMUSCULAR; INTRAVENOUS at 05:48

## 2021-03-15 RX ADMIN — ONDANSETRON 4 MG: 2 INJECTION INTRAMUSCULAR; INTRAVENOUS at 13:23

## 2021-03-15 RX ADMIN — DIPHENHYDRAMINE HYDROCHLORIDE 25 MG: 50 INJECTION, SOLUTION INTRAMUSCULAR; INTRAVENOUS at 05:48

## 2021-03-15 RX ADMIN — LOSARTAN POTASSIUM 100 MG: 25 TABLET, FILM COATED ORAL at 21:45

## 2021-03-15 RX ADMIN — ASPIRIN 324 MG: 81 TABLET, CHEWABLE ORAL at 05:47

## 2021-03-15 RX ADMIN — ONDANSETRON 4 MG: 2 INJECTION INTRAMUSCULAR; INTRAVENOUS at 05:48

## 2021-03-15 RX ADMIN — HYDRALAZINE HYDROCHLORIDE 10 MG: 20 INJECTION INTRAMUSCULAR; INTRAVENOUS at 15:32

## 2021-03-15 RX ADMIN — SEVELAMER CARBONATE 1600 MG: 800 TABLET, FILM COATED ORAL at 20:25

## 2021-03-15 RX ADMIN — SODIUM CHLORIDE, PRESERVATIVE FREE 10 ML: 5 INJECTION INTRAVENOUS at 20:25

## 2021-03-15 RX ADMIN — HYDRALAZINE HYDROCHLORIDE 50 MG: 25 TABLET ORAL at 20:25

## 2021-03-15 RX ADMIN — CLONIDINE HYDROCHLORIDE 0.3 MG: 0.1 TABLET ORAL at 20:24

## 2021-03-15 RX ADMIN — Medication 3 MG: at 21:07

## 2021-03-15 RX ADMIN — DOXYCYCLINE 100 MG: 100 INJECTION, POWDER, LYOPHILIZED, FOR SOLUTION INTRAVENOUS at 06:09

## 2021-03-15 RX ADMIN — CLONIDINE HYDROCHLORIDE 0.3 MG: 0.1 TABLET ORAL at 17:37

## 2021-03-15 RX ADMIN — HYDRALAZINE HYDROCHLORIDE 10 MG: 20 INJECTION INTRAMUSCULAR; INTRAVENOUS at 05:48

## 2021-03-15 ASSESSMENT — ENCOUNTER SYMPTOMS
SORE THROAT: 0
EYE PAIN: 0
DIARRHEA: 1
SINUS PRESSURE: 0
COUGH: 0
RHINORRHEA: 1
SHORTNESS OF BREATH: 0
ABDOMINAL PAIN: 0
VOMITING: 1
CONSTIPATION: 0
BLOOD IN STOOL: 0
NAUSEA: 1
BACK PAIN: 0
ABDOMINAL DISTENTION: 0
WHEEZING: 0
EYE DISCHARGE: 0
EYE REDNESS: 0

## 2021-03-15 ASSESSMENT — PAIN SCALES - GENERAL
PAINLEVEL_OUTOF10: 0
PAINLEVEL_OUTOF10: 0

## 2021-03-15 NOTE — CONSULTS
Nephrology Consult Note  Patient's Name: Harika Garcia  3:13 PM  3/15/2021      Reason for Consult: ESRD, HD dependent  Requesting Physician:  Sofia Catalan DO    Chief Complaint: Chest pain  History Obtained From:  patient and EHR    History of Present Ilness:    Harika Garcia is a 38 y.o. male with a history of hypertension, ESRD HD dependent MWF,HFpEF with several other medical problems. Presented to ED today with complaints of chest pain retrosternal described as intermittent. He reports some associated associate shortness of breath. Denies any nausea or diaphoresis. Does experience some exertional dyspnea while going upstairs. He subsequently presented to ED for evaluation. Peak blood pressure in the ED was 202/115 with a pulse of 90. Laboratory data showed a BUN of 53, creatinine 11.8, potassium 4.0, WBC 6.7 hemoglobin 8.3 hematocrit 26.5, platelets 469,348. Troponin was 0.02. Chest x-ray showed a possible right lower lobe opacity. Patient received doxycycline, Zofran, ceftriaxone 1 g, and hydralazine 10 mg. He was also given 324 mg  of aspirin chewable. He was then taken to the hemodialysis suite for treatment to be followed by admission.     Past Medical History:   Diagnosis Date    (HFpEF) heart failure with preserved ejection fraction ([address]     stage III    Anxiety 9/19/2015    AVF (arteriovenous fistula) ([address] 4/30/2018    Chronic kidney disease     CKD since early childhood per pt and on HD ~ 11 years    Depression     Encounter regarding vascular access for dialysis for ESRD ([address] 4/30/2018    Hypertension     on meds for ~ 11 years    Hypothyroidism     Intracranial hemorrhage ([address]     was d/t ruptured aneurysm - pt underwent neurosurgery for clipping of the aneurysm    Noncompliance w/medication treatment due to intermit use of medication 11/3/2015       Past Surgical History:   Procedure Laterality Date    BRAIN ANEURYSM SURGERY Right 3-4 years ago    Intracranial aneurysm clipping surgery 3-4 years ago, after rupture of aneurysm causing ICH    DIALYSIS FISTULA CREATION Left 11 years ago    UPPER GASTROINTESTINAL ENDOSCOPY N/A 1/3/2019    EGD BIOPSY performed by Mora Villarreal MD at [facility] ENDOSCOPY       Family History   Problem Relation Age of Onset    Kidney Disease Paternal Grandmother     Cancer Neg Hx     Heart Disease Neg Hx         reports that he has quit smoking. His smoking use included cigars. He has never used smokeless tobacco. He reports previous drug use. Drug: Marijuana. He reports that he does not drink alcohol. Allergies:  Tylenol [acetaminophen]    Current Medications:    doxycycline (VIBRAMYCIN) 100 mg in dextrose 5 % 100 mL IVPB, Q12H  promethazine (PHENERGAN) tablet 12.5 mg, Q6H PRN    Or  ondansetron (ZOFRAN) injection 4 mg, Q6H PRN  hydrALAZINE (APRESOLINE) injection 10 mg, Q6H PRN        Review of Systems:   Pertinent items are noted in HPI. Physical exam:  Vitals:    03/15/21 1503   BP: (!) 203/123   Pulse: 89   Resp:    Temp:    SpO2:           General: No distress. Alert. Eyes: PERRL. No sclera icterus. No conjunctival injection. ENT: No discharge. Pharynx clear. Neck: Trachea midline. Normal thyroid. Lungs: No accessory muscle use. No crackles. No wheezing. No rhonchi. CV: Regular rate. Regular rhythm. No murmur or rub. .   Abd: Non-tender. Non-distended. No masses. No organmegaly. Normal bowel sounds. Skin: Warm and dry. No nodule on exposed extremities. No rash on exposed extremities. Ext: No cyanosis, clubbing, edema ; MICHAEL AVF  Neuro: Awake. Follows commands. Positive pupils/gag/corneals. Normal pain response.       Data:   Labs:  Lab Results   Component Value Date     03/15/2021     02/17/2021     02/03/2021    K 4.0 03/15/2021    K 3.2 (L) 02/17/2021    K 4.1 02/03/2021    CL 99 03/15/2021    CO2 23 03/15/2021    CO2 32 (H) 02/17/2021    CO2 29 02/03/2021    CREATININE 11.8 (HH) 03/15/2021    CREATININE 2.9 (H) 02/17/2021    CREATININE 11.0 (HH) 02/03/2021    BUN 53 (H) 03/15/2021    BUN 9 02/17/2021    BUN 60 (H) 02/03/2021    GLUCOSE 79 03/15/2021    GLUCOSE 86 02/17/2021    GLUCOSE 105 (H) 02/03/2021    PHOS 5.5 (H) 02/03/2021    PHOS 4.5 02/02/2021    PHOS 2.6 02/01/2021    WBC 6.7 03/15/2021    WBC 3.4 (L) 02/17/2021    WBC 4.7 02/03/2021    HGB 8.3 (L) 03/15/2021    HGB 8.3 (L) 02/17/2021    HGB 8.7 (L) 02/03/2021    HCT 26.5 (L) 03/15/2021    HCT 26.1 (L) 02/17/2021    HCT 26.7 (L) 02/03/2021    MCV 96.0 03/15/2021     03/15/2021         Imaging:  Xr Chest Portable    Result Date: 3/15/2021  EXAMINATION: ONE XRAY VIEW OF THE CHEST 3/15/2021 3:39 am COMPARISON: 02/17/2021 HISTORY: ORDERING SYSTEM PROVIDED HISTORY: chest pain TECHNOLOGIST PROVIDED HISTORY: Reason for exam:->chest pain What reading provider will be dictating this exam?->CRC FINDINGS: Possible right lower lobe opacity. There is no effusion or pneumothorax. Stable cardiomegaly. The osseous structures are without acute process. Possible right lower lobe opacity. Correlate with abnormal breast sounds for pneumonia. Stable cardiomegaly. Assessment/Plans    1. Hypertensive urgency patient states he has been compliant with his antihypertensive medications  -Adjust BP meds  2. Chest pain normal cardiac enzymes, appears atypical for acute coronary syndrome  3. ESRD, HD dependent M WF  -We will continue his usual hemodialysis schedule  4.   Anemia due to CKD  -Check iron stores  -Begin PREETI once blood pressure improves        Thank you Dr. Samy Duffy DO for allowing us to participate in care of Juan Fritz MD  3:13 PM  3/15/2021     Department of Internal Medicine  Section of Nephrology  Dialysis Note           PROCEDURE:  Patient seen on hemodialysis     PHYSICIAN:  Carlota Real M.D., FACP    INDICATION:  End-stage renal disease    RX:  See dialysis flowsheet for specifics on access, blood flow

## 2021-03-15 NOTE — H&P
[facility]  Internal [facility] Residency Program  History and Physical    Patient:  Kevin Muhammad 38 y.o. male MRN: [id number]     Date of Service: 3/15/2021    Hospital Day: 1      Chief complaint: had concerns including Chest Pain (chest pressure that started this AM. due for dialysis later this AM). History of Present Illness   The patient is a 38 y.o. male with a past medical history of ESRD on HD MWF, HTN, HLD, ruptured Aneurysm, AVF, HFpEF (EF 55-60% with Grade III DD mild MR and TR)     Mr. Sahil Bhatia came from home due to chest pain that occurred last night. The pain is in the left pectoral area described as pressure-like and then sharp once breathing in. It occurred during rest, He does have baseline shortness of breath going up stairs. He did not take any medications for the pain. He denies, fever, chills, exposure to any sick contacts. He does not have any cough but occasionally he has yellowing sputum. He also had to increase his pillows during sleep lately. He was for HD today however due to the persistence of the pain, he instead proceeded to the ED. At the ED, BP was noted to be elevated 195/114. Notable lab work includes Creatinine of 11.8, Hgb of 8.3 (baseline) Pro BNP is 70,000 although it has always been this elevated, Troponin 0.02.      Past Medical History:      Diagnosis Date    (HFpEF) heart failure with preserved ejection fraction ([address]     stage III    Anxiety 9/19/2015    AVF (arteriovenous fistula) ([address] 4/30/2018    Chronic kidney disease     CKD since early childhood per pt and on HD ~ 11 years    Depression     Encounter regarding vascular access for dialysis for ESRD ([address] 4/30/2018    Hypertension     on meds for ~ 11 years    Hypothyroidism     Intracranial hemorrhage ([address]     was d/t ruptured aneurysm - pt underwent neurosurgery for clipping of the aneurysm    Noncompliance w/medication treatment due to intermit use of medication 11/3/2015       Past Surgical History:        Procedure Laterality Date    BRAIN ANEURYSM SURGERY Right 3-4 years ago    Intracranial aneurysm clipping surgery 3-4 years ago, after rupture of aneurysm causing ICH    DIALYSIS FISTULA CREATION Left 11 years ago    UPPER GASTROINTESTINAL ENDOSCOPY N/A 1/3/2019    EGD BIOPSY performed by Russ Srinivasan MD at [address] [place] [address]       Medications Prior to Admission:    Prior to Admission medications    Medication Sig Start Date End Date Taking? Authorizing Provider   labetalol (NORMODYNE) 200 MG tablet Take 1 tablet by mouth three times daily 2/4/21  Yes Candace Ou, DO   melatonin 3 MG TABS tablet Take 1 tablet by mouth nightly as needed (sleep) 2/4/21  Yes Author Child Pupillo, DO   diphenhydrAMINE (BENADRYL) 25 MG tablet Take 25 mg by mouth every 8 hours as needed for Itching   Yes Historical Provider, MD   hydrALAZINE (APRESOLINE) 50 MG tablet Take 50 mg by mouth 3 times daily   Yes Historical Provider, MD   losartan (COZAAR) 100 MG tablet Take 100 mg by mouth every evening   Yes Historical Provider, MD   pantoprazole (PROTONIX) 40 MG tablet Take 40 mg by mouth daily   Yes Historical Provider, MD   NIFEdipine (ADALAT CC) 60 MG extended release tablet TAKE 1 TABLET BY MOUTH DAILY 7/28/20  Yes Zena Singh MD   cloNIDine (CATAPRES) 0.3 MG tablet Take 0.3 mg by mouth 3 times daily    Yes Historical Provider, MD   sevelamer (RENVELA) 800 MG tablet Take 2 tablets by mouth 3 times daily    Yes Historical Provider, MD       Allergies:  Tylenol [acetaminophen]    Social History:   TOBACCO:   reports that he has quit smoking. His smoking use included cigars. He has never used smokeless tobacco.  ETOH:   reports no history of alcohol use.     Family History:       Problem Relation Age of Onset    Kidney Disease Paternal Grandmother     Cancer Neg Hx     Heart Disease Neg Hx        REVIEW OF SYSTEMS:    · Constitutional: No fever, no chills, no change in weight; good appetite  · HEENT: No blurred vision, no ear problems, no sore throat, no rhinorrhea. · Respiratory: No cough, no sputum production, no pleuritic chest pain, no shortness of breath  · Cardiology: (+) chest pain, minimal dyspnea on exertion, no paroxysmal nocturnal dyspnea, no orthopnea, no palpitation, no leg swelling. · Gastroenterology: No dysphagia, no reflux; no abdominal pain, no nausea or vomiting; no constipation or diarrhea.  No hematochezia   · Genitourinary: No dysuria, no frequency, hesitancy; no hematuria  · Musculoskeletal: (+) Elbow pain   · Neurology: no focal weakness in extremities, no slurred speech, no double vision, no tingling or numbness sensation  · Endocrinology: no temperature intolerance, no polyphagia, polydipsia or polyuria  · Hematology: no increased bleeding, no bruising, no lymphadenopathy  · Skin: no skin changes noticed by patient  · Psychology: no depressed mood, no suicidal ideation    Physical Exam   · Vitals: BP (!) 194/124   Pulse 96   Temp 98 °F (36.7 °C)   Resp 18   Ht 5' 6\" (1.676 m)   Wt 118 lb 12.8 oz (53.9 kg)   SpO2 99%   BMI 19.17 kg/m²     · General Appearance: alert and oriented to person, place and time, well developed and well- nourished, in no acute distress  · Skin: warm and dry, no rash or erythema  · Head: normocephalic and atraumatic  · Eyes: pupils equal, round, and reactive to light, extraocular eye movements intact, conjunctivae normal  · ENT: tympanic membrane, external ear and ear canal normal bilaterally, nose without deformity, nasal mucosa and turbinates normal without polyps  · Neck: supple and non-tender without mass, no thyromegaly or thyroid nodules, no cervical lymphadenopathy  · Pulmonary/Chest: clear to auscultation bilaterally- no wheezes, rales or rhonchi, normal air movement, no respiratory distress  · Cardiovascular: normal rate, regular rhythm, normal S1 and S2, no murmurs, rubs, clicks, or gallops, distal pulses intact, no carotid bruits  · Abdomen: soft, non-tender, non-distended, normal bowel sounds, no masses or organomegaly  · Extremities: no cyanosis, clubbing or edema  · Musculoskeletal: normal range of motion, no joint swelling, deformity or tenderness  · Neurologic: reflexes normal and symmetric, no cranial nerve deficit, gait, coordination and speech normal     Labs and Imaging Studies   Basic Labs  Recent Labs     03/15/21  0502      K 4.0   CL 99   CO2 23   BUN 53*   CREATININE 11.8*   GLUCOSE 79   CALCIUM 9.1       Recent Labs     03/15/21  0502   WBC 6.7   RBC 2.76*   HGB 8.3*   HCT 26.5*   MCV 96.0   MCH 30.1   MCHC 31.3*   RDW 16.0*      MPV 10.0       CBC:   Lab Results   Component Value Date    WBC 6.7 03/15/2021    RBC 2.76 03/15/2021    HGB 8.3 03/15/2021    HCT 26.5 03/15/2021    MCV 96.0 03/15/2021    RDW 16.0 03/15/2021     03/15/2021     CMP:  Lab Results   Component Value Date     03/15/2021    K 4.0 03/15/2021    K 3.2 02/17/2021    CL 99 03/15/2021    CO2 23 03/15/2021    BUN 53 03/15/2021    PROT 7.3 03/15/2021       Imaging Studies:     Xr Chest Portable    Result Date: 3/15/2021  EXAMINATION: ONE XRAY VIEW OF THE CHEST 3/15/2021 3:39 am COMPARISON: 02/17/2021 HISTORY: ORDERING SYSTEM PROVIDED HISTORY: chest pain TECHNOLOGIST PROVIDED HISTORY: Reason for exam:->chest pain What reading provider will be dictating this exam?->CRC FINDINGS: Possible right lower lobe opacity. There is no effusion or pneumothorax. Stable cardiomegaly. The osseous structures are without acute process. Possible right lower lobe opacity. Correlate with abnormal breast sounds for pneumonia. Stable cardiomegaly. Xr Chest Portable    Result Date: 2/17/2021  EXAMINATION: ONE XRAY VIEW OF THE CHEST 2/17/2021 12:24 pm COMPARISON: Chest radiograph February 1, 2021 and February 13, 2020 HISTORY: ORDERING SYSTEM PROVIDED HISTORY: chest pain. Congenital abnormality.  TECHNOLOGIST PROVIDED HISTORY: Reason for exam:->chest pain FINDINGS: Cardiac silhouette appears stable in size compared to February 1, 2021, mildly enlarged when compared to February of 2020. Trachea is midline. There is a small left-sided pleural effusion. There is prominence of the pulmonary vasculature. No pneumothorax. Small left-sided pleural effusion. Moderate pulmonary vascular congestion. EKG: Normal sinus rhythm, no ST elevation    Resident's Assessment and Plan     Demetrius Mejia is a 38 y.o. male  past medical history of ESRD on HD MWF, HTN, HLD, ruptured Aneurysm, AVF, HFpEF (EF 55-60% with Grade III DD mild MR and TR)    Atypical Chest Pain likely non-cardiac 2/2 Pleurisy  - Patient has chest pain since 3/16 in the pectoral area, non-radiating,   - Troponins 0.02 but has been baseline   - EKG not showing ST.  Elevations   - Continue to trend troponins x 2   - repeat EKG in AM     Right Pulmonary Infiltrate 2/2 to Possible Community Acquired Pneumonia vs. Fluid Overload   - Patient has no fever or cough no exposure to sick contacts   - CXR showing some right sided infiltrate   - Given Rocephin and Doxycycline in the ED   - Obtain procalcitonin, add doxycycline and Ceftriaxone if elevated   - Follow respiratory status     ESRD on HD   - Patient on HD MWF , missed HD today   - Continue Selevamer.   - Nephrology following, recommendations are appreciated     Hx of HFpEF   - with EF 55-60% with Grade III DD, mild MR and TR)   - Patient is on Losartan 200 mg and labetalol 200 mg TID   - Pro BNP >70,000, but is still baseline   - repeat Pro -BNP q 48 hours     HTN, Uncontrolled   - Patient with elevated BP,   - on labetalol 200 mg TID, Hydralazine 50 TID, Losartan 100, Nifedipine 60 mg Daily, Clonidine 0.3 TID   - Continue medications   - Hydralazine PRN     PT/OT evaluation: Not warranted at present time   DVT prophylaxis/ GI prophylaxis: Lovenox  Disposition: Admit to House team 1     Felicity Boas, MD, PGY-1   Attending physician: Dr. Carolina Rivas Ching [place]

## 2021-03-15 NOTE — ED PROVIDER NOTES
JOÃO Redd is a 38 y.o. male with a PMHx significant for hypertension, CHF, ruptured berry aneurysm, ESRD on dialysis (patient states he does not make any urine), anemia of chronic disease, anxiety and depression who presents with abrupt onset of chest pain. The patient states when he woke up this morning he was having chest pain. The patient states he regularly gets dialysis on Mondays, Wednesdays and Fridays. He states he has not missed any previous appointments. He says that he was due to come in for dialysis today and with his chest pain occurring early this morning he decided to come in to the ER first to be evaluated. He describes the chest pain as an aching sensation. He states that he feels like the pain radiates into his back. He also endorses a mild intermittent cough that is productive for yellow sputum, nausea, vomiting and intermittent diarrhea. He states that he has had several episodes of NBNB emesis today. He is unsure of any associated fevers. The patient denies recent trauma, chills, fatigue, HA, dizziness, vision changes, rhinorrhea, neck pain, palpitations, hx of MI, LE edema, SOB, cough, wheezing, abdominal pain, hematochezia, melena, generalized weakness and paresthesias. The patient is currently taking no blood thinners. Tobacco Hx:   reports that he has quit smoking. His smoking use included cigars. He has never used smokeless tobacco.    Alcohol Hx:   reports no history of alcohol use. Illicit Drug Hx:  Patient states that he smokes marijuana daily. The history is provided by the patient. Last Tetanus (if applicable): N/A    Review of Systems   Constitutional: Positive for fever (Subjective). Negative for chills, diaphoresis and fatigue. HENT: Positive for congestion and rhinorrhea. Negative for postnasal drip, sinus pressure, sneezing and sore throat. Eyes: Negative for pain, discharge and redness.    Respiratory: Negative for cough, shortness of breath and wheezing. Cardiovascular: Positive for chest pain. Negative for palpitations and leg swelling. Gastrointestinal: Positive for diarrhea, nausea and vomiting. Negative for abdominal distention, abdominal pain, blood in stool and constipation. Genitourinary: Positive for difficulty urinating. Negative for flank pain. Patient does not make any urine. Musculoskeletal: Negative for arthralgias, back pain, myalgias and neck pain. Skin: Negative for rash and wound. Neurological: Negative for dizziness, syncope, speech difficulty, weakness, light-headedness, numbness and headaches. Physical Exam  Vitals signs and nursing note reviewed. Constitutional:       General: He is awake. He is not in acute distress. Appearance: He is not diaphoretic. HENT:      Head: Normocephalic and atraumatic. Right Ear: External ear normal.      Left Ear: External ear normal.      Nose: Nose normal. No congestion or rhinorrhea. Mouth/Throat:      Mouth: Mucous membranes are moist.      Pharynx: Oropharynx is clear. No oropharyngeal exudate or posterior oropharyngeal erythema. Eyes:      General: Scleral icterus present. Right eye: No discharge. Left eye: No discharge. Extraocular Movements: Extraocular movements intact. Conjunctiva/sclera: Conjunctivae normal.   Neck:      Musculoskeletal: Normal range of motion and neck supple. No neck rigidity or muscular tenderness. Cardiovascular:      Rate and Rhythm: Normal rate and regular rhythm. Heart sounds: Murmur (4/6 OLIVE heard best at the LLSB) present. No friction rub. No gallop. Comments: Upper extremity and lower extremity distal pulses intact bilaterally +2/4  Pulmonary:      Effort: Pulmonary effort is normal. No respiratory distress. Breath sounds: Rales present. No wheezing or rhonchi. Comments: Crackles noted at the right lung base. Slightly decreased air movement noted throughout. Chest:      Chest wall: No tenderness. Abdominal:      General: Bowel sounds are normal. There is no distension. Palpations: Abdomen is soft. Tenderness: There is no abdominal tenderness. There is no guarding or rebound. Musculoskeletal: Normal range of motion. General: No tenderness or deformity. Right lower leg: No edema. Left lower leg: No edema. Lymphadenopathy:      Cervical: No cervical adenopathy. Skin:     General: Skin is warm and dry. Capillary Refill: Capillary refill takes less than 2 seconds. Findings: No erythema or rash. Neurological:      General: No focal deficit present. Mental Status: He is alert and oriented to person, place, and time. Sensory: No sensory deficit. Motor: No weakness. Coordination: Coordination normal.          ---------------------------------- PAST HISTORY ---------------------------------------------  Past Medical History:  has a past medical history of (HFpEF) heart failure with preserved ejection fraction ([facility] [address] Anxiety, AVF (arteriovenous fistula) ([facility] [address] Chronic kidney disease, Depression, Encounter regarding vascular access for dialysis for ESRD ([facility] [address] Hypertension, Hypothyroidism, Intracranial hemorrhage ([facility] [address] and Noncompliance w/medication treatment due to intermit use of medication. Past Surgical History:  has a past surgical history that includes Brain aneurysm surgery (Right, 3-4 years ago); Dialysis fistula creation (Left, 11 years ago); and Upper gastrointestinal endoscopy (N/A, 1/3/2019). Social History:  reports that he has quit smoking. His smoking use included cigars. He has never used smokeless tobacco. He reports previous drug use. Drug: Marijuana. He reports that he does not drink alcohol. Family History: family history includes Kidney Disease in his paternal grandmother.      Home Meds: Medications Prior to Admission: labetalol (NORMODYNE) 200 MG tablet, Take 1 tablet by mouth three times daily  melatonin 3 MG TABS tablet, Take 1 tablet by mouth nightly as needed (sleep)  diphenhydrAMINE (BENADRYL) 25 MG tablet, Take 25 mg by mouth every 8 hours as needed for Itching  hydrALAZINE (APRESOLINE) 50 MG tablet, Take 50 mg by mouth 3 times daily  losartan (COZAAR) 100 MG tablet, Take 100 mg by mouth every evening  pantoprazole (PROTONIX) 40 MG tablet, Take 40 mg by mouth daily  NIFEdipine (ADALAT CC) 60 MG extended release tablet, TAKE 1 TABLET BY MOUTH DAILY  cloNIDine (CATAPRES) 0.3 MG tablet, Take 0.3 mg by mouth 3 times daily   sevelamer (RENVELA) 800 MG tablet, Take 2 tablets by mouth 3 times daily   The patients home medications have been reviewed. Allergies: Tylenol [acetaminophen]    ------------------------- NURSING NOTES AND VITALS REVIEWED ---------------------------  Date / Time Roomed:  3/15/2021  4:18 AM  ED Bed Assignment:  [id number]    The nursing notes within the ED encounter and vital signs as below have been reviewed. BP (!) 196/117   Pulse 93   Temp 97.5 °F (36.4 °C)   Resp 18   Ht 5' 6\" (1.676 m)   Wt 118 lb 12.8 oz (53.9 kg)   SpO2 99%   BMI 19.17 kg/m²   -------------------------------------------------- RESULTS / INTERVENTIONS -------------------------------------------------  All laboratory and radiology tests have been reviewed by this physician.     LABS:  Results for orders placed or performed during the hospital encounter of 03/15/21   COVID-19, Rapid    Specimen: Nasopharyngeal Swab   Result Value Ref Range    SARS-CoV-2, NAAT Not Detected Not Detected   CBC auto differential   Result Value Ref Range    WBC 6.7 4.5 - 11.5 E9/L    RBC 2.76 (L) 3.80 - 5.80 E12/L    Hemoglobin 8.3 (L) 12.5 - 16.5 g/dL    Hematocrit 26.5 (L) 37.0 - 54.0 %    MCV 96.0 80.0 - 99.9 fL    MCH 30.1 26.0 - 35.0 pg    MCHC 31.3 (L) 32.0 - 34.5 %    RDW 16.0 (H) 11.5 - 15.0 fL    Platelets 332 623 - 258 E9/L    MPV 10.0 7.0 - 12.0 fL    Neutrophils % 69.1 43.0 - 80.0 %    Immature Granulocytes % 0.3 0.0 - 5.0 %    Lymphocytes % 19.6 (L) 20.0 - 42.0 %    Monocytes % 7.6 2.0 - 12.0 %    Eosinophils % 2.8 0.0 - 6.0 %    Basophils % 0.6 0.0 - 2.0 %    Neutrophils Absolute 4.63 1.80 - 7.30 E9/L    Immature Granulocytes # 0.02 E9/L    Lymphocytes Absolute 1.31 (L) 1.50 - 4.00 E9/L    Monocytes Absolute 0.51 0.10 - 0.95 E9/L    Eosinophils Absolute 0.19 0.05 - 0.50 E9/L    Basophils Absolute 0.04 0.00 - 0.20 E9/L   Comprehensive Metabolic Panel   Result Value Ref Range    Sodium 139 132 - 146 mmol/L    Potassium 4.0 3.5 - 5.0 mmol/L    Chloride 99 98 - 107 mmol/L    CO2 23 22 - 29 mmol/L    Anion Gap 17 (H) 7 - 16 mmol/L    Glucose 79 74 - 99 mg/dL    BUN 53 (H) 6 - 20 mg/dL    CREATININE 11.8 (HH) 0.7 - 1.2 mg/dL    GFR Non-African American 6 >=60 mL/min/1.73    GFR African American 6     Calcium 9.1 8.6 - 10.2 mg/dL    Total Protein 7.3 6.4 - 8.3 g/dL    Albumin 4.2 3.5 - 5.2 g/dL    Total Bilirubin 0.4 0.0 - 1.2 mg/dL    Alkaline Phosphatase 73 40 - 129 U/L    ALT 14 0 - 40 U/L    AST 20 0 - 39 U/L   Troponin   Result Value Ref Range    Troponin 0.02 0.00 - 0.03 ng/mL   Protime-INR   Result Value Ref Range    Protime 11.6 9.3 - 12.4 sec    INR 1.1    Brain Natriuretic Peptide   Result Value Ref Range    Pro-BNP >70,000 (H) 0 - 125 pg/mL   EKG 12 Lead   Result Value Ref Range    Ventricular Rate 92 BPM    Atrial Rate 92 BPM    P-R Interval 224 ms    QRS Duration 80 ms    Q-T Interval 372 ms    QTc Calculation (Bazett) 460 ms    P Axis 80 degrees    R Axis 71 degrees    T Axis 88 degrees       RADIOLOGY: Interpreted by Radiologist unless otherwise noted. XR CHEST PORTABLE   Final Result   Possible right lower lobe opacity. Correlate with abnormal breast sounds for   pneumonia. Stable cardiomegaly. XR ELBOW RIGHT (2 VIEWS)    (Results Pending)       EKG: As interpreted by this ER physician.   Rate: 91 bpm  Rhythm: Sinus  Axis: Right  ST Segments: no acute change  T-Waves: no acute change  Interpretation: NSR with LAE and voltage criteria for LVH  Comparison: changes compared to previous EKG    Oxygen Saturation Interpretation: Normal    Meds Given:  Medications   hydrALAZINE (APRESOLINE) tablet 50 mg (has no administration in time range)   labetalol (NORMODYNE) tablet 200 mg (has no administration in time range)   losartan (COZAAR) tablet 100 mg (has no administration in time range)   NIFEdipine (PROCARDIA XL) extended release tablet 60 mg (has no administration in time range)   pantoprazole (PROTONIX) tablet 40 mg (has no administration in time range)   sevelamer (RENVELA) tablet 1,600 mg (has no administration in time range)   sodium chloride flush 0.9 % injection 10 mL (has no administration in time range)   sodium chloride flush 0.9 % injection 10 mL (has no administration in time range)   heparin (porcine) injection 5,000 Units (has no administration in time range)   promethazine (PHENERGAN) tablet 12.5 mg ( Oral See Alternative 3/15/21 1323)     Or   ondansetron (ZOFRAN) injection 4 mg (4 mg Intravenous Given 3/15/21 1323)   polyethylene glycol (GLYCOLAX) packet 17 g (has no administration in time range)   hydrALAZINE (APRESOLINE) injection 10 mg (10 mg Intravenous Given 3/15/21 1532)   cloNIDine (CATAPRES) tablet 0.3 mg (0.3 mg Oral Given 3/15/21 1737)   ondansetron (ZOFRAN) injection 4 mg (4 mg Intravenous Given 3/15/21 0548)   diphenhydrAMINE (BENADRYL) injection 25 mg (25 mg Intravenous Given 3/15/21 0548)   cefTRIAXone (ROCEPHIN) 1,000 mg in sterile water 10 mL IV syringe (0 mg Intravenous Stopped 3/15/21 0704)   aspirin chewable tablet 324 mg (324 mg Oral Given 3/15/21 0547)   hydrALAZINE (APRESOLINE) injection 10 mg (10 mg Intravenous Given 3/15/21 0548)       Procedures:  No procedures performed. --------------------------------- PROGRESS NOTES / ADDITIONAL PROVIDER NOTES ---------------------------------  Consultations:  As outlined below.     ED Course:  ED Course as of Mar [address] Mar 15, 2021   1155 This patient was signed out to resident Dr. Mimi Rader. The patient's history, exam findings, work up, interventions and plan for disposition were discussed in detail with the oncoming team.     [ML]   7958 Dr. Brannon Meza agreeable with admission at this time     [CB]      ED Course User Index  [CB] Mac Cain MD  [ML] [address]: All results were discussed with the patient and I have provided specific details regarding the plan of care, diagnosis and associated prognosis. The patient tolerated the visit well and, at the time of discharge, he was without objective evidence of hemodynamic instability or an acute process (biological or psychological) requiring hospitalization and inpatient management. He was seen by myself and the assigned attending physician, Dr. Jessica Zaldivar, who agreed with my assessment and plan as laid out herein. The importance of follow-up was discussed at the end of the visit and I recommended that the patient be seen by his primary care physician in 2-3 days. The patient verbalized his understanding and agreement with the plan as presented and stated his intention to follow up. Reasons to return to the ER or seek immediate evaluation by a medical provider were discussed at length and all questions were answered. The patient was discharged home in stable condition. MDM:  Patient presented from home complaining of chest pain, cough, nausea, vomiting and diarrhea. On arrival, the patient was hypertensive with remaining vital signs within normal limits. EKG and physical exam were as documented above. Work-up was initiated to further evaluate the patient presenting complaints. No leukocytosis. COVID negative. Trop negative. ProBNP >70,000. Cr significantly elevated as expected. Chest xray indicative of a right-sided PNA and the patient was given rocephin and doxycycline. He was also given zofran, benadryl, hydralazine and ASA.  He remained stable throughout his time in the department, but given the findings of his work up, need for dialysis and ongoing hypertension, the decision was made to admit the patient for further evaluation and management. Case was discussed with IM who agreed to admit. The patient was stable at the time of his disposition. Current Discharge Medication List          Diagnosis:  1. Chest pain, unspecified type    2. Pneumonia due to organism    3. Hypertension, unspecified type    4. ESRD on dialysis [facility])        Disposition:  Patient's disposition:to be admitted  Patient's condition is stable. This patient was seen, examined and treated with Dr. Kailee Fisher. All pertinent aspects of the patient's care were discussed with the attending physician. Evert Aguirre DO  Resident  03/15/21 2267  ATTENDING PROVIDER ATTESTATION:     I have personally performed and/or participated in the history, exam, medical decision making, and procedures and agree with all pertinent clinical information. I have also reviewed and agree with the past medical, family and social history unless otherwise noted. I have discussed this patient in detail with the resident, and provided the instruction and education regarding chest pain. My findings/Plan: I was the primary provider for patient. Patient presented with chest pain started this am and intermittent radiating to back. He has had some cough but no fever. Patient is on dialysis and is due this am   There is no abdominal pain or vomiting or diarrhea. There is no weakness. Lungs rhonci noted heart noted murmur otherwise normal mildly tachycardic abdomen soft non teneder. Patient neuro intact. Labs and chest and ekg reviewed. Antibiotics given and aspirin and hydralazine given ,patient made aware of findings and plan.  IM consulted and plan will be to admit         Morgan Stone MD  03/15/21 8272       Morgan Stone MD  03/15/21 0606

## 2021-03-15 NOTE — FLOWSHEET NOTE
03/15/21 1748   Vital Signs   BP (!) 196/117   Temp 97.5 °F (36.4 °C)   Pulse 93   Post-Hemodialysis Assessment   Post-Treatment Procedures Blood returned; Access bleeding time < 10 minutes   Machine Disinfection Process Acid/Vinegar Clean;Heat Disinfect; Exterior Machine Disinfection   Rinseback Volume (ml) 300 ml   Total Liters Processed (l/min) 90.7 l/min   Dialyzer Clearance Lightly streaked   Duration of Treatment (minutes) 240 minutes   Heparin amount administered during treatment (units) 0 units   Hemodialysis Intake (ml) 300 ml   Hemodialysis Output (ml) 1240 ml   NET Removed (ml) 940 ml   Tolerated Treatment Good   Patient Response to Treatment tolerated well, blood returned, needles pulled, sites held, stasis acheived, bandaids applied, +thirll, +bruit

## 2021-03-16 ENCOUNTER — APPOINTMENT (OUTPATIENT)
Dept: GENERAL RADIOLOGY | Age: 36
DRG: 199 | End: 2021-03-16
Payer: MEDICAID

## 2021-03-16 LAB
ANION GAP SERPL CALCULATED.3IONS-SCNC: 14 MMOL/L (ref 7–16)
BUN BLDV-MCNC: 25 MG/DL (ref 6–20)
CALCIUM SERPL-MCNC: 9.1 MG/DL (ref 8.6–10.2)
CHLORIDE BLD-SCNC: 95 MMOL/L (ref 98–107)
CO2: 24 MMOL/L (ref 22–29)
CREAT SERPL-MCNC: 6.5 MG/DL (ref 0.7–1.2)
FERRITIN: 879 NG/ML
GFR AFRICAN AMERICAN: 12
GFR NON-AFRICAN AMERICAN: 12 ML/MIN/1.73
GLUCOSE BLD-MCNC: 94 MG/DL (ref 74–99)
IRON SATURATION: 24 % (ref 20–55)
IRON: 40 MCG/DL (ref 59–158)
POTASSIUM SERPL-SCNC: 4.7 MMOL/L (ref 3.5–5)
PRO-BNP: ABNORMAL PG/ML (ref 0–125)
PROCALCITONIN: 0.41 NG/ML (ref 0–0.08)
SODIUM BLD-SCNC: 133 MMOL/L (ref 132–146)
TOTAL IRON BINDING CAPACITY: 164 MCG/DL (ref 250–450)
TRANSFERRIN: 136 MG/DL (ref 200–360)
TROPONIN: 0.02 NG/ML (ref 0–0.03)

## 2021-03-16 PROCEDURE — G0378 HOSPITAL OBSERVATION PER HR: HCPCS

## 2021-03-16 PROCEDURE — 83880 ASSAY OF NATRIURETIC PEPTIDE: CPT

## 2021-03-16 PROCEDURE — 71045 X-RAY EXAM CHEST 1 VIEW: CPT

## 2021-03-16 PROCEDURE — 6370000000 HC RX 637 (ALT 250 FOR IP): Performed by: INTERNAL MEDICINE

## 2021-03-16 PROCEDURE — 84145 PROCALCITONIN (PCT): CPT

## 2021-03-16 PROCEDURE — 84466 ASSAY OF TRANSFERRIN: CPT

## 2021-03-16 PROCEDURE — 80048 BASIC METABOLIC PNL TOTAL CA: CPT

## 2021-03-16 PROCEDURE — 82728 ASSAY OF FERRITIN: CPT

## 2021-03-16 PROCEDURE — 83550 IRON BINDING TEST: CPT

## 2021-03-16 PROCEDURE — 2580000003 HC RX 258: Performed by: INTERNAL MEDICINE

## 2021-03-16 PROCEDURE — 83540 ASSAY OF IRON: CPT

## 2021-03-16 PROCEDURE — 36415 COLL VENOUS BLD VENIPUNCTURE: CPT

## 2021-03-16 PROCEDURE — 93005 ELECTROCARDIOGRAM TRACING: CPT | Performed by: INTERNAL MEDICINE

## 2021-03-16 RX ORDER — LABETALOL HYDROCHLORIDE 5 MG/ML
10 INJECTION, SOLUTION INTRAVENOUS EVERY 4 HOURS PRN
Status: DISCONTINUED | OUTPATIENT
Start: 2021-03-16 | End: 2021-03-17 | Stop reason: HOSPADM

## 2021-03-16 RX ORDER — HYDRALAZINE HYDROCHLORIDE 20 MG/ML
10 INJECTION INTRAMUSCULAR; INTRAVENOUS EVERY 4 HOURS PRN
Status: DISCONTINUED | OUTPATIENT
Start: 2021-03-16 | End: 2021-03-17 | Stop reason: HOSPADM

## 2021-03-16 RX ADMIN — CLONIDINE HYDROCHLORIDE 0.3 MG: 0.1 TABLET ORAL at 20:47

## 2021-03-16 RX ADMIN — CLONIDINE HYDROCHLORIDE 0.3 MG: 0.1 TABLET ORAL at 15:18

## 2021-03-16 RX ADMIN — LABETALOL HYDROCHLORIDE 200 MG: 100 TABLET, FILM COATED ORAL at 10:05

## 2021-03-16 RX ADMIN — HYDRALAZINE HYDROCHLORIDE 50 MG: 25 TABLET ORAL at 15:18

## 2021-03-16 RX ADMIN — SEVELAMER CARBONATE 1600 MG: 800 TABLET, FILM COATED ORAL at 10:05

## 2021-03-16 RX ADMIN — HYDRALAZINE HYDROCHLORIDE 50 MG: 25 TABLET ORAL at 10:05

## 2021-03-16 RX ADMIN — LOSARTAN POTASSIUM 100 MG: 25 TABLET, FILM COATED ORAL at 17:42

## 2021-03-16 RX ADMIN — LABETALOL HYDROCHLORIDE 200 MG: 100 TABLET, FILM COATED ORAL at 15:18

## 2021-03-16 RX ADMIN — SEVELAMER CARBONATE 1600 MG: 800 TABLET, FILM COATED ORAL at 15:18

## 2021-03-16 RX ADMIN — LABETALOL HYDROCHLORIDE 200 MG: 100 TABLET, FILM COATED ORAL at 20:47

## 2021-03-16 RX ADMIN — PANTOPRAZOLE SODIUM 40 MG: 40 TABLET, DELAYED RELEASE ORAL at 10:06

## 2021-03-16 RX ADMIN — CLONIDINE HYDROCHLORIDE 0.3 MG: 0.1 TABLET ORAL at 10:05

## 2021-03-16 RX ADMIN — NIFEDIPINE 60 MG: 60 TABLET, FILM COATED, EXTENDED RELEASE ORAL at 10:06

## 2021-03-16 RX ADMIN — HYDRALAZINE HYDROCHLORIDE 50 MG: 25 TABLET ORAL at 20:47

## 2021-03-16 RX ADMIN — SEVELAMER CARBONATE 1600 MG: 800 TABLET, FILM COATED ORAL at 20:47

## 2021-03-16 RX ADMIN — SODIUM CHLORIDE, PRESERVATIVE FREE 10 ML: 5 INJECTION INTRAVENOUS at 10:06

## 2021-03-16 RX ADMIN — SODIUM CHLORIDE, PRESERVATIVE FREE 10 ML: 5 INJECTION INTRAVENOUS at 20:49

## 2021-03-16 ASSESSMENT — PAIN SCALES - GENERAL
PAINLEVEL_OUTOF10: 0

## 2021-03-16 NOTE — PROGRESS NOTES
IM resident notified about patient refusing Heparin. Risks were explained to patient and he continued to refuse. Will continue to monitor.

## 2021-03-16 NOTE — PLAN OF CARE
Problem: Infection:  Goal: Will remain free from infection  Description: Will remain free from infection  3/15/2021 2356 by Kim Lloyd RN  Outcome: Met This Shift     Problem: Safety:  Goal: Free from accidental physical injury  Description: Free from accidental physical injury  3/15/2021 2356 by Kim Lloyd RN  Outcome: Met This Shift     Problem: Safety:  Goal: Free from intentional harm  Description: Free from intentional harm  3/15/2021 2356 by Kim Lloyd RN  Outcome: Met This Shift     Problem: Daily Care:  Goal: Daily care needs are met  Description: Daily care needs are met  3/15/2021 2356 by Kim Lloyd RN  Outcome: Met This Shift     Problem: Pain:  Goal: Patient's pain/discomfort is manageable  Description: Patient's pain/discomfort is manageable  3/15/2021 2356 by Kim Lloyd RN  Outcome: Met This Shift

## 2021-03-16 NOTE — PROGRESS NOTES
Nephrology Progress Note  Patient's Name: Rossana Dowling  11:52 AM  3/16/2021      Reason for Consult: ESRD, HD dependent  Requesting Physician:  Michael King DO    Chief Complaint: Chest pain  History Obtained From:  patient and EHR    History of Present Ilness:  Per Dr. Ellen Shanks 3/15 Note:  Rossana Dowling is a 38 y.o. male with a history of hypertension, ESRD HD dependent MWF,HFpEF with several other medical problems. Presented to ED today with complaints of chest pain retrosternal described as intermittent. He reports some associated associate shortness of breath. Denies any nausea or diaphoresis. Does experience some exertional dyspnea while going upstairs. He subsequently presented to ED for evaluation. Peak blood pressure in the ED was 202/115 with a pulse of 90. Laboratory data showed a BUN of 53, creatinine 11.8, potassium 4.0, WBC 6.7 hemoglobin 8.3 hematocrit 26.5, platelets 503,039. Troponin was 0.02. Chest x-ray showed a possible right lower lobe opacity. Patient received doxycycline, Zofran, ceftriaxone 1 g, and hydralazine 10 mg. He was also given 324 mg  of aspirin chewable. He was then taken to the hemodialysis suite for treatment to be followed by admission. Interval history:  3/16/21: Sitting up in bed. States he is feeling much better. Denies sob, chest pain.     Past Medical History:   Diagnosis Date    (HFpEF) heart failure with preserved ejection fraction ([address]     stage III    Anxiety 9/19/2015    AVF (arteriovenous fistula) ([address] 4/30/2018    Chronic kidney disease     CKD since early childhood per pt and on HD ~ 11 years    Depression     Encounter regarding vascular access for dialysis for ESRD ([address] 4/30/2018    Hypertension     on meds for ~ 11 years    Hypothyroidism     Intracranial hemorrhage ([address]     was d/t ruptured aneurysm - pt underwent neurosurgery for clipping of the aneurysm    Noncompliance w/medication treatment due to intermit use of medication 11/3/2015       Past Surgical History:   Procedure Laterality Date    BRAIN ANEURYSM SURGERY Right 3-4 years ago    Intracranial aneurysm clipping surgery 3-4 years ago, after rupture of aneurysm causing ICH    DIALYSIS FISTULA CREATION Left 11 years ago    UPPER GASTROINTESTINAL ENDOSCOPY N/A 1/3/2019    EGD BIOPSY performed by Brittany Lyles MD [facility] ENDOSCOPY       Family History   Problem Relation Age of Onset    Kidney Disease Paternal Grandmother     Cancer Neg Hx     Heart Disease Neg Hx         reports that he has quit smoking. His smoking use included cigars. He has never used smokeless tobacco. He reports previous drug use. Drug: Marijuana. He reports that he does not drink alcohol. Allergies:  Tylenol [acetaminophen]    Current Medications:    hydrALAZINE (APRESOLINE) injection 10 mg, Q4H PRN  labetalol (NORMODYNE;TRANDATE) injection 10 mg, Q4H PRN  hydrALAZINE (APRESOLINE) tablet 50 mg, TID  labetalol (NORMODYNE) tablet 200 mg, TID  losartan (COZAAR) tablet 100 mg, QPM  NIFEdipine (PROCARDIA XL) extended release tablet 60 mg, Daily  pantoprazole (PROTONIX) tablet 40 mg, Daily  sevelamer (RENVELA) tablet 1,600 mg, TID  sodium chloride flush 0.9 % injection 10 mL, 2 times per day  sodium chloride flush 0.9 % injection 10 mL, PRN  heparin (porcine) injection 5,000 Units, 3 times per day  promethazine (PHENERGAN) tablet 12.5 mg, Q6H PRN    Or  ondansetron (ZOFRAN) injection 4 mg, Q6H PRN  polyethylene glycol (GLYCOLAX) packet 17 g, Daily PRN  cloNIDine (CATAPRES) tablet 0.3 mg, TID  diphenhydrAMINE-zinc acetate (BENADRYL) cream, TID PRN        Review of Systems:   Pertinent items are noted in HPI. Physical exam:  Vitals:    03/16/21 0830   BP: (!) 142/85   Pulse: 88   Resp: 16   Temp: 96.4 °F (35.8 °C)   SpO2: 95%          General: No distress. Alert. Neck: Trachea midline. No JVD  Lungs: Clear, no adventitious sounds  CV: Regular rate. Regular rhythm. No murmur or rub.  .   Abd: Non-tender. Non-distended. + bowel sounds. Skin: Warm and dry. No rash on exposed extremities. Ext: No cyanosis, clubbing, edema ; MICHAEL AVF  Neuro: Alert, oriented. Follows commands. Data:   Labs:  Lab Results   Component Value Date     03/16/2021     03/15/2021     02/17/2021    K 4.7 03/16/2021    K 4.0 03/15/2021    K 3.2 (L) 02/17/2021    CL 95 (L) 03/16/2021    CO2 24 03/16/2021    CO2 23 03/15/2021    CO2 32 (H) 02/17/2021    CREATININE 6.5 (H) 03/16/2021    CREATININE 11.8 (HH) 03/15/2021    CREATININE 2.9 (H) 02/17/2021    BUN 25 (H) 03/16/2021    BUN 53 (H) 03/15/2021    BUN 9 02/17/2021    GLUCOSE 94 03/16/2021    GLUCOSE 79 03/15/2021    GLUCOSE 86 02/17/2021    PHOS 5.5 (H) 02/03/2021    PHOS 4.5 02/02/2021    PHOS 2.6 02/01/2021    WBC 6.7 03/15/2021    WBC 3.4 (L) 02/17/2021    WBC 4.7 02/03/2021    HGB 8.3 (L) 03/15/2021    HGB 8.3 (L) 02/17/2021    HGB 8.7 (L) 02/03/2021    HCT 26.5 (L) 03/15/2021    HCT 26.1 (L) 02/17/2021    HCT 26.7 (L) 02/03/2021    MCV 96.0 03/15/2021     03/15/2021         Imaging:  Xr Chest Portable    Result Date: 3/15/2021  EXAMINATION: ONE XRAY VIEW OF THE CHEST 3/15/2021 3:39 am COMPARISON: 02/17/2021 HISTORY: ORDERING SYSTEM PROVIDED HISTORY: chest pain TECHNOLOGIST PROVIDED HISTORY: Reason for exam:->chest pain What reading provider will be dictating this exam?->CRC FINDINGS: Possible right lower lobe opacity. There is no effusion or pneumothorax. Stable cardiomegaly. The osseous structures are without acute process. Possible right lower lobe opacity. Correlate with abnormal breast sounds for pneumonia. Stable cardiomegaly. Assessment/Plans    1. Hypertensive urgency patient states he has been compliant with his antihypertensive medications  -Adjust BP meds  -BP improved - at/near target <140/90    2. Chest pain normal cardiac enzymes, appears atypical for acute coronary syndrome    3.   ESRD, HD dependent M   -We will

## 2021-03-16 NOTE — PROGRESS NOTES
[address] [facility]  Internal Medicine Residency / [address]    Attending Physician Statement  I have discussed the case, including pertinent history and exam findings with the resident and the team.  I have seen and examined the patient and the key elements of the encounter have been performed by me. I agree with the assessment, plan and orders as documented by the resident. A&O  Pulmonary infiltrate clearing after dialysis  No clinical evidence for pneumonia  Probable localized pulmonary edema clearing  Plan; Review meds for discharge  Discharge planning    Remainder of medical problems as per resident note.       Ana María BOLIVARCP([facility])  Internal Medicine Residency Faculty

## 2021-03-16 NOTE — PLAN OF CARE
Problem: Infection:  Goal: Will remain free from infection  Description: Will remain free from infection  Outcome: Met This Shift     Problem: Safety:  Goal: Free from accidental physical injury  Description: Free from accidental physical injury  Outcome: Met This Shift     Problem: Safety:  Goal: Free from intentional harm  Description: Free from intentional harm  Outcome: Met This Shift     Problem: Daily Care:  Goal: Daily care needs are met  Description: Daily care needs are met  Outcome: Met This Shift     Problem: Pain:  Goal: Patient's pain/discomfort is manageable  Description: Patient's pain/discomfort is manageable  Outcome: Met This Shift

## 2021-03-16 NOTE — PROGRESS NOTES
[facility]  Internal Medicine Residency Program  Progress Note - House Team 1    Patient:  Rossana Dowling 38 y.o. male MRN: [id number]     Date of Service: 3/16/2021     CC: Chest Pain   Overnight events: None      Subjective     Patient was seen and examined this morning. He is currently comfortable, not complaining of any chest pain. He did have hemodialysis last night and says that his chest pain improved after. He does not have shortness of breath. Chest x-ray is clearing today. Objective     Physical Exam:  · Vitals: BP (!) 142/85   Pulse 88   Temp 96.4 °F (35.8 °C) (Temporal)   Resp 16   Ht 5' 6\" (1.676 m)   Wt 118 lb 12.8 oz (53.9 kg)   SpO2 95%   BMI 19.17 kg/m²     · I & O - 24hr: No intake/output data recorded.    § General Appearance: alert and oriented to person, place and time, well developed and well- nourished, in no acute distress  § Skin: warm and dry, no rash or erythema  § Head: normocephalic and atraumatic  § Eyes: pupils equal, round, and reactive to light, extraocular eye movements intact, conjunctivae normal  § ENT: tympanic membrane, external ear and ear canal normal bilaterally, nose without deformity, nasal mucosa and turbinates normal without polyps  § Neck: supple and non-tender without mass, no thyromegaly or thyroid nodules, no cervical lymphadenopathy  § Pulmonary/Chest: clear to auscultation bilaterally- no wheezes, rales or rhonchi, normal air movement, no respiratory distress  § Cardiovascular: normal rate, regular rhythm, normal S1 and S2, (+) Murmur left 5th intercostal. rubs, clicks, or gallops, distal pulses intact, no carotid bruits  § Abdomen: soft, non-tender, non-distended, normal bowel sounds, no masses or organomegaly  § Extremities: no cyanosis, clubbing or edema  § Musculoskeletal: normal range of motion, no joint swelling, deformity or tenderness  § Neurologic: reflexes normal and symmetric, no cranial nerve deficit, gait, coordination and speech normal   Subject  Pertinent Labs & Imaging Studies   denice  CBC:   Lab Results   Component Value Date    WBC 6.7 03/15/2021    RBC 2.76 03/15/2021    HGB 8.3 03/15/2021    HCT 26.5 03/15/2021    MCV 96.0 03/15/2021    MCH 30.1 03/15/2021    MCHC 31.3 03/15/2021    RDW 16.0 03/15/2021     03/15/2021    MPV 10.0 03/15/2021     CMP:    Lab Results   Component Value Date     03/16/2021    K 4.7 03/16/2021    K 3.2 02/17/2021    CL 95 03/16/2021    CO2 24 03/16/2021    BUN 25 03/16/2021    CREATININE 6.5 03/16/2021    GFRAA 12 03/16/2021    LABGLOM 12 03/16/2021    GLUCOSE 94 03/16/2021    PROT 7.3 03/15/2021    LABALBU 4.2 03/15/2021    CALCIUM 9.1 03/16/2021    BILITOT 0.4 03/15/2021    ALKPHOS 73 03/15/2021    AST 20 03/15/2021    ALT 14 03/15/2021       Resident's Assessment and Plan     Rebecca Shepherd is a 38 y.o. male past medical history of ESRD on HD MWF, HTN, HLD, ruptured Aneurysm, AVF, HFpEF (EF 55-60% with Grade III DD mild MR and TR)    Atypical Chest Pain likely non-cardiac 2/2 Pleurisy  - Patient has chest pain since 3/16 in the pectoral area, non-radiating,   - Troponins 0.02 but has been baseline   - EKG not showing ST.  Elevations   - Troponins continue to remain stable      Right Pulmonary Infiltrate 2/2 to Fluid Overload   - Patient has no fever or cough no exposure to sick contacts   - CXR showing some right sided infiltrate - improved post hemodialysis   - Given Rocephin and Doxycycline in the ED   - Procalcitonin 0.35 -> antibiotics held for now, no clinical evidence for Pneumonia   - Follow respiratory status      ESRD on HD   - Patient on HD MWF  - last HD 3/15/2021   - Continue Selevamer.   - Nephrology following, recommendations are appreciated      Hx of HFpEF   - with EF 55-60% with Grade III DD, mild MR and TR)   - Patient is on Losartan 200 mg and labetalol 200 mg TID   - Pro BNP >70,000, but is still baseline   - repeat Pro-BNP still >70,000     HTN, Uncontrolled - improved - Patient with elevated BP,   - on labetalol 200 mg TID, Hydralazine 50 TID, Losartan 100, Nifedipine 60 mg Daily, Clonidine 0.3 TID   - Continue medications   - Hydralazine and labetalol PRN     Chronic Normocytic Anemia 2/2 to ACD in setting of ESRD   - Patient Hgb 8.3, at baseline, no active bleeding   - Iron studies consistent with Anemia of chronic disease      PT/OT evaluation: Not warranted at present time   DVT prophylaxis/ GI prophylaxis: Lovenox  Disposition: Continue current care, for possible discharge tomorrow      Linda Laughlin MD, PGY-1   Attending physician: Dr. Roxy Moerno

## 2021-03-16 NOTE — CARE COORDINATION
Discussed transition of care with the pt. He lives alone in an apartment. He is independent. He received HD at [facility] M-W-F @ 7:20. He will return home when medically stable. His cousin can provide transportation. He works [employer]. [facility] may also be contacted (through [facility]) for transportation. [phone].  Rodrigo Back RN

## 2021-03-17 VITALS
BODY MASS INDEX: 18.46 KG/M2 | RESPIRATION RATE: 18 BRPM | HEIGHT: 66 IN | DIASTOLIC BLOOD PRESSURE: 89 MMHG | HEART RATE: 76 BPM | OXYGEN SATURATION: 96 % | WEIGHT: 114.86 LBS | TEMPERATURE: 98.5 F | SYSTOLIC BLOOD PRESSURE: 134 MMHG

## 2021-03-17 PROBLEM — N17.9 ACUTE RENAL FAILURE (ARF) (HCC): Status: ACTIVE | Noted: 2021-03-17

## 2021-03-17 LAB
ANION GAP SERPL CALCULATED.3IONS-SCNC: 13 MMOL/L (ref 7–16)
BASOPHILS ABSOLUTE: 0.02 E9/L (ref 0–0.2)
BASOPHILS RELATIVE PERCENT: 0.5 % (ref 0–2)
BUN BLDV-MCNC: 40 MG/DL (ref 6–20)
CALCIUM SERPL-MCNC: 9.2 MG/DL (ref 8.6–10.2)
CHLORIDE BLD-SCNC: 95 MMOL/L (ref 98–107)
CO2: 28 MMOL/L (ref 22–29)
CREAT SERPL-MCNC: 10 MG/DL (ref 0.7–1.2)
EOSINOPHILS ABSOLUTE: 0.14 E9/L (ref 0.05–0.5)
EOSINOPHILS RELATIVE PERCENT: 3.8 % (ref 0–6)
GFR AFRICAN AMERICAN: 7
GFR NON-AFRICAN AMERICAN: 7 ML/MIN/1.73
GLUCOSE BLD-MCNC: 122 MG/DL (ref 74–99)
HCT VFR BLD CALC: 25.6 % (ref 37–54)
HEMOGLOBIN: 8.3 G/DL (ref 12.5–16.5)
IMMATURE GRANULOCYTES #: 0 E9/L
IMMATURE GRANULOCYTES %: 0 % (ref 0–5)
LYMPHOCYTES ABSOLUTE: 0.93 E9/L (ref 1.5–4)
LYMPHOCYTES RELATIVE PERCENT: 24.9 % (ref 20–42)
MCH RBC QN AUTO: 30.4 PG (ref 26–35)
MCHC RBC AUTO-ENTMCNC: 32.4 % (ref 32–34.5)
MCV RBC AUTO: 93.8 FL (ref 80–99.9)
MONOCYTES ABSOLUTE: 0.42 E9/L (ref 0.1–0.95)
MONOCYTES RELATIVE PERCENT: 11.3 % (ref 2–12)
NEUTROPHILS ABSOLUTE: 2.22 E9/L (ref 1.8–7.3)
NEUTROPHILS RELATIVE PERCENT: 59.5 % (ref 43–80)
PDW BLD-RTO: 15.3 FL (ref 11.5–15)
PLATELET # BLD: 124 E9/L (ref 130–450)
PMV BLD AUTO: 10 FL (ref 7–12)
POTASSIUM SERPL-SCNC: 4.1 MMOL/L (ref 3.5–5)
RBC # BLD: 2.73 E12/L (ref 3.8–5.8)
SODIUM BLD-SCNC: 136 MMOL/L (ref 132–146)
WBC # BLD: 3.7 E9/L (ref 4.5–11.5)

## 2021-03-17 PROCEDURE — 85025 COMPLETE CBC W/AUTO DIFF WBC: CPT

## 2021-03-17 PROCEDURE — 36415 COLL VENOUS BLD VENIPUNCTURE: CPT

## 2021-03-17 PROCEDURE — G0378 HOSPITAL OBSERVATION PER HR: HCPCS

## 2021-03-17 PROCEDURE — 6370000000 HC RX 637 (ALT 250 FOR IP): Performed by: INTERNAL MEDICINE

## 2021-03-17 PROCEDURE — 96376 TX/PRO/DX INJ SAME DRUG ADON: CPT

## 2021-03-17 PROCEDURE — 6360000002 HC RX W HCPCS: Performed by: INTERNAL MEDICINE

## 2021-03-17 PROCEDURE — 90935 HEMODIALYSIS ONE EVALUATION: CPT

## 2021-03-17 PROCEDURE — 1200000000 HC SEMI PRIVATE

## 2021-03-17 PROCEDURE — 2580000003 HC RX 258: Performed by: INTERNAL MEDICINE

## 2021-03-17 PROCEDURE — 80048 BASIC METABOLIC PNL TOTAL CA: CPT

## 2021-03-17 PROCEDURE — 6370000000 HC RX 637 (ALT 250 FOR IP): Performed by: CLINICAL NURSE SPECIALIST

## 2021-03-17 RX ORDER — HYDRALAZINE HYDROCHLORIDE 50 MG/1
100 TABLET, FILM COATED ORAL 3 TIMES DAILY
Status: DISCONTINUED | OUTPATIENT
Start: 2021-03-17 | End: 2021-03-17 | Stop reason: HOSPADM

## 2021-03-17 RX ADMIN — HYDRALAZINE HYDROCHLORIDE 50 MG: 25 TABLET ORAL at 07:52

## 2021-03-17 RX ADMIN — NIFEDIPINE 60 MG: 60 TABLET, FILM COATED, EXTENDED RELEASE ORAL at 07:52

## 2021-03-17 RX ADMIN — LABETALOL HYDROCHLORIDE 200 MG: 100 TABLET, FILM COATED ORAL at 13:46

## 2021-03-17 RX ADMIN — ONDANSETRON 4 MG: 2 INJECTION INTRAMUSCULAR; INTRAVENOUS at 03:50

## 2021-03-17 RX ADMIN — CLONIDINE HYDROCHLORIDE 0.3 MG: 0.1 TABLET ORAL at 13:45

## 2021-03-17 RX ADMIN — HYDRALAZINE HYDROCHLORIDE 100 MG: 25 TABLET, FILM COATED ORAL at 13:46

## 2021-03-17 RX ADMIN — CLONIDINE HYDROCHLORIDE 0.3 MG: 0.1 TABLET ORAL at 07:53

## 2021-03-17 RX ADMIN — SEVELAMER CARBONATE 1600 MG: 800 TABLET, FILM COATED ORAL at 13:45

## 2021-03-17 RX ADMIN — Medication 10 ML: at 03:50

## 2021-03-17 RX ADMIN — PANTOPRAZOLE SODIUM 40 MG: 40 TABLET, DELAYED RELEASE ORAL at 07:53

## 2021-03-17 RX ADMIN — SEVELAMER CARBONATE 1600 MG: 800 TABLET, FILM COATED ORAL at 07:53

## 2021-03-17 RX ADMIN — LABETALOL HYDROCHLORIDE 200 MG: 100 TABLET, FILM COATED ORAL at 07:54

## 2021-03-17 ASSESSMENT — PAIN SCALES - GENERAL: PAINLEVEL_OUTOF10: 0

## 2021-03-17 NOTE — PROGRESS NOTES
Nephrology Progress Note  Patient's Name: Cayla Caballero  12:21 PM  3/17/2021      Reason for Consult: ESRD, HD dependent  Requesting Physician:  Vitor Gibbons DO    Chief Complaint: Chest pain  History Obtained From:  patient and EHR    History of Present Ilness:  Per Dr. Charles Dunn 3/15 Note:  Cayla Caballero is a 38 y.o. male with a history of hypertension, ESRD HD dependent MWF,HFpEF with several other medical problems. Presented to ED today with complaints of chest pain retrosternal described as intermittent. He reports some associated associate shortness of breath. Denies any nausea or diaphoresis. Does experience some exertional dyspnea while going upstairs. He subsequently presented to ED for evaluation. Peak blood pressure in the ED was 202/115 with a pulse of 90. Laboratory data showed a BUN of 53, creatinine 11.8, potassium 4.0, WBC 6.7 hemoglobin 8.3 hematocrit 26.5, platelets 776,766. Troponin was 0.02. Chest x-ray showed a possible right lower lobe opacity. Patient received doxycycline, Zofran, ceftriaxone 1 g, and hydralazine 10 mg. He was also given 324 mg  of aspirin chewable. He was then taken to the hemodialysis suite for treatment to be followed by admission. Interval history:  3/16/21: Sitting up in bed. States he is feeling much better. Denies sob, chest pain.     Past Medical History:   Diagnosis Date    (HFpEF) heart failure with preserved ejection fraction ([address]     stage III    Anxiety 9/19/2015    AVF (arteriovenous fistula) ([address] 4/30/2018    Chronic kidney disease     CKD since early childhood per pt and on HD ~ 11 years    Depression     Encounter regarding vascular access for dialysis for ESRD ([address] 4/30/2018    Hypertension     on meds for ~ 11 years    Hypothyroidism     Intracranial hemorrhage ([address]     was d/t ruptured aneurysm - pt underwent neurosurgery for clipping of the aneurysm    Noncompliance w/medication treatment due to intermit use of medication 11/3/2015       Past Surgical History:   Procedure Laterality Date    BRAIN ANEURYSM SURGERY Right 3-4 years ago    Intracranial aneurysm clipping surgery 3-4 years ago, after rupture of aneurysm causing ICH    DIALYSIS FISTULA CREATION Left 11 years ago    UPPER GASTROINTESTINAL ENDOSCOPY N/A 1/3/2019    EGD BIOPSY performed by Deb Min MD at [facility] ENDOSCOPY       Family History   Problem Relation Age of Onset    Kidney Disease Paternal Grandmother     Cancer Neg Hx     Heart Disease Neg Hx         reports that he has quit smoking. His smoking use included cigars. He has never used smokeless tobacco. He reports previous drug use. Drug: Marijuana. He reports that he does not drink alcohol. Allergies:  Tylenol [acetaminophen]    Current Medications:    hydrALAZINE (APRESOLINE) tablet 100 mg, TID  hydrALAZINE (APRESOLINE) injection 10 mg, Q4H PRN  labetalol (NORMODYNE;TRANDATE) injection 10 mg, Q4H PRN  labetalol (NORMODYNE) tablet 200 mg, TID  losartan (COZAAR) tablet 100 mg, QPM  NIFEdipine (PROCARDIA XL) extended release tablet 60 mg, Daily  pantoprazole (PROTONIX) tablet 40 mg, Daily  sevelamer (RENVELA) tablet 1,600 mg, TID  sodium chloride flush 0.9 % injection 10 mL, 2 times per day  sodium chloride flush 0.9 % injection 10 mL, PRN  heparin (porcine) injection 5,000 Units, 3 times per day  promethazine (PHENERGAN) tablet 12.5 mg, Q6H PRN    Or  ondansetron (ZOFRAN) injection 4 mg, Q6H PRN  polyethylene glycol (GLYCOLAX) packet 17 g, Daily PRN  cloNIDine (CATAPRES) tablet 0.3 mg, TID  diphenhydrAMINE-zinc acetate (BENADRYL) cream, TID PRN        Review of Systems:   Pertinent items are noted in HPI. Physical exam:  Vitals:    03/17/21 1121   BP: (!) 165/108   Pulse: 76   Resp:    Temp: 97.7 °F (36.5 °C)   SpO2:           General: No distress. Alert. Neck: Trachea midline. No JVD  Lungs: Clear, no adventitious sounds  CV: Regular rate. Regular rhythm. No murmur or rub.  .   Abd: Non-tender. Non-distended. + bowel sounds. Skin: Warm and dry. No rash on exposed extremities. Ext: No cyanosis, clubbing, edema ; MICHAEL AVF  Neuro: Alert, oriented. Follows commands. Data:   Labs:  Lab Results   Component Value Date     03/17/2021     03/16/2021     03/15/2021    K 4.1 03/17/2021    K 4.7 03/16/2021    K 4.0 03/15/2021    CL 95 (L) 03/17/2021    CO2 28 03/17/2021    CO2 24 03/16/2021    CO2 23 03/15/2021    CREATININE 10.0 (HH) 03/17/2021    CREATININE 6.5 (H) 03/16/2021    CREATININE 11.8 (HH) 03/15/2021    BUN 40 (H) 03/17/2021    BUN 25 (H) 03/16/2021    BUN 53 (H) 03/15/2021    GLUCOSE 122 (H) 03/17/2021    GLUCOSE 94 03/16/2021    GLUCOSE 79 03/15/2021    PHOS 5.5 (H) 02/03/2021    PHOS 4.5 02/02/2021    PHOS 2.6 02/01/2021    WBC 3.7 (L) 03/17/2021    WBC 6.7 03/15/2021    WBC 3.4 (L) 02/17/2021    HGB 8.3 (L) 03/17/2021    HGB 8.3 (L) 03/15/2021    HGB 8.3 (L) 02/17/2021    HCT 25.6 (L) 03/17/2021    HCT 26.5 (L) 03/15/2021    HCT 26.1 (L) 02/17/2021    MCV 93.8 03/17/2021     (L) 03/17/2021         Imaging:  Xr Chest Portable    Result Date: 3/15/2021  EXAMINATION: ONE XRAY VIEW OF THE CHEST 3/15/2021 3:39 am COMPARISON: 02/17/2021 HISTORY: ORDERING SYSTEM PROVIDED HISTORY: chest pain TECHNOLOGIST PROVIDED HISTORY: Reason for exam:->chest pain What reading provider will be dictating this exam?->CRC FINDINGS: Possible right lower lobe opacity. There is no effusion or pneumothorax. Stable cardiomegaly. The osseous structures are without acute process. Possible right lower lobe opacity. Correlate with abnormal breast sounds for pneumonia. Stable cardiomegaly. Assessment/Plans    1. Hypertensive urgency patient states he has been compliant with his antihypertensive medications  -Adjust BP meds  -BP above target <140/90  -Increase hydralazine to 100 mg TID    2. Chest pain normal cardiac enzymes, appears atypical for acute coronary syndrome    3. ESRD, HD dependent M WF  -We will continue his usual hemodialysis schedule    4. Anemia due to CKD  -3/8/21 ferritin 669, Tsat-35% -at targets  -Receives Mircera 50 mcg q 2 weeks at outpatient HD clinic  -Follow daily CBC   Add PREETI      Thank you Dr. Leni Damico DO for allowing us to participate in care of Cristóbal BonnerKENDRA CNS  12:21 PM  3/17/2021         Patient seen and examined all key components of the physical performed independently , case discussed with NP, all pertinent labs and radiologic tests personally reviewed agree with above.     Renal procedure: hemodialysis  Blanca Lesch, MD

## 2021-03-17 NOTE — PROGRESS NOTES
[address] [facility]  Internal Medicine Residency / [address]    Attending Physician Statement  I have discussed the case, including pertinent history and exam findings with the resident and the team.  I have seen and examined the patient and the key elements of the encounter have been performed by me. I agree with the assessment, plan and orders as documented by the resident. In dialysis today  \"Pneumonia\" cleared  VS stable  Meds reviewed  Plan: Probable discharge today    Remainder of medical problems as per resident note.       Alison Bustillo MD FRCP([facility])  Internal Medicine Residency Faculty

## 2021-03-17 NOTE — FLOWSHEET NOTE
03/17/21 1121   Vital Signs   BP (!) 165/108   Temp 97.7 °F (36.5 °C)   Pulse 76   Weight 114 lb 13.8 oz (52.1 kg)   Weight Method Bed scale   Percent Weight Change -4.05   Post-Hemodialysis Assessment   Post-Treatment Procedures Blood returned; Access bleeding time < 10 minutes   Machine Disinfection Process Exterior Machine Disinfection   Rinseback Volume (ml) 300 ml   Total Liters Processed (l/min) 91.1 l/min   Dialyzer Clearance Lightly streaked   Duration of Treatment (minutes) 240 minutes   Heparin amount administered during treatment (units) 0 units   Hemodialysis Intake (ml) 300 ml   Hemodialysis Output (ml) 2500 ml   NET Removed (ml) 2200 ml   Tolerated Treatment Good   Patient Response to Treatment tolerated well, blood returned, needles pulled, sites held, stasis achieved, bandaids applied, +thrill, +bruit

## 2021-03-17 NOTE — DISCHARGE SUMMARY
[address]  Department of Internal Medicine   Internal Medicine Residency Program   Discharge Summary    PCP: DO Grzegorz Pollard Team: 1    Admit Date:3/15/2021  Discharge Date: 3/17/2021    Admission Diagnosis:    1. Atypical Chest Pain likely non-cardiac 2/2 to pleurisy/HTN urgency   2. Right pulmonary infiltrate 2.2 to Possible Community Acquired Pneumonia Vs. Fluid Overload   3. ESRD on Hemodialysis MWF   4. Hypertension, uncontrolled   5. Hx of HFpEF       Discharge Diagnosis:    1. Atypical Chest Pain likely non-cardiac 2/2 HTN urgency - resolved  2. Right pulmonary infiltrate 2.2 to Possible Community Acquired Pneumonia Vs. Fluid Overload - improved  3. ESRD on Hemodialysis MWF   4. Hypertension, uncontrolled - improved   5. Hx of HFpEF - stable     Hospital Course: The patient is a 38 y.o. male with a past medical history of ESRD on HD MWF, HTN, HLD, ruptured Aneurysm, AVF, HFpEF (EF 55-60% with Grade III DD mild MR and TR)      Mr. Domingo Bishop came from home due to chest pain that occurred last night. The pain is in the left pectoral area described as pressure-like and then sharp once breathing in. It occurred during rest, He does have baseline shortness of breath going up stairs. He did not take any medications for the pain. He denies, fever, chills, exposure to any sick contacts. He does not have any cough but occasionally he has yellowing sputum. He also had to increase his pillows during sleep lately. He was for HD today however due to the persistence of the pain, he instead proceeded to the ED.     At the ED, BP was noted to be elevated 195/114. Notable lab work includes Creatinine of 11.8, Hgb of 8.3 (baseline) Pro BNP is 70,000 although it has always been this elevated, Troponin 0.02. During the course of his hospital stay, Nephrology was consulted to continue his HD. He sill experienced chest pain during HD but it improved after.  All his antihypertensives were resumed. Blood pressure improved as did his chest pain. CXR also showed improvement of his pulmonary infiltrate. He remained stable throughout his stay. He was discharged and aked to continue same medications and to follow up with the  [facility]. sAdvised to go to the ED if he has any worsening of the chest pain. Significant findings (history and exam, laboratory, radiological, pathology, other tests):  CBC:   Lab Results   Component Value Date    WBC 3.7 03/17/2021    RBC 2.73 03/17/2021    HGB 8.3 03/17/2021    HCT 25.6 03/17/2021    MCV 93.8 03/17/2021    MCH 30.4 03/17/2021    MCHC 32.4 03/17/2021    RDW 15.3 03/17/2021     03/17/2021    MPV 10.0 03/17/2021     CMP:    Lab Results   Component Value Date     03/17/2021    K 4.1 03/17/2021    K 3.2 02/17/2021    CL 95 03/17/2021    CO2 28 03/17/2021    BUN 40 03/17/2021    CREATININE 10.0 03/17/2021    GFRAA 7 03/17/2021    LABGLOM 7 03/17/2021    GLUCOSE 122 03/17/2021    PROT 7.3 03/15/2021    LABALBU 4.2 03/15/2021    CALCIUM 9.2 03/17/2021    BILITOT 0.4 03/15/2021    ALKPHOS 73 03/15/2021    AST 20 03/15/2021    ALT 14 03/15/2021        Xr Elbow Right (2 Views)    Result Date: 3/15/2021  EXAMINATION: TWO XRAY VIEWS OF THE RIGHT ELBOW 3/15/2021 9:43 pm COMPARISON: None. HISTORY: ORDERING SYSTEM PROVIDED HISTORY: Right elbow pain, hx of trauma TECHNOLOGIST PROVIDED HISTORY: Reason for exam:->Right elbow pain, hx of trauma What reading provider will be dictating this exam?->CRC FINDINGS: There is no elbow effusion. There is no acute fracture or dislocation. Alignment is normal.     No acute abnormality.      Xr Chest Portable    Result Date: 3/16/2021  EXAMINATION: ONE XRAY VIEW OF THE CHEST 3/16/2021 9:38 am COMPARISON: Comparison is dated March 15, 2021 HISTORY: ORDERING SYSTEM PROVIDED HISTORY: follow up pulmonary edema 2/2 volume overload TECHNOLOGIST PROVIDED HISTORY: Reason for exam:->follow up pulmonary edema 2/2

## 2021-03-18 ENCOUNTER — TELEPHONE (OUTPATIENT)
Dept: INTERNAL MEDICINE | Age: 36
End: 2021-03-18

## 2021-03-18 LAB
EKG ATRIAL RATE: 82 BPM
EKG P AXIS: 74 DEGREES
EKG P-R INTERVAL: 192 MS
EKG Q-T INTERVAL: 402 MS
EKG QRS DURATION: 84 MS
EKG QTC CALCULATION (BAZETT): 469 MS
EKG R AXIS: 70 DEGREES
EKG T AXIS: 90 DEGREES
EKG VENTRICULAR RATE: 82 BPM

## 2021-03-18 PROCEDURE — 93010 ELECTROCARDIOGRAM REPORT: CPT | Performed by: INTERNAL MEDICINE

## 2021-03-18 NOTE — TELEPHONE ENCOUNTER
[facility] Transitions Initial Follow Up Call    Outreach made within 2 business days of discharge: Yes    Patient: John Ye Patient : 1985   MRN: [id number]  Reason for Admission: There are no discharge diagnoses documented for the most recent discharge. Discharge Date: 3/17/21       Spoke with: Gunjan Zaman    Discharge department/facility: Home    TCM Interactive Patient Contact:  Was patient able to fill all prescriptions: No: Nothing new  Was patient instructed to bring all medications to the follow-up visit: Yes  Is patient taking all medications as directed in the discharge summary?  Yes  Does patient understand their discharge instructions: Yes  Does patient have questions or concerns that need addressed prior to 7-14 day follow up office visit: no    Scheduled appointment with PCP within 7-14 days    Follow Up  Future Appointments   Date Time Provider Jean Joshi   3/30/2021  9:30 AM Shree Albright MD [address]

## 2021-03-19 ENCOUNTER — CARE COORDINATION (OUTPATIENT)
Dept: CASE MANAGEMENT | Age: 36
End: 2021-03-19

## 2021-03-19 NOTE — CARE COORDINATION
Challenges to be reviewed by the provider   Additional needs identified to be addressed with provider No  none             Method of communication with provider : none    Discussed COVID-19 related testing which was available at this time. Test results were negative. Patient informed of results, if available? aware    Was this a readmission? Yes  Patient stated reason for admission: chest pain  Patients top risk factors for readmission: lack of knowledge about disease and medical condition    Care Transition Nurse (CTN) contacted the patient by telephone to perform post hospital discharge assessment. Verified name and  with patient as identifiers. Provided introduction to self, and explanation of the CTN role. CTN reviewed discharge instructions, medical action plan and red flags with patient who verbalized understanding. Patient given an opportunity to ask questions and does not have any further questions or concerns at this time. Were discharge instructions available to patient? Yes. Reviewed appropriate site of care based on symptoms and resources available to patient including: PCP and When to call 911. The patient agrees to contact the PCP office for questions related to their healthcare. .     Covid Risk Education    Patient has following risk factors of: heart failure and chronic kidney disease. Education provided regarding infection prevention, and signs and symptoms of COVID-19 and when to seek medical attention with patient who verbalized understanding. Discussed exposure protocols and quarantine From CDC: Are you at higher risk for severe illness?   and given an opportunity for questions and concerns. The patient agrees to contact the COVID-19 hotline [phone] or PCP office for questions related to COVID-19. For more information on steps you can take to protect yourself, see CDC's How to Protect Yourself     Discussed follow-up appointments.  If no appointment was previously scheduled, appointment scheduling offered: Yes. Is follow up appointment scheduled within 7 days of discharge? No,scheduled 3/30    Plan for follow-up call in 3-5 days based on severity of symptoms and risk factors. Plan for next call: symptom management-\  and medication management-    CTN provided contact information for future needs. Pt states he's doing fine. Denies any CP, SOB, fever, flu-like symptoms, swelling or weight gain. States he's at dialysis right now. Reports taking medications as prescribed but declines to review meds at this time as he's at dialysis. He is agreeable to future follow up calls.

## 2021-03-22 RX ORDER — PANTOPRAZOLE SODIUM 40 MG/1
TABLET, DELAYED RELEASE ORAL
Qty: 30 TABLET | Refills: 0 | OUTPATIENT
Start: 2021-03-22

## 2021-03-22 NOTE — TELEPHONE ENCOUNTER
Last Appointment:  10-2-2020  Future Appointments   Date Time Provider Jean Joshi   3/30/2021  9:30 AM Femi García MD [facility]

## 2021-03-23 ENCOUNTER — CARE COORDINATION (OUTPATIENT)
Dept: CASE MANAGEMENT | Age: 36
End: 2021-03-23

## 2021-03-23 RX ORDER — LANOLIN ALCOHOL/MO/W.PET/CERES
3 CREAM (GRAM) TOPICAL NIGHTLY PRN
Qty: 20 TABLET | Refills: 0 | Status: SHIPPED | OUTPATIENT
Start: 2021-03-23 | End: 2021-03-30 | Stop reason: SDUPTHER

## 2021-03-23 NOTE — CARE COORDINATION
[facility] Transitions Follow Up Call    3/23/2021    Patient: Bianca Cherry  Patient : 1985   MRN: <[id number]>  Reason for Admission: Atypical Chest Pain likely non-cardiac   Discharge Date: 3/17/21 RARS: Readmission Risk Score: 16         Spoke with: Called to speak with patient for update with transition of care. Phone was picked up without an answer or voice mailbox. Unable to leave message for callback will attempt again at a later time. Care Transitions Subsequent and Final Call    Subsequent and Final Calls  Care Transitions Interventions  Other Interventions:            Follow Up  Future Appointments   Date Time Provider Jean Joshi   3/30/2021  9:30 AM Aide Lindo MD [facility] LPN

## 2021-03-25 ENCOUNTER — TELEPHONE (OUTPATIENT)
Dept: INTERNAL MEDICINE | Age: 36
End: 2021-03-25

## 2021-03-25 ENCOUNTER — CARE COORDINATION (OUTPATIENT)
Dept: CASE MANAGEMENT | Age: 36
End: 2021-03-25

## 2021-03-25 NOTE — CARE COORDINATION
Needs to be reviewed by the provider   Additional needs identified to be addressed with provider No  none           Method of communication with provider : none        Care Transition Nurse (CTN) contacted the patient by telephone to follow up after admission on 3/15/21. Verified name and  with patient as identifiers. Addressed changes since last contact: symptom management-denies CP, orthopnea, SOB  Discharged needs reviewed: medications-pt stated he has all medications and taking as directed, goes to dialysis MWF        CTN reviewed discharge instructions, medical action plan and red flags with patient and discussed any barriers to care and/or understanding of plan of care after discharge. Discussed appropriate site of care based on symptoms and resources available to patient including: PCP and When to call 911. The patient agrees to contact the PCP office for questions related to their healthcare. Patients top risk factors for readmission: medical condition  Interventions to address risk factors: Obtained and reviewed discharge summary and/or continuity of care documents    Discussed follow-up appointments. If no appointment was previously scheduled, appointment scheduling offered: Pt has f/u appt on 3/30/21    Plan for follow-up call in 5-7 days based on severity of symptoms and risk factors. Plan for next call: symptom management-CP, SOB, orthopnea  CTN provided contact information for future needs. Adrián Nunez stated he is doing well, no CP/pressure, palpitations, SOB, orthopnea since discharge, goes to dialysis MWF. Stated he has all his medications at home and taking as directed, appetite is OK. Pt has HFU on 3/30/21 and is aware. No immediate needs or concerns.     Sergio Li RN BSN   Care Transitions Nurse  [phone]

## 2021-03-29 NOTE — PROGRESS NOTES
Post-Discharge Transitional Care Management Services      Elizabeth Gabriel   YOB: 1985    Date of Visit:  3/30/2021  30 Day Post-Discharge Date: 4/17/2021    Allergies   Allergen Reactions    Tylenol [Acetaminophen] Itching     Outpatient Medications Marked as Taking for the 3/30/21 encounter (Office Visit) with Chrissie Hdz MD   Medication Sig Dispense Refill    melatonin 3 MG TABS tablet TAKE 1 TABLET BY MOUTH NIGHTLY AS NEEDED (SLEEP) 20 tablet 0    labetalol (NORMODYNE) 200 MG tablet Take 1 tablet by mouth three times daily 60 tablet 2    diphenhydrAMINE (BENADRYL) 25 MG tablet Take 25 mg by mouth every 8 hours as needed for Itching      hydrALAZINE (APRESOLINE) 50 MG tablet Take 50 mg by mouth 3 times daily      losartan (COZAAR) 100 MG tablet Take 100 mg by mouth every evening      pantoprazole (PROTONIX) 40 MG tablet Take 40 mg by mouth daily      NIFEdipine (ADALAT CC) 60 MG extended release tablet TAKE 1 TABLET BY MOUTH DAILY 90 tablet 3    cloNIDine (CATAPRES) 0.3 MG tablet Take 0.3 mg by mouth 3 times daily       sevelamer (RENVELA) 800 MG tablet Take 2 tablets by mouth 3 times daily            Vitals:    03/30/21 0954   BP: 134/76   Site: Right Upper Arm   Position: Sitting   Cuff Size: Medium Adult   Pulse: 90   Resp: 20   Temp: 97.7 °F (36.5 °C)   TempSrc: Oral   SpO2: 100%   Weight: 113 lb 14.4 oz (51.7 kg)   Height: 5' 6\" (1.676 m)     Body mass index is 18.38 kg/m². Wt Readings from Last 3 Encounters:   03/30/21 113 lb 14.4 oz (51.7 kg)   03/17/21 114 lb 13.8 oz (52.1 kg)   02/17/21 101 lb (45.8 kg)     BP Readings from Last 3 Encounters:   03/30/21 134/76   03/17/21 134/89   02/17/21 (!) 160/100        Patient was admitted to [facility] from 3/15/2021  To 3/17/2021  for atypical chest pain . Inpatient course: Discharge summary reviewed- see chart.     Current status:     Atypical Chest Pain likely non-cardiac 2/2 HTN urgency - resolved  Reports no more chest pain after dc from [facility]       ESRD on Hemodialysis [facility] Nephrology Dr. Cordell Gibson  Kidney transplant eval on 5/11/2021 in     Resistant Hypertension  /76 today   On hydralazine 50 TID, labetalol 200 TID, nifedipine 60 daily  And clonidine 0.3 TID, losartan 100 daily  Med refilled      HFpEF   Echo 1/21/2021:  EF 55-60% with Grade III DD mild MR and TR  Ejection fraction is visually estimated at 55-60%. Grade III diastolic dysfunction. No regional wall motion abnormalities seen. Severe left ventricular concentric hypertrophy noted. LV mass 264 g. Global longitudinal strain abnormal -15.4%. Strain predominantly abnormal   in the basal segments with relative sparing of the apex. A similar pattern may be seen with cardiac amyloidosis. Moderately dilated right ventricle. Right ventricle global systolic function is normal.   Severe biatrial dilation. Mild-moderate mitral regurgitation. Mild tricuspid regurgitation. RVSP is 58 mmHg. Estimated PA pressure 58/15 mmHg. There is a trivial circumferential pericardial effusion noted with no   evidence of hemodynamic compromise. will refer to cardiology OP for further eval     Anemia due to CKD  On iron sucrose 100 mf 3 times weekly with HD    Hx of Intracranial hemorrhage from a ruptured aneurysm 2016  Hx of seizure       Gastritis   On protonix 40 daily     STD exposure and symptoms   Reports female partner has been diagnosed with STD, not specified which one. Had one unprotected sex 2 months ago, since than started to have itchiness around uritheral. Two days ago, started to have bloody discharge from urethral. He does not make urine due to ESRD. Will send all test including Gonnarea, chlamydia, trachomas, RPR, HIV, Hepatitis. FU with results  Educate about safe sex       Review of Systems:  A comprehensive review of systems was negative except for what was noted in the HPI.     Physical Exam:  General Appearance: alert and oriented to person, place and

## 2021-03-30 ENCOUNTER — OFFICE VISIT (OUTPATIENT)
Dept: INTERNAL MEDICINE | Age: 36
End: 2021-03-30
Payer: MEDICAID

## 2021-03-30 VITALS
HEIGHT: 66 IN | WEIGHT: 113.9 LBS | OXYGEN SATURATION: 100 % | TEMPERATURE: 97.7 F | DIASTOLIC BLOOD PRESSURE: 76 MMHG | RESPIRATION RATE: 20 BRPM | SYSTOLIC BLOOD PRESSURE: 134 MMHG | HEART RATE: 90 BPM | BODY MASS INDEX: 18.3 KG/M2

## 2021-03-30 DIAGNOSIS — Z86.79 H/O INTRACRANIAL HEMORRHAGE: Chronic | ICD-10-CM

## 2021-03-30 DIAGNOSIS — N25.81 SECONDARY HYPERPARATHYROIDISM (HCC): ICD-10-CM

## 2021-03-30 DIAGNOSIS — Z20.2 STD EXPOSURE: ICD-10-CM

## 2021-03-30 DIAGNOSIS — N18.6 ESRD ON HEMODIALYSIS (HCC): Chronic | ICD-10-CM

## 2021-03-30 DIAGNOSIS — Z99.2 ESRD ON HEMODIALYSIS (HCC): Chronic | ICD-10-CM

## 2021-03-30 DIAGNOSIS — R07.89 ATYPICAL CHEST PAIN: ICD-10-CM

## 2021-03-30 DIAGNOSIS — D63.8 ANEMIA, CHRONIC DISEASE: ICD-10-CM

## 2021-03-30 DIAGNOSIS — I60.7: Primary | ICD-10-CM

## 2021-03-30 DIAGNOSIS — I10 HYPERTENSION, UNSPECIFIED TYPE: ICD-10-CM

## 2021-03-30 PROCEDURE — 1111F DSCHRG MED/CURRENT MED MERGE: CPT | Performed by: INTERNAL MEDICINE

## 2021-03-30 PROCEDURE — 99214 OFFICE O/P EST MOD 30 MIN: CPT | Performed by: INTERNAL MEDICINE

## 2021-03-30 RX ORDER — NIFEDIPINE 60 MG/1
60 TABLET, FILM COATED, EXTENDED RELEASE ORAL DAILY
Qty: 90 TABLET | Refills: 0 | Status: ON HOLD | OUTPATIENT
Start: 2021-03-30 | End: 2021-08-06 | Stop reason: HOSPADM

## 2021-03-30 RX ORDER — CLONIDINE HYDROCHLORIDE 0.3 MG/1
0.3 TABLET ORAL 3 TIMES DAILY
Qty: 90 TABLET | Refills: 2 | Status: SHIPPED | OUTPATIENT
Start: 2021-03-30 | End: 2021-08-20 | Stop reason: SDUPTHER

## 2021-03-30 RX ORDER — HYDRALAZINE HYDROCHLORIDE 50 MG/1
50 TABLET, FILM COATED ORAL 3 TIMES DAILY
Qty: 90 TABLET | Refills: 2 | Status: ON HOLD
Start: 2021-03-30 | End: 2021-07-17 | Stop reason: HOSPADM

## 2021-03-30 RX ORDER — PANTOPRAZOLE SODIUM 40 MG/1
40 TABLET, DELAYED RELEASE ORAL DAILY
Qty: 30 TABLET | Refills: 2 | Status: SHIPPED
Start: 2021-03-30 | End: 2021-06-03 | Stop reason: SDUPTHER

## 2021-03-30 RX ORDER — LABETALOL 200 MG/1
200 TABLET, FILM COATED ORAL 3 TIMES DAILY
Qty: 90 TABLET | Refills: 2 | Status: SHIPPED
Start: 2021-03-30 | End: 2021-07-21 | Stop reason: SDUPTHER

## 2021-03-30 RX ORDER — LANOLIN ALCOHOL/MO/W.PET/CERES
3 CREAM (GRAM) TOPICAL NIGHTLY PRN
Qty: 20 TABLET | Refills: 0 | Status: SHIPPED | OUTPATIENT
Start: 2021-03-30 | End: 2021-08-20

## 2021-03-30 RX ORDER — LOSARTAN POTASSIUM 100 MG/1
100 TABLET ORAL EVERY EVENING
Qty: 30 TABLET | Refills: 2 | Status: SHIPPED | OUTPATIENT
Start: 2021-03-30 | End: 2021-08-20 | Stop reason: SDUPTHER

## 2021-03-30 ASSESSMENT — PATIENT HEALTH QUESTIONNAIRE - PHQ9
2. FEELING DOWN, DEPRESSED OR HOPELESS: 0
SUM OF ALL RESPONSES TO PHQ9 QUESTIONS 1 & 2: 0
1. LITTLE INTEREST OR PLEASURE IN DOING THINGS: 0
SUM OF ALL RESPONSES TO PHQ QUESTIONS 1-9: 0

## 2021-03-30 NOTE — PROGRESS NOTES
[facility]    Attending Physician Statement  I have discussed the case, including pertinent history and exam findings with the resident physician. I have seen and examined the patient and the key elements of the encounter have been performed by me. I agree with the assessment, plan and orders as documented by the resident. I have reviewed all pertinent PMHx, PSHx, FamHx, SocialHx, medications, and allergies and updated history as appropriate. Patient presents for hospital follow up appointment. ESRD on HD - follows with Dr. Safia Thomas. Has had ESRD since age of 21 and on dialysis since that time. He has been referred to [facility] for evaluation of possible transplant. Evaluated in hospital for chest pain. Blood pressure was noted to be markedly elevated. HFpEF:  Last Echo was 6/95/61 Diastolic dysfunction. Echo with severe LVH. EF was 65%. Current     HTN:  Ran out of clonidine for a few days. Continue current medication. STD symptoms - +itching and blood from urethra. Swab taken and sent to lab for culture. Vitals:    03/30/21 0954   BP: 134/76   Site: Right Upper Arm   Position: Sitting   Cuff Size: Medium Adult   Pulse: 90   Resp: 20   Temp: 97.7 °F (36.5 °C)   TempSrc: Oral   SpO2: 100%   Weight: 113 lb 14.4 oz (51.7 kg)   Height: 5' 6\" (1.676 m)     Genital exam reveals no discharge from penile meatus. No purulence. No lesions or vesicles. Remainder of medical problems as per resident note.     Sintia Lin MD  3/30/2021 10:40 AM

## 2021-03-30 NOTE — PROGRESS NOTES
Patient given instruction by Dr Mona Milton. 3 follow up scheduled with PCP. Printed AVS given to patient.

## 2021-03-30 NOTE — PATIENT INSTRUCTIONS
Please continue current medication  Make appointment with cardiology  Return in 3 months and call for appointment

## 2021-03-31 LAB
REASON FOR REJECTION: NORMAL
REJECTED TEST: NORMAL

## 2021-04-05 ENCOUNTER — CARE COORDINATION (OUTPATIENT)
Dept: CASE MANAGEMENT | Age: 36
End: 2021-04-05

## 2021-04-05 NOTE — CARE COORDINATION
[facility] Transitions Follow Up Call    2021    Patient: Governor Daily  Patient : 1985   MRN: [id number]  Reason for Admission: Chest pain  Discharge Date: 3/17/21 RARS: Readmission Risk Score: 17         Spoke with: Governor Daily, patient    Needs to be reviewed by the provider   Additional needs identified to be addressed with provider No  none           Method of communication with provider : none    Care Transition Nurse (CTN) contacted the patient by telephone to follow up after admission on 3/15/21. Verified name and  with patient as identifiers. Addressed changes since last contact: symptom management-Patient denies chest pain. , self management-Patient reports compliance with renal diet and medications. and medication management-Denies medication changes or questions. Discharged needs reviewed: none  Follow up appointment completed? Yes    CTN reviewed discharge instructions, medical action plan and red flags with patient and discussed any barriers to care and/or understanding of plan of care after discharge. Discussed appropriate site of care based on symptoms and resources available to patient including: PCP, Specialist and hd staff. The patient agrees to contact the PCP office for questions related to their healthcare. Patients top risk factors for readmission: medical condition    Plan for follow-up call in 10-14 days based on severity of symptoms and risk factors. Plan for next call: symptom management-cp, self management-med and hd compliance and medication management-Med changes or questions  CTN provided contact information for future needs.       Care Transitions Subsequent and Final Call    Subsequent and Final Calls  Do you have any ongoing symptoms?: No  Have your medications changed?: No  Do you have any questions related to your medications?: No  Do you currently have any active services?: Yes  Are you currently active with any services?: Outpatient/Community Services  Do you have any needs or concerns that I can assist you with?: No  Identified Barriers: None  Care Transitions Interventions  No Identified Needs  Other Interventions:         Spoke with patient for follow up care transition call. Patient denies any further needs, questions, or concerns at this time. Patient is agreeable to future follow up calls.          Follow Up  Future Appointments   Date Time Provider Jean Joshi   4/13/2021  8:45 AM Gurpreet Mancia MD [address] [place] [address] NOEL Lion

## 2021-04-06 ENCOUNTER — NURSE ONLY (OUTPATIENT)
Dept: INTERNAL MEDICINE | Age: 36
End: 2021-04-06
Payer: MEDICAID

## 2021-04-06 VITALS — TEMPERATURE: 97.2 F

## 2021-04-06 DIAGNOSIS — Z20.2 STD EXPOSURE: ICD-10-CM

## 2021-04-06 PROCEDURE — 99211 OFF/OP EST MAY X REQ PHY/QHP: CPT

## 2021-04-06 NOTE — PROGRESS NOTES
Patient returned to the office to get swabbed again. Lab sent yellow top  specimen tube due to this test. This test is a send out lab. Patient tolerated procedure well. 2 nd nurse present in the exam room. Patient will be notified with results.

## 2021-04-13 ENCOUNTER — OFFICE VISIT (OUTPATIENT)
Dept: CARDIOLOGY CLINIC | Age: 36
End: 2021-04-13
Payer: MEDICAID

## 2021-04-13 VITALS
RESPIRATION RATE: 18 BRPM | OXYGEN SATURATION: 99 % | BODY MASS INDEX: 19.03 KG/M2 | HEIGHT: 66 IN | DIASTOLIC BLOOD PRESSURE: 62 MMHG | SYSTOLIC BLOOD PRESSURE: 122 MMHG | HEART RATE: 84 BPM | WEIGHT: 118.4 LBS

## 2021-04-13 DIAGNOSIS — N18.6 STAGE 5 CHRONIC KIDNEY DISEASE ON CHRONIC DIALYSIS (HCC): ICD-10-CM

## 2021-04-13 DIAGNOSIS — I10 ESSENTIAL HYPERTENSION: ICD-10-CM

## 2021-04-13 DIAGNOSIS — Z91.199 NONCOMPLIANCE: ICD-10-CM

## 2021-04-13 DIAGNOSIS — I60.7: ICD-10-CM

## 2021-04-13 DIAGNOSIS — R07.89 ATYPICAL CHEST PAIN: Primary | ICD-10-CM

## 2021-04-13 DIAGNOSIS — Z99.2 STAGE 5 CHRONIC KIDNEY DISEASE ON CHRONIC DIALYSIS (HCC): ICD-10-CM

## 2021-04-13 PROCEDURE — 1036F TOBACCO NON-USER: CPT | Performed by: INTERNAL MEDICINE

## 2021-04-13 PROCEDURE — G8420 CALC BMI NORM PARAMETERS: HCPCS | Performed by: INTERNAL MEDICINE

## 2021-04-13 PROCEDURE — 93000 ELECTROCARDIOGRAM COMPLETE: CPT | Performed by: INTERNAL MEDICINE

## 2021-04-13 PROCEDURE — G8427 DOCREV CUR MEDS BY ELIG CLIN: HCPCS | Performed by: INTERNAL MEDICINE

## 2021-04-13 PROCEDURE — 1111F DSCHRG MED/CURRENT MED MERGE: CPT | Performed by: INTERNAL MEDICINE

## 2021-04-13 PROCEDURE — 99205 OFFICE O/P NEW HI 60 MIN: CPT | Performed by: INTERNAL MEDICINE

## 2021-04-13 RX ORDER — MINOXIDIL 2.5 MG/1
TABLET ORAL
Status: ON HOLD | COMMUNITY
Start: 2021-04-07 | End: 2021-07-17 | Stop reason: HOSPADM

## 2021-04-13 RX ORDER — LIDOCAINE AND PRILOCAINE 25; 25 MG/G; MG/G
CREAM TOPICAL
Status: ON HOLD | COMMUNITY
Start: 2021-03-22 | End: 2021-09-07

## 2021-04-13 NOTE — PROGRESS NOTES
OUTPATIENT CARDIOLOGY CONSULT    Name: Daphne Arroyo    Age: 38 y.o. Date of Service: 4/13/2021    Reason for Consultation: Atypical chest pain, hypertension, chronic kidney disease    Referring Physician: Vickie Fuentes MD    History of Present Illness: The patient is a 38-year-old white male with no known history of structural heart disease and a history of a previous cerebral aneurysm with prior resection as well as that of hypertension with suboptimal control in part related to compliance contributing to that of stage V chronic kidney disease. He has previously been objectively evaluated for cardiovascular standpoint with a gated vasodilator myocardial perfusion imaging study in July, 2019 demonstrating no evidence of perfusion ebonized with normal left ventricular systolic function and that of an echocardiogram in the face of hypertensive urgency in January, 2020 demonstrating evidence of a normal-sized left ventricular chamber with severe concentric left ventricular hypertrophy and normal left ventricular systolic function with an abnormal strain pattern potentially consistent with that of amyloid with associated right ventricular dilatation normal right ventricular systolic function with severe biatrial enlargement, mild to moderate mitral regurgitation, moderate pulmonary hypertension with right ventricular systolic pressures approximately 55 to 60 mmHg and a trivial pericardial effusion. He has continued to remain somewhat noncompliant with medical therapy and fluid restriction contributing to additional hospitalizations with hypertensive urgency occluding that within the past month during which he noted atypically described right precordial chest discomfort exclusively in the supine position and mildly pleuritic in nature likely in part related to volume overload as well as that of an identified pneumonic infiltrate.   He subsequently has undergone stabilization of his blood pressure and resolution of his pneumonitis with an improvement of symptomatology and no further symptoms of chest discomfort. He presently relates no additional symptoms consistent with that of anginal-like chest discomfort or other ischemic equivalents nor additional manifestations of decompensated left ventricular systolic dysfunction or volume overload with present reported compliance with dialysis and medication administration. In review of records, he has undergone no further assessment of potential amyloid disease suggested by his previous echocardiogram.      Review of Systems: The remainder of a complete multisystem review including consitutional, central nervous, respiratory, circulatory, gastrointestinal, genitourinary, endocrinologic, hematologic, musculoskeletal and psychiatric are negative.     Past Medical History:  Past Medical History:   Diagnosis Date    (HFpEF) heart failure with preserved ejection fraction ([address]     stage III    Anxiety 9/19/2015    [facility]F (arteriovenous fistula) ([facility] 4/30/2018    Chronic kidney disease     CKD since early childhood per pt and on HD ~ 11 years    Depression     Encounter regarding vascular access for dialysis for ESRD ([facility] 4/30/2018    Hypertension     on meds for ~ 11 years    Hypothyroidism     Intracranial hemorrhage ([facility]     was d/t ruptured aneurysm - pt underwent neurosurgery for clipping of the aneurysm    Noncompliance w/medication treatment due to intermit use of medication 11/3/2015       Past Surgical History:  Past Surgical History:   Procedure Laterality Date    BRAIN ANEURYSM SURGERY Right 3-4 years ago    Intracranial aneurysm clipping surgery 3-4 years ago, after rupture of aneurysm causing ICH    DIALYSIS FISTULA CREATION Left 11 years ago    UPPER GASTROINTESTINAL ENDOSCOPY N/A 1/3/2019    EGD BIOPSY performed by Sadie Crigler, MD at [facility] ENDOSCOPY       Family History:  Family History   Problem Relation Age of Onset    Kidney Disease Paternal Grandmother     Cancer Neg Hx     Heart Disease Neg Hx        Social History:  Social History     Socioeconomic History    Marital status:      Spouse name: Not on file    Number of children: 3    Years of education: Not on file    Highest education level: High school graduate   Occupational History    Not on file   Social Needs    Financial resource strain: Not hard at all   [place] insecurity     Worry: Sometimes true     Inability: Never true   [employer] needs     Medical: No     Non-medical: No   Tobacco Use    Smoking status: Never Smoker    Smokeless tobacco: Never Used    Tobacco comment: only smokes marijuana daily   Substance and Sexual Activity    Alcohol use: No     Frequency: Never     Binge frequency: Never    Drug use: Yes     Types: Marijuana     Comment: occ    Sexual activity: Yes     Partners: Female   Lifestyle    Physical activity     Days per week: 0 days     Minutes per session: 0 min    Stress: Not at all   Relationships    Social connections     Talks on phone: Once a week     Gets together: Once a week     Attends Latter day service: 1 to 4 times per year     Active member of club or organization: No     Attends meetings of clubs or organizations: Never     Relationship status:     Intimate partner violence     Fear of current or ex partner: Not on file     Emotionally abused: Not on file     Physically abused: Not on file     Forced sexual activity: Not on file   Other Topics Concern    Not on file   Social History Narrative    Not on file       Allergies: Allergies   Allergen Reactions    Levofloxacin Itching    Tylenol [Acetaminophen] Itching       Current Medications:  Current Outpatient Medications   Medication Sig Dispense Refill    minoxidil (LONITEN) 2.5 MG tablet       Methoxy PEG-Epoetin Beta (MIRCERA IJ) 75 mcg      lidocaine-prilocaine (EMLA) 2.5-2.5 % cream APPLY SMALL AMOUNT TO ACCESS SITE (AVF) 1 TO 2 HOURS BEFORE DIALYSIS.  Malik Liang WITH OCCLUSIVE DRESSING (SARAN WRAP)      Cinacalcet HCl (SENSIPAR PO) Take 30 mg by mouth      diphenhydrAMINE (SOMINEX) 25 MG tablet Take 25 mg by mouth every 6 hours as needed      melatonin 3 MG TABS tablet Take 1 tablet by mouth nightly as needed (sleep) 20 tablet 0    NIFEdipine (ADALAT CC) 60 MG extended release tablet Take 1 tablet by mouth daily 90 tablet 0    labetalol (NORMODYNE) 200 MG tablet Take 1 tablet by mouth three times daily 90 tablet 2    pantoprazole (PROTONIX) 40 MG tablet Take 1 tablet by mouth daily 30 tablet 2    losartan (COZAAR) 100 MG tablet Take 1 tablet by mouth every evening 30 tablet 2    hydrALAZINE (APRESOLINE) 50 MG tablet Take 1 tablet by mouth 3 times daily 90 tablet 2    cloNIDine (CATAPRES) 0.3 MG tablet Take 1 tablet by mouth 3 times daily 90 tablet 2    iron sucrose (VENOFER) 20 MG/ML injection Infuse 5 mLs intravenously three times a week Infuse 100 mg intravenously three times a week Given in dialysis every Monday ,Wednesday ,Friday 1 vial 2    sevelamer (RENVELA) 800 MG tablet Take 2 tablets by mouth 3 times daily        No current facility-administered medications for this visit. Physical Exam:  /62   Pulse 84   Resp 18   Ht 5' 6\" (1.676 m)   Wt 118 lb 6.4 oz (53.7 kg)   SpO2 99%   BMI 19.11 kg/m²   Wt Readings from Last 3 Encounters:   04/13/21 118 lb 6.4 oz (53.7 kg)   03/30/21 113 lb 14.4 oz (51.7 kg)   03/17/21 114 lb 13.8 oz (52.1 kg)     The patient is awake, alert and in no discomfort or distress. No gross musculoskeletal deformity or lymphadenopathy are present. No significant skin or nail changes are present. Gross examination of head, eyes, nose and throat are negative. Jugular venous pressure is normal and no carotid bruits are present. No thyromegaly is noted. Normal respiratory effort is noted with no accessory muscle usage present. Lung fields are clear to ascultation.  Cardiac examination is notable for a regular rate and rhythm with no palpable thrill. No gallop rhythm and a holosystolic murmur at the left sternal border radiating to the ventricular apex are identified. A benign abdominal examination is present with no masses or organomegaly. A normal abdominal aortic pulsation is present. Intact pulses are present throughout all extremities and no peripheral edema is present. No focal neurologic deficits are present. Laboratory Tests:  Lab Results   Component Value Date    CREATININE 10.0 (HH) 03/17/2021    BUN 40 (H) 03/17/2021     03/17/2021    K 4.1 03/17/2021    CL 95 (L) 03/17/2021    CO2 28 03/17/2021     No results found for: BNP  Lab Results   Component Value Date    WBC 3.7 03/17/2021    RBC 2.73 03/17/2021    HGB 8.3 03/17/2021    HCT 25.6 03/17/2021    MCV 93.8 03/17/2021    MCH 30.4 03/17/2021    MCHC 32.4 03/17/2021    RDW 15.3 03/17/2021     03/17/2021    MPV 10.0 03/17/2021     No results for input(s): ALKPHOS, ALT, AST, PROT, BILITOT, BILIDIR, LABALBU in the last 72 hours.   Lab Results   Component Value Date    MG 2.3 02/17/2021     Lab Results   Component Value Date    PROTIME 11.6 03/15/2021    INR 1.1 03/15/2021     Lab Results   Component Value Date    TSH 1.870 03/20/2016     No components found for: CHLPL  Lab Results   Component Value Date    TRIG 82 10/21/2015     Lab Results   Component Value Date    HDL 52 10/21/2015     Lab Results   Component Value Date    LDLCALC 89 10/21/2015       Cardiac Tests:  ECG: A resting electrocardiogram demonstrates evidence of sinus rhythm with biatrial enlargement and left ventricular hypertrophy and a voltage basis  Chest X-ray: Chest x-ray of March, 2021 reviewed at time of evaluation demonstrates evidence of borderline cardiomegaly with a resolving right lower lobe infiltrate  Last Echocardiogram: An echocardiogram of January, 2020 demonstrated evidence of a normal-sized left ventricular chamber with severe left ventricular hypertrophy and an abnormal strain pattern potentially compatible with that of amyloid. A dilated right ventricular chamber was present with normal right ventricular systolic function and biatrial enlargement. Mild to moderate mitral regurgitation was present with moderate pulmonary hypertension right ventricular systolic pressure is approximately 55 to 60 mmHg with a trivial pericardial effusion  Last stress test: A gated vasodilator myocardial perfusion imaging study of July, 2019 demonstrated no evidence of perfusion ebonized with normal left ventricular systolic function      ASSESSMENT / PLAN: On a clinical basis, the patient presents with presently resolved atypically described chest discomfort exclusively of a positional nature and potentially related to a component of volume overload at the time of his recent hospitalization in the face of an apparent respiratory infection and volume overload in the face of suboptimally controlled hypertension. He describes no further symptomatology and no symptoms suggestive of myocardial ischemia requiring further present investigation. In light of present improved control of his blood pressure, I have recommended a repeat limited echocardiogram for reassessment of left ventricular and right ventricular systolic function as well as that of his mitral valve disease. Upon review of his previous echocardiogram, findings potentially compatible with that of amyloid disease were noted, although similar findings can be present in the face of hypertension but based on the strain pattern evaluation for potential amyloid may be appropriate and will defer this to your discretion.   I will provide additional recommendations as appropriate following the completion and review of his echocardiogram.  Independent of findings, continued compliance with dialysis, fluid restriction medication administration will be essential to reducing risk of future complications in addition to that of aggressive risk factor modification of blood pressure and serum lipids and attempt to reduce risk of future atherosclerotic development. I await the findings of his echocardiogram to assess needs of further cardiovascular assessment. Follow-up office visit and echocardiographic findings. Thank you for allowing me to participate in your patient's care. Please feel free to contact me if you have any questions or concerns. Note: This report was completed utilizing a computerized voice recognition software. Every effort has been made to insure accuracy, however; inadvertent computerized transcription errors may be present. Sagrario Langford.  [facility], [facility] Cardiology    An electronic copy of this consult note was forwarded to Dr. Christo Guerrero

## 2021-04-19 ENCOUNTER — CARE COORDINATION (OUTPATIENT)
Dept: CASE MANAGEMENT | Age: 36
End: 2021-04-19

## 2021-06-03 RX ORDER — PANTOPRAZOLE SODIUM 40 MG/1
40 TABLET, DELAYED RELEASE ORAL DAILY
Qty: 30 TABLET | Refills: 0 | Status: SHIPPED
Start: 2021-06-03 | End: 2021-07-21 | Stop reason: SDUPTHER

## 2021-06-17 ENCOUNTER — TELEPHONE (OUTPATIENT)
Dept: INTERNAL MEDICINE | Age: 36
End: 2021-06-17

## 2021-06-17 NOTE — TELEPHONE ENCOUNTER
Patient called wanting test results for STD's in March 30 & 31 2021. Patient came into office to reswab on 4-6-21.  Patient would like to know what happened to the test. Please call patient [phone]

## 2021-06-17 NOTE — TELEPHONE ENCOUNTER
Patient called wanting lab results. 2 nd specimen sent was rejected by send out lab. I spoke with the lab. Specimen needs to be a urine sample . Patient is on dialysis and makes a scant amount of urine. Lab is unsure how much urine is needed for the test to be completed. Informed patient of this. Schedule patient an Urgent care visit to come to be seen by a physician. He was informed by a partner that she had to be treated for an STD. He was unsure of what the STD was. His symptoms are sweating in penile area and occasional itching  \" inside\". Patient denies bleeding or drainage from his penis.

## 2021-07-02 RX ORDER — LABETALOL 200 MG/1
200 TABLET, FILM COATED ORAL 3 TIMES DAILY
Qty: 90 TABLET | Refills: 2 | OUTPATIENT
Start: 2021-07-02

## 2021-07-02 RX ORDER — PANTOPRAZOLE SODIUM 40 MG/1
40 TABLET, DELAYED RELEASE ORAL DAILY
Qty: 30 TABLET | Refills: 0 | OUTPATIENT
Start: 2021-07-02

## 2021-07-02 NOTE — TELEPHONE ENCOUNTER
Last Appointment:  3/30/2021  Future Appointments   Date Time Provider Jean Kamarai   7/8/2021  1:30 PM Cristopher Arambula DO [facility]   7/21/2021  8:45 AM Irina Bailey MD [facility]   7/23/2021  1:00 PM [facility] ECHO RM 1 [address] [facility]   8/3/2021  9:00 AM Chandrakant Arredondo MD [facility]

## 2021-07-02 NOTE — TELEPHONE ENCOUNTER
Last Appointment:  Visit date not found  Future Appointments   Date Time Provider Jean Madelyn   7/8/2021  1:30 PM Hernan Gary DO [facility]   7/21/2021  8:45 AM Kyaw Segura MD [facility]   7/23/2021  1:00 PM [facility] ECHO RM 1 101 [facility]   8/3/2021  9:00 AM Linda Shelton MD [facility]

## 2021-07-08 PROBLEM — N17.9 ACUTE RENAL FAILURE (ARF) (HCC): Status: RESOLVED | Noted: 2021-03-17 | Resolved: 2021-07-08

## 2021-07-08 PROBLEM — Z01.818 PRE-TRANSPLANT EVALUATION FOR KIDNEY TRANSPLANT: Status: ACTIVE | Noted: 2017-04-05

## 2021-07-08 PROBLEM — N25.81 SECONDARY HYPERPARATHYROIDISM OF RENAL ORIGIN (HCC): Status: ACTIVE | Noted: 2018-12-12

## 2021-07-08 PROBLEM — Z01.818 PRE-TRANSPLANT EVALUATION FOR KIDNEY TRANSPLANT: Status: RESOLVED | Noted: 2017-04-05 | Resolved: 2021-07-08

## 2021-07-14 ENCOUNTER — HOSPITAL ENCOUNTER (INPATIENT)
Age: 36
LOS: 3 days | Discharge: HOME | DRG: 720 | End: 2021-07-17
Attending: EMERGENCY MEDICINE | Admitting: FAMILY MEDICINE
Payer: MEDICAID

## 2021-07-14 ENCOUNTER — APPOINTMENT (OUTPATIENT)
Dept: CT IMAGING | Age: 36
DRG: 720 | End: 2021-07-14
Payer: MEDICAID

## 2021-07-14 ENCOUNTER — APPOINTMENT (OUTPATIENT)
Dept: GENERAL RADIOLOGY | Age: 36
DRG: 720 | End: 2021-07-14
Payer: MEDICAID

## 2021-07-14 DIAGNOSIS — J18.9 PNEUMONIA OF RIGHT LUNG DUE TO INFECTIOUS ORGANISM, UNSPECIFIED PART OF LUNG: Primary | ICD-10-CM

## 2021-07-14 DIAGNOSIS — R50.9 FEVER IN ADULT: ICD-10-CM

## 2021-07-14 PROBLEM — I50.32 CHRONIC HEART FAILURE WITH PRESERVED EJECTION FRACTION (HCC): Status: ACTIVE | Noted: 2021-07-14

## 2021-07-14 PROBLEM — A41.9 SEPSIS DUE TO PNEUMONIA (HCC): Status: ACTIVE | Noted: 2021-03-15

## 2021-07-14 LAB
ALBUMIN SERPL-MCNC: 3.8 G/DL (ref 3.5–5.2)
ALP BLD-CCNC: 75 U/L (ref 40–129)
ALT SERPL-CCNC: 8 U/L (ref 0–40)
ANION GAP SERPL CALCULATED.3IONS-SCNC: 17 MMOL/L (ref 7–16)
AST SERPL-CCNC: 13 U/L (ref 0–39)
BASOPHILS ABSOLUTE: 0.03 E9/L (ref 0–0.2)
BASOPHILS RELATIVE PERCENT: 0.2 % (ref 0–2)
BILIRUB SERPL-MCNC: 0.6 MG/DL (ref 0–1.2)
BUN BLDV-MCNC: 53 MG/DL (ref 6–20)
CALCIUM SERPL-MCNC: 9.9 MG/DL (ref 8.6–10.2)
CHLORIDE BLD-SCNC: 92 MMOL/L (ref 98–107)
CO2: 25 MMOL/L (ref 22–29)
CREAT SERPL-MCNC: 11.1 MG/DL (ref 0.7–1.2)
EKG ATRIAL RATE: 103 BPM
EKG P AXIS: 68 DEGREES
EKG P-R INTERVAL: 172 MS
EKG Q-T INTERVAL: 338 MS
EKG QRS DURATION: 78 MS
EKG QTC CALCULATION (BAZETT): 442 MS
EKG R AXIS: 50 DEGREES
EKG T AXIS: 86 DEGREES
EKG VENTRICULAR RATE: 103 BPM
EOSINOPHILS ABSOLUTE: 0.01 E9/L (ref 0.05–0.5)
EOSINOPHILS RELATIVE PERCENT: 0.1 % (ref 0–6)
GFR AFRICAN AMERICAN: 6
GFR NON-AFRICAN AMERICAN: 6 ML/MIN/1.73
GLUCOSE BLD-MCNC: 112 MG/DL (ref 74–99)
HCT VFR BLD CALC: 26.7 % (ref 37–54)
HEMOGLOBIN: 8.9 G/DL (ref 12.5–16.5)
HYPOCHROMIA: ABNORMAL
IMMATURE GRANULOCYTES #: 0.09 E9/L
IMMATURE GRANULOCYTES %: 0.7 % (ref 0–5)
LACTIC ACID, SEPSIS: 0.5 MMOL/L (ref 0.5–1.9)
LIPASE: 28 U/L (ref 13–60)
LV EF: 63 %
LVEF MODALITY: NORMAL
LYMPHOCYTES ABSOLUTE: 1.13 E9/L (ref 1.5–4)
LYMPHOCYTES RELATIVE PERCENT: 8.5 % (ref 20–42)
MCH RBC QN AUTO: 30.3 PG (ref 26–35)
MCHC RBC AUTO-ENTMCNC: 33.3 % (ref 32–34.5)
MCV RBC AUTO: 90.8 FL (ref 80–99.9)
MONOCYTES ABSOLUTE: 1.69 E9/L (ref 0.1–0.95)
MONOCYTES RELATIVE PERCENT: 12.8 % (ref 2–12)
NEUTROPHILS ABSOLUTE: 10.29 E9/L (ref 1.8–7.3)
NEUTROPHILS RELATIVE PERCENT: 77.7 % (ref 43–80)
PDW BLD-RTO: 15 FL (ref 11.5–15)
PLATELET # BLD: 219 E9/L (ref 130–450)
PMV BLD AUTO: 9.5 FL (ref 7–12)
POTASSIUM REFLEX MAGNESIUM: 4.6 MMOL/L (ref 3.5–5)
PRO-BNP: ABNORMAL PG/ML (ref 0–125)
RBC # BLD: 2.94 E12/L (ref 3.8–5.8)
SARS-COV-2, NAAT: NOT DETECTED
SODIUM BLD-SCNC: 134 MMOL/L (ref 132–146)
TOTAL PROTEIN: 7.5 G/DL (ref 6.4–8.3)
TROPONIN, HIGH SENSITIVITY: 93 NG/L (ref 0–11)
TROPONIN, HIGH SENSITIVITY: 94 NG/L (ref 0–11)
WBC # BLD: 13.2 E9/L (ref 4.5–11.5)

## 2021-07-14 PROCEDURE — 99222 1ST HOSP IP/OBS MODERATE 55: CPT | Performed by: INTERNAL MEDICINE

## 2021-07-14 PROCEDURE — 93005 ELECTROCARDIOGRAM TRACING: CPT | Performed by: STUDENT IN AN ORGANIZED HEALTH CARE EDUCATION/TRAINING PROGRAM

## 2021-07-14 PROCEDURE — 93010 ELECTROCARDIOGRAM REPORT: CPT | Performed by: INTERNAL MEDICINE

## 2021-07-14 PROCEDURE — 71250 CT THORAX DX C-: CPT

## 2021-07-14 PROCEDURE — 83605 ASSAY OF LACTIC ACID: CPT

## 2021-07-14 PROCEDURE — 84484 ASSAY OF TROPONIN QUANT: CPT

## 2021-07-14 PROCEDURE — 83690 ASSAY OF LIPASE: CPT

## 2021-07-14 PROCEDURE — 83880 ASSAY OF NATRIURETIC PEPTIDE: CPT

## 2021-07-14 PROCEDURE — 71045 X-RAY EXAM CHEST 1 VIEW: CPT

## 2021-07-14 PROCEDURE — 96375 TX/PRO/DX INJ NEW DRUG ADDON: CPT

## 2021-07-14 PROCEDURE — 2580000003 HC RX 258: Performed by: INTERNAL MEDICINE

## 2021-07-14 PROCEDURE — 96374 THER/PROPH/DIAG INJ IV PUSH: CPT

## 2021-07-14 PROCEDURE — 5A1D70Z PERFORMANCE OF URINARY FILTRATION, INTERMITTENT, LESS THAN 6 HOURS PER DAY: ICD-10-PCS | Performed by: INTERNAL MEDICINE

## 2021-07-14 PROCEDURE — 6370000000 HC RX 637 (ALT 250 FOR IP): Performed by: STUDENT IN AN ORGANIZED HEALTH CARE EDUCATION/TRAINING PROGRAM

## 2021-07-14 PROCEDURE — 99285 EMERGENCY DEPT VISIT HI MDM: CPT

## 2021-07-14 PROCEDURE — 93306 TTE W/DOPPLER COMPLETE: CPT

## 2021-07-14 PROCEDURE — 2060000000 HC ICU INTERMEDIATE R&B

## 2021-07-14 PROCEDURE — 6370000000 HC RX 637 (ALT 250 FOR IP): Performed by: INTERNAL MEDICINE

## 2021-07-14 PROCEDURE — 36415 COLL VENOUS BLD VENIPUNCTURE: CPT

## 2021-07-14 PROCEDURE — 90935 HEMODIALYSIS ONE EVALUATION: CPT

## 2021-07-14 PROCEDURE — 6360000002 HC RX W HCPCS: Performed by: INTERNAL MEDICINE

## 2021-07-14 PROCEDURE — 87635 SARS-COV-2 COVID-19 AMP PRB: CPT

## 2021-07-14 PROCEDURE — 80053 COMPREHEN METABOLIC PANEL: CPT

## 2021-07-14 PROCEDURE — 87040 BLOOD CULTURE FOR BACTERIA: CPT

## 2021-07-14 PROCEDURE — 85025 COMPLETE CBC W/AUTO DIFF WBC: CPT

## 2021-07-14 PROCEDURE — 6360000002 HC RX W HCPCS: Performed by: STUDENT IN AN ORGANIZED HEALTH CARE EDUCATION/TRAINING PROGRAM

## 2021-07-14 RX ORDER — POLYETHYLENE GLYCOL 3350 17 G/17G
17 POWDER, FOR SOLUTION ORAL DAILY PRN
Status: DISCONTINUED | OUTPATIENT
Start: 2021-07-14 | End: 2021-07-17 | Stop reason: HOSPADM

## 2021-07-14 RX ORDER — DOXYCYCLINE HYCLATE 100 MG
100 TABLET ORAL 2 TIMES DAILY
Qty: 20 TABLET | Refills: 0 | Status: SHIPPED | OUTPATIENT
Start: 2021-07-14 | End: 2021-07-17 | Stop reason: HOSPADM

## 2021-07-14 RX ORDER — IBUPROFEN 600 MG/1
600 TABLET ORAL ONCE
Status: COMPLETED | OUTPATIENT
Start: 2021-07-14 | End: 2021-07-14

## 2021-07-14 RX ORDER — SODIUM CHLORIDE 9 MG/ML
25 INJECTION, SOLUTION INTRAVENOUS PRN
Status: DISCONTINUED | OUTPATIENT
Start: 2021-07-14 | End: 2021-07-17 | Stop reason: HOSPADM

## 2021-07-14 RX ORDER — HEPARIN SODIUM 10000 [USP'U]/ML
5000 INJECTION, SOLUTION INTRAVENOUS; SUBCUTANEOUS EVERY 8 HOURS SCHEDULED
Status: DISCONTINUED | OUTPATIENT
Start: 2021-07-14 | End: 2021-07-17 | Stop reason: HOSPADM

## 2021-07-14 RX ORDER — PANTOPRAZOLE SODIUM 40 MG/1
40 TABLET, DELAYED RELEASE ORAL DAILY
Status: DISCONTINUED | OUTPATIENT
Start: 2021-07-14 | End: 2021-07-17 | Stop reason: HOSPADM

## 2021-07-14 RX ORDER — NIFEDIPINE 60 MG/1
60 TABLET, EXTENDED RELEASE ORAL DAILY
Status: DISCONTINUED | OUTPATIENT
Start: 2021-07-14 | End: 2021-07-17 | Stop reason: HOSPADM

## 2021-07-14 RX ORDER — DIPHENHYDRAMINE HCL 25 MG
25 TABLET ORAL EVERY 6 HOURS PRN
Status: DISCONTINUED | OUTPATIENT
Start: 2021-07-14 | End: 2021-07-17 | Stop reason: HOSPADM

## 2021-07-14 RX ORDER — SEVELAMER CARBONATE 800 MG/1
1600 TABLET, FILM COATED ORAL
Status: DISCONTINUED | OUTPATIENT
Start: 2021-07-15 | End: 2021-07-17 | Stop reason: HOSPADM

## 2021-07-14 RX ORDER — CEFDINIR 300 MG/1
300 CAPSULE ORAL 2 TIMES DAILY
Qty: 20 CAPSULE | Refills: 0 | Status: SHIPPED | OUTPATIENT
Start: 2021-07-14 | End: 2021-07-17 | Stop reason: HOSPADM

## 2021-07-14 RX ORDER — ONDANSETRON 2 MG/ML
4 INJECTION INTRAMUSCULAR; INTRAVENOUS EVERY 6 HOURS PRN
Status: DISCONTINUED | OUTPATIENT
Start: 2021-07-14 | End: 2021-07-17 | Stop reason: HOSPADM

## 2021-07-14 RX ORDER — SODIUM CHLORIDE 0.9 % (FLUSH) 0.9 %
5-40 SYRINGE (ML) INJECTION EVERY 12 HOURS SCHEDULED
Status: DISCONTINUED | OUTPATIENT
Start: 2021-07-14 | End: 2021-07-17 | Stop reason: HOSPADM

## 2021-07-14 RX ORDER — LOSARTAN POTASSIUM 50 MG/1
100 TABLET ORAL EVERY EVENING
Status: DISCONTINUED | OUTPATIENT
Start: 2021-07-14 | End: 2021-07-17 | Stop reason: HOSPADM

## 2021-07-14 RX ORDER — ONDANSETRON 2 MG/ML
4 INJECTION INTRAMUSCULAR; INTRAVENOUS ONCE
Status: COMPLETED | OUTPATIENT
Start: 2021-07-14 | End: 2021-07-14

## 2021-07-14 RX ORDER — DOXYCYCLINE HYCLATE 100 MG/1
100 CAPSULE ORAL ONCE
Status: COMPLETED | OUTPATIENT
Start: 2021-07-14 | End: 2021-07-14

## 2021-07-14 RX ORDER — MINOXIDIL 2.5 MG/1
2.5 TABLET ORAL DAILY
Status: DISCONTINUED | OUTPATIENT
Start: 2021-07-15 | End: 2021-07-17 | Stop reason: HOSPADM

## 2021-07-14 RX ORDER — ACETAMINOPHEN 650 MG/1
650 SUPPOSITORY RECTAL EVERY 6 HOURS PRN
Status: DISCONTINUED | OUTPATIENT
Start: 2021-07-14 | End: 2021-07-17 | Stop reason: HOSPADM

## 2021-07-14 RX ORDER — LABETALOL 200 MG/1
200 TABLET, FILM COATED ORAL 3 TIMES DAILY
Status: DISCONTINUED | OUTPATIENT
Start: 2021-07-14 | End: 2021-07-17 | Stop reason: HOSPADM

## 2021-07-14 RX ORDER — ACETAMINOPHEN 325 MG/1
650 TABLET ORAL EVERY 6 HOURS PRN
Status: DISCONTINUED | OUTPATIENT
Start: 2021-07-14 | End: 2021-07-17 | Stop reason: HOSPADM

## 2021-07-14 RX ORDER — CEFDINIR 300 MG/1
300 CAPSULE ORAL ONCE
Status: COMPLETED | OUTPATIENT
Start: 2021-07-14 | End: 2021-07-14

## 2021-07-14 RX ORDER — ONDANSETRON 4 MG/1
4 TABLET, ORALLY DISINTEGRATING ORAL EVERY 8 HOURS PRN
Status: DISCONTINUED | OUTPATIENT
Start: 2021-07-14 | End: 2021-07-17 | Stop reason: HOSPADM

## 2021-07-14 RX ORDER — LANOLIN ALCOHOL/MO/W.PET/CERES
3 CREAM (GRAM) TOPICAL NIGHTLY PRN
Status: DISCONTINUED | OUTPATIENT
Start: 2021-07-14 | End: 2021-07-17 | Stop reason: HOSPADM

## 2021-07-14 RX ORDER — CINACALCET 30 MG/1
30 TABLET, FILM COATED ORAL DAILY
Status: DISCONTINUED | OUTPATIENT
Start: 2021-07-14 | End: 2021-07-17 | Stop reason: HOSPADM

## 2021-07-14 RX ORDER — DIPHENHYDRAMINE HYDROCHLORIDE 50 MG/ML
25 INJECTION INTRAMUSCULAR; INTRAVENOUS ONCE
Status: COMPLETED | OUTPATIENT
Start: 2021-07-14 | End: 2021-07-14

## 2021-07-14 RX ORDER — SODIUM CHLORIDE 0.9 % (FLUSH) 0.9 %
5-40 SYRINGE (ML) INJECTION PRN
Status: DISCONTINUED | OUTPATIENT
Start: 2021-07-14 | End: 2021-07-17 | Stop reason: HOSPADM

## 2021-07-14 RX ORDER — HYDRALAZINE HYDROCHLORIDE 50 MG/1
50 TABLET, FILM COATED ORAL 3 TIMES DAILY
Status: DISCONTINUED | OUTPATIENT
Start: 2021-07-14 | End: 2021-07-16

## 2021-07-14 RX ADMIN — DIPHENHYDRAMINE HCL 25 MG: 25 TABLET ORAL at 21:17

## 2021-07-14 RX ADMIN — SODIUM CHLORIDE 25 MG: 9 INJECTION, SOLUTION INTRAVENOUS at 23:14

## 2021-07-14 RX ADMIN — CEFDINIR 300 MG: 300 CAPSULE ORAL at 12:23

## 2021-07-14 RX ADMIN — CINACALCET HYDROCHLORIDE 30 MG: 30 TABLET, FILM COATED ORAL at 21:17

## 2021-07-14 RX ADMIN — HYDRALAZINE HYDROCHLORIDE 50 MG: 50 TABLET, FILM COATED ORAL at 21:17

## 2021-07-14 RX ADMIN — ONDANSETRON 4 MG: 2 INJECTION INTRAMUSCULAR; INTRAVENOUS at 09:57

## 2021-07-14 RX ADMIN — Medication 3 MG: at 21:17

## 2021-07-14 RX ADMIN — DIPHENHYDRAMINE HYDROCHLORIDE 25 MG: 50 INJECTION INTRAMUSCULAR; INTRAVENOUS at 09:57

## 2021-07-14 RX ADMIN — SODIUM CHLORIDE, PRESERVATIVE FREE 10 ML: 5 INJECTION INTRAVENOUS at 21:44

## 2021-07-14 RX ADMIN — IBUPROFEN 600 MG: 600 TABLET, FILM COATED ORAL at 11:47

## 2021-07-14 RX ADMIN — LOSARTAN POTASSIUM 100 MG: 50 TABLET, FILM COATED ORAL at 21:17

## 2021-07-14 RX ADMIN — NIFEDIPINE 60 MG: 60 TABLET, EXTENDED RELEASE ORAL at 21:17

## 2021-07-14 RX ADMIN — LABETALOL HYDROCHLORIDE 200 MG: 200 TABLET, FILM COATED ORAL at 21:17

## 2021-07-14 RX ADMIN — Medication 10 ML: at 23:14

## 2021-07-14 RX ADMIN — DOXYCYCLINE HYCLATE 100 MG: 100 CAPSULE ORAL at 12:22

## 2021-07-14 RX ADMIN — PANTOPRAZOLE SODIUM 40 MG: 40 TABLET, DELAYED RELEASE ORAL at 21:17

## 2021-07-14 RX ADMIN — CLONIDINE HYDROCHLORIDE 0.3 MG: 0.2 TABLET ORAL at 21:17

## 2021-07-14 RX ADMIN — ONDANSETRON 4 MG: 4 TABLET, ORALLY DISINTEGRATING ORAL at 20:54

## 2021-07-14 ASSESSMENT — PAIN DESCRIPTION - PAIN TYPE
TYPE: ACUTE PAIN

## 2021-07-14 ASSESSMENT — PAIN SCALES - GENERAL
PAINLEVEL_OUTOF10: 9
PAINLEVEL_OUTOF10: 0
PAINLEVEL_OUTOF10: 0
PAINLEVEL_OUTOF10: 10
PAINLEVEL_OUTOF10: 0
PAINLEVEL_OUTOF10: 3

## 2021-07-14 ASSESSMENT — ENCOUNTER SYMPTOMS
VOMITING: 0
TROUBLE SWALLOWING: 0
APNEA: 0
COUGH: 0
CONSTIPATION: 0
WHEEZING: 0
NAUSEA: 1
SORE THROAT: 0
RHINORRHEA: 0
DIARRHEA: 0
PHOTOPHOBIA: 0
BACK PAIN: 0
ABDOMINAL PAIN: 0
CHEST TIGHTNESS: 0
EYE PAIN: 0
EYE REDNESS: 0
SHORTNESS OF BREATH: 0

## 2021-07-14 ASSESSMENT — PAIN DESCRIPTION - ORIENTATION: ORIENTATION: RIGHT

## 2021-07-14 ASSESSMENT — PAIN DESCRIPTION - LOCATION
LOCATION: CHEST
LOCATION: HEAD
LOCATION: HEAD

## 2021-07-14 ASSESSMENT — PAIN DESCRIPTION - DESCRIPTORS: DESCRIPTORS: ACHING;DISCOMFORT;HEADACHE

## 2021-07-14 NOTE — ED NOTES
Report faxed Edyta Purcell at ext [phone] confirmed receipt.  When room marked clean patient can transport via cart and monitor     Mike August RN  07/14/21 [id number]

## 2021-07-14 NOTE — CARE COORDINATION
Social Work/Transition of Care:    Pt has been discharged from the ED and in need of Dialysis, Call placed to [facility] in order to arrange treatment. Electronically signed by Lux Borja on 2/29/3786 at 12:45 PM     LONA spoke with ED PCP who spoke with Dr. Felicita Harada, Pt is able to return to unit to receive treatment this afternoon. Electronically signed by GALA Fleming on 2/49/9694 at 12:53 PM     Pt is now being admitted, per pt request LONA called [facility] to update on pt admission and ensure pts keys are placed in a secure place. Pt requested his medicaid transportation form be faxed to the ED,  and given to patient, pt reports he will be able to arrange transport upon discharge.

## 2021-07-14 NOTE — ED PROVIDER NOTES
[address]      Pt Name: Brown Libman  MRN: [id number]  [place] 1985  Date of evaluation: 7/14/2021      CHIEF COMPLAINT       Chief Complaint   Patient presents with    Chest Pain     R sided chest pain intermittently dialysis dasys M-W-F    Other     Low pulse ox at dialysis this am 88% on room air        HPI  Brown Libman is a 38 y.o. male with history of end-stage renal disease on dialysis Monday Wednesday Friday who presents to the emergency department from dialysis with concerns of hypoxia and fever. Patient states he had a full dialysis session on Monday. He states that he was having some vague chest pain over the last couple of days. He states he had this chest pain for many years and it comes and goes. Described as right-sided achy pain. He went to dialysis today where apparently on pulse oximetry he was hypoxic down to 87%. EMS report that he had a temperature of 103 °F there. The patient otherwise denies any symptoms. Denies any shortness of breath. He denies any fevers, chills, cough. Did have some nausea yesterday. Denies any vomiting. Denies any abdominal pain. Except as noted above the remainder of the review of systems was reviewed and negative. Review of Systems   Constitutional: Positive for fatigue. Negative for activity change, chills, diaphoresis and fever. HENT: Negative for rhinorrhea, sore throat and trouble swallowing. Eyes: Negative for photophobia, pain and redness. Respiratory: Negative for apnea, cough, chest tightness, shortness of breath and wheezing. Cardiovascular: Positive for chest pain. Negative for palpitations and leg swelling. Gastrointestinal: Positive for nausea. Negative for abdominal pain, constipation, diarrhea and vomiting. Endocrine: Negative for polyuria. Genitourinary: Negative for difficulty urinating and dysuria.    Musculoskeletal: Negative for back pain, neck pain and neck stiffness. Skin: Negative for pallor and rash. Neurological: Negative for dizziness, syncope, weakness, light-headedness and numbness. Psychiatric/Behavioral: Negative for confusion. The patient is not nervous/anxious. Physical Exam  Vitals and nursing note reviewed. Constitutional:       General: He is not in acute distress. Appearance: He is well-developed. Comments: Awake and alert. Sitting in the gurney in no obvious distress. HENT:      Head: Normocephalic and atraumatic. Mouth/Throat:      Mouth: Mucous membranes are moist.      Pharynx: No oropharyngeal exudate. Eyes:      General: No scleral icterus. Pupils: Pupils are equal, round, and reactive to light. Cardiovascular:      Rate and Rhythm: Normal rate and regular rhythm. Heart sounds: Normal heart sounds. No murmur heard. Comments: 2+ radial and dorsal pedis pulses bilaterally  Pulmonary:      Effort: Pulmonary effort is normal. No respiratory distress. Breath sounds: Normal breath sounds. No wheezing. Abdominal:      Palpations: Abdomen is soft. Tenderness: There is no abdominal tenderness. There is no guarding or rebound. Musculoskeletal:         General: No tenderness or deformity. Normal range of motion. Cervical back: Normal range of motion and neck supple. Right lower leg: No edema. Left lower leg: No edema. Comments: Dialysis fistula left arm with good thrill. Skin:     General: Skin is warm and dry. Capillary Refill: Capillary refill takes less than 2 seconds. Findings: No rash. Neurological:      General: No focal deficit present. Mental Status: He is alert and oriented to person, place, and time. Cranial Nerves: No cranial nerve deficit. Sensory: No sensory deficit. Motor: No weakness or abnormal muscle tone.    Psychiatric:         Mood and Affect: Mood normal.         Behavior: Behavior normal.          Procedures     MDM   This is a 38-year-old male with history of end-stage renal disease on dialysis who presents to the emergency department from dialysis center with shortness of breath and fever. In the emergency department patient is awake and alert, hemodynamic stable. Initially afebrile but then developed fever in the department. Came borderline hypoxic with ambulation in the ED. CT imaging shows signs of right-sided pneumonia. There is also incidental pericardial effusion found. Nephrology consulted who will organize dialysis in the hospital.  Patient with mild leukocytosis. Covid test was negative. BNP elevated as well as creatinine consistent with his end-stage renal disease. Discussed the case with on-call hospitalist who accepts patient for further evaluation. ED Course as of Jul 14 1523 Wed Jul 14, 2021   1255 She ambulated without difficulty. Remained nonhypoxic. SPO2 95% on room air. [LM]      ED Course User Index  [LM] Gwendolyn Baca DO         ED Course as of Jul 14 1525 Wed Jul 14, 2021   1255 She ambulated without difficulty. Remained nonhypoxic. SPO2 95% on room air. [LM]      ED Course User Index  [LM] Gwendolyn Baca DO       --------------------------------------------- PAST HISTORY ---------------------------------------------  Past Medical History:  has a past medical history of (HFpEF) heart failure with preserved ejection fraction ([address] Anxiety, AVF (arteriovenous fistula) ([address] Chronic kidney disease, Depression, Encounter regarding vascular access for dialysis for ESRD [facility]), History of ruptured Berry aneurysm ([address] Hypertension, Hypothyroidism, Intracranial hemorrhage ([address] Neutropenia ([address] Noncompliance w/medication treatment due to intermit use of medication, and Pre-transplant evaluation for kidney transplant.     Past Surgical History:  has a past surgical history that includes Brain aneurysm surgery (Right, 3-4 years ago); Dialysis fistula creation (Left, 11 years ago); and Upper gastrointestinal endoscopy (N/A, 1/3/2019). Social History:  reports that he has never smoked. He has never used smokeless tobacco. He reports current drug use. Drug: Marijuana. He reports that he does not drink alcohol. Family History: family history includes Kidney Disease in his paternal grandmother. The patients home medications have been reviewed.     Allergies: Levofloxacin and Tylenol [acetaminophen]    -------------------------------------------------- RESULTS -------------------------------------------------    LABS:  Results for orders placed or performed during the hospital encounter of 07/14/21   COVID-19, Rapid    Specimen: Nasopharyngeal Swab   Result Value Ref Range    SARS-CoV-2, NAAT Not Detected Not Detected   CBC Auto Differential   Result Value Ref Range    WBC 13.2 (H) 4.5 - 11.5 E9/L    RBC 2.94 (L) 3.80 - 5.80 E12/L    Hemoglobin 8.9 (L) 12.5 - 16.5 g/dL    Hematocrit 26.7 (L) 37.0 - 54.0 %    MCV 90.8 80.0 - 99.9 fL    MCH 30.3 26.0 - 35.0 pg    MCHC 33.3 32.0 - 34.5 %    RDW 15.0 11.5 - 15.0 fL    Platelets 916 072 - 012 E9/L    MPV 9.5 7.0 - 12.0 fL    Neutrophils % 77.7 43.0 - 80.0 %    Immature Granulocytes % 0.7 0.0 - 5.0 %    Lymphocytes % 8.5 (L) 20.0 - 42.0 %    Monocytes % 12.8 (H) 2.0 - 12.0 %    Eosinophils % 0.1 0.0 - 6.0 %    Basophils % 0.2 0.0 - 2.0 %    Neutrophils Absolute 10.29 (H) 1.80 - 7.30 E9/L    Immature Granulocytes # 0.09 E9/L    Lymphocytes Absolute 1.13 (L) 1.50 - 4.00 E9/L    Monocytes Absolute 1.69 (H) 0.10 - 0.95 E9/L    Eosinophils Absolute 0.01 (L) 0.05 - 0.50 E9/L    Basophils Absolute 0.03 0.00 - 0.20 E9/L    Hypochromia 1+    Comprehensive Metabolic Panel w/ Reflex to MG   Result Value Ref Range    Sodium 134 132 - 146 mmol/L    Potassium reflex Magnesium 4.6 3.5 - 5.0 mmol/L    Chloride 92 (L) 98 - 107 mmol/L    CO2 25 22 - 29 mmol/L    Anion Gap 17 (H) 7 - 16 mmol/L    Glucose 112 (H) 74 SUE/SUE    The nursing notes within the ED encounter and vital signs as below have been reviewed. Patient Vitals for the past 24 hrs:   BP Temp Temp src Pulse Resp SpO2 Height Weight   07/14/21 1500 (!) 161/86 -- -- 86 -- -- -- --   07/14/21 1455 (!) 154/87 -- -- 90 -- -- -- --   07/14/21 1440 (!) 141/76 99.1 °F (37.3 °C) -- 94 17 -- -- 119 lb 0.8 oz (54 kg)   07/14/21 1351 (!) 143/81 99.8 °F (37.7 °C) -- 101 16 95 % -- --   07/14/21 1259 (!) 142/77 -- -- 99 16 95 % -- --   07/14/21 1255 -- -- -- -- -- 95 % -- --   07/14/21 1156 (!) 158/84 -- -- 109 16 91 % -- --   07/14/21 1054 (!) 154/90 101.2 °F (38.4 °C) -- 102 16 95 % -- --   07/14/21 0922 (!) 160/88 -- -- 100 16 95 % -- --   07/14/21 0805 (!) 149/87 99.8 °F (37.7 °C) Oral 104 16 95 % 5' 6\" (1.676 m) 111 lb 4.8 oz (50.5 kg)       Oxygen Saturation Interpretation: abNormal    ------------------------------------------ PROGRESS NOTES ------------------------------------------    Counseling:  I have spoken with the patient and discussed todays results, in addition to providing specific details for the plan of care and counseling regarding the diagnosis and prognosis. Their questions are answered at this time and they are agreeable with the plan of admission.    --------------------------------- ADDITIONAL PROVIDER NOTES ---------------------------------  Consultations:  Spoke with Dr. Roseanne Greene. Discussed case. They will admit the patient. This patient's ED course included: a personal history and physicial examination, re-evaluation prior to disposition, multiple bedside re-evaluations, IV medications, cardiac monitoring and continuous pulse oximetry    This patient has remained hemodynamically stable during their ED course. Diagnosis:  1. Pneumonia of right lung due to infectious organism, unspecified part of lung    2. Fever in adult        Disposition:  Patient's disposition: Admit to telemetry  Patient's condition is stable.          Luis Fernando Ramirez, DO  Resident  07/14/21 152

## 2021-07-14 NOTE — H&P
[facility] History and Physical    Admission Date  7/14/2021  7:58 AM  Chief Complaint R chest pain, reported hypoxia  Admit Dx   Pneumonia [J18.9]    Subjective  History of Present Illness  35M PMH ESRD/HD MWF, stage III HFpEF, HTN with fevers and reported hypoxia at HD today; did not get dialysis session and sent here. Pt with intermittent R sided aching chest / upper abdominal pain for several years. No cough, sputum production, emesis, diarrhea. Admits to some nausea over the past few days since resolved. Had temp 101.2 in ED, HR ~100, SpO2 as low as 91 in ED (not hypoxic), WBC 13.2, BNP >70k, trop 94, Cr 11.1, CO2 25, hgb 8.9. CXR showed faint b/l patchy opacities. COVID neg. F/u CT showed posterior RLL consolidation w air bronchograms, significant pericardial effusion. Pt was admitted w HCAP.     Review of Systems - 12-point review of systems has been reviewed and is otherwise negative except as listed in the HPI    Past Medical History:   Diagnosis Date    (HFpEF) heart failure with preserved ejection fraction ([address]     stage III    Anxiety 9/19/2015    AVF (arteriovenous fistula) ([address] 4/30/2018    Chronic kidney disease     CKD since early childhood per pt and on HD ~ 11 years    Depression     Encounter regarding vascular access for dialysis for ESRD ([address] 4/30/2018    History of ruptured Berry aneurysm ([address] 10/20/2015    Hypertension     on meds for ~ 11 years    Hypothyroidism     Intracranial hemorrhage (HCC)     was d/t ruptured aneurysm - pt underwent neurosurgery for clipping of the aneurysm    Neutropenia ([address]     Noncompliance w/medication treatment due to intermit use of medication 11/3/2015    Pre-transplant evaluation for kidney transplant 4/5/2017     Past Surgical History:   Procedure Laterality Date    BRAIN ANEURYSM SURGERY Right 3-4 years ago    Intracranial aneurysm clipping surgery 3-4 years ago, after rupture of aneurysm causing ICH    DIALYSIS FISTULA CREATION Left 11 years ago    UPPER GASTROINTESTINAL ENDOSCOPY N/A 1/3/2019    EGD BIOPSY performed by Cesar Teixeira MD at [address] [facility]     Prior to Admission medications    Medication Sig Start Date End Date Taking? Authorizing Provider   cefdinir (OMNICEF) 300 MG capsule Take 1 capsule by mouth 2 times daily for 10 days 7/14/21 7/24/21 Yes Cain Blizzard, DO   doxycycline hyclate (VIBRA-TABS) 100 MG tablet Take 1 tablet by mouth 2 times daily for 10 days 7/14/21 7/24/21 Yes Cain Blizzard, DO   pantoprazole (PROTONIX) 40 MG tablet Take 1 tablet by mouth daily 6/3/21   Jina Liu MD   minoxidil (LONITEN) 2.5 MG tablet  4/7/21   Historical Provider, MD   Methoxy PEG-Epoetin Beta (MIRCERA IJ) 75 mcg 1/22/21 4/8/22  Historical Provider, MD   lidocaine-prilocaine (EMLA) 2.5-2.5 % cream APPLY SMALL AMOUNT TO ACCESS SITE (AVF) 1 TO 2 HOURS BEFORE DIALYSIS.  COVER WITH OCCLUSIVE DRESSING (SARAN WRAP) 3/22/21   Historical Provider, MD   Cinacalcet HCl (SENSIPAR PO) Take 30 mg by mouth 3/12/21 3/11/22  Historical Provider, MD   diphenhydrAMINE (SOMINEX) 25 MG tablet Take 25 mg by mouth every 6 hours as needed    Historical Provider, MD   melatonin 3 MG TABS tablet Take 1 tablet by mouth nightly as needed (sleep) 3/30/21   Cari Navarro MD   NIFEdipine (ADALAT CC) 60 MG extended release tablet Take 1 tablet by mouth daily 3/30/21   Cari Navarro MD   labetalol (NORMODYNE) 200 MG tablet Take 1 tablet by mouth three times daily 3/30/21   Cari Navarro MD   losartan (COZAAR) 100 MG tablet Take 1 tablet by mouth every evening 3/30/21   Cari Navarro MD   hydrALAZINE (APRESOLINE) 50 MG tablet Take 1 tablet by mouth 3 times daily 3/30/21   Cari Navarro MD   cloNIDine (CATAPRES) 0.3 MG tablet Take 1 tablet by mouth 3 times daily 3/30/21   Cari Navarro MD   iron sucrose (VENOFER) 20 MG/ML injection Infuse 5 mLs intravenously three times a week Infuse 100 mg intravenously three times a week Given in dialysis every Monday ,Wednesday ,Friday 3/17/21   Luiza Duke MD   sevelamer (RENVELA) 800 MG tablet Take 2 tablets by mouth 3 times daily     Historical Provider, MD     Allergies  Levofloxacin and Tylenol [acetaminophen]    Social History   reports that he has never smoked. He has never used smokeless tobacco. He reports current drug use. Drug: Marijuana. He reports that he does not drink alcohol. Family History  family history includes Kidney Disease in his paternal grandmother. Objective  Physical Exam   General: well-developed, well-nourished, no acute distress, cooperative  Skin: warm, dry, intact, normal color without cyanosis  HEENT: normocephalic, atraumatic, mucous membranes normal  Respiratory: clear to auscultation bilaterally without respiratory distress  Cardiovascular: regular rate and rhythm without murmur / rub / gallop  Abdominal: soft, nontender, nondistended, normoactive bowel sounds  Extremities: no mottling, pulses intact, no edema  Neurologic: awake, alert, no focal deficits  Psychiatric: normal affect, cooperative    *Available labs, imaging studies, microbiologic studies, cardiac studies have been reviewed    Assessment / Plan  Sepsis due to HCAP  ESRD/HD  HFpEF  Anemia of iron deficiency / chronic dz  Non-zero troponin  HTN    Meets temp / HR / WBC sepsis criteria. BP stable. Got Omnicef and doxy PO in ED, allergic to Levaquin, will check procal / ESR / CRP. Blood cx sent. Check resp cx. COVID negative. Checking echo, watch fluid balance. Nephro consult to Dr. Booker Robertson - pt did not get HD session today. Troponin up in 90s / trending down due to renal fn.       Code status  Full  DVT prophylaxis Heparin  Disposition  Home when ready    Electronically signed by Darcy Cabezas,  on 7/14/2021 at 2:02 PM

## 2021-07-14 NOTE — ED NOTES
Ambulated patient around POD 2, on room air, pulse ox drop to 90% on right hand. When I returned patient to room I placed pulse on on left hand along with right hand all on room air.  Right hand 91% left hand 88% ( patient dialysis fistula on left hand)      Iftikhar Torres RN  07/14/21 1200

## 2021-07-15 ENCOUNTER — APPOINTMENT (OUTPATIENT)
Dept: GENERAL RADIOLOGY | Age: 36
DRG: 720 | End: 2021-07-15
Payer: MEDICAID

## 2021-07-15 LAB
ALBUMIN SERPL-MCNC: 3.7 G/DL (ref 3.5–5.2)
ALP BLD-CCNC: 77 U/L (ref 40–129)
ALT SERPL-CCNC: 13 U/L (ref 0–40)
ANION GAP SERPL CALCULATED.3IONS-SCNC: 13 MMOL/L (ref 7–16)
ANTI-NUCLEAR ANTIBODY (ANA): NEGATIVE
AST SERPL-CCNC: 19 U/L (ref 0–39)
BILIRUB SERPL-MCNC: 0.6 MG/DL (ref 0–1.2)
BUN BLDV-MCNC: 22 MG/DL (ref 6–20)
C3 COMPLEMENT: 125 MG/DL (ref 90–180)
C4 COMPLEMENT: 40 MG/DL (ref 10–40)
CALCIUM SERPL-MCNC: 9.2 MG/DL (ref 8.6–10.2)
CHLORIDE BLD-SCNC: 97 MMOL/L (ref 98–107)
CO2: 28 MMOL/L (ref 22–29)
CREAT SERPL-MCNC: 5.7 MG/DL (ref 0.7–1.2)
GFR AFRICAN AMERICAN: 14
GFR NON-AFRICAN AMERICAN: 14 ML/MIN/1.73
GLUCOSE BLD-MCNC: 96 MG/DL (ref 74–99)
HCT VFR BLD CALC: 28.6 % (ref 37–54)
HEMOGLOBIN: 9.2 G/DL (ref 12.5–16.5)
MCH RBC QN AUTO: 30.3 PG (ref 26–35)
MCHC RBC AUTO-ENTMCNC: 32.2 % (ref 32–34.5)
MCV RBC AUTO: 94.1 FL (ref 80–99.9)
PDW BLD-RTO: 15.2 FL (ref 11.5–15)
PLATELET # BLD: 253 E9/L (ref 130–450)
PMV BLD AUTO: 9.6 FL (ref 7–12)
POTASSIUM SERPL-SCNC: 4.3 MMOL/L (ref 3.5–5)
RBC # BLD: 3.04 E12/L (ref 3.8–5.8)
SEDIMENTATION RATE, ERYTHROCYTE: 94 MM/HR (ref 0–15)
SODIUM BLD-SCNC: 138 MMOL/L (ref 132–146)
T4 FREE: 1.39 NG/DL (ref 0.93–1.7)
TOTAL PROTEIN: 7.5 G/DL (ref 6.4–8.3)
TSH SERPL DL<=0.05 MIU/L-ACNC: 4.26 UIU/ML (ref 0.27–4.2)
WBC # BLD: 10.5 E9/L (ref 4.5–11.5)

## 2021-07-15 PROCEDURE — 85651 RBC SED RATE NONAUTOMATED: CPT

## 2021-07-15 PROCEDURE — 36415 COLL VENOUS BLD VENIPUNCTURE: CPT

## 2021-07-15 PROCEDURE — 6360000002 HC RX W HCPCS: Performed by: INTERNAL MEDICINE

## 2021-07-15 PROCEDURE — 2060000000 HC ICU INTERMEDIATE R&B

## 2021-07-15 PROCEDURE — 84439 ASSAY OF FREE THYROXINE: CPT

## 2021-07-15 PROCEDURE — 2580000003 HC RX 258: Performed by: INTERNAL MEDICINE

## 2021-07-15 PROCEDURE — 83516 IMMUNOASSAY NONANTIBODY: CPT

## 2021-07-15 PROCEDURE — 6370000000 HC RX 637 (ALT 250 FOR IP): Performed by: NURSE PRACTITIONER

## 2021-07-15 PROCEDURE — 80053 COMPREHEN METABOLIC PANEL: CPT

## 2021-07-15 PROCEDURE — 71045 X-RAY EXAM CHEST 1 VIEW: CPT

## 2021-07-15 PROCEDURE — 6370000000 HC RX 637 (ALT 250 FOR IP): Performed by: INTERNAL MEDICINE

## 2021-07-15 PROCEDURE — 84443 ASSAY THYROID STIM HORMONE: CPT

## 2021-07-15 PROCEDURE — 99233 SBSQ HOSP IP/OBS HIGH 50: CPT | Performed by: INTERNAL MEDICINE

## 2021-07-15 PROCEDURE — 86038 ANTINUCLEAR ANTIBODIES: CPT

## 2021-07-15 PROCEDURE — 85027 COMPLETE CBC AUTOMATED: CPT

## 2021-07-15 PROCEDURE — 87081 CULTURE SCREEN ONLY: CPT

## 2021-07-15 PROCEDURE — 6370000000 HC RX 637 (ALT 250 FOR IP): Performed by: SPECIALIST

## 2021-07-15 PROCEDURE — 90935 HEMODIALYSIS ONE EVALUATION: CPT | Performed by: INTERNAL MEDICINE

## 2021-07-15 PROCEDURE — 86160 COMPLEMENT ANTIGEN: CPT

## 2021-07-15 PROCEDURE — 84165 PROTEIN E-PHORESIS SERUM: CPT

## 2021-07-15 RX ORDER — CLONIDINE HYDROCHLORIDE 0.1 MG/1
0.1 TABLET ORAL EVERY 6 HOURS PRN
Status: DISCONTINUED | OUTPATIENT
Start: 2021-07-15 | End: 2021-07-17 | Stop reason: HOSPADM

## 2021-07-15 RX ORDER — CEFUROXIME AXETIL 250 MG/1
250 TABLET ORAL EVERY 12 HOURS SCHEDULED
Status: DISCONTINUED | OUTPATIENT
Start: 2021-07-15 | End: 2021-07-17 | Stop reason: HOSPADM

## 2021-07-15 RX ORDER — DOXYCYCLINE HYCLATE 100 MG/1
100 CAPSULE ORAL EVERY 12 HOURS SCHEDULED
Status: DISCONTINUED | OUTPATIENT
Start: 2021-07-15 | End: 2021-07-17 | Stop reason: HOSPADM

## 2021-07-15 RX ADMIN — EPOETIN ALFA-EPBX 3000 UNITS: 3000 INJECTION, SOLUTION INTRAVENOUS; SUBCUTANEOUS at 00:50

## 2021-07-15 RX ADMIN — CEFUROXIME AXETIL 250 MG: 250 TABLET ORAL at 21:27

## 2021-07-15 RX ADMIN — ONDANSETRON 4 MG: 2 INJECTION INTRAMUSCULAR; INTRAVENOUS at 16:37

## 2021-07-15 RX ADMIN — SEVELAMER CARBONATE 1600 MG: 800 TABLET, FILM COATED ORAL at 08:13

## 2021-07-15 RX ADMIN — HYDRALAZINE HYDROCHLORIDE 50 MG: 50 TABLET, FILM COATED ORAL at 09:54

## 2021-07-15 RX ADMIN — Medication 10 ML: at 08:22

## 2021-07-15 RX ADMIN — SODIUM CHLORIDE 100 MG: 9 INJECTION, SOLUTION INTRAVENOUS at 00:49

## 2021-07-15 RX ADMIN — CINACALCET HYDROCHLORIDE 30 MG: 30 TABLET, FILM COATED ORAL at 08:14

## 2021-07-15 RX ADMIN — EPOETIN ALFA-EPBX 2000 UNITS: 2000 INJECTION, SOLUTION INTRAVENOUS; SUBCUTANEOUS at 00:49

## 2021-07-15 RX ADMIN — LABETALOL HYDROCHLORIDE 200 MG: 200 TABLET, FILM COATED ORAL at 16:38

## 2021-07-15 RX ADMIN — ONDANSETRON 4 MG: 2 INJECTION INTRAMUSCULAR; INTRAVENOUS at 08:22

## 2021-07-15 RX ADMIN — Medication 3 MG: at 21:26

## 2021-07-15 RX ADMIN — LABETALOL HYDROCHLORIDE 200 MG: 200 TABLET, FILM COATED ORAL at 08:13

## 2021-07-15 RX ADMIN — DIPHENHYDRAMINE HCL 25 MG: 25 TABLET ORAL at 16:45

## 2021-07-15 RX ADMIN — Medication 10 ML: at 22:04

## 2021-07-15 RX ADMIN — ACETAMINOPHEN 650 MG: 325 TABLET ORAL at 21:26

## 2021-07-15 RX ADMIN — MINOXIDIL 2.5 MG: 2.5 TABLET ORAL at 08:13

## 2021-07-15 RX ADMIN — SEVELAMER CARBONATE 1600 MG: 800 TABLET, FILM COATED ORAL at 17:47

## 2021-07-15 RX ADMIN — PANTOPRAZOLE SODIUM 40 MG: 40 TABLET, DELAYED RELEASE ORAL at 08:14

## 2021-07-15 RX ADMIN — DOXYCYCLINE HYCLATE 100 MG: 100 CAPSULE ORAL at 21:26

## 2021-07-15 RX ADMIN — NIFEDIPINE 60 MG: 60 TABLET, EXTENDED RELEASE ORAL at 08:13

## 2021-07-15 ASSESSMENT — PAIN SCALES - GENERAL
PAINLEVEL_OUTOF10: 0
PAINLEVEL_OUTOF10: 10
PAINLEVEL_OUTOF10: 0
PAINLEVEL_OUTOF10: 0

## 2021-07-15 NOTE — PROGRESS NOTES
NEPHROLOGY Attending   Progress Note  7/15/2021 8:21 AM  Subjective:   Admit Date: 7/14/2021  PCP: Jason Currie DO      History of Present Ilness:    Leona Farmer is a 38 y.o. male with a history of hypertension, ESRD HD dependent MWF,HFpEF with several other medical problems. Presented to ED today from dialysisi  For fever. He reports some associated associate shortness of breath. Denies any nausea or diaphoresis. Does experience some exertional dyspnea while going upstairs, blood pressure in the ED was [phone] with a pulse of   Laboratory data showed a BUN of 53, creatinine 11.1, potassium 4.6, WBC 3.8 hemoglobin 8.9 hematocrit 26.7, platelets 726. Troponin was 93 and Pro-BNP >70,000. Chest x-ray showed a possible right lower lobe CT Scan of the chest showed a pericardial effusion and a dense consolidation in the RLL  Patient received doxycycline and cefdinir. He was then taken to the hemodialysis suite for treatment to be followed by admission.       Interval History:     7/15/2021: Patient seen sitting up in bed in no acute distress -complaining of generalized weakness and fatigue worsening past several days -currently without chest pain or shortness of breath at rest      Diet: ADULT DIET;  Regular    Data:   Scheduled Meds:   sevelamer  1,600 mg Oral TID WC    NIFEdipine  60 mg Oral Daily    labetalol  200 mg Oral TID    losartan  100 mg Oral QPM    hydrALAZINE  50 mg Oral TID    cloNIDine  0.3 mg Oral TID    cinacalcet  30 mg Oral Daily    pantoprazole  40 mg Oral Daily    minoxidil  2.5 mg Oral Daily    sodium chloride flush  5-40 mL Intravenous 2 times per day    heparin (porcine)  5,000 Units Subcutaneous 3 times per day    [START ON 7/16/2021] ferric gluconate (FERRLECIT) IVPB  125 mg Intravenous Q MWF    epoetin katlyn-epbx  3,000 Units Subcutaneous Once per day on Mon Wed Fri    And    epoetin katlyn-epbx  2,000 Units Subcutaneous Once per day on Mon Wed Fri Continuous Infusions:   sodium chloride       PRN Meds:melatonin, diphenhydrAMINE, sodium chloride flush, sodium chloride, ondansetron **OR** ondansetron, polyethylene glycol, acetaminophen **OR** acetaminophen    Intake/Output Summary (Last 24 hours) at 7/15/2021 0821  Last data filed at 7/14/2021 1905  Gross per 24 hour   Intake --   Output 1700 ml   Net -1700 ml     CBC:   Recent Labs     07/14/21  0821 07/15/21  0455   WBC 13.2* 10.5   HGB 8.9* 9.2*    253     BMP:    Recent Labs     07/14/21  0821 07/15/21  0455    138   K 4.6 4.3   CL 92* 97*   CO2 25 28   BUN 53* 22*   CREATININE 11.1* 5.7*   GLUCOSE 112* 96     Hepatic:   Recent Labs     07/14/21  0821 07/15/21  0455   AST 13 19   ALT 8 13   BILITOT 0.6 0.6   ALKPHOS 75 77     Troponin: No results for input(s): TROPONINI in the last 72 hours. BNP: No results for input(s): BNP in the last 72 hours. Lipids: No results for input(s): CHOL, HDL in the last 72 hours. Invalid input(s): LDLCALCU  ABGs: No results found for: PHART, PO2ART, UIH8HPS  INR: No results for input(s): INR in the last 72 hours. -----------------------------------------------------------------  RAD: Echo Complete    Result Date: 7/14/2021  Findings   Left Ventricle  Normal left ventricle size and systolic function. Ejection fraction is visually estimated at 60-65%. No regional wall motion abnormalities seen. Severe concentric left ventricular hypertrophy. Stage II diastolic dysfunction. Increased echo texture, consider cardiac MRI rule out infiltrative  cardiomyopathies such as amyloidosis. Images are foreshortened. Right Ventricle  Normal right ventricular size and function. TAPSE 23 mm. Left Atrium  The left atrium is severely dilated. Right Atrium  Mildly enlarged right atrium size. Mitral Valve  Normal mitral valve structure and function. No evidence of mitral valve stenosis. Physiologic and/or trace mitral regurgitation is present.    Tricuspid Valve  The tricuspid valve appears structurally normal.  Mild tricuspid regurgitation. RVSP is 44 mmHg. Pulmonary hypertension is mild . Aortic Valve  Structurally normal aortic valve. The aortic valve is trileaflet. No hemodynamically significant aortic stenosis is present. No evidence of aortic valve regurgitation. Pulmonic Valve  Pulmonic valve is structurally normal.  Physiologic and/or trace pulmonic regurgitation present. No evidence of pulmonic valve stenosis. Pericardial Effusion  There is a moderate circumferential pericardial effusion noted. No  echocardiographic signs of tamponade. Pleural Effusion  Small left pleural effusion. Aorta  Normal aortic root and ascending aorta. Miscellaneous  The inferior vena cava diameter is normal with normal respiratory  variation. Conclusions   Summary  Normal left ventricle size and systolic function. Ejection fraction is visually estimated at 60-65%. No regional wall motion abnormalities seen. Severe concentric left ventricular hypertrophy. Stage II diastolic dysfunction. Increased echo texture, consider cardiac MRI to rule out infiltrative  cardiomyopathies such as amyloidosis. The left atrium is severely dilated. Normal right ventricular size and function. TAPSE 23 mm. No hemodynamically significant aortic stenosis is present. Mild tricuspid regurgitation. RVSP is 44 mmHg. Pulmonary hypertension is mild . There is a moderate circumferential pericardial effusion noted. No  echocardiographic signs of tamponade. CT CHEST WO CONTRAST    Result Date: 7/14/2021  EXAMINATION: CT OF THE CHEST WITHOUT CONTRAST 7/14/2021 10:23 am TECHNIQUE: CT of the chest was performed without the administration of intravenous contrast. Multiplanar reformatted images are provided for review. Dose modulation, iterative reconstruction, and/or weight based adjustment of the mA/kV was utilized to reduce the radiation dose to as low as reasonably achievable. COMPARISON: 02/10/2020 HISTORY: ORDERING SYSTEM PROVIDED HISTORY: possible pneumonia TECHNOLOGIST PROVIDED HISTORY: Reason for exam:->possible pneumonia Decision Support Exception - unselect if not a suspected or confirmed emergency medical condition->Emergency Medical Condition (MA) FINDINGS: Mediastinum: There is significant cardiomegaly again identified. There is also a pericardial effusion, measuring up to 14 mm in thickness. Aorta appears normal in caliber. There is prominence of the main pulmonary artery. Mildly prominent left paratracheal lymph node, measuring up to 11 mm in short axis. Lungs/pleura: There is dense consolidation of the posterior right lower lobe, with air bronchograms, compatible with pneumonia. The central airways appear patent. There is platelike atelectasis or scarring at the left lung base. No pneumothorax. Trace right pleural effusion. Upper Abdomen: Kidneys are atrophic. Soft Tissues/Bones: Prominent bilateral axillary lymph nodes. Diffuse increased density of the osseous structures, which can be seen with renal osteodystrophy. There is dense consolidation of the posterior right lower lobe with air bronchograms, compatible with pneumonia. There is a trace right pleural effusion. Significant cardiomegaly with pericardial effusion. Bilateral axillary lymphadenopathy. Recommend follow-up/further evaluation, if not already performed. XR CHEST PORTABLE    Result Date: 7/14/2021  EXAMINATION: ONE XRAY VIEW OF THE CHEST 7/14/2021 8:09 am COMPARISON: 03/16/2021 HISTORY: ORDERING SYSTEM PROVIDED HISTORY: shortness of breath TECHNOLOGIST PROVIDED HISTORY: Reason for exam:->shortness of breath FINDINGS: Prominent cardiac silhouette. There are faint patchy bilateral pulmonary airspace opacities. No sizable effusion or pneumothorax. No acute osseous abnormality. Faint patchy bilateral pulmonary opacities. Correlate for possible infectious process.  Prominent cardiac silhouette. Objective:   Vitals:   Vitals:    07/15/21 0800   BP: 101/61   Pulse: 100   Resp: 18   Temp: 98.8 °F (37.1 °C)   SpO2: 90%     Patient Vitals for the past 24 hrs:   BP Temp Temp src Pulse Resp SpO2 Weight   07/15/21 0800 101/61 98.8 °F (37.1 °C) Oral 100 18 90 % --   07/15/21 0030 131/66 101.2 °F (38.4 °C) Oral 106 16 91 % --   07/15/21 0018 -- -- -- -- -- -- 115 lb 4.8 oz (52.3 kg)   07/14/21 1930 (!) 156/90 100.6 °F (38.1 °C) Oral 111 16 94 % --   07/14/21 1905 (!) 172/99 99.5 °F (37.5 °C) -- 103 15 -- 115 lb 4.8 oz (52.3 kg)   07/14/21 1855 (!) 197/119 -- -- 97 -- -- --   07/14/21 1830 (!) 193/105 -- -- 94 -- -- --   07/14/21 1800 (!) 189/117 -- -- 93 -- -- --   07/14/21 1730 (!) 155/77 -- -- 88 -- -- --   07/14/21 1700 (!) 158/77 -- -- 90 -- -- --   07/14/21 1630 (!) 169/94 -- -- 89 -- -- --   07/14/21 1600 (!) 183/110 -- -- 86 -- -- --   07/14/21 1530 (!) 170/96 -- -- 86 -- -- --   07/14/21 1500 (!) 161/86 -- -- 86 -- -- --   07/14/21 1455 (!) 154/87 -- -- 90 -- -- --   07/14/21 1440 (!) 141/76 99.1 °F (37.3 °C) -- 94 17 -- 119 lb 0.8 oz (54 kg)   07/14/21 1351 (!) 143/81 99.8 °F (37.7 °C) -- 101 16 95 % --   07/14/21 1259 (!) 142/77 -- -- 99 16 95 % --   07/14/21 1255 -- -- -- -- -- 95 % --   07/14/21 1156 (!) 158/84 -- -- 109 16 91 % --   07/14/21 1054 (!) 154/90 101.2 °F (38.4 °C) -- 102 16 95 % --   07/14/21 0922 (!) 160/88 -- -- 100 16 95 % --     General appearance: alert, appears stated age and cooperative  Skin: Skin color, texture, turgor normal. No rashes or lesions  HEENT: Head: Normocephalic, no lesions, without obvious abnormality.   Neck: no adenopathy, no carotid bruit, no JVD, supple, symmetrical, trachea midline and thyroid not enlarged, symmetric, no tenderness/mass/nodules  Lungs: Clear  Heart: regular rate and rhythm, S1, S2 normal, no murmur, click, rub or gallop  Abdomen: soft, non-tender; bowel sounds normal; no masses,  no organomegaly  Extremities: extremities normal, atraumatic, no cyanosis or edema  Neurologic: Mental status: Alert, oriented, thought content appropriate  Dialysis access: Left upper extremity AVF with good thrill and bruit      Assessment/Plan:   1. Hypertensive with CKD G5/ESKD states he has been compliant with his antihypertensive medications  -BP initially above target <140/90 but trending lower  With the presence of the pericardial effusion which is larger than previous will stop the minoxidil and as he is on the hydralazine check an CLAY and a sed rate as well as C and an Anti-Histone Ab for a Lupus Like reaction to hydralazine or labetalol both of which can commonly cause the issue  PLAN  1. As of 7/15/2021, stop minoxidil and hydralazine  2. Add hold parameters to other antihypertensives  3. Adjust clonidine to PRN status  4. Follow BP closely for likely additional needed adjustments with current hypotension    2. Pne with Hypoxia  PLAN:  1. Await BC  2. Defer to ID for antibx     3. ESRD, HD dependent M WF  PLAN:  1. We will continue his usual hemodialysis schedule MWF with an additional dialysis for the pericardial effusion     4. Anemia due to CKD  Last 3/8/21 ferritin 669, Tsat-35% -at targets  -Receives Mircera 50 mcg q 2 weeks at outpatient HD clinic  PLAN:  1. Follow daily CBC   2. Add PREETI  3. Discontinue current IV Ferrlecit order     5. Pericardial Effusion  Increased in vol since the last 2 D ECHO in Jan 2020  Also a question on the last 2 D ECHO of Amyloidosis and again on this 2 D ECHO  PLAN: 1. Check Sed Rate (pending)  2. Check CLAY, C3 & C4, Anti-Histone Ab, Sed Rate (pending)  3. TSH and Free T4 WNL  4. Serum and Urine immunofixation (pend)     Thank you for allowing us to participate in care of KENDRA De La Cruz CNP  Patient seen and examined. I had a face to face encounter with the patient. Agree with exam.    Agree with  formulation, assessment and plan as outlined above and directed by me.    Addition and corrections were done as deemed appropriate. My exam and plan include:   A/P:  1. ESKD-seen on IHD this PM tolerating the procedure 1L  vol removal targeted-plan for dialysis again 7/16 given the pericardial effusion    2.  Pericardial Effusion  C3 & C4 not suppressed  TSH 4.260 elevated but not to level to lead to pericardial effusion with the normal free T4  Await serologies and SPEP and UPEP  Concern for Amyloid on the last 2 D [facility] would consider asking Cardiology to see      JULEE Oliva MD

## 2021-07-15 NOTE — CONSULTS
Nephrology Progress Note  Patient's Name: Zackary Sands      Reason for Consult: ESRD, HD dependent  Requesting Physician:  Narendra Zuniga DO    Chief Complaint: Fever  History Obtained From:  patient and EHR    History of Present Ilness:    Zackary Sands is a 38 y.o. male with a history of hypertension, ESRD HD dependent MWF,HFpEF with several other medical problems. Presented to ED today from dialysisi  For fever. He reports some associated associate shortness of breath. Denies any nausea or diaphoresis. Does experience some exertional dyspnea while going upstairs, blood pressure in the ED was [phone] with a pulse of   Laboratory data showed a BUN of 53, creatinine 11.1, potassium 4.6, WBC 3.8 hemoglobin 8.9 hematocrit 26.7, platelets 246. Troponin was 93 and Pro-BNP >70,000. Chest x-ray showed a possible right lower lobe CT Scan of the chest showed a pericardial effusion and a dense consolidation in the RLL  Patient received doxycycline and cefdinir. He was then taken to the hemodialysis suite for treatment to be followed by admission.     Interval history:  7/15/21:     Past Medical History:   Diagnosis Date    (HFpEF) heart failure with preserved ejection fraction ([address]     stage III    Anxiety 9/19/2015    AVF (arteriovenous fistula) ([address] 4/30/2018    Chronic kidney disease     CKD since early childhood per pt and on HD ~ 11 years    Depression     Encounter regarding vascular access for dialysis for ESRD ([address] 4/30/2018    History of ruptured Berry aneurysm ([address] 10/20/2015    Hypertension     on meds for ~ 11 years    Hypothyroidism     Intracranial hemorrhage ([address]     was d/t ruptured aneurysm - pt underwent neurosurgery for clipping of the aneurysm    Neutropenia ([address]     Noncompliance w/medication treatment due to intermit use of medication 11/3/2015    Pre-transplant evaluation for kidney transplant 4/5/2017       Past Surgical History:   Procedure Laterality Date    BRAIN ANEURYSM SURGERY Right 3-4 years ago    Intracranial aneurysm clipping surgery 3-4 years ago, after rupture of aneurysm causing ICH    DIALYSIS FISTULA CREATION Left 11 years ago    UPPER GASTROINTESTINAL ENDOSCOPY N/A 1/3/2019    EGD BIOPSY [facility] Christo Cole MD [facility] ENDOSCOPY       Family History   Problem Relation Age of Onset    Kidney Disease Paternal Grandmother     Cancer Neg Hx     Heart Disease Neg Hx         reports that he has never smoked. He has never used smokeless tobacco. He reports current drug use. Drug: Marijuana. He reports that he does not drink alcohol.     Allergies:  Levofloxacin and Tylenol [acetaminophen]    Current Medications:    [START ON 7/15/2021] sevelamer (RENVELA) tablet 1,600 mg, TID WC  melatonin tablet 3 mg, Nightly PRN  NIFEdipine (PROCARDIA XL) extended release tablet 60 mg, Daily  labetalol (NORMODYNE) tablet 200 mg, TID  losartan (COZAAR) tablet 100 mg, QPM  hydrALAZINE (APRESOLINE) tablet 50 mg, TID  cloNIDine (CATAPRES) tablet 0.3 mg, TID  cinacalcet (SENSIPAR) tablet 30 mg, Daily  diphenhydrAMINE (BENADRYL) tablet 25 mg, Q6H PRN  pantoprazole (PROTONIX) tablet 40 mg, Daily  [START ON 7/15/2021] minoxidil (LONITEN) tablet 2.5 mg, Daily  sodium chloride flush 0.9 % injection 5-40 mL, 2 times per day  sodium chloride flush 0.9 % injection 5-40 mL, PRN  0.9 % sodium chloride infusion, PRN  heparin (porcine) injection 5,000 Units, 3 times per day  ondansetron (ZOFRAN-ODT) disintegrating tablet 4 mg, Q8H PRN   Or  ondansetron (ZOFRAN) injection 4 mg, Q6H PRN  polyethylene glycol (GLYCOLAX) packet 17 g, Daily PRN  acetaminophen (TYLENOL) tablet 650 mg, Q6H PRN   Or  acetaminophen (TYLENOL) suppository 650 mg, Q6H PRN  ferric gluconate (FERRLECIT) 25 mg in sodium chloride 0.9 % 50 mL (test dose) IVPB, Once   Followed by  ferric gluconate (FERRLECIT) 100 mg in sodium chloride 0.9 % 100 mL IVPB, Once   Followed by  [START ON 7/16/2021] ferric gluconate (FERRLECIT) 125 mg in sodium chloride 0.9 % 100 mL IVPB, Q MWF        Review of Systems:   Pertinent items are noted in HPI. Physical exam:  Vitals:    07/14/21 1930   BP: (!) 156/90   Pulse: 111   Resp: 16   Temp: 100.6 °F (38.1 °C)   SpO2: 94%          General: No distress. Alert. Neck: Trachea midline. No JVD  Lungs: Clear, no adventitious sounds  CV: Regular rate. Regular rhythm. No murmur or rub. .   Abd: Non-tender. Non-distended. + bowel sounds. Skin: Warm and dry. No rash on exposed extremities. Ext: No cyanosis, clubbing, edema ; MICHAEL AVF  Neuro: Alert, oriented. Follows commands. Data:   Labs:  Lab Results   Component Value Date     07/14/2021     03/17/2021     03/16/2021    K 4.6 07/14/2021    K 4.1 03/17/2021    K 4.7 03/16/2021    CL 92 (L) 07/14/2021    CO2 25 07/14/2021    CO2 28 03/17/2021    CO2 24 03/16/2021    CREATININE 11.1 (HH) 07/14/2021    CREATININE 10.0 (HH) 03/17/2021    CREATININE 6.5 (H) 03/16/2021    BUN 53 (H) 07/14/2021    BUN 40 (H) 03/17/2021    BUN 25 (H) 03/16/2021    GLUCOSE 112 (H) 07/14/2021    GLUCOSE 122 (H) 03/17/2021    GLUCOSE 94 03/16/2021    PHOS 5.5 (H) 02/03/2021    PHOS 4.5 02/02/2021    PHOS 2.6 02/01/2021    WBC 13.2 (H) 07/14/2021    WBC 3.7 (L) 03/17/2021    WBC 6.7 03/15/2021    HGB 8.9 (L) 07/14/2021    HGB 8.3 (L) 03/17/2021    HGB 8.3 (L) 03/15/2021    HCT 26.7 (L) 07/14/2021    HCT 25.6 (L) 03/17/2021    HCT 26.5 (L) 03/15/2021    MCV 90.8 07/14/2021     07/14/2021         Imaging:  Xr Chest Portable    Result Date: 3/15/2021  EXAMINATION: ONE XRAY VIEW OF THE CHEST 3/15/2021 3:39 am COMPARISON: 02/17/2021 HISTORY: ORDERING SYSTEM PROVIDED HISTORY: chest pain TECHNOLOGIST PROVIDED HISTORY: Reason for exam:->chest pain What reading provider will be dictating this exam?->CRC FINDINGS: Possible right lower lobe opacity. There is no effusion or pneumothorax. Stable cardiomegaly.   The osseous structures are without acute process. Possible right lower lobe opacity. Correlate with abnormal breast sounds for pneumonia. Stable cardiomegaly. EXAMINATION:   CT OF THE CHEST WITHOUT CONTRAST 7/14/2021 10:23 am       TECHNIQUE:   CT of the chest was performed without the administration of intravenous   contrast. Multiplanar reformatted images are provided for review. Dose   modulation, iterative reconstruction, and/or weight based adjustment of the   mA/kV was utilized to reduce the radiation dose to as low as reasonably   achievable.       COMPARISON:   02/10/2020       HISTORY:   ORDERING SYSTEM PROVIDED HISTORY: possible pneumonia   TECHNOLOGIST PROVIDED HISTORY:   Reason for exam:->possible pneumonia   Decision Support Exception - unselect if not a suspected or confirmed   emergency medical condition->Emergency Medical Condition (MA)       FINDINGS:   Mediastinum: There is significant cardiomegaly again identified.  There is   also a pericardial effusion, measuring up to 14 mm in thickness.  Aorta   appears normal in caliber.  There is prominence of the main pulmonary artery. Mildly prominent left paratracheal lymph node, measuring up to 11 mm in short   axis.       Lungs/pleura: There is dense consolidation of the posterior right lower lobe,   with air bronchograms, compatible with pneumonia.  The central airways appear   patent.  There is platelike atelectasis or scarring at the left lung base.    No pneumothorax.  Trace right pleural effusion.       Upper Abdomen: Kidneys are atrophic.       Soft Tissues/Bones: Prominent bilateral axillary lymph nodes.  Diffuse   increased density of the osseous structures, which can be seen with renal   osteodystrophy.           Impression   There is dense consolidation of the posterior right lower lobe with air   bronchograms, compatible with pneumonia. [place] is a trace right pleural   effusion.       Significant cardiomegaly with pericardial effusion.       Bilateral axillary lymphadenopathy.  Recommend follow-up/further evaluation,   if not already performed. 2D ECHO from 1/20/2020   Summary   Ejection fraction is visually estimated at 55-60%. Grade III diastolic dysfunction. No regional wall motion abnormalities seen. Severe left ventricular concentric hypertrophy noted. LV mass 264 g. Global longitudinal strain abnormal -15.4%. Strain predominantly abnormal   in the basal segments with relative sparing of the apex. A similar pattern may be seen with cardiac amyloidosis. Moderately dilated right ventricle. Right ventricle global systolic function is normal.   Severe biatrial dilation. Mild-moderate mitral regurgitation. Mild tricuspid regurgitation. RVSP is 58 mmHg. Estimated PA pressure 58/15 mmHg. There is a trivial circumferential pericardial effusion noted with no   evidence of hemodynamic compromise. 7/14/21 2 D ECHO      Summary   Normal left ventricle size and systolic function. Ejection fraction is visually estimated at 60-65%. No regional wall motion abnormalities seen. Severe concentric left ventricular hypertrophy. Stage II diastolic dysfunction. Increased echo texture, consider cardiac MRI to rule out infiltrative   cardiomyopathies such as amyloidosis. The left atrium is severely dilated. Normal right ventricular size and function. TAPSE 23 mm. No hemodynamically significant aortic stenosis is present. Mild tricuspid regurgitation. RVSP is 44 mmHg. Pulmonary hypertension is mild . There is a moderate circumferential pericardial effusion noted. No   echocardiographic signs of tamponade    Assessment/Plans    1. Hypertensive with CKD G5/ESKD states he has been compliant with his antihypertensive medications  -BP above target <140/90  PLAN:  1.  With the presence of the pericardial effusion which is larger than previous will stop the minoxidil and as he is on the hydralazine check an CLAY and a sed rate as well as C` and an Anti-Histone Ab for a Lupus Like reaction to hydralazine or labetalol both of which can commonly cause the issue  2. Will titrate the extended release nifedipine  3. Add prn cloniodine    2. Pne with Hypoxia  PLAN:  1. Await BC  2. Defer to ID for antibx    3. ESRD, HD dependent M WF  PLAN:  1. We will continue his usual hemodialysis schedule MWF with an additional dialysis for the pericardial effusion    4. Anemia due to CKD   Last 3/8/21 ferritin 669, Tsat-35% -at targets  -Receives Mircera 50 mcg q 2 weeks at outpatient HD clinic  PLAN:  1. Follow daily CBC   2. Add PREETI    5. Pericardial Effusion  Increased in vol since the last 2 D ECHO in Jan 2020  Also a question on the last 2 D ECHO of Amyloidosis and again on this 2 D ECHO  PLAN: 1. Check Sed Rate  2. Check CLAY, C3 & C4, Anti-Histone Ab, Sed Rate  3. TSH and Free T4  4.  Serum and Urine immunofixation     Thank you for allowing us to participate in care of Chase Wayne MD

## 2021-07-15 NOTE — PROGRESS NOTES
BMI 18.61 kg/m²   General Appearance: alert and oriented to person, place and time, well-developed and well-nourished, in no acute distress  Skin: warm and dry, no rash or erythema  Head: normocephalic and atraumatic  Eyes: pupils equal, round, and reactive to light, extraocular eye movements intact, conjunctivae normal  ENT: tympanic membrane, external ear and ear canal normal bilaterally, oropharynx clear and moist with normal mucous membranes  Neck: neck supple and non tender without mass, no thyromegaly or thyroid nodules, no cervical lymphadenopathy   Pulmonary/Chest: clear to auscultation bilaterally- no wheezes, rales or rhonchi, normal air movement, no respiratory distress  Cardiovascular: normal rate, normal S1 and S2, no gallops, intact distal pulses and no carotid bruits  Abdomen: soft, non-tender, non-distended, normal bowel sounds, no masses or organomegaly      Recent Labs     07/14/21  0821 07/15/21  0455    138   K 4.6 4.3   CL 92* 97*   CO2 25 28   BUN 53* 22*   CREATININE 11.1* 5.7*   GLUCOSE 112* 96   CALCIUM 9.9 9.2       Recent Labs     07/14/21  0821 07/15/21  0455   WBC 13.2* 10.5   RBC 2.94* 3.04*   HGB 8.9* 9.2*   HCT 26.7* 28.6*   MCV 90.8 94.1   MCH 30.3 30.3   MCHC 33.3 32.2   RDW 15.0 15.2*    253   MPV 9.5 9.6     WBC 10.5    Radiology:   CT CHEST WO CONTRAST   Final Result   There is dense consolidation of the posterior right lower lobe with air   bronchograms, compatible with pneumonia. There is a trace right pleural   effusion. Significant cardiomegaly with pericardial effusion. Bilateral axillary lymphadenopathy. Recommend follow-up/further evaluation,   if not already performed. XR CHEST PORTABLE   Final Result   Faint patchy bilateral pulmonary opacities. Correlate for possible   infectious process. Prominent cardiac silhouette.              Assessment:    Active Problems:    ESRD on hemodialysis ([address]    Sepsis due to pneumonia ([address] Chronic heart failure with preserved ejection fraction (HCC)  Resolved Problems:    * No resolved hospital problems. *      Plan:  1. Sepsis, improved. 2. More due to HCAP, patient evaluated by ID, remains on antibiotics. 3. ESRD, on HD, nephrology following. 4. Hypertension, controlled. 5. Anemia, multifactorial, hemoglobin stable.         Electronically signed by Colin Presley MD on 7/15/2021 at 10:56 AM

## 2021-07-15 NOTE — CARE COORDINATION
COVID negative 7/14. Met w/ patient. Explained role of  and plan of care. Lives alone in a 2nd floor apartment- 14 total steps to entrance. No Hx DMEs, HHC, or EDUAR. Receives hemodialysis @ [address] M-W-F- [employer] provides transportation to dialysis notify [address] when patient is discharged [phone]. PCP is Dr. Radha Ibrahim and pharmacy is [facility]. Per pt, plan is to return home on discharge- he will contact [phone] [employer] for transportation home. No needs.  Will follow Isabel Barrera RN case manager

## 2021-07-15 NOTE — CONSULTS
[address] [place] [address] [facility]  KARLOSIDA  Consultation Note     Admit Date: 7/14/2021  7:58 AM    Reason for Consult:   HCAP. Fluoroquinolone allergy    Attending Physician:  Violet Pena*    HISTORY OF PRESENT ILLNESS:             The history is obtained from extensive review of available past medical records. The patient is a 38 y.o. male who is known to the ID service. The patient has a history of ESRD. He has been on dialysis for 20 years. He says that for the last week or so he was feeling weak. He would normally go shopping after dialysis but lately he just wanted to go home and sleep. He was having some pleuritic time pain on the right anterior chest.  Together with this we had a dry cough and some shortness of breath, especially on exertion. He had a fever in dialysis but he was sent to the ED at [facility] on 7/14/2020. A temperature of 101.2 degrees. Was hyper tensive, tachycardic and slightly hypoxemic. He was treated with Cefdinir and IV Doxycycline. ID was asked to see him in consultation. Past Medical History:        Diagnosis Date    (HFpEF) heart failure with preserved ejection fraction ([address]     stage III    Anxiety 9/19/2015    AVF (arteriovenous fistula) [facility] 4/30/2018    Chronic kidney disease     CKD since early childhood per pt and on HD ~ 11 years    Depression     Encounter regarding vascular access for dialysis for ESRD ([address] 4/30/2018    History of ruptured Berry aneurysm ([address] 10/20/2015    Hypertension     on meds for ~ 11 years    Hypothyroidism     Intracranial hemorrhage ([address]     was d/t ruptured aneurysm - pt underwent neurosurgery for clipping of the aneurysm    Neutropenia ([address]     Noncompliance w/medication treatment due to intermit use of medication 11/3/2015    Pre-transplant evaluation for kidney transplant 4/5/2017 January 2020. Admitted to [facility] with pain in the left upper extremity.   Admitted to the ICU with accelerated hypertension. Seen by ID for possible fever. He was felt to have a possible AV fistula malfunction, thus explaining the pain. There was no cellulitis or ongoing infection. ID signed off. January 2019. Admitted to [facility] with nausea, vomiting and abdominal pain. A CT showed gallbladder wall thickening seen by Dr Chandana Montano for cholecystitis/ascending cholangitis. An ultrasound did not reveal pericholecystic fluid. He was treated with Ceftriaxone and Metronidazole. Antibiotics were stopped by surgery so ID deferred further treatment to surgery and signed off.     Past Surgical History:        Procedure Laterality Date    BRAIN ANEURYSM SURGERY Right 3-4 years ago    Intracranial aneurysm clipping surgery 3-4 years ago, after rupture of aneurysm causing ICH    DIALYSIS FISTULA CREATION Left 11 years ago    UPPER GASTROINTESTINAL ENDOSCOPY N/A 1/3/2019    EGD BIOPSY performed by Sj Bonilla MD at [facility] ENDOSCOPY     Current Medications:   Scheduled Meds:   doxycycline hyclate  100 mg Oral 2 times per day    cefUROXime  250 mg Oral 2 times per day    sevelamer  1,600 mg Oral TID WC    NIFEdipine  60 mg Oral Daily    labetalol  200 mg Oral TID    losartan  100 mg Oral QPM    hydrALAZINE  50 mg Oral TID    cloNIDine  0.3 mg Oral TID    cinacalcet  30 mg Oral Daily    pantoprazole  40 mg Oral Daily    minoxidil  2.5 mg Oral Daily    sodium chloride flush  5-40 mL Intravenous 2 times per day    heparin (porcine)  5,000 Units Subcutaneous 3 times per day    [START ON 7/16/2021] ferric gluconate (FERRLECIT) IVPB  125 mg Intravenous Q MWF    epoetin katlyn-epbx  3,000 Units Subcutaneous Once per day on Mon Wed Fri    And    epoetin katlyn-epbx  2,000 Units Subcutaneous Once per day on Mon Wed Fri     Continuous Infusions:   sodium chloride       PRN Meds:melatonin, diphenhydrAMINE, sodium chloride flush, sodium chloride, ondansetron **OR** ondansetron, polyethylene glycol, acetaminophen **OR** acetaminophen    Allergies:  Tylenol [acetaminophen] and Levofloxacin    Social History:   Social History     Socioeconomic History    Marital status:      Spouse name: None    Number of children: 3    Years of education: None    Highest education level: High school graduate   Occupational History    None   Tobacco Use    Smoking status: Never Smoker    Smokeless tobacco: Never Used    Tobacco comment: only smokes marijuana daily   Vaping Use    Vaping Use: Never used   Substance and Sexual Activity    Alcohol use: No    Drug use: Yes     Types: Marijuana     Comment: occ    Sexual activity: Yes     Partners: Female   Other Topics Concern    None   Social History Narrative    None     Social Determinants of Health     Financial Resource Strain:     Difficulty of Paying Living Expenses:    Food Insecurity:     Worried About Running Out of Food in the Last Year:     Ran Out of Food in the Last Year:    Transportation Needs:     Lack of Transportation (Medical):  Lack of Transportation (Non-Medical):    Physical Activity:     Days of Exercise per Week:     Minutes of Exercise per Session:    Stress:     Feeling of Stress :    Social Connections:     Frequency of Communication with Friends and Family:     Frequency of Social Gatherings with Friends and Family:     Attends Religion Services:     Active Member of Clubs or Organizations:     Attends Club or Organization Meetings:     Marital Status:    Intimate Partner Violence:     Fear of Current or Ex-Partner:     Emotionally Abused:     Physically Abused:     Sexually Abused:       Pets: None  Travel: No  The patient lives alone    Family History:       Problem Relation Age of Onset    Kidney Disease Paternal Grandmother     Cancer Neg Hx     Heart Disease Neg Hx    . Otherwise non-pertinent to the chief complaint.     REVIEW OF SYSTEMS:    Constitutional: Positive for fevers, chills, diaphoresis  Neurologic: Negative   Psychiatric: Negative  Rheumatologic: Negative   Endocrine: Negative  Hematologic: Negative  Immunologic: Negative  ENT: Negative  Respiratory: As in the HPI  Cardiovascular: Negative  GI: Negative  : Negative  Musculoskeletal: Negative  Skin: No rashes. PHYSICAL EXAM:    Vitals:   /60   Pulse 100   Temp 98.8 °F (37.1 °C) (Oral)   Resp 18   Ht 5' 6\" (1.676 m)   Wt 115 lb 4.8 oz (52.3 kg)   SpO2 90%   BMI 18.61 kg/m²   Constitutional: The patient is awake, alert, and oriented. Sitting up in bed. Awake and alert. Seen by the hospitalist.  Skin: Warm and dry. No rashes were noted. HEENT: Eyes show round, and reactive pupils. No jaundice. Moist mucous membranes, no ulcerations, no thrush. Neck: Supple to movements. No lymphadenopathy. Chest: No use of accessory muscles to breathe. Symmetrical expansion. Auscultation reveals bronchial sounds over the left base posteriorly. Bibasilar crackles. He egophony noted. Cardiovascular: S1 and S2 are rhythmic and regular. No murmurs appreciated. Abdomen: Positive bowel sounds to auscultation. Benign to palpation. No masses felt. No hepatosplenomegaly. Extremities: Left proximal AV fistula with good thrill. Lines: peripheral      CBC+dif:  Recent Labs     07/14/21  0821 07/14/21  0821 07/15/21  0455   WBC 13.2*  --  10.5   HGB 8.9*   < > 9.2*   HCT 26.7*   < > 28.6*   MCV 90.8   < > 94.1      < > 253   NEUTROABS 10.29*  --   --     < > = values in this interval not displayed.      Lab Results   Component Value Date    CRP 6.6 (H) 02/01/2021    CRP 9.0 (H) 01/03/2019    CRP 4.4 (H) 10/21/2015      No results found for: CRPHS  Lab Results   Component Value Date    SEDRATE 94 (H) 07/15/2021    SEDRATE 39 (H) 02/01/2021    SEDRATE 37 (H) 10/21/2015     Lab Results   Component Value Date    ALT 13 07/15/2021    AST 19 07/15/2021    ALKPHOS 77 07/15/2021    BILITOT 0.6 07/15/2021     Lab Results   Component Value Date     07/15/2021    K 4.3 07/15/2021    K 4.6 07/14/2021    CL 97 07/15/2021    CO2 28 07/15/2021    BUN 22 07/15/2021    CREATININE 5.7 07/15/2021    GFRAA 14 07/15/2021    LABGLOM 14 07/15/2021    GLUCOSE 96 07/15/2021    PROT 7.5 07/15/2021    LABALBU 3.7 07/15/2021    CALCIUM 9.2 07/15/2021    BILITOT 0.6 07/15/2021    ALKPHOS 77 07/15/2021    AST 19 07/15/2021    ALT 13 07/15/2021       Lab Results   Component Value Date    PROTIME 11.6 03/15/2021    INR 1.1 03/15/2021       Lab Results   Component Value Date    TSH 4.260 07/15/2021       No results found for: NITRITE, COLORU, PHUR, LABCAST, WBCUA, RBCUA, MUCUS, TRICHOMONAS, YEAST, BACTERIA, CLARITYU, SPECGRAV, LEUKOCYTESUR, UROBILINOGEN, BILIRUBINUR, BLOODU, GLUCOSEU, AMORPHOUS    Lab Results   Component Value Date    HCO3 28.2 03/15/2016    BE 1.2 03/15/2016    O2SAT 99.5 03/15/2016    PH 7.325 03/15/2016    PCO2 55.4 03/15/2016    PO2 218.5 03/15/2016     Radiology:      Microbiology:  Pending  No results for input(s): BC in the last 72 hours. No results for input(s): ORG in the last 72 hours. No results for input(s): Selene Alpers in the last 72 hours. No results for input(s): STREPNEUMAGU in the last 72 hours. No results for input(s): LP1UAG in the last 72 hours. No results for input(s): ASO in the last 72 hours. No results for input(s): CULTRESP in the last 72 hours. No results for input(s): PROCAL in the last 72 hours. Assessment:  · HCAP  · Fever with leukocytosis associated to the above  · CKD, on HD Mondays, Wednesdays and Fridays    Plan:    · Continue Doxycycline. Change to an oral route  · Start Cefuroxime  · Check cultures, baseline ESR, CRP  · Urine antigens if possible  · Nares screen for MRSA  · Respiratory culture  · Will follow with you    Thank you for having us see this patient in consultation. I will be discussing this case with the treating physicians.     Conrad Stone MD  11:26 AM  7/15/2021

## 2021-07-15 NOTE — PROGRESS NOTES
8:01 AM  Consult placed through to ID office.   Giuliano Smith, [facility]/2

## 2021-07-15 NOTE — CARE COORDINATION
Social Work discharge planning   Sw spoke to pt, who lives alone in [place]. Pt said his Carlota Mcdonald lives in an apartment below his. Pt said he is independent with adls and ambulation. Pt goes to [address] [facility]  via [address] with his insurance. SW called [address] unit [phone] and confirmed [facility] chair time with Marilu. Pt said he does not need home care, nor rehab. Pt said his cousin Maricarmen Patel is a support person. He also has an 25 yr old daughter that he sees \"occasionally\". Pt said he will call for his own ride home at discharge. His PCP is Dr Iván Lim [phone]. Pt denied needs at this time.   Electronically signed by Usama Leblanc on 7/15/2021 at 9:54 AM

## 2021-07-15 NOTE — FLOWSHEET NOTE
Pt ran 4hrs on a 3K bath with 1L UF removed safely. 07/15/21 1550   Vital Signs   /79   Temp 98.2 °F (36.8 °C)   Pulse 90   Resp 16   Weight 112 lb 14 oz (51.2 kg)   Weight Method Bed scale   Percent Weight Change -2.1   Pain Assessment   Pain Assessment 0-10   Pain Level 0   Post-Hemodialysis Assessment   Post-Treatment Procedures Blood returned; Access bleeding time < 10 minutes   Machine Disinfection Process Acid/Vinegar Clean;Heat Disinfect; Exterior Machine Disinfection   Rinseback Volume (ml) 300 ml   Duration of Treatment (minutes) 240 minutes   Hemodialysis Output (ml) 1300 ml   NET Removed (ml) 1000 ml   Tolerated Treatment Good   Patient Response to Treatment tolerated well; blood returned; stasis acheived; sites secured with 2x2's, bandaids, and cross tape; post report to [facility] RN   Bilateral Breath Sounds Diminished

## 2021-07-16 ENCOUNTER — TELEPHONE (OUTPATIENT)
Dept: CARDIOLOGY | Age: 36
End: 2021-07-16

## 2021-07-16 LAB
ALBUMIN SERPL-MCNC: 3.1 G/DL (ref 3.5–4.7)
ALBUMIN SERPL-MCNC: 3.7 G/DL (ref 3.5–5.2)
ALP BLD-CCNC: 71 U/L (ref 40–129)
ALPHA-1-GLOBULIN: 0.5 G/DL (ref 0.2–0.4)
ALPHA-2-GLOBULIN: 0.9 G/FL (ref 0.5–1)
ALT SERPL-CCNC: 15 U/L (ref 0–40)
ANION GAP SERPL CALCULATED.3IONS-SCNC: 11 MMOL/L (ref 7–16)
AST SERPL-CCNC: 23 U/L (ref 0–39)
BETA GLOBULIN: 1 G/DL (ref 0.8–1.3)
BILIRUB SERPL-MCNC: 0.5 MG/DL (ref 0–1.2)
BUN BLDV-MCNC: 17 MG/DL (ref 6–20)
CALCIUM SERPL-MCNC: 9.1 MG/DL (ref 8.6–10.2)
CHLORIDE BLD-SCNC: 97 MMOL/L (ref 98–107)
CO2: 31 MMOL/L (ref 22–29)
CREAT SERPL-MCNC: 4.1 MG/DL (ref 0.7–1.2)
ELECTROPHORESIS: ABNORMAL
GAMMA GLOBULIN: 1.5 G/DL (ref 0.7–1.6)
GFR AFRICAN AMERICAN: 20
GFR NON-AFRICAN AMERICAN: 20 ML/MIN/1.73
GLUCOSE BLD-MCNC: 106 MG/DL (ref 74–99)
HCT VFR BLD CALC: 30 % (ref 37–54)
HEMOGLOBIN: 9.7 G/DL (ref 12.5–16.5)
MCH RBC QN AUTO: 30 PG (ref 26–35)
MCHC RBC AUTO-ENTMCNC: 32.3 % (ref 32–34.5)
MCV RBC AUTO: 92.9 FL (ref 80–99.9)
PDW BLD-RTO: 15.2 FL (ref 11.5–15)
PLATELET # BLD: 278 E9/L (ref 130–450)
PMV BLD AUTO: 9.8 FL (ref 7–12)
POTASSIUM SERPL-SCNC: 4 MMOL/L (ref 3.5–5)
RBC # BLD: 3.23 E12/L (ref 3.8–5.8)
SODIUM BLD-SCNC: 139 MMOL/L (ref 132–146)
TOTAL PROTEIN: 7.9 G/DL (ref 6.4–8.3)
WBC # BLD: 8 E9/L (ref 4.5–11.5)

## 2021-07-16 PROCEDURE — 6370000000 HC RX 637 (ALT 250 FOR IP): Performed by: INTERNAL MEDICINE

## 2021-07-16 PROCEDURE — 85027 COMPLETE CBC AUTOMATED: CPT

## 2021-07-16 PROCEDURE — 36415 COLL VENOUS BLD VENIPUNCTURE: CPT

## 2021-07-16 PROCEDURE — APPSS180 APP SPLIT SHARED TIME > 60 MINUTES: Performed by: NURSE PRACTITIONER

## 2021-07-16 PROCEDURE — 99233 SBSQ HOSP IP/OBS HIGH 50: CPT | Performed by: INTERNAL MEDICINE

## 2021-07-16 PROCEDURE — 6370000000 HC RX 637 (ALT 250 FOR IP): Performed by: SPECIALIST

## 2021-07-16 PROCEDURE — 2060000000 HC ICU INTERMEDIATE R&B

## 2021-07-16 PROCEDURE — 6370000000 HC RX 637 (ALT 250 FOR IP): Performed by: NURSE PRACTITIONER

## 2021-07-16 PROCEDURE — 80053 COMPREHEN METABOLIC PANEL: CPT

## 2021-07-16 PROCEDURE — 90935 HEMODIALYSIS ONE EVALUATION: CPT

## 2021-07-16 PROCEDURE — 2580000003 HC RX 258: Performed by: INTERNAL MEDICINE

## 2021-07-16 PROCEDURE — 6360000002 HC RX W HCPCS: Performed by: INTERNAL MEDICINE

## 2021-07-16 PROCEDURE — 99254 IP/OBS CNSLTJ NEW/EST MOD 60: CPT | Performed by: INTERNAL MEDICINE

## 2021-07-16 RX ORDER — CLONIDINE HYDROCHLORIDE 0.1 MG/1
0.1 TABLET ORAL ONCE
Status: COMPLETED | OUTPATIENT
Start: 2021-07-16 | End: 2021-07-16

## 2021-07-16 RX ADMIN — SEVELAMER CARBONATE 1600 MG: 800 TABLET, FILM COATED ORAL at 13:20

## 2021-07-16 RX ADMIN — EPOETIN ALFA-EPBX 3000 UNITS: 3000 INJECTION, SOLUTION INTRAVENOUS; SUBCUTANEOUS at 13:19

## 2021-07-16 RX ADMIN — ACETAMINOPHEN 650 MG: 325 TABLET ORAL at 15:02

## 2021-07-16 RX ADMIN — CLONIDINE HYDROCHLORIDE 0.1 MG: 0.1 TABLET ORAL at 13:21

## 2021-07-16 RX ADMIN — EPOETIN ALFA-EPBX 2000 UNITS: 2000 INJECTION, SOLUTION INTRAVENOUS; SUBCUTANEOUS at 13:22

## 2021-07-16 RX ADMIN — ONDANSETRON 4 MG: 2 INJECTION INTRAMUSCULAR; INTRAVENOUS at 21:34

## 2021-07-16 RX ADMIN — SODIUM CHLORIDE, PRESERVATIVE FREE 20 ML: 5 INJECTION INTRAVENOUS at 21:34

## 2021-07-16 RX ADMIN — CEFUROXIME AXETIL 250 MG: 250 TABLET ORAL at 20:34

## 2021-07-16 RX ADMIN — NIFEDIPINE 60 MG: 60 TABLET, EXTENDED RELEASE ORAL at 13:20

## 2021-07-16 RX ADMIN — CINACALCET HYDROCHLORIDE 30 MG: 30 TABLET, FILM COATED ORAL at 13:20

## 2021-07-16 RX ADMIN — LABETALOL HYDROCHLORIDE 200 MG: 200 TABLET, FILM COATED ORAL at 13:20

## 2021-07-16 RX ADMIN — DOXYCYCLINE HYCLATE 100 MG: 100 CAPSULE ORAL at 20:34

## 2021-07-16 RX ADMIN — LABETALOL HYDROCHLORIDE 200 MG: 200 TABLET, FILM COATED ORAL at 20:34

## 2021-07-16 RX ADMIN — SEVELAMER CARBONATE 1600 MG: 800 TABLET, FILM COATED ORAL at 16:17

## 2021-07-16 RX ADMIN — CLONIDINE HYDROCHLORIDE 0.1 MG: 0.1 TABLET ORAL at 11:39

## 2021-07-16 RX ADMIN — DOXYCYCLINE HYCLATE 100 MG: 100 CAPSULE ORAL at 13:21

## 2021-07-16 RX ADMIN — PANTOPRAZOLE SODIUM 40 MG: 40 TABLET, DELAYED RELEASE ORAL at 13:20

## 2021-07-16 RX ADMIN — LOSARTAN POTASSIUM 100 MG: 50 TABLET, FILM COATED ORAL at 20:34

## 2021-07-16 RX ADMIN — Medication 10 ML: at 20:34

## 2021-07-16 RX ADMIN — Medication 3 MG: at 21:40

## 2021-07-16 RX ADMIN — CEFUROXIME AXETIL 250 MG: 250 TABLET ORAL at 13:21

## 2021-07-16 RX ADMIN — Medication 10 ML: at 13:19

## 2021-07-16 ASSESSMENT — PAIN SCALES - GENERAL
PAINLEVEL_OUTOF10: 3
PAINLEVEL_OUTOF10: 0
PAINLEVEL_OUTOF10: 5
PAINLEVEL_OUTOF10: 2
PAINLEVEL_OUTOF10: 0

## 2021-07-16 NOTE — PLAN OF CARE
Problem: Pain:  Goal: Pain level will decrease  Description: Pain level will decrease  7/16/2021 1544 by Stefany Bowens RN  Outcome: Ongoing  7/16/2021 0414 by Osbaldo Neal RN  Outcome: Met This Shift  Goal: Control of acute pain  Description: Control of acute pain  7/16/2021 1544 by Stefany Bowens RN  Outcome: Ongoing  7/16/2021 0414 by Osbaldo Neal RN  Outcome: Met This Shift  Goal: Control of chronic pain  Description: Control of chronic pain  7/16/2021 1544 by Stefany Bowens RN  Outcome: Ongoing  7/16/2021 0414 by Osbaldo Neal RN  Outcome: Met This Shift

## 2021-07-16 NOTE — PLAN OF CARE
Problem: Pain:  Goal: Pain level will decrease  Description: Pain level will decrease  7/16/2021 1637 by Galina Gonzalez RN  Outcome: Met This Shift  7/16/2021 1544 by Kelin Wilder RN  Outcome: Ongoing  7/16/2021 0414 by Brittany Ward RN  Outcome: Met This Shift  Goal: Control of acute pain  Description: Control of acute pain  7/16/2021 1637 by Galina Gonzalez RN  Outcome: Met This Shift  7/16/2021 1544 by Kelin Wilder RN  Outcome: Ongoing  7/16/2021 0414 by Brittany Ward RN  Outcome: Met This Shift  Goal: Control of chronic pain  Description: Control of chronic pain  7/16/2021 1637 by Galina Gonzalez RN  Outcome: Met This Shift  7/16/2021 1544 by Kelin Wilder RN  Outcome: Ongoing  7/16/2021 0414 by Brittany Ward RN  Outcome: Met This Shift

## 2021-07-16 NOTE — CARE COORDINATION
7/16/2021  Social Work Discharge Planning:Plan is home alone with supportive family nearby. Pt goes to [address] [facility]  via [address] with his insurance. Pt said he will call for his own ride home at discharge.  Electronically signed by GALA Fisher on 7/16/2021 at 9:19 AM

## 2021-07-16 NOTE — CONSULTS
Signed             I independently interviewed and examined the patient. I have reviewed the above documentation completed by the ALEJANDRA. Please see my additional contributions to the HPI, physical exam, and assessment / medical decision making.     Reason for consult: Moderate pericardial effusion     He was previously known to Dr. Lyric Urias service at [facility]) cardiology [facility]) cardiology.     Mr. Veronika Napoles is a 57-year-old -American gentleman with history of end-stage renal disease on hemodialysis since age of 14, hypothyroidism on HRT, HFpEF, anxiety, depression, hypertension, history of intracranial hemorrhage s/p intracranial artery aneurysmal clipping, and noncompliance with medications who presented to the hospital on 7/14/2021 with complaints of right-sided chest pain and hypoxemia with a pulse ox of 88%. He also generalized weakness and EMS service noted a temperature of 103 °F of fever. He denies any fever, chills and cough also denies any shortness of breath. He had some nausea without vomiting. Currently, he is also enrolled in renal transplant list.  He told me that he developed hypotension after he was initiated on hemodialysis. Currently, he denies any chest pain, dyspnea, palpitations, syncope or presyncope. He had an echocardiogram yesterday which showed moderate pericardial effusion without any tamponade physiology.   It also showed evidence of severe LVH with echo texture suspicious for amyloidosis.     Review of Systems:  Cardiac: As per HPI  General: No fever, chills  Pulmonary: As per HPI  GI: No nausea, vomiting  Musculoskeletal: JUAREZ x 4, no focal motor deficits  Skin: Intact, no rashes  Neuro/Psych: No headache or seizures     Physical Exam:  BP (!) 182/106   Pulse 106   Temp 98 °F (36.7 °C)   Resp 18   Ht 5' 6\" (1.676 m)   Wt 112 lb 7 oz (51 kg)   SpO2 94%   BMI 18.15 kg/m²   Appearance: Awake, alert, no acute respiratory distress  Skin: Intact, no rash  Head: Normocephalic, atraumatic  ENMT: MMM, no rhinorrhea  Neck: Supple, no carotid bruits, JVD is elevated. Lungs: Clear to auscultation bilaterally. No wheezes, rales, or rhonchi. Cardiac: Regular rate and rhythm, +S1S2, no murmurs apparent  Abdomen: Soft, +bowel sounds  Extremities: Moves all extremities x 4, no lower extremity edema  Neurologic: No focal motor deficits apparent, normal mood and affect     Telemetry findings reviewed: SR at rate 100, no new tachy/bradyarrhythmias overnight     EKG: Sinus tachycardia, LVH, biatrial enlargement, nonspecific ST-T changes, abnormal EKG.    Vitals and labs reviewed: Blood pressure 182/106 heart rate 106 O2 sats 94% room air.     Labs-BUNs/creatinine 17/4.1 potassium 4 rest of chemistry normal proBNP on admission was more than 70,000 high-sensitivity troponin 93. Liver function normal H&H 9.7/30 TSH 4.2 Free T4 1.39     1. Treadmill Nuclear Stress Test 08/01/2019: Achieved 89% of THR and 13.3 METS, DTS 10,  without chest pain. No ischemia on ECG. Hypertensive response; No personable perfusion defect.  EF 58%.  No significant WMA. 2. TTE 01/20/2020 (Dr. Padmini De La Fuenteder fraction is visually estimated at 55-60%. Grade III diastolic dysfunction. No regional wall motion abnormalities seen. Severe left ventricular concentric hypertrophy noted. LV mass 264 g. Global longitudinal strain abnormal -15.4%. Strain predominantly abnormal  in the basal segments with relative sparing of the apex. A similar pattern may be seen with cardiac amyloidosis. Moderately dilated right ventricle. Right ventricle global systolic function is normal. Severe biatrial dilation. Mild-moderate mitral regurgitation. Mild tricuspid regurgitation.  RVSP is 58 mmHg. Estimated PA pressure 58/15 mmHg. There is a trivial circumferential pericardial effusion noted with no evidence of hemodynamic compromise. 3. TTE 07/14/2021 (Dr. Micheline Gibbons left ventricle size and systolic function.  Ejection fraction is visually estimated at 60-65%. No regional wall motion abnormalities seen. Severe concentric left ventricular hypertrophy. Stage II diastolic dysfunction. Increased echo texture, consider cardiac MRI to rule out infiltrative cardiomyopathies such as amyloidosis. The left atrium is severely dilated. Normal right ventricular size and function. TAPSE 23 mm. No hemodynamically significant aortic stenosis is present. Mild tricuspid regurgitation.  RVSP is 44 mmHg. Pulmonary hypertension is mild. There is a moderate circumferential pericardial effusion noted. No echocardiographic signs of tamponade.     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 Jeffrey Stallings about hydralazine and work-up for drug-induced lupus. Repeat another echocardiogram in 3 months. · I would also recommend cardiac MRI with contrast as an outpatient to rule out cardiac amyloidosis. We need to arrange hemodialysis couple of hours after cardiac MRI with contrast.  · Management of renal failure and hypertension as per renal service. · Management of pneumonia as per ID service  · He is otherwise stable from the cardiology standpoint.     Thank you for consulting and allowing us to participate in Mr. Karmen Domínguez MD, Nickolas Bailon. [facility]) Cardiology                          Inpatient Cardiology Consultation      Reason for Consult:   Moderate Pericardial Effusion Consulting Physician: Dr. Cristina Hair     Requesting Physician:  Dr. Paul Kasper    Date of Consultation: 7/16/2021    HISTORY OF PRESENT ILLNESS:   Mr. Lady Zhong is a 38year old  male who was seen in initial outpatient consultation with Dr. Rush Serrano on 04/13/2021. His medical history includes ESRD on HD, hypothyroidism, HFpEF, anxiety, depression, HTN, intracranial hemorrhage s/p artery aneurysm clipping, and noncompliance with medications. Mr. Lady Zhong presented to [facility] ED on 07/14/2021 with complaints of right-sided chest pain. He states that prior to presentation over the course of the past 2 days he has been having intermittent right-sided chest pain that lasts approximately \" 5 seconds\" in duration without accompanying dyspnea. He states over the past week he has been waking up diaphoretic without accompanying chest pain or dyspnea. He denies fever, chills, or cough. He also denies orthopnea, PND, palpitations or feelings of his heart racing. He states that he has been compliant with hemodialysis on Monday Wednesday Friday, and states compliance with all medications. He states that his chest discomfort occurs randomly and is unchanged with exertion. Upon arrival to the ED his VS were 99.8-104-16-95% on RA-149/87. Reportedly he was noted to be 88% on RA during hemodialysis. EKG SR. Covid testing negative. WBC 13.2. H&H 8.9/26.7.  BUN/SCR 53/11. 1. Sodium 134. Potassium 4.6. Troponin of 94.  proBNP greater than 70,000. Lactic acid 0.5. CT of the chest without contrast with significant cardiomegaly with a pericardial effusion and trace right pleural effusion. He received Zofran, Benadryl, Advil, doxycycline, and Omnicef. ID was consulted for management of pneumonia. Cardiology was consulted by Dr. Paul Kasper for management of pericardial effusion. Of note he does take hydralazine 3 times daily at home.       Please note: past medical records were reviewed per electronic medical record (EMR) - see detailed reports under Past Medical/ Surgical History. Past Medical and Surgical History:    1. Treadmill Nuclear Stress Test 08/01/2019: Achieved 89% of THR and 13.3 METS, DTS 10,  without chest pain. No ischemia on ECG. Hypertensive response; No personable perfusion defect. EF 58%. No significant WMA. 2. TTE 01/20/2020 (Dr. Randi Sebastian): Ejection fraction is visually estimated at 55-60%. Grade III diastolic dysfunction. No regional wall motion abnormalities seen. Severe left ventricular concentric hypertrophy noted. LV mass 264 g. Global longitudinal strain abnormal -15.4%. Strain predominantly abnormal  in the basal segments with relative sparing of the apex. A similar pattern may be seen with cardiac amyloidosis. Moderately dilated right ventricle. Right ventricle global systolic function is normal. Severe biatrial dilation. Mild-moderate mitral regurgitation. Mild tricuspid regurgitation. RVSP is 58 mmHg. Estimated PA pressure 58/15 mmHg. There is a trivial circumferential pericardial effusion noted with no evidence of hemodynamic compromise. 3. TTE 07/14/2021 (Dr. Klaudia Arreola): Normal left ventricle size and systolic function. Ejection fraction is visually estimated at 60-65%. No regional wall motion abnormalities seen. Severe concentric left ventricular hypertrophy. Stage II diastolic dysfunction. Increased echo texture, consider cardiac MRI to rule out infiltrative cardiomyopathies such as amyloidosis. The left atrium is severely dilated. Normal right ventricular size and function. TAPSE 23 mm. No hemodynamically significant aortic stenosis is present. Mild tricuspid regurgitation. RVSP is 44 mmHg. Pulmonary hypertension is mild. There is a moderate circumferential pericardial effusion noted. No echocardiographic signs of tamponade. 4. HTN  5. Hypothyroidism  6. ESRD on HD (since age 14) via AV fistula, currently being evaluated for renal transplant at [facility]  7. Hx HFpEF  8.  Depression and Anxiety 9. Intracranial hemorrhage secondary to ruptured berry aneurysm s/p aneurysm clipping  10. Noncompliance with medications          Medications Prior to admit:  Prior to Admission medications    Medication Sig Start Date End Date Taking?  Authorizing Provider   cefdinir (OMNICEF) 300 MG capsule Take 1 capsule by mouth 2 times daily for 10 days 7/14/21 7/24/21 Yes Rishabh Marshall,    doxycycline hyclate (VIBRA-TABS) 100 MG tablet Take 1 tablet by mouth 2 times daily for 10 days 7/14/21 7/24/21 Yes Rishabh Marshall,    pantoprazole (PROTONIX) 40 MG tablet Take 1 tablet by mouth daily 6/3/21  Yes Jacques Lynn MD   minoxidil (LONITEN) 2.5 MG tablet  4/7/21  Yes Historical Provider, MD   Methoxy PEG-Epoetin Beta (MIRCERA IJ) 75 mcg 1/22/21 4/8/22 Yes Historical Provider, MD   Cinacalcet HCl (SENSIPAR PO) Take 30 mg by mouth 3/12/21 3/11/22 Yes Historical Provider, MD   diphenhydrAMINE (SOMINEX) 25 MG tablet Take 25 mg by mouth every 6 hours as needed   Yes Historical Provider, MD   melatonin 3 MG TABS tablet Take 1 tablet by mouth nightly as needed (sleep) 3/30/21  Yes Soledad Rees MD   NIFEdipine (ADALAT CC) 60 MG extended release tablet Take 1 tablet by mouth daily 3/30/21  Yes Soledad Rees MD   labetalol (NORMODYNE) 200 MG tablet Take 1 tablet by mouth three times daily 3/30/21  Yes Soledad Rees MD   losartan (COZAAR) 100 MG tablet Take 1 tablet by mouth every evening 3/30/21  Yes Soledad Rees MD   hydrALAZINE (APRESOLINE) 50 MG tablet Take 1 tablet by mouth 3 times daily 3/30/21  Yes Soledad Rees MD   cloNIDine (CATAPRES) 0.3 MG tablet Take 1 tablet by mouth 3 times daily 3/30/21  Yes Soledad Rees MD   iron sucrose (VENOFER) 20 MG/ML injection Infuse 5 mLs intravenously three times a week Infuse 100 mg intravenously three times a week Given in dialysis every Monday ,Wednesday ,Friday 3/17/21  Yes Flower Gee MD   sevelamer (RENVELA) 800 MG tablet Take 2 tablets by mouth 3 times daily    Yes Historical Provider, MD   lidocaine-prilocaine (EMLA) 2.5-2.5 % cream APPLY SMALL AMOUNT TO ACCESS SITE (AVF) 1 TO 2 HOURS BEFORE DIALYSIS.  COVER WITH OCCLUSIVE DRESSING (SARAN WRAP) 3/22/21   Historical Provider, MD       Current Medications:    Current Facility-Administered Medications: doxycycline hyclate (VIBRAMYCIN) capsule 100 mg, 100 mg, Oral, 2 times per day  cefUROXime (CEFTIN) tablet 250 mg, 250 mg, Oral, 2 times per day  cloNIDine (CATAPRES) tablet 0.1 mg, 0.1 mg, Oral, Q6H PRN  sevelamer (RENVELA) tablet 1,600 mg, 1,600 mg, Oral, TID WC  melatonin tablet 3 mg, 3 mg, Oral, Nightly PRN  NIFEdipine (PROCARDIA XL) extended release tablet 60 mg, 60 mg, Oral, Daily  labetalol (NORMODYNE) tablet 200 mg, 200 mg, Oral, TID  losartan (COZAAR) tablet 100 mg, 100 mg, Oral, QPM  [Held by provider] hydrALAZINE (APRESOLINE) tablet 50 mg, 50 mg, Oral, TID  cinacalcet (SENSIPAR) tablet 30 mg, 30 mg, Oral, Daily  diphenhydrAMINE (BENADRYL) tablet 25 mg, 25 mg, Oral, Q6H PRN  pantoprazole (PROTONIX) tablet 40 mg, 40 mg, Oral, Daily  [Held by provider] minoxidil (LONITEN) tablet 2.5 mg, 2.5 mg, Oral, Daily  sodium chloride flush 0.9 % injection 5-40 mL, 5-40 mL, Intravenous, 2 times per day  sodium chloride flush 0.9 % injection 5-40 mL, 5-40 mL, Intravenous, PRN  0.9 % sodium chloride infusion, 25 mL, Intravenous, PRN  heparin (porcine) injection 5,000 Units, 5,000 Units, Subcutaneous, 3 times per day  ondansetron (ZOFRAN-ODT) disintegrating tablet 4 mg, 4 mg, Oral, Q8H PRN **OR** ondansetron (ZOFRAN) injection 4 mg, 4 mg, Intravenous, Q6H PRN  polyethylene glycol (GLYCOLAX) packet 17 g, 17 g, Oral, Daily PRN  acetaminophen (TYLENOL) tablet 650 mg, 650 mg, Oral, Q6H PRN **OR** acetaminophen (TYLENOL) suppository 650 mg, 650 mg, Rectal, Q6H PRN  epoetin katlyn-epbx (RETACRIT) injection 3,000 Units, 3,000 Units, Subcutaneous, Once per day on Mon Wed Fri **AND** epoetin katlyn-epbx (RETACRIT) injection 2,000 Units, 2,000 Units, Subcutaneous, Once per day on Mon Wed Fri    Allergies:  Tylenol [acetaminophen] and Levofloxacin    Social History:    Denies tobacco, alcohol, illicit drug use. Caffeine intake includes occasional soda pop    Family History:   Denies family history of premature CAD. REVIEW OF SYSTEMS:     · Constitutional: Denies fevers, chills, night sweats, and fatigue  · HEENT: Denies headaches, nose bleeds, and blurred vision,oral pain, abscess or lesion. · Musculoskeletal: Denies falls, pain to BLE with ambulation and edema to BLE. · Neurological: Denies dizziness and lightheadedness, numbness and tingling  · Cardiovascular: Complains of right-sided chest pain-see HPI. Denies palpitations, and feelings of heart racing. · Respiratory: Denies orthopnea and PND  · Gastrointestinal: Denies heartburn, nausea/vomiting, diarrhea and constipation, black/bloody, and tarry stools. · Genitourinary: Denies dysuria and hematuria  · Hematologic: Denies excessive bruising or bleeding  · Lymphatic: Denies lumps and bumps to neck, axilla, breast, and groin  · Endocrine: Denies excessive thirst. Denies intolerance to hot and cold  · Psychiatric: Denies anxiety and depression. PHYSICAL EXAM:   BP (!) 188/99   Pulse 99   Temp 98 °F (36.7 °C)   Resp 18   Ht 5' 6\" (1.676 m)   Wt 113 lb (51.3 kg)   SpO2 94%   BMI 18.24 kg/m²   CONST:  Well developed, thin, frail appearing middle-aged male who appears stated age. Awake, alert, cooperative, no apparent distress  HEENT:   Head- Normocephalic, atraumatic   Eyes- Conjunctivae pink, anicteric  Throat- Oral mucosa pink and moist  Neck-  No stridor, trachea midline, no jugular venous distention. No adenopathy   CHEST: Chest symmetrical and non-tender to palpation.  No accessory muscle use or intercostal retractions  RESPIRATORY: Lung sounds - clear throughout fields   CARDIOVASCULAR:     No carotid bruit  Heart Inspection- shows no noted pulsations  Heart Palpation- no heaves or thrills; PMI is non-displaced   Heart Ausculation- Regular rate and rhythm, no murmur. No s3, s4 or rub   PV: No lower extremity edema. No varicosities. Pedal pulses palpable, no clubbing or cyanosis. Left arm AV fistula noted, DSD in place, recently finished HD. ABDOMEN: Soft, non-tender to light palpation. Bowel sounds present. No palpable masses no organomegaly; no abdominal bruit  MS: Good muscle strength and tone. No atrophy or abnormal movements. : Deferred  SKIN: Warm and dry no statis dermatitis or ulcers   NEURO / PSYCH: Oriented to person, place and time. Speech clear and appropriate. Follows all commands. Pleasant affect     DATA:    ECG: As above  Tele strips: As above  Diagnostic:      Intake/Output Summary (Last 24 hours) at 7/16/2021 1150  Last data filed at 7/15/2021 1550  Gross per 24 hour   Intake --   Output 1300 ml   Net -1300 ml       Labs:   CBC:   Recent Labs     07/15/21  0455 07/16/21  0205   WBC 10.5 8.0   HGB 9.2* 9.7*   HCT 28.6* 30.0*    278     BMP:   Recent Labs     07/15/21  0455 07/16/21  0205    139   K 4.3 4.0   CO2 28 31*   BUN 22* 17   CREATININE 5.7* 4.1*   LABGLOM 14 20   CALCIUM 9.2 9.1   TSH:   Recent Labs     07/15/21  0455   TSH 4.260*     FASTING LIPID PANEL:  Lab Results   Component Value Date    CHOL 157 10/21/2015    HDL 52 10/21/2015    LDLCALC 89 10/21/2015    TRIG 82 10/21/2015     LIVER PROFILE:  Recent Labs     07/15/21  0455 07/16/21  0205   AST 19 23   ALT 13 15   LABALBU 3.7 3.7     Results for Thomas Kerns (MRN [id number]) as of 7/16/2021 11:55   Ref. Range 7/14/2021 08:21 7/14/2021 09:55   Pro-BNP Latest Ref Range: 0 - 125 pg/mL >70,000 (H)    Troponin, High Sensitivity Latest Ref Range: 0 - 11 ng/L 94 (H) 93 (H)     07/14/2021 CXR:  Faint patchy bilateral pulmonary opacities. Correlate for possible infectious process.   Prominent cardiac silhouette.    07/14/2021 CT Chest without contrast:   There is dense consolidation of the posterior right lower lobe with air bronchograms, compatible with pneumonia. Selene Knuckles is a trace right pleural effusion. Significant cardiomegaly with pericardial effusion. Bilateral axillary lymphadenopathy.  Recommend follow-up/further evaluation, if not already performed. 07/15/2021 CXR:   Persistent faint right lower lobe opacity. Stable cardiomegaly.       IMPRESSION and PLAN to follow per Dr. Bryanna Ballesteros     Electronically signed by KENDRA Santillan CNP on 7/16/2021 at 11:50 AM

## 2021-07-16 NOTE — PROGRESS NOTES
[address] [place] [address] [facility]  [facility]  Progress Note    SUBJECTIVE:  Chief Complaint   Patient presents with    Chest Pain     R sided chest pain intermittently dialysis dasys M-W-F    Other     Low pulse ox at dialysis this am 88% on room air     Patient has low-grade fevers. He has a nonproductive cough. Tolerating antibiotics  Feeling better. Review of systems:  As stated above in the chief complaint, otherwise negative. Medications:  Scheduled Meds:   doxycycline hyclate  100 mg Oral 2 times per day    cefUROXime  250 mg Oral 2 times per day    sevelamer  1,600 mg Oral TID WC    NIFEdipine  60 mg Oral Daily    labetalol  200 mg Oral TID    losartan  100 mg Oral QPM    [Held by provider] hydrALAZINE  50 mg Oral TID    cinacalcet  30 mg Oral Daily    pantoprazole  40 mg Oral Daily    [Held by provider] minoxidil  2.5 mg Oral Daily    sodium chloride flush  5-40 mL Intravenous 2 times per day    heparin (porcine)  5,000 Units Subcutaneous 3 times per day    epoetin katlyn-epbx  3,000 Units Subcutaneous Once per day on     And    epoetin katlyn-epbx  2,000 Units Subcutaneous Once per day on      Continuous Infusions:   sodium chloride       PRN Meds:cloNIDine, melatonin, diphenhydrAMINE, sodium chloride flush, sodium chloride, ondansetron **OR** ondansetron, polyethylene glycol, acetaminophen **OR** acetaminophen    OBJECTIVE:  BP (!) 166/100   Pulse 82   Temp 98.1 °F (36.7 °C) (Oral)   Resp 18   Ht 5' 6\" (1.676 m)   Wt 113 lb (51.3 kg)   SpO2 94%   BMI 18.24 kg/m²   Temp  Av.7 °F (37.1 °C)  Min: 98.1 °F (36.7 °C)  Max: 100.1 °F (37.8 °C)  Constitutional: The patient is awake, alert, and oriented. Skin: Warm and dry. No rashes were noted. HEENT: Round and reactive pupils. Moist mucous membranes. No ulcerations or thrush. Neck: Supple to movements. Chest: No use of accessory muscles to breathe. Symmetrical expansion.   Crackles right base posteriorly. Cardiovascular: S1 and S2 are rhythmic and regular. No murmurs appreciated. Abdomen: Positive bowel sounds to auscultation. Benign to palpation. Extremities: No edema. Left AV fistula in use. Lines: peripheral    Laboratory and Tests Review:  Lab Results   Component Value Date    WBC 8.0 07/16/2021    WBC 10.5 07/15/2021    WBC 13.2 (H) 07/14/2021    HGB 9.7 (L) 07/16/2021    HCT 30.0 (L) 07/16/2021    MCV 92.9 07/16/2021     07/16/2021     Lab Results   Component Value Date    NEUTROABS 10.29 (H) 07/14/2021    NEUTROABS 2.22 03/17/2021    NEUTROABS 4.63 03/15/2021     No results found for: [facility]  Lab Results   Component Value Date    ALT 15 07/16/2021    AST 23 07/16/2021    ALKPHOS 71 07/16/2021    BILITOT 0.5 07/16/2021     Lab Results   Component Value Date     07/16/2021    K 4.0 07/16/2021    K 4.6 07/14/2021    CL 97 07/16/2021    CO2 31 07/16/2021    BUN 17 07/16/2021    CREATININE 4.1 07/16/2021    CREATININE 5.7 07/15/2021    CREATININE 11.1 07/14/2021    GFRAA 20 07/16/2021    LABGLOM 20 07/16/2021    GLUCOSE 106 07/16/2021    PROT 7.9 07/16/2021    LABALBU 3.7 07/16/2021    CALCIUM 9.1 07/16/2021    BILITOT 0.5 07/16/2021    ALKPHOS 71 07/16/2021    AST 23 07/16/2021    ALT 15 07/16/2021     Lab Results   Component Value Date    CRP 6.6 (H) 02/01/2021    CRP 9.0 (H) 01/03/2019    CRP 4.4 (H) 10/21/2015     Lab Results   Component Value Date    SEDRATE 94 (H) 07/15/2021    SEDRATE 39 (H) 02/01/2021    SEDRATE 37 (H) 10/21/2015     Radiology:  Chest x-ray reviewed.   Infiltrate improving    Microbiology:   Urinary antigens cannot be done  Rapid SARS-CoV-2: Not detected  Blood culture 7/14/2021: Negative so far  Neuro screen MRSA: Pending    ASSESSMENT:  · Right lower lobe pneumonia  · Fever with leukocytosis secondary to pneumonia  · CKD, on HD Doxycycline and Cefuroxime    PLAN:  · Continue Doxycycline and Cefuroxime for at least 7 days upon discharge  · The patient can be discharged from ID standpoint    Jose Alfredo Bazan MD  10:28 AM  7/16/2021

## 2021-07-16 NOTE — CONSULTS
at 60-65%. No regional wall motion abnormalities seen. Severe concentric left ventricular hypertrophy. Stage II diastolic dysfunction. Increased echo texture, consider cardiac MRI to rule out infiltrative cardiomyopathies such as amyloidosis. The left atrium is severely dilated. Normal right ventricular size and function. TAPSE 23 mm. No hemodynamically significant aortic stenosis is present. Mild tricuspid regurgitation.  RVSP is 44 mmHg. Pulmonary hypertension is mild. There is a moderate circumferential pericardial effusion noted. No echocardiographic signs of tamponade. 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 Keri Martin about hydralazine and work-up for drug-induced lupus. Repeat another echocardiogram in 3 months. · I would also recommend cardiac MRI with contrast as an outpatient to rule out cardiac amyloidosis. We need to arrange hemodialysis couple of hours after cardiac MRI with contrast.  · Management of renal failure and hypertension as per renal service. · Management of pneumonia as per ID service  · He is otherwise stable from the cardiology standpoint. Thank you for consulting and allowing us to participate in Mr. Valentin's care. Brie Larsen MD, Bhavani Cooper.   [facility]) Cardiology

## 2021-07-16 NOTE — FLOWSHEET NOTE
07/16/21 1217   Vital Signs   BP (!) 188/99   Temp 98 °F (36.7 °C)   Pulse 94   Resp 18   Weight 112 lb 7 oz (51 kg)   Weight Method Estimated   Percent Weight Change -0.5   Pain Assessment   Pain Assessment 0-10   Pain Level 2   Post-Hemodialysis Assessment   Post-Treatment Procedures Blood returned; Access bleeding time < 10 minutes   Machine Disinfection Process Exterior Machine Disinfection   Rinseback Volume (ml) 300 ml   Dialyzer Clearance Clear   Duration of Treatment (minutes) 120 minutes   Hemodialysis Output (ml) 900 ml   NET Removed (ml) 600 ml   Tolerated Treatment Fair   Patient Response to Treatment pt became hypertensive treated with clonodine, pt dc tx rtd 60   Bilateral Breath Sounds Clear   Edema None   Physician Notified?  Yes

## 2021-07-16 NOTE — PROGRESS NOTES
NEPHROLOGY Attending   Progress Note  7/16/2021 11:38 AM  Subjective:   Admit Date: 7/14/2021  PCP: Jason Currie DO      History of Present Ilness:    Leona Farmer is a 38 y.o. male with a history of hypertension, ESRD HD dependent MWF,HFpEF with several other medical problems. Presented to ED today from dialysisi  For fever. He reports some associated associate shortness of breath. Denies any nausea or diaphoresis. Does experience some exertional dyspnea while going upstairs, blood pressure in the ED was [phone] with a pulse of   Laboratory data showed a BUN of 53, creatinine 11.1, potassium 4.6, WBC 3.8 hemoglobin 8.9 hematocrit 26.7, platelets 220. Troponin was 93 and Pro-BNP >70,000. Chest x-ray showed a possible right lower lobe CT Scan of the chest showed a pericardial effusion and a dense consolidation in the RLL  Patient received doxycycline and cefdinir. He was then taken to the hemodialysis suite for treatment to be followed by admission.       Interval History:     7/16/2021: Patient seen sitting up in bed on dialysis in no acute distress - currently without chest pain or shortness of breath at rest but very weak - low grade fever last ana      Diet: ADULT DIET;  Regular    Data:   Scheduled Meds:   doxycycline hyclate  100 mg Oral 2 times per day    cefUROXime  250 mg Oral 2 times per day    sevelamer  1,600 mg Oral TID WC    NIFEdipine  60 mg Oral Daily    labetalol  200 mg Oral TID    losartan  100 mg Oral QPM    [Held by provider] hydrALAZINE  50 mg Oral TID    cinacalcet  30 mg Oral Daily    pantoprazole  40 mg Oral Daily    [Held by provider] minoxidil  2.5 mg Oral Daily    sodium chloride flush  5-40 mL Intravenous 2 times per day    heparin (porcine)  5,000 Units Subcutaneous 3 times per day    epoetin katlyn-epbx  3,000 Units Subcutaneous Once per day on Mon Wed Fri    And    epoetin katlyn-epbx  2,000 Units Subcutaneous Once per day on Mon Wed Fri Continuous Infusions:   sodium chloride       PRN Meds:cloNIDine, melatonin, diphenhydrAMINE, sodium chloride flush, sodium chloride, ondansetron **OR** ondansetron, polyethylene glycol, acetaminophen **OR** acetaminophen    Intake/Output Summary (Last 24 hours) at 7/16/2021 1138  Last data filed at 7/15/2021 1550  Gross per 24 hour   Intake --   Output 1300 ml   Net -1300 ml     CBC:   Recent Labs     07/14/21  0821 07/15/21  0455 07/16/21  0205   WBC 13.2* 10.5 8.0   HGB 8.9* 9.2* 9.7*    253 278     BMP:    Recent Labs     07/14/21  0821 07/15/21  0455 07/16/21  0205    138 139   K 4.6 4.3 4.0   CL 92* 97* 97*   CO2 25 28 31*   BUN 53* 22* 17   CREATININE 11.1* 5.7* 4.1*   GLUCOSE 112* 96 106*     Hepatic:   Recent Labs     07/14/21  0821 07/15/21  0455 07/16/21  0205   AST 13 19 23   ALT 8 13 15   BILITOT 0.6 0.6 0.5   ALKPHOS 75 77 71     Troponin: No results for input(s): TROPONINI in the last 72 hours. BNP: No results for input(s): BNP in the last 72 hours. Lipids: No results for input(s): CHOL, HDL in the last 72 hours. Invalid input(s): LDLCALCU  ABGs: No results found for: PHART, PO2ART, ULG6UTT  INR: No results for input(s): INR in the last 72 hours. -----------------------------------------------------------------  RAD: Echo Complete    Result Date: 7/14/2021  Findings   Left Ventricle  Normal left ventricle size and systolic function. Ejection fraction is visually estimated at 60-65%. No regional wall motion abnormalities seen. Severe concentric left ventricular hypertrophy. Stage II diastolic dysfunction. Increased echo texture, consider cardiac MRI rule out infiltrative  cardiomyopathies such as amyloidosis. Images are foreshortened. Right Ventricle  Normal right ventricular size and function. TAPSE 23 mm. Left Atrium  The left atrium is severely dilated. Right Atrium  Mildly enlarged right atrium size. Mitral Valve  Normal mitral valve structure and function.   No evidence of mitral valve stenosis. Physiologic and/or trace mitral regurgitation is present. Tricuspid Valve  The tricuspid valve appears structurally normal.  Mild tricuspid regurgitation. RVSP is 44 mmHg. Pulmonary hypertension is mild . Aortic Valve  Structurally normal aortic valve. The aortic valve is trileaflet. No hemodynamically significant aortic stenosis is present. No evidence of aortic valve regurgitation. Pulmonic Valve  Pulmonic valve is structurally normal.  Physiologic and/or trace pulmonic regurgitation present. No evidence of pulmonic valve stenosis. Pericardial Effusion  There is a moderate circumferential pericardial effusion noted. No  echocardiographic signs of tamponade. Pleural Effusion  Small left pleural effusion. Aorta  Normal aortic root and ascending aorta. Miscellaneous  The inferior vena cava diameter is normal with normal respiratory  variation. Conclusions   Summary  Normal left ventricle size and systolic function. Ejection fraction is visually estimated at 60-65%. No regional wall motion abnormalities seen. Severe concentric left ventricular hypertrophy. Stage II diastolic dysfunction. Increased echo texture, consider cardiac MRI to rule out infiltrative  cardiomyopathies such as amyloidosis. The left atrium is severely dilated. Normal right ventricular size and function. TAPSE 23 mm. No hemodynamically significant aortic stenosis is present. Mild tricuspid regurgitation. RVSP is 44 mmHg. Pulmonary hypertension is mild . There is a moderate circumferential pericardial effusion noted. No  echocardiographic signs of tamponade. CT CHEST WO CONTRAST    Result Date: 7/14/2021  EXAMINATION: CT OF THE CHEST WITHOUT CONTRAST 7/14/2021 10:23 am TECHNIQUE: CT of the chest was performed without the administration of intravenous contrast. Multiplanar reformatted images are provided for review.  Dose modulation, iterative reconstruction, and/or weight based adjustment of the mA/kV was utilized to reduce the radiation dose to as low as reasonably achievable. COMPARISON: 02/10/2020 HISTORY: ORDERING SYSTEM PROVIDED HISTORY: possible pneumonia TECHNOLOGIST PROVIDED HISTORY: Reason for exam:->possible pneumonia Decision Support Exception - unselect if not a suspected or confirmed emergency medical condition->Emergency Medical Condition (MA) FINDINGS: Mediastinum: There is significant cardiomegaly again identified. There is also a pericardial effusion, measuring up to 14 mm in thickness. Aorta appears normal in caliber. There is prominence of the main pulmonary artery. Mildly prominent left paratracheal lymph node, measuring up to 11 mm in short axis. Lungs/pleura: There is dense consolidation of the posterior right lower lobe, with air bronchograms, compatible with pneumonia. The central airways appear patent. There is platelike atelectasis or scarring at the left lung base. No pneumothorax. Trace right pleural effusion. Upper Abdomen: Kidneys are atrophic. Soft Tissues/Bones: Prominent bilateral axillary lymph nodes. Diffuse increased density of the osseous structures, which can be seen with renal osteodystrophy. There is dense consolidation of the posterior right lower lobe with air bronchograms, compatible with pneumonia. There is a trace right pleural effusion. Significant cardiomegaly with pericardial effusion. Bilateral axillary lymphadenopathy. Recommend follow-up/further evaluation, if not already performed. XR CHEST PORTABLE    Result Date: 7/14/2021  EXAMINATION: ONE XRAY VIEW OF THE CHEST 7/14/2021 8:09 am COMPARISON: 03/16/2021 HISTORY: ORDERING SYSTEM PROVIDED HISTORY: shortness of breath TECHNOLOGIST PROVIDED HISTORY: Reason for exam:->shortness of breath FINDINGS: Prominent cardiac silhouette. There are faint patchy bilateral pulmonary airspace opacities. No sizable effusion or pneumothorax. No acute osseous abnormality.      Faint patchy bilateral pulmonary opacities. Correlate for possible infectious process. Prominent cardiac silhouette. Objective:   Vitals:   Vitals:    07/16/21 1100   BP: (!) 155/100   Pulse: 94   Resp:    Temp:    SpO2:      Patient Vitals for the past 24 hrs:   BP Temp Temp src Pulse Resp SpO2 Weight   07/16/21 1100 (!) 155/100 -- -- 94 -- -- --   07/16/21 1030 (!) 164/99 -- -- 99 -- -- --   07/16/21 1000 (!) 166/100 -- -- 82 -- -- --   07/16/21 0955 (!) 162/92 98 °F (36.7 °C) -- 83 18 -- --   07/16/21 0800 (!) 157/93 98.1 °F (36.7 °C) Oral 87 18 94 % --   07/16/21 0446 -- -- -- -- -- -- 113 lb (51.3 kg)   07/15/21 2345 117/64 98.2 °F (36.8 °C) Oral 90 16 96 % --   07/15/21 2115 110/63 100.1 °F (37.8 °C) Oral 104 16 96 % --   07/15/21 1630 119/67 -- -- -- -- -- --   07/15/21 1550 132/79 98.2 °F (36.8 °C) -- 90 16 -- 112 lb 14 oz (51.2 kg)   07/15/21 1530 132/72 -- -- 96 -- -- --   07/15/21 1500 126/86 -- -- 98 -- -- --   07/15/21 1430 130/84 -- -- 100 -- -- --   07/15/21 1400 138/78 -- -- 89 -- -- --   07/15/21 1330 99/61 -- -- 95 -- -- --   07/15/21 1300 116/72 -- -- 91 -- -- --   07/15/21 1230 114/72 -- -- 90 -- -- --   07/15/21 1200 109/67 -- -- 92 -- -- --   07/15/21 1145 109/66 -- -- 90 -- -- --     General appearance: alert, appears stated age and cooperative  Skin: Skin color, texture, turgor normal. No rashes or lesions  HEENT: Head: Normocephalic, no lesions, without obvious abnormality. Neck: no adenopathy, no carotid bruit, no JVD, supple, symmetrical, trachea midline and thyroid not enlarged, symmetric, no tenderness/mass/nodules  Lungs:  Few rhonchi R base  Heart: regular rate and rhythm, S1, S2 normal, no murmur, click, rub or gallop  Abdomen: soft, non-tender; bowel sounds normal; no masses,  no organomegaly  Extremities: extremities normal, atraumatic, no cyanosis or edema  Neurologic: Mental status: Alert, oriented, thought content appropriate  Dialysis access: Left upper extremity AVF with good thrill and bruit      Assessment/Plan:   1. Hypertensive with CKD G5/ESKD states he has been compliant with his antihypertensive medications  -BP initially above target <140/90 but trending lower  With the presence of the pericardial effusion which is larger than previous will stop the minoxidil and as he is on the hydralazine check an CLAY and a sed rate as well as C and an Anti-Histone Ab for a Lupus Like reaction to hydralazine or labetalol both of which can commonly cause the issue  PLAN  1. As of 7/15/2021, stopped minoxidil and hydralazine  2. Added hold parameters to all antihypertensives  3. Clonidine PRN status  4. Follow BP closely for likely additional needed adjustments     2. Pne with Hypoxia  PLAN:  1. Await BC  2. Defer to ID for antibx     3. ESRD, HD dependent M WF  PLAN:  1. We will continue his usual hemodialysis schedule MWF with additional dialysis for the pericardial effusion     4. Anemia due to CKD  Last 3/8/21 ferritin 669, Tsat-35% -at targets  Receives Mircera 50 mcg q 2 weeks at outpatient HD clinic  PLAN:  1. Follow daily CBC   2. Continue PREETI     5.  Pericardial Effusion  C3 & C4 not suppressed  TSH 4.260 elevated but not to level to lead to pericardial effusion with the normal free T4  Await serologies and SPEP and UPEP  Concern for Amyloid on the last 2 D [facility]   7/16: Reviewed w/ Cardiology NP - has followed w/ Cardiology in the past - will consult Dr Cristina Hair as he interpreted current echo          Thank you for allowing us to participate in care of KENDRA De La Cruz - CNP   Patient seen and examined. I had a face to face encounter with the patient. Agree with exam.    Agree with  formulation, assessment and plan as outlined above and directed by me. Addition and corrections were done as deemed appropriate. My exam and plan include:     A/P:  1.  ESKD-pt in IHD earlier today and treatment terminated early for severe HA and accelerated HTN-repeat BP when up on the floor 182/106 and received clonidine 0.1mg  BP prior to IHD was in goal range-would hold on discharge for present    2.  Pericardial Effusion and 2 prior 2 D ECHO's with issues of amyloid raised-SPEP (-) monoclonal protein-await Cardiology to see      JULEE Oliva MD

## 2021-07-16 NOTE — TELEPHONE ENCOUNTER
Left patient a message in regards to calling back to reschedule echocardiogram due to no provider on 7/23.

## 2021-07-16 NOTE — PROGRESS NOTES
[facility] Hospitalist   Progress Note    Admitting Date and Time: 7/14/2021  7:58 AM  Admit Dx: Pneumonia [J18.9]    Subjective: Admitted on 14th, came with right-sided chest pain and hypoxia, patient with ESRD, HD on Monday Wednesday Friday. Heart failure with preserved EF, hypertension, patient did have fevers. Impression on presentation of sepsis due to HCAP. Renal following. ID has changed doxycycline to p.o. and started on cefuroxime. Impression of HCAP. Plans to continue for 7 days following discharge. Patient with still with significant hypertension. New moderate pericardial effusion likely secondary to use of hydralazine. Work-up for drug induced lupus in progress. Patient was admitted with Pneumonia [J18.9]. Patient is awake, alert, does communicate well, do have decreased in his exercise tolerance, going on for last couple of months, extreme weakness only for a week. On dialysis since age 14. Per RN: No new complaints. ROS: denies fever, chills, cp, sob, n/v, HA unless stated above.      doxycycline hyclate  100 mg Oral 2 times per day    cefUROXime  250 mg Oral 2 times per day    sevelamer  1,600 mg Oral TID WC    NIFEdipine  60 mg Oral Daily    labetalol  200 mg Oral TID    losartan  100 mg Oral QPM    [Held by provider] hydrALAZINE  50 mg Oral TID    cinacalcet  30 mg Oral Daily    pantoprazole  40 mg Oral Daily    [Held by provider] minoxidil  2.5 mg Oral Daily    sodium chloride flush  5-40 mL Intravenous 2 times per day    heparin (porcine)  5,000 Units Subcutaneous 3 times per day    epoetin katlyn-epbx  3,000 Units Subcutaneous Once per day on Mon Wed Fri    And    epoetin katlyn-epbx  2,000 Units Subcutaneous Once per day on Mon Wed Fri     cloNIDine, 0.1 mg, Q6H PRN  melatonin, 3 mg, Nightly PRN  diphenhydrAMINE, 25 mg, Q6H PRN  sodium chloride flush, 5-40 mL, PRN  sodium chloride, 25 mL, PRN  ondansetron, 4 mg, Q8H PRN   Or  ondansetron, 4 mg, Q6H PRN  polyethylene glycol, 17 g, Daily PRN  acetaminophen, 650 mg, Q6H PRN   Or  acetaminophen, 650 mg, Q6H PRN         Objective:    BP (!) 157/93   Pulse 87   Temp 98.1 °F (36.7 °C) (Oral)   Resp 18   Ht 5' 6\" (1.676 m)   Wt 113 lb (51.3 kg)   SpO2 94%   BMI 18.24 kg/m²   General Appearance: alert and oriented to person, place and time, well-developed and well-nourished, in no acute distress  Skin: warm and dry, no rash or erythema  Head: normocephalic and atraumatic  Eyes: pupils equal, round, and reactive to light, extraocular eye movements intact, conjunctivae normal  ENT: tympanic membrane, external ear and ear canal normal bilaterally, oropharynx clear and moist with normal mucous membranes  Neck: neck supple and non tender without mass, no thyromegaly or thyroid nodules, no cervical lymphadenopathy   Pulmonary/Chest: clear to auscultation bilaterally- no wheezes, rales or rhonchi, normal air movement, no respiratory distress  Cardiovascular: normal rate, normal S1 and S2, no gallops, intact distal pulses and no carotid bruits  Abdomen: soft, non-tender, non-distended, normal bowel sounds, no masses or organomegaly      Recent Labs     07/14/21  0821 07/15/21  0455 07/16/21  0205    138 139   K 4.6 4.3 4.0   CL 92* 97* 97*   CO2 25 28 31*   BUN 53* 22* 17   CREATININE 11.1* 5.7* 4.1*   GLUCOSE 112* 96 106*   CALCIUM 9.9 9.2 9.1       Recent Labs     07/14/21  0821 07/15/21  0455 07/16/21  0205   WBC 13.2* 10.5 8.0   RBC 2.94* 3.04* 3.23*   HGB 8.9* 9.2* 9.7*   HCT 26.7* 28.6* 30.0*   MCV 90.8 94.1 92.9   MCH 30.3 30.3 30.0   MCHC 33.3 32.2 32.3   RDW 15.0 15.2* 15.2*    253 278   MPV 9.5 9.6 9.8     WBC 10.5 / 8.0  CLAY negative  ESR 94    Radiology:   XR CHEST PORTABLE   Final Result   Persistent faint right lower lobe opacity. Stable cardiomegaly.          CT CHEST WO CONTRAST   Final Result   There is dense consolidation of the posterior right lower lobe with air   bronchograms, compatible with pneumonia. There is a trace right pleural   effusion. Significant cardiomegaly with pericardial effusion. Bilateral axillary lymphadenopathy. Recommend follow-up/further evaluation,   if not already performed. XR CHEST PORTABLE   Final Result   Faint patchy bilateral pulmonary opacities. Correlate for possible   infectious process. Prominent cardiac silhouette. Assessment:    Active Problems:    ESRD on hemodialysis ([facility] [address]    Sepsis due to pneumonia ([facility] [address]    Chronic heart failure with preserved ejection fraction (HCC)  Resolved Problems:    * No resolved hospital problems. *      Plan:  1. Sepsis, improved. 2. More due to HCAP, patient evaluated by ID, remains on antibiotics. Doxycycline and Ceftin-day 2  3. ESRD, on HD, nephrology following, patient did have hemodialysis today. 4. Hypertension, not well controlled, nephrology making medication changes, also concern over possible drug-induced lupus, hydralazine leading to pericardial effusion. 5. Anemia, multifactorial, hemoglobin stable. 6. DC planning once hypertension better controlled and okay with subspecialties.         Electronically signed by Jarrod Ibrahim MD on 7/16/2021 at 9:43 AM

## 2021-07-17 VITALS
SYSTOLIC BLOOD PRESSURE: 152 MMHG | RESPIRATION RATE: 18 BRPM | HEART RATE: 88 BPM | HEIGHT: 66 IN | DIASTOLIC BLOOD PRESSURE: 99 MMHG | TEMPERATURE: 98.4 F | OXYGEN SATURATION: 97 % | WEIGHT: 112 LBS | BODY MASS INDEX: 18 KG/M2

## 2021-07-17 LAB
ALBUMIN SERPL-MCNC: 3.4 G/DL (ref 3.5–5.2)
ALP BLD-CCNC: 75 U/L (ref 40–129)
ALT SERPL-CCNC: 9 U/L (ref 0–40)
ANION GAP SERPL CALCULATED.3IONS-SCNC: 16 MMOL/L (ref 7–16)
AST SERPL-CCNC: 18 U/L (ref 0–39)
BILIRUB SERPL-MCNC: 0.4 MG/DL (ref 0–1.2)
BUN BLDV-MCNC: 24 MG/DL (ref 6–20)
C-REACTIVE PROTEIN: 16.4 MG/DL (ref 0–0.4)
CALCIUM SERPL-MCNC: 9.3 MG/DL (ref 8.6–10.2)
CHLORIDE BLD-SCNC: 97 MMOL/L (ref 98–107)
CO2: 24 MMOL/L (ref 22–29)
CREAT SERPL-MCNC: 6.1 MG/DL (ref 0.7–1.2)
GFR AFRICAN AMERICAN: 13
GFR NON-AFRICAN AMERICAN: 13 ML/MIN/1.73
GLUCOSE BLD-MCNC: 82 MG/DL (ref 74–99)
HCT VFR BLD CALC: 33 % (ref 37–54)
HEMOGLOBIN: 10.5 G/DL (ref 12.5–16.5)
MCH RBC QN AUTO: 29.8 PG (ref 26–35)
MCHC RBC AUTO-ENTMCNC: 31.8 % (ref 32–34.5)
MCV RBC AUTO: 93.8 FL (ref 80–99.9)
MRSA CULTURE ONLY: NORMAL
PDW BLD-RTO: 15.1 FL (ref 11.5–15)
PLATELET # BLD: 342 E9/L (ref 130–450)
PMV BLD AUTO: 9.1 FL (ref 7–12)
POTASSIUM SERPL-SCNC: 4.2 MMOL/L (ref 3.5–5)
RBC # BLD: 3.52 E12/L (ref 3.8–5.8)
REASON FOR REJECTION: NORMAL
REJECTED TEST: NORMAL
SODIUM BLD-SCNC: 137 MMOL/L (ref 132–146)
TOTAL PROTEIN: 7.3 G/DL (ref 6.4–8.3)
WBC # BLD: 10.6 E9/L (ref 4.5–11.5)

## 2021-07-17 PROCEDURE — 6370000000 HC RX 637 (ALT 250 FOR IP): Performed by: INTERNAL MEDICINE

## 2021-07-17 PROCEDURE — 85027 COMPLETE CBC AUTOMATED: CPT

## 2021-07-17 PROCEDURE — 36415 COLL VENOUS BLD VENIPUNCTURE: CPT

## 2021-07-17 PROCEDURE — 80053 COMPREHEN METABOLIC PANEL: CPT

## 2021-07-17 PROCEDURE — 6360000002 HC RX W HCPCS: Performed by: INTERNAL MEDICINE

## 2021-07-17 PROCEDURE — 2580000003 HC RX 258: Performed by: INTERNAL MEDICINE

## 2021-07-17 PROCEDURE — 6370000000 HC RX 637 (ALT 250 FOR IP): Performed by: NURSE PRACTITIONER

## 2021-07-17 PROCEDURE — 99239 HOSP IP/OBS DSCHRG MGMT >30: CPT | Performed by: INTERNAL MEDICINE

## 2021-07-17 PROCEDURE — 86140 C-REACTIVE PROTEIN: CPT

## 2021-07-17 PROCEDURE — 6370000000 HC RX 637 (ALT 250 FOR IP): Performed by: SPECIALIST

## 2021-07-17 RX ORDER — DOXYCYCLINE HYCLATE 100 MG/1
100 CAPSULE ORAL EVERY 12 HOURS SCHEDULED
Qty: 14 CAPSULE | Refills: 0 | Status: SHIPPED | OUTPATIENT
Start: 2021-07-17 | End: 2021-07-24

## 2021-07-17 RX ORDER — CEFUROXIME AXETIL 250 MG/1
250 TABLET ORAL EVERY 12 HOURS SCHEDULED
Qty: 14 TABLET | Refills: 0 | Status: SHIPPED | OUTPATIENT
Start: 2021-07-17 | End: 2021-07-24

## 2021-07-17 RX ADMIN — Medication 10 ML: at 08:04

## 2021-07-17 RX ADMIN — PANTOPRAZOLE SODIUM 40 MG: 40 TABLET, DELAYED RELEASE ORAL at 08:04

## 2021-07-17 RX ADMIN — LABETALOL HYDROCHLORIDE 200 MG: 200 TABLET, FILM COATED ORAL at 08:03

## 2021-07-17 RX ADMIN — LABETALOL HYDROCHLORIDE 200 MG: 200 TABLET, FILM COATED ORAL at 13:49

## 2021-07-17 RX ADMIN — SEVELAMER CARBONATE 1600 MG: 800 TABLET, FILM COATED ORAL at 13:48

## 2021-07-17 RX ADMIN — NIFEDIPINE 60 MG: 60 TABLET, EXTENDED RELEASE ORAL at 08:04

## 2021-07-17 RX ADMIN — ONDANSETRON 4 MG: 2 INJECTION INTRAMUSCULAR; INTRAVENOUS at 08:14

## 2021-07-17 RX ADMIN — CINACALCET HYDROCHLORIDE 30 MG: 30 TABLET, FILM COATED ORAL at 08:03

## 2021-07-17 RX ADMIN — SEVELAMER CARBONATE 1600 MG: 800 TABLET, FILM COATED ORAL at 08:03

## 2021-07-17 RX ADMIN — DOXYCYCLINE HYCLATE 100 MG: 100 CAPSULE ORAL at 08:03

## 2021-07-17 RX ADMIN — CEFUROXIME AXETIL 250 MG: 250 TABLET ORAL at 08:04

## 2021-07-17 ASSESSMENT — PAIN SCALES - GENERAL
PAINLEVEL_OUTOF10: 0
PAINLEVEL_OUTOF10: 0

## 2021-07-17 NOTE — PROGRESS NOTES
Pt c/o nausea requested nausea medicine, medicated with zofran 4mg IV per MD order. Pt refused heparin IM injection stating he walks around enough.

## 2021-07-17 NOTE — DISCHARGE SUMMARY
[facility] EMERGENCY SERVICE Physician Discharge Summary       [address] [facility]  [phone]    Schedule an appointment as soon as possible for a visit in 1 week  follow up    hospitals  [phone]    In 1 week  follow up      Activity level: As tolerated    Diet: ADULT DIET; Regular    Labs:    Dispo: Home    Condition at discharge: Stable    Continue supplemental oxygen via nasal canula @ 2 LPM round-the-clock. Continue CPAP / BiPAP during sleep as prior to admission. Patient ID:  Yasmin Rowe  [id number]  1 y.o.  1985    Admit date: 7/14/2021    Discharge date and time:  7/17/2021  2:06 PM    Admission Diagnoses: Active Problems:    ESRD on hemodialysis ([facility] [address]    Pneumonia of right lung due to infectious organism    Chronic heart failure with preserved ejection fraction (HCC)  Resolved Problems:    * No resolved hospital problems. *      Discharge Diagnoses: Active Problems:    ESRD on hemodialysis ([address]    Pneumonia of right lung due to infectious organism    Chronic heart failure with preserved ejection fraction (HCC)  Resolved Problems:    * No resolved hospital problems. *      Consults:  IP CONSULT TO NEPHROLOGY  IP CONSULT TO INFECTIOUS DISEASES  IP CONSULT TO IV TEAM  IP CONSULT TO CARDIOLOGY    Procedures: None    Hospital Course: Patient was admitted with Pneumonia [J18.9]. Patient  Admitted on 14th, came with right-sided chest pain and hypoxia, patient with ESRD, HD on Monday Wednesday Friday. Heart failure with preserved EF, hypertension, patient did have fevers. Impression on presentation of sepsis due to HCAP. Renal following. ID has changed doxycycline to p.o. and started on cefuroxime. Impression of HCAP.   Plans to continue for 7 days following 07/16/21  0205 07/17/21  0750   WBC 10.5 8.0 10.6   RBC 3.04* 3.23* 3.52*   HGB 9.2* 9.7* 10.5*   HCT 28.6* 30.0* 33.0*   MCV 94.1 92.9 93.8   MCH 30.3 30.0 29.8   MCHC 32.2 32.3 31.8*   RDW 15.2* 15.2* 15.1*    278 342   MPV 9.6 9.8 9.1       Imaging:   XR CHEST PORTABLE   Final Result   Persistent faint right lower lobe opacity. Stable cardiomegaly. CT CHEST WO CONTRAST   Final Result   There is dense consolidation of the posterior right lower lobe with air   bronchograms, compatible with pneumonia. There is a trace right pleural   effusion. Significant cardiomegaly with pericardial effusion. Bilateral axillary lymphadenopathy. Recommend follow-up/further evaluation,   if not already performed. XR CHEST PORTABLE   Final Result   Faint patchy bilateral pulmonary opacities. Correlate for possible   infectious process. Prominent cardiac silhouette.              Patient Instructions:      Medication List      START taking these medications    cefdinir 300 MG capsule  Commonly known as: OMNICEF  Take 1 capsule by mouth 2 times daily for 10 days     doxycycline hyclate 100 MG tablet  Commonly known as: VIBRA-TABS  Take 1 tablet by mouth 2 times daily for 10 days        CONTINUE taking these medications    cloNIDine 0.3 MG tablet  Commonly known as: CATAPRES  Take 1 tablet by mouth 3 times daily     diphenhydrAMINE 25 MG tablet  Commonly known as: SOMINEX     iron sucrose 20 MG/ML injection  Commonly known as: VENOFER  Infuse 5 mLs intravenously three times a week Infuse 100 mg intravenously three times a week Given in dialysis every Monday ,Wednesday ,Friday     labetalol 200 MG tablet  Commonly known as: NORMODYNE  Take 1 tablet by mouth three times daily     lidocaine-prilocaine 2.5-2.5 % cream  Commonly known as: EMLA     losartan 100 MG tablet  Commonly known as: COZAAR  Take 1 tablet by mouth every evening     melatonin 3 MG Tabs tablet  Take 1 tablet by mouth nightly as needed (sleep)     MIRCERA IJ     NIFEdipine 60 MG extended release tablet  Commonly known as: ADALAT CC  Take 1 tablet by mouth daily     pantoprazole 40 MG tablet  Commonly known as: PROTONIX  Take 1 tablet by mouth daily     SENSIPAR PO     sevelamer 800 MG tablet  Commonly known as: RENVELA        STOP taking these medications    hydrALAZINE 50 MG tablet  Commonly known as: APRESOLINE     minoxidil 2.5 MG tablet  Commonly known as: Chitina Bryon           Where to Get Your Medications      You can get these medications from any pharmacy    Bring a paper prescription for each of these medications  · cefdinir 300 MG capsule  · doxycycline hyclate 100 MG tablet           Note that more than 30 minutes was spent in preparing discharge papers, discussing discharge with patient, medication review, etc.    Signed:  Electronically signed by Usha Pimentel MD on 7/17/2021 at 2:06 PM    NOTE: This report was transcribed using voice recognition software. Every effort was made to ensure accuracy; however, inadvertent computerized transcription errors may be present.

## 2021-07-17 NOTE — PROGRESS NOTES
[address] [place] [address] [facility]  Progress Note    SUBJECTIVE:  Chief Complaint   Patient presents with    Chest Pain     R sided chest pain intermittently dialysis dasys M-W-F    Other     Low pulse ox at dialysis this am 88% on room air     The patient feels good. He would like to go home. He still has a nonproductive cough. No pain. No fever. Review of systems:  As stated above in the chief complaint, otherwise negative. Medications:  Scheduled Meds:   doxycycline hyclate  100 mg Oral 2 times per day    cefUROXime  250 mg Oral 2 times per day    sevelamer  1,600 mg Oral TID WC    NIFEdipine  60 mg Oral Daily    labetalol  200 mg Oral TID    losartan  100 mg Oral QPM    cinacalcet  30 mg Oral Daily    pantoprazole  40 mg Oral Daily    [Held by provider] minoxidil  2.5 mg Oral Daily    sodium chloride flush  5-40 mL Intravenous 2 times per day    heparin (porcine)  5,000 Units Subcutaneous 3 times per day    epoetin katlyn-epbx  3,000 Units Subcutaneous Once per day on     And    epoetin katlyn-epbx  2,000 Units Subcutaneous Once per day on      Continuous Infusions:   sodium chloride       PRN Meds:cloNIDine, melatonin, diphenhydrAMINE, sodium chloride flush, sodium chloride, ondansetron **OR** ondansetron, polyethylene glycol, acetaminophen **OR** acetaminophen    OBJECTIVE:  BP (!) 149/88   Pulse 92   Temp 98.4 °F (36.9 °C) (Oral)   Resp 18   Ht 5' 6\" (1.676 m)   Wt 112 lb (50.8 kg)   SpO2 97%   BMI 18.08 kg/m²   Temp  Av.3 °F (36.8 °C)  Min: 98 °F (36.7 °C)  Max: 98.7 °F (37.1 °C)  Constitutional: The patient i sitting up in bed. Awake and in no distress. Skin: Warm and dry. No rashes were noted. HEENT: Round and reactive pupils. Moist mucous membranes. No ulcerations or thrush. Neck: Supple to movements. Chest: No use of accessory muscles to breathe. Symmetrical expansion. Crackles right base posteriorly.   Cardiovascular: Heart sounds rhythmic and regular  Abdomen: Positive bowel sounds to auscultation. Benign to palpation. Extremities: No edema. Left AV fistula. Lines: peripheral    Laboratory and Tests Review:  Lab Results   Component Value Date    WBC 10.6 07/17/2021    WBC 8.0 07/16/2021    WBC 10.5 07/15/2021    HGB 10.5 (L) 07/17/2021    HCT 33.0 (L) 07/17/2021    MCV 93.8 07/17/2021     07/17/2021     Lab Results   Component Value Date    NEUTROABS 10.29 (H) 07/14/2021    NEUTROABS 2.22 03/17/2021    NEUTROABS 4.63 03/15/2021     No results found for: [facility]  Lab Results   Component Value Date    ALT 9 07/17/2021    AST 18 07/17/2021    ALKPHOS 75 07/17/2021    BILITOT 0.4 07/17/2021     Lab Results   Component Value Date     07/17/2021    K 4.2 07/17/2021    K 4.6 07/14/2021    CL 97 07/17/2021    CO2 24 07/17/2021    BUN 24 07/17/2021    CREATININE 6.1 07/17/2021    CREATININE 4.1 07/16/2021    CREATININE 5.7 07/15/2021    GFRAA 13 07/17/2021    LABGLOM 13 07/17/2021    GLUCOSE 82 07/17/2021    PROT 7.3 07/17/2021    LABALBU 3.4 07/17/2021    CALCIUM 9.3 07/17/2021    BILITOT 0.4 07/17/2021    ALKPHOS 75 07/17/2021    AST 18 07/17/2021    ALT 9 07/17/2021     Lab Results   Component Value Date    CRP 6.6 (H) 02/01/2021    CRP 9.0 (H) 01/03/2019    CRP 4.4 (H) 10/21/2015     Lab Results   Component Value Date    SEDRATE 94 (H) 07/15/2021    SEDRATE 39 (H) 02/01/2021    SEDRATE 37 (H) 10/21/2015     Radiology:  Chest x-ray reviewed. Infiltrate improving    Microbiology:   Urinary antigens cannot be done  Rapid SARS-CoV-2: Not detected  Blood culture 7/14/2021: Negative so far  Neuro screen MRSA: Pending    ASSESSMENT:  · Right lower lobe pneumonia, improved  · Fever with leukocytosis secondary to pneumonia, resolved  · CKD, on HD Doxycycline and Cefuroxime    PLAN:  · Continue Doxycycline and Cefuroxime for 7 more days. Reconciled  · The patient can be discharged from ID standpoint.   Follow-up with PCP.    Uzair Anaya MD  9:15 AM  7/17/2021

## 2021-07-17 NOTE — PROGRESS NOTES
NEPHROLOGY Attending   Progress Note  7/17/2021 12:28 PM  Subjective:   Admit Date: 7/14/2021  PCP: Timothy Hampton DO      History of Present Ilness:    Peng Jay is a 38 y.o. male with a history of hypertension, ESRD HD dependent MWF,HFpEF with several other medical problems. Presented to ED today from dialysisi  For fever. He reports some associated associate shortness of breath. Denies any nausea or diaphoresis. Does experience some exertional dyspnea while going upstairs, blood pressure in the ED was [phone] with a pulse of   Laboratory data showed a BUN of 53, creatinine 11.1, potassium 4.6, WBC 3.8 hemoglobin 8.9 hematocrit 26.7, platelets 170. Troponin was 93 and Pro-BNP >70,000. Chest x-ray showed a possible right lower lobe CT Scan of the chest showed a pericardial effusion and a dense consolidation in the RLL  Patient received doxycycline and cefdinir. He was then taken to the hemodialysis suite for treatment to be followed by admission.       Interval History:     7/17/2021- awake and alert. No overnight issues. He is feeling a little better. Diet: ADULT DIET;  Regular    Data:   Scheduled Meds:   doxycycline hyclate  100 mg Oral 2 times per day    cefUROXime  250 mg Oral 2 times per day    sevelamer  1,600 mg Oral TID WC    NIFEdipine  60 mg Oral Daily    labetalol  200 mg Oral TID    losartan  100 mg Oral QPM    cinacalcet  30 mg Oral Daily    pantoprazole  40 mg Oral Daily    [Held by provider] minoxidil  2.5 mg Oral Daily    sodium chloride flush  5-40 mL Intravenous 2 times per day    heparin (porcine)  5,000 Units Subcutaneous 3 times per day    epoetin katlyn-epbx  3,000 Units Subcutaneous Once per day on Mon Wed Fri    And    epoetin katlyn-epbx  2,000 Units Subcutaneous Once per day on Mon Wed Fri     Continuous Infusions:   sodium chloride       PRN Meds:cloNIDine, melatonin, diphenhydrAMINE, sodium chloride flush, sodium chloride, ondansetron **OR** ondansetron, polyethylene glycol, acetaminophen **OR** acetaminophen    Intake/Output Summary (Last 24 hours) at 7/17/2021 1228  Last data filed at 7/16/2021 1945  Gross per 24 hour   Intake 270 ml   Output --   Net 270 ml     CBC:   Recent Labs     07/15/21  0455 07/16/21  0205 07/17/21  0750   WBC 10.5 8.0 10.6   HGB 9.2* 9.7* 10.5*    278 342     BMP:    Recent Labs     07/15/21  0455 07/16/21  0205 07/17/21  0645    139 137   K 4.3 4.0 4.2   CL 97* 97* 97*   CO2 28 31* 24   BUN 22* 17 24*   CREATININE 5.7* 4.1* 6.1*   GLUCOSE 96 106* 82     Hepatic:   Recent Labs     07/15/21  0455 07/16/21  0205 07/17/21  0645   AST 19 23 18   ALT 13 15 9   BILITOT 0.6 0.5 0.4   ALKPHOS 77 71 75     Troponin: No results for input(s): TROPONINI in the last 72 hours. BNP: No results for input(s): BNP in the last 72 hours. Lipids: No results for input(s): CHOL, HDL in the last 72 hours. Invalid input(s): LDLCALCU  ABGs: No results found for: PHART, PO2ART, TSW6FQH  INR: No results for input(s): INR in the last 72 hours. -----------------------------------------------------------------  RAD: Echo Complete    Result Date: 7/14/2021  Findings   Left Ventricle  Normal left ventricle size and systolic function. Ejection fraction is visually estimated at 60-65%. No regional wall motion abnormalities seen. Severe concentric left ventricular hypertrophy. Stage II diastolic dysfunction. Increased echo texture, consider cardiac MRI rule out infiltrative  cardiomyopathies such as amyloidosis. Images are foreshortened. Right Ventricle  Normal right ventricular size and function. TAPSE 23 mm. Left Atrium  The left atrium is severely dilated. Right Atrium  Mildly enlarged right atrium size. Mitral Valve  Normal mitral valve structure and function. No evidence of mitral valve stenosis. Physiologic and/or trace mitral regurgitation is present.    Tricuspid Valve  The tricuspid valve appears structurally PROVIDED HISTORY: possible pneumonia TECHNOLOGIST PROVIDED HISTORY: Reason for exam:->possible pneumonia Decision Support Exception - unselect if not a suspected or confirmed emergency medical condition->Emergency Medical Condition (MA) FINDINGS: Mediastinum: There is significant cardiomegaly again identified. There is also a pericardial effusion, measuring up to 14 mm in thickness. Aorta appears normal in caliber. There is prominence of the main pulmonary artery. Mildly prominent left paratracheal lymph node, measuring up to 11 mm in short axis. Lungs/pleura: There is dense consolidation of the posterior right lower lobe, with air bronchograms, compatible with pneumonia. The central airways appear patent. There is platelike atelectasis or scarring at the left lung base. No pneumothorax. Trace right pleural effusion. Upper Abdomen: Kidneys are atrophic. Soft Tissues/Bones: Prominent bilateral axillary lymph nodes. Diffuse increased density of the osseous structures, which can be seen with renal osteodystrophy. There is dense consolidation of the posterior right lower lobe with air bronchograms, compatible with pneumonia. There is a trace right pleural effusion. Significant cardiomegaly with pericardial effusion. Bilateral axillary lymphadenopathy. Recommend follow-up/further evaluation, if not already performed. XR CHEST PORTABLE    Result Date: 7/14/2021  EXAMINATION: ONE XRAY VIEW OF THE CHEST 7/14/2021 8:09 am COMPARISON: 03/16/2021 HISTORY: ORDERING SYSTEM PROVIDED HISTORY: shortness of breath TECHNOLOGIST PROVIDED HISTORY: Reason for exam:->shortness of breath FINDINGS: Prominent cardiac silhouette. There are faint patchy bilateral pulmonary airspace opacities. No sizable effusion or pneumothorax. No acute osseous abnormality. Faint patchy bilateral pulmonary opacities. Correlate for possible infectious process. Prominent cardiac silhouette.          Objective:   Vitals:   Vitals: 07/17/21 0800   BP: (!) 149/88   Pulse: 92   Resp: 18   Temp: 98.4 °F (36.9 °C)   SpO2: 97%     Patient Vitals for the past 24 hrs:   BP Temp Temp src Pulse Resp SpO2 Weight   07/17/21 0800 (!) 149/88 98.4 °F (36.9 °C) Oral 92 18 97 % --   07/17/21 0600 -- -- -- -- -- -- 112 lb (50.8 kg)   07/17/21 0103 115/65 98.6 °F (37 °C) Oral 89 18 -- --   07/16/21 1945 118/63 98.7 °F (37.1 °C) Oral 91 18 95 % --   07/16/21 1800 116/72 -- -- -- -- -- --   07/16/21 1445 (!) 179/108 -- -- -- -- -- --   07/16/21 1230 (!) 182/106 -- -- 106 -- -- --     General appearance: alert, appears stated age and cooperative  Skin: Skin color, texture, turgor normal. No rashes or lesions  HEENT: Head: Normocephalic, no lesions, without obvious abnormality. Neck: no adenopathy, no carotid bruit, no JVD, supple, symmetrical, trachea midline and thyroid not enlarged, symmetric, no tenderness/mass/nodules  Lungs: clear upper, diminished in bases  Heart: regular rate and rhythm, S1, S2 normal, no murmur, click, rub or gallop  Abdomen: soft, non-tender; bowel sounds normal; no masses,  no organomegaly  Extremities: extremities normal, atraumatic, no cyanosis or edema  Neurologic: Mental status: Alert, oriented, thought content appropriate  Dialysis access: Left upper extremity AVF with good thrill and bruit      Assessment/Plan:   1. Hypertensive with CKD G5/ESKD states he has been compliant with his antihypertensive medications  -BP initially above target <140/90 but trending lower  With the presence of the pericardial effusion which is larger than previous will stop the minoxidil and as he is on the hydralazine check an CLAY and a sed rate as well as C and an Anti-Histone Ab for a Lupus Like reaction to hydralazine or labetalol both of which can commonly cause the issue  PLAN  1. As of 7/15/2021, stopped minoxidil and hydralazine  2. Added hold parameters to all antihypertensives  3. Clonidine PRN status  4.   Follow BP closely for likely additional needed adjustments     2. Pne with Hypoxia  PLAN:  1. BC- 24 hours no growth, will await final  2. Defer to ID for antibx     3. ESKD, HD dependent MWF  PLAN:  1. We will continue his usual hemodialysis schedule MWF with additional dialysis for the pericardial effusion     4. Anemia due to CKD  Last 3/8/21 ferritin 669, Tsat-35% -at targets  Receives Mircera 50 mcg q 2 weeks at outpatient HD clinic  PLAN:  1. Follow daily CBC   2. Continue PREETI     5.  Pericardial Effusion  C3 & C4 not suppressed  TSH 4.260 elevated but not to level to lead to pericardial effusion with the normal free T4  Await serologies and SPEP and UPEP   2 prior 2 D ECHO's with issues of amyloid raised-SPEP (-) monoclonal protein-await Cardiology following- plan echo in 3 months post DC hydralazine         Thank you for allowing us to participate in care of [address] [place] [address]   Patient seen and examined. I had a face to face encounter with the patient prior to his discharge-charting is a late entry. Agree with exam.    Agree with  formulation, assessment and plan as outlined above and directed by me. Addition and corrections were done as deemed appropriate. My exam and plan include:    A/P:  1 ESKD-plan continue on the Q MWF regimen    2. Pericardial Effusion and 2 D ECHO with the question of possible amyloidosis  CLAY (-), C3 & C4 not suppressed  Anti-histone Ab pending  Cardiology note reviewed will need an MRI with contrast as an outpt and for dialysis 3 days in a row post procedure-will have office arrange     OK for D/C    JULEE Oliva MD

## 2021-07-18 LAB — HISTONE ANTIBODY IGG: 0.7 UNITS (ref 0–0.9)

## 2021-07-19 ENCOUNTER — TELEPHONE (OUTPATIENT)
Dept: CARDIOLOGY | Age: 36
End: 2021-07-19

## 2021-07-19 ENCOUNTER — TELEPHONE (OUTPATIENT)
Dept: INTERNAL MEDICINE | Age: 36
End: 2021-07-19

## 2021-07-19 LAB
BLOOD CULTURE, ROUTINE: NORMAL
CULTURE, BLOOD 2: NORMAL

## 2021-07-19 NOTE — TELEPHONE ENCOUNTER
Cancelled patients echo, ordered by Dr Faviola Palafox,  and did not reschedule due to patient had an echo done at [facility] on 07/14/21.   Thank you  Electronically signed by Karrie Real on 7/19/2021 at 2:40 PM

## 2021-07-19 NOTE — TELEPHONE ENCOUNTER
Thanks, echocardiogram results reviewed and demonstrates evidence of fluid surrounding her heart likely on the basis of her kidney disease with repeat limited echocardiogram recommended in 3 months

## 2021-07-19 NOTE — TELEPHONE ENCOUNTER
[facility] Trans[facility] Initial Follow Up Call    Outreach made within 2 business days of discharge: Yes    Patient: Roberto Ramirez Patient : 1985   MRN: [id number]  Reason for Admission: There are no discharge diagnoses documented for the most recent discharge. Discharge Date: 21       Spoke with: Leticia Long    Discharge department/facility: home     TCM Interactive Patient Contact:  Was patient able to fill all prescriptions: Yes  Was patient instructed to bring all medications to the follow-up visit: Yes  Is patient taking all medications as directed in the discharge summary?  Yes  Does patient understand their discharge instructions: Yes  Does patient have questions or concerns that need addressed prior to 7-14 day follow up office visit: no    Scheduled appointment with PCP within 7-14 days    Follow Up  Future Appointments   Date Time Provider Jean Joshi   2021  8:45 AM Peggy Hoyos MD [facility]   2021  1:00 PM [facility]   8/3/2021  9:00 AM Clare Wagner MD [facility]  [facility]

## 2021-07-20 ENCOUNTER — TELEPHONE (OUTPATIENT)
Dept: VASCULAR SURGERY | Age: 36
End: 2021-07-20

## 2021-07-20 ENCOUNTER — TELEPHONE (OUTPATIENT)
Dept: CARDIOLOGY | Age: 36
End: 2021-07-20

## 2021-07-21 ENCOUNTER — OFFICE VISIT (OUTPATIENT)
Dept: VASCULAR SURGERY | Age: 36
End: 2021-07-21
Payer: MEDICAID

## 2021-07-21 VITALS — SYSTOLIC BLOOD PRESSURE: 162 MMHG | DIASTOLIC BLOOD PRESSURE: 104 MMHG

## 2021-07-21 DIAGNOSIS — T82.898A ANEURYSM OF ARTERIOVENOUS DIALYSIS FISTULA, INITIAL ENCOUNTER (HCC): Primary | ICD-10-CM

## 2021-07-21 PROCEDURE — G8419 CALC BMI OUT NRM PARAM NOF/U: HCPCS | Performed by: SURGERY

## 2021-07-21 PROCEDURE — G8427 DOCREV CUR MEDS BY ELIG CLIN: HCPCS | Performed by: SURGERY

## 2021-07-21 PROCEDURE — 1036F TOBACCO NON-USER: CPT | Performed by: SURGERY

## 2021-07-21 PROCEDURE — 1111F DSCHRG MED/CURRENT MED MERGE: CPT | Performed by: SURGERY

## 2021-07-21 PROCEDURE — 99213 OFFICE O/P EST LOW 20 MIN: CPT | Performed by: SURGERY

## 2021-07-21 RX ORDER — LABETALOL 200 MG/1
200 TABLET, FILM COATED ORAL 3 TIMES DAILY
Qty: 30 TABLET | Refills: 0 | Status: ON HOLD
Start: 2021-07-21 | End: 2021-08-06 | Stop reason: HOSPADM

## 2021-07-21 RX ORDER — PANTOPRAZOLE SODIUM 40 MG/1
40 TABLET, DELAYED RELEASE ORAL DAILY
Qty: 30 TABLET | Refills: 0 | Status: ON HOLD
Start: 2021-07-21 | End: 2021-08-05

## 2021-07-21 NOTE — PROGRESS NOTES
Vascular Surgery Outpatient Progress Note      Chief Complaint   Patient presents with    Circulatory Problem     Aneurysmal AVF       HISTORY OF PRESENT ILLNESS:                The patient is a 38 y.o. male who returns for follow-up evaluation of left arm aneurysmal fistula. This is been aneurysmal for years now. He has had prior interventions secondary to central venous issues. He has stents that are been placed. He also has reflux into the arm. At this point he has no pain no discomfort. He has had a recent balloon angioplasty. He has no thinning of the skin he has no arterial steal symptoms and he has no prolonged bleeding currently. Past Medical History:        Diagnosis Date    (HFpEF) heart failure with preserved ejection fraction ([address]     stage III    Anxiety 9/19/2015    AVF (arteriovenous fistula) ([facility] 4/30/2018    Chronic kidney disease     CKD since early childhood per pt and on HD ~ 11 years    Depression     Encounter regarding vascular access for dialysis for ESRD ([facility] 4/30/2018    History of ruptured Berry aneurysm ([facility] 10/20/2015    Hypertension     on meds for ~ 11 years    Hypothyroidism     Intracranial hemorrhage (HCC)     was d/t ruptured aneurysm - pt underwent neurosurgery for clipping of the aneurysm    Neutropenia ([address]     Noncompliance w/medication treatment due to intermit use of medication 11/3/2015    Pre-transplant evaluation for kidney transplant 4/5/2017     Past Surgical History:        Procedure Laterality Date    BRAIN ANEURYSM SURGERY Right 3-4 years ago    Intracranial aneurysm clipping surgery 3-4 years ago, after rupture of aneurysm causing ICH    DIALYSIS FISTULA CREATION Left 11 years ago    UPPER GASTROINTESTINAL ENDOSCOPY N/A 1/3/2019    EGD BIOPSY performed by Astrid James MD at [address] [place] [address]     Current Medications:   Prior to Admission medications    Medication Sig Start Date End Date Taking?  Authorizing Provider   cefUROXime (CEFTIN) 250 MG tablet Take 1 tablet by mouth every 12 hours for 7 days 7/17/21 7/24/21 Yes Last Webster MD   doxycycline hyclate (VIBRAMYCIN) 100 MG capsule Take 1 capsule by mouth every 12 hours for 7 days 7/17/21 7/24/21 Yes Last Webster MD   pantoprazole (PROTONIX) 40 MG tablet Take 1 tablet by mouth daily 6/3/21  Yes Soy Velazquez MD   Methoxy PEG-Epoetin Beta (MIRCERA IJ) 75 mcg 1/22/21 4/8/22 Yes Historical Provider, MD   lidocaine-prilocaine (EMLA) 2.5-2.5 % cream APPLY SMALL AMOUNT TO ACCESS SITE (AVF) 1 TO 2 HOURS BEFORE DIALYSIS.  COVER WITH OCCLUSIVE DRESSING (SARAN WRAP) 3/22/21  Yes Historical Provider, MD   Cinacalcet HCl (SENSIPAR PO) Take 30 mg by mouth 3/12/21 3/11/22 Yes Historical Provider, MD   diphenhydrAMINE (SOMINEX) 25 MG tablet Take 25 mg by mouth every 6 hours as needed   Yes Historical Provider, MD   melatonin 3 MG TABS tablet Take 1 tablet by mouth nightly as needed (sleep) 3/30/21  Yes Jennifer Mcdermott MD   NIFEdipine (ADALAT CC) 60 MG extended release tablet Take 1 tablet by mouth daily 3/30/21  Yes Jennifer Mcdermott MD   labetalol (NORMODYNE) 200 MG tablet Take 1 tablet by mouth three times daily 3/30/21  Yes Jennifer Mcdermott MD   losartan (COZAAR) 100 MG tablet Take 1 tablet by mouth every evening 3/30/21  Yes Jennifer Mcdermott MD   cloNIDine (CATAPRES) 0.3 MG tablet Take 1 tablet by mouth 3 times daily 3/30/21  Yes Jennifer Mcdermott MD   iron sucrose (VENOFER) 20 MG/ML injection Infuse 5 mLs intravenously three times a week Infuse 100 mg intravenously three times a week Given in dialysis every Monday ,Wednesday ,Friday 3/17/21  Yes Esthela Sharif MD   sevelamer (RENVELA) 800 MG tablet Take 2 tablets by mouth 3 times daily    Yes Historical Provider, MD     Allergies:  Tylenol [acetaminophen] and Levofloxacin    Social History     Socioeconomic History    Marital status:      Spouse name: Not on file    Number of children: 4    Years of education: Not on file    Highest apnea, cough, chest tightness, shortness of breath and wheezing. Cardiovascular: Negative for chest pain, palpitations and leg swelling. Gastrointestinal: Negative for abdominal distention, abdominal pain, blood in stool, constipation, diarrhea, nausea and vomiting. Endocrine: Negative for cold intolerance, heat intolerance, polydipsia, polyphagia and polyuria. Genitourinary: Negative for decreased urine volume, difficulty urinating, dysuria, frequency, hematuria and urgency. Musculoskeletal: Negative for arthralgias, back pain, gait problem, joint swelling and neck pain. Skin: Negative for color change, pallor, rash and wound. Allergic/Immunologic: Negative for environmental allergies, food allergies and immunocompromised state. Neurological: Negative for dizziness, syncope, facial asymmetry, speech difficulty, weakness, light-headedness, numbness and headaches. Hematological: Negative for adenopathy. Does not bruise/bleed easily. Psychiatric/Behavioral: Negative for agitation, confusion, sleep disturbance and suicidal ideas. The patient is not nervous/anxious.             PHYSICAL EXAM:  Vitals:    07/21/21 0916   BP: (!) 162/104     General Appearance: alert and oriented to person, place and time, well developed and well- nourished, in no acute distress  Skin: warm and dry, no rash or erythema  Head: normocephalic and atraumatic  Eyes: extraocular eye movements intact, conjunctivae normal  ENT: external ear and ear canal normal bilaterally, nose without deformity  Pulmonary/Chest: clear to auscultation bilaterally- no wheezes, rales or rhonchi, normal air movement, no respiratory distress  Cardiovascular: normal rate, regular rhythm, normal S1 and S2, no murmurs, no carotid bruits  Abdomen: soft, non-tender, non-distended, normal bowel sounds, no masses or organomegaly  Musculoskeletal: normal range of motion, no joint swelling, deformity or tenderness  Neurologic: no cranial nerve deficit, gait, coordination and speech normal  Extremities: Left arm demonstrates good skin integrity. There is no ulcerations. He has a palpable radial pulse. Problem List Items Addressed This Visit     None      Visit Diagnoses     Aneurysm of arteriovenous dialysis fistula, initial encounter ([facility]    -  Primary          #1 left arm AV fistula with a history of aneurysmal degeneration. He has had this fistula now since he has been 14years old. From our standpoint I would not recommend any aneurysmal excision in the face of central venous occlusive issues. Right now his skin integrity is fine he has no current prolonged bleeding he has no venous hypertension and he has no pain or discomfort with cannulation. I will see him back in 1 year      No follow-ups on file.

## 2021-07-26 NOTE — PROGRESS NOTES
MG tablet  Take 1 tablet by mouth three times daily             lidocaine-prilocaine (EMLA) 2.5-2.5 % cream  APPLY SMALL AMOUNT TO ACCESS SITE (AVF) 1 TO 2 HOURS BEFORE DIALYSIS. COVER WITH OCCLUSIVE DRESSING (SARAN WRAP)             losartan (COZAAR) 100 MG tablet  Take 1 tablet by mouth every evening             melatonin 3 MG TABS tablet  Take 1 tablet by mouth nightly as needed (sleep)             Methoxy PEG-Epoetin Beta (MIRCERA IJ)  75 mcg             NIFEdipine (ADALAT CC) 60 MG extended release tablet  Take 1 tablet by mouth daily             pantoprazole (PROTONIX) 40 MG tablet  Take 1 tablet by mouth daily             sevelamer (RENVELA) 800 MG tablet  Take 2 tablets by mouth 3 times daily                    Medications marked \"taking\" at this time  Outpatient Medications Marked as Taking for the 7/27/21 encounter (Office Visit) with Yvrose Lee MD   Medication Sig Dispense Refill    doxycycline hyclate (VIBRAMYCIN) 100 MG capsule Take by mouth      labetalol (NORMODYNE) 200 MG tablet Take 1 tablet by mouth three times daily 30 tablet 0    pantoprazole (PROTONIX) 40 MG tablet Take 1 tablet by mouth daily 30 tablet 0    Methoxy PEG-Epoetin Beta (MIRCERA IJ) 75 mcg      lidocaine-prilocaine (EMLA) 2.5-2.5 % cream APPLY SMALL AMOUNT TO ACCESS SITE (AVF) 1 TO 2 HOURS BEFORE DIALYSIS.  COVER WITH OCCLUSIVE DRESSING (SARAN WRAP)      Cinacalcet HCl (SENSIPAR PO) Take 30 mg by mouth      diphenhydrAMINE (SOMINEX) 25 MG tablet Take 25 mg by mouth every 6 hours as needed      melatonin 3 MG TABS tablet Take 1 tablet by mouth nightly as needed (sleep) 20 tablet 0    NIFEdipine (ADALAT CC) 60 MG extended release tablet Take 1 tablet by mouth daily 90 tablet 0    losartan (COZAAR) 100 MG tablet Take 1 tablet by mouth every evening 30 tablet 2    cloNIDine (CATAPRES) 0.3 MG tablet Take 1 tablet by mouth 3 times daily 90 tablet 2    iron sucrose (VENOFER) 20 MG/ML injection Infuse 5 mLs intravenously three times a week Infuse 100 mg intravenously three times a week Given in dialysis every Monday ,Wednesday ,Friday 1 vial 2    sevelamer (RENVELA) 800 MG tablet Take 2 tablets by mouth 3 times daily           Medications patient taking as of now reconciled against medications ordered at time of hospital discharge: Yes    Chief Complaint   Patient presents with   [address] from Hospital     Pneumonia 07/14/21    Exposure to STD     Would like to have blood drawn for testing       History of Present illness - Follow up of Hospital diagnosis(es):     Inpatient course: Discharge summary reviewed- see chart. Interval history/Current status:   He stated that he is feeling well. He denies any nausea, vomiting, headache, light headedness, dizziness, diarrhea, constipation. He is anuric. He denies any further episode of hematuria. He reports intermittent episodes of penile itching for the last few months. He stated that he had intercourse with a female with STD in the past. He is not sexually active for the last 2-3 years. He had multiple partners in the past. Never been diagnosed with STD. Admitted to hospital in 7/21 for pneumonia, right-sided chest pain and hypoxia. Treated with doxycycline and cefuroxime for 7 days post discharge. Found to have uncontrolled HTN during hospital admission. New moderate pericardial effusion likely secondary to use of hydralazine.  Nephrology stopped his hydralazine as well as minoxidil, patient was started on labetalol as well as Procardia which will be continued. 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 Reccommended a repeat limited echocardiogram for reassessment of left ventricular and right ventricular systolic function as well as that of his mitral valve disease.     A comprehensive review of systems was negative except for what was due to CKD  Last H/H 10.5/33  On iron sucrose 100 mf 3 times weekly with HD     H/o of Intracranial hemorrhage from a ruptured aneurysm 2016  H/o of seizure      Gastritis   Stable  On protonix 40 daily      STD exposure and symptoms   Reports intermittent itching  Denies any urethral/penile discharge, redness, swelling  Reports female partner has been diagnosed with STD, not specified which one  Had one unprotected sex few years ago  Educated about safe sex  Not sexually active for the last 2-3 years  Unable to check for chlamydia or gonorrhea as sample could not be collected due to the patient being anuric  Will check RPR, HIV, Hepatitis    I have reviewed my findings and recommendations with Lynne Mora and Dr Torey Kidd MD PGY-3  7/26/2021 9:50 AM      Medical Decision Making: moderate complexity

## 2021-07-27 ENCOUNTER — HOSPITAL ENCOUNTER (OUTPATIENT)
Age: 36
Discharge: HOME | End: 2021-07-27
Payer: MEDICAID

## 2021-07-27 ENCOUNTER — OFFICE VISIT (OUTPATIENT)
Dept: INTERNAL MEDICINE | Age: 36
End: 2021-07-27
Payer: MEDICAID

## 2021-07-27 VITALS
TEMPERATURE: 97.5 F | BODY MASS INDEX: 18.13 KG/M2 | HEIGHT: 66 IN | DIASTOLIC BLOOD PRESSURE: 109 MMHG | OXYGEN SATURATION: 97 % | SYSTOLIC BLOOD PRESSURE: 180 MMHG | HEART RATE: 69 BPM | WEIGHT: 112.8 LBS

## 2021-07-27 DIAGNOSIS — I77.0 AVF (ARTERIOVENOUS FISTULA) (HCC): ICD-10-CM

## 2021-07-27 DIAGNOSIS — N18.6 ESRD ON HEMODIALYSIS (HCC): ICD-10-CM

## 2021-07-27 DIAGNOSIS — I50.20 HFREF (HEART FAILURE WITH REDUCED EJECTION FRACTION) (HCC): ICD-10-CM

## 2021-07-27 DIAGNOSIS — Z20.2 STD EXPOSURE: ICD-10-CM

## 2021-07-27 DIAGNOSIS — J18.9 PNEUMONIA DUE TO INFECTIOUS ORGANISM, UNSPECIFIED LATERALITY, UNSPECIFIED PART OF LUNG: Primary | ICD-10-CM

## 2021-07-27 DIAGNOSIS — I10 ESSENTIAL HYPERTENSION: ICD-10-CM

## 2021-07-27 DIAGNOSIS — Z99.2 ESRD ON HEMODIALYSIS (HCC): ICD-10-CM

## 2021-07-27 PROBLEM — K52.9 COLITIS: Status: RESOLVED | Noted: 2018-11-11 | Resolved: 2021-07-27

## 2021-07-27 PROBLEM — R07.89 ATYPICAL CHEST PAIN: Status: RESOLVED | Noted: 2021-02-01 | Resolved: 2021-07-27

## 2021-07-27 PROCEDURE — 80074 ACUTE HEPATITIS PANEL: CPT

## 2021-07-27 PROCEDURE — 99214 OFFICE O/P EST MOD 30 MIN: CPT | Performed by: INTERNAL MEDICINE

## 2021-07-27 PROCEDURE — 36415 COLL VENOUS BLD VENIPUNCTURE: CPT

## 2021-07-27 PROCEDURE — 86592 SYPHILIS TEST NON-TREP QUAL: CPT

## 2021-07-27 PROCEDURE — 86703 HIV-1/HIV-2 1 RESULT ANTBDY: CPT

## 2021-07-27 PROCEDURE — 99212 OFFICE O/P EST SF 10 MIN: CPT | Performed by: INTERNAL MEDICINE

## 2021-07-27 RX ORDER — DOXYCYCLINE HYCLATE 100 MG/1
CAPSULE ORAL
COMMUNITY
Start: 2021-07-19 | End: 2021-07-29

## 2021-07-27 SDOH — ECONOMIC STABILITY: FOOD INSECURITY: WITHIN THE PAST 12 MONTHS, YOU WORRIED THAT YOUR FOOD WOULD RUN OUT BEFORE YOU GOT MONEY TO BUY MORE.: NEVER TRUE

## 2021-07-27 SDOH — ECONOMIC STABILITY: FOOD INSECURITY: WITHIN THE PAST 12 MONTHS, THE FOOD YOU BOUGHT JUST DIDN'T LAST AND YOU DIDN'T HAVE MONEY TO GET MORE.: NEVER TRUE

## 2021-07-27 ASSESSMENT — SOCIAL DETERMINANTS OF HEALTH (SDOH): HOW HARD IS IT FOR YOU TO PAY FOR THE VERY BASICS LIKE FOOD, HOUSING, MEDICAL CARE, AND HEATING?: SOMEWHAT HARD

## 2021-07-27 NOTE — PATIENT INSTRUCTIONS
Discharge Instructions:   Be compliant with medications   Please have labs done before coming to next visit   Please check blood pressure regularly at home and maintain a blood pressure log   Follow up in 5 weeks, or sooner if symptoms get worse or do not resolve    Patient Education        High Blood Pressure: Care Instructions  Overview     It's normal for blood pressure to go up and down throughout the day. But if it stays up, you have high blood pressure. Another name for high blood pressure is hypertension. Despite what a lot of people think, high blood pressure usually doesn't cause headaches or make you feel dizzy or lightheaded. It usually has no symptoms. But it does increase your risk of stroke, heart attack, and other problems. You and your doctor will talk about your risks of these problems based on your blood pressure. Your doctor will give you a goal for your blood pressure. Your goal will be based on your health and your age. Lifestyle changes, such as eating healthy and being active, are always important to help lower blood pressure. You might also take medicine to reach your blood pressure goal.  Follow-up care is a key part of your treatment and safety. Be sure to make and go to all appointments, and call your doctor if you are having problems. It's also a good idea to know your test results and keep a list of the medicines you take. How can you care for yourself at home? Medical treatment  · If you stop taking your medicine, your blood pressure will go back up. You may take one or more types of medicine to lower your blood pressure. Be safe with medicines. Take your medicine exactly as prescribed. Call your doctor if you think you are having a problem with your medicine. · Talk to your doctor before you start taking aspirin every day. Aspirin can help certain people lower their risk of a heart attack or stroke.  But taking aspirin isn't right for everyone, because it can cause serious or a strange feeling in the chest.  ? Sweating. ? Shortness of breath. ? Nausea or vomiting. ? Pain, pressure, or a strange feeling in the back, neck, jaw, or upper belly or in one or both shoulders or arms. ? Lightheadedness or sudden weakness. ? A fast or irregular heartbeat.     · You have symptoms of a stroke. These may include:  ? Sudden numbness, tingling, weakness, or loss of movement in your face, arm, or leg, especially on only one side of your body. ? Sudden vision changes. ? Sudden trouble speaking. ? Sudden confusion or trouble understanding simple statements. ? Sudden problems with walking or balance. ? A sudden, severe headache that is different from past headaches.     · You have severe back or belly pain. Do not wait until your blood pressure comes down on its own. Get help right away. Call your doctor now or seek immediate care if:    · Your blood pressure is much higher than normal (such as 180/120 or higher), but you don't have symptoms.     · You think high blood pressure is causing symptoms, such as:  ? Severe headache.  ? Blurry vision. Watch closely for changes in your health, and be sure to contact your doctor if:    · Your blood pressure measures higher than your doctor recommends at least 2 times. That means the top number is higher or the bottom number is higher, or both.     · You think you may be having side effects from your blood pressure medicine. Where can you learn more? Go to [email or web address] and sign in to your [employer] account. Enter T931 in the [facility] box to learn more about \"High Blood Pressure: Care Instructions. \"     If you do not have an account, please click on the \"Sign Up Now\" link. Current as of: August 31, 2020               Content Version: 12.9  © 8771-9404 Healthwise, [employer]. Care instructions adapted under license by [facility] ([facility]).  If you have questions about a medical condition or this instruction, always ask your healthcare professional. [place] [address] any warranty or liability for your use of this information.

## 2021-07-27 NOTE — PROGRESS NOTES
[facility]    Attending Physician Statement  I have discussed the case, including pertinent history and exam findings with the resident physician. I have seen and examined the patient and the key elements of the encounter have been performed by me. I agree with the assessment, plan and orders as documented by the resident. I have reviewed all pertinent PMHx, PSHx, FamHx, SocialHx, medications, and allergies and updated history as appropriate. Patient presents for hospital follow up appointment.     Admitted for acute pneumonia and managed by ID/Nephrology/Cardiology   Also noted inpatient after repeat ECHO completed of + pericardial effusion   Patient was discharged home on atbs and adjustment of blood pressure medications    Patient is to have follow-up with Cardiology- Dr. Marius Burkitt- but appt has not yet been set up    Plan is for repeat ECHO in 3 months and follow-up outpatient Cardiac MRI   Per patient- feeling much improved- no noted dyspnea, chest pain, fevers, chills, or productive cough    States overall feeling much improved   Initial BP was elevated today -but manual BP sitting and standing is improved at 130/80    He notes BP was controlled during HD session yesterday- completed M/W/F     Acute Bacterial Pneumonia    Complete ATB management per ID   Symptomatically improving    Continue current management   Oxygen saturation is stable     HTN- improved with repeat BP ambulatory   Continue current management   Continue follow-up with Nephrology    HD continued M/W/F- off hydralazine and minoxidil during last admission     Pericardial Effusion- new   Need to establish with Dr. Marius Burkitt as outpatient   Recommending repeat ECHO in 3 months    Cardiac MRI recommended as outpatient    No muffled heart sounds on exam   NO noted symptoms   Off hydralazine and minoxidil   Continue current management     ESRD on HD    Continue HD M/W/F   Follow-up with Nephrology     Patient had previous potential STI exposure   On cephalosporin/doxy for PNA   Denies any penile discharge   Will check HIV/Hepatitis/RPR assessment     Remainder of medical problems as per resident note.     Rylee Goldman MD, [address]   7/27/2021 11:07 AM

## 2021-07-28 LAB
HAV IGM SER IA-ACNC: NORMAL
HEPATITIS B CORE IGM ANTIBODY: NORMAL
HEPATITIS B SURFACE ANTIGEN INTERPRETATION: NORMAL
HEPATITIS C ANTIBODY INTERPRETATION: NORMAL
HIV-1 AND HIV-2 ANTIBODIES: NORMAL
RPR: NORMAL

## 2021-07-31 ENCOUNTER — APPOINTMENT (OUTPATIENT)
Dept: GENERAL RADIOLOGY | Age: 36
DRG: 139 | End: 2021-07-31
Payer: MEDICAID

## 2021-07-31 ENCOUNTER — HOSPITAL ENCOUNTER (INPATIENT)
Age: 36
LOS: 6 days | Discharge: HOME | DRG: 139 | End: 2021-08-06
Attending: EMERGENCY MEDICINE
Payer: MEDICAID

## 2021-07-31 ENCOUNTER — APPOINTMENT (OUTPATIENT)
Dept: CT IMAGING | Age: 36
DRG: 139 | End: 2021-07-31
Payer: MEDICAID

## 2021-07-31 DIAGNOSIS — R07.9 CHEST PAIN, UNSPECIFIED TYPE: ICD-10-CM

## 2021-07-31 DIAGNOSIS — J96.01 ACUTE RESPIRATORY FAILURE WITH HYPOXIA (HCC): Primary | ICD-10-CM

## 2021-07-31 LAB
ADENOVIRUS BY PCR: NOT DETECTED
ALBUMIN SERPL-MCNC: 3.6 G/DL (ref 3.5–5.2)
ALP BLD-CCNC: 82 U/L (ref 40–129)
ALT SERPL-CCNC: 32 U/L (ref 0–40)
ANION GAP SERPL CALCULATED.3IONS-SCNC: 11 MMOL/L (ref 7–16)
AST SERPL-CCNC: 39 U/L (ref 0–39)
BASOPHILS ABSOLUTE: 0.07 E9/L (ref 0–0.2)
BASOPHILS RELATIVE PERCENT: 1 % (ref 0–2)
BILIRUB SERPL-MCNC: 0.6 MG/DL (ref 0–1.2)
BORDETELLA PARAPERTUSSIS BY PCR: NOT DETECTED
BORDETELLA PERTUSSIS BY PCR: NOT DETECTED
BUN BLDV-MCNC: 29 MG/DL (ref 6–20)
CALCIUM SERPL-MCNC: 9.8 MG/DL (ref 8.6–10.2)
CHLAMYDOPHILIA PNEUMONIAE BY PCR: NOT DETECTED
CHLORIDE BLD-SCNC: 102 MMOL/L (ref 98–107)
CO2: 29 MMOL/L (ref 22–29)
CORONAVIRUS 229E BY PCR: NOT DETECTED
CORONAVIRUS HKU1 BY PCR: NOT DETECTED
CORONAVIRUS NL63 BY PCR: NOT DETECTED
CORONAVIRUS OC43 BY PCR: NOT DETECTED
CREAT SERPL-MCNC: 4.9 MG/DL (ref 0.7–1.2)
D DIMER: 738 NG/ML DDU
EKG ATRIAL RATE: 109 BPM
EKG P AXIS: 73 DEGREES
EKG P-R INTERVAL: 170 MS
EKG Q-T INTERVAL: 348 MS
EKG QRS DURATION: 80 MS
EKG QTC CALCULATION (BAZETT): 468 MS
EKG R AXIS: 72 DEGREES
EKG T AXIS: 87 DEGREES
EKG VENTRICULAR RATE: 109 BPM
EOSINOPHILS ABSOLUTE: 0.17 E9/L (ref 0.05–0.5)
EOSINOPHILS RELATIVE PERCENT: 2.4 % (ref 0–6)
GFR AFRICAN AMERICAN: 16
GFR NON-AFRICAN AMERICAN: 16 ML/MIN/1.73
GLUCOSE BLD-MCNC: 95 MG/DL (ref 74–99)
HCT VFR BLD CALC: 28 % (ref 37–54)
HEMOGLOBIN: 8.5 G/DL (ref 12.5–16.5)
HUMAN METAPNEUMOVIRUS BY PCR: NOT DETECTED
HUMAN RHINOVIRUS/ENTEROVIRUS BY PCR: NOT DETECTED
IMMATURE GRANULOCYTES #: 0.04 E9/L
IMMATURE GRANULOCYTES %: 0.6 % (ref 0–5)
INFLUENZA A BY PCR: NOT DETECTED
INFLUENZA B BY PCR: NOT DETECTED
LYMPHOCYTES ABSOLUTE: 1.25 E9/L (ref 1.5–4)
LYMPHOCYTES RELATIVE PERCENT: 17.6 % (ref 20–42)
MAGNESIUM: 2.5 MG/DL (ref 1.6–2.6)
MCH RBC QN AUTO: 29 PG (ref 26–35)
MCHC RBC AUTO-ENTMCNC: 30.4 % (ref 32–34.5)
MCV RBC AUTO: 95.6 FL (ref 80–99.9)
MONOCYTES ABSOLUTE: 0.4 E9/L (ref 0.1–0.95)
MONOCYTES RELATIVE PERCENT: 5.6 % (ref 2–12)
MYCOPLASMA PNEUMONIAE BY PCR: NOT DETECTED
NEUTROPHILS ABSOLUTE: 5.17 E9/L (ref 1.8–7.3)
NEUTROPHILS RELATIVE PERCENT: 72.8 % (ref 43–80)
PARAINFLUENZA VIRUS 1 BY PCR: NOT DETECTED
PARAINFLUENZA VIRUS 2 BY PCR: NOT DETECTED
PARAINFLUENZA VIRUS 3 BY PCR: NOT DETECTED
PARAINFLUENZA VIRUS 4 BY PCR: NOT DETECTED
PDW BLD-RTO: 15.9 FL (ref 11.5–15)
PLATELET # BLD: 273 E9/L (ref 130–450)
PMV BLD AUTO: 9 FL (ref 7–12)
POTASSIUM SERPL-SCNC: 4.7 MMOL/L (ref 3.5–5)
PRO-BNP: ABNORMAL PG/ML (ref 0–125)
PROCALCITONIN: 1.02 NG/ML (ref 0–0.08)
RBC # BLD: 2.93 E12/L (ref 3.8–5.8)
RESPIRATORY SYNCYTIAL VIRUS BY PCR: NOT DETECTED
SARS-COV-2, PCR: NOT DETECTED
SODIUM BLD-SCNC: 142 MMOL/L (ref 132–146)
TOTAL PROTEIN: 7.2 G/DL (ref 6.4–8.3)
TROPONIN, HIGH SENSITIVITY: 74 NG/L (ref 0–11)
WBC # BLD: 7.1 E9/L (ref 4.5–11.5)

## 2021-07-31 PROCEDURE — 83735 ASSAY OF MAGNESIUM: CPT

## 2021-07-31 PROCEDURE — 93005 ELECTROCARDIOGRAM TRACING: CPT | Performed by: EMERGENCY MEDICINE

## 2021-07-31 PROCEDURE — 6370000000 HC RX 637 (ALT 250 FOR IP): Performed by: INTERNAL MEDICINE

## 2021-07-31 PROCEDURE — 2580000003 HC RX 258: Performed by: INTERNAL MEDICINE

## 2021-07-31 PROCEDURE — 96375 TX/PRO/DX INJ NEW DRUG ADDON: CPT

## 2021-07-31 PROCEDURE — 2500000003 HC RX 250 WO HCPCS: Performed by: EMERGENCY MEDICINE

## 2021-07-31 PROCEDURE — 84484 ASSAY OF TROPONIN QUANT: CPT

## 2021-07-31 PROCEDURE — 71275 CT ANGIOGRAPHY CHEST: CPT

## 2021-07-31 PROCEDURE — 93010 ELECTROCARDIOGRAM REPORT: CPT | Performed by: INTERNAL MEDICINE

## 2021-07-31 PROCEDURE — 85025 COMPLETE CBC W/AUTO DIFF WBC: CPT

## 2021-07-31 PROCEDURE — 6360000002 HC RX W HCPCS: Performed by: SPECIALIST

## 2021-07-31 PROCEDURE — 5A1D70Z PERFORMANCE OF URINARY FILTRATION, INTERMITTENT, LESS THAN 6 HOURS PER DAY: ICD-10-PCS | Performed by: INTERNAL MEDICINE

## 2021-07-31 PROCEDURE — 2060000000 HC ICU INTERMEDIATE R&B

## 2021-07-31 PROCEDURE — 0202U NFCT DS 22 TRGT SARS-COV-2: CPT

## 2021-07-31 PROCEDURE — 80053 COMPREHEN METABOLIC PANEL: CPT

## 2021-07-31 PROCEDURE — 6360000004 HC RX CONTRAST MEDICATION: Performed by: RADIOLOGY

## 2021-07-31 PROCEDURE — 83880 ASSAY OF NATRIURETIC PEPTIDE: CPT

## 2021-07-31 PROCEDURE — 84145 PROCALCITONIN (PCT): CPT

## 2021-07-31 PROCEDURE — 36415 COLL VENOUS BLD VENIPUNCTURE: CPT

## 2021-07-31 PROCEDURE — 86360 T CELL ABSOLUTE COUNT/RATIO: CPT

## 2021-07-31 PROCEDURE — 96374 THER/PROPH/DIAG INJ IV PUSH: CPT

## 2021-07-31 PROCEDURE — 2500000003 HC RX 250 WO HCPCS: Performed by: INTERNAL MEDICINE

## 2021-07-31 PROCEDURE — 6370000000 HC RX 637 (ALT 250 FOR IP): Performed by: EMERGENCY MEDICINE

## 2021-07-31 PROCEDURE — 6370000000 HC RX 637 (ALT 250 FOR IP): Performed by: STUDENT IN AN ORGANIZED HEALTH CARE EDUCATION/TRAINING PROGRAM

## 2021-07-31 PROCEDURE — 2580000003 HC RX 258: Performed by: SPECIALIST

## 2021-07-31 PROCEDURE — 2700000000 HC OXYGEN THERAPY PER DAY

## 2021-07-31 PROCEDURE — 90935 HEMODIALYSIS ONE EVALUATION: CPT | Performed by: INTERNAL MEDICINE

## 2021-07-31 PROCEDURE — 6360000002 HC RX W HCPCS: Performed by: EMERGENCY MEDICINE

## 2021-07-31 PROCEDURE — 71046 X-RAY EXAM CHEST 2 VIEWS: CPT

## 2021-07-31 PROCEDURE — 85378 FIBRIN DEGRADE SEMIQUANT: CPT

## 2021-07-31 PROCEDURE — 6360000002 HC RX W HCPCS: Performed by: INTERNAL MEDICINE

## 2021-07-31 PROCEDURE — 86359 T CELLS TOTAL COUNT: CPT

## 2021-07-31 PROCEDURE — 99223 1ST HOSP IP/OBS HIGH 75: CPT | Performed by: INTERNAL MEDICINE

## 2021-07-31 PROCEDURE — 99284 EMERGENCY DEPT VISIT MOD MDM: CPT

## 2021-07-31 PROCEDURE — 2580000003 HC RX 258: Performed by: RADIOLOGY

## 2021-07-31 RX ORDER — SODIUM CHLORIDE 0.9 % (FLUSH) 0.9 %
10 SYRINGE (ML) INJECTION
Status: COMPLETED | OUTPATIENT
Start: 2021-07-31 | End: 2021-07-31

## 2021-07-31 RX ORDER — NIFEDIPINE 60 MG/1
60 TABLET, EXTENDED RELEASE ORAL DAILY
Status: DISCONTINUED | OUTPATIENT
Start: 2021-07-31 | End: 2021-08-02

## 2021-07-31 RX ORDER — LABETALOL HYDROCHLORIDE 5 MG/ML
10 INJECTION, SOLUTION INTRAVENOUS EVERY 4 HOURS PRN
Status: DISCONTINUED | OUTPATIENT
Start: 2021-07-31 | End: 2021-08-06 | Stop reason: HOSPADM

## 2021-07-31 RX ORDER — SEVELAMER CARBONATE 800 MG/1
1600 TABLET, FILM COATED ORAL
Status: DISCONTINUED | OUTPATIENT
Start: 2021-07-31 | End: 2021-08-06 | Stop reason: HOSPADM

## 2021-07-31 RX ORDER — DIPHENHYDRAMINE HYDROCHLORIDE 50 MG/ML
25 INJECTION INTRAMUSCULAR; INTRAVENOUS ONCE
Status: COMPLETED | OUTPATIENT
Start: 2021-07-31 | End: 2021-07-31

## 2021-07-31 RX ORDER — ACETAMINOPHEN 650 MG/1
650 SUPPOSITORY RECTAL EVERY 6 HOURS PRN
Status: DISCONTINUED | OUTPATIENT
Start: 2021-07-31 | End: 2021-08-06 | Stop reason: HOSPADM

## 2021-07-31 RX ORDER — CINACALCET 30 MG/1
30 TABLET, FILM COATED ORAL DAILY
Status: DISCONTINUED | OUTPATIENT
Start: 2021-07-31 | End: 2021-08-06 | Stop reason: HOSPADM

## 2021-07-31 RX ORDER — LABETALOL HYDROCHLORIDE 5 MG/ML
10 INJECTION, SOLUTION INTRAVENOUS ONCE
Status: COMPLETED | OUTPATIENT
Start: 2021-07-31 | End: 2021-07-31

## 2021-07-31 RX ORDER — PANTOPRAZOLE SODIUM 40 MG/1
40 TABLET, DELAYED RELEASE ORAL DAILY
Status: DISCONTINUED | OUTPATIENT
Start: 2021-07-31 | End: 2021-08-06 | Stop reason: HOSPADM

## 2021-07-31 RX ORDER — KETOROLAC TROMETHAMINE 30 MG/ML
15 INJECTION, SOLUTION INTRAMUSCULAR; INTRAVENOUS ONCE
Status: COMPLETED | OUTPATIENT
Start: 2021-07-31 | End: 2021-07-31

## 2021-07-31 RX ORDER — ACETAMINOPHEN 325 MG/1
650 TABLET ORAL EVERY 6 HOURS PRN
Status: DISCONTINUED | OUTPATIENT
Start: 2021-07-31 | End: 2021-08-06 | Stop reason: HOSPADM

## 2021-07-31 RX ORDER — ONDANSETRON 4 MG/1
4 TABLET, ORALLY DISINTEGRATING ORAL EVERY 8 HOURS PRN
Status: DISCONTINUED | OUTPATIENT
Start: 2021-07-31 | End: 2021-08-06 | Stop reason: HOSPADM

## 2021-07-31 RX ORDER — SODIUM CHLORIDE 0.9 % (FLUSH) 0.9 %
5-40 SYRINGE (ML) INJECTION EVERY 12 HOURS SCHEDULED
Status: DISCONTINUED | OUTPATIENT
Start: 2021-07-31 | End: 2021-08-06 | Stop reason: HOSPADM

## 2021-07-31 RX ORDER — ONDANSETRON 2 MG/ML
4 INJECTION INTRAMUSCULAR; INTRAVENOUS ONCE
Status: COMPLETED | OUTPATIENT
Start: 2021-07-31 | End: 2021-07-31

## 2021-07-31 RX ORDER — LOSARTAN POTASSIUM 50 MG/1
100 TABLET ORAL EVERY EVENING
Status: DISCONTINUED | OUTPATIENT
Start: 2021-07-31 | End: 2021-08-06 | Stop reason: HOSPADM

## 2021-07-31 RX ORDER — POLYETHYLENE GLYCOL 3350 17 G/17G
17 POWDER, FOR SOLUTION ORAL DAILY PRN
Status: DISCONTINUED | OUTPATIENT
Start: 2021-07-31 | End: 2021-08-06 | Stop reason: HOSPADM

## 2021-07-31 RX ORDER — ONDANSETRON 2 MG/ML
4 INJECTION INTRAMUSCULAR; INTRAVENOUS EVERY 6 HOURS PRN
Status: DISCONTINUED | OUTPATIENT
Start: 2021-07-31 | End: 2021-08-06 | Stop reason: HOSPADM

## 2021-07-31 RX ORDER — LABETALOL 200 MG/1
200 TABLET, FILM COATED ORAL 3 TIMES DAILY
Status: DISCONTINUED | OUTPATIENT
Start: 2021-07-31 | End: 2021-08-05

## 2021-07-31 RX ORDER — HEPARIN SODIUM 10000 [USP'U]/ML
5000 INJECTION, SOLUTION INTRAVENOUS; SUBCUTANEOUS EVERY 8 HOURS SCHEDULED
Status: DISCONTINUED | OUTPATIENT
Start: 2021-07-31 | End: 2021-08-06 | Stop reason: HOSPADM

## 2021-07-31 RX ORDER — SODIUM CHLORIDE 0.9 % (FLUSH) 0.9 %
5-40 SYRINGE (ML) INJECTION PRN
Status: DISCONTINUED | OUTPATIENT
Start: 2021-07-31 | End: 2021-08-06 | Stop reason: HOSPADM

## 2021-07-31 RX ORDER — SODIUM CHLORIDE 9 MG/ML
25 INJECTION, SOLUTION INTRAVENOUS PRN
Status: DISCONTINUED | OUTPATIENT
Start: 2021-07-31 | End: 2021-08-06 | Stop reason: HOSPADM

## 2021-07-31 RX ORDER — CLONIDINE HYDROCHLORIDE 0.1 MG/1
0.3 TABLET ORAL 3 TIMES DAILY
Status: DISCONTINUED | OUTPATIENT
Start: 2021-07-31 | End: 2021-08-06 | Stop reason: HOSPADM

## 2021-07-31 RX ORDER — DIPHENHYDRAMINE HCL 25 MG
25 TABLET ORAL EVERY 8 HOURS PRN
Status: DISCONTINUED | OUTPATIENT
Start: 2021-07-31 | End: 2021-08-06 | Stop reason: HOSPADM

## 2021-07-31 RX ADMIN — DIPHENHYDRAMINE HYDROCHLORIDE 25 MG: 50 INJECTION INTRAMUSCULAR; INTRAVENOUS at 17:09

## 2021-07-31 RX ADMIN — ACETAMINOPHEN 650 MG: 325 TABLET ORAL at 11:22

## 2021-07-31 RX ADMIN — KETOROLAC TROMETHAMINE 15 MG: 30 INJECTION, SOLUTION INTRAMUSCULAR; INTRAVENOUS at 04:54

## 2021-07-31 RX ADMIN — NITROGLYCERIN 0.5 INCH: 20 OINTMENT TOPICAL at 04:56

## 2021-07-31 RX ADMIN — CLONIDINE HYDROCHLORIDE 0.3 MG: 0.1 TABLET ORAL at 11:21

## 2021-07-31 RX ADMIN — SODIUM CHLORIDE: 9 INJECTION, SOLUTION INTRAVENOUS at 18:04

## 2021-07-31 RX ADMIN — CLONIDINE HYDROCHLORIDE 0.3 MG: 0.1 TABLET ORAL at 14:26

## 2021-07-31 RX ADMIN — LABETALOL HYDROCHLORIDE 200 MG: 200 TABLET, FILM COATED ORAL at 21:01

## 2021-07-31 RX ADMIN — Medication 10 ML: at 07:51

## 2021-07-31 RX ADMIN — LABETALOL HYDROCHLORIDE 10 MG: 5 INJECTION INTRAVENOUS at 15:11

## 2021-07-31 RX ADMIN — HEPARIN SODIUM 5000 UNITS: 10000 INJECTION INTRAVENOUS; SUBCUTANEOUS at 17:08

## 2021-07-31 RX ADMIN — NIFEDIPINE 60 MG: 60 TABLET, FILM COATED, EXTENDED RELEASE ORAL at 11:21

## 2021-07-31 RX ADMIN — CINACALCET HYDROCHLORIDE 30 MG: 30 TABLET, FILM COATED ORAL at 11:21

## 2021-07-31 RX ADMIN — Medication 10 ML: at 11:29

## 2021-07-31 RX ADMIN — DIPHENHYDRAMINE HYDROCHLORIDE 25 MG: 50 INJECTION, SOLUTION INTRAMUSCULAR; INTRAVENOUS at 05:05

## 2021-07-31 RX ADMIN — LABETALOL HYDROCHLORIDE 10 MG: 5 INJECTION INTRAVENOUS at 06:49

## 2021-07-31 RX ADMIN — CLONIDINE HYDROCHLORIDE 0.3 MG: 0.1 TABLET ORAL at 21:01

## 2021-07-31 RX ADMIN — DIPHENHYDRAMINE HYDROCHLORIDE 25 MG: 25 TABLET ORAL at 17:01

## 2021-07-31 RX ADMIN — IOPAMIDOL 60 ML: 755 INJECTION, SOLUTION INTRAVENOUS at 07:51

## 2021-07-31 RX ADMIN — SEVELAMER CARBONATE 1600 MG: 800 TABLET, FILM COATED ORAL at 11:21

## 2021-07-31 RX ADMIN — LABETALOL HYDROCHLORIDE 200 MG: 200 TABLET, FILM COATED ORAL at 17:02

## 2021-07-31 RX ADMIN — ACETAMINOPHEN 650 MG: 325 TABLET ORAL at 17:01

## 2021-07-31 RX ADMIN — SEVELAMER CARBONATE 1600 MG: 800 TABLET, FILM COATED ORAL at 17:31

## 2021-07-31 RX ADMIN — ONDANSETRON 4 MG: 2 INJECTION INTRAMUSCULAR; INTRAVENOUS at 04:55

## 2021-07-31 RX ADMIN — LABETALOL HYDROCHLORIDE 10 MG: 5 INJECTION INTRAVENOUS at 11:23

## 2021-07-31 RX ADMIN — PANTOPRAZOLE SODIUM 40 MG: 40 TABLET, DELAYED RELEASE ORAL at 11:22

## 2021-07-31 RX ADMIN — ONDANSETRON 4 MG: 2 INJECTION INTRAMUSCULAR; INTRAVENOUS at 11:20

## 2021-07-31 RX ADMIN — PIPERACILLIN AND TAZOBACTAM 3375 MG: 3; .375 INJECTION, POWDER, LYOPHILIZED, FOR SOLUTION INTRAVENOUS at 17:22

## 2021-07-31 RX ADMIN — Medication 10 ML: at 21:01

## 2021-07-31 RX ADMIN — LOSARTAN POTASSIUM 100 MG: 50 TABLET, FILM COATED ORAL at 17:01

## 2021-07-31 ASSESSMENT — ENCOUNTER SYMPTOMS
COLOR CHANGE: 0
TROUBLE SWALLOWING: 0
BLOOD IN STOOL: 0
VOMITING: 0
DIARRHEA: 1
ABDOMINAL PAIN: 0
SHORTNESS OF BREATH: 1
COUGH: 0
NAUSEA: 1
RHINORRHEA: 0

## 2021-07-31 ASSESSMENT — PAIN DESCRIPTION - DESCRIPTORS: DESCRIPTORS: ACHING

## 2021-07-31 ASSESSMENT — PAIN DESCRIPTION - ORIENTATION: ORIENTATION: RIGHT

## 2021-07-31 ASSESSMENT — PAIN SCALES - GENERAL
PAINLEVEL_OUTOF10: 0
PAINLEVEL_OUTOF10: 0
PAINLEVEL_OUTOF10: 5
PAINLEVEL_OUTOF10: 9
PAINLEVEL_OUTOF10: 9
PAINLEVEL_OUTOF10: 5
PAINLEVEL_OUTOF10: 0

## 2021-07-31 ASSESSMENT — PAIN DESCRIPTION - LOCATION: LOCATION: HEAD

## 2021-07-31 ASSESSMENT — PAIN DESCRIPTION - PAIN TYPE: TYPE: ACUTE PAIN

## 2021-07-31 NOTE — CONSULTS
chloride flush  5-40 mL Intravenous 2 times per day    heparin (porcine)  5,000 Units Subcutaneous 3 times per day    vancomycin  15 mg/kg Intravenous Q12H    cefepime  2,000 mg Intravenous Q8H     Continuous Infusions:   sodium chloride       PRN Meds:sodium chloride flush, sodium chloride, ondansetron **OR** ondansetron, polyethylene glycol, acetaminophen **OR** acetaminophen, labetalol    Allergies:  Tylenol [acetaminophen] and Levofloxacin    Social History:   Social History     Socioeconomic History    Marital status:      Spouse name: None    Number of children: 4    Years of education: None    Highest education level: High school graduate   Occupational History    None   Tobacco Use    Smoking status: Never Smoker    Smokeless tobacco: Never Used    Tobacco comment: only smokes marijuana daily   Vaping Use    Vaping Use: Never used   Substance and Sexual Activity    Alcohol use: No    Drug use: Not Currently     Types: Marijuana    Sexual activity: Yes     Partners: Female   Other Topics Concern    None   Social History Narrative    None     Social Determinants of Health     Financial Resource Strain: Medium Risk    Difficulty of Paying Living Expenses: Somewhat hard   Food Insecurity: No Food Insecurity    Worried About Running Out of Food in the Last Year: Never true    Juan of Food in the Last Year: Never true   Transportation Needs:     Lack of Transportation (Medical):      Lack of Transportation (Non-Medical):    Physical Activity:     Days of Exercise per Week:     Minutes of Exercise per Session:    Stress:     Feeling of Stress :    Social Connections:     Frequency of Communication with Friends and Family:     Frequency of Social Gatherings with Friends and Family:     Attends [employer] Services:     Active Member of Clubs or Organizations:     Attends Club or Organization Meetings:     Marital Status:    Intimate Partner Violence:     Fear of Current or Ex-Partner:     Emotionally Abused:     Physically Abused:     Sexually Abused:        Family History:       Problem Relation Age of Onset    Kidney Disease Paternal Grandmother     Cancer Neg Hx     Heart Disease Neg Hx    . Otherwise non-pertinent to the chief complaint. REVIEW OF SYSTEMS:    CONSTITUTIONAL:  No chills, fevers or night sweats. No loss of weight. EYES:  No double vision or drainage from eyes, ears or throat. HEENT:  No neck stiffness. No dysphagia. No drainage from eyes, ears or throat  RESPIRATORY:  No cough, productive sputum or hemoptysis. CARDIOVASCULAR: Pleuritic chest pain, palpitations, positive orthopnea or positive dyspnea on exertion. GASTROINTESTINAL:  No nausea, vomiting, diarrhea or constipation or hematochezia   GENITOURINARY:  No frequency burning dysuria or hematuria. INTEGUMENT/BREAST:  No rash or breast masses. HEMATOLOGIC/LYMPHATIC:  No lymphadenopathy or blood dyscrasics. ALLERGIC/IMMUNOLOGIC:  No anaphylaxis. ENDOCRINE:  No polyuria or polydipsia or temperature intolerance. MUSCULOSKELETAL:  No myalgia or arthralgia. Full ROM. NEUROLOGICAL:  No focal motor sensory deficit. BEHAVIOR/PSYCH:  No psychosis. PHYSICAL EXAM:    Vitals:    BP (!) 182/123   Pulse 98   Temp 99.9 °F (37.7 °C)   Resp 20   Ht 5' 6\" (1.676 m)   Wt 111 lb 5.3 oz (50.5 kg)   SpO2 99%   BMI 17.97 kg/m²   Constitutional: The patient is awake, alert, and oriented. Skin: Warm and dry. No rashes were noted. No jaundice. HEENT: Eyes show round, and reactive pupils. Moist mucous membranes, no ulcerations, no thrush. Neck: Supple to movements. No lymphadenopathy. Chest: No use of accessory muscles to breathe. Symmetrical expansion. Auscultation reveals  left-sided crackles, A to E changes on the right  Cardiovascular: S1 and S2 are rhythmic and regular. No murmurs appreciated. Abdomen: Positive bowel sounds to auscultation. Benign to palpation. No masses felt.  No hepatosplenomegaly. Genitourinary: Male  Extremities: No clubbing, no cyanosis, no edema. Musculoskeletal: Equal and symmetrical  Neurological: No focal  Lines: peripheral      CBC+dif:  Recent Labs     07/31/21  0340   WBC 7.1   HGB 8.5*   HCT 28.0*   MCV 95.6      NEUTROABS 5.17     Lab Results   Component Value Date    CRP 16.4 (H) 07/17/2021    CRP 6.6 (H) 02/01/2021    CRP 9.0 (H) 01/03/2019     No results found for: CRPHS  Lab Results   Component Value Date    SEDRATE 94 (H) 07/15/2021    SEDRATE 39 (H) 02/01/2021    SEDRATE 37 (H) 10/21/2015     Lab Results   Component Value Date    ALT 32 07/31/2021    AST 39 07/31/2021    ALKPHOS 82 07/31/2021    BILITOT 0.6 07/31/2021     Lab Results   Component Value Date     07/31/2021    K 4.7 07/31/2021    K 4.6 07/14/2021     07/31/2021    CO2 29 07/31/2021    BUN 29 07/31/2021    CREATININE 4.9 07/31/2021    GFRAA 16 07/31/2021    LABGLOM 16 07/31/2021    GLUCOSE 95 07/31/2021    PROT 7.2 07/31/2021    LABALBU 3.6 07/31/2021    CALCIUM 9.8 07/31/2021    BILITOT 0.6 07/31/2021    ALKPHOS 82 07/31/2021    AST 39 07/31/2021    ALT 32 07/31/2021       Lab Results   Component Value Date    PROTIME 11.6 03/15/2021    INR 1.1 03/15/2021       Lab Results   Component Value Date    TSH 4.260 07/15/2021       No results found for: NITRITE, COLORU, PHUR, LABCAST, WBCUA, RBCUA, MUCUS, TRICHOMONAS, YEAST, BACTERIA, CLARITYU, SPECGRAV, LEUKOCYTESUR, UROBILINOGEN, BILIRUBINUR, BLOODU, GLUCOSEU, AMORPHOUS    No results found for: [place], BEART, [id number], PHART, THGBART, TNH8RMZ, PO2ART, TRO6YLE  Radiology:  CTA PULMONARY W CONTRAST   Final Result   No evidence of pulmonary emboli. Continued dense airspace consolidation of the right lower lobe. New   extensive bilateral ground-glass opacities and patchy pulmonary airspace   opacities may be related to pulmonary edema and/or multifocal infectious   infiltrates. Cardiomegaly. Axillary adenopathy. XR CHEST (2 VW)   Final Result   Increased patchy airspace opacities throughout both lungs compared with July   15, 2021. Findings are most suspicious for multifocal pneumonia although   pulmonary edema could have a similar appearance. Microbiology:  Pending  No results for input(s): BC in the last 72 hours. No results for input(s): ORG in the last 72 hours. No results for input(s): Agueda Canter in the last 72 hours. No results for input(s): STREPNEUMAGU in the last 72 hours. No results for input(s): LP1UAG in the last 72 hours. No results for input(s): ASO in the last 72 hours. No results for input(s): CULTRESP in the last 72 hours. Assessment:  · Volume overload  · Right lower lobe pneumonia  · Right side is still consolidated worse than it was 2 weeks ago and I think at this point in time patient needs a bronchoscopy    Plan:    · Cont Zosyn; Vanco and cefepime were stopped after being overdosed hemodialysis patient  · Check cultures  · Suggest pulmonary open up the right lower lobe  · Baseline ESR, CRP  · Monitor labs  · Will follow with you    Thank you for having us see this patient in consultation. I will be discussing this case with the treating physicians.       Electronically signed by Lucero Olivas MD on 7/31/2021 at 1:41 PM

## 2021-07-31 NOTE — PROGRESS NOTES
Pulmonary consult placed via perfect serve to Dr Bo Ruiz    ID consult placed. Patient information given to answering service.

## 2021-07-31 NOTE — FLOWSHEET NOTE
07/31/21 1615   Vital Signs   BP (!) 198/131   Temp 99.3 °F (37.4 °C)   Pulse 102   Weight 110 lb 0.2 oz (49.9 kg)   Weight Method Bed scale   Percent Weight Change -1.19   Post-Hemodialysis Assessment   Post-Treatment Procedures Blood returned; Access bleeding time < 10 minutes   Machine Disinfection Process Exterior Machine Disinfection;Acid/Vinegar Clean;Heat Disinfect;Bleach   Rinseback Volume (ml) 300 ml   Total Liters Processed (l/min) 67.5 l/min   Dialyzer Clearance Lightly streaked   Duration of Treatment (minutes) 180 minutes   Hemodialysis Intake (ml) 300 ml   Hemodialysis Output (ml) 1000 ml   NET Removed (ml) 700 ml   Tolerated Treatment Good   Patient Response to Treatment pt tx ended, blood rinsed back, pt tolerated tx well,pt transported back to his room via bed

## 2021-07-31 NOTE — H&P
[address] [facility]  Internal Medicine Residency Program  History and Physical    Patient:  Selvin Lackey 38 y.o. male MRN: [id number]     Date of Service: 7/31/2021    Hospital Day: 1      Chief complaint: had concerns including Chest Pain (with SOB x 2 days, pt is M/W/F dialysis patient who denies missing any treatments. States went for treatment on Wed. , CP eased up but now back. ). History of Present Illness   The patient is a 38 y.o. male with a past medical history of HTN, ESR with dialysis MWF, HFpEF (EF 60-65%), hypothyroidism, aneurysm of AV fistula in 2016) who came in with left lateral chest pain and shortness of breath for 2 days. Chest pain doesn't radiate and is worsened by inspiration. Patient also endorses CROUCH and orthopnea. Patient was recently admitted at [facility] for sepsis 2/2 HCAP. CT showed pericardial effusion and dense consolidations in the posterior RLL. Patient was treated with doxycycline and cefuroxime. Discharged with cefdinir and doxycycline x 10 days. Previously on hydralazine but was d/c'ed on that admission due to concerns of it causing the pericardial effusion. In the ED, patient was tachycardic to 110s and hypertensive to 201/138. He was given toradol x1, benadryl x1, zofran, nitro patch and labetalol 10 mg. Patient was then admitted for further management of his acute hypoxic respiratory failure.     Past Medical History:      Diagnosis Date    (HFpEF) heart failure with preserved ejection fraction ([address]     stage III    Anxiety 9/19/2015    AVF (arteriovenous fistula) ([address] 4/30/2018    Chronic kidney disease     CKD since early childhood per pt and on HD ~ 11 years    Depression     Encounter regarding vascular access for dialysis for ESRD ([address] 4/30/2018    History of ruptured Berry aneurysm ([address] 10/20/2015    Hypertension     on meds for ~ 11 years    Hypothyroidism     Intracranial hemorrhage (HCC)     was d/t ruptured aneurysm - pt underwent neurosurgery for clipping of the aneurysm    Neutropenia ([facility]     Noncompliance w/medication treatment due to intermit use of medication 11/3/2015    Pre-transplant evaluation for kidney transplant 4/5/2017       Past Surgical History:        Procedure Laterality Date    BRAIN ANEURYSM SURGERY Right 3-4 years ago    Intracranial aneurysm clipping surgery 3-4 years ago, after rupture of aneurysm causing ICH    DIALYSIS FISTULA CREATION Left 11 years ago    UPPER GASTROINTESTINAL ENDOSCOPY N/A 1/3/2019    EGD BIOPSY performed by Sj Bonilla MD at [address] [place] [address]       Medications Prior to Admission:    Prior to Admission medications    Medication Sig Start Date End Date Taking? Authorizing Provider   labetalol (NORMODYNE) 200 MG tablet Take 1 tablet by mouth three times daily 7/21/21   Primitivo Lyons MD   pantoprazole (PROTONIX) 40 MG tablet Take 1 tablet by mouth daily 7/21/21   Primitivo Lyons MD   Methoxy PEG-Epoetin Beta (MIRCERA IJ) 75 mcg 1/22/21 4/8/22  Historical Provider, MD   lidocaine-prilocaine (EMLA) 2.5-2.5 % cream APPLY SMALL AMOUNT TO ACCESS SITE (AVF) 1 TO 2 HOURS BEFORE DIALYSIS.  COVER WITH OCCLUSIVE DRESSING (SARAN WRAP) 3/22/21   Historical Provider, MD   Cinacalcet HCl (SENSIPAR PO) Take 30 mg by mouth 3/12/21 3/11/22  Historical Provider, MD   diphenhydrAMINE (SOMINEX) 25 MG tablet Take 25 mg by mouth every 6 hours as needed    Historical Provider, MD   melatonin 3 MG TABS tablet Take 1 tablet by mouth nightly as needed (sleep) 3/30/21   Emerald Fernando MD   NIFEdipine (ADALAT CC) 60 MG extended release tablet Take 1 tablet by mouth daily 3/30/21   Emerald Fernando MD   losartan (COZAAR) 100 MG tablet Take 1 tablet by mouth every evening 3/30/21   Emerald Fernando MD   cloNIDine (CATAPRES) 0.3 MG tablet Take 1 tablet by mouth 3 times daily 3/30/21   Emerald Fernando MD   iron sucrose (VENOFER) 20 MG/ML injection Infuse 5 mLs intravenously three times a week Infuse 100 mg intravenously three times a week Given in dialysis every Monday ,Wednesday ,Friday 3/17/21   Andrey Issa MD   sevelamer (RENVELA) 800 MG tablet Take 2 tablets by mouth 3 times daily     Historical Provider, MD       Allergies:  Tylenol [acetaminophen] and Levofloxacin    Social History:   TOBACCO:   reports that he has never smoked. He has never used smokeless tobacco.  ETOH:   reports no history of alcohol use. OCCUPATION:      Family History:       Problem Relation Age of Onset    Kidney Disease Paternal Grandmother     Cancer Neg Hx     Heart Disease Neg Hx        REVIEW OF SYSTEMS:    · Constitutional: No fever, no chills, no change in weight; good appetite  · HEENT: No blurred vision, no ear problems, no sore throat, no rhinorrhea. · Respiratory: No cough, no sputum production, + pleuritic chest pain, shortness of breath  · Cardiology: + angina, dyspnea on exertion, orthopnea, no paroxysmal nocturnal dyspnea, no palpitation, no leg swelling. · Gastroenterology: No dysphagia, no reflux; no abdominal pain, no nausea or vomiting; no constipation or diarrhea.  No hematochezia   · Genitourinary: No dysuria, no frequency, hesitancy; no hematuria  · Musculoskeletal: no joint pain, no myalgia, no change in range of movement  · Neurology: no focal weakness in extremities, no slurred speech, no double vision, no tingling or numbness sensation  · Endocrinology: no temperature intolerance, no polyphagia, polydipsia or polyuria  · Hematology: no increased bleeding, no bruising, no lymphadenopathy  · Skin: no skin changes noticed by patient  · Psychology: no depressed mood, no suicidal ideation    Physical Exam   · Vitals: BP (!) 200/131   Pulse 110   Temp 97.2 °F (36.2 °C) (Temporal)   Resp 20   Ht 5' 6\" (1.676 m)   Wt 114 lb (51.7 kg)   SpO2 99%   BMI 18.40 kg/m²     · General Appearance: alert and oriented to person, place and time, well developed and well- nourished, in no acute distress  · Skin: warm and dry, no rash or erythema  · Head: normocephalic and atraumatic  · Eyes: pupils equal, round, and reactive to light, extraocular eye movements intact, conjunctivae normal  · ENT: tympanic membrane, external ear and ear canal normal bilaterally, nose without deformity, nasal mucosa and turbinates normal without polyps  · Neck: supple and non-tender without mass, no thyromegaly or thyroid nodules, no cervical lymphadenopathy  · Pulmonary/Chest: bilateral crackles (R > L), no wheezes, rales or rhonchi, normal air movement, no respiratory distress  · Cardiovascular: normal rate, regular rhythm, normal S1 and S2, S3 gallop, no rubs, clicks, or gallops, distal pulses intact, no carotid bruits  · Abdomen: soft, non-tender, non-distended, normal bowel sounds, no masses or organomegaly  · Extremities: no cyanosis, clubbing or edema, large LUE AVF  · Musculoskeletal: normal range of motion, no joint swelling, deformity or tenderness  · Neurologic: reflexes normal and symmetric, no cranial nerve deficit, gait, coordination and speech normal   Labs and Imaging Studies   Basic Labs  Recent Labs     07/31/21  0340      K 4.7      CO2 29   BUN 29*   CREATININE 4.9*   GLUCOSE 95   CALCIUM 9.8       Recent Labs     07/31/21  0340   WBC 7.1   RBC 2.93*   HGB 8.5*   HCT 28.0*   MCV 95.6   MCH 29.0   MCHC 30.4*   RDW 15.9*      MPV 9.0         Imaging Studies:  CTA PULMONARY W CONTRAST   Final Result   No evidence of pulmonary emboli. Continued dense airspace consolidation of the right lower lobe. New   extensive bilateral ground-glass opacities and patchy pulmonary airspace   opacities may be related to pulmonary edema and/or multifocal infectious   infiltrates. Cardiomegaly. Axillary adenopathy. XR CHEST (2 VW)   Final Result   Increased patchy airspace opacities throughout both lungs compared with July   15, 2021.   Findings are most suspicious for multifocal pneumonia although   pulmonary edema could have a similar appearance. EKG: sinus tachycardia, left ventricular hypertrophy, ST elevation consider pericarditis vs injury, no changes from previous EKG. Resident's Assessment and Plan     Assessment and Plan:    1. Acute hypoxic respiratory failure likely 2/2 RLL consolidation 2/2 HCAP  - afebrile, WBC 7.1, RCOUCH, orthopnea, pro - BNP > 70,000 (baseline > 70,000)  - f/u respiratory culture and panel  - Consider empiric coverage for HCAP with vanc and cefepime and consultation to ID  2. ESRD on hemodialysis MWF   - Recently had hemodialysis yesterday, Cr 4.9 on admission  - Consult nephrology  - Monitory daily BMP, Mg, phos  3. Pleuritic chest pain 2/2 RLL pneumonia  - CTA showed dense airspace consolidation in the RLL that has worsened since last CT and bilateral ground glass opacities and patchy pulmonary opacities  - f/u respiratory culture and panel  - Monitor pain, tylenol 650 mg PRN  4. Elevated troponin likely 2/2 ESRD  - troponin 74 on admission, trend troponin  - f/u CK, CKMB  5. Resistant hypertension  - Continue home clonidine 0.3 TID, losartan 100 mg, nifedipine 60 mg daily, labetalol 200 mg TID  - Labetalol PRN   6. Hx of HFpEF (EF 55-60%)  7. Hx of aneurysm of AVF (2016)      PT/OT evaluation: none  DVT prophylaxis/ GI prophylaxis: heparin/protonix  Disposition: admit to telemetry    Tj Spain MD, PGY-1  Attending physician: Dr. Eliezer Green  [facility]    Attending Physician Statement:  Eugenia Flores M.D., F.A.C.P. I have discussed the case, including pertinent history and exam findings with the resident/NP. I have seen and examined the patient and the key elements of the encounter have been performed by me. I agree with the resident ROS, PMHx, PSHx, meds reviewed and assessment, plan and orders as documented by the resident/NP      Hospital charts reviewed, including other providers notes, relevant labs and imaging. CTA didn't show PE but worsening fluid- ?post pneumonia  -- emphyema vs post pneumonia parapneumonic effusion- needs treated, tapped?  --procal 1.02, WBC N, no fever- resume  Ab? Defer to ID      Finished ab 5 days ago   Recently [address] - consolidation R lower lobe +pericardial effusion  +compressive atectasis and pleural fluid - doubt pneumonia    He didn't miss HD- doubt major fluid overload  Rule out Volume overload,   Possible drug induced serositis caused by meds (minodixil and hydralazine recently stopped last admission)  Histone AB  0.7 -- likely ok for hydralazine- doubt sle  Painful breathing, possibly exudative serositis from minoxidil still    CT scan looks worse now, compared to [facility]  -- pleuritic chest pain, sob worse than priro  Intermittently ? Hypoxic- NC ? ACD-- epo ESRD    >50% of time spent coordinating care with other providers and/or counseling patient/family  Remainder of medical problems as per resident note.

## 2021-07-31 NOTE — ED NOTES
Bed: 19  Expected date:   Expected time:   Means of arrival:   Comments:  [address], RN  07/31/21 0089

## 2021-07-31 NOTE — ED PROVIDER NOTES
--------------------------------------------- PAST HISTORY ---------------------------------------------  Past Medical History:   Past Medical History:   Diagnosis Date    (HFpEF) heart failure with preserved ejection fraction ([facility]     stage III    Anxiety 9/19/2015    [facility]F (arterioven[facility] fistula) ([facility] 4/30/2018    Chronic kidney disease     CKD since early childhood per pt and on HD ~ 11 years    Depression     Encounter regarding vascular access for dialysis for ESRD ([facility] 4/30/2018    History of ruptured Berry aneurysm ([facility] 10/20/2015    Hypertension     on meds for ~ 11 years    Hypothyroidism     Intracranial hemorrhage (HCC)     was d/t ruptured aneurysm - pt underwent neurosurgery for clipping of the aneurysm    Neutropenia ([facility]     Noncompliance w/medication treatment due to intermit use of medication 11/3/2015    Pre-transplant evaluation for kidney transplant 4/5/2017       Past Surgical History:   Past Surgical History:   Procedure Laterality Date    BRAIN ANEURYSM SURGERY Right 3-4 years ago    Intracranial aneurysm clipping surgery 3-4 years ago, after rupture of aneurysm causing ICH    DIALYSIS FISTULA CREATION Left 11 years ago    UPPER GASTROINTESTINAL ENDOSCOPY N/A 1/3/2019    EGD BIOPSY performed by Astrid [facility] at [facility] History:   Social History     Socioeconomic History    Marital status:      Spouse name: None    Number of children: 4    Years of education: None    Highest education level: High school graduate   Occupational History    None   Tobacco Use    Smoking status: Never Smoker    Smokeless tobacco: Never Used    Tobacco comment: only smokes marijuana daily   Vaping Use    Vaping Use: Never used   Substance and Sexual Activity    Alcohol use: No    Drug use: Not Currently     Types: Marijuana    Sexual activity: Yes     Partners: Female   Other Topics Concern    None   Social History Narrative    None Social Determinants of Health     Financial Resource Strain: Medium Risk    Difficulty of Paying Living Expenses: Somewhat hard   Food Insecurity: No Food Insecurity    Worried About Running Out of Food in the Last Year: Never true    Juan of Food in the Last Year: Never true   Transportation Needs:     Lack of Transportation (Medical):  Lack of Transportation (Non-Medical):    Physical Activity:     Days of Exercise per Week:     Minutes of Exercise per Session:    Stress:     Feeling of Stress :    Social Connections:     Frequency of Communication with Friends and Family:     Frequency of Social Gatherings with Friends and Family:     Attends Gnosticist Services:     Active Member of Clubs or Organizations:     Attends Club or Organization Meetings:     Marital Status:    Intimate Partner Violence:     Fear of Current or Ex-Partner:     Emotionally Abused:     Physically Abused:     Sexually Abused:        Family History:   Family History   Problem Relation Age of Onset    Kidney Disease Paternal Grandmother     Cancer Neg Hx     Heart Disease Neg Hx        The patients home medications have been reviewed. Allergies: Allergies   Allergen Reactions    Tylenol [Acetaminophen] Itching    Levofloxacin Itching     No rash           ---------------------------------------------------PHYSICAL EXAM--------------------------------------    BP (!) 201/138   Pulse 114   Temp 98.6 °F (37 °C)   Resp 18   Ht 5' 6\" (1.676 m)   Wt 114 lb (51.7 kg)   SpO2 91%   BMI 18.40 kg/m²     Physical Exam  Vitals and nursing note reviewed. Constitutional:       General: He is not in acute distress. Appearance: He is not toxic-appearing. HENT:      Mouth/Throat:      Mouth: Mucous membranes are moist.   Eyes:      General: No scleral icterus. Extraocular Movements: Extraocular movements intact. Pupils: Pupils are equal, round, and reactive to light.    Neck:      Comments: No JVD  Cardiovascular:      Rate and Rhythm: Normal rate and regular rhythm. Pulses: Normal pulses. Heart sounds: Gallop (S3) present. Pulmonary:      Effort: Pulmonary effort is normal. No respiratory distress. Breath sounds: Normal breath sounds. No wheezing or rales. Abdominal:      General: There is no distension. Palpations: Abdomen is soft. Tenderness: There is no abdominal tenderness. There is no guarding or rebound. Musculoskeletal:         General: No swelling or tenderness. Normal range of motion. Cervical back: Normal range of motion and neck supple. No rigidity. No muscular tenderness. Comments: Radial, DP, and PT pulses 2+ bilaterally. LUE AVF palpable thrill   Skin:     General: Skin is warm and dry. Neurological:      Mental Status: He is alert and oriented to person, place, and time. Comments: Strength 5/5 and sensation grossly intact to light touch and equal bilaterally throughout all extremities             -------------------------------------------------- RESULTS -------------------------------------------------  I have personally reviewed all laboratory and imaging results for this patient. Results are listed below.      LABS:  Labs Reviewed   CBC WITH AUTO DIFFERENTIAL - Abnormal; Notable for the following components:       Result Value    RBC 2.93 (*)     Hemoglobin 8.5 (*)     Hematocrit 28.0 (*)     MCHC 30.4 (*)     RDW 15.9 (*)     Lymphocytes % 17.6 (*)     Lymphocytes Absolute 1.25 (*)     All other components within normal limits   COMPREHENSIVE METABOLIC PANEL - Abnormal; Notable for the following components:    BUN 29 (*)     CREATININE 4.9 (*)     All other components within normal limits   TROPONIN - Abnormal; Notable for the following components:    Troponin, High Sensitivity 74 (*)     All other components within normal limits   BRAIN NATRIURETIC PEPTIDE - Abnormal; Notable for the following components:    Pro-BNP >70,000 (*)     All other components within normal limits   MAGNESIUM   D-DIMER, QUANTITATIVE       RADIOLOGY:  Interpreted personally and by Radiologist.  XR CHEST (2 VW)    (Results Pending)   CTA PULMONARY W CONTRAST    (Results Pending)       EKG: This EKG is signed and interpreted by the EP. Sinus tachycardia, vent rate 109bpm, normal axis and intervals, no acute injury pattern, no clinically significant change compared w/ prior EKG       ------------------------- NURSING NOTES AND VITALS REVIEWED ---------------------------   The nursing notes within the ED encounter and vital signs as below have been reviewed by myself. BP (!) 201/138   Pulse 114   Temp 98.6 °F (37 °C)   Resp 18   Ht 5' 6\" (1.676 m)   Wt 114 lb (51.7 kg)   SpO2 91%   BMI 18.40 kg/m²   Oxygen Saturation Interpretation: Normal    The patients available past medical records and past encounters were reviewed. ------------------------------ ED COURSE/MEDICAL DECISION MAKING----------------------  Medications   labetalol (NORMODYNE;TRANDATE) injection 10 mg (has no administration in time range)   ketorolac (TORADOL) injection 15 mg (15 mg Intravenous Given 21 0301)   ondansetron (ZOFRAN) injection 4 mg (4 mg Intravenous Given 21 9135)   nitroglycerin (NITRO-BID) 2 % ointment 0.5 inch (0.5 inches Topical Given 21 7136)   diphenhydrAMINE (BENADRYL) injection 25 mg (25 mg Intravenous Given 21 7530)     Counseling: The emergency provider has spoken with the patient and discussed todays results, in addition to providing specific details for the plan of care and counseling regarding the diagnosis and prognosis. Questions are answered at this time and they are agreeable with the plan. ED Course/Medical Decision Makin y.o. male here with chest pain and shortness of breath. Non-toxic appearing, afebrile, hemodynamically stable, and in no acute distress.  Hypoxic on arrival, clinically volume overloaded, CXR appears to show pulmonary edema. Nonspecific ST-T abnormality, however low suspicion for ACS based on EKG. ? pericarditis. Treated w/ nitro paste, BP improving somewhat, also given labetalol IV for BP control. Dimer elevated in setting of pleuritic chest pain, CTA ordered and pending. Admitted in stable condition for further management.       --------------------------------- IMPRESSION AND DISPOSITION ---------------------------------    IMPRESSION  1. Acute respiratory failure with hypoxia (HCC)    2. Chest pain, unspecified type        DISPOSITION  Disposition: Admit to telemetry  Patient condition is stable    NOTE: This report was transcribed using voice recognition software.  Every effort was made to ensure accuracy; however, inadvertent computerized transcription errors may be present    Jose Roberto Arce MD  Attending Emergency Physician         Jose Roberto Arce MD  07/31/21 [id number]

## 2021-07-31 NOTE — CONSULTS
[address] NOTE    Patient: Augustus Escobar  MRN: [id number]  : 1985    Encounter Date: 2021  Encounter Time: 6:17 PM     Date of Admission: .2021  3:02 AM    Consulting Physician:  Primary Care Physician:      [address] [facility]     [id number]    PROBLEM LIST:  Patient Active Problem List   Diagnosis    ESRD on hemodialysis ([facility]    H/O intracranial hemorrhage and aneurysm clipping on the right side    Anemia, chronic disease    Anxiety    Noncompliance    [facility]F [facility]arteriovenous fistula) ([facility]    Severe protein-calorie malnutrition ([address]    Venous hypertension, chronic, left    Iron deficiency    Secondary hyperparathyroidism of renal origin ([address]    Essential hypertension    Chronic heart failure with preserved ejection fraction ([address]    Acute respiratory failure with hypoxia ([facility]     Reason for Consultation: RLL Pneumonia     HPI:   Mr. Henrry Kenyon is a 37 y/o male with past medical history noted that presented to [facility]) for 8/10, non-radiating chest pain. He is known to ID service and was being treated for HCAP by Dr. Aaron Shukla. He has been on hemodialysis for 20 years and has a fistula. Originally on  he presented with just feeling weak and tired. He had right anterior chest wall pain. It was pleuritic in nature and he also had a dry cough. At that time he had fever and dialysis and was admitted. He had temperatures of 101. Now his white count is 7.1 hemoglobin 8.5 and the patient is afebrile. Patient received vancomycin and cefepime. CTA noted B/L GGOs with RLL consolidation. Pulmonary consulted for RLL consolidation.      PAST MEDICAL HISTORY:   Past Medical History:   Diagnosis Date    (HFpEF) heart failure with preserved ejection fraction ([address]     stage III    Anxiety 2015    AVF (arteriovenous fistula) (HCC) 2018    Chronic kidney disease     CKD since early childhood per pt and on HD ~ 11 years    Depression     Encounter regarding vascular access for dialysis for ESRD ([facility] 4/30/2018    History of ruptured Berry aneurysm ([facility] 10/20/2015    Hypertension     on meds for ~ 11 years    Hypothyroidism     Intracranial hemorrhage (HCC)     was d/t ruptured aneurysm - pt underwent neurosurgery for clipping of the aneurysm    Neutropenia ([facility]     Noncompliance w/medication treatment due to intermit use of medication 11/3/2015    Pre-transplant evaluation for kidney transplant 4/5/2017       PAST SURGICAL HISTORY:   Past Surgical History:   Procedure Laterality Date    BRAIN ANEURYSM SURGERY Right 3-4 years ago    Intracranial aneurysm clipping surgery 3-4 years ago, after rupture of aneurysm causing ICH    DIALYSIS FISTULA CREATION Left 11 years ago    UPPER GASTROINTESTINAL ENDOSCOPY N/A 1/3/2019    EGD [facility]OPSY performed by Shashi [facility] MD at [facility]       FAMILY HISTORY:   Family History   Problem Relation Age of Onset    Kidney Disease Paternal Grandmother     Cancer Neg Hx     Heart Disease Neg Hx        SOCIAL HISTORY:   Social History     Socioeconomic History    Marital status:      Spouse name: Not on file    Number of children: 3    Years of education: Not on file    Highest education level: High school graduate   Occupational History    Not on file   Tobacco Use    Smoking status: Never Smoker    Smokeless tobacco: Never Used    Tobacco comment: only smokes marijuana daily   Vaping Use    Vaping Use: Never used   Substance and Sexual Activity    Alcohol use: No    Drug use: Not Currently     Types: Marijuana    Sexual activity: Yes     Partners: Female   Other Topics Concern    Not on file   Social History Narrative    Not on file     Social Determinants of Health     Financial Resource Strain: Medium Risk    Difficulty of Paying Living Expenses: Somewhat hard   Food Insecurity: No Food Insecurity    Worried About Running Out of Food in the Last Year: Never true    Ran Out of Food in the Last Year: Never true   Transportation Needs:     Lack of Transportation (Medical):  Lack of Transportation (Non-Medical):    Physical Activity:     Days of Exercise per Week:     Minutes of Exercise per Session:    Stress:     Feeling of Stress :    Social Connections:     Frequency of Communication with Friends and Family:     Frequency of Social Gatherings with Friends and Family:     Attends [employer] Services:     Active Member of Clubs or Organizations:     Attends Club or Organization Meetings:     Marital Status:    Intimate Partner Violence:     Fear of Current or Ex-Partner:     Emotionally Abused:     Physically Abused:     Sexually Abused:      Social History     Tobacco Use   Smoking Status Never Smoker   Smokeless Tobacco Never Used   Tobacco Comment    only smokes marijuana daily     Social History     Substance and Sexual Activity   Alcohol Use No     Social History     Substance and Sexual Activity   Drug Use Not Currently    Types: Marijuana     HOME MEDICATIONS:  Prior to Admission medications    Medication Sig Start Date End Date Taking? Authorizing Provider   labetalol (NORMODYNE) 200 MG tablet Take 1 tablet by mouth three times daily 7/21/21  Yes Anna Bran MD   pantoprazole (PROTONIX) 40 MG tablet Take 1 tablet by mouth daily 7/21/21  Yes Anna Bran MD   Methoxy PEG-Epoetin Beta (MIRCERA IJ) 75 mcg 1/22/21 4/8/22 Yes Historical Provider, MD   lidocaine-prilocaine (EMLA) 2.5-2.5 % cream APPLY SMALL AMOUNT TO ACCESS SITE (AVF) 1 TO 2 HOURS BEFORE DIALYSIS.  COVER WITH OCCLUSIVE DRESSING (SARAN WRAP) 3/22/21  Yes Historical Provider, MD   Cinacalcet HCl (SENSIPAR PO) Take 30 mg by mouth 3/12/21 3/11/22 Yes Historical Provider, MD   diphenhydrAMINE (SOMINEX) 25 MG tablet Take 25 mg by mouth every 6 hours as needed   Yes Historical Provider, MD   melatonin 3 MG TABS tablet PRN  piperacillin-tazobactam (ZOSYN) 3,375 mg in dextrose 5 % 100 mL IVPB extended infusion (mini-bag), 3,375 mg, Intravenous, Q12H **AND** 0.9 % sodium chloride infusion admixture, , Intravenous, Q12H  diphenhydrAMINE (BENADRYL) tablet 25 mg, 25 mg, Oral, Q8H PRN    IV MEDICATIONS:   sodium chloride         ALLERGIES:  Allergies   Allergen Reactions    Tylenol [Acetaminophen] Itching    Levofloxacin Itching     No rash       REVIEW OF SYSTEMS:  General ROS:     - Positive For:     - Negative For: chills, fatigue, fever, malaise, night sweats or sleep disturbance   ENT ROS:      - Positive For:     - Negative For: epistaxis, headaches, sinus pain, sneezing or sore throat   Allergy and Immunology ROS:     - Negative For: nasal congestion, postnasal drip or seasonal allergies   Hematological and Lymphatic ROS:      - Negative For: bleeding problems, bruising, fatigue, night sweats or pallor   Respiratory ROS:      - Positive For:       - Negative For: hemoptysis, pleuritic type chest pains  Cardiovascular ROS:      - Positive For:      - Negative For: chest pain, dyspnea on exertion, irregular heartbeat, loss of consciousness, murmur, orthopnea or palpitations   Gastrointestinal ROS:      - Positive For:     - Negative For: abdominal pain, appetite loss, blood in stools, change in bowel habits, change in stools, constipation, diarrhea, hematemesis, melena, nausea/vomiting or stool incontinence   Genito-Urinary ROS:      - Negative For: change in urinary stream, dysuria, hematuria or incontinence   Musculoskeletal ROS:      - Negative For: joint pain, joint stiffness, joint swelling or muscle pain   Neurological ROS:     - Negative For: behavioral changes, confusion, dizziness, headaches, impaired coordination/balance or memory loss   Dermatological ROS:      - Negative For: hair changes, lumps, mole changes, nail changes or pruritus    PHYSICAL EXAMINATION:     VITAL SIGNS:  BP (!) 174/113   Pulse 104   Temp 98.7 °F (37.1 °C) (Temporal)   Resp 18   Ht 5' 6\" (1.676 m)   Wt 110 lb 0.2 oz (49.9 kg)   SpO2 99%   BMI 17.76 kg/m²   Wt Readings from Last 3 Encounters:   21 110 lb 0.2 oz (49.9 kg)   21 112 lb 12.8 oz (51.2 kg)   21 112 lb (50.8 kg)     Temp Readings from Last 3 Encounters:   21 98.7 °F (37.1 °C) (Temporal)   21 97.5 °F (36.4 °C)   21 98.4 °F (36.9 °C) (Oral)     TMAX:  BP Readings from Last 3 Encounters:   21 (!) 174/113   21 (!) 180/109   21 (!) 162/104     Pulse Readings from Last 3 Encounters:   21 104   21 69   21 88       CURRENT PULSE OXIMETRY: SpO2: 99 %  24HR PULSE OXIMETRY RANGE: SpO2  Av.6 %  Min: 88 %  Max: 99 %  CVP:      ________________________________________________________________________    VENTILATOR SETTINGS (if applicable):         Vent Information  SpO2: 99 %  Additional Respiratory  Assessments  Pulse: 104  Resp: 18  SpO2: 99 %  ETCO2:  Peak Inspiratory Pressure:  End-Inspiratory Plateau Pressure:    ABG:  No results for input(s): PH, PO2, PCO2, HCO3, BE, O2SAT, METHB, O2HB, COHB, O2CON, HHB, THB in the last 72 hours. ________________________________________________________________________    IV ACCESS:    NUTRITION: ADULT DIET; Regular; Low Potassium (Less than 3000 mg/day); Low Phosphorus (Less than 1000 mg)    INTAKE/OUTPUTS:  No intake/output data recorded.     Intake/Output Summary (Last 24 hours) at 2021  Last data filed at 2021 1615  Gross per 24 hour   Intake 300 ml   Output 1000 ml   Net -700 ml     General Appearance: alert and oriented to person, place and time, well-developed and   well-nourished, in no acute distress   Eyes: pupils equal, round, and reactive to light, extraocular eye movements intact, conjunctivae normal and sclera anicteric   Neck: neck supple and non tender without mass, no thyromegaly, no thyroid nodules and no cervical adenopathy   Pulmonary/Chest: decreased breath sounds, no accessory muscles of inspiration, no focal wheezes  - crackles at lung bases  Cardiovascular: normal rate, regular rhythm, normal S1 and S2, no murmurs, rubs, clicks or gallops, distal pulses intact, no carotid bruits, no murmurs, no gallops, no carotid bruits and no JVD   Abdomen: soft, non-tender, non-distended, normal bowel sounds, no masses or organomegaly   Extremities: no cyanosis, no clubbing, no edema  Musculoskeletal: normal range of motion, no joint swelling, deformity or tenderness   Neurologic: reflexes normal and symmetric, no cranial nerve deficit noted    LABS/IMAGING:    CBC:  Lab Results   Component Value Date    WBC 7.1 07/31/2021    HGB 8.5 (L) 07/31/2021    HCT 28.0 (L) 07/31/2021    MCV 95.6 07/31/2021     07/31/2021    LYMPHOPCT 17.6 (L) 07/31/2021    RBC 2.93 (L) 07/31/2021    MCH 29.0 07/31/2021    MCHC 30.4 (L) 07/31/2021    RDW 15.9 (H) 07/31/2021    NEUTOPHILPCT 72.8 07/31/2021    MONOPCT 5.6 07/31/2021    BASOPCT 1.0 07/31/2021    NEUTROABS 5.17 07/31/2021    LYMPHSABS 1.25 (L) 07/31/2021    MONOSABS 0.40 07/31/2021    EOSABS 0.17 07/31/2021    BASOSABS 0.07 07/31/2021       Recent Labs     07/31/21  0340 07/17/21  0750 07/16/21  0205   WBC 7.1 10.6 8.0   HGB 8.5* 10.5* 9.7*   HCT 28.0* 33.0* 30.0*   MCV 95.6 93.8 92.9    342 278       BMP:   Recent Labs     07/31/21  0340      K 4.7      CO2 29   BUN 29*   CREATININE 4.9*       MG:   Lab Results   Component Value Date    MG 2.5 07/31/2021     Ca/Phos:   Lab Results   Component Value Date    CALCIUM 9.8 07/31/2021    PHOS 5.5 (H) 02/03/2021     Amylase:   Lab Results   Component Value Date    AMYLASE 243 03/17/2017     Lipase:   Lab Results   Component Value Date    LIPASE 28 07/14/2021     LIVER PROFILE:   Recent Labs     07/31/21  0340   AST 39   ALT 32   BILITOT 0.6   ALKPHOS 82       PT/INR: No results for input(s): PROTIME, INR in the last 72 hours. APTT: No results for input(s):  APTT in the last 72 hours. Cardiac Enzymes:  Lab Results   Component Value Date    CKTOTAL 143 03/15/2021    CKMB 2.2 03/15/2021    TROPONINI 0.02 03/15/2021       Hgb A1C: No results found for: LABA1C  No results found for: EAG  CLAY:   Lab Results   Component Value Date    CLAY NEGATIVE 07/15/2021     ESR:   Lab Results   Component Value Date    SEDRATE 94 (H) 07/15/2021     CRP:   Lab Results   Component Value Date    CRP 16.4 (H) 07/17/2021     D Dimer:   Lab Results   Component Value Date    DDIMER 738 07/31/2021     Folate and B12:   Lab Results   Component Value Date    UOOOWLVS07 131 02/10/2020   ,   Lab Results   Component Value Date    FOLATE 10.5 02/10/2020       Lactic Acid:   Lab Results   Component Value Date    LACTA 0.7 11/11/2018     Ammonia:   Cortisol:  Thyroid Studies:  Lab Results   Component Value Date    TSH 4.260 (H) 07/15/2021    Z2DSEDH 82.05 09/18/2015    F5UAQCC 7.8 03/20/2016     CTA Chest 7/31/2021:  No evidence of pulmonary emboli.       Continued dense airspace consolidation of the right lower lobe.  New   extensive bilateral ground-glass opacities and patchy pulmonary airspace   opacities may be related to pulmonary edema and/or multifocal infectious   infiltrates.       Cardiomegaly.       Axillary adenopathy. ASSESSMENT:  1.) RLL Pneumonia   2.) Volume Overload/B/L GGO   3.) ESRD on HD  4.) Hypoxia     PLAN:  1.) plan for bronchoscopy Monday with assessment for fungal infection, aspergillus   2.) abx per ID  3.) check immunoglobulins, IgG subclass, ANCA, HP panel  4.) COVID negative   5.) check Ferritin, LDH  6.) DVT Prophylaxis     - CLAY negative   - HIV negative  - histone ab unremarkable   - Hepatitis Panel negative     Thank you Katharine Crowe MD very much for allowing me to see this patient in consultation and follow up. Care reviewed with nursing staff, medical and surgical specialty care, primary care and the patient's family as available.  Restraints are ordered when the patient can do harm to him/herself by pulling out devices.     Jah Bishop MD, MTATIANA.

## 2021-07-31 NOTE — CONSULTS
Nephrology Consult Note  Patient's Name: Brian Hwang  4:24 PM  7/31/2021        Reason for Consult:  ESRD  Requesting Physician:  Gt Zarate DO    Chief Complaint:  SOB, chest pain  History Obtained From:  Patient, EHR    History of Present Ilness:    Brian Hwang is a 38 y.o. male with a history of ESRD, HD dependent MWF, hypertension, hypothyroidism and HFpEF. Patient presented to ED with complaints of left-sided chest pain and shortness of breath of several days duration. He said the pain was pleuritic. This was associated with exertional dyspnea. He could not go up the flights of stairs at his home without stopping to catch his breath. In the ED vital signs were significant for markedly elevated BP of 201/138 pulse 110 and temperature of 98.6. Lab data was significant for a WBC of 7.1, hemoglobin 8.5 and platelet count 585,983. His CHEM profile was consistent with his ESRD status. CTA of the chest demonstrated no pulmonary embolism. However it did show dense opacity in the right lower lobe and some bilateral groundglass opacity. Patient received 10 mg of labetalol IV, Toradol, Benadryl and Zofran. Patient was recently treated at [facility] for pneumonia and discharged on a combination of doxycycline and cefuroxime. Covid was negative at the time. He is now admitted for further management.     Past Medical History:   Diagnosis Date    (HFpEF) heart failure with preserved ejection fraction ([address]     stage III    Anxiety 9/19/2015    [facility]F [facility]arteriovenous fistula) [facility] 4/30/2018    Chronic kidney disease     CKD since early childhood per pt and on HD ~ 11 years    Depression     Encounter regarding vascular access for dialysis for ESRD ([address] 4/30/2018    History of ruptured Berry aneurysm ([address] 10/20/2015    Hypertension     on meds for ~ 11 years    Hypothyroidism     Intracranial hemorrhage (HCC)     was d/t ruptured aneurysm - pt underwent neurosurgery for clipping of the aneurysm    Neutropenia (HCC)     Noncompliance w/medication treatment due to intermit use of medication 11/3/2015    Pre-transplant evaluation for kidney transplant 4/5/2017       Past Surgical History:   Procedure Laterality Date    BRAIN ANEURYSM SURGERY Right 3-4 years ago    Intracranial aneurysm clipping surgery 3-4 years ago, after rupture of aneurysm causing ICH    DIALYSIS FISTULA CREATION Left 11 years ago    UPPER GASTROINTESTINAL ENDOSCOPY N/A 1/3/2019    EGD BIOPSY performed by Shashi Hernandez MD at [facility] ENDOSCOPY       Family History   Problem Relation Age of Onset    Kidney Disease Paternal Grandmother     Cancer Neg Hx     Heart Disease Neg Hx         reports that he has never smoked. He has never used smokeless tobacco. He reports previous drug use. Drug: Marijuana. He reports that he does not drink alcohol.     Allergies:  Tylenol [acetaminophen] and Levofloxacin    Current Medications:    cinacalcet (SENSIPAR) tablet 30 mg, Daily  cloNIDine (CATAPRES) tablet 0.3 mg, TID  labetalol (NORMODYNE) tablet 200 mg, TID  losartan (COZAAR) tablet 100 mg, QPM  NIFEdipine (PROCARDIA XL) extended release tablet 60 mg, Daily  pantoprazole (PROTONIX) tablet 40 mg, Daily  sevelamer (RENVELA) tablet 1,600 mg, TID WC  sodium chloride flush 0.9 % injection 5-40 mL, 2 times per day  sodium chloride flush 0.9 % injection 5-40 mL, PRN  0.9 % sodium chloride infusion, PRN  ondansetron (ZOFRAN-ODT) disintegrating tablet 4 mg, Q8H PRN   Or  ondansetron (ZOFRAN) injection 4 mg, Q6H PRN  polyethylene glycol (GLYCOLAX) packet 17 g, Daily PRN  acetaminophen (TYLENOL) tablet 650 mg, Q6H PRN   Or  acetaminophen (TYLENOL) suppository 650 mg, Q6H PRN  heparin (porcine) injection 5,000 Units, 3 times per day  labetalol (NORMODYNE;TRANDATE) injection 10 mg, Q4H PRN  piperacillin-tazobactam (ZOSYN) 3,375 mg in dextrose 5 % 100 mL IVPB extended infusion (mini-bag), Q12H   And  0.9 % sodium chloride infusion admixture, FINDINGS: There are patchy bilateral airspace opacities, greatest in the right lower lobe. These have increased since July 15, 2021. No evidence of a sizable pleural effusion. No pneumothorax. Heart size is unable to be accurately assessed on this single portable view of the chest, but appears to be stable. No acute osseous abnormality. Increased patchy airspace opacities throughout both lungs compared with July 15, 2021. Findings are most suspicious for multifocal pneumonia although pulmonary edema could have a similar appearance. CTA PULMONARY W CONTRAST    Result Date: 7/31/2021  EXAMINATION: CTA OF THE CHEST 7/31/2021 6:42 am TECHNIQUE: CTA of the chest was performed after the administration of intravenous contrast.  Multiplanar reformatted images are provided for review. MIP images are provided for review. Dose modulation, iterative reconstruction, and/or weight based adjustment of the mA/kV was utilized to reduce the radiation dose to as low as reasonably achievable. COMPARISON: 07/14/2021 HISTORY: ORDERING SYSTEM PROVIDED HISTORY: elevated dimer TECHNOLOGIST PROVIDED HISTORY: Reason for exam:->elevated dimer Decision Support Exception - unselect if not a suspected or confirmed emergency medical condition->Emergency Medical Condition (MA) What reading provider will be dictating this exam?->CRC FINDINGS: Pulmonary Arteries: Pulmonary arteries are adequately opacified for evaluation. No evidence of intraluminal filling defect to suggest pulmonary embolism. Main pulmonary artery is normal in caliber. Mediastinum: No aortic aneurysm or dissection. There is cardiomegaly and a small pericardial effusion. Borderline sized subcarinal lymph node is noted. 13 mm left paratracheal node. . Lungs/pleura: There is continued dense airspace consolidation in the right lower lobe with air bronchograms.   There are new diffuse bilateral ground-glass airspace opacities and bilateral multifocal small areas of airspace consolidation. No pneumothorax. Small right pleural effusion. Upper Abdomen: The kidneys appear atrophic. Soft Tissues/Bones: There are again seen prominent bilateral axillary lymph nodes. Diffuse increased density of the osseous structures are again compatible with renal osteodystrophy. No evidence of pulmonary emboli. Continued dense airspace consolidation of the right lower lobe. New extensive bilateral ground-glass opacities and patchy pulmonary airspace opacities may be related to pulmonary edema and/or multifocal infectious infiltrates. Cardiomegaly. Axillary adenopathy. Assessment/Plans    1. Hypertensive urgency, in part due to missed doses of some of his antihypertensives  -Adjust BP meds    2. Right lower lobe pneumonia  -Antibiotics  -ID consult  -Pulmonary consult with view of obtaining a bronchoscopy    3. Anemia due to CKD  -Continue his PREETI treatment  -Monitor    4. ESRD  -Continue hemodialysis MWF    5. Acute hypoxic respiratory failure due to pneumonia and volume overload  - target increased ultrafiltration with dialysis  -Pulmonary consult    Will follow  Thanks        Lincoln Carlton MD  4:24 PM  7/31/2021     Department of Internal Medicine  Section of Nephrology  Dialysis Note        PROCEDURE:  Patient seen on hemodialysis at 4:25 PM    PHYSICIAN:  Valeriy Beckford M.D., FACP    INDICATION:  End-stage renal disease    RX:  See dialysis flowsheet for specifics on access, blood flow rate, dialysate baths, duration of dialysis, anticoagulation and other technical information.     COMMENTS:      Lincoln Carlton MD, MD

## 2021-07-31 NOTE — ED NOTES
Name: Brant Rudolph  : 1985  MRN: [id number]    Date: 2021    Benefits of immediately proceeding with Radiology exam outweigh the risks and therefore the following is being waived:      [] Pregnancy test    [] Protocol for Iodine allergy    [] MRI questionnaire    [x] BUN/Creatinine        MD Edyta Carney MD  21 [phone]

## 2021-07-31 NOTE — ED NOTES
Bed: 19  Expected date:   Expected time:   Means of arrival:   Comments:  [address], RN  07/31/21 3592

## 2021-08-01 LAB
ANION GAP SERPL CALCULATED.3IONS-SCNC: 12 MMOL/L (ref 7–16)
BUN BLDV-MCNC: 29 MG/DL (ref 6–20)
CALCIUM SERPL-MCNC: 9.6 MG/DL (ref 8.6–10.2)
CHLORIDE BLD-SCNC: 99 MMOL/L (ref 98–107)
CO2: 30 MMOL/L (ref 22–29)
CREAT SERPL-MCNC: 4.9 MG/DL (ref 0.7–1.2)
FERRITIN: NORMAL NG/ML
GFR AFRICAN AMERICAN: 16
GFR NON-AFRICAN AMERICAN: 16 ML/MIN/1.73
GLUCOSE BLD-MCNC: 112 MG/DL (ref 74–99)
LACTATE DEHYDROGENASE: 518 U/L (ref 135–225)
MAGNESIUM: 2.5 MG/DL (ref 1.6–2.6)
PHOSPHORUS: 3.2 MG/DL (ref 2.5–4.5)
POTASSIUM SERPL-SCNC: 4.6 MMOL/L (ref 3.5–5)
SODIUM BLD-SCNC: 141 MMOL/L (ref 132–146)

## 2021-08-01 PROCEDURE — 2580000003 HC RX 258: Performed by: SPECIALIST

## 2021-08-01 PROCEDURE — 6370000000 HC RX 637 (ALT 250 FOR IP): Performed by: STUDENT IN AN ORGANIZED HEALTH CARE EDUCATION/TRAINING PROGRAM

## 2021-08-01 PROCEDURE — 2580000003 HC RX 258: Performed by: INTERNAL MEDICINE

## 2021-08-01 PROCEDURE — 83615 LACTATE (LD) (LDH) ENZYME: CPT

## 2021-08-01 PROCEDURE — 84100 ASSAY OF PHOSPHORUS: CPT

## 2021-08-01 PROCEDURE — 36415 COLL VENOUS BLD VENIPUNCTURE: CPT

## 2021-08-01 PROCEDURE — 6370000000 HC RX 637 (ALT 250 FOR IP): Performed by: INTERNAL MEDICINE

## 2021-08-01 PROCEDURE — 86606 ASPERGILLUS ANTIBODY: CPT

## 2021-08-01 PROCEDURE — 6370000000 HC RX 637 (ALT 250 FOR IP): Performed by: NURSE PRACTITIONER

## 2021-08-01 PROCEDURE — 6360000002 HC RX W HCPCS: Performed by: INTERNAL MEDICINE

## 2021-08-01 PROCEDURE — 86331 IMMUNODIFFUSION OUCHTERLONY: CPT

## 2021-08-01 PROCEDURE — 82728 ASSAY OF FERRITIN: CPT

## 2021-08-01 PROCEDURE — 82784 ASSAY IGA/IGD/IGG/IGM EACH: CPT

## 2021-08-01 PROCEDURE — 99233 SBSQ HOSP IP/OBS HIGH 50: CPT | Performed by: INTERNAL MEDICINE

## 2021-08-01 PROCEDURE — 6360000002 HC RX W HCPCS: Performed by: SPECIALIST

## 2021-08-01 PROCEDURE — 83735 ASSAY OF MAGNESIUM: CPT

## 2021-08-01 PROCEDURE — 80048 BASIC METABOLIC PNL TOTAL CA: CPT

## 2021-08-01 PROCEDURE — 82785 ASSAY OF IGE: CPT

## 2021-08-01 PROCEDURE — 82787 IGG 1 2 3 OR 4 EACH: CPT

## 2021-08-01 PROCEDURE — 86255 FLUORESCENT ANTIBODY SCREEN: CPT

## 2021-08-01 PROCEDURE — 2060000000 HC ICU INTERMEDIATE R&B

## 2021-08-01 RX ORDER — FOLIC ACID/VIT B COMPLEX AND C 0.8 MG
1 TABLET ORAL DAILY
COMMUNITY
Start: 2021-04-23 | End: 2022-04-09

## 2021-08-01 RX ORDER — GLUCOSAMINE SULFATE 500 MG
TABLET ORAL
Status: ON HOLD | COMMUNITY
Start: 2021-07-26 | End: 2021-08-05

## 2021-08-01 RX ORDER — MEGESTROL ACETATE 20 MG/1
20 TABLET ORAL 3 TIMES DAILY
COMMUNITY
Start: 2021-07-26 | End: 2021-08-09

## 2021-08-01 RX ORDER — GUAIFENESIN 400 MG/1
400 TABLET ORAL 3 TIMES DAILY
Status: DISCONTINUED | OUTPATIENT
Start: 2021-08-01 | End: 2021-08-03

## 2021-08-01 RX ORDER — HYDRALAZINE HYDROCHLORIDE 50 MG/1
50 TABLET, FILM COATED ORAL 3 TIMES DAILY
COMMUNITY
Start: 2021-07-20 | End: 2021-08-09

## 2021-08-01 RX ADMIN — CLONIDINE HYDROCHLORIDE 0.3 MG: 0.1 TABLET ORAL at 20:22

## 2021-08-01 RX ADMIN — Medication 10 ML: at 20:30

## 2021-08-01 RX ADMIN — CINACALCET HYDROCHLORIDE 30 MG: 30 TABLET, FILM COATED ORAL at 09:41

## 2021-08-01 RX ADMIN — HEPARIN SODIUM 5000 UNITS: 10000 INJECTION INTRAVENOUS; SUBCUTANEOUS at 13:20

## 2021-08-01 RX ADMIN — SEVELAMER CARBONATE 1600 MG: 800 TABLET, FILM COATED ORAL at 09:40

## 2021-08-01 RX ADMIN — GUAIFENESIN 400 MG: 400 TABLET ORAL at 13:15

## 2021-08-01 RX ADMIN — GUAIFENESIN 400 MG: 400 TABLET ORAL at 20:22

## 2021-08-01 RX ADMIN — PIPERACILLIN AND TAZOBACTAM 3375 MG: 3; .375 INJECTION, POWDER, LYOPHILIZED, FOR SOLUTION INTRAVENOUS at 13:20

## 2021-08-01 RX ADMIN — LABETALOL HYDROCHLORIDE 200 MG: 200 TABLET, FILM COATED ORAL at 13:15

## 2021-08-01 RX ADMIN — DIPHENHYDRAMINE HYDROCHLORIDE 25 MG: 25 TABLET ORAL at 20:28

## 2021-08-01 RX ADMIN — SEVELAMER CARBONATE 1600 MG: 800 TABLET, FILM COATED ORAL at 17:07

## 2021-08-01 RX ADMIN — PANTOPRAZOLE SODIUM 40 MG: 40 TABLET, DELAYED RELEASE ORAL at 09:41

## 2021-08-01 RX ADMIN — CLONIDINE HYDROCHLORIDE 0.3 MG: 0.1 TABLET ORAL at 13:15

## 2021-08-01 RX ADMIN — GUAIFENESIN 400 MG: 400 TABLET ORAL at 09:45

## 2021-08-01 RX ADMIN — HEPARIN SODIUM 5000 UNITS: 10000 INJECTION INTRAVENOUS; SUBCUTANEOUS at 20:27

## 2021-08-01 RX ADMIN — LABETALOL HYDROCHLORIDE 200 MG: 200 TABLET, FILM COATED ORAL at 09:40

## 2021-08-01 RX ADMIN — LOSARTAN POTASSIUM 100 MG: 50 TABLET, FILM COATED ORAL at 17:08

## 2021-08-01 RX ADMIN — CLONIDINE HYDROCHLORIDE 0.3 MG: 0.1 TABLET ORAL at 09:40

## 2021-08-01 RX ADMIN — LABETALOL HYDROCHLORIDE 200 MG: 200 TABLET, FILM COATED ORAL at 20:22

## 2021-08-01 RX ADMIN — Medication 10 ML: at 09:44

## 2021-08-01 RX ADMIN — DIPHENHYDRAMINE HYDROCHLORIDE 25 MG: 25 TABLET ORAL at 09:41

## 2021-08-01 RX ADMIN — SODIUM CHLORIDE: 9 INJECTION, SOLUTION INTRAVENOUS at 17:10

## 2021-08-01 RX ADMIN — NIFEDIPINE 60 MG: 60 TABLET, FILM COATED, EXTENDED RELEASE ORAL at 09:40

## 2021-08-01 RX ADMIN — SEVELAMER CARBONATE 1600 MG: 800 TABLET, FILM COATED ORAL at 13:12

## 2021-08-01 RX ADMIN — PIPERACILLIN AND TAZOBACTAM 3375 MG: 3; .375 INJECTION, POWDER, LYOPHILIZED, FOR SOLUTION INTRAVENOUS at 01:53

## 2021-08-01 ASSESSMENT — PAIN DESCRIPTION - LOCATION: LOCATION: HEAD

## 2021-08-01 ASSESSMENT — PAIN DESCRIPTION - PAIN TYPE: TYPE: ACUTE PAIN

## 2021-08-01 ASSESSMENT — PAIN SCALES - GENERAL
PAINLEVEL_OUTOF10: 0

## 2021-08-01 ASSESSMENT — PAIN DESCRIPTION - DESCRIPTORS: DESCRIPTORS: ACHING

## 2021-08-01 ASSESSMENT — PAIN DESCRIPTION - ORIENTATION: ORIENTATION: RIGHT

## 2021-08-01 NOTE — PROGRESS NOTES
[id number] [id number] PROGRESS NOTE    Patient: Augustus Escobar  MRN: [id number]  : 1985    Encounter Date: 2021  Encounter Time: 7:19 AM     Date of Admission: .2021  3:02 AM    Consulting Physician:  Primary Care Physician:      [address] Aaron Areval[place]ain[place]     [id number]    PROBLEM LIST:  Patient Active Problem List   Diagnosis    ESRD on hemodialysis ([facility]    H/O intracranial hemorrhage and aneurysm clipping on the right side    Anemia, chronic disease    Anxiety    Noncompliance    [facility]F (arteriovenous fistula) ([facility]    Severe protein-calorie malnutrition ([facility]    Venous hypertension, chronic, left    Iron deficiency    Secondary hyperparathyroidism of renal origin ([facility]    Essential hypertension    Chronic heart failure with preserved ejection fraction ([address]    Acute respiratory failure with hypoxia ([facility]     SUBJECTIVE:  Mild dyspnea  Coughing but thick mucous and swallows it - too thick to expectorate     PAST MEDICAL HISTORY:     Past Medical History:   Diagnosis Date    (HFpEF) heart failure with preserved ejection fraction ([facility] [facility]     stage III    Anxiety 2015    AVF (arteriovenous fistula) ([facility] [facility] 2018    Chronic kidney disease     CKD since early childhood per pt and on HD ~ 11 years    Depression     Encounter regarding vascular access for dialysis for ESRD ([facility] [facility] 2018    History of ruptured Berry aneurysm ([facility] [facility] 10/20/2015    Hypertension     on meds for ~ 11 years    Hypothyroidism     Intracranial hemorrhage (HCC)     was d/t ruptured aneurysm - pt underwent neurosurgery for clipping of the aneurysm    Neutropenia ([facility] [facility]     Noncompliance w/medication treatment due to intermit use of medication 11/3/2015    Pre-transplant evaluation for kidney transplant 2017     CURRENT MEDICATIONS:  Current Facility-Administered Medications: cinacalcet (SENSIPAR) tablet 30 mg, 30 mg, Oral, Daily  cloNIDine (CATAPRES) tablet 0.3 mg, 0.3 mg, Oral, TID  labetalol (NORMODYNE) tablet 200 mg, 200 mg, Oral, TID  losartan (COZAAR) tablet 100 mg, 100 mg, Oral, QPM  NIFEdipine (PROCARDIA XL) extended release tablet 60 mg, 60 mg, Oral, Daily  pantoprazole (PROTONIX) tablet 40 mg, 40 mg, Oral, Daily  sevelamer (RENVELA) tablet 1,600 mg, 1,600 mg, Oral, TID WC  sodium chloride flush 0.9 % injection 5-40 mL, 5-40 mL, Intravenous, 2 times per day  sodium chloride flush 0.9 % injection 5-40 mL, 5-40 mL, Intravenous, PRN  0.9 % sodium chloride infusion, 25 mL, Intravenous, PRN  ondansetron (ZOFRAN-ODT) disintegrating tablet 4 mg, 4 mg, Oral, Q8H PRN **OR** ondansetron (ZOFRAN) injection 4 mg, 4 mg, Intravenous, Q6H PRN  polyethylene glycol (GLYCOLAX) packet 17 g, 17 g, Oral, Daily PRN  acetaminophen (TYLENOL) tablet 650 mg, 650 mg, Oral, Q6H PRN **OR** acetaminophen (TYLENOL) suppository 650 mg, 650 mg, Rectal, Q6H PRN  heparin (porcine) injection 5,000 Units, 5,000 Units, Subcutaneous, 3 times per day  labetalol (NORMODYNE;TRANDATE) injection 10 mg, 10 mg, Intravenous, Q4H PRN  piperacillin-tazobactam (ZOSYN) 3,375 mg in dextrose 5 % 100 mL IVPB extended infusion (mini-bag), 3,375 mg, Intravenous, Q12H **AND** 0.9 % sodium chloride infusion admixture, , Intravenous, Q12H  diphenhydrAMINE (BENADRYL) tablet 25 mg, 25 mg, Oral, Q8H PRN    IV MEDICATIONS:   sodium chloride         ALLERGIES:  Allergies   Allergen Reactions    Tylenol [Acetaminophen] Itching    Levofloxacin Itching     No rash       PHYSICAL EXAMINATION:     VITAL SIGNS:  /75   Pulse 91   Temp 96.9 °F (36.1 °C) (Temporal)   Resp 18   Ht 5' 6\" (1.676 m)   Wt 113 lb 8.6 oz (51.5 kg)   SpO2 92%   BMI 18.33 kg/m²   Wt Readings from Last 3 Encounters:   08/01/21 113 lb 8.6 oz (51.5 kg)   07/27/21 112 lb 12.8 oz (51.2 kg)   07/17/21 112 lb (50.8 kg)     Temp Readings from Last 3 Encounters:   07/31/21 96.9 °F (36.1 °C) (Temporal)   07/27/21 97.5 °F (36.4 °C) 21 98.4 °F (36.9 °C) (Oral)     TMAX:  BP Readings from Last 3 Encounters:   21 121/75   21 (!) 180/109   21 (!) 162/104     Pulse Readings from Last 3 Encounters:   21 91   21 69   21 88       CURRENT PULSE OXIMETRY: SpO2: 92 %  24HR PULSE OXIMETRY RANGE: SpO2  Av %  Min: 92 %  Max: 98 %            Vent Information  Skin Assessment: Clean, dry, & intact  SpO2: 92 %  Additional Respiratory  Assessments  Pulse: 91  Resp: 18  SpO2: 92 %  ABG:  No results for input(s): PH, PO2, PCO2, HCO3, BE, O2SAT, METHB, O2HB, COHB, O2CON, HHB, THB in the last 72 hours. ________________________________________________________________________    INTAKE/OUTPUTS:  I/O last 3 completed shifts: In: 904.6 [P.O.:360; I.V.:44.6; IV Piggyback:200]  Out: 1000     Intake/Output Summary (Last 24 hours) at 2021 0719  Last data filed at 2021 0601  Gross per 24 hour   Intake 904.58 ml   Output 1000 ml   Net -95.42 ml     General Appearance: A&O, well-developed and well-nourished, in no acute distress   Eyes: PERRLA, extraocular eye movements intact, conjunctivae normal and sclera anicteric   Neck: neck supple and non tender without mass, no thyromegaly  Pulmonary/Chest: decreased breath sounds, no accessory muscles of inspiration, no focal wheezes - crackles at lung bases R>L  Cardiovascular: normal rate, regular rhythm, normal S1 and S2, no murmurs, rubs, clicks or gallops, distal pulses intact and no JVD   Abdomen: soft, non-tender, non-distended, normal bowel sounds, no masses or organomegaly   Extremities: no cyanosis, no clubbing, no edema.  LUE with fistula +bruit/thrill  Musculoskeletal: normal range of motion, no joint swelling, deformity or tenderness   Neurologic: reflexes normal and symmetric, no cranial nerve deficit noted    LABS/IMAGING:    CBC:  Lab Results   Component Value Date    WBC 7.1 2021    HGB 8.5 (L) 2021    HCT 28.0 (L) 2021    MCV 95.6 2021  07/31/2021    LYMPHOPCT 17.6 (L) 07/31/2021    RBC 2.93 (L) 07/31/2021    MCH 29.0 07/31/2021    MCHC 30.4 (L) 07/31/2021    RDW 15.9 (H) 07/31/2021    NEUTOPHILPCT 72.8 07/31/2021    MONOPCT 5.6 07/31/2021    BASOPCT 1.0 07/31/2021    NEUTROABS 5.17 07/31/2021    LYMPHSABS 1.25 (L) 07/31/2021    MONOSABS 0.40 07/31/2021    EOSABS 0.17 07/31/2021    BASOSABS 0.07 07/31/2021       Recent Labs     07/31/21  0340 07/17/21  0750 07/16/21  0205   WBC 7.1 10.6 8.0   HGB 8.5* 10.5* 9.7*   HCT 28.0* 33.0* 30.0*   MCV 95.6 93.8 92.9    342 278       BMP:   Recent Labs     07/31/21  0340      K 4.7      CO2 29   BUN 29*   CREATININE 4.9*       MG:   Lab Results   Component Value Date    MG 2.5 07/31/2021     Ca/Phos:   Lab Results   Component Value Date    CALCIUM 9.8 07/31/2021    PHOS 5.5 (H) 02/03/2021     Amylase:   Lab Results   Component Value Date    AMYLASE 243 03/17/2017     Lipase:   Lab Results   Component Value Date    LIPASE 28 07/14/2021     LIVER PROFILE:   Recent Labs     07/31/21  0340   AST 39   ALT 32   BILITOT 0.6   ALKPHOS 82       PT/INR: No results for input(s): PROTIME, INR in the last 72 hours. APTT: No results for input(s): APTT in the last 72 hours.     Cardiac Enzymes:  Lab Results   Component Value Date    CKTOTAL 143 03/15/2021    CKMB 2.2 03/15/2021    TROPONINI 0.02 03/15/2021       Hgb A1C: No results found for: LABA1C  No results found for: EAG  CLAY:   Lab Results   Component Value Date    LCAY NEGATIVE 07/15/2021     ESR:   Lab Results   Component Value Date    SEDRATE 94 (H) 07/15/2021     CRP:   Lab Results   Component Value Date    CRP 16.4 (H) 07/17/2021     D Dimer:   Lab Results   Component Value Date    DDIMER 738 07/31/2021     Folate and B12:   Lab Results   Component Value Date    TFDRBADC27 191 02/10/2020   ,   Lab Results   Component Value Date    FOLATE 10.5 02/10/2020       Lactic Acid:   Lab Results   Component Value Date    LACTA 0.7 11/11/2018 Ammonia:   Cortisol:  Thyroid Studies:  Lab Results   Component Value Date    TSH 4.260 (H) 07/15/2021    C0VQIQP 82.05 09/18/2015    H9NMYPQ 7.8 03/20/2016     Results for Duglas Tomas (MRN [id number]) as of 8/1/2021 07:24   Ref. Range 7/31/2021 11:28   Influenza A by PCR Latest Ref Range: Not Detected  Not Detected   Influenza B by PCR Latest Ref Range: Not Detected  Not Detected   Adenovirus by PCR Latest Ref Range: Not Detected  Not Detected   Coronavirus 229E by PCR Latest Ref Range: Not Detected  Not Detected   Coronavirus HKU1 by PCR Latest Ref Range: Not Detected  Not Detected   Coronavirus NL63 by PCR Latest Ref Range: Not Detected  Not Detected   Coronavirus OC43 by PCR Latest Ref Range: Not Detected  Not Detected   Human Metapneumovirus by PCR Latest Ref Range: Not Detected  Not Detected   Human Rhinovirus/Enterovirus by PCR Latest Ref Range: Not Detected  Not Detected   Parainfluenza Virus 1 by PCR Latest Ref Range: Not Detected  Not Detected   Parainfluenza Virus 2 by PCR Latest Ref Range: Not Detected  Not Detected   Parainfluenza Virus 3 by PCR Latest Ref Range: Not Detected  Not Detected   Parainfluenza Virus 4 by PCR Latest Ref Range: Not Detected  Not Detected   Respiratory Syncytial Virus by PCR Latest Ref Range: Not Detected  Not Detected   Bordetella parapertussis by PCR Latest Ref Range: Not Detected  Not Detected   Chlamydophilia pneumoniae by PCR Latest Ref Range: Not Detected  Not Detected   Mycoplasma pneumoniae by PCR Latest Ref Range: Not Detected  Not Detected   SARS-CoV-2, PCR Latest Ref Range: Not Detected  Not Detected   Bordetella pertussis by PCR Latest Ref Range: Not Detected  Not Detected     Results for Duglas Tomas (MRN [id number]) as of 8/1/2021 07:24   Ref.  Range 7/27/2021 12:05   RPR Latest Ref Range: Non-reactive  NON-REACTIVE   Hep A IgM Latest Ref Range: Non-Reactive  Non-Reactive   Hep B S Ag Interp Latest Ref Range: Non-Reactive  Non-Reactive   Hep C Ab Interp Latest Ref Range: Non-Reactive  Non-Reactive   Hep B Core Ab, IgM Latest Ref Range: Non-Reactive  Non-Reactive     CTA Chest 7/31/2021:  No evidence of pulmonary emboli.       Continued dense airspace consolidation of the right lower lobe.  New   extensive bilateral ground-glass opacities and patchy pulmonary airspace   opacities may be related to pulmonary edema and/or multifocal infectious   infiltrates.       Cardiomegaly.       Axillary adenopathy. ASSESSMENT:  1.) RLL Pneumonia   2.) Volume Overload/B/L GGO   3.) ESRD on HD  4.) Hypoxia     PLAN:  1.) Bronchoscopy Monday with assessment for fungal infection, aspergillus   2.) ID following - on Zosyn  3.) CLAY, HIV, Histone AB, Hepatitis panel - negative. Check immunoglobulins, IgG subclass, ANCA, HP panel - spoke with RN, did not have drawn yesterday - no line, in dialysis.  Will have drawn this am.  4.) COVID negative   5.) D-Dimer 738, check Ferritin, LDH - will have drawn this am  6.) Add mucinex - just for a couple days  7.) DVT Prophylaxis  8.) IS      KENDRA Rossi - CNP

## 2021-08-01 NOTE — PROGRESS NOTES
Nephrology Consult Note  Patient's Name: Yasmin Rowe  4:40 PM  8/1/2021        Reason for Consult:  ESRD  Requesting Physician:  Cole Hughes DO    Chief Complaint:  SOB, chest pain  History Obtained From:  Patient, EHR    History of Present Ilness:    Yasmin Rowe is a 38 y.o. male with a history of ESRD, HD dependent MWF, hypertension, hypothyroidism and HFpEF. Patient presented to ED with complaints of left-sided chest pain and shortness of breath of several days duration. He said the pain was pleuritic. This was associated with exertional dyspnea. He could not go up the flights of stairs at his home without stopping to catch his breath. In the ED vital signs were significant for markedly elevated BP of 201/138 pulse 110 and temperature of 98.6. Lab data was significant for a WBC of 7.1, hemoglobin 8.5 and platelet count 416,608. His CHEM profile was consistent with his ESRD status. CTA of the chest demonstrated no pulmonary embolism. However it did show dense opacity in the right lower lobe and some bilateral groundglass opacity. Patient received 10 mg of labetalol IV, Toradol, Benadryl and Zofran. Patient was recently treated at [facility] for pneumonia and discharged on a combination of doxycycline and cefuroxime. Covid was negative at the time. He is now admitted for further management.     Subjective    8/1: States breathing is less labored          Allergies:  Tylenol [acetaminophen] and Levofloxacin    Current Medications:    guaiFENesin tablet 400 mg, TID  cinacalcet (SENSIPAR) tablet 30 mg, Daily  cloNIDine (CATAPRES) tablet 0.3 mg, TID  labetalol (NORMODYNE) tablet 200 mg, TID  losartan (COZAAR) tablet 100 mg, QPM  NIFEdipine (PROCARDIA XL) extended release tablet 60 mg, Daily  pantoprazole (PROTONIX) tablet 40 mg, Daily  sevelamer (RENVELA) tablet 1,600 mg, TID WC  sodium chloride flush 0.9 % injection 5-40 mL, 2 times per day  sodium chloride flush 0.9 % injection 5-40 mL, 08/01/2021    PHOS 5.5 (H) 02/03/2021    PHOS 4.5 02/02/2021    WBC 7.1 07/31/2021    WBC 10.6 07/17/2021    WBC 8.0 07/16/2021    HGB 8.5 (L) 07/31/2021    HGB 10.5 (L) 07/17/2021    HGB 9.7 (L) 07/16/2021    HCT 28.0 (L) 07/31/2021    HCT 33.0 (L) 07/17/2021    HCT 30.0 (L) 07/16/2021    MCV 95.6 07/31/2021     07/31/2021         Imaging:  XR CHEST (2 VW)    Result Date: 7/31/2021  EXAMINATION: TWO XRAY VIEWS OF THE CHEST 7/31/2021 4:07 am COMPARISON: July 15, 2021 HISTORY: ORDERING SYSTEM PROVIDED HISTORY: chest pain TECHNOLOGIST PROVIDED HISTORY: Reason for exam:->chest pain What reading provider will be dictating this exam?->CRC FINDINGS: There are patchy bilateral airspace opacities, greatest in the right lower lobe. These have increased since July 15, 2021. No evidence of a sizable pleural effusion. No pneumothorax. Heart size is unable to be accurately assessed on this single portable view of the chest, but appears to be stable. No acute osseous abnormality. Increased patchy airspace opacities throughout both lungs compared with July 15, 2021. Findings are most suspicious for multifocal pneumonia although pulmonary edema could have a similar appearance. CTA PULMONARY W CONTRAST    Result Date: 7/31/2021  EXAMINATION: CTA OF THE CHEST 7/31/2021 6:42 am TECHNIQUE: CTA of the chest was performed after the administration of intravenous contrast.  Multiplanar reformatted images are provided for review. MIP images are provided for review. Dose modulation, iterative reconstruction, and/or weight based adjustment of the mA/kV was utilized to reduce the radiation dose to as low as reasonably achievable.  COMPARISON: 07/14/2021 HISTORY: ORDERING SYSTEM PROVIDED HISTORY: elevated dimer TECHNOLOGIST PROVIDED HISTORY: Reason for exam:->elevated dimer Decision Support Exception - unselect if not a suspected or confirmed emergency medical condition->Emergency Medical Condition (MA) What reading provider will be dictating this exam?->CRC FINDINGS: Pulmonary Arteries: Pulmonary arteries are adequately opacified for evaluation. No evidence of intraluminal filling defect to suggest pulmonary embolism. Main pulmonary artery is normal in caliber. Mediastinum: No aortic aneurysm or dissection. There is cardiomegaly and a small pericardial effusion. Borderline sized subcarinal lymph node is noted. 13 mm left paratracheal node. . Lungs/pleura: There is continued dense airspace consolidation in the right lower lobe with air bronchograms. There are new diffuse bilateral ground-glass airspace opacities and bilateral multifocal small areas of airspace consolidation. No pneumothorax. Small right pleural effusion. Upper Abdomen: The kidneys appear atrophic. Soft Tissues/Bones: There are again seen prominent bilateral axillary lymph nodes. Diffuse increased density of the osseous structures are again compatible with renal osteodystrophy. No evidence of pulmonary emboli. Continued dense airspace consolidation of the right lower lobe. New extensive bilateral ground-glass opacities and patchy pulmonary airspace opacities may be related to pulmonary edema and/or multifocal infectious infiltrates. Cardiomegaly. Axillary adenopathy. Assessment/Plans    1. Hypertensive urgency, in part due to missed doses of some of his antihypertensives  -Adjust BP meds    2. Right lower lobe pneumonia  -Antibiotics, on Zosyn  -ID following  -Pulmonary following for bronchoscopy Monday    3. Anemia due to CKD  -Continue his PREETI treatment  -Monitor    4. ESRD  -Continue hemodialysis MWF    5.   Acute hypoxic respiratory failure due to pneumonia and volume overload  - target increased ultrafiltration with dialysis  -Pulmonary consult noted  -For bronchoscopy Monday    Will follow  Thanks        Ceferino Simpson MD  4:40 PM  8/1/2021

## 2021-08-01 NOTE — PROGRESS NOTES
[facility]  Internal Medicine Residency Program  Progress Note - House Team     Patient:  Manisha Rowland 38 y.o. male MRN: [id number]     Date of Service: 8/1/2021     CC: chest pain and SOB  Overnight events: No acute overnight events     Subjective     Patient was seen and examined this morning at bedside in no acute distress. Patient said he feels better but still has chest pain every now and then. Breathing is better. No other complaints. Overnight, patient has been stable. Blood pressure has been improving overnight. Latest BP was at 121/75. Objective     Physical Exam:  · Vitals: /75   Pulse 91   Temp 96.9 °F (36.1 °C) (Temporal)   Resp 18   Ht 5' 6\" (1.676 m)   Wt 110 lb 0.2 oz (49.9 kg)   SpO2 92%   BMI 17.76 kg/m²     · I & O - 24hr: I/O this shift:  · In: 264.6 [P.O.:120; I.V.:44.6; IV Piggyback:100]  · Out: -    · General Appearance: alert, appears stated age, cooperative and no distress  · HEENT:  Head: Normocephalic, no lesions, without obvious abnormality. · Neck: no adenopathy, no carotid bruit, no JVD, supple, symmetrical, trachea midline and thyroid not enlarged, symmetric, no tenderness/mass/nodules  · Lung: bilateral crackles R>L  · Heart: regular rate and rhythm, S1, S2 normal, no murmur, click, rub or gallop  · Abdomen: soft, non-tender; bowel sounds normal; no masses,  no organomegaly  · Extremities:  extremities normal, atraumatic, no cyanosis or edema  · Musculokeletal: No joint swelling, no muscle tenderness. ROM normal in all joints of extremities.    · Neurologic: Mental status: Alert, oriented, thought content appropriate  Subject  Pertinent Labs & Imaging Studies   denice  CBC with Differential:    Lab Results   Component Value Date    WBC 7.1 07/31/2021    RBC 2.93 07/31/2021    HGB 8.5 07/31/2021    HCT 28.0 07/31/2021     07/31/2021    MCV 95.6 07/31/2021    MCH 29.0 07/31/2021    MCHC 30.4 07/31/2021    RDW 15.9 07/31/2021    LYMPHOPCT 17.6 07/31/2021    MONOPCT 5.6 07/31/2021    BASOPCT 1.0 07/31/2021    MONOSABS 0.40 07/31/2021    LYMPHSABS 1.25 07/31/2021    EOSABS 0.17 07/31/2021    BASOSABS 0.07 07/31/2021     BMP:    Lab Results   Component Value Date     07/31/2021    K 4.7 07/31/2021    K 4.6 07/14/2021     07/31/2021    CO2 29 07/31/2021    BUN 29 07/31/2021    LABALBU 3.6 07/31/2021    CREATININE 4.9 07/31/2021    CALCIUM 9.8 07/31/2021    GFRAA 16 07/31/2021    LABGLOM 16 07/31/2021    GLUCOSE 95 07/31/2021       CTA PULMONARY W CONTRAST   Final Result   No evidence of pulmonary emboli. Continued dense airspace consolidation of the right lower lobe. New   extensive bilateral ground-glass opacities and patchy pulmonary airspace   opacities may be related to pulmonary edema and/or multifocal infectious   infiltrates. Cardiomegaly. Axillary adenopathy. XR CHEST (2 VW)   Final Result   Increased patchy airspace opacities throughout both lungs compared with July   15, 2021. Findings are most suspicious for multifocal pneumonia although   pulmonary edema could have a similar appearance. Resident's Assessment and Plan     Manisha Rowland is a 43-year old male with a past medical history of HTN, ESR with dialysis MWF, HFpEF (EF 60-65%), hypothyroidism, aneurysm of AV fistula in 2016) who came in with left lateral pleuritic chest pain and shortness of breath for 2 days. He was recently admitted at [facility] for sepsis 2/2 HCAP, treat with doxycycline and cefuroxime. He is currently being treated for the following:    Assessment and Plan:    1.  Acute hypoxic respiratory failure likely 2/2 consolidation 2/2 HCAP  · Afebrile, WBC 7.1, dyspnea on exertion, orthopnea, pro-BNP > 7000  · Respiratory panel negative  · Initially on vanc and cefepime   · ID consulted: RLL pneumonia, started on zosyn; d/c vanc and cefepime  · Pulm consulted: for bronchoscopy on Monday with assessment for fungal infx, aspergillus  · F/u cultures  · F/u immunoglobulins, IgG subclasses, Hypersensitivity pneumonitis profile, T+B lymphocyte differential  2. ESRD on hemodialysis  · HD every MWF  · Nephro consulted: target increased ultrafiltration with HD  3. Pleuritic chest pain 2/2 RLL pneumonia  · CTA showerd dense air airspace consolidation in the RLL that has worsened since last CT and bilateral ground glass opacities and patchy pulmonary opacities  · respi panel negative  · Tylenol 650mg PRN for pain  · F/u respi cultures  4. Elevated troponin likely 2/2 ESRD  · Troponin 74 on admission, trend troponin  · F/u CK, CK-MB, troponins    5. Resistant hypertension  · BP better last night -> 121/75  · Continue clonidine 0.3 TID, losartan 100mg ODHS, nifedipine 60mg OD, and labatelol 200mg TID  · Continue labetalol IV PRN    6. History of HFpEF (EF 60-65%)  7. History of aneurysm of AVF (2016)      PT/OT evaluation: none  DVT prophylaxis/ GI prophylaxis: heparin/protonix  Disposition: continue current care    Candance Leavell, MD, PGY-1  Attending physician: Dr. Kaylee Daily    Attending Physician Statement:  Mohan Flores M.D., F.A.C.P.     I have discussed the case, including pertinent history and exam findings with the resident/NP. I have seen and examined the patient and the key elements of the encounter have been performed by me. I agree with the resident ROS, PMHx, PSHx, meds reviewed and assessment, plan and orders as documented by the resident/NP    [facility] charts reviewed, including other providers notes, relevant labs and imaging.    Non-toxic appearing, afebrile, hemodynamically stable, and in no acute distress.  Hypoxic on arrival, clinically volume overloaded, CXR appears to show pulmonary edema    New hypoxic, but no missed HD appointments  So requested CTA- fu ?pneumonia /effusion/empyema  CTA didn't show PE but worsening fluid- ?post pneumonia  Continued dense airspace consolidation of the right lower lobe.  New extensive bilateral ground-glass opacities and patchy pulmonary airspace   opacities may be related to pulmonary edema and/or multifocal infectious   infiltrates     -- emphyema vs post pneumonia parapneumonic effusion- needs treated, tapped?  --procal 1.02, WBC N, no fever-   Finished ab 6 days ago   resume  Ab? Defer to ID--who changed to zosyn     Subsequent respiratory panel-- negative   plan bronch MONDAAY   Finished ab 6 days ago   Recently [facility] - consolidation R lower lobe +pericardial effusion  +compressive atectasis and pleural fluid - doubt pneumonia     He didn't miss HD- doubt major fluid overload  Rule out Volume overload,   Possible drug induced serositis caused by meds (minodixil and hydralazine recently stopped last admission)  Histone AB  0.7 -- likely ok for hydralazine- doubt sle  Painful breathing, possibly exudative serositis from minoxidil still     CT scan looks worse now, compared to [place]  -- pleuritic chest pain, sob worse than priro  Intermittently ? Hypoxic- NC ?     CLAY, HIV, Histone AB, Hepatitis panel - negative. Check immunoglobulins, IgG subclass, ANCA, HP panel     Rule out fungal infection    HTN better controlled, sp pulling fluid off (1 liter)  Trop stable, no major delta  Pleuritic chest pain improved  Better appetite  ACD-- epo ESRD     >50% of time spent coordinating care with other providers and/or counseling patient/family  Remainder of medical problems as per resident note.

## 2021-08-01 NOTE — PLAN OF CARE
Problem: Infection:  Goal: Will remain free from infection  Description: Will remain free from infection  Outcome: Met This Shift     Problem: Safety:  Goal: Free from accidental physical injury  Description: Free from accidental physical injury  Outcome: Met This Shift  Goal: Free from intentional harm  Description: Free from intentional harm  Outcome: Met This Shift

## 2021-08-01 NOTE — PROGRESS NOTES
[address] [place] [address] [facility]  KARLOSIDA  Progress Note  CC: has dry cough/ HCAP  Face to face encounter   SUBJECTIVE:  Patient is tolerating medications. No reported adverse drug reactions. ROS: No nausea, vomiting, diarrhea. Has shortness of breath. On 2 liters/ and room air. No fevers. Reports dry cough     Medications:  Scheduled Meds:   guaiFENesin  400 mg Oral TID    cinacalcet  30 mg Oral Daily    cloNIDine  0.3 mg Oral TID    labetalol  200 mg Oral TID    losartan  100 mg Oral QPM    NIFEdipine  60 mg Oral Daily    pantoprazole  40 mg Oral Daily    sevelamer  1,600 mg Oral TID WC    sodium chloride flush  5-40 mL Intravenous 2 times per day    heparin (porcine)  5,000 Units Subcutaneous 3 times per day    piperacillin-tazobactam  3,375 mg Intravenous Q12H    And    sodium chloride   Intravenous Q12H     Continuous Infusions:   sodium chloride       PRN Meds:sodium chloride flush, sodium chloride, ondansetron **OR** ondansetron, polyethylene glycol, acetaminophen **OR** acetaminophen, labetalol, diphenhydrAMINE  OBJECTIVE:  Patient Vitals for the past 24 hrs:   BP Temp Temp src Pulse Resp SpO2 Weight   08/01/21 0815 (!) 157/96 97.9 °F (36.6 °C) Temporal 90 18 94 % --   08/01/21 0600 -- -- -- -- -- -- 113 lb 8.6 oz (51.5 kg)   07/31/21 2245 121/75 96.9 °F (36.1 °C) Temporal 91 18 92 % --   07/31/21 2045 122/77 96.9 °F (36.1 °C) Temporal 90 18 98 % --   07/31/21 1701 (!) 174/113 98.7 °F (37.1 °C) Temporal 104 18 -- --   07/31/21 1615 (!) 198/131 99.3 °F (37.4 °C) -- 102 -- -- 110 lb 0.2 oz (49.9 kg)   07/31/21 1600 (!) 193/129 -- -- 100 -- -- --   07/31/21 1530 (!) 195/131 -- -- 99 -- -- --   07/31/21 1500 (!) 201/129 -- -- 100 -- -- --   07/31/21 1431 (!) 196/133 -- -- 100 -- -- --   07/31/21 1403 (!) 196/126 -- -- 100 -- -- --   07/31/21 1336 (!) 182/123 -- -- 98 -- -- --     Constitutional: The patient is awake, alert, and oriented. Skin: Warm and dry. No rashes were noted.    Head: Eyes show round, and reactive pupils. No jaundice. Mouth: Moist mucous membranes, no ulcerations, no thrush. Neck: Supple to movements. No lymphadenopathy. Chest: No use of accessory muscles to breathe. Symmetrical expansion. Auscultation reveals diminished to right. Dry cough   Cardiovascular: S1 and S2 are rhythmic and regular. No murmurs appreciated. Abdomen: Positive bowel sounds to auscultation. Benign to palpation. Extremities: No clubbing, no cyanosis, no edema.   Left fistula     Laboratory and Tests Review:  Lab Results   Component Value Date    WBC 7.1 07/31/2021    WBC 10.6 07/17/2021    WBC 8.0 07/16/2021    HGB 8.5 (L) 07/31/2021    HCT 28.0 (L) 07/31/2021    MCV 95.6 07/31/2021     07/31/2021     Lab Results   Component Value Date    NEUTROABS 5.17 07/31/2021    NEUTROABS 10.29 (H) 07/14/2021    NEUTROABS 2.22 03/17/2021     Lab Results   Component Value Date    CRP 16.4 (H) 07/17/2021    CRP 6.6 (H) 02/01/2021    CRP 9.0 (H) 01/03/2019     Lab Results   Component Value Date    SEDRATE 94 (H) 07/15/2021    SEDRATE 39 (H) 02/01/2021    SEDRATE 37 (H) 10/21/2015     Lab Results   Component Value Date    ALT 32 07/31/2021    AST 39 07/31/2021    ALKPHOS 82 07/31/2021    BILITOT 0.6 07/31/2021     Lab Results   Component Value Date     08/01/2021    K 4.6 08/01/2021    K 4.6 07/14/2021    CL 99 08/01/2021    CO2 30 08/01/2021    BUN 29 08/01/2021    CREATININE 4.9 08/01/2021    GFRAA 16 08/01/2021    LABGLOM 16 08/01/2021    GLUCOSE 112 08/01/2021    PROT 7.2 07/31/2021    LABALBU 3.6 07/31/2021    CALCIUM 9.6 08/01/2021    BILITOT 0.6 07/31/2021    ALKPHOS 82 07/31/2021    AST 39 07/31/2021    ALT 32 07/31/2021     Radiology:  Reviewed     Microbiology:   Respiratory array panel- negative     ASSESSMENT:  HCAP- right lower lobe pneumonia  Volume overload   ESRD on HD    PLAN:  Continue Zosyn for now   Check cultures  Pulmonary following- for Bronch on Monday   Monitor labs    Charissa Warren KENDRA Barrientos - CNS  1:27 PM  8/1/2021     Pt seen and examined. Above discussed agree with advanced practice nurse. Labs, cultures, and radiographs reviewed. Face to Face encounter occurred. Changes made as necessary.      Stephanie Cadena MD

## 2021-08-02 LAB
ANION GAP SERPL CALCULATED.3IONS-SCNC: 12 MMOL/L (ref 7–16)
BUN BLDV-MCNC: 50 MG/DL (ref 6–20)
CALCIUM SERPL-MCNC: 8.9 MG/DL (ref 8.6–10.2)
CHLORIDE BLD-SCNC: 96 MMOL/L (ref 98–107)
CO2: 28 MMOL/L (ref 22–29)
CREAT SERPL-MCNC: 8.2 MG/DL (ref 0.7–1.2)
GFR AFRICAN AMERICAN: 9
GFR NON-AFRICAN AMERICAN: 9 ML/MIN/1.73
GLUCOSE BLD-MCNC: 90 MG/DL (ref 74–99)
MAGNESIUM: 2.8 MG/DL (ref 1.6–2.6)
METER GLUCOSE: 105 MG/DL (ref 74–99)
PHOSPHORUS: 4.1 MG/DL (ref 2.5–4.5)
POTASSIUM SERPL-SCNC: 4.1 MMOL/L (ref 3.5–5)
SODIUM BLD-SCNC: 136 MMOL/L (ref 132–146)

## 2021-08-02 PROCEDURE — 99231 SBSQ HOSP IP/OBS SF/LOW 25: CPT | Performed by: INTERNAL MEDICINE

## 2021-08-02 PROCEDURE — 82962 GLUCOSE BLOOD TEST: CPT

## 2021-08-02 PROCEDURE — 80048 BASIC METABOLIC PNL TOTAL CA: CPT

## 2021-08-02 PROCEDURE — 2500000003 HC RX 250 WO HCPCS: Performed by: INTERNAL MEDICINE

## 2021-08-02 PROCEDURE — 6370000000 HC RX 637 (ALT 250 FOR IP): Performed by: INTERNAL MEDICINE

## 2021-08-02 PROCEDURE — 36415 COLL VENOUS BLD VENIPUNCTURE: CPT

## 2021-08-02 PROCEDURE — 90935 HEMODIALYSIS ONE EVALUATION: CPT | Performed by: INTERNAL MEDICINE

## 2021-08-02 PROCEDURE — 6360000002 HC RX W HCPCS: Performed by: INTERNAL MEDICINE

## 2021-08-02 PROCEDURE — 6370000000 HC RX 637 (ALT 250 FOR IP): Performed by: NURSE PRACTITIONER

## 2021-08-02 PROCEDURE — 6360000002 HC RX W HCPCS: Performed by: SPECIALIST

## 2021-08-02 PROCEDURE — 2580000003 HC RX 258: Performed by: SPECIALIST

## 2021-08-02 PROCEDURE — 2060000000 HC ICU INTERMEDIATE R&B

## 2021-08-02 PROCEDURE — 84100 ASSAY OF PHOSPHORUS: CPT

## 2021-08-02 PROCEDURE — 83735 ASSAY OF MAGNESIUM: CPT

## 2021-08-02 PROCEDURE — 93005 ELECTROCARDIOGRAM TRACING: CPT | Performed by: INTERNAL MEDICINE

## 2021-08-02 PROCEDURE — 6370000000 HC RX 637 (ALT 250 FOR IP): Performed by: STUDENT IN AN ORGANIZED HEALTH CARE EDUCATION/TRAINING PROGRAM

## 2021-08-02 PROCEDURE — 2580000003 HC RX 258: Performed by: INTERNAL MEDICINE

## 2021-08-02 RX ORDER — SODIUM CHLORIDE 0.9 % (FLUSH) 0.9 %
5-40 SYRINGE (ML) INJECTION EVERY 12 HOURS SCHEDULED
Status: CANCELLED | OUTPATIENT
Start: 2021-08-02

## 2021-08-02 RX ORDER — SODIUM CHLORIDE 0.9 % (FLUSH) 0.9 %
5-40 SYRINGE (ML) INJECTION PRN
Status: CANCELLED | OUTPATIENT
Start: 2021-08-02

## 2021-08-02 RX ORDER — SODIUM CHLORIDE 9 MG/ML
25 INJECTION, SOLUTION INTRAVENOUS PRN
Status: CANCELLED | OUTPATIENT
Start: 2021-08-02

## 2021-08-02 RX ORDER — HYDRALAZINE HYDROCHLORIDE 20 MG/ML
10 INJECTION INTRAMUSCULAR; INTRAVENOUS EVERY 4 HOURS PRN
Status: DISCONTINUED | OUTPATIENT
Start: 2021-08-02 | End: 2021-08-06 | Stop reason: HOSPADM

## 2021-08-02 RX ADMIN — PIPERACILLIN AND TAZOBACTAM 3375 MG: 3; .375 INJECTION, POWDER, LYOPHILIZED, FOR SOLUTION INTRAVENOUS at 02:10

## 2021-08-02 RX ADMIN — CINACALCET HYDROCHLORIDE 30 MG: 30 TABLET, FILM COATED ORAL at 12:51

## 2021-08-02 RX ADMIN — LABETALOL HYDROCHLORIDE 10 MG: 5 INJECTION INTRAVENOUS at 09:09

## 2021-08-02 RX ADMIN — SEVELAMER CARBONATE 1600 MG: 800 TABLET, FILM COATED ORAL at 12:51

## 2021-08-02 RX ADMIN — SEVELAMER CARBONATE 1600 MG: 800 TABLET, FILM COATED ORAL at 17:00

## 2021-08-02 RX ADMIN — ONDANSETRON 4 MG: 2 INJECTION INTRAMUSCULAR; INTRAVENOUS at 21:25

## 2021-08-02 RX ADMIN — GUAIFENESIN 400 MG: 400 TABLET ORAL at 12:51

## 2021-08-02 RX ADMIN — LABETALOL HYDROCHLORIDE 200 MG: 200 TABLET, FILM COATED ORAL at 20:09

## 2021-08-02 RX ADMIN — GUAIFENESIN 400 MG: 400 TABLET ORAL at 20:08

## 2021-08-02 RX ADMIN — PIPERACILLIN AND TAZOBACTAM 3375 MG: 3; .375 INJECTION, POWDER, LYOPHILIZED, FOR SOLUTION INTRAVENOUS at 13:53

## 2021-08-02 RX ADMIN — PANTOPRAZOLE SODIUM 40 MG: 40 TABLET, DELAYED RELEASE ORAL at 12:51

## 2021-08-02 RX ADMIN — CLONIDINE HYDROCHLORIDE 0.3 MG: 0.1 TABLET ORAL at 20:08

## 2021-08-02 RX ADMIN — ONDANSETRON 4 MG: 2 INJECTION INTRAMUSCULAR; INTRAVENOUS at 06:17

## 2021-08-02 RX ADMIN — HEPARIN SODIUM 5000 UNITS: 10000 INJECTION INTRAVENOUS; SUBCUTANEOUS at 06:02

## 2021-08-02 RX ADMIN — LABETALOL HYDROCHLORIDE 200 MG: 200 TABLET, FILM COATED ORAL at 10:55

## 2021-08-02 RX ADMIN — Medication 10 ML: at 20:09

## 2021-08-02 RX ADMIN — CLONIDINE HYDROCHLORIDE 0.3 MG: 0.1 TABLET ORAL at 16:59

## 2021-08-02 RX ADMIN — LABETALOL HYDROCHLORIDE 200 MG: 200 TABLET, FILM COATED ORAL at 17:00

## 2021-08-02 RX ADMIN — HEPARIN SODIUM 5000 UNITS: 10000 INJECTION INTRAVENOUS; SUBCUTANEOUS at 20:08

## 2021-08-02 RX ADMIN — HEPARIN SODIUM 5000 UNITS: 10000 INJECTION INTRAVENOUS; SUBCUTANEOUS at 13:57

## 2021-08-02 RX ADMIN — CLONIDINE HYDROCHLORIDE 0.3 MG: 0.1 TABLET ORAL at 10:55

## 2021-08-02 RX ADMIN — DIPHENHYDRAMINE HYDROCHLORIDE 25 MG: 25 TABLET ORAL at 12:51

## 2021-08-02 RX ADMIN — NIFEDIPINE 60 MG: 60 TABLET, FILM COATED, EXTENDED RELEASE ORAL at 12:51

## 2021-08-02 RX ADMIN — SODIUM CHLORIDE, PRESERVATIVE FREE 10 ML: 5 INJECTION INTRAVENOUS at 17:00

## 2021-08-02 RX ADMIN — LOSARTAN POTASSIUM 100 MG: 50 TABLET, FILM COATED ORAL at 17:00

## 2021-08-02 ASSESSMENT — PAIN DESCRIPTION - PAIN TYPE: TYPE: ACUTE PAIN

## 2021-08-02 ASSESSMENT — PAIN SCALES - GENERAL
PAINLEVEL_OUTOF10: 0
PAINLEVEL_OUTOF10: 9
PAINLEVEL_OUTOF10: 0

## 2021-08-02 ASSESSMENT — PAIN DESCRIPTION - LOCATION: LOCATION: CHEST

## 2021-08-02 NOTE — PROGRESS NOTES
Phyllis Eisenberg MD notified that patient is having chest pain and that ekg was taken and uploaded.  Awaiting call back

## 2021-08-02 NOTE — PROGRESS NOTES
[address] [address] PROGRESS NOTE    Patient: Rory Adams  MRN: [id number]  : 1985    Encounter Date: 2021  Encounter Time: 7:25 AM     Date of Admission: .2021  3:02 AM    Consulting Physician:  Primary Care Physician:      [address] [facility]     [phone]    PROBLEM LIST:  Patient Active Problem List   Diagnosis    ESRD on hemodialysis ([address]    H/O intracranial hemorrhage and aneurysm clipping on the right side    Anemia, chronic disease    Anxiety    Noncompliance    AVF (arteriovenous fistula) ([address]    Severe protein-calorie malnutrition ([address]    Venous hypertension, chronic, left    Iron deficiency    Secondary hyperparathyroidism of renal origin ([address]    Essential hypertension    Chronic heart failure with preserved ejection fraction ([address]    Acute respiratory failure with hypoxia ([address]     SUBJECTIVE:  Complains of nausea this am - no emesis  Requesting 2 benadryl pills as this helps with his nausea  Eating also helps nausea  Mild dyspnea with activity, cough with thick mucous still  On room air     PAST MEDICAL HISTORY:     Past Medical History:   Diagnosis Date    (HFpEF) heart failure with preserved ejection fraction ([address]     stage III    Anxiety 2015    AVF (arteriovenous fistula) ([address] 2018    Chronic kidney disease     CKD since early childhood per pt and on HD ~ 11 years    Depression     Encounter regarding vascular access for dialysis for ESRD ([address] 2018    History of ruptured Berry aneurysm ([address] 10/20/2015    Hypertension     on meds for ~ 11 years    Hypothyroidism     Intracranial hemorrhage (HCC)     was d/t ruptured aneurysm - pt underwent neurosurgery for clipping of the aneurysm    Neutropenia ([address]     Noncompliance w/medication treatment due to intermit use of medication 11/3/2015    Pre-transplant evaluation for kidney transplant 2017     CURRENT MEDICATIONS:  Current Facility-Administered Medications: guaiFENesin tablet 400 mg, 400 mg, Oral, TID  cinacalcet (SENSIPAR) tablet 30 mg, 30 mg, Oral, Daily  cloNIDine (CATAPRES) tablet 0.3 mg, 0.3 mg, Oral, TID  labetalol (NORMODYNE) tablet 200 mg, 200 mg, Oral, TID  losartan (COZAAR) tablet 100 mg, 100 mg, Oral, QPM  NIFEdipine (PROCARDIA XL) extended release tablet 60 mg, 60 mg, Oral, Daily  pantoprazole (PROTONIX) tablet 40 mg, 40 mg, Oral, Daily  sevelamer (RENVELA) tablet 1,600 mg, 1,600 mg, Oral, TID WC  sodium chloride flush 0.9 % injection 5-40 mL, 5-40 mL, Intravenous, 2 times per day  sodium chloride flush 0.9 % injection 5-40 mL, 5-40 mL, Intravenous, PRN  0.9 % sodium chloride infusion, 25 mL, Intravenous, PRN  ondansetron (ZOFRAN-ODT) disintegrating tablet 4 mg, 4 mg, Oral, Q8H PRN **OR** ondansetron (ZOFRAN) injection 4 mg, 4 mg, Intravenous, Q6H PRN  polyethylene glycol (GLYCOLAX) packet 17 g, 17 g, Oral, Daily PRN  acetaminophen (TYLENOL) tablet 650 mg, 650 mg, Oral, Q6H PRN **OR** acetaminophen (TYLENOL) suppository 650 mg, 650 mg, Rectal, Q6H PRN  heparin (porcine) injection 5,000 Units, 5,000 Units, Subcutaneous, 3 times per day  labetalol (NORMODYNE;TRANDATE) injection 10 mg, 10 mg, Intravenous, Q4H PRN  piperacillin-tazobactam (ZOSYN) 3,375 mg in dextrose 5 % 100 mL IVPB extended infusion (mini-bag), 3,375 mg, Intravenous, Q12H **AND** 0.9 % sodium chloride infusion admixture, , Intravenous, Q12H  diphenhydrAMINE (BENADRYL) tablet 25 mg, 25 mg, Oral, Q8H PRN    IV MEDICATIONS:   sodium chloride         ALLERGIES:  Allergies   Allergen Reactions    Tylenol [Acetaminophen] Itching    Levofloxacin Itching     No rash       PHYSICAL EXAMINATION:     VITAL SIGNS:  BP (!) 145/97   Pulse 85   Temp 97 °F (36.1 °C) (Temporal)   Resp 18   Ht 5' 6\" (1.676 m)   Wt 113 lb 9.4 oz (51.5 kg)   SpO2 91%   BMI 18.33 kg/m²   Wt Readings from Last 3 Encounters:   08/02/21 113 lb 9.4 oz (51.5 kg) 21 112 lb 12.8 oz (51.2 kg)   21 112 lb (50.8 kg)     Temp Readings from Last 3 Encounters:   21 97 °F (36.1 °C) (Temporal)   21 97.5 °F (36.4 °C)   21 98.4 °F (36.9 °C) (Oral)     TMAX:  BP Readings from Last 3 Encounters:   21 (!) 145/97   21 (!) 180/109   21 (!) 162/104     Pulse Readings from Last 3 Encounters:   21 85   21 69   21 88       CURRENT PULSE OXIMETRY: SpO2: 91 %  24HR PULSE OXIMETRY RANGE: SpO2  Av.3 %  Min: 91 %  Max: 98 %            Vent Information  Skin Assessment: Clean, dry, & intact  SpO2: 91 %  Additional Respiratory  Assessments  Pulse: 85  Resp: 18  SpO2: 91 %  ABG:  No results for input(s): PH, PO2, PCO2, HCO3, BE, O2SAT, METHB, O2HB, COHB, O2CON, HHB, THB in the last 72 hours. ________________________________________________________________________    INTAKE/OUTPUTS:  I/O last 3 completed shifts: In: 750 [P.O.:600; I.V.:50; IV Piggyback:100]  Out: -     Intake/Output Summary (Last 24 hours) at 2021 0725  Last data filed at 2021 0606  Gross per 24 hour   Intake 750 ml   Output --   Net 750 ml     General Appearance: A&O, well-developed and well-nourished, in no acute distress   Eyes: PERRLA, extraocular eye movements intact, conjunctivae normal and sclera anicteric   Neck: neck supple and non tender without mass, no thyromegaly  Pulmonary/Chest: decreased breath sounds, no accessory muscles of inspiration, no focal wheezes - crackles at lung bases R>L  Cardiovascular: normal rate, regular rhythm, normal S1 and S2, no murmurs, rubs, clicks or gallops, distal pulses intact and no JVD   Abdomen: soft, non-tender, non-distended, normal bowel sounds, no masses or organomegaly   Extremities: no cyanosis, no clubbing, no edema.  LUE with fistula +bruit/thrill  Musculoskeletal: normal range of motion, no joint swelling, deformity or tenderness   Neurologic: reflexes normal and symmetric, no cranial nerve deficit Lab Results   Component Value Date    [id number] 864 02/10/2020   ,   Lab Results   Component Value Date    FOLATE 10.5 02/10/2020       Lactic Acid:   Lab Results   Component Value Date    LACTA 0.7 11/11/2018     Ammonia:   Cortisol:  Thyroid Studies:  Lab Results   Component Value Date    TSH 4.260 (H) 07/15/2021    P5MHYOG 82.05 09/18/2015    U4XSEOO 7.8 03/20/2016     Results for Jimbo Fields (MRN [id number]) as of 8/1/2021 07:24   Ref. Range 7/31/2021 11:28   Influenza A by PCR Latest Ref Range: Not Detected  Not Detected   Influenza B by PCR Latest Ref Range: Not Detected  Not Detected   Adenovirus by PCR Latest Ref Range: Not Detected  Not Detected   Coronavirus 229E by PCR Latest Ref Range: Not Detected  Not Detected   Coronavirus HKU1 by PCR Latest Ref Range: Not Detected  Not Detected   Coronavirus NL63 by PCR Latest Ref Range: Not Detected  Not Detected   Coronavirus OC43 by PCR Latest Ref Range: Not Detected  Not Detected   Human Metapneumovirus by PCR Latest Ref Range: Not Detected  Not Detected   Human Rhinovirus/Enterovirus by PCR Latest Ref Range: Not Detected  Not Detected   Parainfluenza Virus 1 by PCR Latest Ref Range: Not Detected  Not Detected   Parainfluenza Virus 2 by PCR Latest Ref Range: Not Detected  Not Detected   Parainfluenza Virus 3 by PCR Latest Ref Range: Not Detected  Not Detected   Parainfluenza Virus 4 by PCR Latest Ref Range: Not Detected  Not Detected   Respiratory Syncytial Virus by PCR Latest Ref Range: Not Detected  Not Detected   Bordetella parapertussis by PCR Latest Ref Range: Not Detected  Not Detected   Chlamydophilia pneumoniae by PCR Latest Ref Range: Not Detected  Not Detected   Mycoplasma pneumoniae by PCR Latest Ref Range: Not Detected  Not Detected   SARS-CoV-2, PCR Latest Ref Range: Not Detected  Not Detected   Bordetella pertussis by PCR Latest Ref Range: Not Detected  Not Detected     Results for Jimbo Fields (MRN [id number]) as of 8/1/2021 07:24   Ref.  Range 7/27/2021 12:05   RPR Latest Ref Range: Non-reactive  NON-REACTIVE   Hep A IgM Latest Ref Range: Non-Reactive  Non-Reactive   Hep B S Ag Interp Latest Ref Range: Non-Reactive  Non-Reactive   Hep C Ab Interp Latest Ref Range: Non-Reactive  Non-Reactive   Hep B Core Ab, IgM Latest Ref Range: Non-Reactive  Non-Reactive     CTA Chest 7/31/2021:  No evidence of pulmonary emboli.       Continued dense airspace consolidation of the right lower lobe.  New   extensive bilateral ground-glass opacities and patchy pulmonary airspace   opacities may be related to pulmonary edema and/or multifocal infectious   infiltrates.       Cardiomegaly.       Axillary adenopathy. ASSESSMENT:  1.) RLL Pneumonia   2.) Volume Overload/B/L GGO   3.) ESRD on HD  4.) Hypoxia     PLAN:  1.) Will plan for Bronchoscopy Tuesday afternoon with assessment for fungal infection, aspergillus. He may eat this am.    2.) ID following - on Zosyn  3.) CLAY, HIV, Histone AB, Hepatitis panel - negative. Check immunoglobulins, IgG subclass, ANCA, HP panel - spoke with RN, did not have drawn 7/31- no line, in dialysis. Discussed with nursing  4.) COVID negative   5.) D-Dimer 738, Ferritin 21,197,    6.) Add mucinex - just for a couple days to aid in expectorating secretions  7.) DVT Prophylaxis  8.) IS      KENDRA Michelle - CNP    Attending Attestation Note:    Patient seen and examined with NP. I agree with above.     In addition, the following apply:    - NPO after midnight tonight  - plan for bronchoscopy in PM on 8/3/2021  - supportive care to continue  - await pending labs  - abx per ID services     Rowe Habermann, MD  8/2/2021  10:39 AM

## 2021-08-02 NOTE — PROGRESS NOTES
[address]   Internal Medicine Residency / [address]    Attending Physician Statement  I have discussed the case, including pertinent history and exam findings with the resident and the team.  I have seen and examined the patient and the key elements of the encounter have been performed by me. I agree with the assessment, plan and orders as documented by the resident. Visited in dialysis today  BP meds reviewed   Continues on Zosyn for the chest infection  And  For Bronchoscopy tomorrow to R/O opportunistic infection  Remainder of medical problems as per resident note.       Lenny Gutierres MD  Internal Medicine Residency Faculty

## 2021-08-02 NOTE — PROGRESS NOTES
Nephrology Consult Note  Patient's Name: Enzo Mejia  10:56 AM  8/2/2021        Reason for Consult:  ESRD    Requesting Physician:  Ana Matos DO    Chief Complaint:  SOB, chest pain    History Obtained From:  Patient, EHR    History of Present Ilness:    Enzo Mejia is a 38 y.o. male with a history of ESRD, HD dependent MWF, hypertension, hypothyroidism and HFpEF. Patient presented to ED with complaints of left-sided chest pain and shortness of breath of several days duration. He said the pain was pleuritic. This was associated with exertional dyspnea. He could not go up the flights of stairs at his home without stopping to catch his breath. In the ED vital signs were significant for markedly elevated BP of 201/138 pulse 110 and temperature of 98.6. Lab data was significant for a WBC of 7.1, hemoglobin 8.5 and platelet count 426,708. His CHEM profile was consistent with his ESRD status. CTA of the chest demonstrated no pulmonary embolism. However it did show dense opacity in the right lower lobe and some bilateral groundglass opacity. Patient received 10 mg of labetalol IV, Toradol, Benadryl and Zofran. Patient was recently treated at [facility] for pneumonia and discharged on a combination of doxycycline and cefuroxime. Covid was negative at the time. He is now admitted for further management.     Subjective    8/1: States breathing is less labored    8/2: Patient seen on hemodialysis - complaining of nausea - blood pressure above goal        Allergies:  Tylenol [acetaminophen] and Levofloxacin    Current Medications:    guaiFENesin tablet 400 mg, TID  cinacalcet (SENSIPAR) tablet 30 mg, Daily  cloNIDine (CATAPRES) tablet 0.3 mg, TID  labetalol (NORMODYNE) tablet 200 mg, TID  losartan (COZAAR) tablet 100 mg, QPM  NIFEdipine (PROCARDIA XL) extended release tablet 60 mg, Daily  pantoprazole (PROTONIX) tablet 40 mg, Daily  sevelamer (RENVELA) tablet 1,600 mg, TID WC  sodium chloride flush 0.9 % injection 5-40 mL, 2 times per day  sodium chloride flush 0.9 % injection 5-40 mL, PRN  0.9 % sodium chloride infusion, PRN  ondansetron (ZOFRAN-ODT) disintegrating tablet 4 mg, Q8H PRN   Or  ondansetron (ZOFRAN) injection 4 mg, Q6H PRN  polyethylene glycol (GLYCOLAX) packet 17 g, Daily PRN  acetaminophen (TYLENOL) tablet 650 mg, Q6H PRN   Or  acetaminophen (TYLENOL) suppository 650 mg, Q6H PRN  heparin (porcine) injection 5,000 Units, 3 times per day  labetalol (NORMODYNE;TRANDATE) injection 10 mg, Q4H PRN  piperacillin-tazobactam (ZOSYN) 3,375 mg in dextrose 5 % 100 mL IVPB extended infusion (mini-bag), Q12H   And  0.9 % sodium chloride infusion admixture, Q12H  diphenhydrAMINE (BENADRYL) tablet 25 mg, Q8H PRN        Review of Systems:   Pertinent items are noted in HPI. Physical exam:  Vitals:    08/02/21 1031   BP: (!) 180/114   Pulse: 80   Resp:    Temp:    SpO2:            General: No distress. Alert. Eyes: PERRL. No sclera icterus. No conjunctival injection. ENT: No discharge. Pharynx clear. Neck: Trachea midline. Normal thyroid. Lungs: Bilateral crackles and rhonchi  CV: Regular rate. Regular rhythm. No murmur or rub. .   Abd: Non-tender. Non-distended. No masses. No organmegaly. Normal bowel sounds. Skin: Warm and dry. No nodule on exposed extremities. No rash on exposed extremities. Ext: No cyanosis, clubbing, edema ; MICHAEL AVF with good thrill and bruit  Neuro: Awake. Follows commands. Positive pupils/gag/corneals. Normal pain response.       Data:   Labs:  Lab Results   Component Value Date     08/02/2021     08/01/2021     07/31/2021    K 4.1 08/02/2021    K 4.6 08/01/2021    K 4.7 07/31/2021    CL 96 (L) 08/02/2021    CO2 28 08/02/2021    CO2 30 (H) 08/01/2021    CO2 29 07/31/2021    CREATININE 8.2 (HH) 08/02/2021    CREATININE 4.9 (H) 08/01/2021    CREATININE 4.9 (H) 07/31/2021    BUN 50 (H) 08/02/2021    BUN 29 (H) 08/01/2021    BUN 29 (H) 07/31/2021    GLUCOSE 90 08/02/2021    GLUCOSE 112 (H) 08/01/2021    GLUCOSE 95 07/31/2021    PHOS 4.1 08/02/2021    PHOS 3.2 08/01/2021    PHOS 5.5 (H) 02/03/2021    WBC 7.1 07/31/2021    WBC 10.6 07/17/2021    WBC 8.0 07/16/2021    HGB 8.5 (L) 07/31/2021    HGB 10.5 (L) 07/17/2021    HGB 9.7 (L) 07/16/2021    HCT 28.0 (L) 07/31/2021    HCT 33.0 (L) 07/17/2021    HCT 30.0 (L) 07/16/2021    MCV 95.6 07/31/2021     07/31/2021         Imaging:  XR CHEST (2 VW)    Result Date: 7/31/2021  EXAMINATION: TWO XRAY VIEWS OF THE CHEST 7/31/2021 4:07 am COMPARISON: July 15, 2021 HISTORY: ORDERING SYSTEM PROVIDED HISTORY: chest pain TECHNOLOGIST PROVIDED HISTORY: Reason for exam:->chest pain What reading provider will be dictating this exam?->CRC FINDINGS: There are patchy bilateral airspace opacities, greatest in the right lower lobe. These have increased since July 15, 2021. No evidence of a sizable pleural effusion. No pneumothorax. Heart size is unable to be accurately assessed on this single portable view of the chest, but appears to be stable. No acute osseous abnormality. Increased patchy airspace opacities throughout both lungs compared with July 15, 2021. Findings are most suspicious for multifocal pneumonia although pulmonary edema could have a similar appearance. CTA PULMONARY W CONTRAST    Result Date: 7/31/2021  EXAMINATION: CTA OF THE CHEST 7/31/2021 6:42 am TECHNIQUE: CTA of the chest was performed after the administration of intravenous contrast.  Multiplanar reformatted images are provided for review. MIP images are provided for review. Dose modulation, iterative reconstruction, and/or weight based adjustment of the mA/kV was utilized to reduce the radiation dose to as low as reasonably achievable.  COMPARISON: 07/14/2021 HISTORY: ORDERING SYSTEM PROVIDED HISTORY: elevated dimer TECHNOLOGIST PROVIDED HISTORY: Reason for exam:->elevated dimer Decision Support Exception - unselect if not a suspected or confirmed emergency medical condition->Emergency Medical Condition (MA) What reading provider will be dictating this exam?->CRC FINDINGS: Pulmonary Arteries: Pulmonary arteries are adequately opacified for evaluation. No evidence of intraluminal filling defect to suggest pulmonary embolism. Main pulmonary artery is normal in caliber. Mediastinum: No aortic aneurysm or dissection. There is cardiomegaly and a small pericardial effusion. Borderline sized subcarinal lymph node is noted. 13 mm left paratracheal node. . Lungs/pleura: There is continued dense airspace consolidation in the right lower lobe with air bronchograms. There are new diffuse bilateral ground-glass airspace opacities and bilateral multifocal small areas of airspace consolidation. No pneumothorax. Small right pleural effusion. Upper Abdomen: The kidneys appear atrophic. Soft Tissues/Bones: There are again seen prominent bilateral axillary lymph nodes. Diffuse increased density of the osseous structures are again compatible with renal osteodystrophy. No evidence of pulmonary emboli. Continued dense airspace consolidation of the right lower lobe. New extensive bilateral ground-glass opacities and patchy pulmonary airspace opacities may be related to pulmonary edema and/or multifocal infectious infiltrates. Cardiomegaly. Axillary adenopathy. Assessment/Plans    1. Hypertensive urgency, in part due to missed doses of some of his antihypertensives  -Adjust BP meds as needed  -UF as tolerated with hemodialysis    2. Right lower lobe pneumonia  -Antibiotics, on Zosyn  -ID and pulmonary following    3. Anemia due to CKD  -Continue his PREETI treatment  -Monitor    4. ESRD  -Continue hemodialysis MWF    5.   Acute hypoxic respiratory failure due to pneumonia and volume overload  -target increased ultrafiltration with dialysis  -Pulmonary consult noted  -Bronchoscopy Monday    Will follow  Thanks        KENDRA Mayes - CNP  10:56 AM  8/2/2021 Patient seen and examined all key components of the physical performed independently , case discussed with NP, all pertinent labs and radiologic tests personally reviewed agree with above. -BP still trending high; increase NIfedipine, and monitor response  -For Bronchoscopy tomorrow  Mayito Blackwell MD     Department of Internal Medicine  Section of Nephrology  Dialysis Note        PROCEDURE:  Patient seen on hemodialysis at 1:39 PM    PHYSICIAN:  Yoana Yin M.D., [facility]    INDICATION:  End-stage renal disease    RX:  See dialysis flowsheet for specifics on access, blood flow rate, dialysate baths, duration of dialysis, anticoagulation and other technical information.     COMMENTS:  Procedure in progress and tolerated       Mayito Blackwell MD, MD

## 2021-08-02 NOTE — PROGRESS NOTES
[address] [place] [address] [facility]  [facility]  Progress Note  CC: has dry cough/ HCAP  Face to face encounter   SUBJECTIVE:  Patient is tolerating medications. No reported adverse drug reactions. ROS: No nausea, vomiting, diarrhea. Has shortness of breath. On 2 liters/ and room air. No fevers.  Reports dry cough     Medications:  Scheduled Meds:   [START ON 8/3/2021] NIFEdipine  90 mg Oral Daily    guaiFENesin  400 mg Oral TID    cinacalcet  30 mg Oral Daily    cloNIDine  0.3 mg Oral TID    labetalol  200 mg Oral TID    losartan  100 mg Oral QPM    pantoprazole  40 mg Oral Daily    sevelamer  1,600 mg Oral TID WC    sodium chloride flush  5-40 mL Intravenous 2 times per day    heparin (porcine)  5,000 Units Subcutaneous 3 times per day    piperacillin-tazobactam  3,375 mg Intravenous Q12H    And    sodium chloride   Intravenous Q12H     Continuous Infusions:   sodium chloride       PRN Meds:sodium chloride flush, sodium chloride, ondansetron **OR** ondansetron, polyethylene glycol, acetaminophen **OR** acetaminophen, labetalol, diphenhydrAMINE  OBJECTIVE:  Patient Vitals for the past 24 hrs:   BP Temp Temp src Pulse Resp SpO2 Weight   08/02/21 1245 (!) 174/110 98 °F (36.7 °C) Temporal 86 18 -- --   08/02/21 1149 (!) 181/111 97.6 °F (36.4 °C) -- 85 18 -- 112 lb 3.4 oz (50.9 kg)   08/02/21 1131 (!) 178/113 -- -- 88 -- -- --   08/02/21 1100 (!) 181/114 -- -- 86 -- -- --   08/02/21 1031 (!) 180/114 -- -- 80 -- -- --   08/02/21 1000 (!) 176/114 -- -- 87 -- -- --   08/02/21 0930 (!) 169/110 -- -- 88 -- -- --   08/02/21 0900 (!) 175/112 -- -- 87 -- -- --   08/02/21 0830 (!) 171/107 -- -- 83 -- -- --   08/02/21 0800 (!) 152/89 -- -- 83 -- -- --   08/02/21 0739 (!) 153/99 -- -- 85 -- -- --   08/02/21 0730 (!) 162/97 97.6 °F (36.4 °C) -- 86 -- -- 115 lb 15.4 oz (52.6 kg)   08/02/21 0615 (!) 145/97 -- -- 85 -- -- --   08/02/21 0400 -- -- -- -- -- -- 113 lb 9.4 oz (51.5 kg)   08/01/21 2230 (!) 146/91 97 °F (36.1 °C) Temporal 82 18 91 % --   08/01/21 2015 (!) 144/91 96.6 °F (35.9 °C) Temporal 84 18 98 % --     Constitutional: The patient is awake, alert, and oriented. Skin: Warm and dry. No rashes were noted. Head: Eyes show round, and reactive pupils. No jaundice. Mouth: Moist mucous membranes, no ulcerations, no thrush. Neck: Supple to movements. No lymphadenopathy. Chest: No use of accessory muscles to breathe. Symmetrical expansion. Auscultation reveals diminished to right. Dry cough   Cardiovascular: S1 and S2 are rhythmic and regular. No murmurs appreciated. Abdomen: Positive bowel sounds to auscultation. Benign to palpation. Extremities: No clubbing, no cyanosis, no edema.   Left fistula     Laboratory and Tests Review:  Lab Results   Component Value Date    WBC 7.1 07/31/2021    WBC 10.6 07/17/2021    WBC 8.0 07/16/2021    HGB 8.5 (L) 07/31/2021    HCT 28.0 (L) 07/31/2021    MCV 95.6 07/31/2021     07/31/2021     Lab Results   Component Value Date    NEUTROABS 5.17 07/31/2021    NEUTROABS 10.29 (H) 07/14/2021    NEUTROABS 2.22 03/17/2021     Lab Results   Component Value Date    CRP 16.4 (H) 07/17/2021    CRP 6.6 (H) 02/01/2021    CRP 9.0 (H) 01/03/2019     Lab Results   Component Value Date    SEDRATE 94 (H) 07/15/2021    SEDRATE 39 (H) 02/01/2021    SEDRATE 37 (H) 10/21/2015     Lab Results   Component Value Date    ALT 32 07/31/2021    AST 39 07/31/2021    ALKPHOS 82 07/31/2021    BILITOT 0.6 07/31/2021     Lab Results   Component Value Date     08/02/2021    K 4.1 08/02/2021    K 4.6 07/14/2021    CL 96 08/02/2021    CO2 28 08/02/2021    BUN 50 08/02/2021    CREATININE 8.2 08/02/2021    GFRAA 9 08/02/2021    LABGLOM 9 08/02/2021    GLUCOSE 90 08/02/2021    PROT 7.2 07/31/2021    LABALBU 3.6 07/31/2021    CALCIUM 8.9 08/02/2021    BILITOT 0.6 07/31/2021    ALKPHOS 82 07/31/2021    AST 39 07/31/2021    ALT 32 07/31/2021     Radiology:  Reviewed     Microbiology:   Respiratory array panel- negative     ASSESSMENT:  · HCAP- right lower lobe pneumonia  · Volume overload   · ESRD on HD    PLAN:  · Continue Zosyn for now   · Check cultures  · Pulmonary following- for Bronch in AM  · Allyn Peters MD  4:54 PM  8/2/2021

## 2021-08-02 NOTE — PROGRESS NOTES
[facility]  [facility]  Progress Note - House Team     Patient:  Giuseppe Michelle 38 y.o. male MRN: [id number]     Date of Service: 8/2/2021     CC: SOB  Overnight events: No significant events    Subjective     Patient was seen and examined this morning at bedside in no acute distress. Overnight no acute events. This AM, pt was seen during dialysis. Admits to no acute concerns at this time. Pt is slightly lethargic    Objective     Physical Exam:  · Vitals: BP (!) 152/89   Pulse 83   Temp 97.6 °F (36.4 °C)   Resp 18   Ht 5' 6\" (1.676 m)   Wt 115 lb 15.4 oz (52.6 kg)   SpO2 91%   BMI 18.72 kg/m²     · I & O - 24hr: No intake/output data recorded. · General Appearance: alert, appears stated age and cooperative  · HEENT:  Head: Normocephalic, no lesions, without obvious abnormality. · Neck: no adenopathy, no carotid bruit, no JVD, supple, symmetrical, trachea midline and thyroid not enlarged, symmetric, no tenderness/mass/nodules  · Lung: clear to auscultation bilaterally  · Heart: regular rate and rhythm, S1, S2 normal, no murmur, click, rub or gallop  · Abdomen: soft, non-tender; bowel sounds normal; no masses,  no organomegaly  · Extremities:  extremities normal, atraumatic, no cyanosis or edema  · Musculokeletal: No joint swelling, no muscle tenderness. ROM normal in all joints of extremities.    · Neurologic: Mental status: Alert, oriented, thought content appropriate  Subject  Pertinent Labs & Imaging Studies   denice  CBC:   Lab Results   Component Value Date    WBC 7.1 07/31/2021    RBC 2.93 07/31/2021    HGB 8.5 07/31/2021    HCT 28.0 07/31/2021    MCV 95.6 07/31/2021    MCH 29.0 07/31/2021    MCHC 30.4 07/31/2021    RDW 15.9 07/31/2021     07/31/2021    MPV 9.0 07/31/2021     CMP:    Lab Results   Component Value Date     08/02/2021    K 4.1 08/02/2021    K 4.6 07/14/2021    CL 96 08/02/2021    CO2 28 08/02/2021    BUN 50 08/02/2021 CREATININE 8.2 08/02/2021    GFRAA 9 08/02/2021    LABGLOM 9 08/02/2021    GLUCOSE 90 08/02/2021    PROT 7.2 07/31/2021    LABALBU 3.6 07/31/2021    CALCIUM 8.9 08/02/2021    BILITOT 0.6 07/31/2021    ALKPHOS 82 07/31/2021    AST 39 07/31/2021    ALT 32 07/31/2021     Magnesium:    Lab Results   Component Value Date    MG 2.8 08/02/2021     Phosphorus:    Lab Results   Component Value Date    PHOS 4.1 08/02/2021       CTA PULMONARY W CONTRAST   Final Result   No evidence of pulmonary emboli. Continued dense airspace consolidation of the right lower lobe. New   extensive bilateral ground-glass opacities and patchy pulmonary airspace   opacities may be related to pulmonary edema and/or multifocal infectious   infiltrates. Cardiomegaly. Axillary adenopathy. XR CHEST (2 VW)   Final Result   Increased patchy airspace opacities throughout both lungs compared with July   15, 2021. Findings are most suspicious for multifocal pneumonia although   pulmonary edema could have a similar appearance. Resident's Assessment and Plan   Brian Hwang is a 37 yo M w/ a PMHx of HTN, ESRD w/ dialysis MWF, HFpEF (EF 60-65%), hypothyroidism, aneurysm of AV fistula in 2016) who came in with left lateral pleuritic chest pain and SOB x2 days. He was recently admitted at [facility] for sepsis 2/2 HCAP, treat with doxycycline and cefuroxime. Continued to complain of SOB and arrived to the ED. CTA Chest showed consolidation in the RLL concerning for possible PNA. Assessment and Plan:     #. Acute hypoxic respiratory failure likely 2/2 consolidation 2/2 HCAP  ? Afebrile, WBC 7.1, dyspnea on exertion, orthopnea, pro-BNP > 7000  ? Respiratory panel negative  ? Initially on vanc and cefepime   ? ID consulted: RLL pneumonia, started on Zosyn (D3)  ? Pulm consulted: for bronchoscopy tomorrow with assessment for fungal infx, aspergillus  ? F/u cultures, pending  ?  F/u immunoglobulins, IgG subclasses, Hypersensitivity pneumonitis profile, T+B lymphocyte differential, pending  ? #. ESRD on hemodialysis  ? HD every MWF  ? HD done today, output of 2L  ? Net I/O since admission: -1L  ? Nephro consulted: target increased ultrafiltration with HD  ? #. Pleuritic chest pain 2/2 RLL pneumonia  ? CTA (7/31/21): showerd dense air airspace consolidation in the RLL that has worsened since last CT and bilateral ground glass opacities and patchy pulmonary opacities  ? resp panel negative  ? Tylenol 650mg PRN for pain     #. Resistant hypertension  ? Pt is in dialysis today  ? BP after dialysis 174/110, increased Nifedipine 60mg -> 90mg daily. ? Continue to monitor BP  ? Continue clonidine 0.3 TID, losartan 100mg qHS, nifedipine 90mg daily, and labatelol 200mg TID  ? Continue labetalol 10mg IV q4 PRN     #. History of HFpEF (EF 60-65%)  #.  History of aneurysm of AVF (2016)    PT/OT evaluation: none  DVT prophylaxis/ GI prophylaxis: Heparin/Protonix  Disposition: continue current care    Sonu Junior MD, PGY-1  Attending physician: Dr. Lalo Meza

## 2021-08-02 NOTE — CARE COORDINATION
Met with pt to discuss discharge planning/transition of care. Pt lives alone in an apartment on the second floor, no elevator. Pt states he is independent, does not drive. [facility], [employer] supplies transport. Dr. Angus Seals is PCP, and pt goes to [facility] in [facility] and uses [facility]. Pt will need transportation at discharge, insurance-[employer] will need called, [phone]. Jerrol Spurling, MSW, LSW

## 2021-08-03 ENCOUNTER — ANESTHESIA EVENT (OUTPATIENT)
Dept: ENDOSCOPY | Age: 36
DRG: 139 | End: 2021-08-03
Payer: MEDICAID

## 2021-08-03 ENCOUNTER — ANESTHESIA (OUTPATIENT)
Dept: ENDOSCOPY | Age: 36
DRG: 139 | End: 2021-08-03
Payer: MEDICAID

## 2021-08-03 ENCOUNTER — APPOINTMENT (OUTPATIENT)
Dept: GENERAL RADIOLOGY | Age: 36
DRG: 139 | End: 2021-08-03
Payer: MEDICAID

## 2021-08-03 VITALS — SYSTOLIC BLOOD PRESSURE: 153 MMHG | DIASTOLIC BLOOD PRESSURE: 122 MMHG | OXYGEN SATURATION: 100 %

## 2021-08-03 LAB
ABSOLUTE CD 3: 693 CELLS/UL (ref 570–2400)
ABSOLUTE CD 4 HELPER: 425 CELLS/UL (ref 430–1800)
ABSOLUTE CD 8 (SUPP): 238 CELLS/UL (ref 210–1200)
ANION GAP SERPL CALCULATED.3IONS-SCNC: 13 MMOL/L (ref 7–16)
BASOPHILS ABSOLUTE: 0.05 E9/L (ref 0–0.2)
BASOPHILS RELATIVE PERCENT: 0.9 % (ref 0–2)
BUN BLDV-MCNC: 28 MG/DL (ref 6–20)
CALCIUM SERPL-MCNC: 9.2 MG/DL (ref 8.6–10.2)
CD4/CD8 RATIO: 1.79 RATIO (ref 0.8–3.9)
CHLORIDE BLD-SCNC: 97 MMOL/L (ref 98–107)
CO2: 28 MMOL/L (ref 22–29)
CREAT SERPL-MCNC: 5.2 MG/DL (ref 0.7–1.2)
EOSINOPHILS ABSOLUTE: 0.41 E9/L (ref 0.05–0.5)
EOSINOPHILS RELATIVE PERCENT: 7.1 % (ref 0–6)
GFR AFRICAN AMERICAN: 15
GFR NON-AFRICAN AMERICAN: 15 ML/MIN/1.73
GLUCOSE BLD-MCNC: 98 MG/DL (ref 74–99)
HCT VFR BLD CALC: 25.3 % (ref 37–54)
HEMOGLOBIN: 7.9 G/DL (ref 12.5–16.5)
IGA: 347 MG/DL (ref 70–400)
IGG: 1413 MG/DL (ref 700–1600)
IGM: 43 MG/DL (ref 40–230)
IMMATURE GRANULOCYTES #: 0.02 E9/L
IMMATURE GRANULOCYTES %: 0.3 % (ref 0–5)
LYMPH SUBSET INFORMATION: ABNORMAL
LYMPHOCYTES ABSOLUTE: 1.37 E9/L (ref 1.5–4)
LYMPHOCYTES RELATIVE PERCENT: 23.6 % (ref 20–42)
MAGNESIUM: 2.6 MG/DL (ref 1.6–2.6)
MCH RBC QN AUTO: 29.5 PG (ref 26–35)
MCHC RBC AUTO-ENTMCNC: 31.2 % (ref 32–34.5)
MCV RBC AUTO: 94.4 FL (ref 80–99.9)
MONOCYTES ABSOLUTE: 0.6 E9/L (ref 0.1–0.95)
MONOCYTES RELATIVE PERCENT: 10.3 % (ref 2–12)
NEUTROPHILS ABSOLUTE: 3.36 E9/L (ref 1.8–7.3)
NEUTROPHILS RELATIVE PERCENT: 57.8 % (ref 43–80)
PDW BLD-RTO: 15.8 FL (ref 11.5–15)
PHOSPHORUS: 4.1 MG/DL (ref 2.5–4.5)
PLATELET # BLD: 205 E9/L (ref 130–450)
PMV BLD AUTO: 9.2 FL (ref 7–12)
POTASSIUM SERPL-SCNC: 4.3 MMOL/L (ref 3.5–5)
RBC # BLD: 2.68 E12/L (ref 3.8–5.8)
SODIUM BLD-SCNC: 138 MMOL/L (ref 132–146)
WBC # BLD: 5.8 E9/L (ref 4.5–11.5)

## 2021-08-03 PROCEDURE — 83735 ASSAY OF MAGNESIUM: CPT

## 2021-08-03 PROCEDURE — 3700000000 HC ANESTHESIA ATTENDED CARE: Performed by: INTERNAL MEDICINE

## 2021-08-03 PROCEDURE — 3609027000 HC BRONCHOSCOPY: Performed by: INTERNAL MEDICINE

## 2021-08-03 PROCEDURE — 87106 FUNGI IDENTIFICATION YEAST: CPT

## 2021-08-03 PROCEDURE — 87205 SMEAR GRAM STAIN: CPT

## 2021-08-03 PROCEDURE — 6360000002 HC RX W HCPCS: Performed by: INTERNAL MEDICINE

## 2021-08-03 PROCEDURE — 6360000002 HC RX W HCPCS: Performed by: SPECIALIST

## 2021-08-03 PROCEDURE — 2709999900 HC NON-CHARGEABLE SUPPLY: Performed by: INTERNAL MEDICINE

## 2021-08-03 PROCEDURE — 6370000000 HC RX 637 (ALT 250 FOR IP): Performed by: NURSE PRACTITIONER

## 2021-08-03 PROCEDURE — 3700000001 HC ADD 15 MINUTES (ANESTHESIA): Performed by: INTERNAL MEDICINE

## 2021-08-03 PROCEDURE — 6360000002 HC RX W HCPCS: Performed by: ANESTHESIOLOGY

## 2021-08-03 PROCEDURE — 36415 COLL VENOUS BLD VENIPUNCTURE: CPT

## 2021-08-03 PROCEDURE — 2580000003 HC RX 258: Performed by: SPECIALIST

## 2021-08-03 PROCEDURE — 2500000003 HC RX 250 WO HCPCS: Performed by: INTERNAL MEDICINE

## 2021-08-03 PROCEDURE — 87116 MYCOBACTERIA CULTURE: CPT

## 2021-08-03 PROCEDURE — 2580000003 HC RX 258: Performed by: INTERNAL MEDICINE

## 2021-08-03 PROCEDURE — 6360000002 HC RX W HCPCS: Performed by: ANESTHESIOLOGIST ASSISTANT

## 2021-08-03 PROCEDURE — 7100000001 HC PACU RECOVERY - ADDTL 15 MIN: Performed by: INTERNAL MEDICINE

## 2021-08-03 PROCEDURE — 71045 X-RAY EXAM CHEST 1 VIEW: CPT

## 2021-08-03 PROCEDURE — 2580000003 HC RX 258: Performed by: ANESTHESIOLOGIST ASSISTANT

## 2021-08-03 PROCEDURE — 87015 SPECIMEN INFECT AGNT CONCNTJ: CPT

## 2021-08-03 PROCEDURE — 87281 PNEUMOCYSTIS CARINII AG IF: CPT

## 2021-08-03 PROCEDURE — 0BJ08ZZ INSPECTION OF TRACHEOBRONCHIAL TREE, VIA NATURAL OR ARTIFICIAL OPENING ENDOSCOPIC: ICD-10-PCS | Performed by: INTERNAL MEDICINE

## 2021-08-03 PROCEDURE — 99231 SBSQ HOSP IP/OBS SF/LOW 25: CPT | Performed by: INTERNAL MEDICINE

## 2021-08-03 PROCEDURE — 7100000000 HC PACU RECOVERY - FIRST 15 MIN: Performed by: INTERNAL MEDICINE

## 2021-08-03 PROCEDURE — 85025 COMPLETE CBC W/AUTO DIFF WBC: CPT

## 2021-08-03 PROCEDURE — 87305 ASPERGILLUS AG IA: CPT

## 2021-08-03 PROCEDURE — 80048 BASIC METABOLIC PNL TOTAL CA: CPT

## 2021-08-03 PROCEDURE — 87206 SMEAR FLUORESCENT/ACID STAI: CPT

## 2021-08-03 PROCEDURE — 87070 CULTURE OTHR SPECIMN AEROBIC: CPT

## 2021-08-03 PROCEDURE — 6370000000 HC RX 637 (ALT 250 FOR IP): Performed by: STUDENT IN AN ORGANIZED HEALTH CARE EDUCATION/TRAINING PROGRAM

## 2021-08-03 PROCEDURE — 2500000003 HC RX 250 WO HCPCS: Performed by: ANESTHESIOLOGIST ASSISTANT

## 2021-08-03 PROCEDURE — 6370000000 HC RX 637 (ALT 250 FOR IP): Performed by: INTERNAL MEDICINE

## 2021-08-03 PROCEDURE — 88112 CYTOPATH CELL ENHANCE TECH: CPT

## 2021-08-03 PROCEDURE — 87102 FUNGUS ISOLATION CULTURE: CPT

## 2021-08-03 PROCEDURE — 2060000000 HC ICU INTERMEDIATE R&B

## 2021-08-03 PROCEDURE — 84100 ASSAY OF PHOSPHORUS: CPT

## 2021-08-03 PROCEDURE — 89051 BODY FLUID CELL COUNT: CPT

## 2021-08-03 RX ORDER — DIPHENHYDRAMINE HYDROCHLORIDE 50 MG/ML
12.5 INJECTION INTRAMUSCULAR; INTRAVENOUS ONCE
Status: COMPLETED | OUTPATIENT
Start: 2021-08-03 | End: 2021-08-03

## 2021-08-03 RX ORDER — FENTANYL CITRATE 50 UG/ML
INJECTION, SOLUTION INTRAMUSCULAR; INTRAVENOUS PRN
Status: DISCONTINUED | OUTPATIENT
Start: 2021-08-03 | End: 2021-08-03 | Stop reason: SDUPTHER

## 2021-08-03 RX ORDER — LIDOCAINE HYDROCHLORIDE 10 MG/ML
INJECTION, SOLUTION EPIDURAL; INFILTRATION; INTRACAUDAL; PERINEURAL PRN
Status: DISCONTINUED | OUTPATIENT
Start: 2021-08-03 | End: 2021-08-03 | Stop reason: ALTCHOICE

## 2021-08-03 RX ORDER — PROPOFOL 10 MG/ML
INJECTION, EMULSION INTRAVENOUS PRN
Status: DISCONTINUED | OUTPATIENT
Start: 2021-08-03 | End: 2021-08-03 | Stop reason: SDUPTHER

## 2021-08-03 RX ORDER — DIPHENHYDRAMINE HYDROCHLORIDE 50 MG/ML
INJECTION INTRAMUSCULAR; INTRAVENOUS
Status: DISPENSED
Start: 2021-08-03 | End: 2021-08-04

## 2021-08-03 RX ORDER — SODIUM CHLORIDE 9 MG/ML
INJECTION, SOLUTION INTRAVENOUS CONTINUOUS PRN
Status: DISCONTINUED | OUTPATIENT
Start: 2021-08-03 | End: 2021-08-03 | Stop reason: SDUPTHER

## 2021-08-03 RX ORDER — MIDAZOLAM HYDROCHLORIDE 1 MG/ML
INJECTION INTRAMUSCULAR; INTRAVENOUS PRN
Status: DISCONTINUED | OUTPATIENT
Start: 2021-08-03 | End: 2021-08-03 | Stop reason: SDUPTHER

## 2021-08-03 RX ORDER — LABETALOL HYDROCHLORIDE 5 MG/ML
INJECTION, SOLUTION INTRAVENOUS PRN
Status: DISCONTINUED | OUTPATIENT
Start: 2021-08-03 | End: 2021-08-03 | Stop reason: SDUPTHER

## 2021-08-03 RX ADMIN — CLONIDINE HYDROCHLORIDE 0.3 MG: 0.1 TABLET ORAL at 17:18

## 2021-08-03 RX ADMIN — LABETALOL HYDROCHLORIDE 200 MG: 200 TABLET, FILM COATED ORAL at 23:35

## 2021-08-03 RX ADMIN — HYDRALAZINE HYDROCHLORIDE 10 MG: 20 INJECTION INTRAMUSCULAR; INTRAVENOUS at 15:52

## 2021-08-03 RX ADMIN — MIDAZOLAM 2 MG: 1 INJECTION INTRAMUSCULAR; INTRAVENOUS at 15:04

## 2021-08-03 RX ADMIN — ONDANSETRON 4 MG: 2 INJECTION INTRAMUSCULAR; INTRAVENOUS at 21:50

## 2021-08-03 RX ADMIN — PROPOFOL 120 MG: 10 INJECTION, EMULSION INTRAVENOUS at 15:10

## 2021-08-03 RX ADMIN — SEVELAMER CARBONATE 1600 MG: 800 TABLET, FILM COATED ORAL at 08:39

## 2021-08-03 RX ADMIN — FENTANYL CITRATE 25 MCG: 50 INJECTION, SOLUTION INTRAMUSCULAR; INTRAVENOUS at 15:09

## 2021-08-03 RX ADMIN — CLONIDINE HYDROCHLORIDE 0.3 MG: 0.1 TABLET ORAL at 08:38

## 2021-08-03 RX ADMIN — CINACALCET HYDROCHLORIDE 30 MG: 30 TABLET, FILM COATED ORAL at 08:38

## 2021-08-03 RX ADMIN — CLONIDINE HYDROCHLORIDE 0.3 MG: 0.1 TABLET ORAL at 21:50

## 2021-08-03 RX ADMIN — PIPERACILLIN AND TAZOBACTAM 3375 MG: 3; .375 INJECTION, POWDER, LYOPHILIZED, FOR SOLUTION INTRAVENOUS at 02:32

## 2021-08-03 RX ADMIN — Medication 10 ML: at 21:57

## 2021-08-03 RX ADMIN — DIPHENHYDRAMINE HYDROCHLORIDE 12.5 MG: 50 INJECTION, SOLUTION INTRAMUSCULAR; INTRAVENOUS at 15:48

## 2021-08-03 RX ADMIN — LABETALOL HYDROCHLORIDE 10 MG: 5 INJECTION INTRAVENOUS at 15:22

## 2021-08-03 RX ADMIN — SODIUM CHLORIDE: 9 INJECTION, SOLUTION INTRAVENOUS at 17:19

## 2021-08-03 RX ADMIN — ONDANSETRON 4 MG: 2 INJECTION INTRAMUSCULAR; INTRAVENOUS at 08:40

## 2021-08-03 RX ADMIN — LOSARTAN POTASSIUM 100 MG: 50 TABLET, FILM COATED ORAL at 17:19

## 2021-08-03 RX ADMIN — Medication 10 ML: at 08:40

## 2021-08-03 RX ADMIN — SODIUM CHLORIDE: 9 INJECTION, SOLUTION INTRAVENOUS at 15:02

## 2021-08-03 RX ADMIN — LABETALOL HYDROCHLORIDE 200 MG: 200 TABLET, FILM COATED ORAL at 17:19

## 2021-08-03 RX ADMIN — GUAIFENESIN 400 MG: 400 TABLET ORAL at 08:39

## 2021-08-03 RX ADMIN — PANTOPRAZOLE SODIUM 40 MG: 40 TABLET, DELAYED RELEASE ORAL at 08:39

## 2021-08-03 RX ADMIN — FENTANYL CITRATE 25 MCG: 50 INJECTION, SOLUTION INTRAMUSCULAR; INTRAVENOUS at 15:04

## 2021-08-03 RX ADMIN — HEPARIN SODIUM 5000 UNITS: 10000 INJECTION INTRAVENOUS; SUBCUTANEOUS at 06:44

## 2021-08-03 RX ADMIN — SEVELAMER CARBONATE 1600 MG: 800 TABLET, FILM COATED ORAL at 17:20

## 2021-08-03 RX ADMIN — PIPERACILLIN AND TAZOBACTAM 3375 MG: 3; .375 INJECTION, POWDER, LYOPHILIZED, FOR SOLUTION INTRAVENOUS at 17:19

## 2021-08-03 RX ADMIN — HEPARIN SODIUM 5000 UNITS: 10000 INJECTION INTRAVENOUS; SUBCUTANEOUS at 21:50

## 2021-08-03 RX ADMIN — LABETALOL HYDROCHLORIDE 200 MG: 200 TABLET, FILM COATED ORAL at 08:39

## 2021-08-03 RX ADMIN — NIFEDIPINE 90 MG: 60 TABLET, FILM COATED, EXTENDED RELEASE ORAL at 08:39

## 2021-08-03 ASSESSMENT — PAIN SCALES - GENERAL
PAINLEVEL_OUTOF10: 0

## 2021-08-03 ASSESSMENT — LIFESTYLE VARIABLES: SMOKING_STATUS: 0

## 2021-08-03 NOTE — PROGRESS NOTES
[address] [place] [address] [facility]  [facility]  Progress Note  CC: has dry cough/ HCAP  Face to face encounter   SUBJECTIVE:  Patient is tolerating medications. No reported adverse drug reactions. ROS: No nausea, vomiting, diarrhea. Has shortness of breath. On 2 liters/ and room air. No fevers. Reports dry cough   Afebrile  Medications:  Scheduled Meds:   diphenhydrAMINE        NIFEdipine  90 mg Oral Daily    cinacalcet  30 mg Oral Daily    cloNIDine  0.3 mg Oral TID    labetalol  200 mg Oral TID    losartan  100 mg Oral QPM    pantoprazole  40 mg Oral Daily    sevelamer  1,600 mg Oral TID WC    sodium chloride flush  5-40 mL Intravenous 2 times per day    heparin (porcine)  5,000 Units Subcutaneous 3 times per day    piperacillin-tazobactam  3,375 mg Intravenous Q12H    And    sodium chloride   Intravenous Q12H     Continuous Infusions:   sodium chloride       PRN Meds:hydrALAZINE, sodium chloride flush, sodium chloride, ondansetron **OR** ondansetron, polyethylene glycol, acetaminophen **OR** acetaminophen, labetalol, diphenhydrAMINE  OBJECTIVE:  Patient Vitals for the past 24 hrs:   BP Temp Temp src Pulse Resp SpO2   08/03/21 1617 (!) 164/108 -- -- 86 23 --   08/03/21 1616 -- -- -- 86 22 100 %   08/03/21 1615 (!) 162/108 -- -- 83 21 98 %   08/03/21 1600 (!) 163/113 -- -- 84 21 95 %   08/03/21 1545 (!) 168/112 -- -- 85 22 100 %   08/03/21 1535 (!) 176/120 97 °F (36.1 °C) -- 85 24 100 %   08/03/21 1530 (!) 176/120 -- -- 90 27 100 %   08/03/21 1523 (!) 151/113 -- Temporal 84 14 99 %   08/03/21 0830 (!) 148/99 97.3 °F (36.3 °C) -- 84 20 96 %   08/02/21 2345 (!) 143/93 97.6 °F (36.4 °C) Temporal 88 22 94 %   08/02/21 2115 (!) 175/111 -- -- 89 18 --   08/02/21 2000 (!) 191/121 98.3 °F (36.8 °C) Temporal 90 20 96 %     Constitutional: The patient is awake, alert, and oriented. Skin: Warm and dry. No rashes were noted. Head: Eyes show round, and reactive pupils. No jaundice.    Mouth: Moist mucous membranes, no ulcerations, no thrush. Neck: Supple to movements. No lymphadenopathy. Chest: No use of accessory muscles to breathe. Symmetrical expansion. Auscultation reveals diminished to right. Dry cough   Cardiovascular: S1 and S2 are rhythmic and regular. No murmurs appreciated. Abdomen: Positive bowel sounds to auscultation. Benign to palpation. Extremities: No clubbing, no cyanosis, no edema.   Left fistula     Laboratory and Tests Review:  Lab Results   Component Value Date    WBC 5.8 08/03/2021    WBC 7.1 07/31/2021    WBC 10.6 07/17/2021    HGB 7.9 (L) 08/03/2021    HCT 25.3 (L) 08/03/2021    MCV 94.4 08/03/2021     08/03/2021     Lab Results   Component Value Date    NEUTROABS 3.36 08/03/2021    NEUTROABS 5.17 07/31/2021    NEUTROABS 10.29 (H) 07/14/2021     Lab Results   Component Value Date    CRP 16.4 (H) 07/17/2021    CRP 6.6 (H) 02/01/2021    CRP 9.0 (H) 01/03/2019     Lab Results   Component Value Date    SEDRATE 94 (H) 07/15/2021    SEDRATE 39 (H) 02/01/2021    SEDRATE 37 (H) 10/21/2015     Lab Results   Component Value Date    ALT 32 07/31/2021    AST 39 07/31/2021    ALKPHOS 82 07/31/2021    BILITOT 0.6 07/31/2021     Lab Results   Component Value Date     08/03/2021    K 4.3 08/03/2021    K 4.6 07/14/2021    CL 97 08/03/2021    CO2 28 08/03/2021    BUN 28 08/03/2021    CREATININE 5.2 08/03/2021    GFRAA 15 08/03/2021    LABGLOM 15 08/03/2021    GLUCOSE 98 08/03/2021    PROT 7.2 07/31/2021    LABALBU 3.6 07/31/2021    CALCIUM 9.2 08/03/2021    BILITOT 0.6 07/31/2021    ALKPHOS 82 07/31/2021    AST 39 07/31/2021    ALT 32 07/31/2021     Radiology:  Reviewed     Microbiology:   Respiratory array panel- negative     ASSESSMENT:  · HCAP- right lower lobe pneumonia  · Volume overload   · ESRD on HD    PLAN:  · Continue Zosyn for now   · Check cultures from the BAL  · Forrest Fonseca MD  7:33 PM  8/3/2021

## 2021-08-03 NOTE — OP NOTE
[address] [place] [address]    Pulmonary/critical care procedure note    Flexible Fiberoptic Bronchoscopy NOTE    Patient: Augustus Escobar    MRN: [id number]  : 1985    Date: 8/3/2021  Time: 3:20 PM     Primary Care Physician:      [address] [phone]    Laboratory Work:  Lab Results   Component Value Date    INR 1.1 03/15/2021    INR 1.1 02/10/2020    INR 1.1 03/15/2016    PROTIME 11.6 03/15/2021    PROTIME 12.6 (H) 02/10/2020    PROTIME 11.6 03/15/2016     Lab Results   Component Value Date    WBC 5.8 2021    HGB 7.9 (L) 2021    HCT 25.3 (L) 2021    MCV 94.4 2021     2021    LYMPHOPCT 23.6 2021    RBC 2.68 (L) 2021    MCH 29.5 2021    MCHC 31.2 (L) 2021    RDW 15.8 (H) 2021    NEUTOPHILPCT 57.8 2021    MONOPCT 10.3 2021    BASOPCT 0.9 2021    NEUTROABS 3.36 2021    LYMPHSABS 1.37 (L) 2021    MONOSABS 0.60 2021    EOSABS 0.41 2021    BASOSABS 0.05 2021     Lab Results   Component Value Date     2021    K 4.3 2021    K 4.6 2021    CL 97 2021    CO2 28 2021    BUN 28 2021    CREATININE 5.2 2021    GLUCOSE 98 2021    CALCIUM 9.2 2021      Attending Physician: Dr. Nancy Chapa    Assistant(s): Anesthesia Dept, Endoscopy     Pre-Operative Medications: Per Anesthesia Dept     Intra-Operative Medications: 4 cc of 1% Lidocaine to Vocal Cords, 4 cc of Lidocaine to Endotracheal Area    Anesthesia: LMAC, Moderate Sedation     Pre-Procedure Diagnosis: RLL Pneumonia     Operation/Procedure: Flexible Fiberoptic Bronchoscopy, Lung Washes to RLL    Post-Procedure Diagnosis: RLL Pneumonia     Consent: Consent was obtained prior to procedure. Indications, risks, benefits were explained at length.     Indications: RLL Pneumonia     Purpose: Diagnostic, Therapeutic    Procedure Summary:   A time out was performed to confirm both patient and procedure. I had worn a gown with hat, mask, gloves prior to initiation of procedure. The patient was pre-medicated with medications per anesthesia dept. Intra-operatively, an additional 4 cc of 1% Lidocaine to Vocal Cords, 4 cc of Lidocaine to Endotracheal Area in addition to what was administered by anesthesia dept. Bronchoscope was introduced via oral airway. There was no tendency toward obstructive apnea noted both before and during the procedure with the patient supine. The left and right nares were not examined. The scope was passed into the area of the hypopharynx. The vocal cords, false cords, arytenoid region, hypopharyngeal region toward the esophagus, and the epiglottis were visualized. These structures were relatively normal.   Some landmarks were present around the region of the arytenoids but there was no erythema, clear edema, or distortion present. Vocal cords appeared to move relatively normally without paradoxical motion although the patient was not able to phonate following sedation. Vocal cords were sprayed with mist of 4cc of 1% lidocaine prior to advancement of the bronchoscope. Bronchoscope was passed through vocal cords without incident.   The trachea was inspected and found to be normal.  The main yari was sharp.     - The right bronchial tree was inspected and the following were noted:   [a] Right Upper Lobe contained three segments [apical segmental bronchus, posterior segmental bronchus, and anterior segmental bronchus] and was found to be normal.   [b] Bronchus Intermedius was examined and found to be normal.   [c] Right Middle Lobe had 2 segments [lateral segmental bronchus and medial segmental bronchus] and was found to be normal.   [d] Right Lower Lobe including apical segment and basal segmental branches [anterior basal branch, lateral basal branch, and posterior basal branch] was also inspected to the sub-segmental levels and was found to be erythematous. - lung washes completed to RLL with cloudy white fluid returned with quite a bit of debris noted   [e] The right bronchial tree was found to be without stenosis, circumferential narrowing, intrinsic compressing mass, extrinsic compressing mass, obstruction in the area of the RUL, RML, RLL.     - The left bronchial tree was inspected and the following were noted:   [a] Left Upper Lobe including the apical posterior segmental bronchus and the anterior segmental bronchus were inspected and found to be normal.  [b] Lingula including the superior, inferior bronchopulmonary segments were inspected and found to be normal.   [c] Left Lower Lobe with superior segment was inspected to sub-segmental level [anterior basal segment, lateral basal segment, and posterior basal segment] and found to be normal.   [d] The left bronchial tree was found to be without stenosis, circumferential narrowing, intrinsic compressing mass, extrinsic compressing mass, obstruction in the area of the JOSE DANIEL, LINGULA, LLL. Patient tolerated the post-procedure recovery. During the endoscopic procedure there were no/some tendencies towards: desaturations, hypotension, hypertension, bradycardia, tachycardia, dysrhythmia.     Estimated Blood Loss: NONE    Complications: NONE    Tolerance: Excellent     Patient extubated and send to PACU in stable condition hemodynamically    Yvonne Yates MD on 8/3/2021 at 3:20 PM

## 2021-08-03 NOTE — PROGRESS NOTES
[facility]  Internal Medicine Residency Program  Progress Note - House Team 1    Patient:  Ruthy Vega 38 y.o. male MRN: [id number]     Date of Service: 8/3/2021     Subjective     He stated that he is feeling well. Reports an episode of dizziness while going out of the bed this morning  Orthostatic vitals negative  Had one episode of chest pain yesterday; EKG revealing nonspecific ST-T wave changes; Denies any chest pain today    Objective     Physical Exam:  · Vitals: BP (!) 148/99   Pulse 84   Temp 97.3 °F (36.3 °C)   Resp 20   Ht 5' 6\" (1.676 m)   Wt 112 lb 3.4 oz (50.9 kg)   SpO2 96%   BMI 18.11 kg/m²     · I & O - 24hr: No intake/output data recorded. General: alert, awake, in no acute distress  HEENT: NC, AT, moist oral mucosa  Neck: supple  Pulmonary: clear to auscultation bilaterally, no wheezing or crackles  CV: RRR, S1 S2 heard, no MRG  Abd: soft, nontender, nondistended, BS +  Ext: no pedal edema  Neuro: Alert, awake, no gross focal neurological deficit  Subject  Pertinent Labs & Imaging Studies   denice  CBC:   Lab Results   Component Value Date    WBC 5.8 08/03/2021    RBC 2.68 08/03/2021    HGB 7.9 08/03/2021    HCT 25.3 08/03/2021    MCV 94.4 08/03/2021    MCH 29.5 08/03/2021    MCHC 31.2 08/03/2021    RDW 15.8 08/03/2021     08/03/2021    MPV 9.2 08/03/2021     CMP:    Lab Results   Component Value Date     08/03/2021    K 4.3 08/03/2021    K 4.6 07/14/2021    CL 97 08/03/2021    CO2 28 08/03/2021    BUN 28 08/03/2021    CREATININE 5.2 08/03/2021    GFRAA 15 08/03/2021    LABGLOM 15 08/03/2021    GLUCOSE 98 08/03/2021    PROT 7.2 07/31/2021    LABALBU 3.6 07/31/2021    CALCIUM 9.2 08/03/2021    BILITOT 0.6 07/31/2021    ALKPHOS 82 07/31/2021    AST 39 07/31/2021    ALT 32 07/31/2021     CTA PULMONARY W CONTRAST   Final Result   No evidence of pulmonary emboli. Continued dense airspace consolidation of the right lower lobe.   New   extensive bilateral ground-glass opacities and patchy pulmonary airspace   opacities may be related to pulmonary edema and/or multifocal infectious   infiltrates. Cardiomegaly. Axillary adenopathy. XR CHEST (2 VW)   Final Result   Increased patchy airspace opacities throughout both lungs compared with July   15, 2021. Findings are most suspicious for multifocal pneumonia although   pulmonary edema could have a similar appearance. Resident's Assessment and Plan     Charlotte reyez 39 yo M w/ a PMHx of HTN, ESRD w/ dialysis MWF, HFpEF (EF 60-65%), hypothyroidism, aneurysm of AV fistula in 2016) who came in with left lateral pleuritic chest pain and SOB x2 days. He was recently admitted at [facility] [id number] for sepsis 2/2 HCAP, treat with doxycycline and cefuroxime. Continued to complain of SOB and arrived to the ED. CTA Chest showed consolidation in the RLL concerning for possible PNA.      Assessment and Plan:  1. Acute hypoxic respiratory failure likely  2/2 HCAP; afebrile, no leukocytosis; Respiratory panel negative; CTA (7/31/21): showerd dense air airspace consolidation in the RLL  2. ESRD on hemodialysis  3. Pleuritic chest pain 2/2 RLL pneumonia  4. Resistant hypertension  5. History of HFpEF (EF 60-65%)  6.  History of aneurysm of AVF (2016)    Plan:  · Will have bronchoscopy today  · Will follow up bronchoscopy result  · Continue Zosyn  · Appreciate ID recommendations  · Appreciate pulmonology recommendations  · Follow up final cultures  · F/u immunoglobulins, IgG subclasses, Hypersensitivity pneumonitis profile, T+B lymphocyte differential, pending  · HD per nephrology  · Continue clonidine 0.3 TID, losartan 100mg qHS, nifedipine 90mg daily, and labatelol 200mg TID  · Continue labetalol 10mg IV q4 PRN  · Monitor BP    PT/OT evaluation: ordered  DVT prophylaxis/ GI prophylaxis: Heparin/Protonix  Disposition: continue current care    Alex Yuan MD, PGY-3  Attending physician: Dr. Paty Oliveros

## 2021-08-03 NOTE — ANESTHESIA POSTPROCEDURE EVALUATION
Department of Anesthesiology  Postprocedure Note    Patient: Dedrick Self  MRN: [id number]  [place]: 1985  Date of evaluation: 8/3/2021  Time:  5:02 PM     Procedure Summary     Date: 08/03/21 Room / Location: [facility]    Anesthesia Start: 4263 Anesthesia Stop: 1197    Procedure: BRONCHOSCOPY DIAGNOSTIC OR CELL [address] (N/A ) Diagnosis: (MUCUS PLUGS)    Surgeons: Veronica Smith MD Responsible Provider: Huma Ferro MD    Anesthesia Type: MAC ASA Status: 4          Anesthesia Type: MAC    Devon Phase I: Devon Score: 10    Devon Phase II:      Last vitals: Reviewed and per EMR flowsheets.        Anesthesia Post Evaluation    Patient location during evaluation: PACU  Patient participation: complete - patient participated  Level of consciousness: awake  Airway patency: patent  Nausea & Vomiting: no nausea and no vomiting  Complications: no  Cardiovascular status: hemodynamically stable  Respiratory status: acceptable  Hydration status: euvolemic

## 2021-08-03 NOTE — PLAN OF CARE
Problem: Infection:  Goal: Will remain free from infection  Description: Will remain free from infection  Outcome: Met This Shift     Problem: Safety:  Goal: Free from accidental physical injury  Description: Free from accidental physical injury  Outcome: Met This Shift  Goal: Free from intentional harm  Description: Free from intentional harm  Outcome: Met This Shift     Problem: Daily Care:  Goal: Daily care needs are met  Description: Daily care needs are met  Outcome: Met This Shift     Problem: Pain:  Goal: Patient's pain/discomfort is manageable  Description: Patient's pain/discomfort is manageable  Outcome: Met This Shift  Goal: Pain level will decrease  Description: Pain level will decrease  Outcome: Met This Shift  Goal: Control of acute pain  Description: Control of acute pain  Outcome: Met This Shift  Goal: Control of chronic pain  Description: Control of chronic pain  Outcome: Met This Shift     Problem: Skin Integrity:  Goal: Skin integrity will stabilize  Description: Skin integrity will stabilize  Outcome: Met This Shift     Problem: Discharge Planning:  Goal: Patients continuum of care needs are met  Description: Patients continuum of care needs are met  Outcome: Met This Shift

## 2021-08-03 NOTE — PROGRESS NOTES
Nephrology Consult Note  Patient's Name: Dedrick Self  10:24 AM  8/3/2021        Reason for Consult:  ESRD    Requesting Physician:  Jose Greene DO    Chief Complaint:  SOB, chest pain    History Obtained From:  Patient, EHR    History of Present Ilness:    Dedrick Self is a 38 y.o. male with a history of ESRD, HD dependent MWF, hypertension, hypothyroidism and HFpEF. Patient presented to ED with complaints of left-sided chest pain and shortness of breath of several days duration. He said the pain was pleuritic. This was associated with exertional dyspnea. He could not go up the flights of stairs at his home without stopping to catch his breath. In the ED vital signs were significant for markedly elevated BP of 201/138 pulse 110 and temperature of 98.6. Lab data was significant for a WBC of 7.1, hemoglobin 8.5 and platelet count 478,277. His CHEM profile was consistent with his ESRD status. CTA of the chest demonstrated no pulmonary embolism. However it did show dense opacity in the right lower lobe and some bilateral groundglass opacity. Patient received 10 mg of labetalol IV, Toradol, Benadryl and Zofran. Patient was recently treated at [facility] for pneumonia and discharged on a combination of doxycycline and cefuroxime. Covid was negative at the time. He is now admitted for further management.     Subjective    8/1: States breathing is less labored  8/2: Patient seen on hemodialysis - complaining of nausea - blood pressure above goal  8/3: Patient examined sitting up in bed in no acute distress -does complain of indigestion as he is NPO for bronchoscopy later this afternoon -blood pressure with improving control      Allergies:  Tylenol [acetaminophen] and Levofloxacin    Current Medications:    NIFEdipine (PROCARDIA XL) extended release tablet 90 mg, Daily  hydrALAZINE (APRESOLINE) injection 10 mg, Q4H PRN  cinacalcet (SENSIPAR) tablet 30 mg, Daily  cloNIDine (CATAPRES) tablet 0.3 mg, COMPARISON: 07/14/2021 HISTORY: ORDERING SYSTEM PROVIDED HISTORY: elevated dimer TECHNOLOGIST PROVIDED HISTORY: Reason for exam:->elevated dimer Decision Support Exception - unselect if not a suspected or confirmed emergency medical condition->Emergency Medical Condition (MA) What reading provider will be dictating this exam?->CRC FINDINGS: Pulmonary Arteries: Pulmonary arteries are adequately opacified for evaluation. No evidence of intraluminal filling defect to suggest pulmonary embolism. Main pulmonary artery is normal in caliber. Mediastinum: No aortic aneurysm or dissection. There is cardiomegaly and a small pericardial effusion. Borderline sized subcarinal lymph node is noted. 13 mm left paratracheal node. . Lungs/pleura: There is continued dense airspace consolidation in the right lower lobe with air bronchograms. There are new diffuse bilateral ground-glass airspace opacities and bilateral multifocal small areas of airspace consolidation. No pneumothorax. Small right pleural effusion. Upper Abdomen: The kidneys appear atrophic. Soft Tissues/Bones: There are again seen prominent bilateral axillary lymph nodes. Diffuse increased density of the osseous structures are again compatible with renal osteodystrophy. No evidence of pulmonary emboli. Continued dense airspace consolidation of the right lower lobe. New extensive bilateral ground-glass opacities and patchy pulmonary airspace opacities may be related to pulmonary edema and/or multifocal infectious infiltrates. Cardiomegaly. Axillary adenopathy. Assessment/Plans    1. Hypertensive urgency, in part due to missed doses of some of his antihypertensives  -Adjust BP meds as needed  -UF as tolerated with hemodialysis  Orthos noted on 8/3/2021:  Laying 142/98 80  Sitting 148/99 86  Standing 152/101 92    2. Right lower lobe pneumonia  -Antibiotics, on Zosyn  -ID and pulmonary following    3.   Anemia due to CKD  -Continue his PREETI treatment  -Monitor    4. ESRD  -Continue hemodialysis MWF    5. Acute hypoxic respiratory failure due to pneumonia and volume overload  -target increased ultrafiltration with dialysis  -Pulmonary consult noted  -For bronchoscopy 8/3/21    Will follow  Thanks        KENDRA Comer - CNP  10:24 AM  8/3/2021     Patient seen and examined all key components of the physical performed independently , case discussed with NP, all pertinent labs and radiologic tests personally reviewed agree with above.    RLL persistent airspace opacity, currently on Zosyn; for bronchoscopy today    Talya Fox MD

## 2021-08-03 NOTE — PROGRESS NOTES
[address]   Internal Medicine Residency / [address]    Attending Physician Statement  I have discussed the case, including pertinent history and exam findings with the resident and the team.  I have seen and examined the patient and the key elements of the encounter have been performed by me. I agree with the assessment, plan and orders as documented by the resident. A&O   To have bronchoscopy this PM  Remains on Zosyn   Gets dizzy getting up to rest room  Short lived chest discomfort at the same time  BP meds reviewed  Check for orthosatic hypotension today  Remainder of medical problems as per resident note.       Jelly Carson MD [facility]  Internal Medicine Residency Faculty

## 2021-08-03 NOTE — PROGRESS NOTES
[address] [facility] PROGRESS NOTE    Patient: Lynne Mora  MRN: [id number]  : 1985    Encounter Date: 8/3/2021  Encounter Time: 8:43 AM     Date of Admission: .2021  3:02 AM    Consulting Physician:  Primary Care Physician:      Rufus Cooper, [address] [place] [address]     [phone]    PROBLEM LIST:  Patient Active Problem List   Diagnosis    ESRD on hemodialysis ([address]    H/O intracranial hemorrhage and aneurysm clipping on the right side    Anemia, chronic disease    Anxiety    Noncompliance    AVF (arteriovenous fistula) ([address]    Severe protein-calorie malnutrition ([address]    Venous hypertension, chronic, left    Iron deficiency    Secondary hyperparathyroidism of renal origin ([address]    Essential hypertension    Chronic heart failure with preserved ejection fraction ([address]    Acute respiratory failure with hypoxia ([address]     SUBJECTIVE:  Still with mild nausea but improved  Had CP and rapid heart rate yesterday after ambulating to bathroom - EKG obtained, no acute changes.   On room air  Mild dyspnea and cough  For bronch this afternoon     PAST MEDICAL HISTORY:     Past Medical History:   Diagnosis Date    (HFpEF) heart failure with preserved ejection fraction ([address]     stage III    Anxiety 2015    AVF (arteriovenous fistula) ([address] 2018    Chronic kidney disease     CKD since early childhood per pt and on HD ~ 11 years    Depression     Encounter regarding vascular access for dialysis for ESRD ([address] 2018    History of ruptured Berry aneurysm ([address] 10/20/2015    Hypertension     on meds for ~ 11 years    Hypothyroidism     Intracranial hemorrhage (HCC)     was d/t ruptured aneurysm - pt underwent neurosurgery for clipping of the aneurysm    Neutropenia ([address]     Noncompliance w/medication treatment due to intermit use of medication 11/3/2015    Pre-transplant evaluation for kidney transplant 2017     CURRENT MEDICATIONS:  Current Facility-Administered Medications: NIFEdipine (PROCARDIA XL) extended release tablet 90 mg, 90 mg, Oral, Daily  hydrALAZINE (APRESOLINE) injection 10 mg, 10 mg, Intravenous, Q4H PRN  guaiFENesin tablet 400 mg, 400 mg, Oral, TID  cinacalcet (SENSIPAR) tablet 30 mg, 30 mg, Oral, Daily  cloNIDine (CATAPRES) tablet 0.3 mg, 0.3 mg, Oral, TID  labetalol (NORMODYNE) tablet 200 mg, 200 mg, Oral, TID  losartan (COZAAR) tablet 100 mg, 100 mg, Oral, QPM  pantoprazole (PROTONIX) tablet 40 mg, 40 mg, Oral, Daily  sevelamer (RENVELA) tablet 1,600 mg, 1,600 mg, Oral, TID WC  sodium chloride flush 0.9 % injection 5-40 mL, 5-40 mL, Intravenous, 2 times per day  sodium chloride flush 0.9 % injection 5-40 mL, 5-40 mL, Intravenous, PRN  0.9 % sodium chloride infusion, 25 mL, Intravenous, PRN  ondansetron (ZOFRAN-ODT) disintegrating tablet 4 mg, 4 mg, Oral, Q8H PRN **OR** ondansetron (ZOFRAN) injection 4 mg, 4 mg, Intravenous, Q6H PRN  polyethylene glycol (GLYCOLAX) packet 17 g, 17 g, Oral, Daily PRN  acetaminophen (TYLENOL) tablet 650 mg, 650 mg, Oral, Q6H PRN **OR** acetaminophen (TYLENOL) suppository 650 mg, 650 mg, Rectal, Q6H PRN  heparin (porcine) injection 5,000 Units, 5,000 Units, Subcutaneous, 3 times per day  labetalol (NORMODYNE;TRANDATE) injection 10 mg, 10 mg, Intravenous, Q4H PRN  piperacillin-tazobactam (ZOSYN) 3,375 mg in dextrose 5 % 100 mL IVPB extended infusion (mini-bag), 3,375 mg, Intravenous, Q12H **AND** 0.9 % sodium chloride infusion admixture, , Intravenous, Q12H  diphenhydrAMINE (BENADRYL) tablet 25 mg, 25 mg, Oral, Q8H PRN    IV MEDICATIONS:   sodium chloride         ALLERGIES:  Allergies   Allergen Reactions    Tylenol [Acetaminophen] Itching    Levofloxacin Itching     No rash       PHYSICAL EXAMINATION:     VITAL SIGNS:  BP (!) 148/99   Pulse 84   Temp 97.3 °F (36.3 °C)   Resp 20   Ht 5' 6\" (1.676 m)   Wt 112 lb 3.4 oz (50.9 kg)   SpO2 96%   BMI 18.11 kg/m²   Wt Readings from Last 3 Encounters:   21 112 lb 3.4 oz (50.9 kg)   21 112 lb 12.8 oz (51.2 kg)   21 112 lb (50.8 kg)     Temp Readings from Last 3 Encounters:   21 97.3 °F (36.3 °C)   21 97.5 °F (36.4 °C)   21 98.4 °F (36.9 °C) (Oral)     TMAX:  BP Readings from Last 3 Encounters:   21 (!) 148/99   21 (!) 180/109   21 (!) 162/104     Pulse Readings from Last 3 Encounters:   21 84   21 69   21 88       CURRENT PULSE OXIMETRY: SpO2: 96 %  24HR PULSE OXIMETRY RANGE: SpO2  Av.3 %  Min: 94 %  Max: 96 %            Vent Information  Skin Assessment: Clean, dry, & intact  SpO2: 96 %  Additional Respiratory  Assessments  Pulse: 84  Resp: 20  SpO2: 96 %  ABG:  No results for input(s): PH, PO2, PCO2, HCO3, BE, O2SAT, METHB, O2HB, COHB, O2CON, HHB, THB in the last 72 hours. ________________________________________________________________________    INTAKE/OUTPUTS:  I/O last 3 completed shifts: In: 5 [P.O.:120]  Out: 2325 [Urine:325]    Intake/Output Summary (Last 24 hours) at 8/3/2021 0843  Last data filed at 8/3/2021 7367  Gross per 24 hour   Intake 420 ml   Output 2325 ml   Net -1905 ml     General Appearance: A&O, well-developed and well-nourished, in no acute distress   Eyes: PERRLA, extraocular eye movements intact, conjunctivae normal and sclera anicteric   Neck: neck supple and non tender without mass, no thyromegaly  Pulmonary/Chest: decreased breath sounds, no accessory muscles of inspiration, crackles at lung bases R>L with faint exp wheeze L base  Cardiovascular: normal rate, regular rhythm, normal S1 and S2, no murmurs, rubs, clicks or gallops, distal pulses intact and no JVD   Abdomen: soft, non-tender, non-distended, normal bowel sounds, no masses or organomegaly   Extremities: no cyanosis, no clubbing, no edema.  LUE with fistula +bruit/thrill  Musculoskeletal: normal range of motion, no joint swelling, deformity or tenderness   Neurologic: reflexes normal and symmetric, no cranial nerve deficit noted    LABS/IMAGING:    CBC:  Lab Results   Component Value Date    WBC 5.8 08/03/2021    HGB 7.9 (L) 08/03/2021    HCT 25.3 (L) 08/03/2021    MCV 94.4 08/03/2021     08/03/2021    LYMPHOPCT 23.6 08/03/2021    RBC 2.68 (L) 08/03/2021    MCH 29.5 08/03/2021    MCHC 31.2 (L) 08/03/2021    RDW 15.8 (H) 08/03/2021    NEUTOPHILPCT 57.8 08/03/2021    MONOPCT 10.3 08/03/2021    BASOPCT 0.9 08/03/2021    NEUTROABS 3.36 08/03/2021    LYMPHSABS 1.37 (L) 08/03/2021    MONOSABS 0.60 08/03/2021    EOSABS 0.41 08/03/2021    BASOSABS 0.05 08/03/2021       Recent Labs     08/03/21  0628 07/31/21  0340 07/17/21  0750   WBC 5.8 7.1 10.6   HGB 7.9* 8.5* 10.5*   HCT 25.3* 28.0* 33.0*   MCV 94.4 95.6 93.8    273 342       BMP:   Recent Labs     08/01/21  0809 08/02/21  0606 08/03/21  0628    136 138   K 4.6 4.1 4.3   CL 99 96* 97*   CO2 30* 28 28   PHOS 3.2 4.1 4.1   BUN 29* 50* 28*   CREATININE 4.9* 8.2* 5.2*       MG:   Lab Results   Component Value Date    MG 2.6 08/03/2021     Ca/Phos:   Lab Results   Component Value Date    CALCIUM 9.2 08/03/2021    PHOS 4.1 08/03/2021     Amylase:   Lab Results   Component Value Date    AMYLASE 243 03/17/2017     Lipase:   Lab Results   Component Value Date    LIPASE 28 07/14/2021     LIVER PROFILE:   No results for input(s): AST, ALT, LIPASE, BILIDIR, BILITOT, ALKPHOS in the last 72 hours. Invalid input(s): AMYLASE,  ALB    PT/INR: No results for input(s): PROTIME, INR in the last 72 hours. APTT: No results for input(s): APTT in the last 72 hours.     Cardiac Enzymes:  Lab Results   Component Value Date    CKTOTAL 143 03/15/2021    CKMB 2.2 03/15/2021    TROPONINI 0.02 03/15/2021       Hgb A1C: No results found for: LABA1C  No results found for: EAG  CLAY:   Lab Results   Component Value Date    CLAY NEGATIVE 07/15/2021     ESR:   Lab Results   Component Value Date    SEDRATE 94 (H) 07/15/2021     CRP:   Lab Results Component Value Date    CRP 16.4 (H) 07/17/2021     D Dimer:   Lab Results   Component Value Date    DDIMER 738 07/31/2021     Folate and B12:   Lab Results   Component Value Date    ANUAOWTS46 375 02/10/2020   ,   Lab Results   Component Value Date    FOLATE 10.5 02/10/2020       Lactic Acid:   Lab Results   Component Value Date    LACTA 0.7 11/11/2018     Ammonia:   Cortisol:  Thyroid Studies:  Lab Results   Component Value Date    TSH 4.260 (H) 07/15/2021    I2NZOUY 82.05 09/18/2015    B6LDGJL 7.8 03/20/2016     Results for Phil Lanes (MRN [id number]) as of 8/1/2021 07:24   Ref.  Range 7/31/2021 11:28   Influenza A by PCR Latest Ref Range: Not Detected  Not Detected   Influenza B by PCR Latest Ref Range: Not Detected  Not Detected   Adenovirus by PCR Latest Ref Range: Not Detected  Not Detected   Coronavirus 229E by PCR Latest Ref Range: Not Detected  Not Detected   Coronavirus HKU1 by PCR Latest Ref Range: Not Detected  Not Detected   Coronavirus NL63 by PCR Latest Ref Range: Not Detected  Not Detected   Coronavirus OC43 by PCR Latest Ref Range: Not Detected  Not Detected   Human Metapneumovirus by PCR Latest Ref Range: Not Detected  Not Detected   Human Rhinovirus/Enterovirus by PCR Latest Ref Range: Not Detected  Not Detected   Parainfluenza Virus 1 by PCR Latest Ref Range: Not Detected  Not Detected   Parainfluenza Virus 2 by PCR Latest Ref Range: Not Detected  Not Detected   Parainfluenza Virus 3 by PCR Latest Ref Range: Not Detected  Not Detected   Parainfluenza Virus 4 by PCR Latest Ref Range: Not Detected  Not Detected   Respiratory Syncytial Virus by PCR Latest Ref Range: Not Detected  Not Detected   Bordetella parapertussis by PCR Latest Ref Range: Not Detected  Not Detected   Chlamydophilia pneumoniae by PCR Latest Ref Range: Not Detected  Not Detected   Mycoplasma pneumoniae by PCR Latest Ref Range: Not Detected  Not Detected   SARS-CoV-2, PCR Latest Ref Range: Not Detected  Not Detected Bordetella pertussis by PCR Latest Ref Range: Not Detected  Not Detected     Results for He Krishnamurthy (MRN [id number]) as of 8/1/2021 07:24   Ref. Range 7/27/2021 12:05   RPR Latest Ref Range: Non-reactive  NON-REACTIVE   Hep A IgM Latest Ref Range: Non-Reactive  Non-Reactive   Hep B S Ag Interp Latest Ref Range: Non-Reactive  Non-Reactive   Hep C Ab Interp Latest Ref Range: Non-Reactive  Non-Reactive   Hep B Core Ab, IgM Latest Ref Range: Non-Reactive  Non-Reactive     CTA Chest 7/31/2021:  No evidence of pulmonary emboli.       Continued dense airspace consolidation of the right lower lobe.  New   extensive bilateral ground-glass opacities and patchy pulmonary airspace   opacities may be related to pulmonary edema and/or multifocal infectious   infiltrates.       Cardiomegaly.       Axillary adenopathy. ASSESSMENT:  1.) RLL Pneumonia   2.) Volume Overload/B/L GGO   3.) ESRD on HD  4.) Hypoxia     PLAN:  1.) Will plan for Bronchoscopy this afternoon with assessment for fungal infection, aspergillus. 2.) ID following - on Zosyn  3.) CLAY, HIV, Histone AB, Hepatitis panel - negative. Immunoglobulins - Alpha-1-Globulin slightly elevated at 0.5 - otherwise negative, check  ANCA, HP panel   4.) COVID negative   5.) D-Dimer 738, Ferritin 21,197,    6.) Add mucinex - just for a couple days to aid in expectorating secretions, will stop today  7.) DVT Prophylaxis  8.) KENDRA Caban - CNP  8/3/2021  8:43 AM    Attending Attestation Note:    Patient seen and examined with NP. I agree with above.     In addition, the following apply:    - bronchoscopy completed with lung washes to RLL  - supportive care to continue   - labs pending    Bowen Parada MD  8/3/2021  3:19 PM

## 2021-08-03 NOTE — ANESTHESIA PRE PROCEDURE
Department of Anesthesiology  Preprocedure Note       Name:  Yasmin Rowe   Age:  38 y.o.  :  1985                                          MRN:  [id number]         Date:  8/3/2021      Surgeon: Timmy Shannon):  Maryla Libman, MD    Procedure: BRONCHOSCOPY DIAGNOSTIC OR CELL [address] ONLY (N/A )    Medications prior to admission:   Prior to Admission medications    Medication Sig Start Date End Date Taking? Authorizing Provider   B Complex-C-Folic Acid (NEPHRO-JOHNNY) 0.8 MG TABS Take 1 tablet by mouth 21   Historical Provider, MD   hydrALAZINE (APRESOLINE) 50 MG tablet TAKE 1 TABLET BY MOUTH THREE TIMES DAILY 21   Historical Provider, MD   megestrol (MEGACE) 20 MG tablet  21   Historical Provider, MD   RA MELATONIN 3-2 MG TABS take 1 tablet by mouth every evening 21   Historical Provider, MD   labetalol (NORMODYNE) 200 MG tablet Take 1 tablet by mouth three times daily 21   Indy Parkinson MD   pantoprazole (PROTONIX) 40 MG tablet Take 1 tablet by mouth daily 21   Indy Parkinson MD   Methoxy PEG-Epoetin Beta (MIRCERA IJ) 75 mcg 21  Historical Provider, MD   lidocaine-prilocaine (EMLA) 2.5-2.5 % cream APPLY SMALL AMOUNT TO ACCESS SITE (AVF) 1 TO 2 HOURS BEFORE DIALYSIS.  COVER WITH OCCLUSIVE DRESSING (SARAN WRAP) 3/22/21   Historical Provider, MD   Cinacalcet HCl (SENSIPAR PO) Take 30 mg by mouth 3/12/21 3/11/22  Historical Provider, MD   diphenhydrAMINE (SOMINEX) 25 MG tablet Take 25 mg by mouth every 6 hours as needed    Historical Provider, MD   melatonin 3 MG TABS tablet Take 1 tablet by mouth nightly as needed (sleep) 3/30/21   Audria Brunner, MD   NIFEdipine (ADALAT CC) 60 MG extended release tablet Take 1 tablet by mouth daily 3/30/21   Audria Brunner, MD   losartan (COZAAR) 100 MG tablet Take 1 tablet by mouth every evening 3/30/21   Audria Brunner, MD   cloNIDine (CATAPRES) 0.3 MG tablet Take 1 tablet by mouth 3 times daily 3/30/21   Audria Brunner, MD sevelamer (Mary Smart) 800 MG tablet Take 2 tablets by mouth 3 times daily     Historical Provider, MD       Current medications:    No current facility-administered medications for this visit. No current outpatient medications on file.      Facility-Administered Medications Ordered in Other Visits   Medication Dose Route Frequency Provider Last Rate Last Admin    NIFEdipine (PROCARDIA XL) extended release tablet 90 mg  90 mg Oral Daily Lucas Mata MD   90 mg at 08/03/21 0839    hydrALAZINE (APRESOLINE) injection 10 mg  10 mg Intravenous Q4H PRN Floretta Hammans, MD        cinacalcet (SENSIPAR) tablet 30 mg  30 mg Oral Daily Amadeo Harrell MD   30 mg at 08/03/21 5930    cloNIDine (CATAPRES) tablet 0.3 mg  0.3 mg Oral TID Amadeo Harrell MD   0.3 mg at 08/03/21 7568    labetalol (NORMODYNE) tablet 200 mg  200 mg Oral TID Amadeo Harrell MD   200 mg at 08/03/21 8814    losartan (COZAAR) tablet 100 mg  100 mg Oral QPM Amadeo Harrell MD   100 mg at 08/02/21 1700    pantoprazole (PROTONIX) tablet 40 mg  40 mg Oral Daily Amadeo Harrell MD   40 mg at 08/03/21 0839    sevelamer (RENVELA) tablet 1,600 mg  1,600 mg Oral TID WC Amdaeo Harrell MD   1,600 mg at 08/03/21 6089    sodium chloride flush 0.9 % injection 5-40 mL  5-40 mL Intravenous 2 times per day Amadeo Harrell MD   10 mL at 08/03/21 0840    sodium chloride flush 0.9 % injection 5-40 mL  5-40 mL Intravenous PRN Amadeo Harrell MD   10 mL at 08/02/21 1700    0.9 % sodium chloride infusion  25 mL Intravenous PRN Amadeo Harrell MD        ondansetron (ZOFRAN-ODT) disintegrating tablet 4 mg  4 mg Oral Q8H PRN Amadeo Harrell MD        Or    ondansetron [facility]) injection 4 mg  4 mg Intravenous Q6H PRN Amadeo Harrell MD   4 mg at 08/03/21 0840    polyethylene glycol ([address] [place] [address]) packet (arteriovenous fistula) ([facility] 4/30/2018    Chronic kidney disease     CKD since early childhood per pt and on HD ~ 11 years    Depression     Encounter regarding vascular access for dialysis for ESRD ([facility] 4/30/2018    History of ruptured Berry aneurysm ([facility] 10/20/2015    Hypertension     on meds for ~ 11 years    Hypothyroidism     Intracranial hemorrhage (HCC)     was d/t ruptured aneurysm - pt underwent neurosurgery for clipping of the aneurysm    Neutropenia ([facility]     Noncompliance w/medication treatment due to intermit use of medication 11/3/2015    Pre-transplant evaluation for kidney transplant 4/5/2017       Past Surgical History:        Procedure Laterality Date    BRAIN ANEURYSM SURGERY Right 3-4 years ago    Intracranial aneurysm clipping surgery 3-4 years ago, after rupture of aneurysm causing ICH    DIALYSIS FISTULA CREATION Left 11 years ago    UPPER GASTROINTESTINAL ENDOSCOPY N/A 1/3/2019    EGD BIOPSY performed by Eliseo Mend[facility]ola MD at [facility] History:    Social History     Tobacco Use    Smoking status: Never Smoker    Smokeless tobacco: Never Used    Tobacco comment: only smokes marijuana daily   Substance Use Topics    Alcohol use: No                                Counseling given: Not Answered  Comment: only smokes marijuana daily      Vital Signs (Current): There were no vitals filed for this visit.                                            BP Readings from Last 3 Encounters:   08/03/21 (!) 148/99   07/27/21 (!) 180/109   07/21/21 (!) 162/104       NPO Status:  Liquids 1/2/19 @ 2300    Solids 1/2/19 @ 1800                                                                               BMI:   Wt Readings from Last 3 Encounters:   08/02/21 112 lb 3.4 oz (50.9 kg)   07/27/21 112 lb 12.8 oz (51.2 kg)   07/17/21 112 lb (50.8 kg)     There is no height or weight on file to calculate BMI.    CBC:   Lab Results   Component Value Date    WBC 5.8 08/03/2021    RBC 2.68 08/03/2021    HGB 7.9 08/03/2021    HCT 25.3 08/03/2021    MCV 94.4 08/03/2021    RDW 15.8 08/03/2021     08/03/2021       CMP:   Lab Results   Component Value Date     08/03/2021    K 4.3 08/03/2021    K 4.6 07/14/2021    CL 97 08/03/2021    CO2 28 08/03/2021    BUN 28 08/03/2021    CREATININE 5.2 08/03/2021    GFRAA 15 08/03/2021    LABGLOM 15 08/03/2021    GLUCOSE 98 08/03/2021    PROT 7.2 07/31/2021    CALCIUM 9.2 08/03/2021    BILITOT 0.6 07/31/2021    ALKPHOS 82 07/31/2021    AST 39 07/31/2021    ALT 32 07/31/2021       POC Tests: No results for input(s): POCGLU, POCNA, POCK, POCCL, POCBUN, POCHEMO, POCHCT in the last 72 hours. Coags:   Lab Results   Component Value Date    PROTIME 11.6 03/15/2021    INR 1.1 03/15/2021    APTT 36.9 02/10/2020       HCG (If Applicable): No results found for: PREGTESTUR, PREGSERUM, HCG, HCGQUANT     ABGs: No results found for: PHART, PO2ART, YRL4IEY, OOF5EOQ, BEART, P7ZHGZON     Type & Screen (If Applicable):  No results found for: LABABO, LABRH    EKG 1/1/19  Narrative     Normal sinus rhythm  Left atrial enlargement  Left ventricular hypertrophy  ST elevation, consider early repolarization, pericarditis, or injury  Abnormal ECG  When compared with ECG of 11-NOV-2018 12:34,  Significant changes have occurred     ECHO 10/20/15  Summary   Good technical quality study   Normal left ventricle size and systolic function   Ejection fraction is visually estimated at 65%.   Severe concentric left ventricular hypertrophy   No evidence of vegetation    US Abdomen 1/2/19  Impression   ALERT:  THIS IS AN ABNORMAL REPORT   1. Severely atrophic right kidney, not well characterized or   evaluated. Please see CT scan report 1/1/2019 for further details. 2. No choledocholithiasis or cholelithiasis is identified. 3. Right pleural effusion.         CXR 1/1/19  Impression       1. Cardiomegaly with pulmonary vascular congestion.       2.  Pneumonia or atelectasis developing at right lung base. Short-term   follow-up could be helpful for further evaluation.         CT Head WO Contrast 1/2/19  Impression   No acute intracranial abnormality.       This report has been electronically signed by Susana Collier MD.       CT Abdomen/Pelvis 1/2/19  Impression   1. Gallbladder wall thickening may be due to myositis or possibly    cholecystitis. Correlate clinically and consider gallbladder ultrasound for    further assessment. 2. Moderate pulmonary edema, mild cardiomegaly, small pericardial effusion and    small volume ascites are likely related to chronic renal disease.            This report has been electronically signed by Susana Collier MD.       Anesthesia Evaluation  Patient summary reviewed and Nursing notes reviewed no history of anesthetic complications:   Airway: Mallampati: III  TM distance: >3 FB   Neck ROM: full  Mouth opening: > = 3 FB Dental:      Comment: Pt states nothing Is loose or chipped at this time     Pulmonary:   (+) pneumonia (right lower lobe):  decreased breath sounds,      (-) not a current smoker                          ROS comment:      Cardiovascular:  Exercise tolerance: no interval change,   (+) hypertension:,     (-) valvular problems/murmurs, past MI, CAD, CABG/stent and dysrhythmias    ECG reviewed  Rhythm: regular    Echocardiogram reviewed         Beta Blocker:  Dose within 24 Hrs         Neuro/Psych:   (+) seizures:, psychiatric history:depression/anxiety  (depression and anxiety)   (-) TIA and CVA            ROS comment: Hx-intracranial hemorrhage d/t ruptured aneurysm-pt underwent neurosurgery for clipping of rhe aneurysm  GI/Hepatic/Renal:   (+) renal disease (HD MWF ): ESRD and dialysis,          ROS comment: Colitis  Cholecystitis .    Endo/Other:    (+) hypothyroidism, blood dyscrasia: anemia:., electrolyte abnormalities (elevated BUN/Cr), .          Pt had no PAT visit        ROS comment: Hx of marijuana use Abdominal:         (-) obese       Vascular:     - DVT and PE.       ROS comment: Pt has AVF Left Arm. Other Findings:             Anesthesia Plan      MAC     ASA 4       Induction: intravenous. DOS STAFF ADDENDUM:    Pt seen and examined, chart reviewed (including anesthesia, drug and allergy history). Anesthetic plan, risks, benefits, alternatives, and personnel involved discussed with patient. Patient verbalized an understanding and agrees to proceed. Plan discussed with care team members and agreed upon.     Beka Scott MD  Staff Anesthesiologist  3:04 PM      Beka Scott MD   8/3/2021

## 2021-08-04 ENCOUNTER — APPOINTMENT (OUTPATIENT)
Dept: GENERAL RADIOLOGY | Age: 36
DRG: 139 | End: 2021-08-04
Payer: MEDICAID

## 2021-08-04 LAB
ANCA IFA: NORMAL
ANION GAP SERPL CALCULATED.3IONS-SCNC: 9 MMOL/L (ref 7–16)
APPEARANCE FLUID: NORMAL
BASO FLUID: 0 %
BASOPHILS ABSOLUTE: 0.06 E9/L (ref 0–0.2)
BASOPHILS RELATIVE PERCENT: 1.1 % (ref 0–2)
BUN BLDV-MCNC: 46 MG/DL (ref 6–20)
CALCIUM SERPL-MCNC: 8.6 MG/DL (ref 8.6–10.2)
CELL COUNT FLUID TYPE: NORMAL
CHLORIDE BLD-SCNC: 96 MMOL/L (ref 98–107)
CO2: 29 MMOL/L (ref 22–29)
COLOR FLUID: NORMAL
CREAT SERPL-MCNC: 8.1 MG/DL (ref 0.7–1.2)
EKG ATRIAL RATE: 88 BPM
EKG P AXIS: 68 DEGREES
EKG P-R INTERVAL: 184 MS
EKG Q-T INTERVAL: 398 MS
EKG QRS DURATION: 84 MS
EKG QTC CALCULATION (BAZETT): 481 MS
EKG R AXIS: 53 DEGREES
EKG T AXIS: 90 DEGREES
EKG VENTRICULAR RATE: 88 BPM
EOSINOPHIL FLUID: 1 %
EOSINOPHILS ABSOLUTE: 0.39 E9/L (ref 0.05–0.5)
EOSINOPHILS RELATIVE PERCENT: 7 % (ref 0–6)
GFR AFRICAN AMERICAN: 9
GFR NON-AFRICAN AMERICAN: 9 ML/MIN/1.73
GLUCOSE BLD-MCNC: 94 MG/DL (ref 74–99)
GRAM STAIN ORDERABLE: NORMAL
HCT VFR BLD CALC: 23.1 % (ref 37–54)
HEMOGLOBIN: 7.4 G/DL (ref 12.5–16.5)
IGG 1: 862 MG/DL (ref 240–1118)
IGG 2: 264 MG/DL (ref 124–549)
IGG 3: 89 MG/DL (ref 21–134)
IGG 4: 17 MG/DL (ref 1–123)
IMMATURE GRANULOCYTES #: 0.03 E9/L
IMMATURE GRANULOCYTES %: 0.5 % (ref 0–5)
LYMPHOCYTES ABSOLUTE: 1.38 E9/L (ref 1.5–4)
LYMPHOCYTES RELATIVE PERCENT: 24.8 % (ref 20–42)
LYMPHOCYTES, BODY FLUID: 67 %
MAGNESIUM: 2.9 MG/DL (ref 1.6–2.6)
MCH RBC QN AUTO: 30 PG (ref 26–35)
MCHC RBC AUTO-ENTMCNC: 32 % (ref 32–34.5)
MCV RBC AUTO: 93.5 FL (ref 80–99.9)
MONOCYTE, FLUID: 31 %
MONOCYTES ABSOLUTE: 0.56 E9/L (ref 0.1–0.95)
MONOCYTES RELATIVE PERCENT: 10.1 % (ref 2–12)
NEUTROPHIL, FLUID: 1 %
NEUTROPHILS ABSOLUTE: 3.14 E9/L (ref 1.8–7.3)
NEUTROPHILS RELATIVE PERCENT: 56.5 % (ref 43–80)
NUCLEATED CELLS FLUID: 130 /UL
PDW BLD-RTO: 16.1 FL (ref 11.5–15)
PHOSPHORUS: 4.6 MG/DL (ref 2.5–4.5)
PLATELET # BLD: 208 E9/L (ref 130–450)
PMV BLD AUTO: 9.5 FL (ref 7–12)
PNEUMOCYSTIS DFA: NORMAL
POTASSIUM SERPL-SCNC: 4.2 MMOL/L (ref 3.5–5)
RBC # BLD: 2.47 E12/L (ref 3.8–5.8)
RBC FLUID: 2100 /UL
SODIUM BLD-SCNC: 134 MMOL/L (ref 132–146)
WBC # BLD: 5.6 E9/L (ref 4.5–11.5)

## 2021-08-04 PROCEDURE — 2060000000 HC ICU INTERMEDIATE R&B

## 2021-08-04 PROCEDURE — 36415 COLL VENOUS BLD VENIPUNCTURE: CPT

## 2021-08-04 PROCEDURE — 6370000000 HC RX 637 (ALT 250 FOR IP): Performed by: INTERNAL MEDICINE

## 2021-08-04 PROCEDURE — 84100 ASSAY OF PHOSPHORUS: CPT

## 2021-08-04 PROCEDURE — 85025 COMPLETE CBC W/AUTO DIFF WBC: CPT

## 2021-08-04 PROCEDURE — 74230 X-RAY XM SWLNG FUNCJ C+: CPT

## 2021-08-04 PROCEDURE — 92526 ORAL FUNCTION THERAPY: CPT

## 2021-08-04 PROCEDURE — 99231 SBSQ HOSP IP/OBS SF/LOW 25: CPT | Performed by: INTERNAL MEDICINE

## 2021-08-04 PROCEDURE — 6370000000 HC RX 637 (ALT 250 FOR IP): Performed by: STUDENT IN AN ORGANIZED HEALTH CARE EDUCATION/TRAINING PROGRAM

## 2021-08-04 PROCEDURE — 93010 ELECTROCARDIOGRAM REPORT: CPT | Performed by: INTERNAL MEDICINE

## 2021-08-04 PROCEDURE — 2580000003 HC RX 258: Performed by: INTERNAL MEDICINE

## 2021-08-04 PROCEDURE — 6360000002 HC RX W HCPCS: Performed by: INTERNAL MEDICINE

## 2021-08-04 PROCEDURE — 83735 ASSAY OF MAGNESIUM: CPT

## 2021-08-04 PROCEDURE — 92611 MOTION FLUOROSCOPY/SWALLOW: CPT

## 2021-08-04 PROCEDURE — 2580000003 HC RX 258: Performed by: SPECIALIST

## 2021-08-04 PROCEDURE — 90935 HEMODIALYSIS ONE EVALUATION: CPT

## 2021-08-04 PROCEDURE — 6360000002 HC RX W HCPCS: Performed by: SPECIALIST

## 2021-08-04 PROCEDURE — 80048 BASIC METABOLIC PNL TOTAL CA: CPT

## 2021-08-04 PROCEDURE — 2500000003 HC RX 250 WO HCPCS: Performed by: INTERNAL MEDICINE

## 2021-08-04 RX ORDER — METHYLPREDNISOLONE SODIUM SUCCINATE 40 MG/ML
40 INJECTION, POWDER, LYOPHILIZED, FOR SOLUTION INTRAMUSCULAR; INTRAVENOUS EVERY 12 HOURS
Status: DISCONTINUED | OUTPATIENT
Start: 2021-08-04 | End: 2021-08-05

## 2021-08-04 RX ADMIN — DIPHENHYDRAMINE HYDROCHLORIDE 25 MG: 25 TABLET ORAL at 02:11

## 2021-08-04 RX ADMIN — NIFEDIPINE 90 MG: 60 TABLET, FILM COATED, EXTENDED RELEASE ORAL at 11:42

## 2021-08-04 RX ADMIN — METHYLPREDNISOLONE SODIUM SUCCINATE 40 MG: 40 INJECTION, POWDER, FOR SOLUTION INTRAMUSCULAR; INTRAVENOUS at 13:02

## 2021-08-04 RX ADMIN — DIPHENHYDRAMINE HYDROCHLORIDE 25 MG: 25 TABLET ORAL at 11:00

## 2021-08-04 RX ADMIN — Medication 10 ML: at 20:03

## 2021-08-04 RX ADMIN — PIPERACILLIN AND TAZOBACTAM 3375 MG: 3; .375 INJECTION, POWDER, LYOPHILIZED, FOR SOLUTION INTRAVENOUS at 22:48

## 2021-08-04 RX ADMIN — SEVELAMER CARBONATE 1600 MG: 800 TABLET, FILM COATED ORAL at 16:56

## 2021-08-04 RX ADMIN — LABETALOL HYDROCHLORIDE 200 MG: 200 TABLET, FILM COATED ORAL at 20:03

## 2021-08-04 RX ADMIN — SODIUM CHLORIDE: 9 INJECTION, SOLUTION INTRAVENOUS at 16:56

## 2021-08-04 RX ADMIN — Medication 10 ML: at 08:51

## 2021-08-04 RX ADMIN — CLONIDINE HYDROCHLORIDE 0.3 MG: 0.1 TABLET ORAL at 13:01

## 2021-08-04 RX ADMIN — LABETALOL HYDROCHLORIDE 10 MG: 5 INJECTION INTRAVENOUS at 21:09

## 2021-08-04 RX ADMIN — LOSARTAN POTASSIUM 100 MG: 50 TABLET, FILM COATED ORAL at 16:54

## 2021-08-04 RX ADMIN — CLONIDINE HYDROCHLORIDE 0.3 MG: 0.1 TABLET ORAL at 20:03

## 2021-08-04 RX ADMIN — LABETALOL HYDROCHLORIDE 200 MG: 200 TABLET, FILM COATED ORAL at 13:02

## 2021-08-04 RX ADMIN — SEVELAMER CARBONATE 1600 MG: 800 TABLET, FILM COATED ORAL at 11:42

## 2021-08-04 RX ADMIN — LABETALOL HYDROCHLORIDE 10 MG: 5 INJECTION INTRAVENOUS at 16:57

## 2021-08-04 RX ADMIN — PANTOPRAZOLE SODIUM 40 MG: 40 TABLET, DELAYED RELEASE ORAL at 11:42

## 2021-08-04 RX ADMIN — HEPARIN SODIUM 5000 UNITS: 10000 INJECTION INTRAVENOUS; SUBCUTANEOUS at 13:02

## 2021-08-04 RX ADMIN — PIPERACILLIN AND TAZOBACTAM 3375 MG: 3; .375 INJECTION, POWDER, LYOPHILIZED, FOR SOLUTION INTRAVENOUS at 11:43

## 2021-08-04 RX ADMIN — METHYLPREDNISOLONE SODIUM SUCCINATE 40 MG: 40 INJECTION, POWDER, FOR SOLUTION INTRAMUSCULAR; INTRAVENOUS at 22:48

## 2021-08-04 RX ADMIN — PIPERACILLIN AND TAZOBACTAM 3375 MG: 3; .375 INJECTION, POWDER, LYOPHILIZED, FOR SOLUTION INTRAVENOUS at 02:10

## 2021-08-04 RX ADMIN — HEPARIN SODIUM 5000 UNITS: 10000 INJECTION INTRAVENOUS; SUBCUTANEOUS at 20:04

## 2021-08-04 RX ADMIN — DIPHENHYDRAMINE HYDROCHLORIDE 25 MG: 25 TABLET ORAL at 20:08

## 2021-08-04 RX ADMIN — HYDRALAZINE HYDROCHLORIDE 10 MG: 20 INJECTION INTRAMUSCULAR; INTRAVENOUS at 22:48

## 2021-08-04 RX ADMIN — CINACALCET HYDROCHLORIDE 30 MG: 30 TABLET, FILM COATED ORAL at 11:40

## 2021-08-04 ASSESSMENT — PAIN SCALES - GENERAL
PAINLEVEL_OUTOF10: 0

## 2021-08-04 NOTE — PROGRESS NOTES
[id number] [address] PROGRESS NOTE    Patient: Roberto Ramirez  MRN: [id number]  : 1985    Encounter Date: 2021  Encounter Time: 7:19 AM     Date of Admission: .2021  3:02 AM    Consulting Physician:  Primary Care Physician:      [address] [place], [place]     [phone]    PROBLEM LIST:  Patient Active Problem List   Diagnosis    ESRD [facility] ([facility]    H/O intracranial hemorrhage and aneurysm clipping on the right side    Anemia, chronic disease    Anxiety    Noncompliance    [facility]arterioven[facility] fistula) ([facility])    Severe protein-calorie malnutrition ([address]    Venous hypertension, chronic, left    Iron deficiency    Secondary hyperparathyroidism of renal origin ([address]    Essential hypertension    Chronic heart failure with preserved ejection fraction ([address]    Acute respiratory failure with hypoxia ([address]     SUBJECTIVE:  Seen in dialysis  S/P bronch yesterday - procedure went well  Feels better today - breathing improved  Still with cough with thick phlegm and swallows it  Not sure if mucinex helped     PAST MEDICAL HISTORY:     Past Medical History:   Diagnosis Date    (HFpEF) heart failure with preserved ejection fraction ([address]     stage III    Anxiety 2015    [facility]F (arteriovenous fistula) ([facility] 2018    Chronic kidney disease     CKD since early childhood per pt and on HD ~ 11 years    Depression     Encounter regarding vascular access for dialysis for ESRD ([facility] 2018    History of ruptured Berry aneurysm ([facility] 10/20/2015    Hypertension     on meds for ~ 11 years    Hypothyroidism     Intracranial hemorrhage (HCC)     was d/t ruptured aneurysm - pt underwent neurosurgery for clipping of the aneurysm    Neutropenia ([address]     Noncompliance w/medication treatment due to intermit use of medication 11/3/2015    Pre-transplant evaluation for kidney transplant 2017     CURRENT MEDICATIONS:  Current Facility-Administered Medications: NIFEdipine (PROCARDIA XL) extended release tablet 90 mg, 90 mg, Oral, Daily  hydrALAZINE (APRESOLINE) injection 10 mg, 10 mg, Intravenous, Q4H PRN  cinacalcet (SENSIPAR) tablet 30 mg, 30 mg, Oral, Daily  cloNIDine (CATAPRES) tablet 0.3 mg, 0.3 mg, Oral, TID  labetalol (NORMODYNE) tablet 200 mg, 200 mg, Oral, TID  losartan (COZAAR) tablet 100 mg, 100 mg, Oral, QPM  pantoprazole (PROTONIX) tablet 40 mg, 40 mg, Oral, Daily  sevelamer (RENVELA) tablet 1,600 mg, 1,600 mg, Oral, TID WC  sodium chloride flush 0.9 % injection 5-40 mL, 5-40 mL, Intravenous, 2 times per day  sodium chloride flush 0.9 % injection 5-40 mL, 5-40 mL, Intravenous, PRN  0.9 % sodium chloride infusion, 25 mL, Intravenous, PRN  ondansetron (ZOFRAN-ODT) disintegrating tablet 4 mg, 4 mg, Oral, Q8H PRN **OR** ondansetron (ZOFRAN) injection 4 mg, 4 mg, Intravenous, Q6H PRN  polyethylene glycol (GLYCOLAX) packet 17 g, 17 g, Oral, Daily PRN  acetaminophen (TYLENOL) tablet 650 mg, 650 mg, Oral, Q6H PRN **OR** acetaminophen (TYLENOL) suppository 650 mg, 650 mg, Rectal, Q6H PRN  heparin (porcine) injection 5,000 Units, 5,000 Units, Subcutaneous, 3 times per day  labetalol (NORMODYNE;TRANDATE) injection 10 mg, 10 mg, Intravenous, Q4H PRN  piperacillin-tazobactam (ZOSYN) 3,375 mg in dextrose 5 % 100 mL IVPB extended infusion (mini-bag), 3,375 mg, Intravenous, Q12H **AND** 0.9 % sodium chloride infusion admixture, , Intravenous, Q12H  diphenhydrAMINE (BENADRYL) tablet 25 mg, 25 mg, Oral, Q8H PRN    IV MEDICATIONS:   sodium chloride         ALLERGIES:  Allergies   Allergen Reactions    Tylenol [Acetaminophen] Itching    Levofloxacin Itching     No rash       PHYSICAL EXAMINATION:     VITAL SIGNS:  /87   Pulse 84   Temp 97.6 °F (36.4 °C) (Temporal)   Resp 20   Ht 5' 6\" (1.676 m)   Wt 112 lb 3.4 oz (50.9 kg)   SpO2 96%   BMI 18.11 kg/m²   Wt Readings from Last 3 Encounters:   08/02/21 112 lb 3.4 oz (50.9 kg) 21 112 lb 12.8 oz (51.2 kg)   21 112 lb (50.8 kg)     Temp Readings from Last 3 Encounters:   21 97.6 °F (36.4 °C) (Temporal)   21 97.5 °F (36.4 °C)   21 98.4 °F (36.9 °C) (Oral)     TMAX:  BP Readings from Last 3 Encounters:   21 137/87   21 (!) 153/122   21 (!) 180/109     Pulse Readings from Last 3 Encounters:   21 84   21 69   21 88       CURRENT PULSE OXIMETRY: SpO2: 96 %  24HR PULSE OXIMETRY RANGE: SpO2  Av.5 %  Min: 87 %  Max: 100 %            Vent Information  Skin Assessment: Clean, dry, & intact  SpO2: 96 %  Additional Respiratory  Assessments  Pulse: 84  Resp: 20  SpO2: 96 %  ABG:  No results for input(s): PH, PO2, PCO2, HCO3, BE, O2SAT, METHB, O2HB, COHB, O2CON, HHB, THB in the last 72 hours. ________________________________________________________________________    INTAKE/OUTPUTS:  I/O last 3 completed shifts: In: 100 [I.V.:100]  Out: -     Intake/Output Summary (Last 24 hours) at 2021 0719  Last data filed at 8/3/2021 1518  Gross per 24 hour   Intake 100 ml   Output --   Net 100 ml     General Appearance: A&O, well-developed and well-nourished, in no acute distress   Eyes: PERRLA, extraocular eye movements intact, conjunctivae normal and sclera anicteric   Neck: neck supple and non tender without mass, no thyromegaly  Pulmonary/Chest: decreased breath sounds, no accessory muscles of inspiration, crackles at lung bases R>L . No wheezing noted. Cardiovascular: normal rate, regular rhythm, normal S1 and S2, no murmurs, rubs, clicks or gallops, distal pulses intact and no JVD   Abdomen: soft, non-tender, non-distended, normal bowel sounds, no masses or organomegaly   Extremities: no cyanosis, no clubbing, no edema.  LUE with fistula +bruit/thrill  Musculoskeletal: normal range of motion, no joint swelling, deformity or tenderness   Neurologic: reflexes normal and symmetric, no cranial nerve deficit noted    LABS/IMAGING:    CBC:  Lab Results   Component Value Date    WBC 5.6 08/04/2021    HGB 7.4 (L) 08/04/2021    HCT 23.1 (L) 08/04/2021    MCV 93.5 08/04/2021     08/04/2021    LYMPHOPCT 24.8 08/04/2021    RBC 2.47 (L) 08/04/2021    MCH 30.0 08/04/2021    MCHC 32.0 08/04/2021    RDW 16.1 (H) 08/04/2021    NEUTOPHILPCT 56.5 08/04/2021    MONOPCT 10.1 08/04/2021    BASOPCT 1.1 08/04/2021    NEUTROABS 3.14 08/04/2021    LYMPHSABS 1.38 (L) 08/04/2021    MONOSABS 0.56 08/04/2021    EOSABS 0.39 08/04/2021    BASOSABS 0.06 08/04/2021       Recent Labs     08/04/21  0637 08/03/21  0628 07/31/21  0340   WBC 5.6 5.8 7.1   HGB 7.4* 7.9* 8.5*   HCT 23.1* 25.3* 28.0*   MCV 93.5 94.4 95.6    205 273       BMP:   Recent Labs     08/01/21  0809 08/02/21  0606 08/03/21  0628    136 138   K 4.6 4.1 4.3   CL 99 96* 97*   CO2 30* 28 28   PHOS 3.2 4.1 4.1   BUN 29* 50* 28*   CREATININE 4.9* 8.2* 5.2*       MG:   Lab Results   Component Value Date    MG 2.6 08/03/2021     Ca/Phos:   Lab Results   Component Value Date    CALCIUM 9.2 08/03/2021    PHOS 4.1 08/03/2021     Amylase:   Lab Results   Component Value Date    AMYLASE 243 03/17/2017     Lipase:   Lab Results   Component Value Date    LIPASE 28 07/14/2021     LIVER PROFILE:   No results for input(s): AST, ALT, LIPASE, BILIDIR, BILITOT, ALKPHOS in the last 72 hours. Invalid input(s): AMYLASE,  ALB    PT/INR: No results for input(s): PROTIME, INR in the last 72 hours. APTT: No results for input(s): APTT in the last 72 hours.     Cardiac Enzymes:  Lab Results   Component Value Date    CKTOTAL 143 03/15/2021    CKMB 2.2 03/15/2021    TROPONINI 0.02 03/15/2021       Hgb A1C: No results found for: LABA1C  No results found for: EAG  CLAY:   Lab Results   Component Value Date    CLAY NEGATIVE 07/15/2021     ESR:   Lab Results   Component Value Date    SEDRATE 94 (H) 07/15/2021     CRP:   Lab Results   Component Value Date    CRP 16.4 (H) 07/17/2021     D Dimer: Lab Results   Component Value Date    DDIMER 881 07/31/2021     Folate and B12:   Lab Results   Component Value Date    SYPMAXAT63 020 02/10/2020   ,   Lab Results   Component Value Date    FOLATE 10.5 02/10/2020       Lactic Acid:   Lab Results   Component Value Date    LACTA 0.7 11/11/2018     Ammonia:   Cortisol:  Thyroid Studies:  Lab Results   Component Value Date    TSH 4.260 (H) 07/15/2021    P3IAWMT 82.05 09/18/2015    T1BSOYH 7.8 03/20/2016     Results for Morgan Alfaro (MRN [id number]) as of 8/1/2021 07:24   Ref.  Range 7/31/2021 11:28   Influenza A by PCR Latest Ref Range: Not Detected  Not Detected   Influenza B by PCR Latest Ref Range: Not Detected  Not Detected   Adenovirus by PCR Latest Ref Range: Not Detected  Not Detected   Coronavirus 229E by PCR Latest Ref Range: Not Detected  Not Detected   Coronavirus HKU1 by PCR Latest Ref Range: Not Detected  Not Detected   Coronavirus NL63 by PCR Latest Ref Range: Not Detected  Not Detected   Coronavirus OC43 by PCR Latest Ref Range: Not Detected  Not Detected   Human Metapneumovirus by PCR Latest Ref Range: Not Detected  Not Detected   Human Rhinovirus/Enterovirus by PCR Latest Ref Range: Not Detected  Not Detected   Parainfluenza Virus 1 by PCR Latest Ref Range: Not Detected  Not Detected   Parainfluenza Virus 2 by PCR Latest Ref Range: Not Detected  Not Detected   Parainfluenza Virus 3 by PCR Latest Ref Range: Not Detected  Not Detected   Parainfluenza Virus 4 by PCR Latest Ref Range: Not Detected  Not Detected   Respiratory Syncytial Virus by PCR Latest Ref Range: Not Detected  Not Detected   Bordetella parapertussis by PCR Latest Ref Range: Not Detected  Not Detected   Chlamydophilia pneumoniae by PCR Latest Ref Range: Not Detected  Not Detected   Mycoplasma pneumoniae by PCR Latest Ref Range: Not Detected  Not Detected   SARS-CoV-2, PCR Latest Ref Range: Not Detected  Not Detected   Bordetella pertussis by PCR Latest Ref Range: Not Detected  Not Detected

## 2021-08-04 NOTE — CARE COORDINATION
Pt passed swallow test. Await PT eval for recommendations. Pt goes to outpt  [facility] M-W-F 7:30am, independent taxi transports. Will need transport home at discharge, will call insurance for transport. Shira Bridges, MSW, LSW

## 2021-08-04 NOTE — FLOWSHEET NOTE
Pt ran 4 hours; 3251 bath; 2.5 L removed; stable/tolerated well; denies c/o. Needles removed & patent hemostasis achieved < 10 min, DSD applied positive T/B post Tx. Good Access flow no issues achieving prescribed BFR. Next Tx scheduled Friday 8/6.     08/04/21 1042   Vital Signs   BP (!) 183/116   Temp 97.5 °F (36.4 °C)   Pulse 83   Resp 15   Weight 108 lb 7.5 oz (49.2 kg)   Weight Method Bed scale   Percent Weight Change -4.84   Pain Assessment   Pain Assessment 0-10   Pain Level 0   Post-Hemodialysis Assessment   Post-Treatment Procedures Blood returned; Access bleeding time < 10 minutes   Machine Disinfection Process Exterior Machine Disinfection   Rinseback Volume (ml) 300 ml   Total Liters Processed (l/min) 90.1 l/min   Dialyzer Clearance Lightly streaked   Duration of Treatment (minutes) 240 minutes   Hemodialysis Intake (ml) 300 ml   Hemodialysis Output (ml) 2800 ml   NET Removed (ml) 2500 ml   Tolerated Treatment Good   Patient Response to Treatment \"B\" Profile for duration   Bilateral Breath Sounds Clear   Edema None   Physician Notified?  Yes

## 2021-08-04 NOTE — PROGRESS NOTES
SPEECH/LANGUAGE PATHOLOGY  VIDEOFLUOROSCOPIC STUDY OF SWALLOWING (MBS)   and PLAN OF CARE    PATIENT NAME:  Brant Rudolph  (male)     MRN:  [id number]    :  1985  (38 y.o.)  STATUS:  Inpatient: Room 7419/7419-A    TODAY'S DATE:  2021  REFERRING PROVIDER:   Dr. Sandra Kaiser: SLP video swallow  Date of order:  8-3-21   REASON FOR REFERRAL: pneumonia   EVALUATING THERAPIST: Prudence Brito, SLP      RESULTS:      DYSPHAGIA DIAGNOSIS:  normal swallow function     DIET RECOMMENDATIONS:  Regular consistency solids with  thin liquids    FEEDING RECOMMENDATIONS:    Assistance level:  No assistance needed     Compensatory strategies recommended: No strategies are recommended at this time     Discussed recommendations with nursing and/or faxed report to referring provider: Yes    SPEECH THERAPY  PLAN OF CARE   The dysphagia POC is established based on physician order and dysphagia diagnosis    Dysphagia therapy is not recommended       Conditions Requiring Skilled Therapeutic Intervention for dysphagia:    not applicable    SPECIFIC DYSPHAGIA INTERVENTIONS TO INCLUDE:     Not applicable    Specific instructions for next treatment:  not applicable   Treatment Goals:    Short Term Goals:  Not applicable no therapy warranted     Long Term Goals:   Not applicable no therapy warranted      Patient/family Goal:    not applicable                    ADMITTING DIAGNOSIS: Acute respiratory failure with hypoxia ([facility] [J96.01]     VISIT DIAGNOSIS:   Visit Diagnoses       Codes    Chest pain, unspecified type     R07.9              PATIENT REPORT/COMPLAINT: none reported    PRIOR LEVEL OF SWALLOW FUNCTION:    Past History of Dysphagia?:  none reported    Home diet: Regular consistency solids with  thin liquids    PROCEDURE:  Consistencies Administered During the Evaluation   Liquids: thin liquid and nectar thick liquid   Solids:  pureed foods and solid foods      Method of Intake:   cup, straw, spoon  Self fed, Fed by clinician      Position:   Seated, upright, Lateral plane    INSTRUMENTAL ASSESSMENT:    ORAL PREPARATION PHASE:    Within functional limits    ORAL PHASE:   Within functional limits    PHARYNGEAL PHASE:     ONSET TIME       Onset time of the pharyngeal swallow was adequate       PHARYNGEAL RESIDUALS        Vallecula/Pharyngeal Wall           No significant residuals were noted in the vallecula      Pyriform Sinuses      No significant residuals were noted in the pyriform sinuses     LARYNGEAL PENETRATION   Laryngeal penetration was not present during this evaluation    ASPIRATION  Aspiration was not present during this evaluation    PENETRATION-ASPIRATION SCALE (PAS):  THIN 1 = Material does not enter the airway  MILDLY THICK 1 = Material does not enter the airway  MODERATELY THICK item not administered  PUREE 1 = Material does not enter the airway  HARD SOLID 1 = Material does not enter the airway       COMPENSATORY STRATEGIES    Compensatory strategies were not attempted      STRUCTURAL/FUNCTIONAL ANOMALIES   No structural/functional anomalies were noted    CERVICAL ESOPHAGEAL STAGE :     The cervical esophagus appeared adequate          ___________    Cognition:   Within functional limits for this exam    Oral Peripheral Examination   Adequate lingual/labial strength     Current Respiratory Status   room air     Parameters of Speech Production  Respiration:  Adequate for speech production  Quality:   Within functional limits  Intensity: Within functional limits    Pain: No pain reported. EDUCATION:   The Speech Language Pathologist (SLP) completed education regarding results of evaluation and that intervention is warranted at this time. Learner: Patient  Education: Reviewed results and recommendations of this evaluation  Evaluation of Education:  Mitesh Gurrola understanding    This plan may be re-evaluated and revised as warranted.         Evaluation Time includes thorough review of current medical

## 2021-08-04 NOTE — PROGRESS NOTES
Physical Therapy    Facility/Department: [facility]  Initial Assessment    NAME: Calvin Pike  : 1985  MRN: [id number]    Date of Service: 2021    Physical therapy orders received/treatment attempted and chart review completed. Patient off the unit and not able to be seen. Will attempt at a later time/date as able. Thank you for the opportunity to assist in the care of this patient.     Lesly Barboza, PT, DPT  License [id number]

## 2021-08-04 NOTE — PROGRESS NOTES
[facility]  Internal Medicine Residency Program  Progress Note - House Team     Patient:  Giuseppe Michelle 38 y.o. male MRN: [id number]     Date of Service: 8/4/2021     CC: SOB  Overnight events: No significant events    Subjective     Patient was seen and examined this morning at bedside in no acute distress. Overnight no significant events. Pt seen during dialysis this morning. Pt denies any chest pain or SOB at this time. Admits to feeling much better compared to initial admission. Objective     Physical Exam:  · Vitals: BP (!) 143/91   Pulse 86   Temp 97.6 °F (36.4 °C) (Temporal)   Resp 20   Ht 5' 6\" (1.676 m)   Wt 112 lb 3.4 oz (50.9 kg)   SpO2 96%   BMI 18.11 kg/m²     · I & O - 24hr: No intake/output data recorded. · General Appearance: alert, appears stated age and cooperative  · HEENT:  Head: Normocephalic, no lesions, without obvious abnormality. · Neck: no adenopathy, no carotid bruit, no JVD, supple, symmetrical, trachea midline and thyroid not enlarged, symmetric, no tenderness/mass/nodules  · Lung: clear to auscultation bilaterally  · Heart: regular rate and rhythm, S1, S2 normal, no murmur, click, rub or gallop  · Abdomen: soft, non-tender; bowel sounds normal; no masses,  no organomegaly  · Extremities:  extremities normal, atraumatic, no cyanosis or edema  · Musculokeletal: No joint swelling, no muscle tenderness. ROM normal in all joints of extremities.    · Neurologic: Mental status: Alert, oriented, thought content appropriate  Subject  Pertinent Labs & Imaging Studies   denice  CBC:   Lab Results   Component Value Date    WBC 5.6 08/04/2021    RBC 2.47 08/04/2021    HGB 7.4 08/04/2021    HCT 23.1 08/04/2021    MCV 93.5 08/04/2021    MCH 30.0 08/04/2021    MCHC 32.0 08/04/2021    RDW 16.1 08/04/2021     08/04/2021    MPV 9.5 08/04/2021     CMP:    Lab Results   Component Value Date     08/03/2021    K 4.3 08/03/2021    K 4.6 07/14/2021    CL 97 08/03/2021    CO2 28 08/03/2021    BUN 28 08/03/2021    CREATININE 5.2 08/03/2021    GFRAA 15 08/03/2021    LABGLOM 15 08/03/2021    GLUCOSE 98 08/03/2021    PROT 7.2 07/31/2021    LABALBU 3.6 07/31/2021    CALCIUM 9.2 08/03/2021    BILITOT 0.6 07/31/2021    ALKPHOS 82 07/31/2021    AST 39 07/31/2021    ALT 32 07/31/2021     Magnesium:    Lab Results   Component Value Date    MG 2.6 08/03/2021     Phosphorus:    Lab Results   Component Value Date    PHOS 4.1 08/03/2021       XR CHEST PORTABLE   Final Result   No evidence for pneumothorax. Right lung infiltrates. Cardiomegaly. CTA PULMONARY W CONTRAST   Final Result   No evidence of pulmonary emboli. Continued dense airspace consolidation of the right lower lobe. New   extensive bilateral ground-glass opacities and patchy pulmonary airspace   opacities may be related to pulmonary edema and/or multifocal infectious   infiltrates. Cardiomegaly. Axillary adenopathy. XR CHEST (2 VW)   Final Result   Increased patchy airspace opacities throughout both lungs compared with July   15, 2021. Findings are most suspicious for multifocal pneumonia although   pulmonary edema could have a similar appearance. Fluoroscopy modified barium swallow with video    (Results Pending)        Resident's Assessment and Plan   Jeramie Guillermo is a 37 yo M w/ a PMHx of HTN, ESRD w/ dialysis MWF, HFpEF (EF 60-65%), hypothyroidism, aneurysm of AV fistula in 2016) who came in with left lateral pleuritic chest pain and SOB x2 days. He was recently admitted at [facility] for sepsis 2/2 HCAP, treat with doxycycline and cefuroxime. Continued to complain of SOB and arrived to the ED. CTA Chest showed consolidation in the RLL concerning for possible PNA. Assessment and Plan:     #. Acute hypoxic respiratory failure likely 2/2 consolidation 2/2 HCAP  ? Afebrile, WBC 5.6, dyspnea on exertion, orthopnea, pro-BNP > 7000  ? Respiratory panel negative  ?  ID consulted: RLL pneumonia, started on Zosyn (D5)  ? Bronchoscopy done (8/3/21). with assessment for fungal infx, aspergillus  ? F/u Bronchoscopy results  ? CLAY, HIV, Histone AB, Hepatitis panel - negative. ? Immunoglobulins (8/1/21): IgG all WNL. Alpha-1-Globulin slightly elevated at 0.5 - otherwise negative  ? CXR (8/4/21): No evidence of pneumothorax. Presence of R. Lung infiltrates. Presence of cardiomegaly    #. ESRD on hemodialysis  ? HD every MWF  ? HD done today  ? Net I/O since admission: -1.1L  ? Nephro consulted: target increased ultrafiltration with HD  ? #. Pleuritic chest pain 2/2 RLL pneumonia  ? CTA (7/31/21): showerd dense air airspace consolidation in the RLL that has worsened since last CT and bilateral ground glass opacities and patchy pulmonary opacities  ? resp panel negative  ? Tylenol 650mg PRN for pain     #. Resistant hypertension  ? Pt is in dialysis today  ? BP prior to dialysis today: 163/102  ? Continue to monitor BP  ? Continue Clonidine 0.3 TID, Losartan 100mg qHS, Nifedipine 90mg daily, and Labatelol 200mg TID  ? Continue Labetalol 10mg IV q4 PRN     #. History of HFpEF (EF 60-65%)  #.  History of aneurysm of AVF (2016)    PT/OT evaluation: none  DVT prophylaxis/ GI prophylaxis: Heparin/Protonix  Disposition: continue current care    Cody Cabello MD, PGY-1  Attending physician: Dr. Wanda Moncada

## 2021-08-04 NOTE — PROGRESS NOTES
Nephrology Progress Note  Patient's Name: Christine Coleman  8:28 AM  8/4/2021        Reason for Consult:  ESRD    Requesting Physician:  Antonio Rebolledo DO    Chief Complaint:  SOB, chest pain    History Obtained From:  Patient, EHR    History of Present Ilness:    Christine Coleman is a 38 y.o. male with a history of ESRD, HD dependent MWF, hypertension, hypothyroidism and HFpEF. Patient presented to ED with complaints of left-sided chest pain and shortness of breath of several days duration. He said the pain was pleuritic. This was associated with exertional dyspnea. He could not go up the flights of stairs at his home without stopping to catch his breath. In the ED vital signs were significant for markedly elevated BP of 201/138 pulse 110 and temperature of 98.6. Lab data was significant for a WBC of 7.1, hemoglobin 8.5 and platelet count 639,213. His CHEM profile was consistent with his ESRD status. CTA of the chest demonstrated no pulmonary embolism. However it did show dense opacity in the right lower lobe and some bilateral groundglass opacity. Patient received 10 mg of labetalol IV, Toradol, Benadryl and Zofran. Patient was recently treated at [facility] for pneumonia and discharged on a combination of doxycycline and cefuroxime. Covid was negative at the time. He is now admitted for further management.     Subjective    8/1: States breathing is less labored  8/2: Patient seen on hemodialysis - complaining of nausea - blood pressure above goal  8/3: Patient examined sitting up in bed in no acute distress -does complain of indigestion as he is NPO for bronchoscopy later this afternoon -blood pressure with improving control  8/4: seen on hemodialysis - tolerating well - s/p bronch on 8/3/21 (cultures pending)      Allergies:  Tylenol [acetaminophen] and Levofloxacin    Current Medications:    NIFEdipine (PROCARDIA XL) extended release tablet 90 mg, Daily  hydrALAZINE (APRESOLINE) injection 10 mg, Q4H CL 97 (L) 08/03/2021    CO2 28 08/03/2021    CO2 28 08/02/2021    CO2 30 (H) 08/01/2021    CREATININE 5.2 (H) 08/03/2021    CREATININE 8.2 (HH) 08/02/2021    CREATININE 4.9 (H) 08/01/2021    BUN 28 (H) 08/03/2021    BUN 50 (H) 08/02/2021    BUN 29 (H) 08/01/2021    GLUCOSE 98 08/03/2021    GLUCOSE 90 08/02/2021    GLUCOSE 112 (H) 08/01/2021    PHOS 4.1 08/03/2021    PHOS 4.1 08/02/2021    PHOS 3.2 08/01/2021    WBC 5.6 08/04/2021    WBC 5.8 08/03/2021    WBC 7.1 07/31/2021    HGB 7.4 (L) 08/04/2021    HGB 7.9 (L) 08/03/2021    HGB 8.5 (L) 07/31/2021    HCT 23.1 (L) 08/04/2021    HCT 25.3 (L) 08/03/2021    HCT 28.0 (L) 07/31/2021    MCV 93.5 08/04/2021     08/04/2021         Imaging:  XR CHEST (2 VW)    Result Date: 7/31/2021  EXAMINATION: TWO XRAY VIEWS OF THE CHEST 7/31/2021 4:07 am COMPARISON: July 15, 2021 HISTORY: ORDERING SYSTEM PROVIDED HISTORY: chest pain TECHNOLOGIST PROVIDED HISTORY: Reason for exam:->chest pain What reading provider will be dictating this exam?->CRC FINDINGS: There are patchy bilateral airspace opacities, greatest in the right lower lobe. These have increased since July 15, 2021. No evidence of a sizable pleural effusion. No pneumothorax. Heart size is unable to be accurately assessed on this single portable view of the chest, but appears to be stable. No acute osseous abnormality. Increased patchy airspace opacities throughout both lungs compared with July 15, 2021. Findings are most suspicious for multifocal pneumonia although pulmonary edema could have a similar appearance. CTA PULMONARY W CONTRAST    Result Date: 7/31/2021  EXAMINATION: CTA OF THE CHEST 7/31/2021 6:42 am TECHNIQUE: CTA of the chest was performed after the administration of intravenous contrast.  Multiplanar reformatted images are provided for review. MIP images are provided for review.  Dose modulation, iterative reconstruction, and/or weight based adjustment of the mA/kV was utilized to reduce the radiation dose to as low as reasonably achievable. COMPARISON: 07/14/2021 HISTORY: ORDERING SYSTEM PROVIDED HISTORY: elevated dimer TECHNOLOGIST PROVIDED HISTORY: Reason for exam:->elevated dimer Decision Support Exception - unselect if not a suspected or confirmed emergency medical condition->Emergency Medical Condition (MA) What reading provider will be dictating this exam?->CRC FINDINGS: Pulmonary Arteries: Pulmonary arteries are adequately opacified for evaluation. No evidence of intraluminal filling defect to suggest pulmonary embolism. Main pulmonary artery is normal in caliber. Mediastinum: No aortic aneurysm or dissection. There is cardiomegaly and a small pericardial effusion. Borderline sized subcarinal lymph node is noted. 13 mm left paratracheal node. . Lungs/pleura: There is continued dense airspace consolidation in the right lower lobe with air bronchograms. There are new diffuse bilateral ground-glass airspace opacities and bilateral multifocal small areas of airspace consolidation. No pneumothorax. Small right pleural effusion. Upper Abdomen: The kidneys appear atrophic. Soft Tissues/Bones: There are again seen prominent bilateral axillary lymph nodes. Diffuse increased density of the osseous structures are again compatible with renal osteodystrophy. No evidence of pulmonary emboli. Continued dense airspace consolidation of the right lower lobe. New extensive bilateral ground-glass opacities and patchy pulmonary airspace opacities may be related to pulmonary edema and/or multifocal infectious infiltrates. Cardiomegaly. Axillary adenopathy. Assessment/Plans    1. Hypertensive urgency, in part due to missed doses of some of his antihypertensives  -Adjust BP meds as needed  -UF as tolerated with hemodialysis    2. Right lower lobe pneumonia  -Antibiotics, on Zosyn  -ID and pulmonary following    3. Anemia due to CKD  -Continue his PREETI treatment  -Monitor    4.   ESRD  -Continue hemodialysis MWF    5. Acute hypoxic respiratory failure due to pneumonia and volume overload  -target increased ultrafiltration with dialysis  -Pulmonary following   -bronchoscopy 8/3/21    Will follow  Thanks        KENDRA Grimes - CNP  8:28 AM  8/4/2021     Patient seen and examined all key components of the physical performed independently , case discussed with NP, all pertinent labs and radiologic tests personally reviewed agree with above.       Grace Brandt MD

## 2021-08-04 NOTE — PROGRESS NOTES
[address] [place] [address] [facility]  KARLOS[facility]  Progress Note  CC: has dry cough/ HCAP  Face to face encounter   SUBJECTIVE:  Patient is tolerating medications. No reported adverse drug reactions. ROS: No nausea, vomiting, diarrhea. Has shortness of breath. On room air. No fevers.   Afebrile  In bed- eating breakfast     Medications:  Scheduled Meds:   methylPREDNISolone  40 mg Intravenous Q12H    NIFEdipine  90 mg Oral Daily    cinacalcet  30 mg Oral Daily    cloNIDine  0.3 mg Oral TID    labetalol  200 mg Oral TID    losartan  100 mg Oral QPM    pantoprazole  40 mg Oral Daily    sevelamer  1,600 mg Oral TID WC    sodium chloride flush  5-40 mL Intravenous 2 times per day    heparin (porcine)  5,000 Units Subcutaneous 3 times per day    piperacillin-tazobactam  3,375 mg Intravenous Q12H    And    sodium chloride   Intravenous Q12H     Continuous Infusions:   sodium chloride       PRN Meds:hydrALAZINE, sodium chloride flush, sodium chloride, ondansetron **OR** ondansetron, polyethylene glycol, acetaminophen **OR** acetaminophen, labetalol, diphenhydrAMINE  OBJECTIVE:  Patient Vitals for the past 24 hrs:   BP Temp Temp src Pulse Resp SpO2 Weight   08/04/21 1042 (!) 183/116 97.5 °F (36.4 °C) -- 83 15 -- 108 lb 7.5 oz (49.2 kg)   08/04/21 1029 (!) 170/114 -- -- 83 -- -- --   08/04/21 0959 (!) 179/115 -- -- 83 -- -- --   08/04/21 0930 (!) 170/108 -- -- 88 -- -- --   08/04/21 0900 (!) 169/108 -- -- 81 -- -- --   08/04/21 0829 (!) 165/106 -- -- 82 -- -- --   08/04/21 0800 (!) 163/102 -- -- 83 -- -- --   08/04/21 0730 (!) 143/91 -- -- 86 -- -- --   08/04/21 0700 137/87 -- -- 84 -- -- --   08/04/21 0630 132/85 -- -- 89 -- -- --   08/04/21 0620 130/82 98.9 °F (37.2 °C) -- 87 16 -- 113 lb 15.7 oz (51.7 kg)   08/03/21 2145 (!) 145/90 97.6 °F (36.4 °C) Temporal 87 20 96 % --   08/03/21 1617 (!) 164/108 -- -- 86 23 -- --   08/03/21 1616 -- -- -- 86 22 100 % --   08/03/21 1615 (!) 162/108 -- -- 83 21 98 % -- 08/03/21 1600 (!) 163/113 -- -- 84 21 95 % --   08/03/21 1545 (!) 168/112 -- -- 85 22 100 % --   08/03/21 1535 (!) 176/120 97 °F (36.1 °C) -- 85 24 100 % --   08/03/21 1530 (!) 176/120 -- -- 90 27 100 % --   08/03/21 1523 (!) 151/113 -- Temporal 84 14 99 % --     Constitutional: The patient is awake, alert, and oriented. Thin. Sitting up in bed- eating breakfast.   Skin: Warm and dry. No rashes were noted. Head: Eyes show round, and reactive pupils. No jaundice. Mouth: Moist mucous membranes, no ulcerations, no thrush. Neck: Supple to movements. No lymphadenopathy. Chest: No use of accessory muscles to breathe. Symmetrical expansion. Auscultation reveals diminished to right. Dry cough   Cardiovascular: S1 and S2 are rhythmic and regular. No murmurs appreciated. Abdomen: Positive bowel sounds to auscultation. Benign to palpation. Extremities: No clubbing, no cyanosis, no edema.   Left fistula     Laboratory and Tests Review:  Lab Results   Component Value Date    WBC 5.6 08/04/2021    WBC 5.8 08/03/2021    WBC 7.1 07/31/2021    HGB 7.4 (L) 08/04/2021    HCT 23.1 (L) 08/04/2021    MCV 93.5 08/04/2021     08/04/2021     Lab Results   Component Value Date    NEUTROABS 3.14 08/04/2021    NEUTROABS 3.36 08/03/2021    NEUTROABS 5.17 07/31/2021     Lab Results   Component Value Date    CRP 16.4 (H) 07/17/2021    CRP 6.6 (H) 02/01/2021    CRP 9.0 (H) 01/03/2019     Lab Results   Component Value Date    SEDRATE 94 (H) 07/15/2021    SEDRATE 39 (H) 02/01/2021    SEDRATE 37 (H) 10/21/2015     Lab Results   Component Value Date    ALT 32 07/31/2021    AST 39 07/31/2021    ALKPHOS 82 07/31/2021    BILITOT 0.6 07/31/2021     Lab Results   Component Value Date     08/04/2021    K 4.2 08/04/2021    K 4.6 07/14/2021    CL 96 08/04/2021    CO2 29 08/04/2021    BUN 46 08/04/2021    CREATININE 8.1 08/04/2021    GFRAA 9 08/04/2021    LABGLOM 9 08/04/2021    GLUCOSE 94 08/04/2021    PROT 7.2 07/31/2021    LABALBU 3.6 07/31/2021    CALCIUM 8.6 08/04/2021    BILITOT 0.6 07/31/2021    ALKPHOS 82 07/31/2021    AST 39 07/31/2021    ALT 32 07/31/2021     Radiology:  Reviewed     Microbiology:   Respiratory array panel- negative     ASSESSMENT:  · HCAP- right lower lobe pneumonia  · Volume overload   · ESRD on HD    PLAN:  · Continue Zosyn for now - Day #5   · Check cultures from the BAL  · Pulmonary following; passed swallowing study   · On solu-medrol   · Monitor labs    KENDRA Morgan - CNS  11:26 AM  8/4/2021       Pt seen and examined. Above discussed agree with advanced practice nurse. Labs, cultures, and radiographs reviewed. Face to Face encounter occurred. Changes made as necessary.      Wm Ramirez MD

## 2021-08-05 LAB
ANION GAP SERPL CALCULATED.3IONS-SCNC: 14 MMOL/L (ref 7–16)
BASOPHILS ABSOLUTE: 0.02 E9/L (ref 0–0.2)
BASOPHILS RELATIVE PERCENT: 0.3 % (ref 0–2)
BUN BLDV-MCNC: 40 MG/DL (ref 6–20)
CALCIUM SERPL-MCNC: 9.4 MG/DL (ref 8.6–10.2)
CHLORIDE BLD-SCNC: 94 MMOL/L (ref 98–107)
CO2: 27 MMOL/L (ref 22–29)
CREAT SERPL-MCNC: 6.5 MG/DL (ref 0.7–1.2)
CULTURE, RESPIRATORY: NORMAL
EOSINOPHILS ABSOLUTE: 0.01 E9/L (ref 0.05–0.5)
EOSINOPHILS RELATIVE PERCENT: 0.1 % (ref 0–6)
GFR AFRICAN AMERICAN: 12
GFR NON-AFRICAN AMERICAN: 12 ML/MIN/1.73
GLUCOSE BLD-MCNC: 183 MG/DL (ref 74–99)
HCT VFR BLD CALC: 28.4 % (ref 37–54)
HEMOGLOBIN: 8.8 G/DL (ref 12.5–16.5)
IGE: <2 KU/L
IMMATURE GRANULOCYTES #: 0.06 E9/L
IMMATURE GRANULOCYTES %: 0.8 % (ref 0–5)
LYMPHOCYTES ABSOLUTE: 1.21 E9/L (ref 1.5–4)
LYMPHOCYTES RELATIVE PERCENT: 17 % (ref 20–42)
MAGNESIUM: 2.8 MG/DL (ref 1.6–2.6)
MCH RBC QN AUTO: 29.3 PG (ref 26–35)
MCHC RBC AUTO-ENTMCNC: 31 % (ref 32–34.5)
MCV RBC AUTO: 94.7 FL (ref 80–99.9)
METER GLUCOSE: 133 MG/DL (ref 74–99)
METER GLUCOSE: 158 MG/DL (ref 74–99)
METER GLUCOSE: 98 MG/DL (ref 74–99)
MONOCYTES ABSOLUTE: 0.23 E9/L (ref 0.1–0.95)
MONOCYTES RELATIVE PERCENT: 3.2 % (ref 2–12)
NEUTROPHILS ABSOLUTE: 5.58 E9/L (ref 1.8–7.3)
NEUTROPHILS RELATIVE PERCENT: 78.6 % (ref 43–80)
PDW BLD-RTO: 16.5 FL (ref 11.5–15)
PHOSPHORUS: 3.9 MG/DL (ref 2.5–4.5)
PLATELET # BLD: 246 E9/L (ref 130–450)
PMV BLD AUTO: 9.5 FL (ref 7–12)
POTASSIUM SERPL-SCNC: 4.8 MMOL/L (ref 3.5–5)
RBC # BLD: 3 E12/L (ref 3.8–5.8)
SMEAR, RESPIRATORY: NORMAL
SODIUM BLD-SCNC: 135 MMOL/L (ref 132–146)
WBC # BLD: 7.1 E9/L (ref 4.5–11.5)

## 2021-08-05 PROCEDURE — 83735 ASSAY OF MAGNESIUM: CPT

## 2021-08-05 PROCEDURE — 6370000000 HC RX 637 (ALT 250 FOR IP): Performed by: INTERNAL MEDICINE

## 2021-08-05 PROCEDURE — 6360000002 HC RX W HCPCS: Performed by: NURSE PRACTITIONER

## 2021-08-05 PROCEDURE — 6370000000 HC RX 637 (ALT 250 FOR IP): Performed by: STUDENT IN AN ORGANIZED HEALTH CARE EDUCATION/TRAINING PROGRAM

## 2021-08-05 PROCEDURE — 6360000002 HC RX W HCPCS: Performed by: SPECIALIST

## 2021-08-05 PROCEDURE — 6360000002 HC RX W HCPCS: Performed by: INTERNAL MEDICINE

## 2021-08-05 PROCEDURE — 2580000003 HC RX 258: Performed by: INTERNAL MEDICINE

## 2021-08-05 PROCEDURE — 97161 PT EVAL LOW COMPLEX 20 MIN: CPT

## 2021-08-05 PROCEDURE — 82962 GLUCOSE BLOOD TEST: CPT

## 2021-08-05 PROCEDURE — 85025 COMPLETE CBC W/AUTO DIFF WBC: CPT

## 2021-08-05 PROCEDURE — 2060000000 HC ICU INTERMEDIATE R&B

## 2021-08-05 PROCEDURE — 36415 COLL VENOUS BLD VENIPUNCTURE: CPT

## 2021-08-05 PROCEDURE — 80048 BASIC METABOLIC PNL TOTAL CA: CPT

## 2021-08-05 PROCEDURE — 6370000000 HC RX 637 (ALT 250 FOR IP): Performed by: SPECIALIST

## 2021-08-05 PROCEDURE — 2500000003 HC RX 250 WO HCPCS: Performed by: INTERNAL MEDICINE

## 2021-08-05 PROCEDURE — 2580000003 HC RX 258: Performed by: SPECIALIST

## 2021-08-05 PROCEDURE — 84100 ASSAY OF PHOSPHORUS: CPT

## 2021-08-05 PROCEDURE — 99231 SBSQ HOSP IP/OBS SF/LOW 25: CPT | Performed by: INTERNAL MEDICINE

## 2021-08-05 RX ORDER — NICOTINE POLACRILEX 4 MG
15 LOZENGE BUCCAL PRN
Status: DISCONTINUED | OUTPATIENT
Start: 2021-08-05 | End: 2021-08-06 | Stop reason: HOSPADM

## 2021-08-05 RX ORDER — AMOXICILLIN AND CLAVULANATE POTASSIUM 600; 42.9 MG/5ML; MG/5ML
600 POWDER, FOR SUSPENSION ORAL EVERY 12 HOURS SCHEDULED
Status: DISCONTINUED | OUTPATIENT
Start: 2021-08-05 | End: 2021-08-06 | Stop reason: HOSPADM

## 2021-08-05 RX ORDER — AMOXICILLIN AND CLAVULANATE POTASSIUM 600; 42.9 MG/5ML; MG/5ML
600 POWDER, FOR SUSPENSION ORAL EVERY 12 HOURS SCHEDULED
Qty: 50 ML | Refills: 0 | Status: ON HOLD | OUTPATIENT
Start: 2021-08-05 | End: 2021-08-10 | Stop reason: HOSPADM

## 2021-08-05 RX ORDER — NIFEDIPINE 90 MG/1
90 TABLET, EXTENDED RELEASE ORAL DAILY
Qty: 30 TABLET | Refills: 3 | Status: CANCELLED | OUTPATIENT
Start: 2021-08-06

## 2021-08-05 RX ORDER — DEXTROSE MONOHYDRATE 25 G/50ML
12.5 INJECTION, SOLUTION INTRAVENOUS PRN
Status: DISCONTINUED | OUTPATIENT
Start: 2021-08-05 | End: 2021-08-06 | Stop reason: HOSPADM

## 2021-08-05 RX ORDER — PREDNISONE 10 MG/1
TABLET ORAL
Qty: 40 TABLET | Refills: 0 | Status: ON HOLD | OUTPATIENT
Start: 2021-08-05 | End: 2021-08-10 | Stop reason: HOSPADM

## 2021-08-05 RX ORDER — PREDNISONE 20 MG/1
40 TABLET ORAL DAILY
Status: DISCONTINUED | OUTPATIENT
Start: 2021-08-05 | End: 2021-08-06 | Stop reason: HOSPADM

## 2021-08-05 RX ORDER — DEXTROSE MONOHYDRATE 50 MG/ML
100 INJECTION, SOLUTION INTRAVENOUS PRN
Status: DISCONTINUED | OUTPATIENT
Start: 2021-08-05 | End: 2021-08-06 | Stop reason: HOSPADM

## 2021-08-05 RX ORDER — DIPHENHYDRAMINE HYDROCHLORIDE 50 MG/ML
25 INJECTION INTRAMUSCULAR; INTRAVENOUS ONCE
Status: COMPLETED | OUTPATIENT
Start: 2021-08-05 | End: 2021-08-05

## 2021-08-05 RX ORDER — PANTOPRAZOLE SODIUM 40 MG/1
40 TABLET, DELAYED RELEASE ORAL DAILY
Qty: 30 TABLET | Refills: 0 | Status: SHIPPED
Start: 2021-08-05 | End: 2021-10-08

## 2021-08-05 RX ORDER — LABETALOL 300 MG/1
300 TABLET, FILM COATED ORAL 3 TIMES DAILY
Qty: 60 TABLET | Refills: 3 | Status: CANCELLED | OUTPATIENT
Start: 2021-08-05

## 2021-08-05 RX ADMIN — CLONIDINE HYDROCHLORIDE 0.3 MG: 0.1 TABLET ORAL at 09:05

## 2021-08-05 RX ADMIN — ONDANSETRON 4 MG: 2 INJECTION INTRAMUSCULAR; INTRAVENOUS at 20:28

## 2021-08-05 RX ADMIN — CINACALCET HYDROCHLORIDE 30 MG: 30 TABLET, FILM COATED ORAL at 09:04

## 2021-08-05 RX ADMIN — METHYLPREDNISOLONE SODIUM SUCCINATE 40 MG: 40 INJECTION, POWDER, FOR SOLUTION INTRAMUSCULAR; INTRAVENOUS at 11:51

## 2021-08-05 RX ADMIN — HEPARIN SODIUM 5000 UNITS: 10000 INJECTION INTRAVENOUS; SUBCUTANEOUS at 05:20

## 2021-08-05 RX ADMIN — LOSARTAN POTASSIUM 100 MG: 50 TABLET, FILM COATED ORAL at 17:08

## 2021-08-05 RX ADMIN — NIFEDIPINE 90 MG: 60 TABLET, FILM COATED, EXTENDED RELEASE ORAL at 09:05

## 2021-08-05 RX ADMIN — PREDNISONE 40 MG: 20 TABLET ORAL at 19:11

## 2021-08-05 RX ADMIN — ONDANSETRON 4 MG: 2 INJECTION INTRAMUSCULAR; INTRAVENOUS at 03:09

## 2021-08-05 RX ADMIN — HEPARIN SODIUM 5000 UNITS: 10000 INJECTION INTRAVENOUS; SUBCUTANEOUS at 17:08

## 2021-08-05 RX ADMIN — CLONIDINE HYDROCHLORIDE 0.3 MG: 0.1 TABLET ORAL at 20:27

## 2021-08-05 RX ADMIN — SEVELAMER CARBONATE 1600 MG: 800 TABLET, FILM COATED ORAL at 17:07

## 2021-08-05 RX ADMIN — DIPHENHYDRAMINE HYDROCHLORIDE 25 MG: 25 TABLET ORAL at 20:28

## 2021-08-05 RX ADMIN — CLONIDINE HYDROCHLORIDE 0.3 MG: 0.1 TABLET ORAL at 13:52

## 2021-08-05 RX ADMIN — Medication 10 ML: at 20:31

## 2021-08-05 RX ADMIN — PANTOPRAZOLE SODIUM 40 MG: 40 TABLET, DELAYED RELEASE ORAL at 09:05

## 2021-08-05 RX ADMIN — SEVELAMER CARBONATE 1600 MG: 800 TABLET, FILM COATED ORAL at 13:52

## 2021-08-05 RX ADMIN — LABETALOL HYDROCHLORIDE 300 MG: 200 TABLET, FILM COATED ORAL at 20:26

## 2021-08-05 RX ADMIN — ONDANSETRON 4 MG: 2 INJECTION INTRAMUSCULAR; INTRAVENOUS at 11:51

## 2021-08-05 RX ADMIN — LABETALOL HYDROCHLORIDE 200 MG: 200 TABLET, FILM COATED ORAL at 09:05

## 2021-08-05 RX ADMIN — HYDRALAZINE HYDROCHLORIDE 10 MG: 20 INJECTION INTRAMUSCULAR; INTRAVENOUS at 23:17

## 2021-08-05 RX ADMIN — DIPHENHYDRAMINE HYDROCHLORIDE 25 MG: 50 INJECTION, SOLUTION INTRAMUSCULAR; INTRAVENOUS at 09:04

## 2021-08-05 RX ADMIN — LABETALOL HYDROCHLORIDE 10 MG: 5 INJECTION INTRAVENOUS at 11:51

## 2021-08-05 RX ADMIN — HEPARIN SODIUM 5000 UNITS: 10000 INJECTION INTRAVENOUS; SUBCUTANEOUS at 20:27

## 2021-08-05 RX ADMIN — SEVELAMER CARBONATE 1600 MG: 800 TABLET, FILM COATED ORAL at 09:04

## 2021-08-05 RX ADMIN — PIPERACILLIN AND TAZOBACTAM 3375 MG: 3; .375 INJECTION, POWDER, LYOPHILIZED, FOR SOLUTION INTRAVENOUS at 13:53

## 2021-08-05 RX ADMIN — AMOXICILLIN AND CLAVULANATE POTASSIUM 600 MG: 600; 42.9 POWDER, FOR SUSPENSION ORAL at 20:26

## 2021-08-05 RX ADMIN — LABETALOL HYDROCHLORIDE 200 MG: 200 TABLET, FILM COATED ORAL at 13:51

## 2021-08-05 RX ADMIN — Medication 10 ML: at 09:12

## 2021-08-05 ASSESSMENT — PAIN DESCRIPTION - DESCRIPTORS: DESCRIPTORS: ACHING;CONSTANT

## 2021-08-05 ASSESSMENT — PAIN - FUNCTIONAL ASSESSMENT: PAIN_FUNCTIONAL_ASSESSMENT: ACTIVITIES ARE NOT PREVENTED

## 2021-08-05 ASSESSMENT — PAIN DESCRIPTION - PROGRESSION: CLINICAL_PROGRESSION: NOT CHANGED

## 2021-08-05 ASSESSMENT — PAIN SCALES - GENERAL
PAINLEVEL_OUTOF10: 0
PAINLEVEL_OUTOF10: 9

## 2021-08-05 ASSESSMENT — PAIN DESCRIPTION - ONSET: ONSET: ON-GOING

## 2021-08-05 ASSESSMENT — PAIN DESCRIPTION - PAIN TYPE: TYPE: ACUTE PAIN

## 2021-08-05 ASSESSMENT — PAIN DESCRIPTION - FREQUENCY: FREQUENCY: CONTINUOUS

## 2021-08-05 ASSESSMENT — PAIN DESCRIPTION - LOCATION: LOCATION: ABDOMEN

## 2021-08-05 NOTE — PROGRESS NOTES
[address] [address] PROGRESS NOTE    Patient: Brian Hwang  MRN: [id number]  : 1985    Encounter Date: 2021  Encounter Time: 7:38 AM     Date of Admission: .2021  3:02 AM    Consulting Physician:  Primary Care Physician:      [address] [address] [facility]     [phone]    PROBLEM LIST:  Patient Active Problem List   Diagnosis    ESRD on hemodialysis ([address]    H/O intracranial hemorrhage and aneurysm clipping on the right side    Anemia, chronic disease    Anxiety    Noncompliance    AVF (arteriovenous fistula) ([address]    Severe protein-calorie malnutrition ([address]    Venous hypertension, chronic, left    Iron deficiency    Secondary hyperparathyroidism of renal origin ([address]    Essential hypertension    Chronic heart failure with preserved ejection fraction ([address]    Acute respiratory failure with hypoxia ([address]     SUBJECTIVE:  Eating breakfast  Denies dyspnea, occ cough  Ambulating with no CP, always has fast heart rate   Complains of significant itching all over - requesting 1 time dose of IV benadryl       PAST MEDICAL HISTORY:     Past Medical History:   Diagnosis Date    (HFpEF) heart failure with preserved ejection fraction ([address]     stage III    Anxiety 2015    AVF (arteriovenous fistula) ([address] 2018    Chronic kidney disease     CKD since early childhood per pt and on HD ~ 11 years    Depression     Encounter regarding vascular access for dialysis for ESRD ([address] 2018    History of ruptured Berry aneurysm ([address] 10/20/2015    Hypertension     on meds for ~ 11 years    Hypothyroidism     Intracranial hemorrhage (HCC)     was d/t ruptured aneurysm - pt underwent neurosurgery for clipping of the aneurysm    Neutropenia ([address]     Noncompliance w/medication treatment due to intermit use of medication 11/3/2015    Pre-transplant evaluation for kidney transplant 2017     CURRENT MEDICATIONS:  Current Facility-Administered Medications: methylPREDNISolone sodium (SOLU-MEDROL) injection 40 mg, 40 mg, Intravenous, Q12H  NIFEdipine (PROCARDIA XL) extended release tablet 90 mg, 90 mg, Oral, Daily  hydrALAZINE (APRESOLINE) injection 10 mg, 10 mg, Intravenous, Q4H PRN  cinacalcet (SENSIPAR) tablet 30 mg, 30 mg, Oral, Daily  cloNIDine (CATAPRES) tablet 0.3 mg, 0.3 mg, Oral, TID  labetalol (NORMODYNE) tablet 200 mg, 200 mg, Oral, TID  losartan (COZAAR) tablet 100 mg, 100 mg, Oral, QPM  pantoprazole (PROTONIX) tablet 40 mg, 40 mg, Oral, Daily  sevelamer (RENVELA) tablet 1,600 mg, 1,600 mg, Oral, TID WC  sodium chloride flush 0.9 % injection 5-40 mL, 5-40 mL, Intravenous, 2 times per day  sodium chloride flush 0.9 % injection 5-40 mL, 5-40 mL, Intravenous, PRN  0.9 % sodium chloride infusion, 25 mL, Intravenous, PRN  ondansetron (ZOFRAN-ODT) disintegrating tablet 4 mg, 4 mg, Oral, Q8H PRN **OR** ondansetron (ZOFRAN) injection 4 mg, 4 mg, Intravenous, Q6H PRN  polyethylene glycol (GLYCOLAX) packet 17 g, 17 g, Oral, Daily PRN  acetaminophen (TYLENOL) tablet 650 mg, 650 mg, Oral, Q6H PRN **OR** acetaminophen (TYLENOL) suppository 650 mg, 650 mg, Rectal, Q6H PRN  heparin (porcine) injection 5,000 Units, 5,000 Units, Subcutaneous, 3 times per day  labetalol (NORMODYNE;TRANDATE) injection 10 mg, 10 mg, Intravenous, Q4H PRN  piperacillin-tazobactam (ZOSYN) 3,375 mg in dextrose 5 % 100 mL IVPB extended infusion (mini-bag), 3,375 mg, Intravenous, Q12H **AND** 0.9 % sodium chloride infusion admixture, , Intravenous, Q12H  diphenhydrAMINE (BENADRYL) tablet 25 mg, 25 mg, Oral, Q8H PRN    IV MEDICATIONS:   sodium chloride         ALLERGIES:  Allergies   Allergen Reactions    Tylenol [Acetaminophen] Itching    Levofloxacin Itching     No rash       PHYSICAL EXAMINATION:     VITAL SIGNS:  BP (!) 178/110   Pulse 93   Temp 96.3 °F (35.7 °C) (Temporal)   Resp 18   Ht 5' 6\" (1.676 m)   Wt 111 lb 6.4 oz (50.5 kg)   SpO2 94%   BMI 17.98 kg/m²   Wt Readings from Last 3 Encounters:   21 111 lb 6.4 oz (50.5 kg)   21 112 lb 12.8 oz (51.2 kg)   21 112 lb (50.8 kg)     Temp Readings from Last 3 Encounters:   21 96.3 °F (35.7 °C) (Temporal)   21 97.5 °F (36.4 °C)   21 98.4 °F (36.9 °C) (Oral)     TMAX:  BP Readings from Last 3 Encounters:   21 (!) 178/110   21 (!) 153/122   21 (!) 180/109     Pulse Readings from Last 3 Encounters:   21 93   21 69   21 88       CURRENT PULSE OXIMETRY: SpO2: 94 %  24HR PULSE OXIMETRY RANGE: SpO2  Av %  Min: 94 %  Max: 96 %            Vent Information  Skin Assessment: Clean, dry, & intact  SpO2: 94 %  Additional Respiratory  Assessments  Pulse: 93  Resp: 18  SpO2: 94 %  ABG:  No results for input(s): PH, PO2, PCO2, HCO3, BE, O2SAT, METHB, O2HB, COHB, O2CON, HHB, THB in the last 72 hours. ________________________________________________________________________    INTAKE/OUTPUTS:  I/O last 3 completed shifts: In: 1240 [P.O.:840; IV Piggyback:100]  Out: 2800     Intake/Output Summary (Last 24 hours) at 2021 0738  Last data filed at 2021 4961  Gross per 24 hour   Intake 1240 ml   Output 2800 ml   Net -1560 ml     General Appearance: A&O, well-developed and well-nourished, in no acute distress   Eyes: PERRLA, extraocular eye movements intact, conjunctivae normal and sclera anicteric   Neck: neck supple and non tender without mass, no thyromegaly  Pulmonary/Chest: decreased breath sounds, no accessory muscles of inspiration, crackles at lung bases R>L . No wheezing noted. Cardiovascular: normal rate, regular rhythm, normal S1 and S2, no murmurs, rubs, clicks or gallops, distal pulses intact and no JVD   Abdomen: soft, non-tender, non-distended, normal bowel sounds, no masses or organomegaly   Extremities: no cyanosis, no clubbing, no edema.  LUE with fistula +bruit/thrill  Musculoskeletal: normal range of motion, no joint swelling, deformity or tenderness   Neurologic: reflexes normal and symmetric, no cranial nerve deficit noted    LABS/IMAGING:    CBC:  Lab Results   Component Value Date    WBC 7.1 08/05/2021    HGB 8.8 (L) 08/05/2021    HCT 28.4 (L) 08/05/2021    MCV 94.7 08/05/2021     08/05/2021    LYMPHOPCT 17.0 (L) 08/05/2021    RBC 3.00 (L) 08/05/2021    MCH 29.3 08/05/2021    MCHC 31.0 (L) 08/05/2021    RDW 16.5 (H) 08/05/2021    NEUTOPHILPCT 78.6 08/05/2021    MONOPCT 3.2 08/05/2021    BASOPCT 0.3 08/05/2021    NEUTROABS 5.58 08/05/2021    LYMPHSABS 1.21 (L) 08/05/2021    MONOSABS 0.23 08/05/2021    EOSABS 0.01 (L) 08/05/2021    BASOSABS 0.02 08/05/2021       Recent Labs     08/05/21  0602 08/04/21  0637 08/03/21  0628   WBC 7.1 5.6 5.8   HGB 8.8* 7.4* 7.9*   HCT 28.4* 23.1* 25.3*   MCV 94.7 93.5 94.4    208 205       BMP:   Recent Labs     08/03/21  0628 08/04/21  0637 08/05/21  0602    134 135   K 4.3 4.2 4.8   CL 97* 96* 94*   CO2 28 29 27   PHOS 4.1 4.6* 3.9   BUN 28* 46* 40*   CREATININE 5.2* 8.1* 6.5*       MG:   Lab Results   Component Value Date    MG 2.8 08/05/2021     Ca/Phos:   Lab Results   Component Value Date    CALCIUM 9.4 08/05/2021    PHOS 3.9 08/05/2021     Amylase:   Lab Results   Component Value Date    AMYLASE 243 03/17/2017     Lipase:   Lab Results   Component Value Date    LIPASE 28 07/14/2021     LIVER PROFILE:   No results for input(s): AST, ALT, LIPASE, BILIDIR, BILITOT, ALKPHOS in the last 72 hours. Invalid input(s): AMYLASE,  ALB    PT/INR: No results for input(s): PROTIME, INR in the last 72 hours. APTT: No results for input(s): APTT in the last 72 hours.     Cardiac Enzymes:  Lab Results   Component Value Date    CKTOTAL 143 03/15/2021    CKMB 2.2 03/15/2021    TROPONINI 0.02 03/15/2021       Hgb A1C: No results found for: LABA1C  No results found for: EAG  CLAY:   Lab Results   Component Value Date    CLAY NEGATIVE 07/15/2021     ESR:   Lab Results   Component Value Date SEDRATE 94 (H) 07/15/2021     CRP:   Lab Results   Component Value Date    CRP 16.4 (H) 07/17/2021     D Dimer:   Lab Results   Component Value Date    DDIMER 738 07/31/2021     Folate and B12:   Lab Results   Component Value Date    [id number] 635 02/10/2020   ,   Lab Results   Component Value Date    FOLATE 10.5 02/10/2020       Lactic Acid:   Lab Results   Component Value Date    LACTA 0.7 11/11/2018     Ammonia:   Cortisol:  Thyroid Studies:  Lab Results   Component Value Date    TSH 4.260 (H) 07/15/2021    [id number] 82.05 09/18/2015    O2QRTAR 7.8 03/20/2016     Results for Ady Oliveira (MRN [id number]) as of 8/1/2021 07:24   Ref.  Range 7/31/2021 11:28   Influenza A by PCR Latest Ref Range: Not Detected  Not Detected   Influenza B by PCR Latest Ref Range: Not Detected  Not Detected   Adenovirus by PCR Latest Ref Range: Not Detected  Not Detected   Coronavirus 229E by PCR Latest Ref Range: Not Detected  Not Detected   Coronavirus HKU1 by PCR Latest Ref Range: Not Detected  Not Detected   Coronavirus NL63 by PCR Latest Ref Range: Not Detected  Not Detected   Coronavirus OC43 by PCR Latest Ref Range: Not Detected  Not Detected   Human Metapneumovirus by PCR Latest Ref Range: Not Detected  Not Detected   Human Rhinovirus/Enterovirus by PCR Latest Ref Range: Not Detected  Not Detected   Parainfluenza Virus 1 by PCR Latest Ref Range: Not Detected  Not Detected   Parainfluenza Virus 2 by PCR Latest Ref Range: Not Detected  Not Detected   Parainfluenza Virus 3 by PCR Latest Ref Range: Not Detected  Not Detected   Parainfluenza Virus 4 by PCR Latest Ref Range: Not Detected  Not Detected   Respiratory Syncytial Virus by PCR Latest Ref Range: Not Detected  Not Detected   Bordetella parapertussis by PCR Latest Ref Range: Not Detected  Not Detected   Chlamydophilia pneumoniae by PCR Latest Ref Range: Not Detected  Not Detected   Mycoplasma pneumoniae by PCR Latest Ref Range: Not Detected  Not Detected   SARS-CoV-2, PCR Latest Ref Range: Not Detected  Not Detected   Bordetella pertussis by PCR Latest Ref Range: Not Detected  Not Detected     Results for [place] Reina (MRN [id number]) as of 8/1/2021 07:24   Ref. Range 7/27/2021 12:05   RPR Latest Ref Range: Non-reactive  NON-REACTIVE   Hep A IgM Latest Ref Range: Non-Reactive  Non-Reactive   Hep B S Ag Interp Latest Ref Range: Non-Reactive  Non-Reactive   Hep C Ab Interp Latest Ref Range: Non-Reactive  Non-Reactive   Hep B Core Ab, IgM Latest Ref Range: Non-Reactive  Non-Reactive     CTA Chest 7/31/2021:  No evidence of pulmonary emboli.       Continued dense airspace consolidation of the right lower lobe.  New   extensive bilateral ground-glass opacities and patchy pulmonary airspace   opacities may be related to pulmonary edema and/or multifocal infectious   infiltrates.       Cardiomegaly.       Axillary adenopathy. ASSESSMENT:  1.) RLL Pneumonia   2.) Volume Overload/B/L GGO   3.) ESRD on HD  4.) Hypoxia     PLAN:  1.) s/p bronchosocpy 8/3/2021 with cell count/differential revealing lymphocytes at 67%  - CD4 425. Await cultures and cytology. Pneumocystis carinii negative  2.) Continue Steroids  3.) ID following - on Zosyn  4.) CLAY, HIV, Histone AB, Hepatitis panel - negative. Immunoglobulins - Alpha-1-Globulin slightly elevated at 0.5 - otherwise negative  5.) COVID negative   6.) D-Dimer 738, Ferritin 21,197,    7.) DVT Prophylaxis  8.) IS  9.) Will give IV benadryl      KENDRA Michelle - CNP  8/5/2021  7:38 AM    Attending Attestation Note:    Patient seen and examined with NP. I agree with above.     In addition, the following apply:    - BP elevated  - zosyn per ID to augmentin (day 6/10)  - taper steroids to PO with taper from there over 20 days   - supportive care    Rowe Habermann, MD  8/5/2021  3:50 PM

## 2021-08-05 NOTE — PROGRESS NOTES
round, and reactive pupils. No jaundice. Mouth: Moist mucous membranes, no ulcerations, no thrush. Neck: Supple to movements. No lymphadenopathy. Chest: No use of accessory muscles to breathe. Symmetrical expansion. Auscultation reveals diminished to right. Cardiovascular: S1 and S2 are rhythmic and regular. No murmurs appreciated. Abdomen: Positive bowel sounds to auscultation. Benign to palpation. Extremities: No clubbing, no cyanosis, no edema.   Left fistula     Laboratory and Tests Review:  Lab Results   Component Value Date    WBC 7.1 08/05/2021    WBC 5.6 08/04/2021    WBC 5.8 08/03/2021    HGB 8.8 (L) 08/05/2021    HCT 28.4 (L) 08/05/2021    MCV 94.7 08/05/2021     08/05/2021     Lab Results   Component Value Date    NEUTROABS 5.58 08/05/2021    NEUTROABS 3.14 08/04/2021    NEUTROABS 3.36 08/03/2021     Lab Results   Component Value Date    CRP 16.4 (H) 07/17/2021    CRP 6.6 (H) 02/01/2021    CRP 9.0 (H) 01/03/2019     Lab Results   Component Value Date    SEDRATE 94 (H) 07/15/2021    SEDRATE 39 (H) 02/01/2021    SEDRATE 37 (H) 10/21/2015     Lab Results   Component Value Date    ALT 32 07/31/2021    AST 39 07/31/2021    ALKPHOS 82 07/31/2021    BILITOT 0.6 07/31/2021     Lab Results   Component Value Date     08/05/2021    K 4.8 08/05/2021    K 4.6 07/14/2021    CL 94 08/05/2021    CO2 27 08/05/2021    BUN 40 08/05/2021    CREATININE 6.5 08/05/2021    GFRAA 12 08/05/2021    LABGLOM 12 08/05/2021    GLUCOSE 183 08/05/2021    PROT 7.2 07/31/2021    LABALBU 3.6 07/31/2021    CALCIUM 9.4 08/05/2021    BILITOT 0.6 07/31/2021    ALKPHOS 82 07/31/2021    AST 39 07/31/2021    ALT 32 07/31/2021     Radiology:  Reviewed     Microbiology:   Respiratory array panel- negative   IGG wnl  cd4 425  BAL-normal carol  ASSESSMENT:  · HCAP- right lower lobe pneumonia  · Volume overload   · ESRD on HD    PLAN:  · Continue Augmentin p.o.- Day #6/10  · Check cultures from the BAL-oral carol  · Pulmonary following; passed swallowing study   · On solu-medrol   · Monitor labs    KENDRA Baez CNP  10:19 AM  8/5/2021   Pt seen and examined. Above discussed agree with advanced practice nurse. Labs, cultures, and radiographs reviewed. Face to Face encounter occurred. Changes made as necessary.      Yesica Regalado MD

## 2021-08-05 NOTE — PROGRESS NOTES
[facility]  Internal Medicine Residency Program  Progress Note - House Team     Patient:  Selvin Lackey 38 y.o. male MRN: [id number]     Date of Service: 8/5/2021     CC: SOB  Overnight events: No significant events    Subjective     Patient was seen and examined this morning at bedside in no acute distress. Overnight no significant events. Pt seen this AM. Pt denies any chest pain or SOB at this time. Admits to feeling much better compared to initial admission. Denies any acute complaints at this time. Objective     Physical Exam:  · Vitals: BP (!) 180/114 Comment: manual  Pulse 95   Temp 97.2 °F (36.2 °C) (Temporal)   Resp 18   Ht 5' 6\" (1.676 m)   Wt 111 lb 6.4 oz (50.5 kg)   SpO2 98%   BMI 17.98 kg/m²     · I & O - 24hr: No intake/output data recorded. · General Appearance: alert, appears stated age and cooperative  · HEENT:  Head: Normocephalic, no lesions, without obvious abnormality. · Neck: no adenopathy, no carotid bruit, no JVD, supple, symmetrical, trachea midline and thyroid not enlarged, symmetric, no tenderness/mass/nodules  · Lung: clear to auscultation bilaterally  · Heart: regular rate and rhythm, S1, S2 normal, no murmur, click, rub or gallop  · Abdomen: soft, non-tender; bowel sounds normal; no masses,  no organomegaly  · Extremities:  extremities normal, atraumatic, no cyanosis or edema  · Musculokeletal: No joint swelling, no muscle tenderness. ROM normal in all joints of extremities.    · Neurologic: Mental status: Alert, oriented, thought content appropriate  Subject  Pertinent Labs & Imaging Studies   denice  CBC:   Lab Results   Component Value Date    WBC 7.1 08/05/2021    RBC 3.00 08/05/2021    HGB 8.8 08/05/2021    HCT 28.4 08/05/2021    MCV 94.7 08/05/2021    MCH 29.3 08/05/2021    MCHC 31.0 08/05/2021    RDW 16.5 08/05/2021     08/05/2021    MPV 9.5 08/05/2021     CMP:    Lab Results   Component Value Date     08/05/2021    K 4.8 08/05/2021 K 4.6 07/14/2021    CL 94 08/05/2021    CO2 27 08/05/2021    BUN 40 08/05/2021    CREATININE 6.5 08/05/2021    GFRAA 12 08/05/2021    LABGLOM 12 08/05/2021    GLUCOSE 183 08/05/2021    PROT 7.2 07/31/2021    LABALBU 3.6 07/31/2021    CALCIUM 9.4 08/05/2021    BILITOT 0.6 07/31/2021    ALKPHOS 82 07/31/2021    AST 39 07/31/2021    ALT 32 07/31/2021     Magnesium:    Lab Results   Component Value Date    MG 2.8 08/05/2021     Phosphorus:    Lab Results   Component Value Date    PHOS 3.9 08/05/2021       Fluoroscopy modified barium swallow with video   Final Result   Satisfactory swallowing mechanism. No barium aspiration. Please see separate speech pathology report for full discussion of findings   and recommendations. XR CHEST PORTABLE   Final Result   No evidence for pneumothorax. Right lung infiltrates. Cardiomegaly. CTA PULMONARY W CONTRAST   Final Result   No evidence of pulmonary emboli. Continued dense airspace consolidation of the right lower lobe. New   extensive bilateral ground-glass opacities and patchy pulmonary airspace   opacities may be related to pulmonary edema and/or multifocal infectious   infiltrates. Cardiomegaly. Axillary adenopathy. XR CHEST (2 VW)   Final Result   Increased patchy airspace opacities throughout both lungs compared with July   15, 2021. Findings are most suspicious for multifocal pneumonia although   pulmonary edema could have a similar appearance. Resident's Assessment and Plan   No Horton is a 37 yo M w/ a PMHx of HTN, ESRD w/ dialysis MWF, HFpEF (EF 60-65%), hypothyroidism, aneurysm of AV fistula in 2016) who came in with left lateral pleuritic chest pain and SOB x2 days. He was recently admitted at [facility] for sepsis 2/2 HCAP, treat with doxycycline and cefuroxime. Continued to complain of SOB and arrived to the ED. CTA Chest showed consolidation in the RLL concerning for possible PNA. Assessment and Plan:     #. Acute hypoxic respiratory failure likely 2/2 consolidation 2/2 HCAP  ? Afebrile, WBC 7.1, dyspnea on exertion, orthopnea, pro-BNP > 7000  ? Respiratory panel negative  ? ID consulted: RLL pneumonia, today switched to PO Augmentin, plan is to continue abx for 5 days after discharge  ? Bronchoscopy done (8/3/21). with assessment for fungal infx, aspergillus  ? F/u Bronchoscopy results: Pending  ? CLAY, HIV, Histone AB, Hepatitis panel - negative. ? Immunoglobulins (8/1/21): IgG all WNL. Alpha-1-Globulin slightly elevated at 0.5 - otherwise negative  ? CXR (8/4/21): No evidence of pneumothorax. Presence of R. Lung infiltrates. Presence of cardiomegaly    #. ESRD on hemodialysis  ? HD every MWF  ? Net I/O since admission: -2.3L  ? Nephro consulted: target increased ultrafiltration with HD  ? #. Pleuritic chest pain 2/2 RLL pneumonia  ? CTA (7/31/21): showerd dense air airspace consolidation in the RLL that has worsened since last CT and bilateral ground glass opacities and patchy pulmonary opacities  ? resp panel negative  ? Tylenol 650mg PRN for pain     #. Resistant hypertension  ? BP today: 188/117  ? Continue to monitor BP  ? Continue Clonidine 0.3 TID, Losartan 100mg qHS, Nifedipine 90mg daily, and Labatelol 300mg TID (Per nephro 200mg -> 300mg TID today)  ? Continue Labetalol 10mg IV q4 PRN  ? Standing BP this afternoon 176/114  ? Hydralazine, minoxidil discontinued last hospitalization (7/15/21) for possible lupus like reaction and pericardial effusion      #. History of HFpEF (EF 60-65%)  #.  History of aneurysm of AVF (2016)    PT/OT evaluation: none  DVT prophylaxis/ GI prophylaxis: Heparin/Protonix  Disposition: continue current care    Rosa M Perales MD, PGY-1  Attending physician: Dr. Mari Payan

## 2021-08-05 NOTE — PROGRESS NOTES
[address]   Internal Medicine Residency / [address]    Attending Physician Statement  I have discussed the case, including pertinent history and exam findings with the resident and the team.  I have seen and examined the patient and the key elements of the encounter have been performed by me. I agree with the assessment, plan and orders as documented by the resident. A&O   Hx of HSP as  A teenager and CRF since  BP remains high post dialysis  Meds reviewed and wiil increase Labetolol  Will R/O orthostatic hypotension  And consult Nephro about any other additions to meds (minoxidil?)  Remains on Zosyn for pulmonary infiltrate   Swallowing study normal  S/P Bronchoscopy and no surprises  Plan: Continue same            Dialysis per Nephro  Remainder of medical problems as per resident note.       Jan Nicholas MD [facility] SPECIALTY [facility]  Internal Medicine Residency Faculty

## 2021-08-05 NOTE — PROGRESS NOTES
Nephrology Progress Note  Patient's Name: Bowen Kay  7:15 AM  8/5/2021    Reason for Consult:  ESRD    Chief Complaint:  SOB, chest pain      From 8/3 Note-  History of Present Ilness:    Bowen Kay is a 38 y.o. male with a history of ESRD, HD dependent MWF, hypertension, hypothyroidism and HFpEF. Patient presented to ED with complaints of left-sided chest pain and shortness of breath of several days duration. He said the pain was pleuritic. This was associated with exertional dyspnea. He could not go up the flights of stairs at his home without stopping to catch his breath. In the ED vital signs were significant for markedly elevated BP of 201/138 pulse 110 and temperature of 98.6. Lab data was significant for a WBC of 7.1, hemoglobin 8.5 and platelet count 176,857. His CHEM profile was consistent with his ESRD status. CTA of the chest demonstrated no pulmonary embolism. However it did show dense opacity in the right lower lobe and some bilateral groundglass opacity. Patient received 10 mg of labetalol IV, Toradol, Benadryl and Zofran. Patient was recently treated at [facility] for pneumonia and discharged on a combination of doxycycline and cefuroxime. Covid was negative at the time. He is now admitted for further management. Subjective    8/5/21:Patient sitting in bed, states he is feeling good, denies headache, shortness of breath.      Allergies:  Tylenol [acetaminophen] and Levofloxacin    Current Medications:    methylPREDNISolone sodium (SOLU-MEDROL) injection 40 mg, Q12H  NIFEdipine (PROCARDIA XL) extended release tablet 90 mg, Daily  hydrALAZINE (APRESOLINE) injection 10 mg, Q4H PRN  cinacalcet (SENSIPAR) tablet 30 mg, Daily  cloNIDine (CATAPRES) tablet 0.3 mg, TID  labetalol (NORMODYNE) tablet 200 mg, TID  losartan (COZAAR) tablet 100 mg, QPM  pantoprazole (PROTONIX) tablet 40 mg, Daily  sevelamer (RENVELA) tablet 1,600 mg, TID WC  sodium chloride flush 0.9 % injection 5-40 mL, 2 times per day  sodium chloride flush 0.9 % injection 5-40 mL, PRN  0.9 % sodium chloride infusion, PRN  ondansetron (ZOFRAN-ODT) disintegrating tablet 4 mg, Q8H PRN   Or  ondansetron (ZOFRAN) injection 4 mg, Q6H PRN  polyethylene glycol (GLYCOLAX) packet 17 g, Daily PRN  acetaminophen (TYLENOL) tablet 650 mg, Q6H PRN   Or  acetaminophen (TYLENOL) suppository 650 mg, Q6H PRN  heparin (porcine) injection 5,000 Units, 3 times per day  labetalol (NORMODYNE;TRANDATE) injection 10 mg, Q4H PRN  piperacillin-tazobactam (ZOSYN) 3,375 mg in dextrose 5 % 100 mL IVPB extended infusion (mini-bag), Q12H   And  0.9 % sodium chloride infusion admixture, Q12H  diphenhydrAMINE (BENADRYL) tablet 25 mg, Q8H PRN        Review of Systems:   Pertinent items are noted in HPI.     Physical exam:  Vitals:    08/04/21 2345   BP: (!) 178/110   Pulse: 93   Resp:    Temp:    SpO2:           General appearance: alert, in no acute distress  Skin: No rashes or lesions on exposed skin  Neck: No JVD  Lungs: Clear, no adventitious sounds  Heart: RRR, no rub  Abdomen: Soft, non-tender, + bowel sounds  Extremities: No edema, left AVF +thrill, +bruit  Neurologic: Mental status: Alert, oriented, thought content appropriate      Data:   Labs:  Lab Results   Component Value Date     08/05/2021     08/04/2021     08/03/2021    K 4.8 08/05/2021    K 4.2 08/04/2021    K 4.3 08/03/2021    CL 94 (L) 08/05/2021    CO2 27 08/05/2021    CO2 29 08/04/2021    CO2 28 08/03/2021    CREATININE 6.5 (H) 08/05/2021    CREATININE 8.1 (HH) 08/04/2021    CREATININE 5.2 (H) 08/03/2021    BUN 40 (H) 08/05/2021    BUN 46 (H) 08/04/2021    BUN 28 (H) 08/03/2021    GLUCOSE 183 (H) 08/05/2021    GLUCOSE 94 08/04/2021    GLUCOSE 98 08/03/2021    PHOS 3.9 08/05/2021    PHOS 4.6 (H) 08/04/2021    PHOS 4.1 08/03/2021    WBC 7.1 08/05/2021    WBC 5.6 08/04/2021    WBC 5.8 08/03/2021    HGB 8.8 (L) 08/05/2021    HGB 7.4 (L) 08/04/2021    HGB 7.9 (L) 08/03/2021    HCT 28.4 (L) 08/05/2021    HCT 23.1 (L) 08/04/2021    HCT 25.3 (L) 08/03/2021    MCV 94.7 08/05/2021     08/05/2021         Imaging:  XR CHEST (2 VW)    Result Date: 7/31/2021  EXAMINATION: TWO XRAY VIEWS OF THE CHEST 7/31/2021 4:07 am COMPARISON: July 15, 2021 HISTORY: ORDERING SYSTEM PROVIDED HISTORY: chest pain TECHNOLOGIST PROVIDED HISTORY: Reason for exam:->chest pain What reading provider will be dictating this exam?->CRC FINDINGS: There are patchy bilateral airspace opacities, greatest in the right lower lobe. These have increased since July 15, 2021. No evidence of a sizable pleural effusion. No pneumothorax. Heart size is unable to be accurately assessed on this single portable view of the chest, but appears to be stable. No acute osseous abnormality. Increased patchy airspace opacities throughout both lungs compared with July 15, 2021. Findings are most suspicious for multifocal pneumonia although pulmonary edema could have a similar appearance. CTA PULMONARY W CONTRAST    Result Date: 7/31/2021  EXAMINATION: CTA OF THE CHEST 7/31/2021 6:42 am TECHNIQUE: CTA of the chest was performed after the administration of intravenous contrast.  Multiplanar reformatted images are provided for review. MIP images are provided for review. Dose modulation, iterative reconstruction, and/or weight based adjustment of the mA/kV was utilized to reduce the radiation dose to as low as reasonably achievable. COMPARISON: 07/14/2021 HISTORY: ORDERING SYSTEM PROVIDED HISTORY: elevated dimer TECHNOLOGIST PROVIDED HISTORY: Reason for exam:->elevated dimer Decision Support Exception - unselect if not a suspected or confirmed emergency medical condition->Emergency Medical Condition (MA) What reading provider will be dictating this exam?->CRC FINDINGS: Pulmonary Arteries: Pulmonary arteries are adequately opacified for evaluation. No evidence of intraluminal filling defect to suggest pulmonary embolism.   Main pulmonary artery is normal in caliber. Mediastinum: No aortic aneurysm or dissection. There is cardiomegaly and a small pericardial effusion. Borderline sized subcarinal lymph node is noted. 13 mm left paratracheal node. . Lungs/pleura: There is continued dense airspace consolidation in the right lower lobe with air bronchograms. There are new diffuse bilateral ground-glass airspace opacities and bilateral multifocal small areas of airspace consolidation. No pneumothorax. Small right pleural effusion. Upper Abdomen: The kidneys appear atrophic. Soft Tissues/Bones: There are again seen prominent bilateral axillary lymph nodes. Diffuse increased density of the osseous structures are again compatible with renal osteodystrophy. No evidence of pulmonary emboli. Continued dense airspace consolidation of the right lower lobe. New extensive bilateral ground-glass opacities and patchy pulmonary airspace opacities may be related to pulmonary edema and/or multifocal infectious infiltrates. Cardiomegaly. Axillary adenopathy. Assessment/Plans    1. Hypertensive urgency, in part due to missed doses of some of his antihypertensives  -Adjust BP meds as needed  -UF as tolerated with hemodialysis  -Hydralazine, minoxidil discontinued last hospitalization (7/15/21) for possible lupus like reaction and pericardial effusion - CLAY neg, C4 wnl, anti-histone AB wnl    2. Right lower lobe pneumonia  -Antibiotics, on Zosyn  -ID and pulmonary following    3. Anemia due to CKD  -Continue his PREETI treatment  -Monitor    4. ESRD  -Continue hemodialysis MWF    5.   Acute hypoxic respiratory failure due to pneumonia and volume overload  -target increased ultrafiltration with dialysis  -Pulmonary following   -bronchoscopy 8/3/21    Will follow  Thanks    KENDRA Betancur - CNS  7:15 AM  8/5/2021     Patient seen and examined all key components of the physical performed independently , case discussed with NP, all pertinent labs and radiologic

## 2021-08-05 NOTE — TELEPHONE ENCOUNTER
Last Appointment:  7/27/21 RTO 5 weeks  Future Appointments   Date Time Provider Jean Joshi   8/25/2021  2:00 PM [facility] ECHO RM 1 SEYZ CARDIO [facility] [place]    9/20/2021  1:30 PM Brittnee Luo DO [facility]   7/20/2022 10:00 AM Kevin Martines MD [facility]/MED [facility]

## 2021-08-05 NOTE — PROGRESS NOTES
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 Pt ambulated with no AD PTA. Pt does not drive and uses taxi services for appointments. OBJECTIVE:   Initial Evaluation  Date: 2021 Treatment Date:  NA Short Term/ Long Term   Goals   AM-PAC 6 Clicks 71/00     Was pt agreeable to Eval/treatment? Yes      Does pt have pain? No c/o pain     Bed Mobility  Rolling: Supervision  Supine to sit: Supervision  Sit to supine: Supervision  Scooting: Supervision  Rolling: Independent  Supine to sit: Independent  Sit to supine: Independent  Scooting: Independent   Transfers Sit to stand: Supervision  Stand to sit: Supervision  Stand pivot: Supervision  Sit to stand: Independent  Stand to sit: Independent  Stand pivot: Independent   Ambulation    350 feet with no AD with SBA  >500 feet with no AD Independent   Stair negotiation: ascended and descended  NT  20 steps with 1 rail with Mod I   ROM BUE:  WFL  BLE:  WFL     Strength BUE:  4/5  BLE:  4/5  Increase strength 1/3 grade.    Balance Sitting EOB:  Supervision  Dynamic Standing:  SBA with no AD  Sitting EOB:  Independent  Dynamic Standing:  Independent     Pt is A & O x 3  Sensation:  Pt denies numbness and tingling to extremities  Edema:  None noted    Vitals:  Blood Pressure at rest - Blood Pressure post session -   Heart Rate at rest 92 Heart Rate post session 98 SPO2 at rest 98% SPO2 post session 98%     Therapeutic Exercises:  NT    Patient education  Pt educated on purpose of PT assessment, importance of mobility, safety with mobility, transfers, gait, use of AD for safety if needed    Patient response to education:   Pt verbalized understanding Pt demonstrated skill Pt requires further education in this area   Yes  Yes with verbal cues Yes/Reinforcement     ASSESSMENT:    Conditions Requiring Skilled Therapeutic Intervention:     [x]Decreased strength     []Decreased ROM  [x]Decreased functional mobility  []Decreased balance   [x]Decreased endurance   []Decreased posture  []Decreased sensation  []Decreased coordination   []Decreased vision  []Decreased safety awareness   []Increased pain       Comments:  Patient cleared by RN and agreeable to treatment. Patient found in semi Marquez's and reported wanting to ambulate. Patient reported breathing much better since bronchoscopy but continues to have elevated HR and BP. SpO2 monitored throughout and documented above. Patient assisted to seated EOB and denied dizziness with positional change. Patient demonstrated slower, guarded gait speed with equal stride length and steady on his feet. Patient denied SOB or heart racing with mobility. Patient assisted back to seated EOB and then to supine. HOB elevated to comfort with call light and tray table in reach. Treatment:  Patient practiced and was instructed in the following treatment:    · Bed mobility: Verbal/tactile cues for sequencing BUEs/BLEs for safe technique with rolling/supine<>sit task. · Transfer training: Verbal/tactile cues to facilitate proper hand placement, technique and safety during sit to stand task. · Gait training: Verbal and tactile cues to facilitate upright posture and safety to complete task. · Therapeutic exercises: As noted above  · Neuromuscular education: NT    Pt's/ family goals   1. To feel better to go home.     Prognosis is good for reaching above PT goals. Patient and or family understand(s) diagnosis, prognosis, and plan of care. Yes     PHYSICAL THERAPY PLAN OF CARE:    PT POC is established based on physician order and patient diagnosis     Referring provider/PT Order:    08/03/21 1500  PT eval and treat Start: 08/03/21 1500, End: 08/03/21 1500, ONE TIME, Standing Count: 1 Occurrences, R      Aisha Kanner, MD       Diagnosis:  Acute respiratory failure with hypoxia ([address] [J96.01]  Specific instructions for next treatment:  Subsequent PT sessions with focus on improved mobility, stair training, and ambulation endurance/distance    Current Treatment Recommendations:     [x] Strengthening to improve independence with functional mobility   [] ROM to improve independence with functional mobility   [] Balance Training to improve static/dynamic balance and to reduce fall risk  [x] Endurance Training to improve activity tolerance during functional mobility   [x] Transfer Training to improve safety and independence with all functional transfers   [x] Gait Training to improve gait mechanics, endurance and asses need for appropriate assistive device  [x] Stair Training in preparation for safe discharge home and/or into the community   [x] Positioning to prevent skin breakdown and contractures  [x] Safety and Education Training   [x] Patient/Caregiver Education   [] HEP  [] Other     PT long term treatment goals are located in above grid    Frequency of treatments: 2-5x/week x 1-2 weeks. Time in  0925  Time out  0935    Total Treatment Time  0 minutes     Evaluation Time includes thorough review of current medical information, gathering information on past medical history/social history and prior level of function, completion of standardized testing/informal observation of tasks, assessment of data and education on plan of care and goals.     CPT codes:  [x] Low Complexity PT evaluation 12107  [] Moderate Complexity PT evaluation 63029  [] High Complexity PT evaluation [id number]  [] PT Re-evaluation [id number]  [] Gait training 70820 - minutes  [] Manual therapy 41206 - minutes  [] Therapeutic activities 70139 - minutes  [] Therapeutic exercises 93911 - minutes  [] Neuromuscular reeducation 89591 - minutes     Gildardo Clark, PT, DPT  License [id number]

## 2021-08-05 NOTE — PLAN OF CARE
Problem: Infection:  Goal: Will remain free from infection  Description: Will remain free from infection  Outcome: Met This Shift     Problem: Safety:  Goal: Free from accidental physical injury  Description: Free from accidental physical injury  Outcome: Met This Shift  Goal: Free from intentional harm  Description: Free from intentional harm  Outcome: Met This Shift     Problem: Skin Integrity:  Goal: Skin integrity will stabilize  Description: Skin integrity will stabilize  Outcome: Met This Shift

## 2021-08-05 NOTE — CARE COORDINATION
Charge RN Rajendra Sheets to check for discharge once standing BP is obtained. Per ID, farzad from their POV for discharge.

## 2021-08-06 VITALS
DIASTOLIC BLOOD PRESSURE: 81 MMHG | TEMPERATURE: 97.8 F | BODY MASS INDEX: 17.68 KG/M2 | WEIGHT: 110.01 LBS | SYSTOLIC BLOOD PRESSURE: 136 MMHG | RESPIRATION RATE: 18 BRPM | HEART RATE: 95 BPM | HEIGHT: 66 IN | OXYGEN SATURATION: 94 %

## 2021-08-06 LAB
ANION GAP SERPL CALCULATED.3IONS-SCNC: 18 MMOL/L (ref 7–16)
ASPERGILLUS GALACTO AG: POSITIVE
ASPERGILLUS GALACTO INDEX: 1.89
BASOPHILS ABSOLUTE: 0.01 E9/L (ref 0–0.2)
BASOPHILS RELATIVE PERCENT: 0.1 % (ref 0–2)
BUN BLDV-MCNC: 62 MG/DL (ref 6–20)
CALCIUM SERPL-MCNC: 9.7 MG/DL (ref 8.6–10.2)
CHLORIDE BLD-SCNC: 94 MMOL/L (ref 98–107)
CO2: 23 MMOL/L (ref 22–29)
CREAT SERPL-MCNC: 9.1 MG/DL (ref 0.7–1.2)
EOSINOPHILS ABSOLUTE: 0 E9/L (ref 0.05–0.5)
EOSINOPHILS RELATIVE PERCENT: 0 % (ref 0–6)
GFR AFRICAN AMERICAN: 8
GFR NON-AFRICAN AMERICAN: 8 ML/MIN/1.73
GLUCOSE BLD-MCNC: 149 MG/DL (ref 74–99)
HCT VFR BLD CALC: 25.7 % (ref 37–54)
HEMOGLOBIN: 8.2 G/DL (ref 12.5–16.5)
IMMATURE GRANULOCYTES #: 0.09 E9/L
IMMATURE GRANULOCYTES %: 0.7 % (ref 0–5)
LYMPHOCYTES ABSOLUTE: 1.8 E9/L (ref 1.5–4)
LYMPHOCYTES RELATIVE PERCENT: 14.8 % (ref 20–42)
MAGNESIUM: 3.3 MG/DL (ref 1.6–2.6)
MCH RBC QN AUTO: 30.3 PG (ref 26–35)
MCHC RBC AUTO-ENTMCNC: 31.9 % (ref 32–34.5)
MCV RBC AUTO: 94.8 FL (ref 80–99.9)
METER GLUCOSE: 189 MG/DL (ref 74–99)
MONOCYTES ABSOLUTE: 0.47 E9/L (ref 0.1–0.95)
MONOCYTES RELATIVE PERCENT: 3.9 % (ref 2–12)
NEUTROPHILS ABSOLUTE: 9.79 E9/L (ref 1.8–7.3)
NEUTROPHILS RELATIVE PERCENT: 80.5 % (ref 43–80)
PDW BLD-RTO: 17.2 FL (ref 11.5–15)
PHOSPHORUS: 5 MG/DL (ref 2.5–4.5)
PLATELET # BLD: 256 E9/L (ref 130–450)
PMV BLD AUTO: 9.7 FL (ref 7–12)
POTASSIUM SERPL-SCNC: 5.3 MMOL/L (ref 3.5–5)
RBC # BLD: 2.71 E12/L (ref 3.8–5.8)
SODIUM BLD-SCNC: 135 MMOL/L (ref 132–146)
WBC # BLD: 12.2 E9/L (ref 4.5–11.5)

## 2021-08-06 PROCEDURE — 90935 HEMODIALYSIS ONE EVALUATION: CPT

## 2021-08-06 PROCEDURE — 99231 SBSQ HOSP IP/OBS SF/LOW 25: CPT | Performed by: INTERNAL MEDICINE

## 2021-08-06 PROCEDURE — 6370000000 HC RX 637 (ALT 250 FOR IP): Performed by: INTERNAL MEDICINE

## 2021-08-06 PROCEDURE — 36415 COLL VENOUS BLD VENIPUNCTURE: CPT

## 2021-08-06 PROCEDURE — 83735 ASSAY OF MAGNESIUM: CPT

## 2021-08-06 PROCEDURE — 6360000002 HC RX W HCPCS: Performed by: INTERNAL MEDICINE

## 2021-08-06 PROCEDURE — 80048 BASIC METABOLIC PNL TOTAL CA: CPT

## 2021-08-06 PROCEDURE — 82962 GLUCOSE BLOOD TEST: CPT

## 2021-08-06 PROCEDURE — 84100 ASSAY OF PHOSPHORUS: CPT

## 2021-08-06 PROCEDURE — 6370000000 HC RX 637 (ALT 250 FOR IP): Performed by: STUDENT IN AN ORGANIZED HEALTH CARE EDUCATION/TRAINING PROGRAM

## 2021-08-06 PROCEDURE — 85025 COMPLETE CBC W/AUTO DIFF WBC: CPT

## 2021-08-06 RX ORDER — LABETALOL 300 MG/1
300 TABLET, FILM COATED ORAL 3 TIMES DAILY
Qty: 90 TABLET | Refills: 2 | Status: SHIPPED
Start: 2021-08-06 | End: 2021-08-20 | Stop reason: SDUPTHER

## 2021-08-06 RX ORDER — NIFEDIPINE 90 MG/1
90 TABLET, EXTENDED RELEASE ORAL DAILY
Qty: 90 TABLET | Refills: 0 | Status: SHIPPED
Start: 2021-08-06 | End: 2021-08-20 | Stop reason: SDUPTHER

## 2021-08-06 RX ADMIN — PREDNISONE 40 MG: 20 TABLET ORAL at 10:10

## 2021-08-06 RX ADMIN — AMOXICILLIN AND CLAVULANATE POTASSIUM 600 MG: 600; 42.9 POWDER, FOR SUSPENSION ORAL at 10:12

## 2021-08-06 RX ADMIN — PANTOPRAZOLE SODIUM 40 MG: 40 TABLET, DELAYED RELEASE ORAL at 10:11

## 2021-08-06 RX ADMIN — CINACALCET HYDROCHLORIDE 30 MG: 30 TABLET, FILM COATED ORAL at 10:11

## 2021-08-06 RX ADMIN — SEVELAMER CARBONATE 1600 MG: 800 TABLET, FILM COATED ORAL at 11:25

## 2021-08-06 RX ADMIN — ONDANSETRON 4 MG: 2 INJECTION INTRAMUSCULAR; INTRAVENOUS at 04:15

## 2021-08-06 RX ADMIN — CLONIDINE HYDROCHLORIDE 0.3 MG: 0.1 TABLET ORAL at 10:10

## 2021-08-06 RX ADMIN — NIFEDIPINE 90 MG: 60 TABLET, FILM COATED, EXTENDED RELEASE ORAL at 10:09

## 2021-08-06 RX ADMIN — LABETALOL HYDROCHLORIDE 300 MG: 200 TABLET, FILM COATED ORAL at 10:10

## 2021-08-06 RX ADMIN — DIPHENHYDRAMINE HYDROCHLORIDE 25 MG: 25 TABLET ORAL at 05:46

## 2021-08-06 ASSESSMENT — PAIN SCALES - GENERAL
PAINLEVEL_OUTOF10: 0
PAINLEVEL_OUTOF10: 0

## 2021-08-06 NOTE — PROGRESS NOTES
[id number] [address] PROGRESS NOTE    Patient: Alesha Maldonado  MRN: [id number]  : 1985    Encounter Date: 2021  Encounter Time: 6:50 AM     Date of Admission: .2021  3:02 AM    Consulting Physician:  Primary Care Physician:      Jermaine Ribeiro [place]     [id number]    PROBLEM LIST:  Patient Active Problem List   Diagnosis    ESRD on hemodialysis ([address]    H/O intracranial hemorrhage and aneurysm clipping on the right side    Anemia, chronic disease    Anxiety    Noncompliance    AVF (arteriovenous fistula) ([address]    Severe protein-calorie malnutrition ([address]    Venous hypertension, chronic, left    Iron deficiency    Secondary hyperparathyroidism of renal origin ([address]    Essential hypertension    Chronic heart failure with preserved ejection fraction ([address]    Acute respiratory failure with hypoxia ([address]     SUBJECTIVE:  Eating breakfast  Ready to go to dialysis  Denies any dyspnea, occ cough  No further CP  Pleasant       PAST MEDICAL HISTORY:     Past Medical History:   Diagnosis Date    (HFpEF) heart failure with preserved ejection fraction ([address]     stage III    Anxiety 2015    [facility]    Chronic kidney disease     CKD since early childhood per pt and on HD ~ 11 years    Depression     Encounter regarding vascular access for dialysis for ESRD ([address] 2018    History of ruptured Berry aneurysm ([address] 10/20/2015    Hypertension     on meds for ~ 11 years    Hypothyroidism     Intracranial hemorrhage (HCC)     was d/t ruptured aneurysm - pt underwent neurosurgery for clipping of the aneurysm    Neutropenia ([address]     Noncompliance w/medication treatment due to intermit use of medication 11/3/2015    Pre-transplant evaluation for kidney transplant 2017     CURRENT MEDICATIONS:  Current Facility-Administered Medications: glucose (GLUTOSE) 40 % oral gel 15 g, 15 g, Oral, PRN  dextrose 50 % IV solution, 12.5 g, Intravenous, PRN  glucagon (rDNA) injection 1 mg, 1 mg, Intramuscular, PRN  dextrose 5 % solution, 100 mL/hr, Intravenous, PRN  labetalol (NORMODYNE) tablet 300 mg, 300 mg, Oral, TID  amoxicillin-clavulanate (AUGMENTIN-ES) 600-42.9 MG/5ML suspension 600 mg, 600 mg, Oral, 2 times per day  predniSONE (DELTASONE) tablet 40 mg, 40 mg, Oral, Daily  NIFEdipine (PROCARDIA XL) extended release tablet 90 mg, 90 mg, Oral, Daily  hydrALAZINE (APRESOLINE) injection 10 mg, 10 mg, Intravenous, Q4H PRN  cinacalcet (SENSIPAR) tablet 30 mg, 30 mg, Oral, Daily  cloNIDine (CATAPRES) tablet 0.3 mg, 0.3 mg, Oral, TID  losartan (COZAAR) tablet 100 mg, 100 mg, Oral, QPM  pantoprazole (PROTONIX) tablet 40 mg, 40 mg, Oral, Daily  sevelamer (RENVELA) tablet 1,600 mg, 1,600 mg, Oral, TID WC  sodium chloride flush 0.9 % injection 5-40 mL, 5-40 mL, Intravenous, 2 times per day  sodium chloride flush 0.9 % injection 5-40 mL, 5-40 mL, Intravenous, PRN  0.9 % sodium chloride infusion, 25 mL, Intravenous, PRN  ondansetron (ZOFRAN-ODT) disintegrating tablet 4 mg, 4 mg, Oral, Q8H PRN **OR** ondansetron (ZOFRAN) injection 4 mg, 4 mg, Intravenous, Q6H PRN  polyethylene glycol (GLYCOLAX) packet 17 g, 17 g, Oral, Daily PRN  acetaminophen (TYLENOL) tablet 650 mg, 650 mg, Oral, Q6H PRN **OR** acetaminophen (TYLENOL) suppository 650 mg, 650 mg, Rectal, Q6H PRN  heparin (porcine) injection 5,000 Units, 5,000 Units, Subcutaneous, 3 times per day  labetalol (NORMODYNE;TRANDATE) injection 10 mg, 10 mg, Intravenous, Q4H PRN  diphenhydrAMINE (BENADRYL) tablet 25 mg, 25 mg, Oral, Q8H PRN    IV MEDICATIONS:   dextrose      sodium chloride         ALLERGIES:  Allergies   Allergen Reactions    Tylenol [Acetaminophen] Itching    Levofloxacin Itching     No rash       PHYSICAL EXAMINATION:     VITAL SIGNS:  BP (!) 158/72 Comment: manual  Pulse 93   Temp 98.2 °F (36.8 °C) (Temporal)   Resp 18   Ht 5' 6\" (1.676 m)   Wt 111 lb 6.4 oz (50.5 kg)   SpO2 94%   BMI 17.98 kg/m²   Wt Readings from Last 3 Encounters:   21 111 lb 6.4 oz (50.5 kg)   21 112 lb 12.8 oz (51.2 kg)   21 112 lb (50.8 kg)     Temp Readings from Last 3 Encounters:   21 98.2 °F (36.8 °C) (Temporal)   21 97.5 °F (36.4 °C)   21 98.4 °F (36.9 °C) (Oral)     TMAX:  BP Readings from Last 3 Encounters:   21 (!) 158/72   21 (!) 153/122   21 (!) 180/109     Pulse Readings from Last 3 Encounters:   21 93   21 69   21 88       CURRENT PULSE OXIMETRY: SpO2: 94 %  24HR PULSE OXIMETRY RANGE: SpO2  Av %  Min: 94 %  Max: 99 %            Vent Information  Skin Assessment: Clean, dry, & intact  SpO2: 94 %  Additional Respiratory  Assessments  Pulse: 93  Resp: 18  SpO2: 94 %  ABG:  No results for input(s): PH, PO2, PCO2, HCO3, BE, O2SAT, METHB, O2HB, COHB, O2CON, HHB, THB in the last 72 hours. ________________________________________________________________________    INTAKE/OUTPUTS:  I/O last 3 completed shifts: In: 36 [P.O.:720; IV Piggyback:100]  Out: -     Intake/Output Summary (Last 24 hours) at 2021 0650  Last data filed at 2021 2318  Gross per 24 hour   Intake 820 ml   Output --   Net 820 ml     General Appearance: A&O, well-developed and well-nourished, in no acute distress   Eyes: PERRLA, extraocular eye movements intact, conjunctivae normal and sclera anicteric   Neck: neck supple and non tender without mass, no thyromegaly  Pulmonary/Chest: decreased breath sounds, no accessory muscles of inspiration, crackles at lung bases R>L - improved. No wheezing noted. Cardiovascular: normal rate, regular rhythm, normal S1 and S2, no murmurs, rubs, clicks or gallops, distal pulses intact and no JVD   Abdomen: soft, non-tender, non-distended, normal bowel sounds, no masses or organomegaly   Extremities: no cyanosis, no clubbing, no edema.  LUE with fistula +bruit/thrill  Musculoskeletal: normal range of motion, no joint swelling, deformity or tenderness   Neurologic: reflexes normal and symmetric, no cranial nerve deficit noted    LABS/IMAGING:    CBC:  Lab Results   Component Value Date    WBC 7.1 08/05/2021    HGB 8.8 (L) 08/05/2021    HCT 28.4 (L) 08/05/2021    MCV 94.7 08/05/2021     08/05/2021    LYMPHOPCT 17.0 (L) 08/05/2021    RBC 3.00 (L) 08/05/2021    MCH 29.3 08/05/2021    MCHC 31.0 (L) 08/05/2021    RDW 16.5 (H) 08/05/2021    NEUTOPHILPCT 78.6 08/05/2021    MONOPCT 3.2 08/05/2021    BASOPCT 0.3 08/05/2021    NEUTROABS 5.58 08/05/2021    LYMPHSABS 1.21 (L) 08/05/2021    MONOSABS 0.23 08/05/2021    EOSABS 0.01 (L) 08/05/2021    BASOSABS 0.02 08/05/2021       Recent Labs     08/05/21  0602 08/04/21  0637 08/03/21  0628   WBC 7.1 5.6 5.8   HGB 8.8* 7.4* 7.9*   HCT 28.4* 23.1* 25.3*   MCV 94.7 93.5 94.4    208 205       BMP:   Recent Labs     08/04/21  0637 08/05/21  0602    135   K 4.2 4.8   CL 96* 94*   CO2 29 27   PHOS 4.6* 3.9   BUN 46* 40*   CREATININE 8.1* 6.5*       MG:   Lab Results   Component Value Date    MG 2.8 08/05/2021     Ca/Phos:   Lab Results   Component Value Date    CALCIUM 9.4 08/05/2021    PHOS 3.9 08/05/2021     Amylase:   Lab Results   Component Value Date    AMYLASE 243 03/17/2017     Lipase:   Lab Results   Component Value Date    LIPASE 28 07/14/2021     LIVER PROFILE:   No results for input(s): AST, ALT, LIPASE, BILIDIR, BILITOT, ALKPHOS in the last 72 hours. Invalid input(s): AMYLASE,  ALB    PT/INR: No results for input(s): PROTIME, INR in the last 72 hours. APTT: No results for input(s): APTT in the last 72 hours.     Cardiac Enzymes:  Lab Results   Component Value Date    CKTOTAL 143 03/15/2021    CKMB 2.2 03/15/2021    TROPONINI 0.02 03/15/2021       Hgb A1C: No results found for: LABA1C  No results found for: EAG  CLAY:   Lab Results   Component Value Date    CLAY NEGATIVE 07/15/2021     ESR:   Lab Results   Component Value Date    SEDRATE 94 (H) 07/15/2021     CRP:   Lab Results   Component Value Date    CRP 16.4 (H) 07/17/2021     D Dimer:   Lab Results   Component Value Date    DDIMER 738 07/31/2021     Folate and B12:   Lab Results   Component Value Date    JVHXSTOD03 383 02/10/2020   ,   Lab Results   Component Value Date    FOLATE 10.5 02/10/2020       Lactic Acid:   Lab Results   Component Value Date    LACTA 0.7 11/11/2018     Ammonia:   Cortisol:  Thyroid Studies:  Lab Results   Component Value Date    TSH 4.260 (H) 07/15/2021    J5TTTEZ 82.05 09/18/2015    C2VIKFW 7.8 03/20/2016     Results for Kristie Gaona (MRN [id number]) as of 8/1/2021 07:24   Ref.  Range 7/31/2021 11:28   Influenza A by PCR Latest Ref Range: Not Detected  Not Detected   Influenza B by PCR Latest Ref Range: Not Detected  Not Detected   Adenovirus by PCR Latest Ref Range: Not Detected  Not Detected   Coronavirus 229E by PCR Latest Ref Range: Not Detected  Not Detected   Coronavirus HKU1 by PCR Latest Ref Range: Not Detected  Not Detected   Coronavirus NL63 by PCR Latest Ref Range: Not Detected  Not Detected   Coronavirus OC43 by PCR Latest Ref Range: Not Detected  Not Detected   Human Metapneumovirus by PCR Latest Ref Range: Not Detected  Not Detected   Human Rhinovirus/Enterovirus by PCR Latest Ref Range: Not Detected  Not Detected   Parainfluenza Virus 1 by PCR Latest Ref Range: Not Detected  Not Detected   Parainfluenza Virus 2 by PCR Latest Ref Range: Not Detected  Not Detected   Parainfluenza Virus 3 by PCR Latest Ref Range: Not Detected  Not Detected   Parainfluenza Virus 4 by PCR Latest Ref Range: Not Detected  Not Detected   Respiratory Syncytial Virus by PCR Latest Ref Range: Not Detected  Not Detected   Bordetella parapertussis by PCR Latest Ref Range: Not Detected  Not Detected   Chlamydophilia pneumoniae by PCR Latest Ref Range: Not Detected  Not Detected   Mycoplasma pneumoniae by PCR Latest Ref Range: Not Detected  Not Detected   SARS-CoV-2, PCR Latest Ref Range: Not Detected  Not Detected   Bordetella pertussis by PCR Latest Ref Range: Not Detected  Not Detected     Results for Blayne Freeman (MRN [id number]) as of 8/1/2021 07:24   Ref. Range 7/27/2021 12:05   RPR Latest Ref Range: Non-reactive  NON-REACTIVE   Hep A IgM Latest Ref Range: Non-Reactive  Non-Reactive   Hep B S Ag Interp Latest Ref Range: Non-Reactive  Non-Reactive   Hep C Ab Interp Latest Ref Range: Non-Reactive  Non-Reactive   Hep B Core Ab, IgM Latest Ref Range: Non-Reactive  Non-Reactive       8/4 Respiratory Culture: Polymorphonuclear leukocytes not seen Few Epithelial cells  Rare Gram positive coccobacillary organisms  Rare yeast     8/3 Cytology: NEGATIVE FOR MALIGNANT CELLS. Benign bronchial cells, squamous cells, pulmonary macrophages, and mucoid material.       CTA Chest 7/31/2021:  No evidence of pulmonary emboli.       Continued dense airspace consolidation of the right lower lobe.  New   extensive bilateral ground-glass opacities and patchy pulmonary airspace   opacities may be related to pulmonary edema and/or multifocal infectious   infiltrates.       Cardiomegaly.       Axillary adenopathy. ASSESSMENT:  1.) RLL Pneumonia   2.) Volume Overload/B/L GGO   3.) ESRD on HD  4.) Hypoxia     PLAN:  1.) s/p bronchosocpy 8/3/2021 with cell count/differential revealing lymphocytes at 67%  - CD4 425. Cytology with benign bronchial cells, squamous cells, pulmonary macrophages. Pneumocystis carinii negative, AFB negative  2.) To continue prednisone for 20 days - 40mg x5 days, 30mg x5 days, 20mg x5 days, 10mg x5 days  3.) ID following - Zosyn changed to Augmentin to completed 10 days - day 7/10 today  4.) CLAY, HIV, Histone AB, Hepatitis panel - negative. Immunoglobulins - Alpha-1-Globulin slightly elevated at 0.5 - otherwise negative  5.) COVID negative   6.) D-Dimer 738, Ferritin 21,197,    7.) DVT Prophylaxis  8.) IS  9.) Ok for discharge from pulmonary perspective.  Follow up with [facility] with repeat imaging. KENDRA Byrd - CNP  8/6/2021  6:50 AM    Attending Attestation Note:    Patient seen and examined with NP. I agree with above.     In addition, the following apply:    - Augmentin per ID  - PO steroids taper (script in chart)  - OK to D/C with follow up in pulmonary office in 2 weeks time with repeat CXR    Lexi Thomson MD  8/6/2021  3:53 PM

## 2021-08-06 NOTE — FLOWSHEET NOTE
Pt ran 4 hours; 2251 bath; 3.5 L removed; stable/tolerated well; denies c/o. Needles removed & patent hemostasis achieved < 10 min, DSD applied positive T/B post Tx. Good Access flow no issues achieving prescribed BFR. Next Tx scheduled Monday 8/9.      08/06/21 1100   Vital Signs   /75   Temp 97.8 °F (36.6 °C)   Pulse 89   Weight 110 lb 0.2 oz (49.9 kg)   Weight Method Bed scale   Percent Weight Change -5.31   Pain Assessment   Pain Assessment 0-10   Pain Level 0   Post-Hemodialysis Assessment   Post-Treatment Procedures Blood returned; Access bleeding time < 10 minutes   Machine Disinfection Process Acid/Vinegar Clean;Heat Disinfect; Exterior Machine Disinfection   Rinseback Volume (ml) 300 ml   Total Liters Processed (l/min) 89.3 l/min   Dialyzer Clearance Lightly streaked   Duration of Treatment (minutes) 240 minutes   Hemodialysis Intake (ml) 300 ml   Hemodialysis Output (ml) 3800 ml   NET Removed (ml) 3500 ml   Tolerated Treatment Good   Patient Response to Treatment \"B\" Profile for duration   Bilateral Breath Sounds Clear   Physician Notified?  Yes

## 2021-08-06 NOTE — DISCHARGE SUMMARY
[address] [facility]  Discharge Summary    PCP: Darcy Joyner DO    Admit Date:7/31/2021  Discharge Date: 8/6/2021    Admission Diagnosis:   1. Acute hypoxic respiratory failure likely 2/2 RLL consolidation 2/2 HCAP  2. ESRD on hemodialysis MWF  3. Pleuritic chest pain 2/2 RLL pneumonia  4. Elevated troponin likely 2/2 ESRD   5. Resistant hypertension  6. Hx of HFpEF (EF 55-60%)  7. Hx of aneurysm of AVF (2016)    Discharge Diagnosis:  1. ESRD on hemodialysis MWF   2. RLL Pneumonia  3. Resistant hypertension  4. Hx of HFpEF (EF 55-60%)  5. Hx of aneurysm of AVF (2016)    Hospital Course: The patient is a 38 y.o. male with a PMHx of HTN, ESRD w/ dialysis MWF, HFpEF (EF 60-65%), hypothyroidism, aneurysm of AV fistula in 2016) who came in with left lateral chest pain and SOB for 2 days. Patient was recently admitted at [facility] for sepsis 2/2 HCAP. CT showed pericardial effusion and dense consolidations in the posterior RLL. At that time, patient was treated with doxycycline and cefuroxime. Discharged with cefdinir and doxycycline x 10 days. Previously on hydralazine but was d/c'ed on that admission due to concerns of it causing the pericardial effusion.     In the ED, patient was tachycardic to 110s and hypertensive to 201/138. He was given toradol x1, benadryl x1, zofran, nitro patch and labetalol 10 mg. Patient was then admitted for further management of his acute hypoxic respiratory failure. On the floors, ID was consulted and pt was started on Zosyn. Pulm was also consulted and pt underwent bronchoscopy for assessment for possible fungal infection. CTA showerd dense airspace consolidation in the RLL that has worsened since last CT and bilateral ground glass opacities and patchy pulmonary opacities. Nephro was also on-board and pt underwent a total of 3 dialysis sessions while admitted with a Net I/O: -5L.  Pt admitted to decreased SOB and pt was no longer tachycardic. Pt's main concern afterwards was his resistant HTN (systolic 577-765 even after dialysis) . Pt is on four BP medications: Clonidine 0.3 TID, Losartan 100mg qHS, Nifedipine 60mg daily, and Labatelol 200mg TID. Due to pt's resistant HTN, pt's Nifedipine increased 60mg->90mg daily and Labetalol increased 200mg->300mg TID. Pt's BP today 136/81 after dialysis. Discharge planning discussed with pt. All questions were addressed at this time. Pt is to be discharged today. Significant findings (history and exam, laboratory, radiological, pathology, other tests):   · General Appearance: alert, appears stated age and cooperative  · HEENT:  Head: Normocephalic, no lesions, without obvious abnormality. · Neck: no adenopathy, no carotid bruit, no JVD, supple, symmetrical, trachea midline and thyroid not enlarged, symmetric, no tenderness/mass/nodules  · Lung: clear to auscultation bilaterally  · Heart: regular rate and rhythm, S1, S2 normal, no murmur, click, rub or gallop  · Abdomen: soft, non-tender; bowel sounds normal; no masses,  no organomegaly  · Extremities:  extremities normal, atraumatic, no cyanosis or edema and Fistula on L arm  · Musculokeletal: No joint swelling, no muscle tenderness. ROM normal in all joints of extremities. · Neurologic: Mental status: Alert, oriented, thought content appropriate      Pending test results:   1. none    Consults:  1. Pulmonology  2. Nephrology  3. Infectious Disease    Procedures:  1.  None    Condition at discharge: Stable    Disposition: home    Discharge Medications:  Current Discharge Medication List      START taking these medications    Details   amoxicillin-clavulanate (AUGMENTIN-ES) 600-42.9 MG/5ML suspension Take 5 mLs by mouth every 12 hours for 5 days  Qty: 50 mL, Refills: 0      predniSONE (DELTASONE) 10 MG tablet Prednisone 40 mg PO q day x 5 days then   Prednisone 30 mg PO q day x 5 days then  Prednisone 20 mg PO q day x 5 days then  Prednisone 10 mg PO q day x 5 days then stop  Qty: 40 tablet, Refills: 0         CONTINUE these medications which have CHANGED    Details   labetalol (NORMODYNE) 300 MG tablet Take 1 tablet by mouth three times daily  Qty: 90 tablet, Refills: 2      NIFEdipine (PROCARDIA XL) 90 MG extended release tablet Take 1 tablet by mouth daily  Qty: 90 tablet, Refills: 0         CONTINUE these medications which have NOT CHANGED    Details   lidocaine-prilocaine (EMLA) 2.5-2.5 % cream APPLY SMALL AMOUNT TO ACCESS SITE (AVF) 1 TO 2 HOURS BEFORE DIALYSIS. COVER WITH OCCLUSIVE DRESSING (SARAN WRAP)      Cinacalcet HCl (SENSIPAR PO) Take 30 mg by mouth      diphenhydrAMINE (SOMINEX) 25 MG tablet Take 25 mg by mouth every 6 hours as needed      melatonin 3 MG TABS tablet Take 1 tablet by mouth nightly as needed (sleep)  Qty: 20 tablet, Refills: 0      cloNIDine (CATAPRES) 0.3 MG tablet Take 1 tablet by mouth 3 times daily  Qty: 90 tablet, Refills: 2      sevelamer (RENVELA) 800 MG tablet Take by mouth See Admin Instructions 2 tablets by mouth three times a day with meals and 1 tablet with snacks      pantoprazole (PROTONIX) 40 MG tablet TAKE 1 TABLET BY MOUTH DAILY  Qty: 30 tablet, Refills: 0      B Complex-C-Folic Acid (NEPHRO-JOHNNY) 0.8 MG TABS Take 1 tablet by mouth daily       hydrALAZINE (APRESOLINE) 50 MG tablet Take 50 mg by mouth 3 times daily       megestrol (MEGACE) 20 MG tablet Take 20 mg by mouth 3 times daily       losartan (COZAAR) 100 MG tablet Take 1 tablet by mouth every evening  Qty: 30 tablet, Refills: 2         STOP taking these medications       iron sucrose (VENOFER) 20 MG/ML injection Comments:   Reason for Stopping:         iron sucrose (VENOFER) 20 MG/ML injection Comments:   Reason for Stopping:         RA MELATONIN 3-2 MG TABS Comments:   Reason for Stopping:         Methoxy PEG-Epoetin Beta (MIRCERA IJ) Comments:   Reason for Stopping:                Follow-up With  Details  [facility]  On 8/13/2021  Hospital follow up @9:30AM  [address]         Franco Rooney MD  PGY-1   4:24 PM 8/6/2021

## 2021-08-06 NOTE — CARE COORDINATION
Called [facility] [phone] for transport, they will arrange transport home, which can take up to 3hrs,1:45pm-4:45pm, they will call the floor on arrival. Reference #[id number]. Updated bedside RN and pt. Aisha Aguirre, MSW, LSW

## 2021-08-06 NOTE — PROGRESS NOTES
Patient transportation request called patient that they were down in the main entrance. RN walked patient down and there was no transportation there. [facility].

## 2021-08-06 NOTE — PROGRESS NOTES
Nephrology Progress Note  Patient's Name: No Horton  10:55 AM  8/6/2021    Reason for Consult:  ESRD    Chief Complaint:  SOB, chest pain      From 8/3 Note-  History of Present Ilness:    No Horton is a 38 y.o. male with a history of ESRD, HD dependent MWF, hypertension, hypothyroidism and HFpEF. Patient presented to ED with complaints of left-sided chest pain and shortness of breath of several days duration. He said the pain was pleuritic. This was associated with exertional dyspnea. He could not go up the flights of stairs at his home without stopping to catch his breath. In the ED vital signs were significant for markedly elevated BP of 201/138 pulse 110 and temperature of 98.6. Lab data was significant for a WBC of 7.1, hemoglobin 8.5 and platelet count 732,078. His CHEM profile was consistent with his ESRD status. CTA of the chest demonstrated no pulmonary embolism. However it did show dense opacity in the right lower lobe and some bilateral groundglass opacity. Patient received 10 mg of labetalol IV, Toradol, Benadryl and Zofran. Patient was recently treated at [facility] for pneumonia and discharged on a combination of doxycycline and cefuroxime. Covid was negative at the time. He is now admitted for further management. Subjective    8/5/21:Patient sitting in bed, states he is feeling good, denies headache, shortness of breath.      Allergies:  Tylenol [acetaminophen] and Levofloxacin    Current Medications:    glucose (GLUTOSE) 40 % oral gel 15 g, PRN  dextrose 50 % IV solution, PRN  glucagon (rDNA) injection 1 mg, PRN  dextrose 5 % solution, PRN  labetalol (NORMODYNE) tablet 300 mg, TID  amoxicillin-clavulanate (AUGMENTIN-ES) 600-42.9 MG/5ML suspension 600 mg, 2 times per day  predniSONE (DELTASONE) tablet 40 mg, Daily  NIFEdipine (PROCARDIA XL) extended release tablet 90 mg, Daily  hydrALAZINE (APRESOLINE) injection 10 mg, Q4H PRN  cinacalcet (SENSIPAR) tablet 30 mg, Daily  cloNIDine (CATAPRES) tablet 0.3 mg, TID  losartan (COZAAR) tablet 100 mg, QPM  pantoprazole (PROTONIX) tablet 40 mg, Daily  sevelamer (RENVELA) tablet 1,600 mg, TID WC  sodium chloride flush 0.9 % injection 5-40 mL, 2 times per day  sodium chloride flush 0.9 % injection 5-40 mL, PRN  0.9 % sodium chloride infusion, PRN  ondansetron (ZOFRAN-ODT) disintegrating tablet 4 mg, Q8H PRN   Or  ondansetron (ZOFRAN) injection 4 mg, Q6H PRN  polyethylene glycol (GLYCOLAX) packet 17 g, Daily PRN  acetaminophen (TYLENOL) tablet 650 mg, Q6H PRN   Or  acetaminophen (TYLENOL) suppository 650 mg, Q6H PRN  heparin (porcine) injection 5,000 Units, 3 times per day  labetalol (NORMODYNE;TRANDATE) injection 10 mg, Q4H PRN  diphenhydrAMINE (BENADRYL) tablet 25 mg, Q8H PRN        Review of Systems:   Pertinent items are noted in HPI.     Physical exam:  Vitals:    08/06/21 1030   BP: 132/74   Pulse: 94   Resp:    Temp:    SpO2:           General appearance: alert, in no acute distress  Skin: No rashes or lesions on exposed skin  Neck: No JVD  Lungs: Clear, no adventitious sounds  Heart: RRR, no rub  Abdomen: Soft, non-tender, + bowel sounds  Extremities: No edema, left AVF +thrill, +bruit  Neurologic: Mental status: Alert, oriented, thought content appropriate      Data:   Labs:  Lab Results   Component Value Date     08/06/2021     08/05/2021     08/04/2021    K 5.3 (H) 08/06/2021    K 4.8 08/05/2021    K 4.2 08/04/2021    CL 94 (L) 08/06/2021    CO2 23 08/06/2021    CO2 27 08/05/2021    CO2 29 08/04/2021    CREATININE 9.1 (HH) 08/06/2021    CREATININE 6.5 (H) 08/05/2021    CREATININE 8.1 (HH) 08/04/2021    BUN 62 (H) 08/06/2021    BUN 40 (H) 08/05/2021    BUN 46 (H) 08/04/2021    GLUCOSE 149 (H) 08/06/2021    GLUCOSE 183 (H) 08/05/2021    GLUCOSE 94 08/04/2021    PHOS 5.0 (H) 08/06/2021    PHOS 3.9 08/05/2021    PHOS 4.6 (H) 08/04/2021    WBC 12.2 (H) 08/06/2021    WBC 7.1 08/05/2021    WBC 5.6 08/04/2021    HGB 8.2 (L) 08/06/2021 HGB 8.8 (L) 08/05/2021    HGB 7.4 (L) 08/04/2021    HCT 25.7 (L) 08/06/2021    HCT 28.4 (L) 08/05/2021    HCT 23.1 (L) 08/04/2021    MCV 94.8 08/06/2021     08/06/2021         Imaging:  XR CHEST (2 VW)    Result Date: 7/31/2021  EXAMINATION: TWO XRAY VIEWS OF THE CHEST 7/31/2021 4:07 am COMPARISON: July 15, 2021 HISTORY: ORDERING SYSTEM PROVIDED HISTORY: chest pain TECHNOLOGIST PROVIDED HISTORY: Reason for exam:->chest pain What reading provider will be dictating this exam?->CRC FINDINGS: There are patchy bilateral airspace opacities, greatest in the right lower lobe. These have increased since July 15, 2021. No evidence of a sizable pleural effusion. No pneumothorax. Heart size is unable to be accurately assessed on this single portable view of the chest, but appears to be stable. No acute osseous abnormality. Increased patchy airspace opacities throughout both lungs compared with July 15, 2021. Findings are most suspicious for multifocal pneumonia although pulmonary edema could have a similar appearance. CTA PULMONARY W CONTRAST    Result Date: 7/31/2021  EXAMINATION: CTA OF THE CHEST 7/31/2021 6:42 am TECHNIQUE: CTA of the chest was performed after the administration of intravenous contrast.  Multiplanar reformatted images are provided for review. MIP images are provided for review. Dose modulation, iterative reconstruction, and/or weight based adjustment of the mA/kV was utilized to reduce the radiation dose to as low as reasonably achievable. COMPARISON: 07/14/2021 HISTORY: ORDERING SYSTEM PROVIDED HISTORY: elevated dimer TECHNOLOGIST PROVIDED HISTORY: Reason for exam:->elevated dimer Decision Support Exception - unselect if not a suspected or confirmed emergency medical condition->Emergency Medical Condition (MA) What reading provider will be dictating this exam?->CRC FINDINGS: Pulmonary Arteries: Pulmonary arteries are adequately opacified for evaluation.   No evidence of intraluminal filling defect to suggest pulmonary embolism. Main pulmonary artery is normal in caliber. Mediastinum: No aortic aneurysm or dissection. There is cardiomegaly and a small pericardial effusion. Borderline sized subcarinal lymph node is noted. 13 mm left paratracheal node. . Lungs/pleura: There is continued dense airspace consolidation in the right lower lobe with air bronchograms. There are new diffuse bilateral ground-glass airspace opacities and bilateral multifocal small areas of airspace consolidation. No pneumothorax. Small right pleural effusion. Upper Abdomen: The kidneys appear atrophic. Soft Tissues/Bones: There are again seen prominent bilateral axillary lymph nodes. Diffuse increased density of the osseous structures are again compatible with renal osteodystrophy. No evidence of pulmonary emboli. Continued dense airspace consolidation of the right lower lobe. New extensive bilateral ground-glass opacities and patchy pulmonary airspace opacities may be related to pulmonary edema and/or multifocal infectious infiltrates. Cardiomegaly. Axillary adenopathy. Assessment/Plans    1. Hypertensive urgency, in part due to missed doses of some of his antihypertensives  -Adjust BP meds as needed  -UF as tolerated with hemodialysis  -Hydralazine, minoxidil discontinued last hospitalization (7/15/21) for possible lupus like reaction and pericardial effusion - CLAY neg, C4 wnl, anti-histone AB wnl    2. Right lower lobe pneumonia  -Antibiotics, on Zosyn  -ID and pulmonary following    3. Anemia due to CKD  -Continue his PREETI treatment  -Monitor    4. ESRD  -Continue hemodialysis MWF    5.   Acute hypoxic respiratory failure due to pneumonia and volume overload  -target increased ultrafiltration with dialysis  -Pulmonary following   -bronchoscopy 8/3/21    Will follow  Thanks    Catarino Dacosta MD  10:55 AM  8/6/2021     Department of Internal Medicine  Section of Nephrology  Dialysis Note        PROCEDURE:  Patient seen on hemodialysis at 10:55 AM    PHYSICIAN:  Mayte Baum M.D., FACP    INDICATION:  End-stage renal disease    RX:  See dialysis flowsheet for specifics on access, blood flow rate, dialysate baths, duration of dialysis, anticoagulation and other technical information.     COMMENTS:  Procedure in progress and tolerated       Billy Galarza MD, MD

## 2021-08-06 NOTE — PROGRESS NOTES
[address]   Internal Medicine Residency / [address]    Attending Physician Statement  I have discussed the case, including pertinent history and exam findings with the resident and the team.  I have seen and examined the patient and the key elements of the encounter have been performed by me. I agree with the assessment, plan and orders as documented by the resident. Examined in dialysis   A&O  VS stable and BP much better overnight  BP meds reviewed  Mobility good  Pneumonia slow improvement on Augmentin now  Plan; Discharge planning today    Remainder of medical problems as per resident note.       Tay Mishra MD [facility]  Internal Medicine Residency Faculty

## 2021-08-06 NOTE — PROGRESS NOTES
Nephrology Progress Note  Patient's Name: Selvin Lackey  1:15 PM  8/6/2021    Reason for Consult:  ESRD    Chief Complaint:  SOB, chest pain      From 8/3 Note-  History of Present Ilness:    Selvin Lackey is a 38 y.o. male with a history of ESRD, HD dependent MWF, hypertension, hypothyroidism and HFpEF. Patient presented to ED with complaints of left-sided chest pain and shortness of breath of several days duration. He said the pain was pleuritic. This was associated with exertional dyspnea. He could not go up the flights of stairs at his home without stopping to catch his breath. In the ED vital signs were significant for markedly elevated BP of 201/138 pulse 110 and temperature of 98.6. Lab data was significant for a WBC of 7.1, hemoglobin 8.5 and platelet count 630,473. His CHEM profile was consistent with his ESRD status. CTA of the chest demonstrated no pulmonary embolism. However it did show dense opacity in the right lower lobe and some bilateral groundglass opacity. Patient received 10 mg of labetalol IV, Toradol, Benadryl and Zofran. Patient was recently treated at [facility] for pneumonia and discharged on a combination of doxycycline and cefuroxime. Covid was negative at the time. He is now admitted for further management. Subjective    8/6/21:Patient states he is feeling good, no new issues. Updated his mother on the phone.        Allergies:  Tylenol [acetaminophen] and Levofloxacin    Current Medications:    glucose (GLUTOSE) 40 % oral gel 15 g, PRN  dextrose 50 % IV solution, PRN  glucagon (rDNA) injection 1 mg, PRN  dextrose 5 % solution, PRN  labetalol (NORMODYNE) tablet 300 mg, TID  amoxicillin-clavulanate (AUGMENTIN-ES) 600-42.9 MG/5ML suspension 600 mg, 2 times per day  predniSONE (DELTASONE) tablet 40 mg, Daily  NIFEdipine (PROCARDIA XL) extended release tablet 90 mg, Daily  hydrALAZINE (APRESOLINE) injection 10 mg, Q4H PRN  cinacalcet (SENSIPAR) tablet 30 mg, Daily  cloNIDine (CATAPRES) tablet 0.3 mg, TID  losartan (COZAAR) tablet 100 mg, QPM  pantoprazole (PROTONIX) tablet 40 mg, Daily  sevelamer (RENVELA) tablet 1,600 mg, TID WC  sodium chloride flush 0.9 % injection 5-40 mL, 2 times per day  sodium chloride flush 0.9 % injection 5-40 mL, PRN  0.9 % sodium chloride infusion, PRN  ondansetron (ZOFRAN-ODT) disintegrating tablet 4 mg, Q8H PRN   Or  ondansetron (ZOFRAN) injection 4 mg, Q6H PRN  polyethylene glycol (GLYCOLAX) packet 17 g, Daily PRN  acetaminophen (TYLENOL) tablet 650 mg, Q6H PRN   Or  acetaminophen (TYLENOL) suppository 650 mg, Q6H PRN  heparin (porcine) injection 5,000 Units, 3 times per day  labetalol (NORMODYNE;TRANDATE) injection 10 mg, Q4H PRN  diphenhydrAMINE (BENADRYL) tablet 25 mg, Q8H PRN        Review of Systems:   Pertinent items are noted in HPI.     Physical exam:  Vitals:    08/06/21 1115   BP: 136/81   Pulse: 95   Resp: 18   Temp:    SpO2:           General appearance: alert, in no acute distress  Skin: No rashes or lesions on exposed skin  Neck: No JVD  Lungs: Clear, no adventitious sounds  Heart: RRR, no rub  Abdomen: Soft, non-tender, + bowel sounds  Extremities: No edema, left AVF +thrill, +bruit  Neurologic: Mental status: Alert, oriented, thought content appropriate      Data:   Labs:  Lab Results   Component Value Date     08/06/2021     08/05/2021     08/04/2021    K 5.3 (H) 08/06/2021    K 4.8 08/05/2021    K 4.2 08/04/2021    CL 94 (L) 08/06/2021    CO2 23 08/06/2021    CO2 27 08/05/2021    CO2 29 08/04/2021    CREATININE 9.1 (HH) 08/06/2021    CREATININE 6.5 (H) 08/05/2021    CREATININE 8.1 (HH) 08/04/2021    BUN 62 (H) 08/06/2021    BUN 40 (H) 08/05/2021    BUN 46 (H) 08/04/2021    GLUCOSE 149 (H) 08/06/2021    GLUCOSE 183 (H) 08/05/2021    GLUCOSE 94 08/04/2021    PHOS 5.0 (H) 08/06/2021    PHOS 3.9 08/05/2021    PHOS 4.6 (H) 08/04/2021    WBC 12.2 (H) 08/06/2021    WBC 7.1 08/05/2021    WBC 5.6 08/04/2021    HGB 8.2 (L) 08/06/2021    HGB 8.8 (L) 08/05/2021    HGB 7.4 (L) 08/04/2021    HCT 25.7 (L) 08/06/2021    HCT 28.4 (L) 08/05/2021    HCT 23.1 (L) 08/04/2021    MCV 94.8 08/06/2021     08/06/2021         Imaging:  XR CHEST (2 VW)    Result Date: 7/31/2021  EXAMINATION: TWO XRAY VIEWS OF THE CHEST 7/31/2021 4:07 am COMPARISON: July 15, 2021 HISTORY: ORDERING SYSTEM PROVIDED HISTORY: chest pain TECHNOLOGIST PROVIDED HISTORY: Reason for exam:->chest pain What reading provider will be dictating this exam?->CRC FINDINGS: There are patchy bilateral airspace opacities, greatest in the right lower lobe. These have increased since July 15, 2021. No evidence of a sizable pleural effusion. No pneumothorax. Heart size is unable to be accurately assessed on this single portable view of the chest, but appears to be stable. No acute osseous abnormality. Increased patchy airspace opacities throughout both lungs compared with July 15, 2021. Findings are most suspicious for multifocal pneumonia although pulmonary edema could have a similar appearance. CTA PULMONARY W CONTRAST    Result Date: 7/31/2021  EXAMINATION: CTA OF THE CHEST 7/31/2021 6:42 am TECHNIQUE: CTA of the chest was performed after the administration of intravenous contrast.  Multiplanar reformatted images are provided for review. MIP images are provided for review. Dose modulation, iterative reconstruction, and/or weight based adjustment of the mA/kV was utilized to reduce the radiation dose to as low as reasonably achievable. COMPARISON: 07/14/2021 HISTORY: ORDERING SYSTEM PROVIDED HISTORY: elevated dimer TECHNOLOGIST PROVIDED HISTORY: Reason for exam:->elevated dimer Decision Support Exception - unselect if not a suspected or confirmed emergency medical condition->Emergency Medical Condition (MA) What reading provider will be dictating this exam?->CRC FINDINGS: Pulmonary Arteries: Pulmonary arteries are adequately opacified for evaluation.   No evidence of intraluminal filling defect to suggest pulmonary embolism. Main pulmonary artery is normal in caliber. Mediastinum: No aortic aneurysm or dissection. There is cardiomegaly and a small pericardial effusion. Borderline sized subcarinal lymph node is noted. 13 mm left paratracheal node. . Lungs/pleura: There is continued dense airspace consolidation in the right lower lobe with air bronchograms. There are new diffuse bilateral ground-glass airspace opacities and bilateral multifocal small areas of airspace consolidation. No pneumothorax. Small right pleural effusion. Upper Abdomen: The kidneys appear atrophic. Soft Tissues/Bones: There are again seen prominent bilateral axillary lymph nodes. Diffuse increased density of the osseous structures are again compatible with renal osteodystrophy. No evidence of pulmonary emboli. Continued dense airspace consolidation of the right lower lobe. New extensive bilateral ground-glass opacities and patchy pulmonary airspace opacities may be related to pulmonary edema and/or multifocal infectious infiltrates. Cardiomegaly. Axillary adenopathy. Assessment/Plans    1. Hypertensive urgency, in part due to missed doses of some of his antihypertensives  -Adjust BP meds as needed  -UF as tolerated with hemodialysis  -Hydralazine, minoxidil discontinued last hospitalization (7/15/21) for possible lupus like reaction and pericardial effusion - CLAY neg, C4 wnl, anti-histone AB wnl  8/5 labetolol increased - monitor for bradycardia as patient is on clonidine as well  Good response - BP is at/near target of <130/80    2. Right lower lobe pneumonia  -Antibiotics, on Zosyn  -ID and pulmonary following    3. Anemia due to CKD  -Monitor    4. ESRD  -Continue hemodialysis MWF    5.   Acute hypoxic respiratory failure due to pneumonia and volume overload  -target increased ultrafiltration with dialysis  -Pulmonary following   -bronchoscopy 8/3/21    Ok for discharge from renal standpoint, will follow patient at dialysis    Fabby Mason, APRN - CNS  1:15 PM  8/6/2021     Patient seen and examined all key components of the physical performed independently , case discussed with NP, all pertinent labs and radiologic tests personally reviewed agree with above. Naresh Martinez MD     Department of Internal Medicine  Section of Nephrology  Dialysis Note        PROCEDURE:  Patient seen on hemodialysis at 5:49 AM    PHYSICIAN:  P[facility]ons M.[facility]., [facility]    INDICATION:  End-stage renal disease    RX:  See dialysis flowsheet for specifics on access, blood flow rate, dialysate baths, duration of dialysis, anticoagulation and other technical information.     COMMENTS:   Procedure in progress and tolerated       Naresh Martinez MD, MD

## 2021-08-06 NOTE — PROGRESS NOTES
Comprehensive Nutrition Assessment    Type and Reason for Visit:  Initial    Nutrition Recommendations/Plan: Continue Current Diet, Start Oral Nutrition Supplement Nepro BID    Nutrition Assessment:  Pt admit w/ acute respiratory failure w/ hypoxia. H/o ESRD on HD, CHF & hypothyroidism. Pt avg 50-75% meals. Will start Nepro BID as requested by pt. Malnutrition Assessment:  Malnutrition Status:  No malnutrition    Context:  Chronic Illness     Findings of the 6 clinical characteristics of malnutrition:  Energy Intake:  Mild decrease in energy intake (Comment)  Weight Loss:  No significant weight loss     Body Fat Loss:  No significant body fat loss     Muscle Mass Loss:  No significant muscle mass loss    Fluid Accumulation:  No significant fluid accumulation     Strength:  Not Performed    Estimated Daily Nutrient Needs:  Energy (kcal):  4540-1864 (MSJ x 1.2 SF); Weight Used for Energy Requirements:  Current     Protein (g):  60-70 (1.2-1.4g/kg CBW); Weight Used for Protein Requirements:  Current        Fluid (ml/day):  Per renal; Method Used for Fluid Requirements:         Nutrition Related Findings:  A&Ox4, -I&Os (-5.3L), missing teeth, +BS, no noted edema, elevated renal labs, hyperglycemia. Wounds:  None       Current Nutrition Therapies:    ADULT DIET; Regular;  Low Sodium (2 gm)    Anthropometric Measures:  · Height: 5' 6\" (167.6 cm)  · Current Body Weight: 110 lb (49.9 kg) (8/6, bed scale)   · Admission Body Weight: 111 lb (50.3 kg) (7/31, bed scale)    · Usual Body Weight: 114 lb (51.7 kg) (3/2021, bed scale)     · Ideal Body Weight: 142 lbs; % Ideal Body Weight 77.5 %   · BMI: 17.8  · BMI Categories: Underweight (BMI less than 18.5)       Nutrition Diagnosis:   · Inadequate oral intake related to renal dysfunction as evidenced by intake 51-75%    Nutrition Interventions:   Food and/or Nutrient Delivery:  Continue Current Diet, Start Oral Nutrition Supplement (Nepro BID)  Nutrition Education/Counseling:  Education not indicated   Coordination of Nutrition Care:  Continue to monitor while inpatient    Goals:  Consume >50% meals/ONS       Nutrition Monitoring and Evaluation:   Behavioral-Environmental Outcomes:  None Identified   Food/Nutrient Intake Outcomes:  Food and Nutrient Intake, Supplement Intake  Physical Signs/Symptoms Outcomes:  Biochemical Data, Fluid Status or Edema, Nutrition Focused Physical Findings, Skin, Weight     Discharge Planning:     Too soon to determine     Electronically signed by Quirino Jay RD, LD on 8/6/21 at 12:12 PM EDT    Contact: [phone]

## 2021-08-07 LAB
ASPERGILLUS FUMIGATUS #1: NORMAL
ASPERGILLUS FUMIGATUS #6: NORMAL
AUREOBASIDIUM PULLULANS: NORMAL
MICROPOLYSPORA FAENI: NORMAL
PIGEON SERUM ABS: NORMAL
THERMOACTINOMYCES VULGARIS #1: NORMAL

## 2021-08-09 ENCOUNTER — APPOINTMENT (OUTPATIENT)
Dept: GENERAL RADIOLOGY | Age: 36
End: 2021-08-09
Payer: MEDICAID

## 2021-08-09 ENCOUNTER — TELEPHONE (OUTPATIENT)
Dept: INTERNAL MEDICINE | Age: 36
End: 2021-08-09

## 2021-08-09 ENCOUNTER — APPOINTMENT (OUTPATIENT)
Dept: CT IMAGING | Age: 36
End: 2021-08-09
Payer: MEDICAID

## 2021-08-09 ENCOUNTER — HOSPITAL ENCOUNTER (OUTPATIENT)
Age: 36
Setting detail: OBSERVATION
Discharge: HOME | End: 2021-08-10
Attending: STUDENT IN AN ORGANIZED HEALTH CARE EDUCATION/TRAINING PROGRAM | Admitting: INTERNAL MEDICINE
Payer: MEDICAID

## 2021-08-09 DIAGNOSIS — I10 ESSENTIAL HYPERTENSION: ICD-10-CM

## 2021-08-09 DIAGNOSIS — N18.6 ESRD (END STAGE RENAL DISEASE) (HCC): ICD-10-CM

## 2021-08-09 DIAGNOSIS — D64.9 CHRONIC ANEMIA: ICD-10-CM

## 2021-08-09 DIAGNOSIS — Z99.2 ESRD ON HEMODIALYSIS (HCC): ICD-10-CM

## 2021-08-09 DIAGNOSIS — I16.0 HYPERTENSIVE URGENCY: Primary | ICD-10-CM

## 2021-08-09 DIAGNOSIS — R07.9 CHEST PAIN, UNSPECIFIED TYPE: ICD-10-CM

## 2021-08-09 DIAGNOSIS — N18.6 ESRD ON HEMODIALYSIS (HCC): ICD-10-CM

## 2021-08-09 DIAGNOSIS — R77.8 ELEVATED TROPONIN: ICD-10-CM

## 2021-08-09 PROBLEM — J18.9 PNEUMONIA: Status: ACTIVE | Noted: 2021-08-09

## 2021-08-09 LAB
ALBUMIN SERPL-MCNC: 3.5 G/DL (ref 3.5–5.2)
ALP BLD-CCNC: 70 U/L (ref 40–129)
ALT SERPL-CCNC: 123 U/L (ref 0–40)
ANION GAP SERPL CALCULATED.3IONS-SCNC: 11 MMOL/L (ref 7–16)
AST SERPL-CCNC: 30 U/L (ref 0–39)
BASOPHILS ABSOLUTE: 0.03 E9/L (ref 0–0.2)
BASOPHILS RELATIVE PERCENT: 0.3 % (ref 0–2)
BILIRUB SERPL-MCNC: 0.4 MG/DL (ref 0–1.2)
BUN BLDV-MCNC: 28 MG/DL (ref 6–20)
CALCIUM SERPL-MCNC: 8.9 MG/DL (ref 8.6–10.2)
CHLORIDE BLD-SCNC: 98 MMOL/L (ref 98–107)
CO2: 29 MMOL/L (ref 22–29)
CREAT SERPL-MCNC: 5.4 MG/DL (ref 0.7–1.2)
EOSINOPHILS ABSOLUTE: 0.48 E9/L (ref 0.05–0.5)
EOSINOPHILS RELATIVE PERCENT: 5.1 % (ref 0–6)
GFR AFRICAN AMERICAN: 14
GFR NON-AFRICAN AMERICAN: 14 ML/MIN/1.73
GLUCOSE BLD-MCNC: 77 MG/DL (ref 74–99)
HCT VFR BLD CALC: 23.3 % (ref 37–54)
HEMOGLOBIN: 7.3 G/DL (ref 12.5–16.5)
IMMATURE GRANULOCYTES #: 0.08 E9/L
IMMATURE GRANULOCYTES %: 0.8 % (ref 0–5)
LYMPHOCYTES ABSOLUTE: 1.51 E9/L (ref 1.5–4)
LYMPHOCYTES RELATIVE PERCENT: 15.9 % (ref 20–42)
MCH RBC QN AUTO: 29.9 PG (ref 26–35)
MCHC RBC AUTO-ENTMCNC: 31.3 % (ref 32–34.5)
MCV RBC AUTO: 95.5 FL (ref 80–99.9)
MONOCYTES ABSOLUTE: 0.53 E9/L (ref 0.1–0.95)
MONOCYTES RELATIVE PERCENT: 5.6 % (ref 2–12)
NEUTROPHILS ABSOLUTE: 6.87 E9/L (ref 1.8–7.3)
NEUTROPHILS RELATIVE PERCENT: 72.3 % (ref 43–80)
PDW BLD-RTO: 17.9 FL (ref 11.5–15)
PLATELET # BLD: 247 E9/L (ref 130–450)
PMV BLD AUTO: 8.6 FL (ref 7–12)
POTASSIUM REFLEX MAGNESIUM: 3.9 MMOL/L (ref 3.5–5)
RBC # BLD: 2.44 E12/L (ref 3.8–5.8)
SODIUM BLD-SCNC: 138 MMOL/L (ref 132–146)
TOTAL PROTEIN: 6.8 G/DL (ref 6.4–8.3)
TROPONIN, HIGH SENSITIVITY: 86 NG/L (ref 0–11)
TROPONIN, HIGH SENSITIVITY: 89 NG/L (ref 0–11)
WBC # BLD: 9.5 E9/L (ref 4.5–11.5)

## 2021-08-09 PROCEDURE — 36415 COLL VENOUS BLD VENIPUNCTURE: CPT

## 2021-08-09 PROCEDURE — 6370000000 HC RX 637 (ALT 250 FOR IP): Performed by: STUDENT IN AN ORGANIZED HEALTH CARE EDUCATION/TRAINING PROGRAM

## 2021-08-09 PROCEDURE — 70450 CT HEAD/BRAIN W/O DYE: CPT

## 2021-08-09 PROCEDURE — 6370000000 HC RX 637 (ALT 250 FOR IP): Performed by: INTERNAL MEDICINE

## 2021-08-09 PROCEDURE — 87040 BLOOD CULTURE FOR BACTERIA: CPT

## 2021-08-09 PROCEDURE — 71046 X-RAY EXAM CHEST 2 VIEWS: CPT

## 2021-08-09 PROCEDURE — G0378 HOSPITAL OBSERVATION PER HR: HCPCS

## 2021-08-09 PROCEDURE — 93005 ELECTROCARDIOGRAM TRACING: CPT | Performed by: STUDENT IN AN ORGANIZED HEALTH CARE EDUCATION/TRAINING PROGRAM

## 2021-08-09 PROCEDURE — 84484 ASSAY OF TROPONIN QUANT: CPT

## 2021-08-09 PROCEDURE — 99285 EMERGENCY DEPT VISIT HI MDM: CPT

## 2021-08-09 PROCEDURE — 80053 COMPREHEN METABOLIC PANEL: CPT

## 2021-08-09 PROCEDURE — 85025 COMPLETE CBC W/AUTO DIFF WBC: CPT

## 2021-08-09 RX ORDER — MEGESTROL ACETATE 20 MG/1
20 TABLET ORAL DAILY
COMMUNITY
End: 2021-08-20 | Stop reason: SDUPTHER

## 2021-08-09 RX ORDER — NIFEDIPINE 60 MG/1
60 TABLET, EXTENDED RELEASE ORAL DAILY
Status: DISCONTINUED | OUTPATIENT
Start: 2021-08-10 | End: 2021-08-10

## 2021-08-09 RX ORDER — AMOXICILLIN AND CLAVULANATE POTASSIUM 600; 42.9 MG/5ML; MG/5ML
600 POWDER, FOR SUSPENSION ORAL EVERY 12 HOURS SCHEDULED
Status: DISCONTINUED | OUTPATIENT
Start: 2021-08-09 | End: 2021-08-10 | Stop reason: HOSPADM

## 2021-08-09 RX ORDER — PREDNISONE 20 MG/1
20 TABLET ORAL DAILY
Status: DISCONTINUED | OUTPATIENT
Start: 2021-08-20 | End: 2021-08-10 | Stop reason: DRUGHIGH

## 2021-08-09 RX ORDER — CLONIDINE HYDROCHLORIDE 0.1 MG/1
0.3 TABLET ORAL ONCE
Status: COMPLETED | OUTPATIENT
Start: 2021-08-09 | End: 2021-08-09

## 2021-08-09 RX ORDER — SODIUM CHLORIDE 0.9 % (FLUSH) 0.9 %
5-40 SYRINGE (ML) INJECTION EVERY 12 HOURS SCHEDULED
Status: DISCONTINUED | OUTPATIENT
Start: 2021-08-09 | End: 2021-08-10 | Stop reason: HOSPADM

## 2021-08-09 RX ORDER — HYDRALAZINE HYDROCHLORIDE 25 MG/1
50 TABLET, FILM COATED ORAL ONCE
Status: COMPLETED | OUTPATIENT
Start: 2021-08-09 | End: 2021-08-09

## 2021-08-09 RX ORDER — SODIUM CHLORIDE 0.9 % (FLUSH) 0.9 %
5-40 SYRINGE (ML) INJECTION PRN
Status: DISCONTINUED | OUTPATIENT
Start: 2021-08-09 | End: 2021-08-10 | Stop reason: HOSPADM

## 2021-08-09 RX ORDER — SODIUM CHLORIDE 9 MG/ML
25 INJECTION, SOLUTION INTRAVENOUS PRN
Status: DISCONTINUED | OUTPATIENT
Start: 2021-08-09 | End: 2021-08-10 | Stop reason: HOSPADM

## 2021-08-09 RX ORDER — PREDNISONE 10 MG/1
10 TABLET ORAL DAILY
Status: DISCONTINUED | OUTPATIENT
Start: 2021-08-25 | End: 2021-08-10 | Stop reason: DRUGHIGH

## 2021-08-09 RX ORDER — ONDANSETRON 2 MG/ML
4 INJECTION INTRAMUSCULAR; INTRAVENOUS EVERY 6 HOURS PRN
Status: DISCONTINUED | OUTPATIENT
Start: 2021-08-09 | End: 2021-08-10 | Stop reason: HOSPADM

## 2021-08-09 RX ORDER — LABETALOL 100 MG/1
300 TABLET, FILM COATED ORAL ONCE
Status: COMPLETED | OUTPATIENT
Start: 2021-08-09 | End: 2021-08-09

## 2021-08-09 RX ORDER — ONDANSETRON 4 MG/1
4 TABLET, ORALLY DISINTEGRATING ORAL EVERY 8 HOURS PRN
Status: DISCONTINUED | OUTPATIENT
Start: 2021-08-09 | End: 2021-08-10 | Stop reason: HOSPADM

## 2021-08-09 RX ORDER — HYDRALAZINE HYDROCHLORIDE 20 MG/ML
10 INJECTION INTRAMUSCULAR; INTRAVENOUS ONCE
Status: DISCONTINUED | OUTPATIENT
Start: 2021-08-09 | End: 2021-08-09

## 2021-08-09 RX ORDER — HEPARIN SODIUM 10000 [USP'U]/ML
5000 INJECTION, SOLUTION INTRAVENOUS; SUBCUTANEOUS EVERY 8 HOURS
Status: DISCONTINUED | OUTPATIENT
Start: 2021-08-09 | End: 2021-08-10 | Stop reason: HOSPADM

## 2021-08-09 RX ORDER — PREDNISONE 20 MG/1
40 TABLET ORAL DAILY
Status: DISCONTINUED | OUTPATIENT
Start: 2021-08-10 | End: 2021-08-10 | Stop reason: DRUGHIGH

## 2021-08-09 RX ORDER — HYDRALAZINE HYDROCHLORIDE 25 MG/1
25 TABLET, FILM COATED ORAL 3 TIMES DAILY
Status: ON HOLD | COMMUNITY
End: 2021-08-10 | Stop reason: HOSPADM

## 2021-08-09 RX ORDER — CLONIDINE HYDROCHLORIDE 0.1 MG/1
0.3 TABLET ORAL 3 TIMES DAILY
Status: DISCONTINUED | OUTPATIENT
Start: 2021-08-09 | End: 2021-08-10 | Stop reason: HOSPADM

## 2021-08-09 RX ADMIN — CLONIDINE HYDROCHLORIDE 0.3 MG: 0.1 TABLET ORAL at 23:48

## 2021-08-09 RX ADMIN — LABETALOL HYDROCHLORIDE 300 MG: 100 TABLET, FILM COATED ORAL at 12:18

## 2021-08-09 RX ADMIN — LABETALOL HYDROCHLORIDE 300 MG: 200 TABLET, FILM COATED ORAL at 23:48

## 2021-08-09 RX ADMIN — HYDRALAZINE HYDROCHLORIDE 50 MG: 25 TABLET, FILM COATED ORAL at 12:13

## 2021-08-09 RX ADMIN — CLONIDINE HYDROCHLORIDE 0.3 MG: 0.1 TABLET ORAL at 12:19

## 2021-08-09 ASSESSMENT — PAIN SCALES - GENERAL
PAINLEVEL_OUTOF10: 0
PAINLEVEL_OUTOF10: 0
PAINLEVEL_OUTOF10: 9

## 2021-08-09 NOTE — PROGRESS NOTES
Notified Dr. Constance Concepcion via perfect serve about admission orders  and the lack of IV access.

## 2021-08-09 NOTE — ED NOTES
Blood work obtained via arterial stick. Pressure applied/ patient tolerated well.      Alice Gavin RN  08/09/21 0659

## 2021-08-09 NOTE — ED PROVIDER NOTES
Department of Emergency Medicine   ED  Provider Note  Admit Date/RoomTime: 8/9/2021 11:20 AM  ED Room: [facility]          History of Present Illness:  8/9/21, Time: 12:03 PM EDT  Chief Complaint   Patient presents with    Chest Pain     starting today with SOB. Had a 3 hour treatment of diaylsis today. Received 1 nitro and 4 ASA       Dedrick Self is a 38 y.o. male presenting to the ED for chest pain. Patient has past medical history of end-stage renal disease on hemodialysis. He states he was in his normal state of health this morning however at dialysis within the last hour he developed chest pain and shortness of breath. The patient states that chest pain was left-sided and stabbing. Seems to radiate to his left arm. Nothing seems to worsen or improve it. He was given aspirin prior to arrival and nitro. He has associated shortness of breath as well. At dialysis they put him on oxygen but it did not seem to help. He has had a cough that is nonproductive. He notes that he was recently discharged and never filled his antibiotics. He is supposed to be taking Augmentin. The patient received 3 hours of dialysis out of 4. Last full run was Friday as he receives dialysis every Monday Wednesday Friday. He is noting some nausea but no vomiting. No abdominal pain. He also is having a headache. He states that it is the right temporal region and a throbbing sensation. This began during dialysis. It is typical for him to have this type of headache when his blood pressure is high. He was noted to have a systolic blood pressure in the 200s at dialysis and believes he was given 0.1 mg of clonidine. He notes that he is due for his lunch blood pressure medications at this time. He also notes that he has a history of brain aneurysm however this does not feel like he has brain aneurysm. He denies any current double vision, numbness, tingling or weakness. No head trauma.   The patient does not make urine.    Review of Systems:   Constitutional symptoms: Denies fever  Eyes: Denies eye pain  Ears, Nose, Mouth, Throat: Denies ear pain  Cardiovascular: Positive for chest pain  Respiratory: Positive for shortness of breath, positive for cough  Gastrointestinal: Denies blood per rectum  Genitourinary: Denies hematuria  Skin: Denies rashes  Neurological: Positive for headache  Musculoskeletal: Denies extremity pain    --------------------------------------------- PAST HISTORY ---------------------------------------------  Past Medical History:  has a past medical history of (HFpEF) heart failure with preserved ejection fraction (HCC), Anxiety, AVF (arteriovenous fistula) ([facility] [address] Chronic kidney disease, Depression, [facility] History of ruptured Berry aneurysm ([facility] [address] Hypertension, Hypothyroidism, Intracranial hemorrhage ([facility] [address] Neutropenia ([facility] [address] Noncompliance w/medication treatment due to intermit use of medication, and Pre-transplant evaluation for kidney transplant. Past Surgical History:  has a past surgical history that includes Brain aneurysm surgery (Right, 3-4 years ago); Dialysis fistula creation (Left, 11 years ago); Upper gastrointestinal endoscopy (N/A, 1/3/2019); and bronchoscopy (N/A, 8/3/2021). Social History:  reports that he has never smoked. He has never used smokeless tobacco. He reports previous drug use. Drug: Marijuana. He reports that he does not drink alcohol. Family History: family history includes Kidney Disease in his paternal grandmother. . Unless otherwise noted, family history is non contributory    The patients home medications have been reviewed.     Allergies: Tylenol [acetaminophen] and Levofloxacin    I have reviewed the past medical history, past surgical history, social history, and family history    ---------------------------------------------------PHYSICAL EXAM--------------------------------------    General: The patient is conversational and lying comfortably in bed. Head: Normocephalic and atraumatic. Eyes: Sclera non-icteric and no conjunctival injection  ENT: Nasal and oral membranes moist  Neck: Trachea midline. No JVD  Respiratory: Lungs clear to auscultation bilaterally. Patient speaking in full sentences. Cardiovascular: Regular rate. No pedal edema. Patient has a left upper extremity fistula with palpable thrill  Gastrointestinal:  Abdomen is soft and nondistended. Bowel sounds present. There is no tenderness. There is no guarding or rebound. Musculoskeletal: No deformity  Skin: Skin warm and dry. No rashes. Neurologic: No gross motor deficits. No aphasia. Pupils are equal and reactive to light. Extraocular eye movements intact. Sensation intact bilaterally. Symmetric facies. Tongue protrudes midline. 5 out of 5 symmetric strength of the upper and lower extremities. Negative finger-to-nose testing. Alert and oriented. Psychiatric: Normal affect.    -------------------------------------------------- RESULTS -------------------------------------------------  I have personally reviewed all laboratory and imaging results for this patient. Results are listed below.      LABS: (Lab results interpreted by me)  Results for orders placed or performed during the hospital encounter of 08/09/21   CBC Auto Differential   Result Value Ref Range    WBC 9.5 4.5 - 11.5 E9/L    RBC 2.44 (L) 3.80 - 5.80 E12/L    Hemoglobin 7.3 (L) 12.5 - 16.5 g/dL    Hematocrit 23.3 (L) 37.0 - 54.0 %    MCV 95.5 80.0 - 99.9 fL    MCH 29.9 26.0 - 35.0 pg    MCHC 31.3 (L) 32.0 - 34.5 %    RDW 17.9 (H) 11.5 - 15.0 fL    Platelets 194 807 - 408 E9/L    MPV 8.6 7.0 - 12.0 fL    Neutrophils % 72.3 43.0 - 80.0 %    Immature Granulocytes % 0.8 0.0 - 5.0 %    Lymphocytes % 15.9 (L) 20.0 - 42.0 %    Monocytes % 5.6 2.0 - 12.0 %    Eosinophils % 5.1 0.0 - 6.0 %    Basophils % 0.3 0.0 - 2.0 %    Neutrophils Absolute 6.87 1.80 - 7.30 E9/L Immature Granulocytes # 0.08 E9/L    Lymphocytes Absolute 1.51 1.50 - 4.00 E9/L    Monocytes Absolute 0.53 0.10 - 0.95 E9/L    Eosinophils Absolute 0.48 0.05 - 0.50 E9/L    Basophils Absolute 0.03 0.00 - 0.20 E9/L   Comprehensive Metabolic Panel w/ Reflex to MG   Result Value Ref Range    Sodium 138 132 - 146 mmol/L    Potassium reflex Magnesium 3.9 3.5 - 5.0 mmol/L    Chloride 98 98 - 107 mmol/L    CO2 29 22 - 29 mmol/L    Anion Gap 11 7 - 16 mmol/L    Glucose 77 74 - 99 mg/dL    BUN 28 (H) 6 - 20 mg/dL    CREATININE 5.4 (H) 0.7 - 1.2 mg/dL    GFR Non-African American 14 >=60 mL/min/1.73    GFR African American 14     Calcium 8.9 8.6 - 10.2 mg/dL    Total Protein 6.8 6.4 - 8.3 g/dL    Albumin 3.5 3.5 - 5.2 g/dL    Total Bilirubin 0.4 0.0 - 1.2 mg/dL    Alkaline Phosphatase 70 40 - 129 U/L     (H) 0 - 40 U/L    AST 30 0 - 39 U/L   Troponin   Result Value Ref Range    Troponin, High Sensitivity 86 (H) 0 - 11 ng/L   Troponin   Result Value Ref Range    Troponin, High Sensitivity 89 (H) 0 - 11 ng/L   EKG 12 Lead   Result Value Ref Range    Ventricular Rate 96 BPM    Atrial Rate 96 BPM    P-R Interval 190 ms    QRS Duration 86 ms    Q-T Interval 370 ms    QTc Calculation (Bazett) 467 ms    P Axis 67 degrees    R Axis 75 degrees    T Axis 84 degrees   ,     RADIOLOGY:  Interpreted by Radiologist unless otherwise specified  CT HEAD WO CONTRAST   Final Result   1. Redemonstration of postsurgical changes related to right-sided craniectomy   and right-sided aneurysm repair. 2. No acute intracranial hemorrhage or edema.          XR CHEST (2 VW)   Final Result   Redemonstration of patchy airspace and interstitial opacities bilaterally   increasing in right lung base compared to prior from August 3, 2021.             ------------------------- NURSING NOTES AND VITALS REVIEWED ---------------------------   The nursing notes within the ED encounter and vital signs as below have been reviewed by myself  BP (!) 192/120 Pulse 93   Temp 97.8 °F (36.6 °C) (Temporal)   Resp 18   Ht 5' 6\" (1.676 m)   Wt 110 lb (49.9 kg)   SpO2 98%   BMI 17.75 kg/m²     Oxygen Saturation Interpretation: Normal    The patients available past medical records and past encounters were reviewed. ------------------------------ ED COURSE/MEDICAL DECISION MAKING----------------------  Medications   vancomycin 1000 mg IVPB in 250 mL D5W addavial (has no administration in time range)   cefepime (MAXIPIME) 2000 mg IVPB minibag (has no administration in time range)   hydrALAZINE (APRESOLINE) injection 10 mg (has no administration in time range)   cloNIDine (CATAPRES) tablet 0.3 mg (0.3 mg Oral Given 8/9/21 1219)   hydrALAZINE (APRESOLINE) tablet 50 mg (50 mg Oral Given 8/9/21 1213)   labetalol (NORMODYNE) tablet 300 mg (300 mg Oral Given 8/9/21 1218)       Medical Decision Making: This is a 38 y.o. male presenting to the ED for chest pain and shortness of breath. On initial presentation, the patient's vital signs are interpreted as significantly hypertensive, afebrile and saturating well. Based on history and physical exam, we have a broad differential.  As the patient is significantly hypertensive and due for his blood pressure medications we are concerned for hypertensive emergency or urgency. His neurological exam is nonfocal and he denies any trauma. However, as he also has a known history of aneurysm we cannot exclude intracranial process. The patient's chest pain or shortness of breath is concerning for ACS, arrhythmia or pulmonary edema. We cannot exclude a pneumonia as the patient has recently been treated and noncompliant with his medication. At this time will obtain chest x-ray, EKG and laboratory studies. Will obtain a CT of the head without contrast.  The patient will be given his afternoon doses of medications including labetalol, clonidine, and hydralazine. A 12-lead EKG was performed and interpreted by myself.   The rate is 96 with normal sinus rhythm and normal axis. There is no ectopy. The WI interval is 190, QRS interval is 86, and QTC interval is 467. The patient denies no acute ST elevations or depressions. There is some weaving artifact noted. There is evidence of biatrial enlargement. This is sinus rhythm with nonspecific abnormality. Laboratory studies show electrolytes within normal limits other than elevated BUN and creatinine of 20 and 5.4. This is consistent with his history of end-stage renal disease. Troponin is elevated to 86. Review of EMR shows this is near baseline which ranges from the 70s to 90s. We will be repeating this in an hour. LFTs within normal except for minimally elevated ALT. No leukocytosis. Patient does have hemoglobin of 7.3. Review of EMR shows baseline range of 7-8. This is a chronic anemia. Chest x-ray shows redemonstration of patchy airspace and interstitial opacities bilaterally increased in the right lung base compared to prior. CT of the head shows redemonstration of postsurgical changes related to the right sided craniotomy and right aneurysm repair. No acute intracranial hemorrhage or edema. This is interpreted by radiology. The patient endorses significant improvement of his headache. Is resolved at this time. However he continues to have chest pain and shortness of breath. With elevated troponin, ESRD and hypertensive emergency we do feel he requires better blood pressure control. Repeat vitals show blood pressure 192/120. Patient given 10 mg of IV hydralazine. Repeat troponin still pending. With worsening pneumonia and persistent symptoms we do feel he requires admission. He will be admitted to observation bed under telemetry. The patient was recently discharged from Dr. Marah Noonan who admits to [place]. We spoke with them they are agreeable to the plan. Repeat troponin is 89. Patient is resting comfortably.     This patient's ED course included: a personal history and physicial examination and re-evaluation prior to disposition    This patient improved during their ED course. Consultations:  Internal Medicine    The cardiac monitor revealed sinus rhythm with a heart rate in the 90s as interpreted by me. The cardiac monitor was ordered secondary to the patient's chest pain and to monitor the patient for dysrhythmia. CPT [id number]    Oxygen Saturation Interpretation: 98 % on room air. Normal    Counseling:   I have spoken with the patient and discussed todays results, in addition to providing specific details for the plan of care and counseling regarding the diagnosis and prognosis. Questions are answered at this time and they are agreeable with the plan.     --------------------------------- IMPRESSION AND DISPOSITION ---------------------------------    IMPRESSION  1. Hypertensive urgency    2. Elevated troponin    3. Chest pain, unspecified type    4. ESRD (end stage renal disease) ([facility] [address]    5. Chronic anemia        DISPOSITION  Disposition: Admit to telemetry  Patient condition is fair    IDr. Tulio, am the primary provider of record    NOTE: This report was transcribed using voice recognition software.  Every effort was made to ensure accuracy; however, inadvertent computerized transcription errors may be present      Tulio Pedersen MD  08/09/21 [id number]

## 2021-08-09 NOTE — ED NOTES
Attempted IV access x 2 without success. IV team notified via [address] [place] [address] for IV access and blood cultures.      Chad Tran RN  08/09/21 [id number]

## 2021-08-09 NOTE — TELEPHONE ENCOUNTER
[facility] Trans[facility] Initial Follow Up Call    Outreach made within 2 business days of discharge: Yes    Patient: Rory Adams Patient : 1985   MRN: [id number]  Reason for Admission: There are no discharge diagnoses documented for the most recent discharge.   Discharge Date: 21       Spoke with: left message     Discharge department/facility: [facility]        Scheduled appointment with PCP within 7-14 days    Follow Up  Future Appointments   Date Time Provider Jean Joshi   2021  2:30 PM Mohit Carlin MD [facility]   2021  2:00 PM [facility]   2021  1:30 PM Sherron Acharya DO [facility]   2022 10:00 AM Domonique Welch MD [facility]

## 2021-08-09 NOTE — H&P
[facility]  Internal Medicine Residency Program  History and Physical    Patient:  Lynne Mora 38 y.o. male MRN: [id number]     Date of Service: 8/9/2021    Hospital Day: 1      Chief complaint: had concerns including Chest Pain (starting today with SOB. Had a 3 hour treatment of diaylsis today. Received 1 nitro and 4 ASA). History of Present Illness   The patient is a 38 y.o. male with a past medical history of HTN, ESRD with HD MWF, HFpEF (EF60-65%), hypothyroidism, and aneurysm of AV fistula (2016) who came in with a chief complaint of chest pain and shortness of breath. Pain and SOB started this morning while the patient was in dialysis. Pt reports that he started having trouble breathing, saw black spots, and felt a stabbing chest pain every time he breathed in. Patient also developed a cough without sputum during dialysis. The pain is in his left chest and radiates to his left arm. Pt also notes tingling/numbness in the right hand. The dialysis nurses put him on oxygen and gave him three doses of clonidine but eventually decided to send him to the ED. He received 3 of 4 hours of dialysis. Patient also notes an intense headache, nausea, dizziness and blurry vision. At bedside, the patient noted some residual chest pain, small headache, and slight blurry vision. Patient denies any previous cough or fever. Patient reports compliance with his home blood pressure medications including nifedipine 90mg TID, clonidine 0.3 mg TID, labetalol 200 mg TID, and losartan 100mg TID. Patient was unable to give an accurate home blood pressure possibly due to faulty equipment (patient reported SBP to be \"1\"). Patient was unable to fill prescription for augmentin or prednisone post discharge on 8/6/21 because he did not have a ride. Patient reports similar headache during previous high blood pressure episodes.        ED Course: EKG showed biatrial enlargement and LV hypertrophy that was non significantly changed from previous tracings. CXR showed redmonstration of patchy airspace and interstitial opacities bilaterally that increased in R lung base compared to prior CXR in August 2021. ED Meds: hydralazine tab 50mg PO, clonidine tab 0.3 mg PO, labetalol 300mg PO  ED Fluids: none    Past Medical History:      Diagnosis Date    (HFpEF) heart failure with preserved ejection fraction ([facility]     stage III    Anxiety 9/19/2015    AVF (arteriovenous fistula) ([facility] 4/30/2018    Chronic kidney disease     CKD since early childhood per pt and on HD ~ 11 years    Depression     Encounter regarding vascular access for dialysis for ESRD ([facility] 4/30/2018    History of ruptured Berry aneurysm ([facility] 10/20/2015    Hypertension     on meds for ~ 11 years    Hypothyroidism     Intracranial hemorrhage (HCC)     was d/t ruptured aneurysm - pt underwent neurosurgery for clipping of the aneurysm    Neutropenia ([facility]     Noncompliance w/medication treatment due to intermit use of medication 11/3/2015    Pre-transplant evaluation for kidney transplant 4/5/2017       Past Surgical History:        Procedure Laterality Date    BRAIN ANEURYSM SURGERY Right 3-4 years ago    Intracranial aneurysm clipping surgery 3-4 years ago, after rupture of aneurysm causing ICH    BRONCHOSCOPY N/A 8/3/2021    BRONCHOSCOPY DIAGNOSTIC OR [facility] ONLY performed by Phan Mullins [facility] 11 years ago    UPPER GASTROINTESTINAL ENDOSCOPY N/A 1/3/2019    EGD BIOPSY performed by Kaylen Kimbrough MD at [address] [place] [address]       Medications Prior to Admission:    Prior to Admission medications    Medication Sig Start Date End Date Taking?  Authorizing Provider   labetalol (NORMODYNE) 300 MG tablet Take 1 tablet by mouth three times daily 8/6/21  Yes Keli Ramos MD   NIFEdipine (PROCARDIA XL) 90 MG extended release tablet Take 1 tablet by mouth daily 8/6/21  Yes Keli Ramos MD pantoprazole (PROTONIX) 40 MG tablet TAKE 1 TABLET BY MOUTH DAILY 8/5/21  Yes Ale Verma MD   amoxicillin-clavulanate (AUGMENTIN-ES) 600-42.9 MG/5ML suspension Take 5 mLs by mouth every 12 hours for 5 days 8/5/21 8/10/21 Yes Douglas Last MD   predniSONE (DELTASONE) 10 MG tablet Prednisone 40 mg PO q day x 5 days then   Prednisone 30 mg PO q day x 5 days then  Prednisone 20 mg PO q day x 5 days then  Prednisone 10 mg PO q day x 5 days then stop 8/5/21  Yes MD JOSEPH Melendez Complex-C-Folic Acid (NEPHRO-JOHNNY) 0.8 MG TABS Take 1 tablet by mouth daily  4/23/21  Yes Historical Provider, MD   lidocaine-prilocaine (EMLA) 2.5-2.5 % cream APPLY SMALL AMOUNT TO ACCESS SITE (AVF) 1 TO 2 HOURS BEFORE DIALYSIS. COVER WITH OCCLUSIVE DRESSING (SARAN WRAP) 3/22/21  Yes Historical Provider, MD   diphenhydrAMINE (SOMINEX) 25 MG tablet Take 25 mg by mouth 2 times daily as needed    Yes Historical Provider, MD   melatonin 3 MG TABS tablet Take 1 tablet by mouth nightly as needed (sleep) 3/30/21  Yes Jennifer Mcdermott MD   losartan (COZAAR) 100 MG tablet Take 1 tablet by mouth every evening 3/30/21  Yes Jennifer Mcdermott MD   cloNIDine (CATAPRES) 0.3 MG tablet Take 1 tablet by mouth 3 times daily 3/30/21  Yes Jennifer Mcdermott MD   sevelamer (RENVELA) 800 MG tablet Take by mouth See Admin Instructions 2 tablets by mouth three times a day with meals and 1 tablet with snacks   Yes Historical Provider, MD       Allergies:  Tylenol [acetaminophen] and Levofloxacin    Social History:   TOBACCO:   reports that he has never smoked. He has never used smokeless tobacco.  ETOH:   reports no history of alcohol use.   OCCUPATION:      Family History:       Problem Relation Age of Onset    Kidney Disease Paternal Grandmother     Cancer Neg Hx     Heart Disease Neg Hx        REVIEW OF SYSTEMS:    · Constitutional: No fever, no chills,   · HEENT: blurred vision, saw black spots  · Respiratory: cough during dialysis (not present otherwise), no sputum production, pleuritic chest pain, episode shortness of breath.    · Gastroenterology: no abdominal pain, nausea no vomiting;   · Genitourinary: no urine output   · Neurology: tingling/numbness in right hand, blurred vision, no slurred speech  · Hematology: no lymphadenopathy    Physical Exam   · Vitals: BP (!) 192/120   Pulse 93   Temp 97.8 °F (36.6 °C) (Temporal)   Resp 18   Ht 5' 6\" (1.676 m)   Wt 110 lb (49.9 kg)   SpO2 98%   BMI 17.75 kg/m²     · General Appearance: alert and oriented to person, place and time, well-developed and well-nourished, in no acute distress  · Head: normocephalic and atraumatic  · Eyes: pupils equal, round, and reactive to light  · ENT: oropharynx clear and moist with normal mucous membranes  · Neck: neck supple and non tender without mass and no cervical adenopathy   · Pulmonary/Chest: wheezing present- bilateral lower lobes  · Cardiovascular: normal rate and normal S1 and S2, bruit present over mitral area due to AV fistula  · Abdomen: soft, non-tender and non-distended  · Extremities: no cyanosis, clubbing or edema  · Neurologic: speech normal   Labs and Imaging Studies   Basic Labs  Recent Labs     08/09/21  1213      K 3.9   CL 98   CO2 29   BUN 28*   CREATININE 5.4*   GLUCOSE 77   CALCIUM 8.9       Recent Labs     08/09/21  1213   WBC 9.5   RBC 2.44*   HGB 7.3*   HCT 23.3*   MCV 95.5   MCH 29.9   MCHC 31.3*   RDW 17.9*      MPV 8.6       CBC:   Lab Results   Component Value Date    WBC 9.5 08/09/2021    RBC 2.44 08/09/2021    HGB 7.3 08/09/2021    HCT 23.3 08/09/2021    MCV 95.5 08/09/2021    RDW 17.9 08/09/2021     08/09/2021     BMP:    Lab Results   Component Value Date     08/09/2021    K 3.9 08/09/2021    CL 98 08/09/2021    CO2 29 08/09/2021    BUN 28 08/09/2021       Imaging Studies:     Echo Complete    Result Date: 7/14/2021  Transthoracic Echocardiography Report (TTE)  Demographics   Patient Name    Radha Koch Gender Male   Medical Record  [id number]     Room Number       SUE  Number   Account #       [de-identified]    Procedure Date    07/14/2021   Corporate ID                 Ordering          Shireen Leon                               Physician   Accession       [id number]   Referring         Heena Agarwal                       Physician   Date of Birth   1985   Sonographer       Sissy Etta RASHEEDA   Age             38 year(s)   Interpreting      [address] [place] [address]                               Physician         Physician Cardiology                                                 Toño Mendoza MD                                Any Other  Procedure Type of Study   TTE procedure:Echo Complete W/Doppler & Color Flow. Procedure Date Date: 07/14/2021 Start: 01:52 PM Study Location: Echo Lab Technical Quality: Adequate visualization Indications:Pericardial effusion. Patient Status: Routine Height: 66 inches Weight: 111 pounds BSA: 1.56 m^2 BMI: 17.92 kg/m^2 HR: 102 bpm BP: 143/81 mmHg  Findings   Left Ventricle  Normal left ventricle size and systolic function. Ejection fraction is visually estimated at 60-65%. No regional wall motion abnormalities seen. Severe concentric left ventricular hypertrophy. Stage II diastolic dysfunction. Increased echo texture, consider cardiac MRI rule out infiltrative  cardiomyopathies such as amyloidosis. Images are foreshortened. Right Ventricle  Normal right ventricular size and function. TAPSE 23 mm. Left Atrium  The left atrium is severely dilated. Right Atrium  Mildly enlarged right atrium size. Mitral Valve  Normal mitral valve structure and function. No evidence of mitral valve stenosis. Physiologic and/or trace mitral regurgitation is present. Tricuspid Valve  The tricuspid valve appears structurally normal.  Mild tricuspid regurgitation. RVSP is 44 mmHg. Pulmonary hypertension is mild . Aortic Valve  Structurally normal aortic valve.   The aortic valve is trileaflet. No hemodynamically significant aortic stenosis is present. No evidence of aortic valve regurgitation. Pulmonic Valve  Pulmonic valve is structurally normal.  Physiologic and/or trace pulmonic regurgitation present. No evidence of pulmonic valve stenosis. Pericardial Effusion  There is a moderate circumferential pericardial effusion noted. No  echocardiographic signs of tamponade. Pleural Effusion  Small left pleural effusion. Aorta  Normal aortic root and ascending aorta. Miscellaneous  The inferior vena cava diameter is normal with normal respiratory  variation. Conclusions   Summary  Normal left ventricle size and systolic function. Ejection fraction is visually estimated at 60-65%. No regional wall motion abnormalities seen. Severe concentric left ventricular hypertrophy. Stage II diastolic dysfunction. Increased echo texture, consider cardiac MRI to rule out infiltrative  cardiomyopathies such as amyloidosis. The left atrium is severely dilated. Normal right ventricular size and function. TAPSE 23 mm. No hemodynamically significant aortic stenosis is present. Mild tricuspid regurgitation. RVSP is 44 mmHg. Pulmonary hypertension is mild . There is a moderate circumferential pericardial effusion noted. No  echocardiographic signs of tamponade.    Signature   ----------------------------------------------------------------  Electronically signed by Liza Jones MD(Interpreting  physician) on 07/14/2021 03:40 PM  ----------------------------------------------------------------  M-Mode/2D Measurements & Calculations   LV Diastolic    LV Systolic Dimension: 2.7   AV Cusp Separation: 1.4 cmLA  Dimension: 4.6  cm                           Dimension: 4.2 cmAO Root  cm              LV Volume Diastolic: 24.0 ml Dimension: 3 cm  LV FS:41.3 %    LV Volume Systolic: 66.4 ml  LV PW           LV EDV/LV EDV Index: 36.4  Diastolic: 1.6  BH/11 HD/P^1BB ESV/LV ESV  cm Index: 27.6 ml/18ml/ m^2     RV Diastolic Dimension: 3.4  LV PW Systolic: EF Calculated: 53.3 %        cm  2.1 cm          LV Mass Index: 211 l/min*m^2  Septum                                       LA/Aorta: 1.4  Diastolic: 1.7                               Ascending Aorta: 3 cm  cm              LVOT: 2 cm                   LA volume/Index: 86 ml  Septum                                       /79.24XH/U^4  Systolic: 2.2                                RA Area: 20.7 cm^2  cm  CO: 7.01 l/min                               IVC Expiration: 1.4 cm  CI: 4.49  l/m*m^2  LV Mass: 329.82  g  Doppler Measurements & Calculations   MV Peak E-Wave:   AV Peak Velocity: 1.98 m/s     LVOT Peak Velocity: 1.1  1.11 m/s          AV Peak Gradient: 15.62 mmHg   m/s  MV Peak A-Wave:   AV Mean Velocity: 1.32 m/s     LVOT Mean Velocity: 0.73  1.07 m/s          AV Mean Gradient: 8.2 mmHg     m/s  MV E/A Ratio:     AV VTI: 34.2 cm                LVOT Peak Gradient: 4.8  1.04              AV Area (Continuity):2.01 cm^2 mmHgLVOT Mean Gradient:  MV Peak Gradient:                                2.5 mmHg  5.4 mmHg          LVOT VTI: 21.9 cm              Estimated RVSP: 43.7 mmHg  MV Mean Gradient: IVRT: 69.2 msec                Estimated RAP:3 mmHg  2.8 mmHg          Estimated PASP: 43.65 mmHg  MV Mean Velocity: Pulm. Vein A Reversal  0.79 m/s          Duration:92.3 msec             TR Velocity:3.19 m/s  MV Deceleration   Pulm. Vein D Velocity:0.49     TR Gradient:40.65 mmHg  Time: 227 msec    m/sPulm. Vein A Reversal       PV Peak Velocity: 0.99  MV P1/2t: 54.4    Velocity:0.25 m/s              m/s  msec              Pulm.  Vein S Velocity: 0.5 m/s PV Peak Gradient: 3.89  MVA by PHT:4.04                                  mmHg  cm^2                                             PV Mean Velocity: 0.66  MV Area                                          m/s  (continuity): 2.6                                PV Mean Gradient: 2 mmHg  cm^2  MV E' Septal WI ED Velocity: 1.5 m/s  Velocity: 0.13  m/s  MV E' Lateral  Velocity: 8 m/s  [email or web address] [id number]    XR CHEST (2 VW)    Result Date: 8/9/2021  EXAMINATION: TWO XRAY VIEWS OF THE CHEST 8/9/2021 1:21 pm COMPARISON: August 3, 2021 HISTORY: ORDERING SYSTEM PROVIDED HISTORY: chest pain TECHNOLOGIST PROVIDED HISTORY: Reason for exam:->chest pain What reading provider will be dictating this exam?->CRC FINDINGS: Redemonstration of interstitial and patchy airspace opacities bilaterally appearing to have increased in right lung base. There is mild blunting of bilateral costophrenic sulci. Mild cardiomegaly. No pneumothorax. Redemonstration of patchy airspace and interstitial opacities bilaterally increasing in right lung base compared to prior from August 3, 2021. XR CHEST (2 VW)    Result Date: 7/31/2021  EXAMINATION: TWO XRAY VIEWS OF THE CHEST 7/31/2021 4:07 am COMPARISON: July 15, 2021 HISTORY: ORDERING SYSTEM PROVIDED HISTORY: chest pain TECHNOLOGIST PROVIDED HISTORY: Reason for exam:->chest pain What reading provider will be dictating this exam?->CRC FINDINGS: There are patchy bilateral airspace opacities, greatest in the right lower lobe. These have increased since July 15, 2021. No evidence of a sizable pleural effusion. No pneumothorax. Heart size is unable to be accurately assessed on this single portable view of the chest, but appears to be stable. No acute osseous abnormality. Increased patchy airspace opacities throughout both lungs compared with July 15, 2021. Findings are most suspicious for multifocal pneumonia although pulmonary edema could have a similar appearance.      CT HEAD WO CONTRAST    Result Date: 8/9/2021  EXAMINATION: CT OF THE HEAD WITHOUT CONTRAST  8/9/2021 1:51 pm TECHNIQUE: CT of the head was performed without the administration of intravenous contrast. Dose modulation, iterative reconstruction, and/or weight based adjustment of the mA/kV was utilized to reduce the radiation dose to as low as reasonably achievable. COMPARISON: 01/20/2020 HISTORY: ORDERING SYSTEM PROVIDED HISTORY: CVA TECHNOLOGIST PROVIDED HISTORY: Has a \"code stroke\" or \"stroke alert\" been called? ->Yes Reason for exam:->CVA Decision Support Exception - unselect if not a suspected or confirmed emergency medical condition->Emergency Medical Condition (MA) What reading provider will be dictating this exam?->CRC FINDINGS: Redemonstration of postsurgical changes related to right craniectomy with redemonstration of metallic surgical material associated with posterior right temporal lobe. Redemonstration of brain parenchymal volume loss at site of surgery. Benign calcifications at level of right and left basal ganglia. No acute intracranial hemorrhage or edema. No abnormal extra-axial fluid collections. No hydrocephalus. Visualized paranasal sinuses and mastoid air cells are clear. 1. Redemonstration of postsurgical changes related to right-sided craniectomy and right-sided aneurysm repair. 2. No acute intracranial hemorrhage or edema. CT CHEST WO CONTRAST    Result Date: 7/14/2021  EXAMINATION: CT OF THE CHEST WITHOUT CONTRAST 7/14/2021 10:23 am TECHNIQUE: CT of the chest was performed without the administration of intravenous contrast. Multiplanar reformatted images are provided for review. Dose modulation, iterative reconstruction, and/or weight based adjustment of the mA/kV was utilized to reduce the radiation dose to as low as reasonably achievable.  COMPARISON: 02/10/2020 HISTORY: ORDERING SYSTEM PROVIDED HISTORY: possible pneumonia TECHNOLOGIST PROVIDED HISTORY: Reason for exam:->possible pneumonia Decision Support Exception - unselect if not a suspected or confirmed emergency medical condition->Emergency Medical Condition (MA) FINDINGS: Mediastinum: There is significant cardiomegaly again identified. There is also a pericardial effusion, measuring up to 14 mm in thickness. Aorta appears normal in caliber. There is prominence of the main pulmonary artery. Mildly prominent left paratracheal lymph node, measuring up to 11 mm in short axis. Lungs/pleura: There is dense consolidation of the posterior right lower lobe, with air bronchograms, compatible with pneumonia. The central airways appear patent. There is platelike atelectasis or scarring at the left lung base. No pneumothorax. Trace right pleural effusion. Upper Abdomen: Kidneys are atrophic. Soft Tissues/Bones: Prominent bilateral axillary lymph nodes. Diffuse increased density of the osseous structures, which can be seen with renal osteodystrophy. There is dense consolidation of the posterior right lower lobe with air bronchograms, compatible with pneumonia. There is a trace right pleural effusion. Significant cardiomegaly with pericardial effusion. Bilateral axillary lymphadenopathy. Recommend follow-up/further evaluation, if not already performed. XR CHEST PORTABLE    Result Date: 8/3/2021  EXAMINATION: ONE XRAY VIEW OF THE CHEST 8/3/2021 3:41 pm COMPARISON: None. HISTORY: ORDERING SYSTEM PROVIDED HISTORY: s/p Bronch TECHNOLOGIST PROVIDED HISTORY: Reason for exam:->s/p Bronch What reading provider will be dictating this exam?->CRC FINDINGS: Right lung infiltrates. There is no effusion or pneumothorax. Cardiomegaly. The osseous structures are without acute process. No evidence for pneumothorax. Right lung infiltrates. Cardiomegaly. XR CHEST PORTABLE    Result Date: 7/15/2021  EXAMINATION: ONE XRAY VIEW OF THE CHEST 7/15/2021 9:50 pm COMPARISON: None. HISTORY: ORDERING SYSTEM PROVIDED HISTORY: increase SOB TECHNOLOGIST PROVIDED HISTORY: Reason for exam:->increase SOB FINDINGS: Persistent faint right lower lobe opacity. .  There is no effusion or pneumothorax. Stable cardiomegaly.   The osseous structures are without acute process. Persistent faint right lower lobe opacity. Stable cardiomegaly. XR CHEST PORTABLE    Result Date: 7/14/2021  EXAMINATION: ONE XRAY VIEW OF THE CHEST 7/14/2021 8:09 am COMPARISON: 03/16/2021 HISTORY: ORDERING SYSTEM PROVIDED HISTORY: shortness of breath TECHNOLOGIST PROVIDED HISTORY: Reason for exam:->shortness of breath FINDINGS: Prominent cardiac silhouette. There are faint patchy bilateral pulmonary airspace opacities. No sizable effusion or pneumothorax. No acute osseous abnormality. Faint patchy bilateral pulmonary opacities. Correlate for possible infectious process. Prominent cardiac silhouette. CTA PULMONARY W CONTRAST    Result Date: 7/31/2021  EXAMINATION: CTA OF THE CHEST 7/31/2021 6:42 am TECHNIQUE: CTA of the chest was performed after the administration of intravenous contrast.  Multiplanar reformatted images are provided for review. MIP images are provided for review. Dose modulation, iterative reconstruction, and/or weight based adjustment of the mA/kV was utilized to reduce the radiation dose to as low as reasonably achievable. COMPARISON: 07/14/2021 HISTORY: ORDERING SYSTEM PROVIDED HISTORY: elevated dimer TECHNOLOGIST PROVIDED HISTORY: Reason for exam:->elevated dimer Decision Support Exception - unselect if not a suspected or confirmed emergency medical condition->Emergency Medical Condition (MA) What reading provider will be dictating this exam?->CRC FINDINGS: Pulmonary Arteries: Pulmonary arteries are adequately opacified for evaluation. No evidence of intraluminal filling defect to suggest pulmonary embolism. Main pulmonary artery is normal in caliber. Mediastinum: No aortic aneurysm or dissection. There is cardiomegaly and a small pericardial effusion. Borderline sized subcarinal lymph node is noted. 13 mm left paratracheal node. . Lungs/pleura:  There is continued dense airspace consolidation in the right lower lobe with air bronchograms. There are new diffuse bilateral ground-glass airspace opacities and bilateral multifocal small areas of airspace consolidation. No pneumothorax. Small right pleural effusion. Upper Abdomen: The kidneys appear atrophic. Soft Tissues/Bones: There are again seen prominent bilateral axillary lymph nodes. Diffuse increased density of the osseous structures are again compatible with renal osteodystrophy. No evidence of pulmonary emboli. Continued dense airspace consolidation of the right lower lobe. New extensive bilateral ground-glass opacities and patchy pulmonary airspace opacities may be related to pulmonary edema and/or multifocal infectious infiltrates. Cardiomegaly. Axillary adenopathy. Fluoroscopy modified barium swallow with video    Result Date: 8/4/2021  EXAMINATION: MODIFIED BARIUM SWALLOW WAS PERFORMED IN CONJUNCTION WITH [facility] WITH [facility] DICOM DISPLAY TECHNIQUE: Fluoroscopic evaluation of the swallowing mechanism was performed with multiple consistency of barium product. FLUOROSCOPY DOSE AND TYPE OR TIME AND EXPOSURES: Images: Cine fluoroscopy Fluoroscopic time: 3 minutes Total dose: 21 mGy COMPARISON: None HISTORY: ORDERING SYSTEM PROVIDED HISTORY: aspiration TECHNOLOGIST PROVIDED HISTORY: Reason for exam:->aspiration What reading provider will be dictating this exam?->CRC FINDINGS: Satisfactory oral and pharyngeal phases of the swallowing mechanism. No significant barium residuals and no barium aspiration. Satisfactory swallowing mechanism. No barium aspiration. Please see separate speech pathology report for full discussion of findings and recommendations. EKG: normal sinus rhythm, biatrial enlargement, LV hypertrophy, unchanged from previous tracings.     Resident's Assessment and Plan     Calvin Pike is a 38 y.o. male with a past medical history of HTN, ESRD with HD MWF, HFpEF (EF60-65%), hypothyroidism, and aneurysm of AV fistula (2016) who came in with a chief complaint of chest pain and shortness of breath. He is currently being treated for the following conditions. 1.RLL pneumonia 2/2 noncompliance with meds    -afebrile, WBC 9.5, dyspnea, pleuritic pain    -last respiratory gram stain (8/4/21) showed rare gram positive coccobacillary organisms   -aspergillus galactomannan index 1.89 (positive)   -CXR on admission (8/9/21) showed same interstitial and patchy airspace opacities bilaterally with increased (from previous) opacities in the right lung base    -EKG showed no changes from previous, troponins at baseline   -pt never filled antibiotics (augmentin)   -unable to give patient IV, must give oral meds til morning   -start prednisone(?)   -f/u blood cultures, repeat troponin, respiratory panel  2. Hypertensive urgency   -/121 on admission   -discharged 08/06/21 on clonidine 0.3 mg TID, losartan 100mg qHS, nifedipine 90mg daily, and labetalol 300mg TID, unsure if compliant   -unable to give patient IV, must give oral meds til morning   -start hydralazine   3. ESRD on HD MWF   -HD done this morning, 3 of 4 hours  4. Hx HFpEF (EF 60-65%)      PT/OT evaluation: none  DVT prophylaxis/ GI prophylaxis:    Disposition: admit to house team [address] [place] [address], PGY-1   Attending physician: Dr. Gisella Green

## 2021-08-09 NOTE — PROGRESS NOTES
Received patient from the ED. No IV access was obtained. IV team no longer here. Unable to administer any IV medications. /121. Will notify MD at this time.

## 2021-08-10 ENCOUNTER — CLINICAL DOCUMENTATION (OUTPATIENT)
Dept: INFECTIOUS DISEASES | Age: 36
End: 2021-08-10

## 2021-08-10 VITALS
SYSTOLIC BLOOD PRESSURE: 153 MMHG | HEART RATE: 97 BPM | WEIGHT: 110 LBS | HEIGHT: 66 IN | BODY MASS INDEX: 17.68 KG/M2 | OXYGEN SATURATION: 96 % | DIASTOLIC BLOOD PRESSURE: 100 MMHG | TEMPERATURE: 97.9 F | RESPIRATION RATE: 20 BRPM

## 2021-08-10 LAB
ANION GAP SERPL CALCULATED.3IONS-SCNC: 14 MMOL/L (ref 7–16)
BASOPHILS ABSOLUTE: 0.03 E9/L (ref 0–0.2)
BASOPHILS RELATIVE PERCENT: 0.4 % (ref 0–2)
BUN BLDV-MCNC: 47 MG/DL (ref 6–20)
CALCIUM SERPL-MCNC: 9.9 MG/DL (ref 8.6–10.2)
CHLORIDE BLD-SCNC: 94 MMOL/L (ref 98–107)
CO2: 29 MMOL/L (ref 22–29)
CREAT SERPL-MCNC: 7.7 MG/DL (ref 0.7–1.2)
EKG ATRIAL RATE: 96 BPM
EKG ATRIAL RATE: 97 BPM
EKG P AXIS: 67 DEGREES
EKG P AXIS: 67 DEGREES
EKG P-R INTERVAL: 186 MS
EKG P-R INTERVAL: 190 MS
EKG Q-T INTERVAL: 370 MS
EKG Q-T INTERVAL: 374 MS
EKG QRS DURATION: 80 MS
EKG QRS DURATION: 86 MS
EKG QTC CALCULATION (BAZETT): 467 MS
EKG QTC CALCULATION (BAZETT): 474 MS
EKG R AXIS: 51 DEGREES
EKG R AXIS: 75 DEGREES
EKG T AXIS: 81 DEGREES
EKG T AXIS: 84 DEGREES
EKG VENTRICULAR RATE: 96 BPM
EKG VENTRICULAR RATE: 97 BPM
EOSINOPHILS ABSOLUTE: 0.38 E9/L (ref 0.05–0.5)
EOSINOPHILS RELATIVE PERCENT: 4.9 % (ref 0–6)
GFR AFRICAN AMERICAN: 10
GFR NON-AFRICAN AMERICAN: 10 ML/MIN/1.73
GLUCOSE BLD-MCNC: 120 MG/DL (ref 74–99)
HCT VFR BLD CALC: 25.2 % (ref 37–54)
HEMOGLOBIN: 8 G/DL (ref 12.5–16.5)
IMMATURE GRANULOCYTES #: 0.04 E9/L
IMMATURE GRANULOCYTES %: 0.5 % (ref 0–5)
LYMPHOCYTES ABSOLUTE: 1.29 E9/L (ref 1.5–4)
LYMPHOCYTES RELATIVE PERCENT: 16.7 % (ref 20–42)
MAGNESIUM: 2.9 MG/DL (ref 1.6–2.6)
MCH RBC QN AUTO: 30.3 PG (ref 26–35)
MCHC RBC AUTO-ENTMCNC: 31.7 % (ref 32–34.5)
MCV RBC AUTO: 95.5 FL (ref 80–99.9)
MONOCYTES ABSOLUTE: 0.43 E9/L (ref 0.1–0.95)
MONOCYTES RELATIVE PERCENT: 5.6 % (ref 2–12)
NEUTROPHILS ABSOLUTE: 5.54 E9/L (ref 1.8–7.3)
NEUTROPHILS RELATIVE PERCENT: 71.9 % (ref 43–80)
PDW BLD-RTO: 18 FL (ref 11.5–15)
PHOSPHORUS: 5.5 MG/DL (ref 2.5–4.5)
PLATELET # BLD: 271 E9/L (ref 130–450)
PMV BLD AUTO: 9.4 FL (ref 7–12)
POTASSIUM SERPL-SCNC: 5 MMOL/L (ref 3.5–5)
PROCALCITONIN: 1.5 NG/ML (ref 0–0.08)
RBC # BLD: 2.64 E12/L (ref 3.8–5.8)
SODIUM BLD-SCNC: 137 MMOL/L (ref 132–146)
TROPONIN, HIGH SENSITIVITY: 102 NG/L (ref 0–11)
WBC # BLD: 7.7 E9/L (ref 4.5–11.5)

## 2021-08-10 PROCEDURE — 93010 ELECTROCARDIOGRAM REPORT: CPT | Performed by: INTERNAL MEDICINE

## 2021-08-10 PROCEDURE — 84100 ASSAY OF PHOSPHORUS: CPT

## 2021-08-10 PROCEDURE — 84484 ASSAY OF TROPONIN QUANT: CPT

## 2021-08-10 PROCEDURE — 83735 ASSAY OF MAGNESIUM: CPT

## 2021-08-10 PROCEDURE — 99224 PR SBSQ OBSERVATION CARE/DAY 15 MINUTES: CPT | Performed by: INTERNAL MEDICINE

## 2021-08-10 PROCEDURE — 36415 COLL VENOUS BLD VENIPUNCTURE: CPT

## 2021-08-10 PROCEDURE — G0378 HOSPITAL OBSERVATION PER HR: HCPCS

## 2021-08-10 PROCEDURE — 80048 BASIC METABOLIC PNL TOTAL CA: CPT

## 2021-08-10 PROCEDURE — 93005 ELECTROCARDIOGRAM TRACING: CPT | Performed by: INTERNAL MEDICINE

## 2021-08-10 PROCEDURE — 85025 COMPLETE CBC W/AUTO DIFF WBC: CPT

## 2021-08-10 PROCEDURE — 84145 PROCALCITONIN (PCT): CPT

## 2021-08-10 PROCEDURE — 6370000000 HC RX 637 (ALT 250 FOR IP): Performed by: INTERNAL MEDICINE

## 2021-08-10 RX ORDER — PREDNISONE 20 MG/1
40 TABLET ORAL DAILY
Qty: 8 TABLET | Refills: 0 | Status: SHIPPED | OUTPATIENT
Start: 2021-08-11 | End: 2021-08-15

## 2021-08-10 RX ORDER — PREDNISONE 20 MG/1
40 TABLET ORAL DAILY
Status: DISCONTINUED | OUTPATIENT
Start: 2021-08-11 | End: 2021-08-10 | Stop reason: HOSPADM

## 2021-08-10 RX ORDER — PREDNISONE 20 MG/1
40 TABLET ORAL DAILY
Qty: 6 TABLET | Refills: 0 | Status: SHIPPED | OUTPATIENT
Start: 2021-08-11 | End: 2021-08-10

## 2021-08-10 RX ORDER — NICOTINE POLACRILEX 4 MG
15 LOZENGE BUCCAL PRN
Status: DISCONTINUED | OUTPATIENT
Start: 2021-08-10 | End: 2021-08-10 | Stop reason: HOSPADM

## 2021-08-10 RX ORDER — PREDNISONE 10 MG/1
30 TABLET ORAL DAILY
Qty: 9 TABLET | Refills: 0 | Status: SHIPPED | OUTPATIENT
Start: 2021-08-15 | End: 2021-08-10 | Stop reason: HOSPADM

## 2021-08-10 RX ORDER — PREDNISONE 20 MG/1
20 TABLET ORAL DAILY
Qty: 3 TABLET | Refills: 0 | Status: SHIPPED | OUTPATIENT
Start: 2021-08-20 | End: 2021-08-10 | Stop reason: HOSPADM

## 2021-08-10 RX ORDER — DEXTROSE MONOHYDRATE 25 G/50ML
12.5 INJECTION, SOLUTION INTRAVENOUS PRN
Status: DISCONTINUED | OUTPATIENT
Start: 2021-08-10 | End: 2021-08-10 | Stop reason: HOSPADM

## 2021-08-10 RX ORDER — DEXTROSE MONOHYDRATE 50 MG/ML
100 INJECTION, SOLUTION INTRAVENOUS PRN
Status: DISCONTINUED | OUTPATIENT
Start: 2021-08-10 | End: 2021-08-10 | Stop reason: HOSPADM

## 2021-08-10 RX ORDER — PREDNISONE 10 MG/1
10 TABLET ORAL DAILY
Qty: 3 TABLET | Refills: 0 | Status: SHIPPED | OUTPATIENT
Start: 2021-08-25 | End: 2021-08-10 | Stop reason: HOSPADM

## 2021-08-10 RX ORDER — AMOXICILLIN AND CLAVULANATE POTASSIUM 600; 42.9 MG/5ML; MG/5ML
600 POWDER, FOR SUSPENSION ORAL 2 TIMES DAILY
Qty: 50 ML | Refills: 0 | Status: SHIPPED | OUTPATIENT
Start: 2021-08-10 | End: 2021-08-15

## 2021-08-10 RX ADMIN — CLONIDINE HYDROCHLORIDE 0.3 MG: 0.1 TABLET ORAL at 08:49

## 2021-08-10 RX ADMIN — AMOXICILLIN AND CLAVULANATE POTASSIUM 600 MG: 600; 42.9 SUSPENSION ORAL at 00:20

## 2021-08-10 RX ADMIN — LABETALOL HYDROCHLORIDE 300 MG: 200 TABLET, FILM COATED ORAL at 08:49

## 2021-08-10 RX ADMIN — NIFEDIPINE 60 MG: 60 TABLET, EXTENDED RELEASE ORAL at 08:49

## 2021-08-10 RX ADMIN — AMOXICILLIN AND CLAVULANATE POTASSIUM 600 MG: 600; 42.9 SUSPENSION ORAL at 08:48

## 2021-08-10 RX ADMIN — PREDNISONE 40 MG: 20 TABLET ORAL at 08:50

## 2021-08-10 ASSESSMENT — PAIN SCALES - GENERAL
PAINLEVEL_OUTOF10: 0
PAINLEVEL_OUTOF10: 0

## 2021-08-10 NOTE — PROGRESS NOTES
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 He is in hospital with possible discharge. Infectious disease is not on consult. Asked RN to check with attending if fungitell can be ordered and we will follow outpatient. Called dialysis center in [facility] and they are unable to draw lab.

## 2021-08-10 NOTE — CARE COORDINATION
8/10/21 Transition of Care: patient recently discharged home and returned with shortness of breath and headaches. He is observation. He lives alone in an apartment on the 2nd floor He is independent. He goes to dialysis MWF at 7:30am at [facility]. He is transported by taxi cab. He follows with Ambulatory clinic at [facility] and his pharmacy is [facility] in [facility]. He can use his insurance for transportation home at discharge. [phone]. Will follow.  Marie Quevedo RN CM

## 2021-08-10 NOTE — CONSULTS
Department of Internal Medicine  Nephrology Attending Consult Note    SUBJECTIVE:  We are following this patient for end-stage renal failure . Full consult deferred as he is followed longitudinally in the OP dialysis clinic at [facility]. Briefly:  Jacoby Ocampo is a 38 y.o. male presented with complaint of chest pain, headache and dyspnea to the ED 8/9/21. Patient reports the chest pain worsened when he took a deep breath. He also associated with shortness of breath. Patient reports he noted symptoms he was at dialysis 8/9/21. He was found to have an elevated blood pressure at HD and hemodialysis was stopped after 3 of the 4 hours. He was given 3 doses of clonidine and recommended to go to the emergency room. Patient was given 1 nitro and 4 aspirin for his chest pain as well. Of note, patient was recently discharged on 8/6/2021 after being treated for right lower lobe pneumonia. Patient discharged with Augmentin and prednisone however has not had time to  from the pharmacy. He is awake and alert.  Less SOB at the time of my visit      PROBLEM LIST:    Patient Active Problem List   Diagnosis    ESRD on hemodialysis ([address]    H/O intracranial hemorrhage and aneurysm clipping on the right side    Anemia, chronic disease    Anxiety    Noncompliance    AVF (arteriovenous fistula) (HCC)    Severe protein-calorie malnutrition (HCC)    Venous hypertension, chronic, left    Iron deficiency    Secondary hyperparathyroidism of renal origin ([address]    Essential hypertension    Chronic heart failure with preserved ejection fraction (HCC)    Acute respiratory failure with hypoxia ([address]    Pneumonia        PAST MEDICAL HISTORY:    Past Medical History:   Diagnosis Date    (HFpEF) heart failure with preserved ejection fraction ([address]     stage III    Anxiety 9/19/2015    AVF (arteriovenous fistula) ([address] 4/30/2018    Chronic kidney disease     CKD since early childhood per pt and on HD ~ 11 years  Depression     [facility] 4/30/2018    History of ruptured Berry aneurysm ([facility] 10/20/2015    Hypertension     on meds for ~ 11 years    Hypothyroidism     Intracranial hemorrhage ([facility]     was d/t ruptured aneurysm - pt underwent neurosurgery for clipping of the aneurysm    Neutropenia ([facility]     Noncompliance w/medication treatment due to intermit use of medication 11/3/2015    Pre-transplant evaluation for kidney transplant 4/5/2017       MEDS (scheduled):   sodium chloride flush  5-40 mL Intravenous 2 times per day    heparin (porcine)  5,000 Units Subcutaneous Q8H    labetalol  300 mg Oral 2 times per day    amoxicillin-clavulanate  600 mg Oral 2 times per day    cloNIDine  0.3 mg Oral TID    NIFEdipine  60 mg Oral Daily    predniSONE  40 mg Oral Daily    Followed by   Teresa Quintero ON 8/15/2021] predniSONE  30 mg Oral Daily    Followed by   Teresa Quintero ON 8/20/2021] predniSONE  20 mg Oral Daily    Followed by   Teresa Quintero ON 8/25/2021] predniSONE  10 mg Oral Daily       MEDS (infusions):   dextrose      sodium chloride         MEDS (prn):  glucose, dextrose, glucagon (rDNA), dextrose, sodium chloride flush, sodium chloride, ondansetron **OR** ondansetron    DIET:    ADULT DIET; Regular;  Low Sodium (2 gm)      PHYSICAL EXAM:      Patient Vitals for the past 24 hrs:   BP Temp Temp src Pulse Resp SpO2 Height Weight   08/10/21 0806 (!) 159/101 97.9 °F (36.6 °C) Temporal 94 20 -- -- --   08/10/21 0645 (!) 174/110 -- -- 96 -- -- -- --   08/10/21 0345 (!) 174/114 -- -- 91 20 96 % -- --   08/09/21 2357 (!) 169/115 97.2 °F (36.2 °C) Temporal 99 18 95 % -- --   08/09/21 1946 (!) 183/121 98.6 °F (37 °C) Oral 99 18 94 % -- --   08/09/21 1425 (!) 192/120 -- -- 93 18 98 % -- --   08/09/21 1220 (!) 200/127 -- -- 88 20 96 % -- --   08/09/21 1218 -- -- -- 88 -- -- -- --   08/09/21 1213 (!) 200/127 -- -- -- -- -- -- --   08/09/21 1114 (!) 200/121 97.8 °F (36.6 °C) Temporal 97 18 98 % 5' 6\" (1.676 m) 110 lb (49.9 kg)        No intake or output data in the 24 hours ending 08/10/21 1012    Wt Readings from Last 3 Encounters:   08/09/21 110 lb (49.9 kg)   08/06/21 110 lb 0.2 oz (49.9 kg)   07/27/21 112 lb 12.8 oz (51.2 kg)       Constitutional:  Patient in no acute distress  Head: normocephalic, atraumatic  Cardiovascular: S1 S2 with murmur  Respiratory:  Clear upper, diminished in bases  Gastrointestinal: soft, nontender, nondistended  Ext: no edema   Neuro: awake, alert and oriented  Skin: dry, no rash      DATA:      Recent Labs     08/09/21  1213 08/10/21  0751   WBC 9.5 7.7   HGB 7.3* 8.0*   HCT 23.3* 25.2*   MCV 95.5 95.5    271     Recent Labs     08/09/21  1213 08/10/21  0751    137   K 3.9 5.0   CL 98 94*   CO2 29 29   BUN 28* 47*   CREATININE 5.4* 7.7*   LABGLOM 14 10   GLUCOSE 77 120*   CALCIUM 8.9 9.9     Recent Labs     08/09/21  1213   *     Lab Results   Component Value Date    LABALBU 3.5 08/09/2021    LABALBU 3.6 07/31/2021    LABALBU 3.4 (L) 07/17/2021       Iron studies:  Lab Results   Component Value Date    FERRITIN 21,197 08/01/2021    IRON 40 (L) 03/16/2021    TIBC 164 (L) 03/16/2021     Vitamin B-12   Date Value Ref Range Status   02/10/2020 830 211 - 946 pg/mL Final     Folate   Date Value Ref Range Status   02/10/2020 10.5 4.8 - 24.2 ng/mL Final       Bone disease:  Lab Results   Component Value Date    MG 2.9 (H) 08/10/2021    PHOS 5.5 (H) 08/10/2021     Vit D, 25-Hydroxy   Date Value Ref Range Status   02/14/2020 15 (L) 30 - 100 ng/mL Final     Comment:     <20 ng/mL. ........... [facility] Deficient  20-30 ng/mL. ......... [facility] Insufficient   ng/mL. ........ [facility] Sufficient  >100 ng/mL. .......... [facility] Toxic       PTH   Date Value Ref Range Status   01/05/2019 418 (H) 15 - 65 pg/mL Final       No components found for: URIC    No results found for: VOL, APPEARANCE, COLORU, LABSPEC, LABPH, LEUKBLD, NITRU, GLUCOSEU, KETUA, UROBILINOGEN, KETUA, UROBILINOGEN, BILIRUBINUR, OCBU    No results found for: LIPIDPAN        IMPRESSION/RECOMMENDATIONS:      1. ESRD-  Followed MWF at [facility]  Had 3 of his 4 hour treatment 8/9  Will plan treatment in the am    2. Hypertension with ESRD-  Above goal <130/80  Will increase nifedipine to 90 mg as he is on that as an OP  Continue outpt catapress, cozaar, normodyne    3. Anemia of ESRD-  Hgb 8.0  Dose PREETI    4. Secondary Hyperparathyroidism-  Follow PO4    5. Chest pain/SOB-  Treated for pneumonia at last admission   Discharged 8/6 to be on Augmentin and prednisone, which he did not .            KENDRA Patino - CNP   Pt seen and examined agree with above  Hd mwf  Start abx that he was prescribed for pna  Gigi Delgado MD

## 2021-08-10 NOTE — PROGRESS NOTES
[address]   Internal Medicine Residency / [address]    Attending Physician Statement  I have discussed the case, including pertinent history and exam findings with the resident and the team.  I have seen and examined the patient and the key elements of the encounter have been performed by me. I agree with the assessment, plan and orders as documented by the resident. At medical baseline today  Medications for BP nt taken at home and now reintroduced   VS stable /100   Dialysis continues  Plan;Discharge planning with medications from [facility] of medical problems as per resident note.       Valentino Lyons MD [facility]  Internal Medicine Residency Faculty

## 2021-08-10 NOTE — H&P
[facility]  Internal Medicine Residency Program  History and Physical    Patient:  Manisha Rowland 38 y.o. male MRN: [id number]     Date of Service: 8/9/2021    Hospital Day: 1      Chief complaint: had concerns including Chest Pain (starting today with SOB. Had a 3 hour treatment of diaylsis today. Received 1 nitro and 4 ASA). History of Present Illness   Manisha Rowland is a 38 y.o. male with chief complaint of chest pain, headache and dyspnea for the last few hours. Patient reports the chest pain worsened when he took a deep breath. He also associated with shortness of breath. Patient reports he noted symptoms he was at dialysis. Patient reports he has had the symptoms in the past when he forgot to take his blood pressure medication. However patient notes, he Mercedes Drilling been taking every single medication as prescribed\". Reports the chest pain was at the center of his chest and nonradiating. He reports that headache was a throbbing headache. Also endorses that he saw black spots during dialysis with a headache. Currently, during our interview, patient denies any symptoms of chest pain, headache or dyspnea. He reports all his symptoms have resolved. He was found to have an elevated blood pressure at HD and hemodialysis was stopped after 3 of the 4 hours. He was given 3 doses of clonidine and recommended to go to the emergency room. Patient was given 1 nitro and 4 aspirin for his chest pain as well. Patient also endorses he had 1 episode of coughing during dialysis. Denies any sputum production. Denies any fevers or chills. Reports some nausea. Denies any vomiting. Of note, patient was recently discharged on 8/6/2021 after being treated for right lower lobe pneumonia. Patient discharged with Augmentin and prednisone however has not had time to  from the pharmacy.      Patient reports his blood pressure at home has been \"top number has been 1.\"  Suspect blood pressure cuff at home is not working properly. Past medical history significant for resistant, ESRD on HD on M/W/F, HFpEF (EF 60 to 65%), hypothyroidism, history of aneurysm of the AV fistula in 2016 who presented on 8/9/2021. ED Course: He was found to have a blood pressure of 200/121 in the ED. He was given a dose of clonidine 0.3 mg, hydralazine 50 mg and labetalol 300 mg. Chest x-ray shows redemonstration of patchy airspace and interstitial opacities bilaterally increasing in the right lung base compared to prior. Patient was initially ordered IV vancomycin and IV cefepime. However patient does not have IV access despite multiple attempts by nursing staff.     Past Medical History:      Diagnosis Date    (HFpEF) heart failure with preserved ejection fraction ([address]     stage III    Anxiety 9/19/2015    AVF (arteriovenous fistula) [facility] 4/30/2018    Chronic kidney disease     CKD since early childhood per pt and on HD ~ 11 years    Depression     Encounter regarding vascular access for dialysis for ESRD ([address] 4/30/2018    History of ruptured Berry aneurysm ([address] 10/20/2015    Hypertension     on meds for ~ 11 years    Hypothyroidism     Intracranial hemorrhage (HCC)     was d/t ruptured aneurysm - pt underwent neurosurgery for clipping of the aneurysm    Neutropenia ([address]     Noncompliance w/medication treatment due to intermit use of medication 11/3/2015    Pre-transplant evaluation for kidney transplant 4/5/2017       Past Surgical History:        Procedure Laterality Date    BRAIN ANEURYSM SURGERY Right 3-4 years ago    Intracranial aneurysm clipping surgery 3-4 years ago, after rupture of aneurysm causing ICH    BRONCHOSCOPY N/A 8/3/2021    BRONCHOSCOPY DIAGNOSTIC OR CELL [address] ONLY performed by Phan Mullins MD at [facility] Left 11 years ago    UPPER GASTROINTESTINAL ENDOSCOPY N/A 1/3/2019    EGD BIOPSY performed by Kaylen Kimbrough MD at [facility] Medications Prior to Admission:    Prior to Admission medications    Medication Sig Start Date End Date Taking? Authorizing Provider   hydrALAZINE (APRESOLINE) 25 MG tablet Take 25 mg by mouth 3 times daily   Yes Historical Provider, MD   megestrol (MEGACE) 20 MG tablet Take 20 mg by mouth daily   Yes Historical Provider, MD   labetalol (NORMODYNE) 300 MG tablet Take 1 tablet by mouth three times daily 8/6/21  Yes Sonu Junior MD   NIFEdipine (PROCARDIA XL) 90 MG extended release tablet Take 1 tablet by mouth daily 8/6/21  Yes Sonu Junior MD   pantoprazole (PROTONIX) 40 MG tablet TAKE 1 TABLET BY MOUTH DAILY 8/5/21  Yes Quyen Valladares MD   amoxicillin-clavulanate (AUGMENTIN-ES) 600-42.9 MG/5ML suspension Take 5 mLs by mouth every 12 hours for 5 days 8/5/21 8/10/21 Yes Tonie Flannery MD   predniSONE (DELTASONE) 10 MG tablet Prednisone 40 mg PO q day x 5 days then   Prednisone 30 mg PO q day x 5 days then  Prednisone 20 mg PO q day x 5 days then  Prednisone 10 mg PO q day x 5 days then stop 8/5/21  Yes Deedee Park MD   B Complex-C-Folic Acid (NEPHRO-JOHNNY) 0.8 MG TABS Take 1 tablet by mouth daily  4/23/21  Yes Historical Provider, MD   lidocaine-prilocaine (EMLA) 2.5-2.5 % cream APPLY SMALL AMOUNT TO ACCESS SITE (AVF) 1 TO 2 HOURS BEFORE DIALYSIS.  COVER WITH OCCLUSIVE DRESSING (SARAN WRAP) 3/22/21  Yes Historical Provider, MD   diphenhydrAMINE (SOMINEX) 25 MG tablet Take 25 mg by mouth 2 times daily as needed    Yes Historical Provider, MD   melatonin 3 MG TABS tablet Take 1 tablet by mouth nightly as needed (sleep) 3/30/21  Yes Azra Oliva MD   losartan (COZAAR) 100 MG tablet Take 1 tablet by mouth every evening 3/30/21  Yes Azra Oliva MD   cloNIDine (CATAPRES) 0.3 MG tablet Take 1 tablet by mouth 3 times daily 3/30/21  Yes Azra Oliva MD   sevelamer (RENVELA) 800 MG tablet Take by mouth See Admin Instructions 2 tablets by mouth three times a day with meals and 1 tablet with snacks   Yes Historical Provider, MD       Allergies:  Tylenol [acetaminophen] and Levofloxacin    Social History:   TOBACCO:   reports that he has never smoked. He has never used smokeless tobacco.  ETOH:   reports no history of alcohol use. Family History:       Problem Relation Age of Onset    Kidney Disease Paternal Grandmother     Cancer Neg Hx     Heart Disease Neg Hx        REVIEW OF SYSTEMS:    · Constitutional: No fever, no chills, no change in weight; good appetite  · HEENT: +blurred vision, no ear problems, no sore throat, no rhinorrhea. · Respiratory: + 1 episode of cough, no sputum production, +pleuritic chest pain, +shortness of breath  · Cardiology: No angina, no dyspnea on exertion, no paroxysmal nocturnal dyspnea, no orthopnea, no palpitation, no leg swelling. · Gastroenterology: No dysphagia, no reflux; no abdominal pain, +nausea, no vomiting; no constipation or diarrhea.  No hematochezia   · Genitourinary: No dysuria, no frequency, hesitancy; no hematuria  · Musculoskeletal: no joint pain, no myalgia, no change in range of movement  · Neurology: no focal weakness in extremities, no slurred speech, no double vision, + tingling or numbness sensation RUE   · Endocrinology: no temperature intolerance, no polyphagia, polydipsia or polyuria  · Hematology: no increased bleeding, no bruising, no lymphadenopathy  · Skin: no skin changes noticed by patient  · Psychology: no depressed mood, no suicidal ideation    Physical Exam   · Vitals: BP (!) 183/121   Pulse 99   Temp 98.6 °F (37 °C) (Oral)   Resp 18   Ht 5' 6\" (1.676 m)   Wt 110 lb (49.9 kg)   SpO2 94%   BMI 17.75 kg/m²     · General Appearance: alert and oriented to person, place and time, well developed and well- nourished, in no acute distress  · Skin: warm and dry, no rash or erythema  · Head: normocephalic and atraumatic  · Eyes: pupils equal, round, and reactive to light, extraocular eye movements intact, conjunctivae normal  · ENT: tympanic membrane, Physician   Accession       [id number]   Referring         Heena Pacheco  Number                       Physician   Date of Birth   1985   Sonographer       Sissy Quiles VIVIAN   Age             38 year(s)   Interpreting      [address] [place] [address]                               Physician         Physician Cardiology                                                 Toño Mendoza MD                                Any Other  Procedure Type of Study   TTE procedure:Echo Complete W/Doppler & Color Flow. Procedure Date Date: 07/14/2021 Start: 01:52 PM Study Location: Echo Lab Technical Quality: Adequate visualization Indications:Pericardial effusion. Patient Status: Routine Height: 66 inches Weight: 111 pounds BSA: 1.56 m^2 BMI: 17.92 kg/m^2 HR: 102 bpm BP: 143/81 mmHg  Findings   Left Ventricle  Normal left ventricle size and systolic function. Ejection fraction is visually estimated at 60-65%. No regional wall motion abnormalities seen. Severe concentric left ventricular hypertrophy. Stage II diastolic dysfunction. Increased echo texture, consider cardiac MRI rule out infiltrative  cardiomyopathies such as amyloidosis. Images are foreshortened. Right Ventricle  Normal right ventricular size and function. TAPSE 23 mm. Left Atrium  The left atrium is severely dilated. Right Atrium  Mildly enlarged right atrium size. Mitral Valve  Normal mitral valve structure and function. No evidence of mitral valve stenosis. Physiologic and/or trace mitral regurgitation is present. Tricuspid Valve  The tricuspid valve appears structurally normal.  Mild tricuspid regurgitation. RVSP is 44 mmHg. Pulmonary hypertension is mild . Aortic Valve  Structurally normal aortic valve. The aortic valve is trileaflet. No hemodynamically significant aortic stenosis is present. No evidence of aortic valve regurgitation.    Pulmonic Valve  Pulmonic valve is structurally normal. Physiologic and/or trace pulmonic regurgitation present. No evidence of pulmonic valve stenosis. Pericardial Effusion  There is a moderate circumferential pericardial effusion noted. No  echocardiographic signs of tamponade. Pleural Effusion  Small left pleural effusion. Aorta  Normal aortic root and ascending aorta. Miscellaneous  The inferior vena cava diameter is normal with normal respiratory  variation. Conclusions   Summary  Normal left ventricle size and systolic function. Ejection fraction is visually estimated at 60-65%. No regional wall motion abnormalities seen. Severe concentric left ventricular hypertrophy. Stage II diastolic dysfunction. Increased echo texture, consider cardiac MRI to rule out infiltrative  cardiomyopathies such as amyloidosis. The left atrium is severely dilated. Normal right ventricular size and function. TAPSE 23 mm. No hemodynamically significant aortic stenosis is present. Mild tricuspid regurgitation. RVSP is 44 mmHg. Pulmonary hypertension is mild . There is a moderate circumferential pericardial effusion noted. No  echocardiographic signs of tamponade.    Signature   ----------------------------------------------------------------  Electronically signed by Julian Hoskins MD(Interpreting  physician) on 07/14/2021 03:40 PM  ----------------------------------------------------------------  M-Mode/2D Measurements & Calculations   LV Diastolic    LV Systolic Dimension: 2.7   AV Cusp Separation: 1.4 cmLA  Dimension: 4.6  cm                           Dimension: 4.2 cmAO Root  cm              LV Volume Diastolic: 30.2 ml Dimension: 3 cm  LV FS:41.3 %    LV Volume Systolic: 13.3 ml  LV PW           LV EDV/LV EDV Index: 93.0  Diastolic: 1.6  MZ/90 EK/E^1ZF ESV/LV ESV  cm              Index: 27.6 ml/18ml/ m^2     RV Diastolic Dimension: 3.4  LV PW Systolic: EF Calculated: 01.4 %        cm  2.1 cm          LV Mass Index: 211 l/min*m^2  Septum LA/Aorta: 1.4  Diastolic: 1.7                               Ascending Aorta: 3 cm  cm              LVOT: 2 cm                   LA volume/Index: 86 ml  Septum                                       /17.13ZG/D^2  Systolic: 2.2                                RA Area: 20.7 cm^2  cm  CO: 7.01 l/min                               IVC Expiration: 1.4 cm  CI: 4.49  l/m*m^2  LV Mass: 329.82  g  Doppler Measurements & Calculations   MV Peak E-Wave:   AV Peak Velocity: 1.98 m/s     LVOT Peak Velocity: 1.1  1.11 m/s          AV Peak Gradient: 15.62 mmHg   m/s  MV Peak A-Wave:   AV Mean Velocity: 1.32 m/s     LVOT Mean Velocity: 0.73  1.07 m/s          AV Mean Gradient: 8.2 mmHg     m/s  MV E/A Ratio:     AV VTI: 34.2 cm                LVOT Peak Gradient: 4.8  1.04              AV Area (Continuity):2.01 cm^2 mmHgLVOT Mean Gradient:  MV Peak Gradient:                                2.5 mmHg  5.4 mmHg          LVOT VTI: 21.9 cm              Estimated RVSP: 43.7 mmHg  MV Mean Gradient: IVRT: 69.2 msec                Estimated RAP:3 mmHg  2.8 mmHg          Estimated PASP: 43.65 mmHg  MV Mean Velocity: Pulm. Vein A Reversal  0.79 m/s          Duration:92.3 msec             TR Velocity:3.19 m/s  MV Deceleration   Pulm. Vein D Velocity:0.49     TR Gradient:40.65 mmHg  Time: 227 msec    m/sPulm. Vein A Reversal       PV Peak Velocity: 0.99  MV P1/2t: 54.4    Velocity:0.25 m/s              m/s  msec              Pulm.  Vein S Velocity: 0.5 m/s PV Peak Gradient: 3.89  MVA by PHT:4.04                                  mmHg  cm^2                                             PV Mean Velocity: 0.66  MV Area                                          m/s  (continuity): 2.6                                PV Mean Gradient: 2 mmHg  cm^2  MV E' Septal                                     NE ED Velocity: 1.5 m/s  Velocity: 0.13  m/s  MV E' Lateral  Velocity: 8 m/s [email or web address] [id number]    XR CHEST (2 VW)    Result Date: 8/9/2021  EXAMINATION: TWO XRAY VIEWS OF THE CHEST 8/9/2021 1:21 pm COMPARISON: August 3, 2021 HISTORY: ORDERING SYSTEM PROVIDED HISTORY: chest pain TECHNOLOGIST PROVIDED HISTORY: Reason for exam:->chest pain What reading provider will be dictating this exam?->CRC FINDINGS: Redemonstration of interstitial and patchy airspace opacities bilaterally appearing to have increased in right lung base. There is mild blunting of bilateral costophrenic sulci. Mild cardiomegaly. No pneumothorax. Redemonstration of patchy airspace and interstitial opacities bilaterally increasing in right lung base compared to prior from August 3, 2021. XR CHEST (2 VW)    Result Date: 7/31/2021  EXAMINATION: TWO XRAY VIEWS OF THE CHEST 7/31/2021 4:07 am COMPARISON: July 15, 2021 HISTORY: ORDERING SYSTEM PROVIDED HISTORY: chest pain TECHNOLOGIST PROVIDED HISTORY: Reason for exam:->chest pain What reading provider will be dictating this exam?->CRC FINDINGS: There are patchy bilateral airspace opacities, greatest in the right lower lobe. These have increased since July 15, 2021. No evidence of a sizable pleural effusion. No pneumothorax. Heart size is unable to be accurately assessed on this single portable view of the chest, but appears to be stable. No acute osseous abnormality. Increased patchy airspace opacities throughout both lungs compared with July 15, 2021. Findings are most suspicious for multifocal pneumonia although pulmonary edema could have a similar appearance.      CT HEAD WO CONTRAST    Result Date: 8/9/2021  EXAMINATION: CT OF THE HEAD WITHOUT CONTRAST  8/9/2021 1:51 pm TECHNIQUE: CT of the head was performed without the administration of intravenous contrast. Dose modulation, iterative reconstruction, and/or weight based adjustment of the mA/kV was utilized to reduce the radiation dose to as low as reasonably achievable. COMPARISON: 01/20/2020 HISTORY: ORDERING SYSTEM PROVIDED HISTORY: CVA TECHNOLOGIST PROVIDED HISTORY: Has a \"code stroke\" or \"stroke alert\" been called? ->Yes Reason for exam:->CVA Decision Support Exception - unselect if not a suspected or confirmed emergency medical condition->Emergency Medical Condition (MA) What reading provider will be dictating this exam?->CRC FINDINGS: Redemonstration of postsurgical changes related to right craniectomy with redemonstration of metallic surgical material associated with posterior right temporal lobe. Redemonstration of brain parenchymal volume loss at site of surgery. Benign calcifications at level of right and left basal ganglia. No acute intracranial hemorrhage or edema. No abnormal extra-axial fluid collections. No hydrocephalus. Visualized paranasal sinuses and mastoid air cells are clear. 1. Redemonstration of postsurgical changes related to right-sided craniectomy and right-sided aneurysm repair. 2. No acute intracranial hemorrhage or edema. CT CHEST WO CONTRAST    Result Date: 7/14/2021  EXAMINATION: CT OF THE CHEST WITHOUT CONTRAST 7/14/2021 10:23 am TECHNIQUE: CT of the chest was performed without the administration of intravenous contrast. Multiplanar reformatted images are provided for review. Dose modulation, iterative reconstruction, and/or weight based adjustment of the mA/kV was utilized to reduce the radiation dose to as low as reasonably achievable. COMPARISON: 02/10/2020 HISTORY: ORDERING SYSTEM PROVIDED HISTORY: possible pneumonia TECHNOLOGIST PROVIDED HISTORY: Reason for exam:->possible pneumonia Decision Support Exception - unselect if not a suspected or confirmed emergency medical condition->Emergency Medical Condition (MA) FINDINGS: Mediastinum: There is significant cardiomegaly again identified.   There is also a pericardial effusion, measuring up to 14 mm in cardiomegaly. XR CHEST PORTABLE    Result Date: 7/14/2021  EXAMINATION: ONE XRAY VIEW OF THE CHEST 7/14/2021 8:09 am COMPARISON: 03/16/2021 HISTORY: ORDERING SYSTEM PROVIDED HISTORY: shortness of breath TECHNOLOGIST PROVIDED HISTORY: Reason for exam:->shortness of breath FINDINGS: Prominent cardiac silhouette. There are faint patchy bilateral pulmonary airspace opacities. No sizable effusion or pneumothorax. No acute osseous abnormality. Faint patchy bilateral pulmonary opacities. Correlate for possible infectious process. Prominent cardiac silhouette. CTA PULMONARY W CONTRAST    Result Date: 7/31/2021  EXAMINATION: CTA OF THE CHEST 7/31/2021 6:42 am TECHNIQUE: CTA of the chest was performed after the administration of intravenous contrast.  Multiplanar reformatted images are provided for review. MIP images are provided for review. Dose modulation, iterative reconstruction, and/or weight based adjustment of the mA/kV was utilized to reduce the radiation dose to as low as reasonably achievable. COMPARISON: 07/14/2021 HISTORY: ORDERING SYSTEM PROVIDED HISTORY: elevated dimer TECHNOLOGIST PROVIDED HISTORY: Reason for exam:->elevated dimer Decision Support Exception - unselect if not a suspected or confirmed emergency medical condition->Emergency Medical Condition (MA) What reading provider will be dictating this exam?->CRC FINDINGS: Pulmonary Arteries: Pulmonary arteries are adequately opacified for evaluation. No evidence of intraluminal filling defect to suggest pulmonary embolism. Main pulmonary artery is normal in caliber. Mediastinum: No aortic aneurysm or dissection. There is cardiomegaly and a small pericardial effusion. Borderline sized subcarinal lymph node is noted. 13 mm left paratracheal node. . Lungs/pleura: There is continued dense airspace consolidation in the right lower lobe with air bronchograms.   There are new diffuse bilateral ground-glass airspace opacities and bilateral multifocal small areas of airspace consolidation. No pneumothorax. Small right pleural effusion. Upper Abdomen: The kidneys appear atrophic. Soft Tissues/Bones: There are again seen prominent bilateral axillary lymph nodes. Diffuse increased density of the osseous structures are again compatible with renal osteodystrophy. No evidence of pulmonary emboli. Continued dense airspace consolidation of the right lower lobe. New extensive bilateral ground-glass opacities and patchy pulmonary airspace opacities may be related to pulmonary edema and/or multifocal infectious infiltrates. Cardiomegaly. Axillary adenopathy. Fluoroscopy modified barium swallow with video    Result Date: 8/4/2021  EXAMINATION: MODIFIED BARIUM SWALLOW WAS PERFORMED IN CONJUNCTION WITH [facility] WITH [facility] UNIT DICOM DISPLAY TECHNIQUE: Fluoroscopic evaluation of the swallowing mechanism was performed with multiple consistency of barium product. FLUOROSCOPY DOSE AND TYPE OR TIME AND EXPOSURES: Images: Cine fluoroscopy Fluoroscopic time: 3 minutes Total dose: 21 mGy COMPARISON: None HISTORY: ORDERING SYSTEM PROVIDED HISTORY: aspiration TECHNOLOGIST PROVIDED HISTORY: Reason for exam:->aspiration What reading provider will be dictating this exam?->CRC FINDINGS: Satisfactory oral and pharyngeal phases of the swallowing mechanism. No significant barium residuals and no barium aspiration. Satisfactory swallowing mechanism. No barium aspiration. Please see separate speech pathology report for full discussion of findings and recommendations. EKG: normal sinus rhythm, biatrial enlargement and LVH, unchanged from previous tracings.     Resident's Assessment and Plan     Christine Coleman is a 38 y.o. male past medical history significant for resistant, ESRD on HD on M/W/F, HFpEF (EF 60 to 65%), hypothyroidism, history of aneurysm of the AV fistula in 2016 who presented on 8/9/2021 with chief complaint of chest pain, headache taper.  Prednisone 40 mg, p.o. daily for 5 days--> prednisone 30 mg, p.o. daily for 5 days --> prednisone 20 mg, p.o. daily for 5 days --> prednisone 10 mg, p.o. daily for 5 days. · Reduce MAP 10 to 15% in the first hour. MAP goal 125. In the next 23 hours, MAP goal is 110. Approximately systolic blood pressure should be 160 in the next 23 hours. · To achieve this: will resume labetalol 300 mg BID, clonidine 0.3 mg TID, nifedipine 60 mg in the a.m. · Nephrology consult for dialysis resistant hypertension. Recommendations appreciated. · Follow-up BMP, CBC, Mg and Phos. · Follow up AM troponin. · Note, patient can only take p.o. medications tonight as difficulty getting IV access. IV team consulted for AM.    Early mobility: Ambulation as tolerated. Encouraged to walk.   DVT prophylaxis/ GI prophylaxis/Diet: Heparin/-/ Renal diet  Disposition: Admit inpatient    Mile Whitaker DO, PGY-2   Attending physician: Dr. Rosalie Wright physician: Dr. Ravin Baker

## 2021-08-10 NOTE — PROGRESS NOTES
[facility]  Internal [facility] Residency Program  Progress Note - House Team 1    Patient:  Brown Libman 38 y.o. male MRN: [id number]     Date of Service: 8/10/2021     CC: chest pain and shortness of breath  Overnight events: none     Subjective     Patient was seen at bedside this morning in no acute distress. Patient has no pain, no cough, no fever, and feels fine today. No overnight events    Objective     Physical Exam:  · Vitals: BP (!) 153/100   Pulse 97   Temp 97.9 °F (36.6 °C) (Temporal)   Resp 20   Ht 5' 6\" (1.676 m)   Wt 110 lb (49.9 kg)   SpO2 96%   BMI 17.75 kg/m²     · I & O - 24hr: No intake/output data recorded. · General Appearance: alert, appears stated age and cooperative  · HEENT:  Head: Normocephalic, no lesions, without obvious abnormality.   · Lung: wheezes bibasilar  · Heart: regular rate and rhythm, S1, S2 normal, no murmur, click, rub or gallop  · Extremities:  extremities normal, atraumatic, no cyanosis or edema  · Neurologic: Mental status: Alert, oriented, thought content appropriate  Subject  Pertinent Labs & Imaging Studies   denice  CBC:   Lab Results   Component Value Date    WBC 7.7 08/10/2021    RBC 2.64 08/10/2021    HGB 8.0 08/10/2021    HCT 25.2 08/10/2021    MCV 95.5 08/10/2021    MCH 30.3 08/10/2021    MCHC 31.7 08/10/2021    RDW 18.0 08/10/2021     08/10/2021    MPV 9.4 08/10/2021     CMP:    Lab Results   Component Value Date     08/10/2021    K 5.0 08/10/2021    K 3.9 08/09/2021    CL 94 08/10/2021    CO2 29 08/10/2021    BUN 47 08/10/2021    CREATININE 7.7 08/10/2021    GFRAA 10 08/10/2021    LABGLOM 10 08/10/2021    GLUCOSE 120 08/10/2021    PROT 6.8 08/09/2021    LABALBU 3.5 08/09/2021    CALCIUM 9.9 08/10/2021    BILITOT 0.4 08/09/2021    ALKPHOS 70 08/09/2021    AST 30 08/09/2021     08/09/2021     Magnesium:    Lab Results   Component Value Date    MG 2.9 08/10/2021     Phosphorus:    Lab Results   Component Value Date    PHOS 5.5 08/10/2021       Medical Student's Assessment and Plan     Sherie Barclay is a 38 y.o. male with a past medical history of HTN, ESRD with HD MWF, HFpEF (EF60-65%), hypothyroidism, and aneurysm of AV fistula (2016) who came in with a chief complaint of chest pain and shortness of breath. He is currently being treated for the following conditions.     2. Hypertensive urgency              -/121 on admission              -discharged 08/06/21 on clonidine 0.3 mg TID, losartan 100mg qHS, nifedipine 90mg daily, and labetalol 300mg TID, unsure if compliant              -continue clonidine 03mg TID, labetalol 300mg BID, nifedipine 60mg daily   -monitor BP  1. RLL pneumonia 2/2 noncompliance with meds               -afebrile, WBC 9.5, dyspnea, pleuritic chest pain               -CXR on admission (8/9/21) showed same interstitial and patchy airspace opacities bilaterally with increased (from previous) opacities in the right lung base               -procalcitonin 1.5              -pt never filled antibiotics (augmentin)              -continue augmentin 600mg BID and prednisone 40mg daily for 4 days              -f/u blood cultures  3. ESRD on HD MWF              -HD done yesterday, 3 of 4 hours   -Magnesium 2.9, phosphorous 5.5  4. Hx HFpEF (EF 60-65%)  5. Elevated troponins   -troponins baseline on admission (70-90), 102 today  6. Hx resistant hypertension   -usual regimen clonidine 0.3mg TID, losartan 100 mg nightly, nifedipine 90mg daily and labetalol 300mg TID   -f/u nephrology consult  7. Hx aneurysm of AVF 2016    PT/OT evaluation: none  DVT prophylaxis/ GI prophylaxis: heparin/none  Disposition: plan to discharge today    Grecia Delvalle, MS3  Attending physician: Dr. Jyoti Ordonez

## 2021-08-10 NOTE — PROGRESS NOTES
CLINICAL PHARMACY NOTE: MEDS TO BEDS    Total # of Prescriptions Filled: 2   The following medications were delivered to the patient:  · Amoxicillin-pot cla 875-125 mg  · Prednisone 20 mg    Additional Documentation:  Delivered meds @316pm

## 2021-08-11 DIAGNOSIS — J18.9 PNEUMONIA OF BOTH LUNGS DUE TO INFECTIOUS ORGANISM, UNSPECIFIED PART OF LUNG: Primary | ICD-10-CM

## 2021-08-11 NOTE — DISCHARGE SUMMARY
[facility]  Discharge Summary    PCP: Ana Matos DO    Admit Date:8/9/2021  Discharge Date: 8/11/2021    Admission Diagnosis:   1. Hypertensive urgency  2. Right lower lobe pneumonia  3. Elevated troponin,  4. Hx of ESRD on HD  5. History of resistant hypertension  6. History of HFpEF   7. History of aneurysm of AVF 2016    Discharge Diagnosis  · Hypertensive urgency  · Right lower lobe pneumonia  · Elevated troponin,  · Hx of ESRD on HD   · History of resistant hypertension  · History of HFpEF  · History of aneurysm of AVF 2016          Hospital Course:   Enzo Mejia is a 38 y.o. male with chief complaint of chest pain, headache and dyspnea for the last few hours. Patient reports he noted symptoms he was at dialysis. Patient reports he has had the symptoms in the past when he forgot to take his blood pressure medication. However patient notes, he Juan Jeronimoe been taking every single medication as prescribed\". Reports the chest pain was at the center of his chest and nonradiating. He reports that headache was a throbbing headache. Also endorses that he saw black spots during dialysis with a headache. Patient reported having chest pain worsened when he took a deep breath. He also associated with shortness of breath.  He was found to have an elevated blood pressure at HD and hemodialysis was stopped after 3 of the 4 hours. He was given 3 doses of clonidine and recommended to go to the emergency room. Patient was given 1 nitro and 4 aspirin for his chest pain as well. Of note, patient was recently discharged on 8/6/2021 after being treated for right lower lobe pneumonia. Patient discharged with Augmentin and prednisone however has not had time to  from the pharmacy. Past medical history significant for resistant, ESRD on HD on M/W/F, HFpEF (EF 60 to 65%), hypothyroidism, history of aneurysm of the AV fistula in 2016 who presented on 8/9/2021.    In ED, He was found to have a blood pressure of 200/121 in the ED. He was given a dose of clonidine 0.3 mg, hydralazine 50 mg and labetalol 300 mg. Chest x-ray shows redemonstration of patchy airspace and interstitial opacities bilaterally increasing in the right lung base compared to prior. Patient was initially ordered IV vancomycin and IV cefepime. However patient does not have IV access despite multiple attempts by nursing staff. During hospital course, he was receiving augmentine, prednisolone, dhydralazine, labetalol,  Clonidine. On discharge, her BP was 153/100. He was still having some pleuritic chest pain and mild cough. He discharged with Augmentin, Deltasone labetalol, nifedipine, pantoprazole, losartan, clonidine, sevelamer. during discharge meds to peds were done. Patient will have dialysis on 8/11/2021, and follow-up appointment next week.       Significant findings (history and exam, laboratory, radiological, pathology, other tests):   · General Appearance: alert, appears stated age and cooperative  · HEENT:  Head: Normocephalic, no lesions, without obvious abnormality. · Neck: no adenopathy, no carotid bruit, no JVD, supple, symmetrical, trachea midline and thyroid not enlarged, symmetric, no tenderness/mass/nodules  · Lung: rochi in the right lower lobe  · Heart: regular rate and rhythm, S1, S2 normal, no murmur, click, rub or gallop  · Abdomen: soft, non-tender; bowel sounds normal; no masses,  no organomegaly  · Extremities:  extremities normal, atraumatic, no cyanosis or edema  · Musculokeletal: No joint swelling, no muscle tenderness. ROM normal in all joints of extremities. · Neurologic: Mental status: Alert, oriented, thought content appropriate. Pending test results:   None    Consults:  1.  Nephrology    Procedures:  None    Condition at discharge: Stable    Disposition: Discharged to home    Discharge Medications:  Discharge Medication List as of 8/10/2021  2:03 PM CONTINUE these medications which have CHANGED    Details   predniSONE (DELTASONE) 20 MG tablet Take 2 tablets by mouth daily for 4 days, Disp-8 tablet, R-0Normal      amoxicillin-clavulanate (AUGMENTIN ES-600) 600-42.9 MG/5ML suspension Take 5 mLs by mouth 2 times daily for 5 days, Disp-50 mL, R-0Normal         CONTINUE these medications which have NOT CHANGED    Details   megestrol (MEGACE) 20 MG tablet Take 20 mg by mouth dailyHistorical Med      labetalol (NORMODYNE) 300 MG tablet Take 1 tablet by mouth three times daily, Disp-90 tablet, R-2Normal      NIFEdipine (PROCARDIA XL) 90 MG extended release tablet Take 1 tablet by mouth daily, Disp-90 tablet, R-0Normal      pantoprazole (PROTONIX) 40 MG tablet TAKE 1 TABLET BY MOUTH DAILY, Disp-30 tablet, R-0Normal      B Complex-C-Folic Acid (NEPHRO-JOHNNY) 0.8 MG TABS Take 1 tablet by mouth daily Historical Med      lidocaine-prilocaine (EMLA) 2.5-2.5 % cream APPLY SMALL AMOUNT TO ACCESS SITE (AVF) 1 TO 2 HOURS BEFORE DIALYSIS.  COVER WITH OCCLUSIVE DRESSING (SARAN WRAP), Historical Med      diphenhydrAMINE (SOMINEX) 25 MG tablet Take 25 mg by mouth 2 times daily as needed Historical Med      melatonin 3 MG TABS tablet Take 1 tablet by mouth nightly as needed (sleep), Disp-20 tablet, R-0Normal      losartan (COZAAR) 100 MG tablet Take 1 tablet by mouth every evening, Disp-30 tablet, R-2Normal      cloNIDine (CATAPRES) 0.3 MG tablet Take 1 tablet by mouth 3 times daily, Disp-90 tablet, R-2Normal      sevelamer (RENVELA) 800 MG tablet Take by mouth See Admin Instructions 2 tablets by mouth three times a day with meals and 1 tablet with snacksHistorical Med         STOP taking these medications       hydrALAZINE (APRESOLINE) 25 MG tablet Comments:   Reason for Stopping:         hydrALAZINE (APRESOLINE) 50 MG tablet Comments:   Reason for Stopping:         Cinacalcet HCl (SENSIPAR PO) Comments:   Reason for Stopping:                 Kristen Leone MD  PGY-1   3:18 PM 8/11/2021

## 2021-08-14 LAB — BLOOD CULTURE, ROUTINE: NORMAL

## 2021-08-20 ENCOUNTER — HOSPITAL ENCOUNTER (OUTPATIENT)
Age: 36
Discharge: HOME | End: 2021-08-20
Payer: MEDICAID

## 2021-08-20 ENCOUNTER — OFFICE VISIT (OUTPATIENT)
Dept: INTERNAL MEDICINE | Age: 36
End: 2021-08-20
Payer: MEDICAID

## 2021-08-20 VITALS
SYSTOLIC BLOOD PRESSURE: 112 MMHG | TEMPERATURE: 98.7 F | BODY MASS INDEX: 20.09 KG/M2 | HEIGHT: 66 IN | OXYGEN SATURATION: 98 % | WEIGHT: 125 LBS | DIASTOLIC BLOOD PRESSURE: 69 MMHG | RESPIRATION RATE: 19 BRPM | HEART RATE: 94 BPM

## 2021-08-20 DIAGNOSIS — N18.6 ESRD ON HEMODIALYSIS (HCC): ICD-10-CM

## 2021-08-20 DIAGNOSIS — Z99.2 ESRD ON HEMODIALYSIS (HCC): ICD-10-CM

## 2021-08-20 DIAGNOSIS — J18.9 PNEUMONIA OF BOTH LUNGS DUE TO INFECTIOUS ORGANISM, UNSPECIFIED PART OF LUNG: ICD-10-CM

## 2021-08-20 DIAGNOSIS — R53.83 FATIGUE, UNSPECIFIED TYPE: ICD-10-CM

## 2021-08-20 DIAGNOSIS — I31.39 PERICARDIAL EFFUSION WITHOUT CARDIAC TAMPONADE: ICD-10-CM

## 2021-08-20 DIAGNOSIS — D63.8 ANEMIA, CHRONIC DISEASE: ICD-10-CM

## 2021-08-20 DIAGNOSIS — I10 ESSENTIAL HYPERTENSION: ICD-10-CM

## 2021-08-20 DIAGNOSIS — I50.32 CHRONIC HEART FAILURE WITH PRESERVED EJECTION FRACTION (HCC): Primary | ICD-10-CM

## 2021-08-20 LAB
ANION GAP SERPL CALCULATED.3IONS-SCNC: 11 MMOL/L (ref 7–16)
BUN BLDV-MCNC: 18 MG/DL (ref 6–20)
CALCIUM SERPL-MCNC: 9.7 MG/DL (ref 8.6–10.2)
CHLORIDE BLD-SCNC: 101 MMOL/L (ref 98–107)
CO2: 32 MMOL/L (ref 22–29)
CREAT SERPL-MCNC: 3.5 MG/DL (ref 0.7–1.2)
GFR AFRICAN AMERICAN: 24
GFR NON-AFRICAN AMERICAN: 24 ML/MIN/1.73
GLUCOSE BLD-MCNC: 107 MG/DL (ref 74–99)
POTASSIUM SERPL-SCNC: 3.8 MMOL/L (ref 3.5–5)
SODIUM BLD-SCNC: 144 MMOL/L (ref 132–146)
VITAMIN D 25-HYDROXY: 19 NG/ML (ref 30–100)

## 2021-08-20 PROCEDURE — 36415 COLL VENOUS BLD VENIPUNCTURE: CPT

## 2021-08-20 PROCEDURE — 80048 BASIC METABOLIC PNL TOTAL CA: CPT

## 2021-08-20 PROCEDURE — 99213 OFFICE O/P EST LOW 20 MIN: CPT | Performed by: INTERNAL MEDICINE

## 2021-08-20 PROCEDURE — 1111F DSCHRG MED/CURRENT MED MERGE: CPT | Performed by: INTERNAL MEDICINE

## 2021-08-20 PROCEDURE — 82306 VITAMIN D 25 HYDROXY: CPT

## 2021-08-20 PROCEDURE — 87449 NOS EACH ORGANISM AG IA: CPT

## 2021-08-20 RX ORDER — MEGESTROL ACETATE 20 MG/1
20 TABLET ORAL 3 TIMES DAILY
COMMUNITY
Start: 2021-06-11 | End: 2022-04-09

## 2021-08-20 RX ORDER — NIFEDIPINE 60 MG/1
TABLET, FILM COATED, EXTENDED RELEASE ORAL
COMMUNITY
Start: 2021-08-19 | End: 2021-08-20

## 2021-08-20 RX ORDER — LABETALOL 300 MG/1
1 TABLET, FILM COATED ORAL
Status: ON HOLD | COMMUNITY
Start: 2021-08-16 | End: 2021-09-07

## 2021-08-20 RX ORDER — NIFEDIPINE 90 MG/1
1 TABLET, EXTENDED RELEASE ORAL
Status: ON HOLD | COMMUNITY
Start: 2021-08-16 | End: 2021-09-07

## 2021-08-20 RX ORDER — LOSARTAN POTASSIUM 100 MG/1
100 TABLET ORAL EVERY EVENING
Qty: 30 TABLET | Refills: 3 | Status: SHIPPED
Start: 2021-08-20 | End: 2022-03-01 | Stop reason: SDUPTHER

## 2021-08-20 RX ORDER — CLONIDINE HYDROCHLORIDE 0.3 MG/1
0.3 TABLET ORAL 3 TIMES DAILY
COMMUNITY
Start: 2021-04-07 | End: 2022-03-01 | Stop reason: SDUPTHER

## 2021-08-20 RX ORDER — PANTOPRAZOLE SODIUM 40 MG/1
40 TABLET, DELAYED RELEASE ORAL DAILY
Qty: 30 TABLET | Refills: 0 | Status: CANCELLED | OUTPATIENT
Start: 2021-08-20

## 2021-08-20 RX ORDER — HYDRALAZINE HYDROCHLORIDE 50 MG/1
TABLET, FILM COATED ORAL
COMMUNITY
Start: 2021-08-19 | End: 2021-08-20 | Stop reason: ALTCHOICE

## 2021-08-20 RX ORDER — LABETALOL 200 MG/1
TABLET, FILM COATED ORAL
COMMUNITY
Start: 2021-08-18 | End: 2021-08-20

## 2021-08-20 RX ORDER — PREDNISONE 10 MG/1
TABLET ORAL
COMMUNITY
Start: 2021-08-11 | End: 2021-08-20 | Stop reason: ALTCHOICE

## 2021-08-20 ASSESSMENT — ENCOUNTER SYMPTOMS
SINUS PRESSURE: 0
SHORTNESS OF BREATH: 0
VOMITING: 0
NAUSEA: 0
WHEEZING: 0
BACK PAIN: 0
SINUS PAIN: 0
CONSTIPATION: 0
COUGH: 0
DIARRHEA: 0

## 2021-08-20 NOTE — PATIENT INSTRUCTIONS
Continue current course of medications  Please get blood work done today  Follow with cardiology  Please call is symptoms worsen    Brittnee Kwan, DO    Patient Education        DASH Diet: Care Instructions  Your Care Instructions     The DASH diet is an eating plan that can help lower your blood pressure. DASH stands for Dietary Approaches to Stop Hypertension. Hypertension is high blood pressure. The DASH diet focuses on eating foods that are high in calcium, potassium, and magnesium. These nutrients can lower blood pressure. The foods that are highest in these nutrients are fruits, vegetables, low-fat dairy products, nuts, seeds, and legumes. But taking calcium, potassium, and magnesium supplements instead of eating foods that are high in those nutrients does not have the same effect. The DASH diet also includes whole grains, fish, and poultry. The DASH diet is one of several lifestyle changes your doctor may recommend to lower your high blood pressure. Your doctor may also want you to decrease the amount of sodium in your diet. Lowering sodium while following the DASH diet can lower blood pressure even further than just the DASH diet alone. Follow-up care is a key part of your treatment and safety. Be sure to make and go to all appointments, and call your doctor if you are having problems. It's also a good idea to know your test results and keep a list of the medicines you take. How can you care for yourself at home? Following the DASH diet  · Eat 4 to 5 servings of fruit each day. A serving is 1 medium-sized piece of fruit, ½ cup chopped or canned fruit, 1/4 cup dried fruit, or 4 ounces (½ cup) of fruit juice. Choose fruit more often than fruit juice. · Eat 4 to 5 servings of vegetables each day. A serving is 1 cup of lettuce or raw leafy vegetables, ½ cup of chopped or cooked vegetables, or 4 ounces (½ cup) of vegetable juice. Choose vegetables more often than vegetable juice.   · Get 2 to 3 servings of low-fat and fat-free dairy each day. A serving is 8 ounces of milk, 1 cup of yogurt, or 1 ½ ounces of cheese. · Eat 6 to 8 servings of grains each day. A serving is 1 slice of bread, 1 ounce of dry cereal, or ½ cup of cooked rice, pasta, or cooked cereal. Try to choose whole-grain products as much as possible. · Limit lean meat, poultry, and fish to 2 servings each day. A serving is 3 ounces, about the size of a deck of cards. · Eat 4 to 5 servings of nuts, seeds, and legumes (cooked dried beans, lentils, and split peas) each week. A serving is 1/3 cup of nuts, 2 tablespoons of seeds, or ½ cup of cooked beans or peas. · Limit fats and oils to 2 to 3 servings each day. A serving is 1 teaspoon of vegetable oil or 2 tablespoons of salad dressing. · Limit sweets and added sugars to 5 servings or less a week. A serving is 1 tablespoon jelly or jam, ½ cup sorbet, or 1 cup of lemonade. · Eat less than 2,300 milligrams (mg) of sodium a day. If you limit your sodium to 1,500 mg a day, you can lower your blood pressure even more. · Be aware that all of these are the suggested number of servings for people who eat 1,800 to 2,000 calories a day. Your recommended number of servings may be different if you need more or fewer calories. Tips for success  · Start small. Do not try to make dramatic changes to your diet all at once. You might feel that you are missing out on your favorite foods and then be more likely to not follow the plan. Make small changes, and stick with them. Once those changes become habit, add a few more changes. · Try some of the following:  ? Make it a goal to eat a fruit or vegetable at every meal and at snacks. This will make it easy to get the recommended amount of fruits and vegetables each day. ? Try yogurt topped with fruit and nuts for a snack or healthy dessert. ? Add lettuce, tomato, cucumber, and onion to sandwiches.   ? Combine a ready-made pizza crust with low-fat mozzarella cheese and lots of vegetable toppings. Try using tomatoes, squash, spinach, broccoli, carrots, cauliflower, and onions. ? Have a variety of cut-up vegetables with a low-fat dip as an appetizer instead of chips and dip. ? Sprinkle sunflower seeds or chopped almonds over salads. Or try adding chopped walnuts or almonds to cooked vegetables. ? Try some vegetarian meals using beans and peas. Add garbanzo or kidney beans to salads. Make burritos and tacos with mashed barker beans or black beans. Where can you learn more? Go to https://Startpack[employer]Helvetaeb.[employer]. org and sign in to your [facility] account. Enter U754 in the Cytoo box to learn more about \"DASH Diet: Care Instructions. \"     If you do not have an account, please click on the \"Sign Up Now\" link. Current as of: August 31, 2020               Content Version: 12.9  © 6387-4586 [employer]. Care instructions adapted under license by [facility] ([facility]). If you have questions about a medical condition or this instruction, always ask your healthcare professional. [address] any warranty or liability for your use of this information.

## 2021-08-20 NOTE — PROGRESS NOTES
[facility]  [facility] Residency [facility]    Attending Physician Statement  I have discussed the case, including pertinent history and exam findings with the resident physician. I agree with the assessment, plan and orders as documented by the resident. I have reviewed all pertinent PMHx, PSHx, FamHx, SocialHx, medications, and allergies and updated history as appropriate. Patient here for routine follow up of medical problems. 1. Resistant HTN: controlled at this time; continue current regimen   2. ESRD on HD: follows with nephrology; unknown cause of ESRD; consider amyloidosis vs PCKD 2/2 history of brain aneurysm  3. HFpEF DDII: concerns for infiltrative disease such as amyloidosis; history of negative fat pad biopsy; 2nd time TTE has denoted possibility of infiltrative disease of heart; DDII may be due to pericardial effusion; cardiology consult for MRI and HD coordination; if concerns for Amyloid consider serum electrophoresis, repeat biopsy of tissue for evaluation, and kappa lambda free light chains   4. Pneumonia: patient currently has CROUCH; treated for PNA but has Aspergillus and Vanna dubliniensis growing in BAL sample; 1,3 beta d glucan ordered per ID; crackles in RLL concerning for recurrent PNA; defer to ID on antibiotic vs antifungal regimen; evaluated for immunodeficiency negative at this time   5. Hypothyroidism: ?hypothyroidism; asymptomatic; possibility of elevated TSH 2/2 renal cause   6. Moderate pericardial Effusion: was thought to be due to hydralazine induced; elevated ESR but negative anti histone antibodies; needs cardiology followup and repeat TTE in 2 months per echocardiogram; if becomes symptomatic consider pericardiocentesis     TTE  Normal left ventricle size and systolic function. Ejection fraction is visually estimated at 60-65%. No regional wall motion abnormalities seen. Severe concentric left ventricular hypertrophy.   Stage II diastolic dysfunction. Increased echo texture, consider cardiac MRI to rule out infiltrative cardiomyopathies such as amyloidosis. The left atrium is severely dilated. Normal right ventricular size and function. TAPSE 23 mm. No hemodynamically significant aortic stenosis is present. Mild tricuspid regurgitation. RVSP is 44 mmHg. Pulmonary hypertension is mild . There is a moderate circumferential pericardial effusion noted. No echocardiographic signs of tamponade. Remainder of medical problems as per resident note.     Denzel Culver DO  8/20/2021 2:22 PM    Encounter time including independent chart review, discussion with patient, interpreting test results and/or external communications: 39'

## 2021-08-20 NOTE — PROGRESS NOTES
Post-Discharge Transitional Care Management Services or Hospital Follow Up      Roberto Ramirez   YOB: 1985    Date of Office Visit:  8/20/2021  Date of Hospital Admission: 8/9/21  Date of Hospital Discharge: 8/10/21  Readmission Risk Score(high >=14%. Medium >=10%):Readmission Risk Score: 31      Care management risk score Rising risk (score 2-5) and Complex Care (Scores >=6): 9     Non face to face  following discharge, date last encounter closed (first attempt may have been earlier): 8/9/2021 10:24 AM 8/9/2021 10:24 AM    Call initiated 2 business days of discharge: Yes     Patient Active Problem List   Diagnosis    [facility]    H/O intracranial hemorrhage and aneurysm clipping on the right side    Anemia, chronic disease    Anxiety    Noncompliance    [facility]arteriovenous fistula) ([facility]    Severe protein-calorie malnutrition ([facility]    Venous hypertension, chronic, left    Iron deficiency    Secondary hyperparathyroidism of renal origin ([facility]    Essential hypertension    Chronic heart failure with preserved ejection fraction (HCC)    Acute respiratory failure with hypoxia (HCC)    Pneumonia       Allergies   Allergen Reactions    Tylenol [Acetaminophen] Itching    Levofloxacin Itching     No rash       Medications listed as ordered at the time of discharge from [facility] Medication Instructions UBB:[id number]    Printed on:08/20/21 5498   Medication Information                      B Complex-C-Folic Acid (NEPHRO-JOHNNY) 0.8 MG TABS  Take 1 tablet by mouth daily              cloNIDine (CATAPRES) 0.3 MG tablet  Take 1 tablet by mouth             diphenhydrAMINE (SOMINEX) 25 MG tablet  Take 25 mg by mouth 2 times daily as needed              labetalol (NORMODYNE) 300 MG tablet  Take 1 tablet by mouth             lidocaine-prilocaine (EMLA) 2.5-2.5 % cream  APPLY SMALL AMOUNT TO ACCESS SITE (AVF) 1 TO 2 HOURS BEFORE DIALYSIS.  COVER WITH OCCLUSIVE DRESSING (SARAN WRAP)             losartan (COZAAR) 100 MG tablet  Take 1 tablet by mouth every evening             megestrol (MEGACE) 20 MG tablet  Take 1 tablet by mouth             Melatonin 5 MG CAPS               Methoxy PEG-Epoetin Beta (MIRCERA IJ)  60 mcg             NIFEdipine (PROCARDIA XL) 90 MG extended release tablet  Take 1 tablet by mouth             pantoprazole (PROTONIX) 40 MG tablet  TAKE 1 TABLET BY MOUTH DAILY             sevelamer (RENVELA) 800 MG tablet  Take by mouth See Admin Instructions 2 tablets by mouth three times a day with meals and 1 tablet with snacks                   Medications marked \"taking\" at this time  Outpatient Medications Marked as Taking for the 8/20/21 encounter (Office Visit) with Hector Or, DO   Medication Sig Dispense Refill    Melatonin 5 MG CAPS       Methoxy PEG-Epoetin Beta (MIRCERA IJ) 60 mcg      cloNIDine (CATAPRES) 0.3 MG tablet Take 1 tablet by mouth      labetalol (NORMODYNE) 300 MG tablet Take 1 tablet by mouth      megestrol (MEGACE) 20 MG tablet Take 1 tablet by mouth      NIFEdipine (PROCARDIA XL) 90 MG extended release tablet Take 1 tablet by mouth      losartan (COZAAR) 100 MG tablet Take 1 tablet by mouth every evening 30 tablet 3    pantoprazole (PROTONIX) 40 MG tablet TAKE 1 TABLET BY MOUTH DAILY 30 tablet 0    B Complex-C-Folic Acid (NEPHRO-JOHNNY) 0.8 MG TABS Take 1 tablet by mouth daily       lidocaine-prilocaine (EMLA) 2.5-2.5 % cream APPLY SMALL AMOUNT TO ACCESS SITE (AVF) 1 TO 2 HOURS BEFORE DIALYSIS.  COVER WITH OCCLUSIVE DRESSING (SARAN WRAP)      diphenhydrAMINE (SOMINEX) 25 MG tablet Take 25 mg by mouth 2 times daily as needed       sevelamer (RENVELA) 800 MG tablet Take by mouth See Admin Instructions 2 tablets by mouth three times a day with meals and 1 tablet with snacks          Medications patient taking as of now reconciled against medications ordered at time of hospital discharge: Yes    Chief Complaint Patient presents with    Follow-Up from Hospital     no energy, short of breath       HPI    Inpatient course: Discharge summary reviewed- see chart. Interval history/Current status:   HFU 8/10: HTN emergency; RLL PNA treated with augmentin. repoerts that he lives on 2nd floor and on hot days he may have drunk about a gallon of fluids. The patient is a 36 y. o. male with a PMHx of HTN, ESRD w/ dialysis MWF, HFpEF (EF 60-65% SIIDD 7/21), hypothyroidism, aneurysm of AV fistula in 2016)  HTN controlled; on clonidine 0.3 qd, labetalol 300 qd, losartan 100 qd, nifedepine 90 qd; stopped hydralazine 7/21  ESRD on HD: had dialysis today; on sevelamer  HFpEF:   Hypothyroidism: TSH 4.3 elevated in 7/21 likely 2/2 HD  GERD: on protonix; does not take it; asymptomatic  Anemia of chronic disease 2/2 ESRD  Protein calorie malnutrition: on megace  Itching likley 2/2 uremia: diphenhydramine    Reprots that he has been doing fine since discharge- denies fever, chills, productive cough, reports CROUCH; has been staying safe and avoiding close contacts. Reports he is planning a transplant. In the process. Only CC for today is low energy. Especially on the days of HD. Review of Systems   Constitutional: Positive for fatigue. Negative for activity change, appetite change, chills and fever. HENT: Negative for sinus pressure, sinus pain and sneezing. Respiratory: Negative for cough, shortness of breath and wheezing. Cardiovascular: Negative for chest pain, palpitations and leg swelling. Gastrointestinal: Negative for constipation, diarrhea, nausea and vomiting. Endocrine: Negative for polydipsia, polyphagia and polyuria. Genitourinary:        Anuric   Musculoskeletal: Negative for arthralgias, back pain and myalgias. Neurological: Negative for dizziness, seizures, syncope and light-headedness. Psychiatric/Behavioral: Negative for dysphoric mood and sleep disturbance. The patient is not nervous/anxious. Vitals:    08/20/21 1333   BP: 112/69   Site: Right Upper Arm   Position: Sitting   Cuff Size: Medium Adult   Pulse: 94   Resp: 19   Temp: 98.7 °F (37.1 °C)   TempSrc: Oral   SpO2: 98%   Weight: 125 lb (56.7 kg)   Height: 5' 6\" (1.676 m)     Body mass index is 20.18 kg/m². Wt Readings from Last 3 Encounters:   08/20/21 125 lb (56.7 kg)   08/09/21 110 lb (49.9 kg)   08/06/21 110 lb 0.2 oz (49.9 kg)     BP Readings from Last 3 Encounters:   08/20/21 112/69   08/10/21 (!) 153/100   08/06/21 136/81       Physical Exam  Constitutional:       Appearance: Normal appearance. He is normal weight. HENT:      Head: Normocephalic and atraumatic. Mouth/Throat:      Mouth: Mucous membranes are moist.   Eyes:      Extraocular Movements: Extraocular movements intact. Pupils: Pupils are equal, round, and reactive to light. Cardiovascular:      Rate and Rhythm: Normal rate and regular rhythm. Heart sounds: Murmur: 3/6 holosystolis murmur best heard in left sternal border; Echo reviewed; no valvular abnormalities. Pulmonary:      Effort: Pulmonary effort is normal.      Breath sounds: No wheezing. Comments: Crackles RLL  Abdominal:      General: Bowel sounds are normal.      Palpations: Abdomen is soft. There is no mass. Tenderness: There is no abdominal tenderness. Musculoskeletal:         General: Normal range of motion. Cervical back: Normal range of motion. No rigidity. No muscular tenderness. Right lower leg: No edema. Left lower leg: No edema. Neurological:      General: No focal deficit present. Mental Status: He is alert and oriented to person, place, and time. Cranial Nerves: No cranial nerve deficit. Psychiatric:         Mood and Affect: Mood normal.         Behavior: Behavior normal.         Thought Content: Thought content normal.             Assessment/Plan:  1.  Chronic heart failure with preserved ejection fraction [address] MD, CardiologyBrooke    2. Pneumonia of both lungs due to infectious organism, unspecified part of lung  3. ESRD on hemodialysis ([facility] [facility]  4. Anemia, chronic disease    5. Fatigue, unspecified type  - Vitamin D 25 Hydroxy; Future  - VITAMIN B12 & FOLATE; Future    6. Essential hypertension  - losartan (COZAAR) 100 MG tablet; Take 1 tablet by mouth every evening  Dispense: 30 tablet;  Refill: 3        Medical Decision Making: moderate complexity

## 2021-08-23 PROBLEM — I31.39 PERICARDIAL EFFUSION WITHOUT CARDIAC TAMPONADE: Status: ACTIVE | Noted: 2021-08-23

## 2021-08-23 LAB
(1,3)-BETA-D-GLUCAN (FUNGITELL) INTERPRETATION: NORMAL
(1,3)-BETA-D-GLUCAN (FUNGITELL): NORMAL PG/ML

## 2021-08-25 ENCOUNTER — HOSPITAL ENCOUNTER (OUTPATIENT)
Dept: CARDIOLOGY | Age: 36
Discharge: HOME | End: 2021-08-25
Payer: MEDICAID

## 2021-08-25 DIAGNOSIS — R07.89 ATYPICAL CHEST PAIN: ICD-10-CM

## 2021-08-25 DIAGNOSIS — N18.6 STAGE 5 CHRONIC KIDNEY DISEASE ON CHRONIC DIALYSIS (HCC): ICD-10-CM

## 2021-08-25 DIAGNOSIS — I10 ESSENTIAL HYPERTENSION: ICD-10-CM

## 2021-08-25 DIAGNOSIS — Z99.2 STAGE 5 CHRONIC KIDNEY DISEASE ON CHRONIC DIALYSIS (HCC): ICD-10-CM

## 2021-08-25 PROCEDURE — 93308 TTE F-UP OR LMTD: CPT

## 2021-08-26 ENCOUNTER — TELEPHONE (OUTPATIENT)
Dept: CARDIOLOGY CLINIC | Age: 36
End: 2021-08-26

## 2021-08-26 NOTE — TELEPHONE ENCOUNTER
----- Message from Parker Neff MD sent at 8/25/2021  3:54 PM EDT -----  Please notify patient that echocardiogram demonstrates continued thickening of heart muscle consistent with that of hypertension with normal heart muscle function and ongoing moderate leakage of mitral valve unchanged from that previously noted. A significant improvement of fluid surrounding his arteries presently noted with only trivial residual likely on the basis of his chronic kidney disease.   Continue as directed and recommend scheduling follow-up appointment within approximately the next 1 to 2 months    Called pt re above

## 2021-09-04 ENCOUNTER — APPOINTMENT (OUTPATIENT)
Dept: GENERAL RADIOLOGY | Age: 36
DRG: 139 | End: 2021-09-04
Payer: MEDICAID

## 2021-09-04 ENCOUNTER — HOSPITAL ENCOUNTER (INPATIENT)
Age: 36
LOS: 3 days | Discharge: HOME | DRG: 139 | End: 2021-09-10
Attending: EMERGENCY MEDICINE | Admitting: INTERNAL MEDICINE
Payer: MEDICAID

## 2021-09-04 DIAGNOSIS — D64.9 ANEMIA, UNSPECIFIED TYPE: ICD-10-CM

## 2021-09-04 DIAGNOSIS — R07.9 CHEST PAIN, UNSPECIFIED TYPE: Primary | ICD-10-CM

## 2021-09-04 LAB
ABO/RH: NORMAL
ADENOVIRUS BY PCR: NOT DETECTED
ALBUMIN SERPL-MCNC: 4 G/DL (ref 3.5–5.2)
ALP BLD-CCNC: 74 U/L (ref 40–129)
ALT SERPL-CCNC: 27 U/L (ref 0–40)
ANION GAP SERPL CALCULATED.3IONS-SCNC: 11 MMOL/L (ref 7–16)
ANTIBODY SCREEN: NORMAL
AST SERPL-CCNC: 42 U/L (ref 0–39)
BASOPHILS ABSOLUTE: 0.04 E9/L (ref 0–0.2)
BASOPHILS RELATIVE PERCENT: 0.5 % (ref 0–2)
BILIRUB SERPL-MCNC: 0.5 MG/DL (ref 0–1.2)
BLOOD BANK DISPENSE STATUS: NORMAL
BLOOD BANK PRODUCT CODE: NORMAL
BORDETELLA PARAPERTUSSIS BY PCR: NOT DETECTED
BORDETELLA PERTUSSIS BY PCR: NOT DETECTED
BPU ID: NORMAL
BUN BLDV-MCNC: 19 MG/DL (ref 6–20)
CALCIUM SERPL-MCNC: 9.9 MG/DL (ref 8.6–10.2)
CHLAMYDOPHILIA PNEUMONIAE BY PCR: NOT DETECTED
CHLORIDE BLD-SCNC: 100 MMOL/L (ref 98–107)
CO2: 32 MMOL/L (ref 22–29)
CORONAVIRUS 229E BY PCR: NOT DETECTED
CORONAVIRUS HKU1 BY PCR: NOT DETECTED
CORONAVIRUS NL63 BY PCR: NOT DETECTED
CORONAVIRUS OC43 BY PCR: NOT DETECTED
CREAT SERPL-MCNC: 5.7 MG/DL (ref 0.7–1.2)
DESCRIPTION BLOOD BANK: NORMAL
EKG ATRIAL RATE: 104 BPM
EKG P AXIS: 75 DEGREES
EKG P-R INTERVAL: 182 MS
EKG Q-T INTERVAL: 354 MS
EKG QRS DURATION: 84 MS
EKG QTC CALCULATION (BAZETT): 465 MS
EKG R AXIS: 75 DEGREES
EKG T AXIS: 88 DEGREES
EKG VENTRICULAR RATE: 104 BPM
EOSINOPHILS ABSOLUTE: 0.25 E9/L (ref 0.05–0.5)
EOSINOPHILS RELATIVE PERCENT: 2.9 % (ref 0–6)
GFR AFRICAN AMERICAN: 14
GFR NON-AFRICAN AMERICAN: 14 ML/MIN/1.73
GLUCOSE BLD-MCNC: 91 MG/DL (ref 74–99)
HCT VFR BLD CALC: 22.5 % (ref 37–54)
HCT VFR BLD CALC: 25.4 % (ref 37–54)
HEMOGLOBIN: 6.9 G/DL (ref 12.5–16.5)
HEMOGLOBIN: 8.3 G/DL (ref 12.5–16.5)
HUMAN METAPNEUMOVIRUS BY PCR: NOT DETECTED
HUMAN RHINOVIRUS/ENTEROVIRUS BY PCR: NOT DETECTED
IMMATURE GRANULOCYTES #: 0.06 E9/L
IMMATURE GRANULOCYTES %: 0.7 % (ref 0–5)
INFLUENZA A BY PCR: NOT DETECTED
INFLUENZA B BY PCR: NOT DETECTED
LYMPHOCYTES ABSOLUTE: 1.78 E9/L (ref 1.5–4)
LYMPHOCYTES RELATIVE PERCENT: 20.5 % (ref 20–42)
MCH RBC QN AUTO: 30.7 PG (ref 26–35)
MCHC RBC AUTO-ENTMCNC: 30.7 % (ref 32–34.5)
MCV RBC AUTO: 100 FL (ref 80–99.9)
MONOCYTES ABSOLUTE: 0.62 E9/L (ref 0.1–0.95)
MONOCYTES RELATIVE PERCENT: 7.1 % (ref 2–12)
MYCOPLASMA PNEUMONIAE BY PCR: NOT DETECTED
NEUTROPHILS ABSOLUTE: 5.93 E9/L (ref 1.8–7.3)
NEUTROPHILS RELATIVE PERCENT: 68.3 % (ref 43–80)
PARAINFLUENZA VIRUS 1 BY PCR: NOT DETECTED
PARAINFLUENZA VIRUS 2 BY PCR: NOT DETECTED
PARAINFLUENZA VIRUS 3 BY PCR: NOT DETECTED
PARAINFLUENZA VIRUS 4 BY PCR: NOT DETECTED
PDW BLD-RTO: 18.6 FL (ref 11.5–15)
PLATELET # BLD: 274 E9/L (ref 130–450)
PMV BLD AUTO: 9 FL (ref 7–12)
POTASSIUM SERPL-SCNC: 4.9 MMOL/L (ref 3.5–5)
RBC # BLD: 2.25 E12/L (ref 3.8–5.8)
RESPIRATORY SYNCYTIAL VIRUS BY PCR: NOT DETECTED
SARS-COV-2, NAAT: NOT DETECTED
SARS-COV-2, PCR: NOT DETECTED
SODIUM BLD-SCNC: 143 MMOL/L (ref 132–146)
TOTAL PROTEIN: 6.9 G/DL (ref 6.4–8.3)
TROPONIN, HIGH SENSITIVITY: 83 NG/L (ref 0–11)
WBC # BLD: 8.7 E9/L (ref 4.5–11.5)

## 2021-09-04 PROCEDURE — 36430 TRANSFUSION BLD/BLD COMPNT: CPT

## 2021-09-04 PROCEDURE — 84484 ASSAY OF TROPONIN QUANT: CPT

## 2021-09-04 PROCEDURE — 0202U NFCT DS 22 TRGT SARS-COV-2: CPT

## 2021-09-04 PROCEDURE — 6360000002 HC RX W HCPCS: Performed by: INTERNAL MEDICINE

## 2021-09-04 PROCEDURE — 87635 SARS-COV-2 COVID-19 AMP PRB: CPT

## 2021-09-04 PROCEDURE — 86850 RBC ANTIBODY SCREEN: CPT

## 2021-09-04 PROCEDURE — 2700000000 HC OXYGEN THERAPY PER DAY

## 2021-09-04 PROCEDURE — 90935 HEMODIALYSIS ONE EVALUATION: CPT | Performed by: INTERNAL MEDICINE

## 2021-09-04 PROCEDURE — 99285 EMERGENCY DEPT VISIT HI MDM: CPT

## 2021-09-04 PROCEDURE — 93005 ELECTROCARDIOGRAM TRACING: CPT | Performed by: EMERGENCY MEDICINE

## 2021-09-04 PROCEDURE — 85018 HEMOGLOBIN: CPT

## 2021-09-04 PROCEDURE — 6370000000 HC RX 637 (ALT 250 FOR IP): Performed by: INTERNAL MEDICINE

## 2021-09-04 PROCEDURE — 86923 COMPATIBILITY TEST ELECTRIC: CPT

## 2021-09-04 PROCEDURE — G0378 HOSPITAL OBSERVATION PER HR: HCPCS

## 2021-09-04 PROCEDURE — 99233 SBSQ HOSP IP/OBS HIGH 50: CPT | Performed by: INTERNAL MEDICINE

## 2021-09-04 PROCEDURE — 96375 TX/PRO/DX INJ NEW DRUG ADDON: CPT

## 2021-09-04 PROCEDURE — 36415 COLL VENOUS BLD VENIPUNCTURE: CPT

## 2021-09-04 PROCEDURE — 86901 BLOOD TYPING SEROLOGIC RH(D): CPT

## 2021-09-04 PROCEDURE — 71045 X-RAY EXAM CHEST 1 VIEW: CPT

## 2021-09-04 PROCEDURE — 96376 TX/PRO/DX INJ SAME DRUG ADON: CPT

## 2021-09-04 PROCEDURE — 85025 COMPLETE CBC W/AUTO DIFF WBC: CPT

## 2021-09-04 PROCEDURE — 85014 HEMATOCRIT: CPT

## 2021-09-04 PROCEDURE — 6370000000 HC RX 637 (ALT 250 FOR IP): Performed by: EMERGENCY MEDICINE

## 2021-09-04 PROCEDURE — 80053 COMPREHEN METABOLIC PANEL: CPT

## 2021-09-04 PROCEDURE — 96374 THER/PROPH/DIAG INJ IV PUSH: CPT

## 2021-09-04 PROCEDURE — 86900 BLOOD TYPING SEROLOGIC ABO: CPT

## 2021-09-04 PROCEDURE — P9016 RBC LEUKOCYTES REDUCED: HCPCS

## 2021-09-04 PROCEDURE — 5A1D70Z PERFORMANCE OF URINARY FILTRATION, INTERMITTENT, LESS THAN 6 HOURS PER DAY: ICD-10-PCS | Performed by: INTERNAL MEDICINE

## 2021-09-04 PROCEDURE — 6360000002 HC RX W HCPCS: Performed by: EMERGENCY MEDICINE

## 2021-09-04 RX ORDER — NIFEDIPINE 30 MG/1
90 TABLET, EXTENDED RELEASE ORAL DAILY
Status: DISCONTINUED | OUTPATIENT
Start: 2021-09-04 | End: 2021-09-10 | Stop reason: HOSPADM

## 2021-09-04 RX ORDER — ASPIRIN 325 MG
325 TABLET ORAL ONCE
Status: COMPLETED | OUTPATIENT
Start: 2021-09-04 | End: 2021-09-04

## 2021-09-04 RX ORDER — SODIUM CHLORIDE 0.9 % (FLUSH) 0.9 %
5-40 SYRINGE (ML) INJECTION EVERY 12 HOURS SCHEDULED
Status: DISCONTINUED | OUTPATIENT
Start: 2021-09-04 | End: 2021-09-10 | Stop reason: HOSPADM

## 2021-09-04 RX ORDER — HYDRALAZINE HYDROCHLORIDE 20 MG/ML
10 INJECTION INTRAMUSCULAR; INTRAVENOUS ONCE
Status: COMPLETED | OUTPATIENT
Start: 2021-09-04 | End: 2021-09-04

## 2021-09-04 RX ORDER — ACETAMINOPHEN 325 MG/1
650 TABLET ORAL EVERY 4 HOURS PRN
Status: DISCONTINUED | OUTPATIENT
Start: 2021-09-04 | End: 2021-09-10 | Stop reason: HOSPADM

## 2021-09-04 RX ORDER — LABETALOL 200 MG/1
300 TABLET, FILM COATED ORAL DAILY
Status: DISCONTINUED | OUTPATIENT
Start: 2021-09-04 | End: 2021-09-08

## 2021-09-04 RX ORDER — SODIUM CHLORIDE 0.9 % (FLUSH) 0.9 %
5-40 SYRINGE (ML) INJECTION PRN
Status: DISCONTINUED | OUTPATIENT
Start: 2021-09-04 | End: 2021-09-10 | Stop reason: HOSPADM

## 2021-09-04 RX ORDER — ONDANSETRON 2 MG/ML
4 INJECTION INTRAMUSCULAR; INTRAVENOUS EVERY 6 HOURS PRN
Status: DISCONTINUED | OUTPATIENT
Start: 2021-09-04 | End: 2021-09-10 | Stop reason: HOSPADM

## 2021-09-04 RX ORDER — SODIUM CHLORIDE 9 MG/ML
25 INJECTION, SOLUTION INTRAVENOUS PRN
Status: DISCONTINUED | OUTPATIENT
Start: 2021-09-04 | End: 2021-09-10 | Stop reason: HOSPADM

## 2021-09-04 RX ORDER — POLYETHYLENE GLYCOL 3350 17 G/17G
17 POWDER, FOR SOLUTION ORAL DAILY PRN
Status: DISCONTINUED | OUTPATIENT
Start: 2021-09-04 | End: 2021-09-10 | Stop reason: HOSPADM

## 2021-09-04 RX ORDER — PANTOPRAZOLE SODIUM 40 MG/1
40 TABLET, DELAYED RELEASE ORAL DAILY
Status: DISCONTINUED | OUTPATIENT
Start: 2021-09-04 | End: 2021-09-10 | Stop reason: HOSPADM

## 2021-09-04 RX ORDER — LIDOCAINE 4 G/G
1 PATCH TOPICAL DAILY
Status: DISCONTINUED | OUTPATIENT
Start: 2021-09-04 | End: 2021-09-10 | Stop reason: HOSPADM

## 2021-09-04 RX ORDER — LOSARTAN POTASSIUM 50 MG/1
100 TABLET ORAL EVERY EVENING
Status: DISCONTINUED | OUTPATIENT
Start: 2021-09-04 | End: 2021-09-10 | Stop reason: HOSPADM

## 2021-09-04 RX ORDER — HYDRALAZINE HYDROCHLORIDE 20 MG/ML
10 INJECTION INTRAMUSCULAR; INTRAVENOUS EVERY 6 HOURS PRN
Status: DISCONTINUED | OUTPATIENT
Start: 2021-09-04 | End: 2021-09-10 | Stop reason: HOSPADM

## 2021-09-04 RX ORDER — CLONIDINE HYDROCHLORIDE 0.1 MG/1
0.3 TABLET ORAL DAILY
Status: DISCONTINUED | OUTPATIENT
Start: 2021-09-04 | End: 2021-09-07

## 2021-09-04 RX ORDER — ONDANSETRON 4 MG/1
4 TABLET, ORALLY DISINTEGRATING ORAL EVERY 8 HOURS PRN
Status: DISCONTINUED | OUTPATIENT
Start: 2021-09-04 | End: 2021-09-10 | Stop reason: HOSPADM

## 2021-09-04 RX ORDER — DIPHENHYDRAMINE HYDROCHLORIDE 50 MG/ML
25 INJECTION INTRAMUSCULAR; INTRAVENOUS EVERY 6 HOURS
Status: DISCONTINUED | OUTPATIENT
Start: 2021-09-04 | End: 2021-09-10 | Stop reason: HOSPADM

## 2021-09-04 RX ORDER — SODIUM CHLORIDE 9 MG/ML
INJECTION, SOLUTION INTRAVENOUS PRN
Status: DISCONTINUED | OUTPATIENT
Start: 2021-09-04 | End: 2021-09-10 | Stop reason: HOSPADM

## 2021-09-04 RX ORDER — SEVELAMER CARBONATE 800 MG/1
800 TABLET, FILM COATED ORAL
Status: DISCONTINUED | OUTPATIENT
Start: 2021-09-04 | End: 2021-09-10 | Stop reason: HOSPADM

## 2021-09-04 RX ADMIN — NIFEDIPINE 90 MG: 30 TABLET, EXTENDED RELEASE ORAL at 16:31

## 2021-09-04 RX ADMIN — DIPHENHYDRAMINE HYDROCHLORIDE 25 MG: 50 INJECTION, SOLUTION INTRAMUSCULAR; INTRAVENOUS at 14:11

## 2021-09-04 RX ADMIN — CLONIDINE HYDROCHLORIDE 0.3 MG: 0.1 TABLET ORAL at 15:57

## 2021-09-04 RX ADMIN — DIPHENHYDRAMINE HYDROCHLORIDE 25 MG: 50 INJECTION, SOLUTION INTRAMUSCULAR; INTRAVENOUS at 23:54

## 2021-09-04 RX ADMIN — SEVELAMER CARBONATE 800 MG: 800 TABLET, FILM COATED ORAL at 16:32

## 2021-09-04 RX ADMIN — PANTOPRAZOLE SODIUM 40 MG: 40 TABLET, DELAYED RELEASE ORAL at 15:57

## 2021-09-04 RX ADMIN — ASPIRIN 325 MG: 325 TABLET ORAL at 11:16

## 2021-09-04 RX ADMIN — LOSARTAN POTASSIUM 100 MG: 50 TABLET, FILM COATED ORAL at 21:52

## 2021-09-04 RX ADMIN — ACETAMINOPHEN 650 MG: 325 TABLET ORAL at 23:54

## 2021-09-04 RX ADMIN — ONDANSETRON HYDROCHLORIDE 4 MG: 2 INJECTION, SOLUTION INTRAMUSCULAR; INTRAVENOUS at 15:57

## 2021-09-04 RX ADMIN — LABETALOL HYDROCHLORIDE 300 MG: 200 TABLET, FILM COATED ORAL at 16:32

## 2021-09-04 RX ADMIN — HYDRALAZINE HYDROCHLORIDE 10 MG: 20 INJECTION INTRAMUSCULAR; INTRAVENOUS at 12:41

## 2021-09-04 ASSESSMENT — ENCOUNTER SYMPTOMS
ORTHOPNEA: 0
CHANGE IN BOWEL HABIT: 0
ABDOMINAL PAIN: 0
COUGH: 0
SORE THROAT: 0
DIARRHEA: 0
CONSTIPATION: 0
NAUSEA: 1
HEARTBURN: 0
SHORTNESS OF BREATH: 1
BLURRED VISION: 0
VOMITING: 0
BACK PAIN: 0

## 2021-09-04 ASSESSMENT — PAIN SCALES - GENERAL
PAINLEVEL_OUTOF10: 0
PAINLEVEL_OUTOF10: 0
PAINLEVEL_OUTOF10: 5
PAINLEVEL_OUTOF10: 0

## 2021-09-04 ASSESSMENT — PAIN DESCRIPTION - LOCATION: LOCATION: BACK

## 2021-09-04 ASSESSMENT — PAIN DESCRIPTION - PAIN TYPE: TYPE: CHRONIC PAIN

## 2021-09-04 ASSESSMENT — PAIN DESCRIPTION - ORIENTATION: ORIENTATION: UPPER

## 2021-09-04 NOTE — H&P
[address]  Internal Medicine Residency Program  History and Physical    Patient:  Brant Rudolph 43 y.o. male MRN: [id number]     Date of Service: 9/4/2021    Hospital Day: 1      Chief complaint: Chest pain    History of Present Illness   The patient is a 43 y.o. male with a past medical history of HTN, ESRD on dialusis, Depression, Anxiety, ICH, Hypothyroidism, AVF, HFpEF, Neutropenia and Hx of ruptued [place] aneurysm who presents for chest pain. Patient having chest pain for the past couple days. Intermittent, worse with exertion, improves with rest, sharp sensation, graded as 9/10. After taking Hydralazine, he grades the pain as 8/10. States he gets very short of breath on exertion. Does have history of end-stage renal disease, he is on dialysis, last dialysis treatment was yesterday. He reports headache, nausea, fatigue and SOB. He denies any fever, chills, vomiting, paresthesias, blurred vision and neck pain. ED:  Vitals: Tachycardia 100s, /131  Labs: CO2 32, Cr 5.7, Troponin 83, AST 42, Hb 6.9, Hct 22.5.   CXR: Multifocal bilateral pneumonia more prominent within the right lung, Marked cardiomegaly and Trace bilateral pleural effusions  EKG: Sinus tachycardia and possible LA enlargement  Covid 19 negative  Respiratory panel negative  Meds:  mg, Hydralazine 10 mg, Zofran 4 mg and Benadryl 25 mg    Patient admitted due to hypertensive urgency.     Past Medical History:      Diagnosis Date    (HFpEF) heart failure with preserved ejection fraction ([address]     stage III    Anxiety 9/19/2015    AVF (arteriovenous fistula) ([address] 4/30/2018    Chronic kidney disease     CKD since early childhood per pt and on HD ~ 11 years    Depression     Encounter regarding vascular access for dialysis for ESRD ([address] 4/30/2018    History of ruptured Berry aneurysm ([address] 10/20/2015    Hypertension     on meds for ~ 11 years    Hypothyroidism     Intracranial hemorrhage ([address] was d/t ruptured aneurysm - pt underwent neurosurgery for clipping of the aneurysm    Neutropenia ([address]     Noncompliance w/medication treatment due to intermit use of medication 11/3/2015    Pre-transplant evaluation for kidney transplant 4/5/2017       Past Surgical History:        Procedure Laterality Date    BRAIN ANEURYSM SURGERY Right 3-4 years ago    Intracranial aneurysm clipping surgery 3-4 years ago, after rupture of aneurysm causing ICH    BRONCHOSCOPY N/A 8/3/2021    BRONCHOSCOPY DIAGNOSTIC OR CELL [address] ONLY performed by Dwain Restrepo MD at [facility] Left 11 years ago    UPPER GASTROINTESTINAL ENDOSCOPY N/A 1/3/2019    EGD BIOPSY performed by Willi Louis MD at [address] [place] [address]       Medications Prior to Admission:    Prior to Admission medications    Medication Sig Start Date End Date Taking? Authorizing Provider   Melatonin 5 MG CAPS  8/19/21   Historical Provider, MD   Methoxy PEG-Epoetin Beta (MIRCERA IJ) 60 mcg 8/13/21 8/12/22  Historical Provider, MD   cloNIDine (CATAPRES) 0.3 MG tablet Take 1 tablet by mouth 4/7/21   Historical Provider, MD   labetalol (NORMODYNE) 300 MG tablet Take 1 tablet by mouth 8/16/21   Historical Provider, MD   megestrol (MEGACE) 20 MG tablet Take 1 tablet by mouth 6/11/21   Historical Provider, MD   NIFEdipine (PROCARDIA XL) 90 MG extended release tablet Take 1 tablet by mouth 8/16/21   Historical Provider, MD   losartan (COZAAR) 100 MG tablet Take 1 tablet by mouth every evening 8/20/21   Brittnee Hinds DO   pantoprazole (PROTONIX) 40 MG tablet TAKE 1 TABLET BY MOUTH DAILY 8/5/21   Florentino Self MD   B Complex-C-Folic Acid (NEPHRO-JOHNNY) 0.8 MG TABS Take 1 tablet by mouth daily  4/23/21   Historical Provider, MD   lidocaine-prilocaine (EMLA) 2.5-2.5 % cream APPLY SMALL AMOUNT TO ACCESS SITE (AVF) 1 TO 2 HOURS BEFORE DIALYSIS.  COVER WITH OCCLUSIVE DRESSING Edda Leopard WRAP) 3/22/21   Historical Provider, MD diphenhydrAMINE (SOMINEX) 25 MG tablet Take 25 mg by mouth 2 times daily as needed     Historical Provider, MD   sevelamer (RENVELA) 800 MG tablet Take by mouth See Admin Instructions 2 tablets by mouth three times a day with meals and 1 tablet with snacks    Historical Provider, MD       Allergies:  Tylenol [acetaminophen] and Levofloxacin    Social History:   TOBACCO:   reports that he has never smoked. He has never used smokeless tobacco.  ETOH:   reports no history of alcohol use. Family History:       Problem Relation Age of Onset    Kidney Disease Paternal Grandmother     Cancer Neg Hx     Heart Disease Neg Hx        REVIEW OF SYSTEMS:  Review of Systems   Constitutional: Positive for malaise/fatigue. Negative for chills and fever. HENT: Negative for congestion and sore throat. Eyes: Negative for blurred vision. Cardiovascular: Positive for chest pain. Negative for leg swelling, orthopnea and palpitations. Respiratory: Positive for shortness of breath. Negative for cough. Skin: Negative for rash. Musculoskeletal: Negative for arthritis, back pain and myalgias. Gastrointestinal: Positive for nausea. Negative for abdominal pain, change in bowel habit, constipation, diarrhea, dysphagia, heartburn and vomiting. Genitourinary: Negative for dysuria, frequency, hematuria, nocturia and urgency. Neurological: Positive for headaches. Negative for dizziness, light-headedness and numbness. Psychiatric/Behavioral: Negative for depression and substance abuse. Physical Exam   Vitals: BP (!) 196/118   Pulse 100   Temp 99.8 °F (37.7 °C) (Temporal)   Resp 20   SpO2 98%   Physical Exam  Constitutional:       Appearance: He is normal weight. HENT:      Head: Normocephalic and atraumatic. Neck:      Vascular: No carotid bruit. Cardiovascular:      Rate and Rhythm: Regular rhythm. Tachycardia present. Pulses: Normal pulses. Heart sounds: Normal heart sounds.    Pulmonary: Effort: Pulmonary effort is normal. No respiratory distress. Breath sounds: Normal breath sounds. Comments: NC 3 L O2  Chest:      Comments: Chest pain reproducible on palpation  Abdominal:      Palpations: Abdomen is soft. There is no mass. Tenderness: There is no abdominal tenderness. Musculoskeletal:         General: No swelling. Cervical back: No tenderness. Right lower leg: No edema. Left lower leg: No edema. Skin:     General: Skin is warm. Coloration: Skin is pale. Findings: No rash. Neurological:      General: No focal deficit present. Mental Status: He is alert and oriented to person, place, and time. Psychiatric:         Mood and Affect: Mood normal.         Behavior: Behavior normal.         Judgment: Judgment normal.         Labs and Imaging Studies   Basic Labs  Recent Labs     09/04/21  1119      K 4.9      CO2 32*   BUN 19   CREATININE 5.7*   GLUCOSE 91   CALCIUM 9.9       Recent Labs     09/04/21  1119   WBC 8.7   RBC 2.25*   HGB 6.9*   HCT 22.5*   .0*   MCH 30.7   MCHC 30.7*   RDW 18.6*      MPV 9.0         Imaging Studies:  XR CHEST PORTABLE   Final Result   1. Multifocal bilateral pneumonia more prominent within the right lung   2. Marked cardiomegaly. 3. Trace bilateral pleural effusions              EKG:  Sinus tachycardia and possible LA enlargement    Resident's Assessment and Plan     Assessment and Plan:    Hypertensive urgency  · BP in /131  · Given Hydralazine in ED - BP went down to 189/102  · He did not take BP medicines this morning  · CXR 09/04: Multifocal bilateral pneumonia more prominent within the right lung, Marked cardiomegaly and Trace bilateral pleural effusions  · EKG 09/04: Sinus tachycardia and possible LA enlargement  · Last Echo 08/25/21: EF 65%  · Chest pain reproducible on palpation  · Continue home medications: Clonidine 0.3 mg, Labetalol 300 mg, Cozaar 100 mg and Procardia XL 90 mg  · EKG ordered  · Monitor BP    SOB 2/2 pulmonary edema vs pneumonia  · CXR 09/04: Multifocal bilateral pneumonia more prominent within the right lung, Marked cardiomegaly and Trace bilateral pleural effusions  · On NC 3 L O2  · Procalcitonin ordered    ESRD on dialysis  · He had HD yesterday  · On Sevelamer 800 mg  · Troponin 83 - Trend troponin  · Nephro consulted    Anemia  · Hb 6.9 in ED  · PRBC transfusion  · Check H&H post-transfusion    Nausea  · On Zofran PRN      PT/OT evaluation: none  DVT prophylaxis: none  GI prophylaxis: Protonix 40 mg  Diet: Adult regular  Disposition: On telemetry    Roger Campos MD, PGY-1  Attending physician: Dr. Graciela Jaime  [facility]    Attending Physician Statement:  Danisha Brown M.D., F.A.C.P. I have discussed the case, including pertinent history and exam findings with the resident/NP. I have seen and examined the patient and the key elements of the encounter have been performed by me. I agree with the resident ROS, PMHx, PSHx, meds reviewed and assessment, plan and orders as documented by the resident/NP      Hospital charts reviewed, including other providers notes, relevant labs and imaging. Seen in ER 94/  - yesterday- delayed   Possible volume overload, treated pneumonia in recent past,  Steroids and augmentin  dbout vaccine assoc sob (2 weeks ago shot)  Ongoing sob/recurrent volume overload, last HD on Friday   never treated aspigillosis? Noted 1.5 ago +testing  Rule out covid - personally looked at cxr, viral panel  Bl spots    BP significanly elevated in ER, resuming BP meds, might need HD tonight    Hx of cehst pain, NOT reproducible now- pleuritic like when present, +CROUCH  ESRD, kidneys failed uncertain etiology  But CAD risk bc of ESRD  Diastolic murmer?   Echo recently  Diastolic dysfunction noted-- but Aortic insuffiiciency note    Last stress test 2019--Achieved 89% of THR and 13.3 METS, DTS 10,  without chest pain; No ischemia on ECG. Hypertensive response. But will update  Nuclear SPECT images pending. >50% of time spent coordinating care with other providers and/or counseling patient/family  Remainder of medical problems as per resident note.

## 2021-09-04 NOTE — PROGRESS NOTES
Patient in room with blood hanging that is occluded. This nurse was not notified of patient arriving to unit. Called placed to Chon Torres in ED that stated he needed a regulator and they do not need to come to the nurses station to report the patient was there. Patients paper work as ion his tray table. Blood restarted after IV site flushed.

## 2021-09-04 NOTE — ED PROVIDER NOTES
Department of Emergency Medicine   ED  Provider Note  Admit Date/RoomTime: 9/4/2021 10:55 AM  ED Room: [facility]          History of Present Illness:  9/4/21, Time: 12:03 PM EDT  Chief Complaint   Patient presents with    Chest Pain     with SOB                Giovanna Flaherty is a 43 y.o. male presenting to the ED for chest pain. Patient having chest pain for the past couple days. Intermittent issue, worse with exertion, improves with rest, sharp sensation. States he gets very short of breath on exertion. States he was told he may need oxygen at some point in his life. Does have history of end-stage renal disease, he is on dialysis, last dialysis treatment was yesterday. He denies any fever, chills, nausea, vomiting, paresthesias, blurred vision, neck pain or stiffness, or any other symptoms or complaints. Review of Systems:   Pertinent positives and negatives are stated within HPI, all other systems reviewed and are negative.        --------------------------------------------- PAST HISTORY ---------------------------------------------  Past Medical History:  has a past medical history of (HFpEF) heart failure with preserved ejection fraction ([address] Anxiety, AVF (arteriovenous fistula) ([facility] [address] Chronic kidney disease, Depression, Encounter regarding vascular access for dialysis for ESRD [facility]), History of ruptured Berry aneurysm ([facility] [address] Hypertension, Hypothyroidism, Intracranial hemorrhage ([address] Neutropenia ([address] Noncompliance w/medication treatment due to intermit use of medication, and Pre-transplant evaluation for kidney transplant. Past Surgical History:  has a past surgical history that includes Brain aneurysm surgery (Right, 3-4 years ago); Dialysis fistula creation (Left, 11 years ago); Upper gastrointestinal endoscopy (N/A, 1/3/2019); and bronchoscopy (N/A, 8/3/2021). Social History:  reports that he has never smoked. He has never used smokeless tobacco. He reports previous drug use.  Drug: Marijuana. He reports that he does not drink alcohol. Family History: family history includes Kidney Disease in his paternal grandmother. . Unless otherwise noted, family history is non contributory    The patients home medications have been reviewed. Allergies: Tylenol [acetaminophen] and Levofloxacin        ---------------------------------------------------PHYSICAL EXAM--------------------------------------    Constitutional/General: Alert and oriented x3  Head: Normocephalic and atraumatic  Eyes: PERRL, EOMI, sclera non icteric  Mouth: Oropharynx clear, handling secretions, no trismus, no asymmetry of the posterior oropharynx or uvular edema  Neck: Supple, full ROM, no stridor, no meningeal signs  Respiratory: Lungs clear to auscultation bilaterally, no wheezes, rales, or rhonchi. Not in respiratory distress  Cardiovascular:  Regular rate. Regular rhythm. 2+ distal pulses. Equal extremity pulses. Chest: No chest wall tenderness  GI:  Abdomen Soft, Non tender, Non distended. No rebound, guarding, or rigidity. No pulsatile masses. Musculoskeletal: Moves all extremities x 4. Warm and well perfused, no clubbing, cyanosis, or edema. Capillary refill <3 seconds  Integument: skin warm and dry. No rashes. Neurologic: GCS 15, no focal deficits, symmetric strength 5/5 in the upper and lower extremities bilaterally  Psychiatric: Normal Affect          -------------------------------------------------- RESULTS -------------------------------------------------  I have personally reviewed all laboratory and imaging results for this patient. Results are listed below. LABS: (Lab results interpreted by me)  No results found for this visit on 09/04/21.,       RADIOLOGY:  Interpreted by Radiologist unless otherwise specified  XR CHEST PORTABLE   Final Result   1. Multifocal bilateral pneumonia more prominent within the right lung   2. Marked cardiomegaly.    3. Trace bilateral pleural effusions               EKG Interpretation  Interpreted by emergency department physician, Dr. Suly Liu, rate 104, no STEMI        ------------------------- NURSING NOTES AND VITALS REVIEWED ---------------------------   The nursing notes within the ED encounter and vital signs as below have been reviewed by myself  BP (!) 208/131   Pulse 100   Temp 97.4 °F (36.3 °C) (Temporal)   Resp 18   SpO2 100%     Oxygen Saturation Interpretation: Normal    The patients available past medical records and past encounters were reviewed. ------------------------------ ED COURSE/MEDICAL DECISION MAKING----------------------  Medications   aspirin tablet 325 mg (325 mg Oral Given 9/4/21 1116)           The cardiac monitor revealed sinus with a heart rate in the 80s as interpreted by me. The cardiac monitor was ordered secondary to the patient's CCP and to monitor the patient for dysrhythmia. CPT 95437         Medical Decision Making:    Labs and imaging reviewed. Patient is anemic. Did receive a unit of blood. Blood pressure is elevated, did require hydralazine. Given his anemia along with his chest pain, patient be admitted. Critical care time: 31 minutes      Counseling: The emergency provider has spoken with the patient and discussed todays results, in addition to providing specific details for the plan of care and counseling regarding the diagnosis and prognosis. Questions are answered at this time and they are agreeable with the plan.       --------------------------------- IMPRESSION AND DISPOSITION ---------------------------------    IMPRESSION  1. Chest pain, unspecified type    2. Anemia, unspecified type        DISPOSITION  Disposition: Admit to telemetry  Patient condition is stable        NOTE: This report was transcribed using voice recognition software.  Every effort was made to ensure accuracy; however, inadvertent computerized transcription errors may be present       Hill Lr MD  09/04/21 [id number]

## 2021-09-05 ENCOUNTER — APPOINTMENT (OUTPATIENT)
Dept: GENERAL RADIOLOGY | Age: 36
DRG: 139 | End: 2021-09-05
Payer: MEDICAID

## 2021-09-05 LAB
ANION GAP SERPL CALCULATED.3IONS-SCNC: 10 MMOL/L (ref 7–16)
BUN BLDV-MCNC: 16 MG/DL (ref 6–20)
CALCIUM SERPL-MCNC: 9.8 MG/DL (ref 8.6–10.2)
CHLORIDE BLD-SCNC: 97 MMOL/L (ref 98–107)
CO2: 32 MMOL/L (ref 22–29)
CREAT SERPL-MCNC: 4.5 MG/DL (ref 0.7–1.2)
GFR AFRICAN AMERICAN: 18
GFR NON-AFRICAN AMERICAN: 18 ML/MIN/1.73
GLUCOSE BLD-MCNC: 90 MG/DL (ref 74–99)
POTASSIUM SERPL-SCNC: 3.8 MMOL/L (ref 3.5–5)
PROCALCITONIN: 1.32 NG/ML (ref 0–0.08)
SODIUM BLD-SCNC: 139 MMOL/L (ref 132–146)

## 2021-09-05 PROCEDURE — 93005 ELECTROCARDIOGRAM TRACING: CPT | Performed by: INTERNAL MEDICINE

## 2021-09-05 PROCEDURE — 96376 TX/PRO/DX INJ SAME DRUG ADON: CPT

## 2021-09-05 PROCEDURE — 6360000002 HC RX W HCPCS: Performed by: INTERNAL MEDICINE

## 2021-09-05 PROCEDURE — 2700000000 HC OXYGEN THERAPY PER DAY

## 2021-09-05 PROCEDURE — 6370000000 HC RX 637 (ALT 250 FOR IP): Performed by: INTERNAL MEDICINE

## 2021-09-05 PROCEDURE — 87449 NOS EACH ORGANISM AG IA: CPT

## 2021-09-05 PROCEDURE — G0378 HOSPITAL OBSERVATION PER HR: HCPCS

## 2021-09-05 PROCEDURE — 2580000003 HC RX 258: Performed by: INTERNAL MEDICINE

## 2021-09-05 PROCEDURE — 99223 1ST HOSP IP/OBS HIGH 75: CPT | Performed by: INTERNAL MEDICINE

## 2021-09-05 PROCEDURE — 80048 BASIC METABOLIC PNL TOTAL CA: CPT

## 2021-09-05 PROCEDURE — 36415 COLL VENOUS BLD VENIPUNCTURE: CPT

## 2021-09-05 PROCEDURE — 84145 PROCALCITONIN (PCT): CPT

## 2021-09-05 PROCEDURE — 71045 X-RAY EXAM CHEST 1 VIEW: CPT

## 2021-09-05 RX ADMIN — LABETALOL HYDROCHLORIDE 300 MG: 200 TABLET, FILM COATED ORAL at 08:55

## 2021-09-05 RX ADMIN — DIPHENHYDRAMINE HYDROCHLORIDE 25 MG: 50 INJECTION, SOLUTION INTRAMUSCULAR; INTRAVENOUS at 20:29

## 2021-09-05 RX ADMIN — SEVELAMER CARBONATE 800 MG: 800 TABLET, FILM COATED ORAL at 08:55

## 2021-09-05 RX ADMIN — LOSARTAN POTASSIUM 100 MG: 50 TABLET, FILM COATED ORAL at 17:09

## 2021-09-05 RX ADMIN — SEVELAMER CARBONATE 800 MG: 800 TABLET, FILM COATED ORAL at 11:52

## 2021-09-05 RX ADMIN — CLONIDINE HYDROCHLORIDE 0.3 MG: 0.1 TABLET ORAL at 08:54

## 2021-09-05 RX ADMIN — ACETAMINOPHEN 650 MG: 325 TABLET ORAL at 20:28

## 2021-09-05 RX ADMIN — PANTOPRAZOLE SODIUM 40 MG: 40 TABLET, DELAYED RELEASE ORAL at 08:44

## 2021-09-05 RX ADMIN — Medication 10 ML: at 08:44

## 2021-09-05 RX ADMIN — NIFEDIPINE 90 MG: 30 TABLET, EXTENDED RELEASE ORAL at 08:54

## 2021-09-05 RX ADMIN — DIPHENHYDRAMINE HYDROCHLORIDE 25 MG: 50 INJECTION, SOLUTION INTRAMUSCULAR; INTRAVENOUS at 11:52

## 2021-09-05 RX ADMIN — SEVELAMER CARBONATE 800 MG: 800 TABLET, FILM COATED ORAL at 17:09

## 2021-09-05 RX ADMIN — Medication 10 ML: at 20:29

## 2021-09-05 RX ADMIN — ACETAMINOPHEN 650 MG: 325 TABLET ORAL at 08:53

## 2021-09-05 ASSESSMENT — PAIN DESCRIPTION - LOCATION
LOCATION: HEAD
LOCATION: BACK

## 2021-09-05 ASSESSMENT — PAIN SCALES - GENERAL
PAINLEVEL_OUTOF10: 7
PAINLEVEL_OUTOF10: 10

## 2021-09-05 ASSESSMENT — PAIN DESCRIPTION - ORIENTATION: ORIENTATION: UPPER;RIGHT

## 2021-09-05 ASSESSMENT — PAIN DESCRIPTION - PAIN TYPE: TYPE: CHRONIC PAIN

## 2021-09-05 NOTE — PROGRESS NOTES
[facility]  Internal Medicine Residency Program  Progress Note - House Team     Patient:  Giuseppe Michelle 43 y.o. male MRN: [id number]     Date of Service: 9/5/2021     CC: SOB and Chest Pain  Overnight events: Pt underwent dialysis, no acute concerns    Subjective     Patient was seen and examined this morning at bedside in no acute distress. Overnight, pt underwent dialysis, otherwise, no other significant overnight events. Pt seen this AM. Pt states he feels much better after dialysis. Denies any chest pain or SOB at this time. Pt denies any fevers or chills at this time. Objective     Physical Exam:  · Vitals: /85   Pulse 101   Temp 99.5 °F (37.5 °C) (Oral)   Resp 18   Wt 119 lb 9.6 oz (54.3 kg)   SpO2 98%   BMI 19.30 kg/m²     · I & O - 24hr: No intake/output data recorded. · General Appearance: alert, appears stated age and cooperative  · HEENT:  Head: Normocephalic, no lesions, without obvious abnormality. · Neck: no adenopathy, no carotid bruit, no JVD, supple, symmetrical, trachea midline and thyroid not enlarged, symmetric, no tenderness/mass/nodules  · Lung: clear to auscultation bilaterally  · Heart: S1 and S2 appreciated. 2/6 diastolic murmur appreciated at cardiac apex in the L. 5th intercostal midaxillary region  · Abdomen: soft, non-tender; bowel sounds normal; no masses,  no organomegaly  · Extremities:  extremities normal, atraumatic, no cyanosis or edema and Fisulas appreciated on LUE  · Musculokeletal: No joint swelling, no muscle tenderness. ROM normal in all joints of extremities.    · Neurologic: Mental status: Alert, oriented, thought content appropriate  Subject  Pertinent Labs & Imaging Studies   denice  CBC with Differential:    Lab Results   Component Value Date    WBC 8.7 09/04/2021    RBC 2.25 09/04/2021    HGB 8.3 09/04/2021    HCT 25.4 09/04/2021     09/04/2021    .0 09/04/2021    MCH 30.7 09/04/2021    MCHC 30.7 09/04/2021    RDW 18.6 09/04/2021    LYMPHOPCT 20.5 09/04/2021    MONOPCT 7.1 09/04/2021    BASOPCT 0.5 09/04/2021    MONOSABS 0.62 09/04/2021    LYMPHSABS 1.78 09/04/2021    EOSABS 0.25 09/04/2021    BASOSABS 0.04 09/04/2021     CMP:    Lab Results   Component Value Date     09/05/2021    K 3.8 09/05/2021    K 3.9 08/09/2021    CL 97 09/05/2021    CO2 32 09/05/2021    BUN 16 09/05/2021    CREATININE 4.5 09/05/2021    GFRAA 18 09/05/2021    LABGLOM 18 09/05/2021    GLUCOSE 90 09/05/2021    PROT 6.9 09/04/2021    LABALBU 4.0 09/04/2021    CALCIUM 9.8 09/05/2021    BILITOT 0.5 09/04/2021    ALKPHOS 74 09/04/2021    AST 42 09/04/2021    ALT 27 09/04/2021       XR CHEST PORTABLE   Final Result   1. Multifocal bilateral pneumonia more prominent within the right lung   2. Marked cardiomegaly. 3. Trace bilateral pleural effusions         XR CHEST PORTABLE    (Results Pending)        Resident's Assessment and Plan     Assessment and Plan:  The patient is a 43 y.o. male with a past medical history of HTN, ESRD on dialusis, Depression, Anxiety, ICH, Hypothyroidism, AVF, HFpEF, Neutropenia and Hx of ruptued [place] aneurysm who presents for SOB and chest pain. 1. Hypertensive urgency likely 2/2 ESRD  · BP in /131  · Given Hydralazine in ED - BP went down to 189/102  · He did not take BP medicines this morning  · CXR 09/04: Multifocal bilateral pneumonia more prominent within the right lung, Marked cardiomegaly and Trace bilateral pleural effusions  · EKG 09/04: Sinus tachycardia and possible LA enlargement  · Last Echo 08/25/21: EF 65%  · Chest pain reproducible on palpation  · Continue home medications: Clonidine 0.3 mg, Labetalol 300 mg, Cozaar 100 mg and Procardia XL 90 mg  · Monitor BP     2. SOB 2/2 pulmonary edema vs pneumonia  · CXR 09/04: Multifocal bilateral pneumonia more prominent within the right lung, Marked cardiomegaly and Trace bilateral pleural effusions  · On NC 3 L O2  · Procalcitonin: pending     3.  ESRD on dialysis  · He had HD yesterday  · On Sevelamer 800 mg  · Troponin 83 - Trend troponin  · Nephro consulted     4. Normocytic Hypochromic anemia likely 2/2 ESRD  · Hb 6.9 in ED  · 1u pRBC transfused  · H&H post-transfusion: 8.3      PT/OT evaluation:   DVT prophylaxis/ GI prophylaxis:   Disposition: continue current care    Arnaud Tran MD, PGY-1  Attending physician: Dr. Rebecca Lynn  [facility]    Attending Physician Statement:  Kvng Rizvi M.D., F.A.C.P. I have discussed the case, including pertinent history and exam findings with the resident/NP. I have seen and examined the patient and the key elements of the encounter have been performed by me. I agree with the resident ROS, PMHx, PSHx, meds reviewed and assessment, plan and orders as documented by the resident/NP      Hospital charts reviewed, including other providers notes, relevant labs and imaging. likely volume overload, told sacral edema at HD center  Now feeling better sp HD overnight  Less sob at rest  But hasn't exerting himself, less chest pressure/pain  - -DIDN\"T produce sputum for study   ?pneumonia doubt   Possible volume overload, treated pneumonia in recent past,  Steroids and augmentin  dbout vaccine assoc sob (2 weeks ago shot)  Ongoing sob/recurrent volume overload, last HD on Friday   never treated aspigillosis? Noted 1.5 ago +testing  -- ordered by dr. Vania Beasley in psat, ?treat abx  ?allergic- need for steroid, vs NO treat    Rule out covid - personally looked at cxr, viral panel-- respiratory panel negative  · cxr didn't look like volume overload:  CXR 09/04: Multifocal bilateral pneumonia more prominent within the right lung, Marked cardiomegaly and Trace bilateral pleural effusions  · ? CT scan vs pulmonary consult  Bl spots on imaging- repeat cxr ordered sp HD    BP significanly elevated in ER, resumed BP meds + sp HD- bp much better    Hx of cehst pain, NOT reproducible now- pleuritic

## 2021-09-05 NOTE — PLAN OF CARE
Patient well known to Dr. Aleksandr Perez and his group. Consulted forwarded to him. Thank you.      Manuel Choe DO

## 2021-09-05 NOTE — FLOWSHEET NOTE
09/04/21 2130   Vital Signs   BP (!) 183/113   Temp 99.1 °F (37.3 °C)   Pulse 96   Resp 18   Weight 124 lb (56.2 kg)   Percent Weight Change -3.13   Post-Hemodialysis Assessment   Post-Treatment Procedures Blood returned; Access bleeding time < 10 minutes   Machine Disinfection Process Acid/Vinegar Clean;Heat Disinfect; Exterior Machine Disinfection   Rinseback Volume (ml) 300 ml   Total Liters Processed (l/min) 38.2 l/min   Dialyzer Clearance Lightly streaked   Duration of Treatment (minutes) 120 minutes   Hemodialysis Output (ml) 2750 ml   NET Removed (ml) 2450 ml   Tolerated Treatment Good   Patient Response to Treatment no distress   Bilateral Breath Sounds Clear;Diminished   Edema None   Physician Notified?  No   d

## 2021-09-05 NOTE — CONSULTS
Pulmonary Consultation    Admit Date: 9/4/2021  Requesting Physician: Darcy Joyner DO    CC:  Shortness of breath    HPI:  This is a 43 YOM  presented  to the ED for chest pain, on 9/4   Patient having chest pain for the past couple days. Intermittent issue, worse with exertion, improves with rest, sharp sensation. States he gets very short of breath on exertion and takes awhile to recover with rest     Does have history of end-stage renal disease, he is on dialysis, last dialysis treatment was 9/3. intermittent  fever, chills, nausea, vomiting. He also c/o his left arm with his AV fistula has been tender, warm to touch, burning sensation at times. He smoked for 5 years and stopped 10 months ago. He had his covid vaccinations 4 months ago and was hospitalized after both injections for pneumonia . He does not follow [facility] pulmonary , takes no inhalers, no o2 use or pap therapy       Resting in bed on RA.  Denies dyspnea at present          PMH:    Past Medical History:   Diagnosis Date    (HFpEF) heart failure with preserved ejection fraction ([address]     stage III    Anxiety 9/19/2015    AVF (arteriovenous fistula) ([address] 4/30/2018    Chronic kidney disease     CKD since early childhood per pt and on HD ~ 11 years    Depression     Encounter regarding vascular access for dialysis for ESRD ([address] 4/30/2018    History of ruptured Berry aneurysm ([address] 10/20/2015    Hypertension     on meds for ~ 11 years    Hypothyroidism     Intracranial hemorrhage (HCC)     was d/t ruptured aneurysm - pt underwent neurosurgery for clipping of the aneurysm    Neutropenia ([address]     Noncompliance w/medication treatment due to intermit use of medication 11/3/2015    Pre-transplant evaluation for kidney transplant 4/5/2017     PSH:    Past Surgical History:   Procedure Laterality Date    BRAIN ANEURYSM SURGERY Right 3-4 years ago    Intracranial aneurysm clipping surgery 3-4 years ago, after rupture of aneurysm causing ICH    BRONCHOSCOPY N/A 8/3/2021    BRONCHOSCOPY DIAGNOSTIC OR CELL [address] ONLY performed by Antonio Helton MD at [facility] Left 11 years ago    UPPER GASTROINTESTINAL ENDOSCOPY N/A 1/3/2019    EGD BIOPSY performed by Dang Martinez MD at [address] [place] [address]          Respiratory ROS: Dyspnea with exertion. Left arm tender and warm Otherwise, a complete review of systems is undertaken and is negative. Social History:  Social History     Socioeconomic History    Marital status:      Spouse name: Not on file    Number of children: 3    Years of education: Not on file    Highest education level: High school graduate   Occupational History    Not on file   Tobacco Use    Smoking status: Never Smoker    Smokeless tobacco: Never Used    Tobacco comment: only smokes marijuana daily   Vaping Use    Vaping Use: Never used   Substance and Sexual Activity    Alcohol use: No    Drug use: Not Currently     Types: Marijuana    Sexual activity: Yes     Partners: Female   Other Topics Concern    Not on file   Social History Narrative    Not on file     Social Determinants of Health     Financial Resource Strain: Medium Risk    Difficulty of Paying Living Expenses: Somewhat hard   Food Insecurity: No Food Insecurity    Worried About Running Out of Food in the Last Year: Never true    Juan of Food in the Last Year: Never true   Transportation Needs:     Lack of Transportation (Medical):      Lack of Transportation (Non-Medical):    Physical Activity:     Days of Exercise per Week:     Minutes of Exercise per Session:    Stress:     Feeling of Stress :    Social Connections:     Frequency of Communication with Friends and Family:     Frequency of Social Gatherings with Friends and Family:     Attends Buddhism Services:     Active Member of Clubs or Organizations:     Attends Club or Organization Meetings:     Marital Status:    Intimate Partner Violence:     Fear of Current or Ex-Partner:     Emotionally Abused:     Physically Abused:     Sexually Abused:        Family History:  Family History   Problem Relation Age of Onset    Kidney Disease Paternal Grandmother     Cancer Neg Hx     Heart Disease Neg Hx        Medications:     sodium chloride      sodium chloride        diphenhydrAMINE  25 mg IntraVENous Q6H    sodium chloride flush  5-40 mL IntraVENous 2 times per day    cloNIDine  0.3 mg Oral Daily    labetalol  300 mg Oral Daily    losartan  100 mg Oral QPM    NIFEdipine  90 mg Oral Daily    pantoprazole  40 mg Oral Daily    sevelamer  800 mg Oral TID WC    lidocaine  1 patch TransDERmal Daily         Vitals:    VITALS:  /82   Pulse 82   Temp 98.5 °F (36.9 °C) (Temporal)   Resp 16   Wt 119 lb 9.6 oz (54.3 kg)   SpO2 97%   BMI 19.30 kg/m²   24HR INTAKE/OUTPUT:      Intake/Output Summary (Last 24 hours) at 2021 1309  Last data filed at 2021 2130  Gross per 24 hour   Intake 350 ml   Output 2750 ml   Net -2400 ml     CURRENT PULSE OXIMETRY:  SpO2: 97 %  24HR PULSE OXIMETRY RANGE:  SpO2  Av.8 %  Min: 97 %  Max: 99 %      EXAM:  General: No distress. Alert   Eyes: PERRL. + sclera icterus. ENT: No discharge. Pharynx clear. Missing teeth   Neck: Trachea midline. Normal thyroid. Resp: No accessory muscle use. diminished with fine rales to LLL  CV: Regular rate. Regular rhythm. No mumur or rub. ABD: Non-tender. Non-distended. No masses. No organmegaly. Normal bowel sounds. Skin: Warm and dry. No nodule on exposed extremities. No rash on exposed extremities. Lymph: No cervical LAD. No supraclavicular LAD. Ext: No joint deformity. No clubbing. No cyanosis. No edema  Neuro: Awake.  Follows commands      Lab Results:  CBC:   Recent Labs     21  1119 21  2314   WBC 8.7  --    HGB 6.9* 8.3*   HCT 22.5* 25.4*   .0*  --      --      BMP:   Recent Labs     21  1119 09/05/21  0504    139   K 4.9 3.8    97*   CO2 32* 32*   BUN 19 16   CREATININE 5.7* 4.5*     LFT:   Recent Labs     09/04/21  1119   ALKPHOS 74   ALT 27   AST 42*   PROT 6.9   BILITOT 0.5   LABALBU 4.0     PT/INR: No results for input(s): PROTIME, INR in the last 72 hours. Cultures:  Results for Miya Candelaria (MRN [id number]) as of 9/5/2021 13:15   Ref.  Range 9/4/2021 12:11 9/4/2021 14:09   Influenza A by PCR Latest Ref Range: Not Detected   Not Detected   Influenza B by PCR Latest Ref Range: Not Detected   Not Detected   Adenovirus by PCR Latest Ref Range: Not Detected   Not Detected   Coronavirus 229E by PCR Latest Ref Range: Not Detected   Not Detected   Coronavirus HKU1 by PCR Latest Ref Range: Not Detected   Not Detected   Coronavirus NL63 by PCR Latest Ref Range: Not Detected   Not Detected   Coronavirus OC43 by PCR Latest Ref Range: Not Detected   Not Detected   Human Metapneumovirus by PCR Latest Ref Range: Not Detected   Not Detected   Human Rhinovirus/Enterovirus by PCR Latest Ref Range: Not Detected   Not Detected   Parainfluenza Virus 1 by PCR Latest Ref Range: Not Detected   Not Detected   Parainfluenza Virus 2 by PCR Latest Ref Range: Not Detected   Not Detected   Parainfluenza Virus 3 by PCR Latest Ref Range: Not Detected   Not Detected   Parainfluenza Virus 4 by PCR Latest Ref Range: Not Detected   Not Detected   Respiratory Syncytial Virus by PCR Latest Ref Range: Not Detected   Not Detected   Bordetella parapertussis by PCR Latest Ref Range: Not Detected   Not Detected   Chlamydophilia pneumoniae by PCR Latest Ref Range: Not Detected   Not Detected   Mycoplasma pneumoniae by PCR Latest Ref Range: Not Detected   Not Detected   SARS-CoV-2, PCR Latest Ref Range: Not Detected   Not Detected   SARS-CoV-2, NAAT Latest Ref Range: Not Detected  Not Detected    Bordetella pertussis by PCR Latest Ref Range: Not Detected   Not Detected     8/11/2021  1:34 PM - Reece, Mhy Incoming Results From [facility]    Specimen Information: Bronchial Washing; Respiratory        Component Collected Lab   Fungus Stain 08/03/2021  3:08 PM [facility]   No Fungal elements seen    Organism Abnormal  08/03/2021  3:08 PM [facility]   Vanna dubliniensis    Fungus (Mycology) Culture 08/03/2021  3:08 PM [facility]   Rare growth   No further workup    Testing Performed By    Lab - Abbreviation Name Director Address Valid Date Range   [facility] Danya Schlatter., M.D. [address] [place] [address] Drive. [facility] [id number] 09/26/20 0000-Present   Narrative  Performed by: [address] [facility]  Source: Lemon [place]       Site: [facility]               Results for Scheryl Pool (MRN [id number]) as of 9/5/2021 13:15   Ref. Range 8/3/2021 15:08   AFB Culture (Mycobacteria) Unknown No growth after 1... Fungus (Mycology) Culture Unknown Rare growth. .. Gram Stain Orderable Unknown Polymorphonuclear. .. CULTURE, RESPIRATORY Unknown Oral Pharyngeal Carla present   Smear, Respiratory Unknown Refer to ordered Gram stain for results   CULTURE WITH SMEAR, ACID FAST BACILLIUS Unknown Rpt   AFB Smear Unknown No AFB observed by Fluorescent stain   Aspergillus Galacto AG Latest Ref Range: Negative  Positive (A)   Aspergillus Galacto Index Unknown 1.89   Fungus Stain Unknown No Fungal elements seen   CULTURE, FUNGUS Unknown Rpt (A)   Pneumocystis DFA Unknown Negative for Pneumocystis carinii     ABG:   No results for input(s): PH, PO2, PCO2, HCO3, BE, O2SAT in the last 72 hours. Films:  XR CHEST PORTABLE  9/5/21:    Result Date: 9/4/2021  EXAMINATION: ONE XRAY VIEW OF THE CHEST 9/4/2021 11:32 am COMPARISON: 08/09/2021 HISTORY: ORDERING SYSTEM PROVIDED HISTORY: chest pain TECHNOLOGIST PROVIDED HISTORY: Reason for exam:->chest pain What reading provider will be dictating this exam?->CRC FINDINGS: The heart is enlarged.  Multifocal infiltrates are seen throughout the right lung.  Vague left perihilar infiltrates are noted. There are trace bilateral pleural effusions. 1. Multifocal bilateral pneumonia more prominent within the right lung 2. Marked cardiomegaly. 3. Trace bilateral pleural effusions         Assessment:  · Volume overload  · ESRD on HD  · Possible pneumonia   · HTN urgency  · Chronic anemia         Plan:  · Keep POX >90%, o2 as needed currently on room air  · Check a procalcitonin, recent pneumonia treated by ID with zosyn/augmentin  at beginning of august.   Had mucous plugging required s/p bronchosocpy 8/3/2021 with cell count/differential revealing lymphocytes at 67%  - CD4 425. Cytology with benign bronchial cells, squamous cells, pulmonary macrophages. Pneumocystis carinii negative, AFB negative  · fungal cx are coming back positive for aspergillus +, Dr. Nancy Chapa aware   · Bilateral infiltrates on cxr-respiratory panel obtained negative for covid   · Renal following, on HD  · hgb 6.8 in Received one unit PRBC-up to 8.3   · IS for pulmonary hygiene     Thank you for allowing us to see this patient in consultation. Please contact us with any questions. Electronically signed by KENDRA Evans on 9/5/2021 at 1:09 PM     Attending Attestation Note:    Patient seen and examined with NP. I agree with above.     In addition, the following apply:    - bronchoscopy with [facility] BAL noted aspergillus 1.89 (+)  - consult infectious disease for input with regards to aspergillus titres from 8/3/2021 bronchoscopy given dyspnea and cough that persists  - wean oxygen as able  - defer on repeat bronchoscopy at this time  - check fungitell levels     Yaritza Brown MD  9/5/2021  3:33 PM

## 2021-09-05 NOTE — CONSULTS
Department of Internal Medicine  Nephrology Attending Consult Note    SUBJECTIVE:  We are following this patient for end-stage renal failure . Full consult deferred as he is followed longitudinally in the OP setting on IHD  Briefly:  Giuseppe Michelle is a 43 y.o. male who presented to the ED for chest pain. Patient having chest pain for the past couple days. He had treatment as per his schedule on Friday, and was again dialyzed last evening. Blood pressures at admission elevated in rthe 170/110's range. Last /85 after dialysis treatment and net UF of 2750 ml. Additionally he was found to have a Hgb of 6.9 and did have a transfusion of one unit PRBC's. This am he is upset they placed him on a diabetic diet, he is not diabetic. Will change.  He is feeling better post fluid removal.     PROBLEM LIST:    Patient Active Problem List   Diagnosis    ESRD on hemodialysis ([address]    H/O intracranial hemorrhage and aneurysm clipping on the right side    Anemia, chronic disease    Anxiety    Noncompliance    AVF (arteriovenous fistula) (HCC)    Severe protein-calorie malnutrition (HCC)    Venous hypertension, chronic, left    Iron deficiency    Secondary hyperparathyroidism of renal origin ([address]    Essential hypertension    Chronic heart failure with preserved ejection fraction (HCC)    Acute respiratory failure with hypoxia (HCC)    Pneumonia    Pericardial effusion without cardiac tamponade    Anemia        PAST MEDICAL HISTORY:    Past Medical History:   Diagnosis Date    (HFpEF) heart failure with preserved ejection fraction ([address]     stage III    Anxiety 9/19/2015    AVF (arteriovenous fistula) ([address] 4/30/2018    Chronic kidney disease     CKD since early childhood per pt and on HD ~ 11 years    Depression     Encounter regarding vascular access for dialysis for ESRD ([address] 4/30/2018    History of ruptured Berry aneurysm ([address] 10/20/2015    Hypertension     on meds for ~ 11 years    Hypothyroidism     Intracranial hemorrhage (HCC)     was d/t ruptured aneurysm - pt underwent neurosurgery for clipping of the aneurysm    Neutropenia [facility]     Noncompliance w/medication treatment due to intermit use of medication 11/3/2015    Pre-transplant evaluation for kidney transplant 4/5/2017       MEDS (scheduled):   diphenhydrAMINE  25 mg IntraVENous Q6H    sodium chloride flush  5-40 mL IntraVENous 2 times per day    cloNIDine  0.3 mg Oral Daily    labetalol  300 mg Oral Daily    losartan  100 mg Oral QPM    NIFEdipine  90 mg Oral Daily    pantoprazole  40 mg Oral Daily    sevelamer  800 mg Oral TID WC    lidocaine  1 patch TransDERmal Daily       MEDS (infusions):   sodium chloride      sodium chloride         MEDS (prn):  sodium chloride, sodium chloride flush, sodium chloride, ondansetron **OR** ondansetron, polyethylene glycol, hydrALAZINE, acetaminophen    DIET:    ADULT DIET;  Regular; 4 carb choices (60 gm/meal)      PHYSICAL EXAM:      Patient Vitals for the past 24 hrs:   BP Temp Temp src Pulse Resp SpO2 Weight   09/05/21 0545 -- -- -- -- -- -- 119 lb 9.6 oz (54.3 kg)   09/04/21 2355 134/85 99.5 °F (37.5 °C) Oral 101 18 98 % --   09/04/21 2209 (!) 166/115 100.2 °F (37.9 °C) Oral 98 20 97 % --   09/04/21 2130 (!) 183/113 99.1 °F (37.3 °C) -- 96 18 -- 124 lb (56.2 kg)   09/04/21 2100 (!) 178/100 -- -- 92 -- -- --   09/04/21 2030 (!) 179/110 -- -- 96 -- -- --   09/04/21 2013 (!) 178/114 99 °F (37.2 °C) -- 94 -- -- --   09/04/21 1945 (!) 175/114 99 °F (37.2 °C) -- 94 -- -- --   09/04/21 1915 (!) 170/111 99 °F (37.2 °C) -- 92 18 -- 128 lb (58.1 kg)   09/04/21 1430 (!) 196/118 99.8 °F (37.7 °C) Temporal 100 20 98 % --   09/04/21 1401 (!) 189/102 98.9 °F (37.2 °C) Oral 102 20 99 % --   09/04/21 1309 (!) 191/120 -- -- 94 16 100 % --   09/04/21 1218 (!) 197/127 -- -- 98 18 100 % --   09/04/21 1055 (!) 208/131 97.4 °F (36.3 °C) Temporal 100 18 100 % --          Intake/Output Summary (Last 24 hours) at 9/5/2021 0836  Last data filed at 9/4/2021 2130  Gross per 24 hour   Intake 350 ml   Output 2750 ml   Net -2400 ml       Wt Readings from Last 3 Encounters:   09/05/21 119 lb 9.6 oz (54.3 kg)   08/20/21 125 lb (56.7 kg)   08/09/21 110 lb (49.9 kg)       Constitutional:  Patient in no acute distress  Head: normocephalic, atraumatic  Cardiovascular: S1 S2 no S3 or rub  Respiratory:  Clear upper, diminished in bases  Gastrointestinal: soft, nontender, nondistended  Ext: no edema   Neuro: awake, alert and oriented  Skin: dry, no rash      DATA:      Recent Labs     09/04/21  1119 09/04/21  2314   WBC 8.7  --    HGB 6.9* 8.3*   HCT 22.5* 25.4*   .0*  --      --      Recent Labs     09/04/21  1119 09/05/21  0504    139   K 4.9 3.8    97*   CO2 32* 32*   BUN 19 16   CREATININE 5.7* 4.5*   LABGLOM 14 18   GLUCOSE 91 90   CALCIUM 9.9 9.8     Recent Labs     09/04/21  1119   ALT 27     Lab Results   Component Value Date    LABALBU 4.0 09/04/2021    LABALBU 3.5 08/09/2021    LABALBU 3.6 07/31/2021       Iron studies:  Lab Results   Component Value Date    FERRITIN 21,197 08/01/2021    IRON 40 (L) 03/16/2021    TIBC 164 (L) 03/16/2021     Vitamin B-12   Date Value Ref Range Status   02/10/2020 830 211 - 946 pg/mL Final     Folate   Date Value Ref Range Status   02/10/2020 10.5 4.8 - 24.2 ng/mL Final       Bone disease:  Lab Results   Component Value Date    MG 2.9 (H) 08/10/2021    PHOS 5.5 (H) 08/10/2021     Vit D, 25-Hydroxy   Date Value Ref Range Status   08/20/2021 19 (L) 30 - 100 ng/mL Final     Comment:     <20 ng/mL. ........... Elida Pinto Deficient  20-30 ng/mL. ......... Elida Sharper Insufficient   ng/mL. ........ Elida Sharper Sufficient  >100 ng/mL. .......... Elida Sharper Toxic       PTH   Date Value Ref Range Status   01/05/2019 418 (H) 15 - 65 pg/mL Final       No components found for: URIC    No results found for: VOL, APPEARANCE, COLORU, LABSPEC, LABPH, LEUKBLD, NITRU, GLUCOSEU, KETUA, UROBILINOGEN, KETUA, UROBILINOGEN, BILIRUBINUR, OCBU    No results found for: LIPIDPAN        IMPRESSION/RECOMMENDATIONS:     1. ESRD-  Followed MWF at [address] [place] [address]  S/P UF 2.7 liters 9/4     2. Hypertension with ESRD-  At goal <130/80 much improved post UF  Continue outpt medications.      3. Anemia of ESRD-  Hgb 6.9-->8.3 post 1 unit PRBC's     4. Secondary Hyperparathyroidism-  Follow PO4     5.  Chest pain/SOB-  Treated for pneumonia at last admission       KENDRA Sherwood - CNP   Pt seen and examined agree with above  Sp hd last night for sob  Sp trf  Lyle Cade MD

## 2021-09-06 ENCOUNTER — APPOINTMENT (OUTPATIENT)
Dept: GENERAL RADIOLOGY | Age: 36
DRG: 139 | End: 2021-09-06
Payer: MEDICAID

## 2021-09-06 LAB
ANION GAP SERPL CALCULATED.3IONS-SCNC: 12 MMOL/L (ref 7–16)
BASOPHILS ABSOLUTE: 0.04 E9/L (ref 0–0.2)
BASOPHILS RELATIVE PERCENT: 0.5 % (ref 0–2)
BUN BLDV-MCNC: 37 MG/DL (ref 6–20)
C-REACTIVE PROTEIN: 4.4 MG/DL (ref 0–0.4)
CALCIUM SERPL-MCNC: 9.2 MG/DL (ref 8.6–10.2)
CHLORIDE BLD-SCNC: 94 MMOL/L (ref 98–107)
CO2: 28 MMOL/L (ref 22–29)
CREAT SERPL-MCNC: 8.2 MG/DL (ref 0.7–1.2)
EOSINOPHILS ABSOLUTE: 0.39 E9/L (ref 0.05–0.5)
EOSINOPHILS RELATIVE PERCENT: 5.3 % (ref 0–6)
FERRITIN: 3608 NG/ML
FUNGUS (MYCOLOGY) CULTURE: ABNORMAL
FUNGUS STAIN: ABNORMAL
GFR AFRICAN AMERICAN: 9
GFR NON-AFRICAN AMERICAN: 9 ML/MIN/1.73
GLUCOSE BLD-MCNC: 88 MG/DL (ref 74–99)
HCT VFR BLD CALC: 23.5 % (ref 37–54)
HCT VFR BLD CALC: 26.3 % (ref 37–54)
HEMOGLOBIN: 7.6 G/DL (ref 12.5–16.5)
IMMATURE GRANULOCYTES #: 0.05 E9/L
IMMATURE GRANULOCYTES %: 0.7 % (ref 0–5)
IMMATURE RETIC FRACT: 35.2 % (ref 2.3–13.4)
IRON SATURATION: 16 % (ref 20–55)
IRON: 33 MCG/DL (ref 59–158)
LYMPHOCYTES ABSOLUTE: 1.73 E9/L (ref 1.5–4)
LYMPHOCYTES RELATIVE PERCENT: 23.6 % (ref 20–42)
MAGNESIUM: 2.7 MG/DL (ref 1.6–2.6)
MCH RBC QN AUTO: 31.1 PG (ref 26–35)
MCHC RBC AUTO-ENTMCNC: 32.3 % (ref 32–34.5)
MCV RBC AUTO: 96.3 FL (ref 80–99.9)
MONOCYTES ABSOLUTE: 0.57 E9/L (ref 0.1–0.95)
MONOCYTES RELATIVE PERCENT: 7.8 % (ref 2–12)
NEUTROPHILS ABSOLUTE: 4.56 E9/L (ref 1.8–7.3)
NEUTROPHILS RELATIVE PERCENT: 62.1 % (ref 43–80)
ORGANISM: ABNORMAL
PDW BLD-RTO: 17.9 FL (ref 11.5–15)
PHOSPHORUS: 4.5 MG/DL (ref 2.5–4.5)
PLATELET # BLD: 268 E9/L (ref 130–450)
PMV BLD AUTO: 8.5 FL (ref 7–12)
POTASSIUM SERPL-SCNC: 4.1 MMOL/L (ref 3.5–5)
RBC # BLD: 2.44 E12/L (ref 3.8–5.8)
RETIC HGB EQUIVALENT: 30.2 PG (ref 28.2–36.6)
RETICULOCYTE ABSOLUTE COUNT: 0.09 E12/L
RETICULOCYTE COUNT PCT: 3.2 % (ref 0.4–1.9)
SODIUM BLD-SCNC: 134 MMOL/L (ref 132–146)
TOTAL IRON BINDING CAPACITY: 202 MCG/DL (ref 250–450)
WBC # BLD: 7.3 E9/L (ref 4.5–11.5)

## 2021-09-06 PROCEDURE — 96376 TX/PRO/DX INJ SAME DRUG ADON: CPT

## 2021-09-06 PROCEDURE — 6370000000 HC RX 637 (ALT 250 FOR IP): Performed by: INTERNAL MEDICINE

## 2021-09-06 PROCEDURE — 80048 BASIC METABOLIC PNL TOTAL CA: CPT

## 2021-09-06 PROCEDURE — 36415 COLL VENOUS BLD VENIPUNCTURE: CPT

## 2021-09-06 PROCEDURE — 83735 ASSAY OF MAGNESIUM: CPT

## 2021-09-06 PROCEDURE — 6360000002 HC RX W HCPCS: Performed by: INTERNAL MEDICINE

## 2021-09-06 PROCEDURE — 85025 COMPLETE CBC W/AUTO DIFF WBC: CPT

## 2021-09-06 PROCEDURE — 90935 HEMODIALYSIS ONE EVALUATION: CPT | Performed by: INTERNAL MEDICINE

## 2021-09-06 PROCEDURE — 85045 AUTOMATED RETICULOCYTE COUNT: CPT

## 2021-09-06 PROCEDURE — 2580000003 HC RX 258: Performed by: INTERNAL MEDICINE

## 2021-09-06 PROCEDURE — 83540 ASSAY OF IRON: CPT

## 2021-09-06 PROCEDURE — 71045 X-RAY EXAM CHEST 1 VIEW: CPT

## 2021-09-06 PROCEDURE — 84100 ASSAY OF PHOSPHORUS: CPT

## 2021-09-06 PROCEDURE — 99233 SBSQ HOSP IP/OBS HIGH 50: CPT | Performed by: INTERNAL MEDICINE

## 2021-09-06 PROCEDURE — G0378 HOSPITAL OBSERVATION PER HR: HCPCS

## 2021-09-06 PROCEDURE — 86140 C-REACTIVE PROTEIN: CPT

## 2021-09-06 PROCEDURE — 96375 TX/PRO/DX INJ NEW DRUG ADDON: CPT

## 2021-09-06 PROCEDURE — 87305 ASPERGILLUS AG IA: CPT

## 2021-09-06 PROCEDURE — 82728 ASSAY OF FERRITIN: CPT

## 2021-09-06 PROCEDURE — 96372 THER/PROPH/DIAG INJ SC/IM: CPT

## 2021-09-06 PROCEDURE — 83550 IRON BINDING TEST: CPT

## 2021-09-06 PROCEDURE — 2500000003 HC RX 250 WO HCPCS: Performed by: STUDENT IN AN ORGANIZED HEALTH CARE EDUCATION/TRAINING PROGRAM

## 2021-09-06 RX ORDER — LABETALOL HYDROCHLORIDE 5 MG/ML
10 INJECTION, SOLUTION INTRAVENOUS EVERY 6 HOURS PRN
Status: DISCONTINUED | OUTPATIENT
Start: 2021-09-06 | End: 2021-09-10 | Stop reason: HOSPADM

## 2021-09-06 RX ADMIN — HYDRALAZINE HYDROCHLORIDE 10 MG: 20 INJECTION INTRAMUSCULAR; INTRAVENOUS at 08:37

## 2021-09-06 RX ADMIN — ONDANSETRON HYDROCHLORIDE 4 MG: 2 INJECTION, SOLUTION INTRAMUSCULAR; INTRAVENOUS at 06:57

## 2021-09-06 RX ADMIN — SEVELAMER CARBONATE 800 MG: 800 TABLET, FILM COATED ORAL at 17:28

## 2021-09-06 RX ADMIN — ONDANSETRON HYDROCHLORIDE 4 MG: 2 INJECTION, SOLUTION INTRAMUSCULAR; INTRAVENOUS at 12:03

## 2021-09-06 RX ADMIN — DIPHENHYDRAMINE HYDROCHLORIDE 25 MG: 50 INJECTION, SOLUTION INTRAMUSCULAR; INTRAVENOUS at 14:10

## 2021-09-06 RX ADMIN — NIFEDIPINE 90 MG: 30 TABLET, EXTENDED RELEASE ORAL at 12:03

## 2021-09-06 RX ADMIN — ACETAMINOPHEN 650 MG: 325 TABLET ORAL at 12:04

## 2021-09-06 RX ADMIN — ACETAMINOPHEN 650 MG: 325 TABLET ORAL at 20:08

## 2021-09-06 RX ADMIN — PANTOPRAZOLE SODIUM 40 MG: 40 TABLET, DELAYED RELEASE ORAL at 12:04

## 2021-09-06 RX ADMIN — DIPHENHYDRAMINE HYDROCHLORIDE 25 MG: 50 INJECTION, SOLUTION INTRAMUSCULAR; INTRAVENOUS at 20:54

## 2021-09-06 RX ADMIN — Medication 10 ML: at 20:48

## 2021-09-06 RX ADMIN — LABETALOL HYDROCHLORIDE 300 MG: 200 TABLET, FILM COATED ORAL at 12:04

## 2021-09-06 RX ADMIN — CLONIDINE HYDROCHLORIDE 0.3 MG: 0.1 TABLET ORAL at 09:32

## 2021-09-06 RX ADMIN — DIPHENHYDRAMINE HYDROCHLORIDE 25 MG: 50 INJECTION, SOLUTION INTRAMUSCULAR; INTRAVENOUS at 01:25

## 2021-09-06 RX ADMIN — EPOETIN ALFA-EPBX 1500 UNITS: 2000 INJECTION, SOLUTION INTRAVENOUS; SUBCUTANEOUS at 17:28

## 2021-09-06 RX ADMIN — LABETALOL HYDROCHLORIDE 10 MG: 5 INJECTION INTRAVENOUS at 14:11

## 2021-09-06 RX ADMIN — LOSARTAN POTASSIUM 100 MG: 50 TABLET, FILM COATED ORAL at 17:28

## 2021-09-06 ASSESSMENT — PAIN DESCRIPTION - LOCATION: LOCATION: HEAD

## 2021-09-06 ASSESSMENT — PAIN DESCRIPTION - PAIN TYPE: TYPE: ACUTE PAIN

## 2021-09-06 ASSESSMENT — PAIN SCALES - GENERAL
PAINLEVEL_OUTOF10: 0
PAINLEVEL_OUTOF10: 0
PAINLEVEL_OUTOF10: 9
PAINLEVEL_OUTOF10: 3

## 2021-09-06 NOTE — PROGRESS NOTES
Department of Internal Medicine  Nephrology Attending Progress Note    SUBJECTIVE:  We are following this patient for end-stage renal failure . 9/5: We are following this patient for end-stage renal failure . Full consult deferred as he is followed longitudinally in the OP setting on IHD  Briefly:  Fernando Vira a 43 y. o. male who presented to the ED for chest pain.  Patient having chest pain for the past couple days. He had treatment as per his schedule on Friday, and was again dialyzed last evening. Blood pressures at admission elevated in rthe 170/110's range. Last /85 after dialysis treatment and net UF of 2750 ml. Additionally he was found to have a Hgb of 6.9 and did have a transfusion of one unit PRBC's. This am he is upset they placed him on a diabetic diet, he is not diabetic. Will change.  He is feeling better post fluid removal.     9/6: pt seen on hd, dry cough, weakness, didn't get bp meds this am    PROBLEM LIST:    Patient Active Problem List   Diagnosis    ESRD on hemodialysis ([address]   Blase Liming H/O intracranial hemorrhage and aneurysm clipping on the right side    Anemia, chronic disease    Anxiety    Noncompliance    AVF (arteriovenous fistula) ([address]    Chest pain    Severe protein-calorie malnutrition (HCC)    Venous hypertension, chronic, left    Iron deficiency    Secondary hyperparathyroidism of renal origin ([address]    Essential hypertension    Chronic heart failure with preserved ejection fraction (HCC)    Acute respiratory failure with hypoxia (HCC)    Pneumonia    Pericardial effusion without cardiac tamponade    Anemia        PAST MEDICAL HISTORY:    Past Medical History:   Diagnosis Date    (HFpEF) heart failure with preserved ejection fraction ([address]     stage III    Anxiety 9/19/2015    AVF (arteriovenous fistula) ([address] 4/30/2018    Chronic kidney disease     CKD since early childhood per pt and on HD ~ 11 years    Depression     Encounter regarding vascular access for dialysis for ESRD [facility]) 4/30/2018    History of ruptured Berry aneurysm [facility]) 10/20/2015    Hypertension     on meds for ~ 11 years    Hypothyroidism     Intracranial hemorrhage (HCC)     was d/t ruptured aneurysm - pt underwent neurosurgery for clipping of the aneurysm    [facility]rop[facility]     Noncompliance w/medication treatment due to intermit use of medication 11/3/2015    Pre-transplant evaluation for kidney transplant 4/5/2017       MEDS (scheduled):   diphenhydrAMINE  25 mg IntraVENous Q6H    sodium chloride flush  5-40 mL IntraVENous 2 times per day    cloNIDine  0.3 mg Oral Daily    labetalol  300 mg Oral Daily    losartan  100 mg Oral QPM    NIFEdipine  90 mg Oral Daily    pantoprazole  40 mg Oral Daily    sevelamer  800 mg Oral TID WC    lidocaine  1 patch TransDERmal Daily       MEDS (infusions):   sodium chloride      sodium chloride         MEDS (prn):  labetalol, regadenoson, sodium chloride, sodium chloride flush, sodium chloride, ondansetron **OR** ondansetron, polyethylene glycol, hydrALAZINE, acetaminophen    DIET:    Diet NPO      PHYSICAL EXAM:      Patient Vitals for the past 24 hrs:   BP Temp Temp src Pulse Resp SpO2 Weight   09/06/21 1110 (!) 195/119 99.2 °F (37.3 °C) -- 89 18 -- 110 lb 14.3 oz (50.3 kg)   09/06/21 1100 (!) 199/118 -- -- 90 -- -- --   09/06/21 1032 (!) 193/118 -- -- 92 -- -- --   09/06/21 1002 (!) 195/120 -- -- 94 -- -- --   09/06/21 0934 (!) 194/116 -- -- 94 -- -- --   09/06/21 0904 (!) 201/115 -- -- 92 -- -- --   09/06/21 0834 (!) 203/120 -- -- 90 -- -- --   09/06/21 0804 (!) 196/119 -- -- 90 -- -- --   09/06/21 0734 (!) 188/119 -- -- 87 -- -- --   09/06/21 0710 (!) 178/111 -- -- 86 -- -- --   09/06/21 0705 (!) 179/115 98.7 °F (37.1 °C) -- 86 -- -- 115 lb 14.7 oz (52.6 kg)   09/06/21 0639 -- -- -- -- -- -- 111 lb 9.6 oz (50.6 kg)   09/05/21 2115 (!) 154/100 98.3 °F (36.8 °C) Oral 95 18 94 % --   09/05/21 1700 (!) 146/95 -- -- 87 -- -- -- Intake/Output Summary (Last 24 hours) at 9/6/2021 1208  Last data filed at 9/6/2021 1110  Gross per 24 hour   Intake 300 ml   Output 3000 ml   Net -2700 ml       Wt Readings from Last 3 Encounters:   09/06/21 110 lb 14.3 oz (50.3 kg)   08/20/21 125 lb (56.7 kg)   08/09/21 110 lb (49.9 kg)       Constitutional:  Patient in no acute distress  Head: normocephalic, atraumatic  Cardiovascular: regular rate and rhythm, no murmurs, gallops, or rubs  Respiratory:  Clear, no rales, rhochi, or wheezes  Gastrointestinal: soft, nontender, nondistended  Ext: no edema  Neuro: aaox3  Skin: dry, no rash      DATA:      Recent Labs     09/04/21  1119 09/04/21  2314 09/06/21  0440   WBC 8.7  --  7.3   HGB 6.9* 8.3* 7.6*   HCT 22.5* 25.4* 23.5*   .0*  --  96.3     --  268     Recent Labs     09/04/21  1119 09/05/21  0504 09/06/21  0440    139 134   K 4.9 3.8 4.1    97* 94*   CO2 32* 32* 28   BUN 19 16 37*   CREATININE 5.7* 4.5* 8.2*   LABGLOM 14 18 9   GLUCOSE 91 90 88   CALCIUM 9.9 9.8 9.2     Recent Labs     09/04/21  1119   ALT 27     Lab Results   Component Value Date    LABALBU 4.0 09/04/2021    LABALBU 3.5 08/09/2021    LABALBU 3.6 07/31/2021       Iron studies:  Lab Results   Component Value Date    FERRITIN 21,197 08/01/2021    IRON 40 (L) 03/16/2021    TIBC 164 (L) 03/16/2021     Vitamin B-12   Date Value Ref Range Status   02/10/2020 830 211 - 946 pg/mL Final     Folate   Date Value Ref Range Status   02/10/2020 10.5 4.8 - 24.2 ng/mL Final       Bone disease:  Lab Results   Component Value Date    MG 2.7 (H) 09/06/2021    PHOS 4.5 09/06/2021     Vit D, 25-Hydroxy   Date Value Ref Range Status   08/20/2021 19 (L) 30 - 100 ng/mL Final     Comment:     <20 ng/mL. ........... [place] Deficient  20-30 ng/mL. ......... [place] Insufficient   ng/mL. ........ [place] Sufficient  >100 ng/mL. .......... [place] Toxic       PTH   Date Value Ref Range Status   01/05/2019 418 (H) 15 - 65 pg/mL Final       No components found for: URIC    No results found for: VOL, APPEARANCE, COLORU, LABSPEC, LABPH, LEUKBLD, NITRU, GLUCOSEU, KETUA, UROBILINOGEN, KETUA, UROBILINOGEN, BILIRUBINUR, OCBU    No results found for: LIPIDPAN        IMPRESSION/RECOMMENDATIONS:      1. ESRD-  Followed MWF at [address] [place] [address]  S/P UF 2.7 liters 9/4  Sp 2.7 L on 9/6     2. Hypertension with ESRD-  At goal <130/80 much improved post UF  Continue outpt medications  Didn't get bp meds this am     3. Anemia of ESRD-  Hgb 6.9-->8.3 post 1 unit PRBC's  Start sierra     4. Secondary Hyperparathyroidism-  Follow PO4     5. Chest pain/SOB-  Treated for pneumonia at last admission in aug  Persistent cough  Aspergillus pos  Await puldanny mcneal and radha Sheets.  Betty Aquino MD

## 2021-09-06 NOTE — FLOWSHEET NOTE
09/06/21 1110   Vital Signs   BP (!) 195/119   Temp 99.2 °F (37.3 °C)   Pulse 89   Resp 18   Weight 110 lb 14.3 oz (50.3 kg)   Weight Method Bed scale   Percent Weight Change -4.34   Pain Assessment   Pain Assessment 0-10   Pain Level 0   Post-Hemodialysis Assessment   Post-Treatment Procedures Blood returned; Access bleeding time < 10 minutes   Machine Disinfection Process Exterior Machine Disinfection   Rinseback Volume (ml) 300 ml   Total Liters Processed (l/min) 101.2 l/min   Dialyzer Clearance Lightly streaked   Duration of Treatment (minutes) 240 minutes   Heparin amount administered during treatment (units) 0 units   Hemodialysis Intake (ml) 300 ml   Hemodialysis Output (ml) 3000 ml   NET Removed (ml) 2700 ml   Tolerated Treatment Good   Patient Response to Treatment tolerated well, profile B, refill noted   Bilateral Breath Sounds Clear   Edema None   Physician Notified?  No

## 2021-09-06 NOTE — CONSULTS
[facility] CONSULT NOTE    Reason for Consult: Concern for Aspergillus pneumonia  Requested by: Calos Wall MD    Chief complaint: Chest pain    History Obtained From: Patient and EMR     HISTORY Marta              The patient is a 43 y.o. male with history of heart failure, ESRD on hemodialysis, hypertension, previously admitted in 07/2021 and seen by Dr. Mignon Collier and Dr. Leonela Boogie for right lower lobe pneumonia for which he was given a course of cefuroxime and doxycycline then amoxicillin-clavulanate and had bronchoscopy on 08/03 yielding BAL with positive Aspergillus galactomannan and culture showing oropharyngeal carol, rare growth of Candida dublinensis, presented on 09/04 for chest pain. On admission, he was afebrile and hemodynamically stable with no leukocytosis. Respiratory pathogen PCR panel was negative. Chest x-ray showed persistent and slightly improved bilateral right greater than left ill-defined opacities. He reports feeling better with less shortness of breath after hemodialysis. ID service was subsequently consulted for elevated Aspergillus galactomannan on BAL from 08/03.     Past Medical History  Past Medical History:   Diagnosis Date    (HFpEF) heart failure with preserved ejection fraction ([address]     stage III    Anxiety 9/19/2015    AVF (arteriovenous fistula) ([address] 4/30/2018    Chronic kidney disease     CKD since early childhood per pt and on HD ~ 11 years    Depression     Encounter regarding vascular access for dialysis for ESRD ([address] 4/30/2018    History of ruptured Berry aneurysm ([address] 10/20/2015    Hypertension     on meds for ~ 11 years    Hypothyroidism     Intracranial hemorrhage (HCC)     was d/t ruptured aneurysm - pt underwent neurosurgery for clipping of the aneurysm    Neutropenia ([address]     Noncompliance w/medication treatment due to intermit use of medication 11/3/2015    Pre-transplant evaluation for kidney transplant 4/5/2017       Current Hillary Carson MD   10 mg at 09/06/21 4861    acetaminophen (TYLENOL) tablet 650 mg  650 mg Oral Q4H PRN Juan Jose Moore MD   650 mg at 09/05/21 2028       Allergies   Allergen Reactions    Tylenol [Acetaminophen] Itching    Levofloxacin Itching     No rash       Surgical History  Past Surgical History:   Procedure Laterality Date    BRAIN ANEURYSM SURGERY Right 3-4 years ago    Intracranial aneurysm clipping surgery 3-4 years ago, after rupture of aneurysm causing ICH    BRONCHOSCOPY N/A 8/3/2021    BRONCHOSCOPY DIAGNOSTIC OR CELL [address] ONLY performed by Len Dunbar MD at [facility] Left 11 years ago    UPPER GASTROINTESTINAL ENDOSCOPY N/A 1/3/2019    EGD BIOPSY performed by Eliseo Mendiola MD at [address] History     Socioeconomic History    Marital status:     Number of children: 4    Highest education level: High school graduate   Occupational History    Not on file   Tobacco Use    Smoking status: Never Smoker    Smokeless tobacco: Never Used    Tobacco comment: only smokes marijuana daily   Vaping Use    Vaping Use: Never used   Substance and Sexual Activity    Alcohol use: No    Drug use: Not Currently     Types: Marijuana     Family Medical History  Family History   Problem Relation Age of Onset    Kidney Disease Paternal Grandmother     Cancer Neg Hx     Heart Disease Neg Hx        Review of Systems:  Constitutional: No fever, no chills  Eyes: No vision changes, no retroorbital pain  ENT: No hearing changes, no ear pain  Respiratory: No cough, no dyspnea  Cardiovascular: No chest pain, no palpitations  Gastrointestinal: No abdominal pain, no diarrhea  Genitourinary: No dysuria, no hematuria  Integumentary: No rash, no itching  Musculoskeletal: No muscle pain, no joint pain  Neurologic: No headache, no numbness in extremities    Physical Examination:  Vitals:    09/06/21 1002 09/06/21 1032 09/06/21 1100 09/06/21 1110   BP: (!) 195/120 (!) 193/118 (!) 199/118 (!) 195/119   Pulse: 94 92 90 89   Resp:    18   Temp:    99.2 °F (37.3 °C)   TempSrc:       SpO2:       Weight:    110 lb 14.3 oz (50.3 kg)     Constitutional: Alert, not in distress  Eyes: Sclerae anicteric, no conjunctival erythema  ENT: No buccal lesion, no pharyngeal exudates  Neck: No nuchal rigidity, no cervical adenopathy  Lungs: Clear breath sounds, no crackles, no wheezes  Heart: Regular rate and rhythm, no murmurs  Abdomen: Bowel sounds present, soft, nontender  Skin: Warm and dry, no active dermatoses  Musculoskeletal: No joint erythema, no joint swelling    Labs, imaging, and medical records/notes were personally reviewed. Assessment:  Right lower lobe pneumonia with elevated BAL galactomannan    Plan:  Repeat chest CT. Follow up beta-D-glucan. Follow up with Dr. Alston Later after discharge regarding initiation of antifungal treatment. Initiation of antifungal treatment is not emergent at this point and may be discussed further on outpatient follow-up although I did discuss with him the potential duration and complications (including but not limited to liver injury, optic neuritis, neuropathy, osteonecrosis) entailed with antifungal treatment. Continue supportive care. Thank you for involving me in the care of Roberto Ramirez. I will continue to follow. Please do not hesitate to call for any questions or concerns.     Electronically signed by Radha Stevens MD on 9/6/2021 at 11:47 AM

## 2021-09-06 NOTE — PROGRESS NOTES
[facility]  Internal Medicine Residency Program  Progress Note - House Team     Patient:  Christine Coleman 43 y.o. male MRN: [id number]     Date of Service: 9/6/2021     CC: SOB and Chest Pain  Overnight events: No acute concerns    Subjective     Patient was seen and examined this morning at bedside in no acute distress. Overnight, pt admits to no acute events. Objective     Physical Exam:  · Vitals: BP (!) 188/119   Pulse 87   Temp 98.7 °F (37.1 °C)   Resp 18   Wt 115 lb 14.7 oz (52.6 kg)   SpO2 94%   BMI 18.71 kg/m²     · I & O - 24hr: No intake/output data recorded. · General Appearance: alert, appears stated age and cooperative  · HEENT:  Head: Normocephalic, no lesions, without obvious abnormality. · Neck: no adenopathy, no carotid bruit, no JVD, supple, symmetrical, trachea midline and thyroid not enlarged, symmetric, no tenderness/mass/nodules  · Lung: clear to auscultation bilaterally  · Heart: S1 and S2 appreciated. 2/6 diastolic murmur appreciated at cardiac apex in the L. 5th intercostal midaxillary region  · Abdomen: soft, non-tender; bowel sounds normal; no masses,  no organomegaly  · Extremities:  extremities normal, atraumatic, no cyanosis or edema and Fisulas appreciated on LUE  · Musculokeletal: No joint swelling, no muscle tenderness. ROM normal in all joints of extremities.    · Neurologic: Mental status: Alert, oriented, thought content appropriate  Subject  Pertinent Labs & Imaging Studies   denice  CBC with Differential:    Lab Results   Component Value Date    WBC 7.3 09/06/2021    RBC 2.44 09/06/2021    HGB 7.6 09/06/2021    HCT 23.5 09/06/2021     09/06/2021    MCV 96.3 09/06/2021    MCH 31.1 09/06/2021    MCHC 32.3 09/06/2021    RDW 17.9 09/06/2021    LYMPHOPCT 23.6 09/06/2021    MONOPCT 7.8 09/06/2021    BASOPCT 0.5 09/06/2021    MONOSABS 0.57 09/06/2021    LYMPHSABS 1.73 09/06/2021    EOSABS 0.39 09/06/2021    BASOSABS 0.04 09/06/2021     CMP:    Lab Results   Component Value Date     09/05/2021    K 3.8 09/05/2021    K 3.9 08/09/2021    CL 97 09/05/2021    CO2 32 09/05/2021    BUN 16 09/05/2021    CREATININE 4.5 09/05/2021    GFRAA 18 09/05/2021    LABGLOM 18 09/05/2021    GLUCOSE 90 09/05/2021    PROT 6.9 09/04/2021    LABALBU 4.0 09/04/2021    CALCIUM 9.8 09/05/2021    BILITOT 0.5 09/04/2021    ALKPHOS 74 09/04/2021    AST 42 09/04/2021    ALT 27 09/04/2021       XR CHEST PORTABLE   Final Result   Persistent and slightly improved bilateral right greater than left   ill-defined opacities. Stable atherosclerotic disease and cardiomegaly. XR CHEST PORTABLE   Final Result   1. Multifocal bilateral pneumonia more prominent within the right lung   2. Marked cardiomegaly. 3. Trace bilateral pleural effusions         NM Cardiac Stress Test Nuclear Imaging    (Results Pending)        Resident's Assessment and Plan     Assessment and Plan:  The patient is a 43 y.o. male with a past medical history of HTN, ESRD on dialusis, Depression, Anxiety, ICH, Hypothyroidism, AVF, HFpEF, Neutropenia and Hx of ruptued [place] aneurysm who presents for SOB and chest pain. 1. Hypertensive urgency likely 2/2 ESRD  · BP in /131 on admission  · Given Hydralazine in ED - BP went down to 189/102  · CXR 09/04: Multifocal bilateral pneumonia more prominent within the right lung, Marked cardiomegaly and Trace bilateral pleural effusions  · EKG 09/04: Sinus tachycardia and possible LA enlargement  · Last Echo 08/25/21: EF 65%  · Continue home medications: Clonidine 0.3 mg, Labetalol 300 mg, Cozaar 100 mg and Procardia XL 90 mg  · Monitor BP      2. Typical Chest pain   · Pt presents with 1 month of chest pain associated with dyspnea on exertion  · Pt denies chest pain currently, but also is not exerting himself.  Pain is not reproducible  · Echo (8/25/21): EF 03%, Diastolic dysfunction present  · PhysExam: 2/6 Diastolic murmur appreciated at cardiac apex  · Stress Test, tomorrow (9/6/21) per Dr. Randi Sebastian       3. SOB 2/2 pulmonary edema vs pneumonia  · SOB on exertion. Pt admits to worsening. 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 ESRD on dialysis, MWF  · He had HD yesterday  · On Sevelamer 800 mg  · Troponin 83 - Trend troponin  · Nephro on board       5. Normocytic Hypochromic anemia likely 2/2 ESRD  · Hb 6.9 in ED  · 1u pRBC transfused  · H&H post-transfusion: 8.3  · Ferritin (8/1/21): 21,000       PT/OT evaluation  DVT prophylaxis/ GI prophylaxis  Disposition: continue current care    Karen Power MD, PGY-1  Attending physician: Dr. Clayton Delgado    Attending Physician Statement:  Hay Lal M.D., F.A.C.P.     I have discussed the case, including pertinent history and exam findings with the resident/NP. I have seen and examined the patient and the key elements of the encounter have been performed by me. I agree with the resident ROS, PMHx, PSHx, meds reviewed and assessment, plan and orders as documented by the resident/NP    [facility] charts reviewed, including other providers notes, relevant labs and imaging.         likely volume overload, told sacral edema at HD center  Now feeling better sp HD overnight  Less sob at rest  But hasn't exerting himself, less chest pressure/pain  - -DIDN\"T produce sputum for study   ?pneumonia doubt   Possible volume overload, treated pneumonia in recent past,  Steroids and augmentin  dbout vaccine assoc sob (2 weeks ago shot)  Ongoing sob/recurrent volume overload, last HD on Friday   never treated aspigillosis?  Noted 1.5 ago +testing  -- ordered by dr. Destiny Vallejo in psat, ?treat abx  ?allergic-

## 2021-09-07 ENCOUNTER — APPOINTMENT (OUTPATIENT)
Dept: NUCLEAR MEDICINE | Age: 36
DRG: 139 | End: 2021-09-07
Payer: MEDICAID

## 2021-09-07 ENCOUNTER — APPOINTMENT (OUTPATIENT)
Dept: CT IMAGING | Age: 36
DRG: 139 | End: 2021-09-07
Payer: MEDICAID

## 2021-09-07 ENCOUNTER — APPOINTMENT (OUTPATIENT)
Dept: NON INVASIVE DIAGNOSTICS | Age: 36
DRG: 139 | End: 2021-09-07
Payer: MEDICAID

## 2021-09-07 LAB
ANION GAP SERPL CALCULATED.3IONS-SCNC: 17 MMOL/L (ref 7–16)
BASOPHILS ABSOLUTE: 0.05 E9/L (ref 0–0.2)
BASOPHILS RELATIVE PERCENT: 0.8 % (ref 0–2)
BUN BLDV-MCNC: 24 MG/DL (ref 6–20)
CALCIUM SERPL-MCNC: 9.5 MG/DL (ref 8.6–10.2)
CHLORIDE BLD-SCNC: 96 MMOL/L (ref 98–107)
CO2: 26 MMOL/L (ref 22–29)
CREAT SERPL-MCNC: 5.5 MG/DL (ref 0.7–1.2)
EKG ATRIAL RATE: 97 BPM
EKG P AXIS: 67 DEGREES
EKG P-R INTERVAL: 194 MS
EKG Q-T INTERVAL: 376 MS
EKG QRS DURATION: 82 MS
EKG QTC CALCULATION (BAZETT): 477 MS
EKG R AXIS: 87 DEGREES
EKG T AXIS: 89 DEGREES
EKG VENTRICULAR RATE: 97 BPM
EOSINOPHILS ABSOLUTE: 0.36 E9/L (ref 0.05–0.5)
EOSINOPHILS RELATIVE PERCENT: 6 % (ref 0–6)
GFR AFRICAN AMERICAN: 14
GFR NON-AFRICAN AMERICAN: 14 ML/MIN/1.73
GLUCOSE BLD-MCNC: 90 MG/DL (ref 74–99)
HCT VFR BLD CALC: 28.3 % (ref 37–54)
HEMOGLOBIN: 8.9 G/DL (ref 12.5–16.5)
IMMATURE GRANULOCYTES #: 0.04 E9/L
IMMATURE GRANULOCYTES %: 0.7 % (ref 0–5)
LV EF: 45 %
LVEF MODALITY: NORMAL
LYMPHOCYTES ABSOLUTE: 1.66 E9/L (ref 1.5–4)
LYMPHOCYTES RELATIVE PERCENT: 27.6 % (ref 20–42)
MCH RBC QN AUTO: 30.4 PG (ref 26–35)
MCHC RBC AUTO-ENTMCNC: 31.4 % (ref 32–34.5)
MCV RBC AUTO: 96.6 FL (ref 80–99.9)
MONOCYTES ABSOLUTE: 0.64 E9/L (ref 0.1–0.95)
MONOCYTES RELATIVE PERCENT: 10.6 % (ref 2–12)
NEUTROPHILS ABSOLUTE: 3.27 E9/L (ref 1.8–7.3)
NEUTROPHILS RELATIVE PERCENT: 54.3 % (ref 43–80)
PDW BLD-RTO: 17.4 FL (ref 11.5–15)
PLATELET # BLD: 347 E9/L (ref 130–450)
PMV BLD AUTO: 8.9 FL (ref 7–12)
POTASSIUM SERPL-SCNC: 3.5 MMOL/L (ref 3.5–5)
RBC # BLD: 2.93 E12/L (ref 3.8–5.8)
SODIUM BLD-SCNC: 139 MMOL/L (ref 132–146)
WBC # BLD: 6 E9/L (ref 4.5–11.5)

## 2021-09-07 PROCEDURE — 2140000000 HC CCU INTERMEDIATE R&B

## 2021-09-07 PROCEDURE — 6360000002 HC RX W HCPCS: Performed by: STUDENT IN AN ORGANIZED HEALTH CARE EDUCATION/TRAINING PROGRAM

## 2021-09-07 PROCEDURE — 80048 BASIC METABOLIC PNL TOTAL CA: CPT

## 2021-09-07 PROCEDURE — 6360000002 HC RX W HCPCS: Performed by: INTERNAL MEDICINE

## 2021-09-07 PROCEDURE — 93017 CV STRESS TEST TRACING ONLY: CPT

## 2021-09-07 PROCEDURE — 6370000000 HC RX 637 (ALT 250 FOR IP): Performed by: INTERNAL MEDICINE

## 2021-09-07 PROCEDURE — 85025 COMPLETE CBC W/AUTO DIFF WBC: CPT

## 2021-09-07 PROCEDURE — 93018 CV STRESS TEST I&R ONLY: CPT | Performed by: INTERNAL MEDICINE

## 2021-09-07 PROCEDURE — 71250 CT THORAX DX C-: CPT

## 2021-09-07 PROCEDURE — 93016 CV STRESS TEST SUPVJ ONLY: CPT | Performed by: INTERNAL MEDICINE

## 2021-09-07 PROCEDURE — 2580000003 HC RX 258: Performed by: INTERNAL MEDICINE

## 2021-09-07 PROCEDURE — A9500 TC99M SESTAMIBI: HCPCS | Performed by: RADIOLOGY

## 2021-09-07 PROCEDURE — 78452 HT MUSCLE IMAGE SPECT MULT: CPT

## 2021-09-07 PROCEDURE — 36415 COLL VENOUS BLD VENIPUNCTURE: CPT

## 2021-09-07 PROCEDURE — 3430000000 HC RX DIAGNOSTIC RADIOPHARMACEUTICAL: Performed by: RADIOLOGY

## 2021-09-07 PROCEDURE — 99239 HOSP IP/OBS DSCHRG MGMT >30: CPT | Performed by: INTERNAL MEDICINE

## 2021-09-07 PROCEDURE — 78452 HT MUSCLE IMAGE SPECT MULT: CPT | Performed by: INTERNAL MEDICINE

## 2021-09-07 PROCEDURE — 93010 ELECTROCARDIOGRAM REPORT: CPT | Performed by: INTERNAL MEDICINE

## 2021-09-07 RX ORDER — DIPHENHYDRAMINE HCL 25 MG
25 TABLET ORAL 2 TIMES DAILY PRN
COMMUNITY
End: 2022-03-01 | Stop reason: SDUPTHER

## 2021-09-07 RX ORDER — LABETALOL 200 MG/1
200 TABLET, FILM COATED ORAL 3 TIMES DAILY
Status: ON HOLD | COMMUNITY
End: 2021-09-09 | Stop reason: HOSPADM

## 2021-09-07 RX ORDER — CLONIDINE HYDROCHLORIDE 0.1 MG/1
0.3 TABLET ORAL 3 TIMES DAILY
Status: DISCONTINUED | OUTPATIENT
Start: 2021-09-07 | End: 2021-09-10 | Stop reason: HOSPADM

## 2021-09-07 RX ORDER — CLONIDINE HYDROCHLORIDE 0.1 MG/1
0.1 TABLET ORAL 3 TIMES DAILY
Status: DISCONTINUED | OUTPATIENT
Start: 2021-09-07 | End: 2021-09-07

## 2021-09-07 RX ORDER — SODIUM FERRIC GLUCONATE COMPLEX IN SUCROSE 12.5 MG/ML
125 INJECTION INTRAVENOUS ONCE
Status: DISCONTINUED | OUTPATIENT
Start: 2021-09-07 | End: 2021-09-07 | Stop reason: SDUPTHER

## 2021-09-07 RX ORDER — NIFEDIPINE 60 MG/1
60 TABLET, FILM COATED, EXTENDED RELEASE ORAL DAILY
Status: ON HOLD | COMMUNITY
End: 2021-09-09 | Stop reason: HOSPADM

## 2021-09-07 RX ADMIN — DIPHENHYDRAMINE HYDROCHLORIDE 25 MG: 50 INJECTION, SOLUTION INTRAMUSCULAR; INTRAVENOUS at 09:14

## 2021-09-07 RX ADMIN — Medication 10 ML: at 21:30

## 2021-09-07 RX ADMIN — SEVELAMER CARBONATE 800 MG: 800 TABLET, FILM COATED ORAL at 16:51

## 2021-09-07 RX ADMIN — CLONIDINE HYDROCHLORIDE 0.3 MG: 0.1 TABLET ORAL at 21:28

## 2021-09-07 RX ADMIN — Medication 10 ML: at 09:07

## 2021-09-07 RX ADMIN — SODIUM CHLORIDE 125 MG: 9 INJECTION, SOLUTION INTRAVENOUS at 17:15

## 2021-09-07 RX ADMIN — Medication 28.6 MILLICURIE: at 14:20

## 2021-09-07 RX ADMIN — REGADENOSON 0.4 MG: 0.08 INJECTION, SOLUTION INTRAVENOUS at 14:15

## 2021-09-07 RX ADMIN — ACETAMINOPHEN 650 MG: 325 TABLET ORAL at 16:51

## 2021-09-07 RX ADMIN — Medication 10.9 MILLICURIE: at 13:12

## 2021-09-07 RX ADMIN — POTASSIUM BICARBONATE 20 MEQ: 782 TABLET, EFFERVESCENT ORAL at 21:30

## 2021-09-07 RX ADMIN — LOSARTAN POTASSIUM 100 MG: 50 TABLET, FILM COATED ORAL at 16:51

## 2021-09-07 RX ADMIN — DIPHENHYDRAMINE HYDROCHLORIDE 25 MG: 50 INJECTION, SOLUTION INTRAMUSCULAR; INTRAVENOUS at 21:29

## 2021-09-07 ASSESSMENT — PAIN SCALES - GENERAL
PAINLEVEL_OUTOF10: 8
PAINLEVEL_OUTOF10: 0

## 2021-09-07 NOTE — PROGRESS NOTES
Department of Internal Medicine  Nephrology Attending Progress Note        SUBJECTIVE:  Mr Oh continues to complain of times at night with diaphoresis. Not admitting to much phlegm production. CT of the chest did suggest worsening infiltrates , viral studies were negative, some of his studies for Aspergillus were positive, patient unaware of any prior exposure. Weights have improved with fluid removal on dialysis? Now under his [facility] weight at [facility].  Still with back pain , cardiac stress test pending    PMH  ESRD on hemodialysis for 21 yrs  History of intracranial hemorrhage with clipping of aneurysm on the right side  Anemia of chronic kidney disease status post PREETI therapy and recent course of IV iron  Left forearm AV fistula  Secondary hyperparathyroidism of renal origin  Hypertension  Hypothyroidism on replacement therapy  History of pericardial effusion thought to be secondary to hydralazine/ minoxidil which have been stopped  History of heart failure with preserved ejection fraction  Work-up for infiltrative cardiac process such as amyloid negative        Physical Exam:    Vitals:    09/06/21 2235   BP: (!) 161/99   Pulse: 93   Resp: 16   Temp: 98 °F (36.7 °C)   SpO2: 96%       I/O last 24 hours:  Intake/Output 1380/3000    Weight: 110   General Appearance:  no acute distress  Head: normocephalic, atraumatic  Cardiovascular: regular rate and rhythm, no murmurs, gallops, or rubs  Respiratory:   Decreased breath sounds no wheezing  Gastrointestinal: soft, nontender, nondistended  Ext: no edema good bruit and thrill over left forearm fistula  Neuro: aaox3  Skin: dry, no rash    DATA:    CBC with Differential:    Lab Results   Component Value Date    WBC 6.0 09/07/2021    RBC 2.93 09/07/2021    HGB 8.9 09/07/2021    HCT 28.3 09/07/2021     09/07/2021    MCV 96.6 09/07/2021    MCH 30.4 09/07/2021    MCHC 31.4 09/07/2021    RDW 17.4 09/07/2021    LYMPHOPCT 27.6 09/07/2021    MONOPCT 10.6 09/07/2021    BASOPCT 0.8 09/07/2021    MONOSABS 0.64 09/07/2021    LYMPHSABS 1.66 09/07/2021    EOSABS 0.36 09/07/2021    BASOSABS 0.05 09/07/2021     CMP:    Lab Results   Component Value Date     09/07/2021    K 3.5 09/07/2021    K 3.9 08/09/2021    CL 96 09/07/2021    CO2 26 09/07/2021    BUN 24 09/07/2021    CREATININE 5.5 09/07/2021    GFRAA 14 09/07/2021    LABGLOM 14 09/07/2021    GLUCOSE 90 09/07/2021    PROT 6.9 09/04/2021    LABALBU 4.0 09/04/2021    CALCIUM 9.5 09/07/2021    BILITOT 0.5 09/04/2021    ALKPHOS 74 09/04/2021    AST 42 09/04/2021    ALT 27 09/04/2021     Magnesium:    Lab Results   Component Value Date    MG 2.7 09/06/2021     Phosphorus:    Lab Results   Component Value Date    PHOS 4.5 09/06/2021     IRON:    Lab Results   Component Value Date    IRON 33 09/06/2021     TIBC:    Lab Results   Component Value Date    TIBC 202 09/06/2021          potassium bicarb-citric acid  20 mEq Oral BID    cloNIDine  0.3 mg Oral TID    epoetin katlyn-epbx  30 Units/kg SubCUTAneous Once per day on Mon Wed Fri    diphenhydrAMINE  25 mg IntraVENous Q6H    sodium chloride flush  5-40 mL IntraVENous 2 times per day    labetalol  300 mg Oral Daily    losartan  100 mg Oral QPM    NIFEdipine  90 mg Oral Daily    pantoprazole  40 mg Oral Daily    sevelamer  800 mg Oral TID WC    lidocaine  1 patch TransDERmal Daily      sodium chloride      sodium chloride       labetalol, regadenoson, sodium chloride, sodium chloride flush, sodium chloride, ondansetron **OR** ondansetron, polyethylene glycol, hydrALAZINE, acetaminophen    IMPRESSION/RECOMMENDATIONS:      End-stage renal disease on Monday Wednesday Friday dialysis schedule  Anemia continue PREETI therapy will give a dose of Ferrlecit as iron level actually dropped from August  Secondary hyperparathyroid disease  Blood pressure control improving  CT suggesting worsening infiltrates right upper lobe , but improvement in right lower lobe, which may be related to fluid removal from dialysis, awaiting decision by ID as to whether to initiate antifungal therapy, pt continues to have nocturnal diaphoresis          Lukas Griffith MD  9/7/2021 11:34 AM

## 2021-09-07 NOTE — PROGRESS NOTES
Pulmonary Progress Note    Admit Date: 2021                            PCP: [address], DO  Active Problems:    Chest pain    Anemia  Resolved Problems:    * No resolved hospital problems. *      Subjective:  Sitting up in be on RA. Denies dyspnea. Feeling a little better. For stress test today     Medications:   sodium chloride      sodium chloride          potassium bicarb-citric acid  20 mEq Oral BID    epoetin katlyn-epbx  30 Units/kg SubCUTAneous Once per day on     diphenhydrAMINE  25 mg IntraVENous Q6H    sodium chloride flush  5-40 mL IntraVENous 2 times per day    cloNIDine  0.3 mg Oral Daily    labetalol  300 mg Oral Daily    losartan  100 mg Oral QPM    NIFEdipine  90 mg Oral Daily    pantoprazole  40 mg Oral Daily    sevelamer  800 mg Oral TID WC    lidocaine  1 patch TransDERmal Daily       Vitals:  VITALS:  BP (!) 161/99   Pulse 93   Temp 98 °F (36.7 °C) (Temporal)   Resp 16   Wt 110 lb 14.3 oz (50.3 kg)   SpO2 96%   BMI 17.90 kg/m²   24HR INTAKE/OUTPUT:      Intake/Output Summary (Last 24 hours) at 2021 1020  Last data filed at 2021 1915  Gross per 24 hour   Intake 1380 ml   Output 3000 ml   Net -1620 ml     CURRENT PULSE OXIMETRY:  SpO2: 96 %  24HR PULSE OXIMETRY RANGE:  SpO2  Av.5 %  Min: 96 %  Max: 97 %  CVP:    VENT SETTINGS:   Vent Information  SpO2: 96 %  Additional Respiratory  Assessments  Pulse: 93  Resp: 16  SpO2: 96 %  Oral Care: Teeth brushed      EXAM:  General: No distress. Alert   Eyes: PERRL. + sclera icterus. ENT: No discharge. Pharynx clear. Missing teeth   Neck: Trachea midline. Normal thyroid. Resp: No accessory muscle use. diminished with fine rales to LLL  CV: Regular rate. Regular rhythm. No mumur or rub. ABD: Non-tender. Non-distended. No masses. No organmegaly. Normal bowel sounds. Skin: Warm and dry. No nodule on exposed extremities. No rash on exposed extremities. Lymph: No cervical LAD.  No supraclavicular LAD. Ext: No joint deformity. No clubbing. No cyanosis. No edema  Neuro: Awake. Follows commands ox3     I/O: I/O last 3 completed shifts: In: 1380 [P.O.:1080]  Out: 3000   No intake/output data recorded. Results:  CBC:   Recent Labs     09/04/21  1119 09/04/21  1119 09/04/21  2314 09/04/21  2314 09/06/21  0440 09/06/21  1255 09/07/21  0546   WBC 8.7  --   --   --  7.3  --  6.0   HGB 6.9*   < > 8.3*  --  7.6*  --  8.9*   HCT 22.5*   < > 25.4*   < > 23.5* 26.3* 28.3*   .0*  --   --   --  96.3  --  96.6     --   --   --  268  --  347    < > = values in this interval not displayed. BMP:   Recent Labs     09/05/21  0504 09/06/21  0440 09/07/21  0546    134 139   K 3.8 4.1 3.5   CL 97* 94* 96*   CO2 32* 28 26   PHOS  --  4.5  --    BUN 16 37* 24*   CREATININE 4.5* 8.2* 5.5*     LFT:   Recent Labs     09/04/21  1119   ALKPHOS 74   ALT 27   AST 42*   PROT 6.9   BILITOT 0.5   LABALBU 4.0     PT/INR: No results for input(s): PROTIME, INR in the last 72 hours. Procalcitonin:   Recent Labs     09/05/21  1429   PROCAL 1.32*     Cultures:  Results for Rupesh Chow (MRN [id number]) as of 9/7/2021 10:21   Ref.  Range 9/4/2021 12:11 9/4/2021 14:09   Influenza A by PCR Latest Ref Range: Not Detected   Not Detected   Influenza B by PCR Latest Ref Range: Not Detected   Not Detected   Adenovirus by PCR Latest Ref Range: Not Detected   Not Detected   Coronavirus 229E by PCR Latest Ref Range: Not Detected   Not Detected   Coronavirus HKU1 by PCR Latest Ref Range: Not Detected   Not Detected   Coronavirus NL63 by PCR Latest Ref Range: Not Detected   Not Detected   Coronavirus OC43 by PCR Latest Ref Range: Not Detected   Not Detected   Human Metapneumovirus by PCR Latest Ref Range: Not Detected   Not Detected   Human Rhinovirus/Enterovirus by PCR Latest Ref Range: Not Detected   Not Detected   Parainfluenza Virus 1 by PCR Latest Ref Range: Not Detected   Not Detected   Parainfluenza Virus 2 by PCR Latest Ref Range: Not Detected   Not Detected   Parainfluenza Virus 3 by PCR Latest Ref Range: Not Detected   Not Detected   Parainfluenza Virus 4 by PCR Latest Ref Range: Not Detected   Not Detected   Respiratory Syncytial Virus by PCR Latest Ref Range: Not Detected   Not Detected   Bordetella parapertussis by PCR Latest Ref Range: Not Detected   Not Detected   Chlamydophilia pneumoniae by PCR Latest Ref Range: Not Detected   Not Detected   Mycoplasma pneumoniae by PCR Latest Ref Range: Not Detected   Not Detected   SARS-CoV-2, PCR Latest Ref Range: Not Detected   Not Detected   SARS-CoV-2, NAAT Latest Ref Range: Not Detected  Not Detected    Bordetella pertussis by PCR Latest Ref Range: Not Detected   Not Detected     8/11/2021  1:34 PM - Reece, Mhy Incoming Results From [employer]           Specimen Information: Bronchial Washing; Respiratory         Component Collected Lab   Fungus Stain 08/03/2021  3:08 PM [facility]   No Fungal elements seen    Organism Abnormal  08/03/2021  3:08 PM 15 [facility]   Vanna dubliniensis    Fungus (Mycology) Culture 08/03/2021  3:08 PM [facility]   Rare growth   No further workup    Testing Performed By     Lab - Abbreviation Name Director Address Valid Date Range   64- - [address] Josie Park M.D. [address] [place] [address] 09/26/20 0000-Present   Narrative  Performed by: [address] [facility]  Source: [facility]       Site: [facility]                Results for Louise Rizvi (MRN [id number]) as of 9/5/2021 13:15    Ref. Range 8/3/2021 15:08   AFB Culture (Mycobacteria) Unknown No growth after 1... Fungus (Mycology) Culture Unknown Rare growth. .. Gram Stain Orderable Unknown Polymorphonuclear. ..    CULTURE, RESPIRATORY Unknown Oral Pharyngeal Carla present   Smear, Respiratory Unknown Refer to ordered Gram stain for results   CULTURE WITH SMEAR, ACID FAST BACILLIUS Unknown Rpt AFB Smear Unknown No AFB observed by Fluorescent stain   Aspergillus Galacto AG Latest Ref Range: Negative  Positive (A)   Aspergillus Galacto Index Unknown 1.89   Fungus Stain Unknown No Fungal elements seen   CULTURE, FUNGUS Unknown Rpt (A)   Pneumocystis DFA Unknown Negative for Pneumocystis carinii          ABG:   No results for input(s): PH, PO2, PCO2, HCO3, BE, O2SAT in the last 72 hours. Films:  Ct chest 9/7:  Improving aeration of the right lower lobe.       Patchy ground-glass opacities in the right upper lobe have increased from   previous and may be related to infiltrates from pneumonia.       Improving aeration of the right middle lobe and left lung.       Small residual right pleural effusion and small pericardial effusion.       Areas of adenopathy are similar to previous. Nyoka Free could be reactive or due   to metastatic disease/lymphoproliferative process.  Correlate clinically.       Other chronic appearing findings. XR CHEST PORTABLE    9/6:  Some improvement of the infiltrates predominant in the mid lower aspect of   the right lung since the study of September 4.     9/6 9/4    Result Date: 9/5/2021  EXAMINATION: ONE XRAY VIEW OF THE CHEST 9/5/2021 12:36 pm COMPARISON: Chest series from September 4, 2021 HISTORY: ORDERING SYSTEM PROVIDED HISTORY: sob TECHNOLOGIST PROVIDED HISTORY: Reason for exam:->sob What reading provider will be dictating this exam?->CRC FINDINGS: Lung hyperinflation. Persistent and perhaps slightly improved ill-defined opacities in the right greater than left lungs. No obvious pleural effusion or pneumothorax. Atherosclerotic disease and cardiomegaly. Vascular stent over the left axilla. Osseous and thoracic soft tissue structures demonstrate no acute findings. Persistent and slightly improved bilateral right greater than left ill-defined opacities.   Stable atherosclerotic disease and cardiomegaly. XR CHEST PORTABLE    Result Date: 9/4/2021  EXAMINATION: ONE XRAY VIEW OF THE CHEST 9/4/2021 11:32 am COMPARISON: 08/09/2021 HISTORY: ORDERING SYSTEM PROVIDED HISTORY: chest pain TECHNOLOGIST PROVIDED HISTORY: Reason for exam:->chest pain What reading provider will be dictating this exam?->CRC FINDINGS: The heart is enlarged. Multifocal infiltrates are seen throughout the right lung. Vague left perihilar infiltrates are noted. There are trace bilateral pleural effusions. 1. Multifocal bilateral pneumonia more prominent within the right lung 2. Marked cardiomegaly. 3. Trace bilateral pleural effusions       Assessment:     Assessment:  · Volume overload  · ESRD on HD  · Possible pneumonia   · HTN urgency  · Chronic anemia            Plan:  · Keep POX >90%, o2 as needed currently on room air  ·  procalcitonin 1.32, low grade fever 99.2, RUL persistent infiltrates although cxr improved--ID following   · Ct chest completed and reviewed   · , recent pneumonia treated by ID with zosyn/augmentin  at beginning of august.   Had mucous plugging required s/p bronchosocpy 8/3/2021 with cell count/differential revealing lymphocytes at 67%  - CD4 425. Cytology with benign bronchial cells, squamous cells, pulmonary macrophages. Pneumocystis carinii negative, AFB negative  · fungal cx are coming back positive for aspergillus +, Dr. Jeremy Joseph aware   · respiratory panel obtained negative for covid   · Renal following, on HD  · hgb 6.8 in Received one unit PRBC-up to 8.3 --->7.6 today   ·  for a cardiac stress test today   · IS for pulmonary hygiene         Electronically signed by KENDRA Ortega on 9/7/2021 at 10:20 AM    Agree with above assessment and plan  Clinically better, for stress test today.  Prelim was ok, pics to follow  CT  chest seen, overall better  To discuss need of antifungal treatment with ID as an outpatient  Okay to discharge home follow-up with pulmonary in 4 to 6 weeks

## 2021-09-07 NOTE — PROCEDURES
[facility]inal[facility] Nuclear Stress Test:    Cardiologist: Dr. Buffy Maldonado EKG: Normal sinus rhythm, biatrial enlargement, LVH, abnormal EKG. Indications for study: Chest pain    1. No chest pain  2. No new arrhythmias  3. No EKG changes suggestive of stress induced ischemia  4. Nuclear images pending    Bridgett Ibarra MD., [facility] [place].    [facility]) Cardiology

## 2021-09-07 NOTE — CARE COORDINATION
9/7/21 Transition of Care: Patient is now changed to inpatient. He is a return after having pneumonia and being treated with iv/po antibiotics. He is npo for a stress test and CT chest. He is a dialysis patient and attends [address] [place] [address]. He follows with Dr Jonny Helms and his pharmacy is mail order and [facility]. The plan is to return to home at discharge.  Edda Zayas RN CM

## 2021-09-07 NOTE — PROGRESS NOTES
NICHOLAS PROGRESS NOTE      Chief complaint: Follow-up of elevated BAL galactomannan    The patient is a 43 y.o. male with history of heart failure, ESRD on hemodialysis, hypertension, previously admitted in 07/2021 and seen by Dr. Sandra Gloria and Dr. Karma Fieror for right lower lobe pneumonia for which he was given a course of cefuroxime and doxycycline then amoxicillin-clavulanate and had bronchoscopy on 08/03 yielding BAL with positive Aspergillus galactomannan and culture showing oropharyngeal carol, rare growth of Candida dublinensis but no Aspergillus, presented on 09/04 for chest pain. On admission, he was afebrile and hemodynamically stable with no leukocytosis. Respiratory pathogen PCR panel was negative. Chest x-ray showed persistent and slightly improved bilateral right greater than left ill-defined opacities. Repeat chest CT on 09/07 showed improved aeration of the right middle lobe, right lower lobe and left lung compared to that from 07/2021, increased patchy groundglass opacities in the right upper lobe, small residual right pleural effusion and small pericardial effusion. He reports feeling better with less shortness of breath after hemodialysis. Subjective: Patient was seen and examined. No chills, no abdominal pain, no diarrhea, no rash, no itching. He reports feeling better with less shortness of breath. Objective:    Vitals:    09/06/21 2235   BP: (!) 161/99   Pulse: 93   Resp: 16   Temp: 98 °F (36.7 °C)   SpO2: 96%     Constitutional: Alert, not in distress  Respiratory: Clear breath sounds, no crackles, no wheezes  Cardiovascular: Regular rate and rhythm, no murmurs  Gastrointestinal: Bowel sounds present, soft, nontender  Skin: Warm and dry, no active dermatoses  Musculoskeletal: No joint swelling, no joint erythema    Labs, imaging, and medical records/notes were personally reviewed.     Assessment:  Right lower lobe pneumonia with elevated BAL galactomannan    Recommendations:  Monitor clinically off antibiotics for now. Follow up beta-D-glucan. Follow up with Dr. Jazmyn Rashid after discharge regarding initiation of antifungal treatment. Initiation of antifungal treatment is not emergent at this point and may be discussed further on outpatient follow-up although I have discussed with him the potential duration and complications (including but not limited to liver injury, optic neuritis, neuropathy, osteonecrosis) entailed with antifungal treatment. Continue supportive care.     Thank you for involving me in the care of Augustus Escobar. I will continue to follow. Please do not hesitate to call for any questions or concerns.     Electronically signed by Glenny Chavez MD on 9/7/2021 at 10:40 AM

## 2021-09-07 NOTE — PROGRESS NOTES
[facility]  [facility] Residency Program  Progress Note - House Team       Patient:  Jeramie Guillermo 43 y.o. male   MRN: [id number]       Date of Service: 9/7/2021    CC: SOB and chest pain  Overnight events: none    Subjective     Patient was seen and examined this morning at bedside in no acute distress. NP chest pain or SOB complained. Patient said he had a history of pericardial effusion from hydralazine and since then he stopped taking hdralazine      Objective     Vitals: BP (!) 161/99   Pulse 93   Temp 98 °F (36.7 °C) (Temporal)   Resp 16   Wt 110 lb 14.3 oz (50.3 kg)   SpO2 96%   BMI 17.90 kg/m²   Net IO Since Admission: -4,020 mL [09/07/21 0713]      General appearance:   Skin:  No rashes or lesions  HEENT: Head: Normocephalic, no lesions, without obvious abnormality. Neck: no adenopathy, no carotid bruit, no JVD, supple, symmetrical, trachea midline and thyroid not enlarged, symmetric, no tenderness/mass/nodules  Lungs: clear to auscultation bilaterally  Heart: regular rate and rhythm, S1, S2 normal, no murmur, click, rub or gallop. 2/6 diastolic murmur appreciated at cardiac apex in the L. 5th intercostal midaxillary region. N  Abdomen: soft, non-tender; bowel sounds normal; no masses,  no organomegaly  Extremities: extremities normal, atraumatic, no cyanosis or edema  Neurologic: Mental status: Alert, oriented, thought content appropriate      Diet:   ADULT DIET; Regular; Low Sodium (2 gm); Low Potassium (Less than 3000 mg/day);  Low Phosphorus (Less than 1000 mg)        Additional Respiratory  Assessments  Pulse: 93  Resp: 16  SpO2: 96 %  Oral Care: Teeth brushed              Pertinent Labs & Imaging Studies   denice  CBC:   Recent Labs     09/04/21  1119 09/04/21  1119 09/04/21  2314 09/04/21  2314 09/06/21  0440 09/06/21  1255 09/07/21  0546   WBC 8.7  --   --   --  7.3  --  6.0   HGB 6.9*   < > 8.3*  --  7.6*  --  8.9*   HCT 22.5*   < > 25.4*   < > 23.5* 26.3* 28.3*   MCV 100.0*  --   --   --  96.3  --  96.6     --   --   --  268  --  347    < > = values in this interval not displayed. BMP:    Recent Labs     09/04/21  1119 09/05/21  0504 09/06/21  0440    139 134   K 4.9 3.8 4.1    97* 94*   CO2 32* 32* 28   BUN 19 16 37*   CREATININE 5.7* 4.5* 8.2*   GLUCOSE 91 90 88       LIVER PROFILE:   Recent Labs     09/04/21  1119   AST 42*   ALT 27   BILITOT 0.5   ALKPHOS 74         Fasting Lipid Panel:    Lab Results   Component Value Date    CHOL 157 10/21/2015    TRIG 82 10/21/2015    HDL 52 10/21/2015       Cardiac Enzymes:    Lab Results   Component Value Date    CKTOTAL 143 03/15/2021    CKTOTAL 166 02/01/2021    CKTOTAL 104 02/18/2016    CKMB 2.2 03/15/2021    CKMB <1.0 02/01/2021    CKMB 1.1 02/25/2016    TROPONINI 0.02 03/15/2021    TROPONINI 0.02 03/15/2021    TROPONINI 0.02 02/17/2021       Imaging Studies:     ECHO LIMITED  Result Date: 8/25/2021  Transthoracic Echocardiography Report (TTE)     Left Ventricle  Left ventricle is normal in size . Moderate left ventricular concentric hypertrophy noted. No regional wall motion abnormalities seen. Normal left ventricular ejection fraction. Ejection fraction is visually estimated at 65%. There is doppler evidence of stage II diastolic dysfunction. Right Ventricle  Normal right ventricular size and function. Left Atrium  The left atrium is severely dilated. Interatrial septum appears intact. Right Atrium  Normal right atrium size. Mitral Valve  Normal mitral leaflet thickness with restricted motion of posterior  leaflet. Moderate posteriorly directed mitral regurgitation. ERO 0.2cm2   Tricuspid Valve  The tricuspid valve appears structurally normal.   Aortic Valve  Structurally normal aortic valve. Pulmonic Valve  The pulmonic valve was not well visualized. Pericardial Effusion  There is a trivial circumferential pericardial effusion noted. Aorta  Aortic root dimension within normal limits. Conclusions     Summary  Left ventricle is normal in size . Moderate left ventricular concentric hypertrophy noted. No regional wall motion abnormalities seen. Normal left ventricular ejection fraction. There is doppler evidence of stage II diastolic dysfunction. The left atrium is severely dilated. Normal mitral leaflet thickness with restricted motion of posterior  leaflet. Moderate posteriorly directed mitral regurgitation. There is a trivial circumferential pericardial effusion noted. XR CHEST (2 VW)    Result Date: 8/9/2021     Redemonstration of interstitial and patchy airspace opacities bilaterally appearing to have increased in right lung base. There is mild blunting of bilateral costophrenic sulci. Mild cardiomegaly. No pneumothorax. Redemonstration of patchy airspace and interstitial opacities bilaterally increasing in right lung base compared to prior from August 3, 2021. CT HEAD WO CONTRAST    Result Date: 8/9/2021  Redemonstration of postsurgical changes related to right craniectomy with redemonstration of metallic surgical material associated with posterior right temporal lobe. Redemonstration of brain parenchymal volume loss at site of surgery. Benign calcifications at level of right and left basal ganglia. No acute intracranial hemorrhage or edema. No abnormal extra-axial fluid collections. No hydrocephalus. Visualized paranasal sinuses and mastoid air cells are clear. 1. Redemonstration of postsurgical changes related to right-sided craniectomy and right-sided aneurysm repair. 2. No acute intracranial hemorrhage or edema. XR CHEST PORTABLE    Result Date: 9/6/2021  Persistent bilateral infiltrates throughout the mid lower aspect of both lungs which are ill-defined vague and scattered but the confluent densities have diminished in the mid lower aspect of the right lung particular since the study of September 4. Heart is enlarged.  There is minimal thickening of the lateral pleural margin but no conspicuous pleural effusions are seen. Some improvement of the infiltrates predominant in the mid lower aspect of the right lung since the study of September 4. XR CHEST PORTABLE    Result Date: 9/5/2021  Lung hyperinflation. Persistent and perhaps slightly improved ill-defined opacities in the right greater than left lungs. No obvious pleural effusion or pneumothorax. Atherosclerotic disease and cardiomegaly. Vascular stent over the left axilla. Osseous and thoracic soft tissue structures demonstrate no acute findings. Persistent and slightly improved bilateral right greater than left ill-defined opacities. Stable atherosclerotic disease and cardiomegaly. Consults:  Nephrology  Pulmonology  Infectious disease      Resident's Assessment and Plan       Mercedes oBo is a 43 y.o. male with PMHx of resistant HTN, ESRD on HD X3 per week, IC s/p craniotomy, anemia of chronic disease (baseline HB 7-8) presented with SOB and chest pain for 1 month. She is currently being  managed for       Chest pain 2/2 ? Chronic pericardia effusion vs CAD? Echo on 08/25/2021 There is a trivial circumferential pericardial effusion    Cardia stress test, NM cardiac stress nuclear imaging ordered and need to follow  Will consult cardiology per stress test result      Hypertensive emergency 2/2 ? ESRD  BP today 161/99  Continue home medications clonidine, labetalol, losartan, nifedipine  Monitor BP regularly  If SBP >165/170 give hydralazine or labetalol as needed      SOB 2/2 Pneumonia vs pulmonary edema   Off to Nasal cannula; currently breathing in room air  WBC 6.0   Procalcitonin 1.32, CRP 4.4   CXR on 09/06/2021 shows Persistent bilateral infiltrates throughout the mid lower aspect of both lungs which are ill-defined vague and scattered but the confluent densities have diminished in the mid lower aspect of the right lung particular   Viral panel negative  H/O aspergillosis on 08/03/2021  Repeat Aspergillus Glacto AG Assay, 1, 3 beta D glucan ordered and need to follow  ID and pulm on board       Normocytic anemia 2/2 CKD vs anemia due to chronic disease  Hb 8.9, MCV 96.6 (on 09/07/2021 )  Iron 33, Ferritin 3608, TIBC 202, sats 16% (on 09/06/021)  Reticulocyte 3.2% (on 09/06/021)  Follow H&H daily  Transfuse PRBC if Hb <7gm /dl (8 if A fib)  Received 1 units of blood since admission; on EPO       CKD stage 4 on HD X 3 per week  Last dialysis yesterday  Cr 5.5, GFR 14 (on.09/07/2021 )  Ca 9.5, Phos 4.5  (09/06/2021)  High sensitivity troponin was 83 on 09/04/2021  Vit D 19 (on 08/20/2021),  (on 01/05/2019*)  Currently on sevelamer for H/P hyperphostaemia  I/O -4.20 L since admission  Monitor I/O   Monitor BMP daily  Nephrology on board      PT/OT evaluation: not indicated  DVT prophylaxis/ GI prophylaxis: none/ protonix  Disposition: home +/- home health / Ania Mayen / [facility] / Titus Marshall MD, PGY-1  Attending physician: Dr. Remington Andersen. Attending Physician Statement:  Jacques Mac M.D., F.A.C.P. I have discussed the case, including pertinent history and exam findings with the resident/NP. I have seen and examined the patient and the key elements of the encounter have been performed by me. I agree with the resident ROS, PMHx, PSHx, meds reviewed and assessment, plan and orders as documented by the resident/NP       Hospital charts reviewed, including other providers notes, relevant labs and imaging. likely volume overload, told sacral edema at HD center  Now feeling better sp HD overnight  Less sob at rest  But hasn't exerting himself, less chest pressure/pain  - -DIDN\"T produce sputum for study   ?pneumonia doubt   Possible volume overload, treated pneumonia in recent past,  Steroids and augmentin  dbout vaccine assoc sob (2 weeks ago shot)  Ongoing sob/recurrent volume overload, last HD on Friday   never treated aspigillosis?  Noted 1.5 ago +testing  -- ordered by dr. Nancy Chapa in psat, ?treat abx  ?allergic- need for steroid, vs NO treat        Infiltrates could be \"pneumonia\"-- ?old treated pneumonia,  ?fungal/aspirgillosis  neg covid - personally looked at cxr- suspicious for pneumonia more so than volume overload-although treating for pulmonar edema/with HD does improve somewhat his sob  Also viral panel-- respiratory panel negative  cxr didn't look like volume overload:  CXR 09/04: Multifocal bilateral pneumonia more prominent within the right lung, Marked cardiomegaly and Trace bilateral pleural effusions   pulmonary consult- fungital repeat, ?glactomanin also- ID consult  Bronch inpast no PCP no AFB, BAL inc lymphocytes +mucus plugging  +glactominin  Bl spots on imaging- repeat cxr ordered sp HD     Imagaing CT showing improvement (but ?one new spot)  Per pulmonary ID, wthey will follow up with medication/glactomanin if positive on repeat? ?old pneumonia changes    BP still high, clonidine 0.3 change from QD to tid,  add alpha blockr  BP significanly elevated in ER, resumed BP meds + sp HD- bp much better     Hx of chest pain, NOT reproducible now- pleuritic like when present, +CROUCH  ESRD, kidneys failed uncertain etiology  But CAD risk bc of ESRD  We noted Diastolic murmer? Echo recently +Diastolic dysfunction   Repeat echo OK       Chest pain/tightness \"elephant sitting on chest\" seems related to volume overload HD and right after HD sessions  Last stress test 2019--Achieved 89% of THR and 13.3 METS, DTS 10,  without chest pain; No ischemia on ECG. Hypertensive response.    But will update    -stress test - pending-- if negative plan to DC home       Echo didn't show pericardial effusion-NOpericarditis  bp high- reasssess sp HD  +CROUCH  Anemia- possibly due to not getting EPO at HD center?  likley ACD  Ordered one unit - defer GI workup    Anemia stable,  no further anemia workup needed  Was Getting IV iron though at HD center per his report  Ferritin 21,000-- dobut still needs IV iron- luis stop  epo ordered August.- just started?- will need more EPO     >[id number] DC time, complicated case  >90% of time spent coordinating care with other providers and/or counseling patient/family  Remainder of medical problems as per resident note.

## 2021-09-08 LAB
ANION GAP SERPL CALCULATED.3IONS-SCNC: 13 MMOL/L (ref 7–16)
BASOPHILS ABSOLUTE: 0.04 E9/L (ref 0–0.2)
BASOPHILS RELATIVE PERCENT: 0.7 % (ref 0–2)
BUN BLDV-MCNC: 34 MG/DL (ref 6–20)
CALCIUM SERPL-MCNC: 9.2 MG/DL (ref 8.6–10.2)
CHLORIDE BLD-SCNC: 95 MMOL/L (ref 98–107)
CO2: 27 MMOL/L (ref 22–29)
CREAT SERPL-MCNC: 8.5 MG/DL (ref 0.7–1.2)
EOSINOPHILS ABSOLUTE: 0.28 E9/L (ref 0.05–0.5)
EOSINOPHILS RELATIVE PERCENT: 4.7 % (ref 0–6)
GFR AFRICAN AMERICAN: 9
GFR NON-AFRICAN AMERICAN: 9 ML/MIN/1.73
GLUCOSE BLD-MCNC: 81 MG/DL (ref 74–99)
HCT VFR BLD CALC: 24.1 % (ref 37–54)
HEMOGLOBIN: 8.1 G/DL (ref 12.5–16.5)
IMMATURE GRANULOCYTES #: 0.04 E9/L
IMMATURE GRANULOCYTES %: 0.7 % (ref 0–5)
LYMPHOCYTES ABSOLUTE: 1.61 E9/L (ref 1.5–4)
LYMPHOCYTES RELATIVE PERCENT: 26.7 % (ref 20–42)
MCH RBC QN AUTO: 31.8 PG (ref 26–35)
MCHC RBC AUTO-ENTMCNC: 33.6 % (ref 32–34.5)
MCV RBC AUTO: 94.5 FL (ref 80–99.9)
MONOCYTES ABSOLUTE: 0.72 E9/L (ref 0.1–0.95)
MONOCYTES RELATIVE PERCENT: 12 % (ref 2–12)
NEUTROPHILS ABSOLUTE: 3.33 E9/L (ref 1.8–7.3)
NEUTROPHILS RELATIVE PERCENT: 55.2 % (ref 43–80)
PDW BLD-RTO: 17.2 FL (ref 11.5–15)
PLATELET # BLD: 313 E9/L (ref 130–450)
PMV BLD AUTO: 8.2 FL (ref 7–12)
POTASSIUM SERPL-SCNC: 3.7 MMOL/L (ref 3.5–5)
RBC # BLD: 2.55 E12/L (ref 3.8–5.8)
SODIUM BLD-SCNC: 135 MMOL/L (ref 132–146)
WBC # BLD: 6 E9/L (ref 4.5–11.5)

## 2021-09-08 PROCEDURE — 6370000000 HC RX 637 (ALT 250 FOR IP): Performed by: INTERNAL MEDICINE

## 2021-09-08 PROCEDURE — 99254 IP/OBS CNSLTJ NEW/EST MOD 60: CPT | Performed by: INTERNAL MEDICINE

## 2021-09-08 PROCEDURE — 36415 COLL VENOUS BLD VENIPUNCTURE: CPT

## 2021-09-08 PROCEDURE — 90935 HEMODIALYSIS ONE EVALUATION: CPT | Performed by: INTERNAL MEDICINE

## 2021-09-08 PROCEDURE — 99232 SBSQ HOSP IP/OBS MODERATE 35: CPT | Performed by: INTERNAL MEDICINE

## 2021-09-08 PROCEDURE — 2140000000 HC CCU INTERMEDIATE R&B

## 2021-09-08 PROCEDURE — 85025 COMPLETE CBC W/AUTO DIFF WBC: CPT

## 2021-09-08 PROCEDURE — 6360000002 HC RX W HCPCS: Performed by: INTERNAL MEDICINE

## 2021-09-08 PROCEDURE — 80048 BASIC METABOLIC PNL TOTAL CA: CPT

## 2021-09-08 PROCEDURE — 2580000003 HC RX 258: Performed by: INTERNAL MEDICINE

## 2021-09-08 RX ORDER — HYDRALAZINE HYDROCHLORIDE 50 MG/1
50 TABLET, FILM COATED ORAL EVERY 8 HOURS SCHEDULED
Status: DISCONTINUED | OUTPATIENT
Start: 2021-09-08 | End: 2021-09-09

## 2021-09-08 RX ORDER — HEPARIN SODIUM 10000 [USP'U]/ML
5000 INJECTION, SOLUTION INTRAVENOUS; SUBCUTANEOUS EVERY 8 HOURS
Status: DISCONTINUED | OUTPATIENT
Start: 2021-09-08 | End: 2021-09-10 | Stop reason: HOSPADM

## 2021-09-08 RX ADMIN — HEPARIN SODIUM 5000 UNITS: 10000 INJECTION INTRAVENOUS; SUBCUTANEOUS at 14:41

## 2021-09-08 RX ADMIN — NIFEDIPINE 90 MG: 30 TABLET, EXTENDED RELEASE ORAL at 11:36

## 2021-09-08 RX ADMIN — CLONIDINE HYDROCHLORIDE 0.3 MG: 0.1 TABLET ORAL at 14:41

## 2021-09-08 RX ADMIN — DIPHENHYDRAMINE HYDROCHLORIDE 25 MG: 50 INJECTION, SOLUTION INTRAMUSCULAR; INTRAVENOUS at 06:28

## 2021-09-08 RX ADMIN — DIPHENHYDRAMINE HYDROCHLORIDE 25 MG: 50 INJECTION, SOLUTION INTRAMUSCULAR; INTRAVENOUS at 21:04

## 2021-09-08 RX ADMIN — CLONIDINE HYDROCHLORIDE 0.3 MG: 0.1 TABLET ORAL at 21:05

## 2021-09-08 RX ADMIN — HYDRALAZINE HYDROCHLORIDE 50 MG: 50 TABLET, FILM COATED ORAL at 21:04

## 2021-09-08 RX ADMIN — LABETALOL HYDROCHLORIDE 300 MG: 200 TABLET, FILM COATED ORAL at 11:37

## 2021-09-08 RX ADMIN — SEVELAMER CARBONATE 800 MG: 800 TABLET, FILM COATED ORAL at 16:30

## 2021-09-08 RX ADMIN — SEVELAMER CARBONATE 800 MG: 800 TABLET, FILM COATED ORAL at 11:36

## 2021-09-08 RX ADMIN — Medication 10 ML: at 21:11

## 2021-09-08 RX ADMIN — DIPHENHYDRAMINE HYDROCHLORIDE 25 MG: 50 INJECTION, SOLUTION INTRAMUSCULAR; INTRAVENOUS at 14:41

## 2021-09-08 RX ADMIN — CLONIDINE HYDROCHLORIDE 0.3 MG: 0.1 TABLET ORAL at 08:59

## 2021-09-08 RX ADMIN — LABETALOL HYDROCHLORIDE 300 MG: 200 TABLET, FILM COATED ORAL at 21:06

## 2021-09-08 RX ADMIN — ACETAMINOPHEN 650 MG: 325 TABLET ORAL at 21:05

## 2021-09-08 RX ADMIN — LOSARTAN POTASSIUM 100 MG: 50 TABLET, FILM COATED ORAL at 23:47

## 2021-09-08 RX ADMIN — DIPHENHYDRAMINE HYDROCHLORIDE 25 MG: 50 INJECTION, SOLUTION INTRAMUSCULAR; INTRAVENOUS at 01:55

## 2021-09-08 ASSESSMENT — PAIN SCALES - GENERAL
PAINLEVEL_OUTOF10: 0
PAINLEVEL_OUTOF10: 9
PAINLEVEL_OUTOF10: 0

## 2021-09-08 NOTE — PROGRESS NOTES
[facility]  Internal Medicine Residency Program  Progress Note - House Team     Patient:  Dedrick Self 43 y.o. male MRN: [id number]     Date of Service: 2021     CC: CP and SOB  Overnight events: none     Subjective     Patient was seen and examined this morning at bedside in no acute distress. He had no acute events overnight. Patient denies any CP, palpitations, SOB or abdominal pain. Patients BP still uncontrolled this am. Patient receiving dialysis at time of interview. Objective     Physical Exam:  TEMPERATURE:  Current - Temp: 98.6 °F (37 °C); Max - Temp  Av.6 °F (37 °C)  Min: 98.6 °F (37 °C)  Max: 98.6 °F (37 °C)  RESPIRATIONS RANGE: Resp  Av  Min: 16  Max: 16  PULSE RANGE: Pulse  Av  Min: 85  Max: 95  BLOOD PRESSURE RANGE:  Systolic (68OBA), OMQ:680 , Min:146 , OTY:616   ; Diastolic (57WOD), REMA:444, Min:92, Max:112    PULSE OXIMETRY RANGE: SpO2  Av %  Min: 100 %  Max: 100 %    I & O - 24hr:    Intake/Output Summary (Last 24 hours) at 2021 0715  Last data filed at 2021  Gross per 24 hour   Intake 240 ml   Output --   Net 240 ml     I/O last 3 completed shifts: In: 240 [P.O.:240]  Out: -  No intake/output data recorded. Weight change:     Physical Exam  Constitutional:       General: He is not in acute distress. Appearance: He is well-developed. He is not diaphoretic. HENT:      Head: Normocephalic and atraumatic. Eyes:      Pupils: Pupils are equal, round, and reactive to light. Neck:      Thyroid: No thyromegaly. Vascular: No JVD. Trachea: No tracheal deviation. Cardiovascular:      Rate and Rhythm: Normal rate and regular rhythm. Heart sounds: Murmur heard. No friction rub. No gallop. Pulmonary:      Effort: Pulmonary effort is normal. No respiratory distress. Breath sounds: Wheezing present. No rales. Abdominal:      General: Bowel sounds are normal. There is no distension.       Palpations: Abdomen is soft. There is no mass. Tenderness: There is no abdominal tenderness. There is no guarding or rebound. Musculoskeletal:         General: Normal range of motion. Skin:     General: Skin is warm and dry. Capillary Refill: Capillary refill takes less than 2 seconds. Coloration: Skin is not pale. Findings: No rash. Neurological:      Mental Status: He is alert and oriented to person, place, and time. Cranial Nerves: No cranial nerve deficit. Psychiatric:         Behavior: Behavior normal.         Thought Content: Thought content normal.         Judgment: Judgment normal.       Subject  Pertinent Labs & Imaging Studies   denice  CBC:   Recent Labs     09/06/21  0440 09/06/21  1255 09/07/21  0546   WBC 7.3  --  6.0   HGB 7.6*  --  8.9*   HCT 23.5* 26.3* 28.3*   MCV 96.3  --  96.6     --  347       BMP:    Recent Labs     09/06/21  0440 09/07/21  0546    139   K 4.1 3.5   CL 94* 96*   CO2 28 26   BUN 37* 24*   CREATININE 8.2* 5.5*   GLUCOSE 88 90       LIVER PROFILE:   No results for input(s): AST, ALT, LIPASE, BILIDIR, BILITOT, ALKPHOS in the last 72 hours. Invalid input(s): AMYLASE,  ALB    PT/INR:   No results for input(s): PROTIME, INR in the last 72 hours. APTT:   No results for input(s): APTT in the last 72 hours. Fasting Lipid Panel:    Lab Results   Component Value Date    CHOL 157 10/21/2015    TRIG 82 10/21/2015    HDL 52 10/21/2015       Notable Cultures:      Blood cultures   Blood Culture, Routine   Date Value Ref Range Status   08/09/2021 5 Days no growth  Final     Respiratory cultures No results found for: RESPCULTURE No results found for: LABGRAM  Urine No results found for: LABURIN  Legionella No results found for: LABLEGI  C Diff PCR No results found for: CDIFPCR  Wound culture/abscess: No results for input(s): WNDABS in the last 72 hours. Tip culture:No results for input(s): CXCATHTIP in the last 72 hours.     ECHO LIMITED    Result Date: 8/25/2021  Transthoracic Echocardiography Report (TTE)  Demographics   Patient Name       Gayle Cunha Gender               Male   Medical Record     [id number]     Room Number  Number   Account #          [de-identified]    Procedure Date       08/25/2021   Corporate ID                    Ordering Physician   Leslie Bray MD   Accession Number   [id number]   Referring Physician  Leslie Briseno   Date of Birth      1985   Ruben Etienne   Age                43 year(s)   Interpreting         Leslie Bray MD                                  Physician                                   Any Other  Procedure Type of Study   TTE procedure:Echo Limited Study. Procedure Date Date: 08/25/2021 Start: 02:21 PM Study Location: Echo Lab Technical Quality: Good visualization Indications:Chest pain, Hypertension and Pericardial effusion. Patient Status: Routine Height: 66 inches Weight: 115 pounds BSA: 1.58 m^2 BMI: 18.56 kg/m^2 BP: 122/62 mmHg Allergies   - Other drug:(tylenol). Findings   Left Ventricle  Left ventricle is normal in size . Moderate left ventricular concentric hypertrophy noted. No regional wall motion abnormalities seen. Normal left ventricular ejection fraction. Ejection fraction is visually estimated at 65%. There is doppler evidence of stage II diastolic dysfunction. Right Ventricle  Normal right ventricular size and function. Left Atrium  The left atrium is severely dilated. Interatrial septum appears intact. Right Atrium  Normal right atrium size. Mitral Valve  Normal mitral leaflet thickness with restricted motion of posterior  leaflet. Moderate posteriorly directed mitral regurgitation. ERO 0.2cm2   Tricuspid Valve  The tricuspid valve appears structurally normal.   Aortic Valve  Structurally normal aortic valve.    Pulmonic Valve  The pulmonic valve was not well visualized. Pericardial Effusion  There is a trivial circumferential pericardial effusion noted. Aorta  Aortic root dimension within normal limits. Conclusions   Summary  Left ventricle is normal in size . Moderate left ventricular concentric hypertrophy noted. No regional wall motion abnormalities seen. Normal left ventricular ejection fraction. There is doppler evidence of stage II diastolic dysfunction. The left atrium is severely dilated. Normal mitral leaflet thickness with restricted motion of posterior  leaflet. Moderate posteriorly directed mitral regurgitation. There is a trivial circumferential pericardial effusion noted.    Signature   ----------------------------------------------------------------  Electronically signed by Darrell Martinez MD(Interpreting  physician) on 08/25/2021 03:36 PM  ----------------------------------------------------------------  M-Mode/2D Measurements & Calculations   LV Diastolic      LV Systolic Dimension: 3.4 cm  Dimension: 5.5 cm LV Volume Diastolic: 602.3 ml  LV CE:44.1 %      LV Volume Systolic: 91.3 ml  LV PW Diastolic:  LV EDV/LV EDV Index: 148.2 ml/94  1.5 cm            ml/m^2LV ESV/LV ESV Index: 98.2  Septum Diastolic: SH/10HL/ m^2  1.5 cm            EF Calculated: 67.1 %                LA volume/Index:                    LV Mass Index: 236 l/min*m^2         101.4 ml  LV Mass: 373.13 g  Doppler Measurements & Calculations   MV Peak E-Wave: 1.53 m/s  MV Peak A-Wave: 0.62 m/s                             Estimated RVSP: 49.8  MV E/A Ratio: 2.46                                   mmHg  MV Peak Gradient: 9.5 mmHg                           Estimated RAP:8 mmHg  MV Mean Gradient: 2.8 mmHg  MV Mean Velocity: 0.71 m/s          Estimated PASP:  MV Deceleration Time: 127.7 msec    49.81 mmHg       TR Velocity:3.23 m/s  MV P1/2t: 60.7 msec                                  TR Gradient:41.81  MVA by PHT:3.62 cm^2 mmHg   MR Velocity: 4.9 m/s  MV EILZA PISA: 0.21 cm^2  MR VTI: 149.8 cm  Alias Velocity: 0.25 m/sPISA  Radius: 0.8 cm   PISA area: 4.02 cm^2MR flow rate:  102.11 ml/sMR volume:31.46 ml  [email or web address] [id number]    XR CHEST (2 VW)    Result Date: 8/9/2021  EXAMINATION: TWO XRAY VIEWS OF THE CHEST 8/9/2021 1:21 pm COMPARISON: August 3, 2021 HISTORY: ORDERING SYSTEM PROVIDED HISTORY: chest pain TECHNOLOGIST PROVIDED HISTORY: Reason for exam:->chest pain What reading provider will be dictating this exam?->CRC FINDINGS: Redemonstration of interstitial and patchy airspace opacities bilaterally appearing to have increased in right lung base. There is mild blunting of bilateral costophrenic sulci. Mild cardiomegaly. No pneumothorax. Redemonstration of patchy airspace and interstitial opacities bilaterally increasing in right lung base compared to prior from August 3, 2021. CT HEAD WO CONTRAST    Result Date: 8/9/2021  EXAMINATION: CT OF THE HEAD WITHOUT CONTRAST  8/9/2021 1:51 pm TECHNIQUE: CT of the head was performed without the administration of intravenous contrast. Dose modulation, iterative reconstruction, and/or weight based adjustment of the mA/kV was utilized to reduce the radiation dose to as low as reasonably achievable. COMPARISON: 01/20/2020 HISTORY: ORDERING SYSTEM PROVIDED HISTORY: CVA TECHNOLOGIST PROVIDED HISTORY: Has a \"code stroke\" or \"stroke alert\" been called? ->Yes Reason for exam:->CVA Decision Support Exception - unselect if not a suspected or confirmed emergency medical condition->Emergency Medical Condition (MA) What reading provider will be dictating this exam?->CRC FINDINGS: Redemonstration of postsurgical changes related to right craniectomy with redemonstration of metallic surgical material associated with posterior right temporal lobe.   Redemonstration of brain parenchymal volume loss at site of surgery. Benign calcifications at level of right and left basal ganglia. No acute intracranial hemorrhage or edema. No abnormal extra-axial fluid collections. No hydrocephalus. Visualized paranasal sinuses and mastoid air cells are clear. 1. Redemonstration of postsurgical changes related to right-sided craniectomy and right-sided aneurysm repair. 2. No acute intracranial hemorrhage or edema. CT CHEST WO CONTRAST    Result Date: 9/7/2021  EXAMINATION: CT OF THE CHEST WITHOUT CONTRAST 9/7/2021 7:40 am TECHNIQUE: CT of the chest was performed without the administration of intravenous contrast. Multiplanar reformatted images are provided for review. Dose modulation, iterative reconstruction, and/or weight based adjustment of the mA/kV was utilized to reduce the radiation dose to as low as reasonably achievable. COMPARISON: Portable chest dated 09/06/2021 and CT dated 07/31/2021 HISTORY: ORDERING SYSTEM PROVIDED HISTORY: reassess right lower lobe consolidation vs that from 07/2021. TECHNOLOGIST PROVIDED HISTORY: Reason for exam:->reassess right lower lobe consolidation vs that from 07/2021. What reading provider will be dictating this exam?->CRC FINDINGS: Exam is limited without intravenous contrast.  Moderately severe cardiomegaly is similar to previous. There is also persistence of a small pericardial effusion similar to previous. Small right pleural effusion. No left pleural effusion. Central pulmonary arteries are enlarged which may be related to pulmonary arterial hypertension. Multiple areas of adenopathy are present in the bilateral axillary regions and the bilateral mediastinum. These findings are similar to previous. There are some apparent varicosities also present within the left axilla similar to previous. Visualized portions of the upper abdomen demonstrate atrophic appearance of the kidneys with multiple low-attenuation lesions likely representing cysts.  No pneumothorax. Previously seen consolidative process in the right lower lobe has significantly improved with some minimal residual ill-defined opacities which may represent atelectasis and or residual infiltrates from pneumonia. There also a few scattered ground-glass opacities remaining in the right lower lobe but also significantly improved. There are patchy ground-glass opacities within the right upper lobe, some of which have improved and some are new. Improved aeration of the right middle lobe with some residual linear atelectasis. Areas of scarring and or atelectasis within the left lower lobe appear relatively similar to previous. There also scattered areas of residual scarring or atelectasis in the left upper lobe with some very minimal residual ground-glass opacification but overall improved from previous. Diffuse increase bony density/sclerosis throughout the visualized osseous structures appears unchanged from previous. This may be related to renal osteodystrophy. Improving aeration of the right lower lobe. Patchy ground-glass opacities in the right upper lobe have increased from previous and may be related to infiltrates from pneumonia. Improving aeration of the right middle lobe and left lung. Small residual right pleural effusion and small pericardial effusion. Areas of adenopathy are similar to previous. These could be reactive or due to metastatic disease/lymphoproliferative process. Correlate clinically. Other chronic appearing findings.      XR CHEST PORTABLE    Result Date: 9/6/2021  EXAMINATION: ONE XRAY VIEW OF THE CHEST 9/6/2021 3:38 pm COMPARISON: August 3 - September 5 and CT chest July 31, 2021 HISTORY: ORDERING SYSTEM PROVIDED HISTORY: pulm infiltrate TECHNOLOGIST PROVIDED HISTORY: Reason for exam:->pulm infiltrate What reading provider will be dictating this exam?->CRC FINDINGS: Persistent bilateral infiltrates throughout the mid lower aspect of both lungs which are ill-defined vague and scattered but the confluent densities have diminished in the mid lower aspect of the right lung particular since the study of September 4. Heart is enlarged. There is minimal thickening of the lateral pleural margin but no conspicuous pleural effusions are seen. Some improvement of the infiltrates predominant in the mid lower aspect of the right lung since the study of September 4. XR CHEST PORTABLE    Result Date: 9/5/2021  EXAMINATION: ONE XRAY VIEW OF THE CHEST 9/5/2021 12:36 pm COMPARISON: Chest series from September 4, 2021 HISTORY: ORDERING SYSTEM PROVIDED HISTORY: sob TECHNOLOGIST PROVIDED HISTORY: Reason for exam:->sob What reading provider will be dictating this exam?->CRC FINDINGS: Lung hyperinflation. Persistent and perhaps slightly improved ill-defined opacities in the right greater than left lungs. No obvious pleural effusion or pneumothorax. Atherosclerotic disease and cardiomegaly. Vascular stent over the left axilla. Osseous and thoracic soft tissue structures demonstrate no acute findings. Persistent and slightly improved bilateral right greater than left ill-defined opacities. Stable atherosclerotic disease and cardiomegaly. XR CHEST PORTABLE    Result Date: 9/4/2021  EXAMINATION: ONE XRAY VIEW OF THE CHEST 9/4/2021 11:32 am COMPARISON: 08/09/2021 HISTORY: ORDERING SYSTEM PROVIDED HISTORY: chest pain TECHNOLOGIST PROVIDED HISTORY: Reason for exam:->chest pain What reading provider will be dictating this exam?->CRC FINDINGS: The heart is enlarged. Multifocal infiltrates are seen throughout the right lung. Vague left perihilar infiltrates are noted. There are trace bilateral pleural effusions. 1. Multifocal bilateral pneumonia more prominent within the right lung 2. Marked cardiomegaly.  3. Trace bilateral pleural effusions     NM Cardiac Stress Test Nuclear Imaging    Result Date: 9/7/2021  Indication:  Chest Pain Clinical History:   Patient has no known history of coronary artery disease. Procedure:  Pharmacologic stress testing was performed with Regadenoson 0.4 mg for 15 seconds. IMAGING: Myocardial perfusion imaging was performed at rest 30-35 minutes following the intravenous injection of 10.9 mCi of (Tc-Sestamibi) followed by 10 ml of Normal Saline. As per infusion protocol, the patient was injected intravenously with 28.6 mCi of (Tc-Sestamibi) followed by 10 ml of Normal Saline. Gated post-stress tomographic imaging was performed 20-25 minutes after stress. FINDINGS: The overall quality of the study was good. Left ventricular cavity size was noted to be enlarged on both rest and stress studies. Rotational analog analysis demonstrated good. The gated SPECT stress imaging in the short, vertical long, and horizontal long axis demonstrated normal homogeneous tracer distribution throughout the myocardium. The resting images show no change. Gated SPECT left ventricular ejection fraction was calculated to be 45% with normal wall motion and thickening. TID ratio 0.96.     1.  ECG during the infusion did not change. 2.  The myocardial perfusion imaging was normal without ischemia or infarct. 3.  Overall left ventricular systolic function was abnormal without regional wall motion abnormalities. EF 45%. 4.  No transient ischemic dilatation. 5. Intermediate risk general pharmacologic stress test. Thank you for sending your patient to this [employer]. Torey Martin MD, [address] [facility] cardiology. Resident's Assessment and Plan     Assessment and Plan:    1.  Chest pain secondary to chronic pericardial effusion versus coronary disease  a. Echo on 8/25/2021 showed trivial circumferential pericardial effusion  b. Cardiac stress test yesterday showed no chest pain Arrhythmias no EKG changes and no stress induced ischemia  c. NM cardiac stress test nuclear imaging showed an EKG during infusion did not change in myocardial perfusion was normal without ischemia or infarct overall left ventricular systolic function was abnormal without regional wall abnormalities with an EF of 45% and it was noted there is an intermediate risk general pharmacologic stress test  d. Cardiology consulted -discharge planning per cardiology clearance  2. Hypertensive emergency likely secondary to end-stage renal disease  a. This morning 182/111  b. Clonidine was increased to 0.3 3 times daily yesterday  c. Monitor BP closely  d. Continue clonidine/labetalol/losartan/nifedipine  e. Hydralazine and labetalol as needed for systolic above 968  3. Shortness of breath secondary to pneumonia versus pulmonary edema  a. Patient on room air  b. WBC 6.0 this morning  c. CRP 4.4  d. Pro-Ciaran 1.32  e. Pulmonology was consulted and plan to follow-up as outpatient in 4 to 6 weeks and plan to discuss antifungal treatment with ID as outpatient  f. Panel negative  g. Patient has a history of aspergillosis 8/3/2021  h. Repeat Aspergillus galactomannan Ag assay and 1 3 beta D glucan ordered  4. Normocytic anemia secondary to chronic kidney disease versus anemia of chronic disease  a. Hemoglobin this morning 8.1  b. Patient received 1 unit of blood since admission is currently on EPO  c. Continue to monitor H&H daily  5. CKD stage IV on hemodialysis 3 times weekly  a. Patient on dialysis 3 times weekly  b. Currently receiving dialysis   c. Nephrology following  d. Cr 8.5 BUN 34 this am  e. Cont to monitor BMP and strict I&O      PT/OT evaluation: None  DVT prophylaxis/ GI prophylaxis: None/Protonix  Disposition: continue current care    Sophie Dale DO, PhD  PGY-1  Attending physician: Dr. Pan Chang  Attending Physician Statement:  Jacky Sawyer M.D., F.A.C.P.     I have discussed the case, including pertinent history and exam findings with the resident/NP.  I have seen and examined the patient and the key elements of the encounter have been performed by me.  I agree with the resident ERNIE, PMJUSTINx, PSHx, meds reviewed and assessment, plan and orders as documented by the resident/NP [facility] [place], [facility] charts reviewed, including other providers notes, relevant labs and imaging.         likely volume overload, told sacral edema at HD center  Now feeling better sp HD overnight  Less sob at rest  But hasn't exerting himself, less chest pressure/pain  - -DIDN\"T produce sputum for study   ?pneumonia doubt   Possible volume overload, treated pneumonia in recent past,  Steroids and augmentin  dbout vaccine assoc sob (2 weeks ago shot)  Ongoing sob/recurrent volume overload, last HD on Friday   never treated aspigillosis? Noted 1.5 ago +testing  -- ordered by dr. Carolyn Crow in psat, ?treat abx  ?allergic- need for steroid, vs NO treat        Infiltrates could be \"pneumonia\"-- ?old treated pneumonia,  ?fungal/aspirgillosis  neg covid - personally looked at cxr- suspicious for pneumonia more so than volume overload-although treating for pulmonar edema/with HD does improve somewhat his sob  Also viral panel-- respiratory panel negative  · cxr didn't look like volume overload:  CXR 09/04: Multifocal bilateral pneumonia more prominent within the right lung, Marked cardiomegaly and Trace bilateral pleural effusions  ·  pulmonary consult- fungital repeat, ?glactomanin also- ID consult  · Bronch inpast no PCP no AFB, BAL inc lymphocytes +mucus plugging  · +glactominin  Bl spots on imaging- repeat cxr ordered sp HD     Imagaing CT showing improvement (but ?one new spot)  Per pulmonary ID, wthey will follow up with medication/glactomanin if positive on repeat?   ?old pneumonia changes     BP still high, clonidine 0.3 change from QD to tid,  add alpha blockr  BP significanly elevated in ER, resumed BP meds + sp HD- bp much better     Hx of chest pain, NOT reproducible now- pleuritic like when present, +CROUCH  ESRD, kidneys failed uncertain etiology  But CAD risk bc of ESRD  We noted Diastolic murmer?  Echo recently +Diastolic dysfunction   Repeat echo OK   Echo didn't show pericardial effusion-NOpericarditis     Chest pain/tightness \"elephant sitting on chest\" seems related to volume overload HD and right after HD sessions  Last stress test 2019--Achieved 89% of THR and 13.3 METS, DTS 10,  without chest pain; No ischemia on ECG. Hypertensive response. Now sp -repeat stress test - negative     bp high- reasssess sp HD-- today inc labetolol dosing  +CROUCH more stable  Anemia- possibly due to not getting EPO at HD center? [id number] [facility]  This admission sp one unit - defer GI workup     Anemia stable,  no further anemia workup needed  Was Getting IV iron though at HD center per his report  Ferritin 21,000-- dobut still needs IV iron- luis stop  epo ordered August.- just started?- will need more EPO       Noted DC was delayed bc stress test showed changes that needed further cardio opinion  >50% of time spent coordinating care with other providers and/or counseling patient/family  Remainder of medical problems as per resident note.

## 2021-09-08 NOTE — PROGRESS NOTES
Pulmonary Progress Note    Admit Date: 2021                            PCP: Manuel Taylor DO  Active Problems:    Chest pain    Anemia  Resolved Problems:    * No resolved hospital problems. *      Subjective:  Sitting up in be on RA. Denies dyspnea. Feeling a little better. Completed stress test yesterday. Denies breathing issues ore complaints     Medications:   sodium chloride      sodium chloride          heparin (porcine)  5,000 Units SubCUTAneous Q8H    labetalol  300 mg Oral 2 times per day    cloNIDine  0.3 mg Oral TID    epoetin katlyn-epbx  10,000 Units SubCUTAneous Once per day on     diphenhydrAMINE  25 mg IntraVENous Q6H    sodium chloride flush  5-40 mL IntraVENous 2 times per day    losartan  100 mg Oral QPM    NIFEdipine  90 mg Oral Daily    pantoprazole  40 mg Oral Daily    sevelamer  800 mg Oral TID WC    lidocaine  1 patch TransDERmal Daily       Vitals:  VITALS:  BP (!) 180/117   Pulse 78   Temp 98.6 °F (37 °C)   Resp 18   Wt 108 lb 3.9 oz (49.1 kg)   SpO2 100%   BMI 17.47 kg/m²   24HR INTAKE/OUTPUT:      Intake/Output Summary (Last 24 hours) at 2021 1412  Last data filed at 2021 1048  Gross per 24 hour   Intake 780 ml   Output 3300 ml   Net -2520 ml     CURRENT PULSE OXIMETRY:  SpO2: 100 %  24HR PULSE OXIMETRY RANGE:  SpO2  Av %  Min: 100 %  Max: 100 %  CVP:    VENT SETTINGS:   Vent Information  SpO2: 100 %  Additional Respiratory  Assessments  Pulse: 78  Resp: 18  SpO2: 100 %  Oral Care: Teeth brushed      EXAM:  General: No distress. Alert   Eyes: PERRL. + sclera icterus. ENT: No discharge. Pharynx clear. Missing teeth   Neck: Trachea midline. Normal thyroid. Resp: No accessory muscle use. diminished with fine rales to LLL  CV: Regular rate. Regular rhythm. No mumur or rub. ABD: Non-tender. Non-distended. No masses. No organmegaly. Normal bowel sounds. Skin: Warm and dry. No nodule on exposed extremities.  No rash on exposed extremities. Lymph: No cervical LAD. No supraclavicular LAD. Ext: No joint deformity. No clubbing. No cyanosis. No edema  Neuro: Awake. Follows commands ox3     I/O: I/O last 3 completed shifts: In: 480 [P.O.:480]  Out: -   I/O this shift:  In: 300   Out: 3300      Results:  CBC:   Recent Labs     09/06/21  0440 09/06/21  0440 09/06/21  1255 09/07/21  0546 09/08/21  0645   WBC 7.3  --   --  6.0 6.0   HGB 7.6*  --   --  8.9* 8.1*   HCT 23.5*   < > 26.3* 28.3* 24.1*   MCV 96.3  --   --  96.6 94.5     --   --  347 313    < > = values in this interval not displayed. BMP:   Recent Labs     09/06/21  0440 09/07/21  0546 09/08/21  0645    139 135   K 4.1 3.5 3.7   CL 94* 96* 95*   CO2 28 26 27   PHOS 4.5  --   --    BUN 37* 24* 34*   CREATININE 8.2* 5.5* 8.5*     LFT:   No results for input(s): ALKPHOS, ALT, AST, PROT, BILITOT, BILIDIR, LABALBU in the last 72 hours. PT/INR: No results for input(s): PROTIME, INR in the last 72 hours. Procalcitonin:   Recent Labs     09/05/21  1429   PROCAL 1.32*     Cultures:  Results for [facility] Patient (MRN [id number]) as of 9/7/2021 10:21   Ref.  Range 9/4/2021 12:11 9/4/2021 14:09   Influenza A by PCR Latest Ref Range: Not Detected   Not Detected   Influenza B by PCR Latest Ref Range: Not Detected   Not Detected   Adenovirus by PCR Latest Ref Range: Not Detected   Not Detected   Coronavirus 229E by PCR Latest Ref Range: Not Detected   Not Detected   Coronavirus HKU1 by PCR Latest Ref Range: Not Detected   Not Detected   Coronavirus NL63 by PCR Latest Ref Range: Not Detected   Not Detected   Coronavirus OC43 by PCR Latest Ref Range: Not Detected   Not Detected   Human Metapneumovirus by PCR Latest Ref Range: Not Detected   Not Detected   Human Rhinovirus/Enterovirus by PCR Latest Ref Range: Not Detected   Not Detected   Parainfluenza Virus 1 by PCR Latest Ref Range: Not Detected   Not Detected   Parainfluenza Virus 2 by PCR Latest Ref Range: Not Detected   Not Fluorescent stain   Aspergillus Galacto AG Latest Ref Range: Negative  Positive (A)   Aspergillus Galacto Index Unknown 1.89   Fungus Stain Unknown No Fungal elements seen   CULTURE, FUNGUS Unknown Rpt (A)   Pneumocystis DFA Unknown Negative for Pneumocystis carinii          ABG:   No results for input(s): PH, PO2, PCO2, HCO3, BE, O2SAT in the last 72 hours. Films:  Ct chest 9/7:  Improving aeration of the right lower lobe.       Patchy ground-glass opacities in the right upper lobe have increased from   previous and may be related to infiltrates from pneumonia.       Improving aeration of the right middle lobe and left lung.       Small residual right pleural effusion and small pericardial effusion.       Areas of adenopathy are similar to previous. Etha Hippo could be reactive or due   to metastatic disease/lymphoproliferative process.  Correlate clinically.       Other chronic appearing findings. XR CHEST PORTABLE    9/6:  Some improvement of the infiltrates predominant in the mid lower aspect of   the right lung since the study of September 4.     9/6 9/4    Result Date: 9/5/2021  EXAMINATION: ONE XRAY VIEW OF THE CHEST 9/5/2021 12:36 pm COMPARISON: Chest series from September 4, 2021 HISTORY: ORDERING SYSTEM PROVIDED HISTORY: sob TECHNOLOGIST PROVIDED HISTORY: Reason for exam:->sob What reading provider will be dictating this exam?->CRC FINDINGS: Lung hyperinflation. Persistent and perhaps slightly improved ill-defined opacities in the right greater than left lungs. No obvious pleural effusion or pneumothorax. Atherosclerotic disease and cardiomegaly. Vascular stent over the left axilla. Osseous and thoracic soft tissue structures demonstrate no acute findings. Persistent and slightly improved bilateral right greater than left ill-defined opacities. Stable atherosclerotic disease and cardiomegaly. XR CHEST PORTABLE    Result Date: 9/4/2021  EXAMINATION: ONE XRAY VIEW OF THE CHEST 9/4/2021 11:32 am COMPARISON: 08/09/2021 HISTORY: ORDERING SYSTEM PROVIDED HISTORY: chest pain TECHNOLOGIST PROVIDED HISTORY: Reason for exam:->chest pain What reading provider will be dictating this exam?->CRC FINDINGS: The heart is enlarged. Multifocal infiltrates are seen throughout the right lung. Vague left perihilar infiltrates are noted. There are trace bilateral pleural effusions. 1. Multifocal bilateral pneumonia more prominent within the right lung 2. Marked cardiomegaly. 3. Trace bilateral pleural effusions       Assessment:     Assessment:  · Volume overload  · ESRD on HD  · Possible pneumonia   · HTN urgency  · Chronic anemia            Plan:  · Keep POX >90%, o2 as needed currently on room air  ·  procalcitonin 1.32, low grade fever 99.2, RUL persistent infiltrates although cxr improved--ID following   · Ct chest completed and reviewed   · , recent pneumonia treated by ID with zosyn/augmentin  at beginning of august.   Had mucous plugging required s/p bronchosocpy 8/3/2021 with cell count/differential revealing lymphocytes at 67%  - CD4 425. Cytology with benign bronchial cells, squamous cells, pulmonary macrophages.  Pneumocystis carinii negative, AFB negative  · fungal cx are coming back positive for aspergillus +, Dr. Teresa Partida aware   · respiratory panel obtained negative for covid   · Renal following, on HD  · hgb 6.8 in Received one unit PRBC-up to 8.3 --->7.6   ·  completed  cardiac stress test  , EF 45%, and EF 65% via echo--cardiology now consulted   · IS for pulmonary hygiene   · Ok to DC from a pulmonary perspective , Fu in 4-6 weeks         Electronically signed by KENDRA Colon on 9/8/2021 at 2:12 PM      Seen and evaluated, agree with above

## 2021-09-08 NOTE — PROGRESS NOTES
[facility] PROGRESS NOTE      Chief complaint: Follow-up of elevated BAL galactomannan    The patient is a 43 y.o. male with history of heart failure, ESRD on hemodialysis, hypertension, previously admitted in 07/2021 and seen by Dr. Mariano Kanner and Dr. Johnny Oh for right lower lobe pneumonia for which he was given a course of cefuroxime and doxycycline then amoxicillin-clavulanate and had bronchoscopy on 08/03 yielding BAL with positive Aspergillus galactomannan and culture showing oropharyngeal carol, rare growth of Candida dublinensis but no Aspergillus, presented on 09/04 for chest pain. On admission, he was afebrile and hemodynamically stable with no leukocytosis. Respiratory pathogen PCR panel was negative. Chest x-ray showed persistent and slightly improved bilateral right greater than left ill-defined opacities. Repeat chest CT on 09/07 showed improved aeration of the right middle lobe, right lower lobe and left lung compared to that from 07/2021, increased patchy groundglass opacities in the right upper lobe, small residual right pleural effusion and small pericardial effusion. He reports feeling better with less shortness of breath after hemodialysis. Subjective: Patient was seen and examined. No chills, no abdominal pain, no diarrhea, no rash, no itching. He reports feeling better with less shortness of breath. Objective:  BP (!) 180/117   Pulse 78   Temp 98.6 °F (37 °C)   Resp 18   Wt 108 lb 3.9 oz (49.1 kg)   SpO2 100%   BMI 17.47 kg/m²   Constitutional: Alert, not in distress  Respiratory: Clear breath sounds, no crackles, no wheezes  Cardiovascular: Regular rate and rhythm, no murmurs  Gastrointestinal: Bowel sounds present, soft, nontender  Skin: Warm and dry, no active dermatoses  Musculoskeletal: No joint swelling, no joint erythema    Labs, imaging, and medical records/notes were personally reviewed.     Assessment:  Right lower lobe pneumonia, improved  Elevated BAL galactomannan    Recommendations:  Monitor clinically off antibiotics for now. Follow up beta-D-glucan. Follow up with Dr. Karma Fierro after discharge regarding initiation of antifungal treatment. Initiation of antifungal treatment is not emergent at this point and may be discussed further on outpatient follow-up although I have discussed with him the potential duration and complications (including but not limited to liver injury, optic neuritis, neuropathy, osteonecrosis) entailed with antifungal treatment. Continue supportive care.     Thank you for involving me in the care of Peng Jay. I will sign off for now. Please do not hesitate to call for any questions, concerns, or new findings.       Electronically signed by Kashmir Wilkerson MD on 9/8/2021 at 11:04 AM

## 2021-09-08 NOTE — PROGRESS NOTES
Date    WBC 6.0 09/08/2021     Platelets:    Lab Results   Component Value Date     09/08/2021     Hemoglobin/Hematocrit:    Lab Results   Component Value Date    HGB 8.1 09/08/2021    HCT 24.1 09/08/2021     CMP:    Lab Results   Component Value Date     09/08/2021    K 3.7 09/08/2021    K 3.9 08/09/2021    CL 95 09/08/2021    CO2 27 09/08/2021    BUN 34 09/08/2021    CREATININE 8.5 09/08/2021    GFRAA 9 09/08/2021    LABGLOM 9 09/08/2021    GLUCOSE 81 09/08/2021    PROT 6.9 09/04/2021    LABALBU 4.0 09/04/2021    CALCIUM 9.2 09/08/2021    BILITOT 0.5 09/04/2021    ALKPHOS 74 09/04/2021    AST 42 09/04/2021    ALT 27 09/04/2021     Potassium:    Lab Results   Component Value Date    K 3.7 09/08/2021    K 3.9 08/09/2021     BUN/Creatinine:    Lab Results   Component Value Date    BUN 34 09/08/2021    CREATININE 8.5 09/08/2021     Albumin:    Lab Results   Component Value Date    LABALBU 4.0 09/04/2021     IRON:    Lab Results   Component Value Date    IRON 33 09/06/2021     Iron Saturation:  No components found for: PERCENTFE  TIBC:    Lab Results   Component Value Date    TIBC 202 09/06/2021     FERRITIN:    Lab Results   Component Value Date    FERRITIN 3,608 09/06/2021     ECHO LIMITED     Result Date: 8/25/2021  Transthoracic Echocardiography Report (TTE)  Demographics   Patient Name       Cleven Brood Gender               Male   Medical Record     [id number]     Room Number  Number   Account #          [de-identified]    Procedure Date       08/25/2021   Corporate ID                    Ordering Physician   Jonathan Cheek MD   Accession Number   [id number]   Referring Physician  Jonathan Carl   Date of Birth      1985   Sonographer          Noreen Pike   Age                43 year(s)   Interpreting         Jonathan Cheek MD                                  Physician Any Other  Procedure Type of Study   TTE procedure:Echo Limited Study. Procedure Date Date: 08/25/2021 Start: 02:21 PM Study Location: Echo Lab Technical Quality: Good visualization Indications:Chest pain, Hypertension and Pericardial effusion. Patient Status: Routine Height: 66 inches Weight: 115 pounds BSA: 1.58 m^2 BMI: 18.56 kg/m^2 BP: 122/62 mmHg Allergies   - Other drug:(tylenol). Findings   Left Ventricle  Left ventricle is normal in size . Moderate left ventricular concentric hypertrophy noted. No regional wall motion abnormalities seen. Normal left ventricular ejection fraction. Ejection fraction is visually estimated at 65%. There is doppler evidence of stage II diastolic dysfunction. Right Ventricle  Normal right ventricular size and function. Left Atrium  The left atrium is severely dilated. Interatrial septum appears intact. Right Atrium  Normal right atrium size. Mitral Valve  Normal mitral leaflet thickness with restricted motion of posterior  leaflet. Moderate posteriorly directed mitral regurgitation. ERO 0.2cm2   Tricuspid Valve  The tricuspid valve appears structurally normal.   Aortic Valve  Structurally normal aortic valve. Pulmonic Valve  The pulmonic valve was not well visualized. Pericardial Effusion  There is a trivial circumferential pericardial effusion noted. Aorta  Aortic root dimension within normal limits. Conclusions   Summary  Left ventricle is normal in size . Moderate left ventricular concentric hypertrophy noted. No regional wall motion abnormalities seen. Normal left ventricular ejection fraction. There is doppler evidence of stage II diastolic dysfunction. The left atrium is severely dilated. Normal mitral leaflet thickness with restricted motion of posterior  leaflet. Moderate posteriorly directed mitral regurgitation. There is a trivial circumferential pericardial effusion noted.    Signature ----------------------------------------------------------------  Electronically signed by Javad Alvarez MD(Interpreting  physician) on 08/25/2021 03:36 PM  ----------------------------------------------------------------  M-Mode/2D Measurements & Calculations   LV Diastolic      LV Systolic Dimension: 3.4 cm  Dimension: 5.5 cm LV Volume Diastolic: 986.6 ml  LV AU:13.7 %      LV Volume Systolic: 94.5 ml  LV PW Diastolic:  LV EDV/LV EDV Index: 148.2 ml/94  1.5 cm            ml/m^2LV ESV/LV ESV Index: 78.5  Septum Diastolic: NO/16OT/ m^2  1.5 cm            EF Calculated: 67.1 %                LA volume/Index:                    LV Mass Index: 236 l/min*m^2         101.4 ml  LV Mass: 373.13 g  Doppler Measurements & Calculations   MV Peak E-Wave: 1.53 m/s  MV Peak A-Wave: 0.62 m/s                             Estimated RVSP: 49.8  MV E/A Ratio: 2.46                                   mmHg  MV Peak Gradient: 9.5 mmHg                           Estimated RAP:8 mmHg  MV Mean Gradient: 2.8 mmHg  MV Mean Velocity: 0.71 m/s          Estimated PASP:  MV Deceleration Time: 127.7 msec    49.81 mmHg       TR Velocity:3.23 m/s  MV P1/2t: 60.7 msec                                  TR Gradient:41.81  MVA by PHT:3.62 cm^2                                 mmHg   MR Velocity: 4.9 m/s  MV ELIZA PISA: 0.21 cm^2  MR VTI: 149.8 cm  Alias Velocity: 0.25 m/sPISA  Radius: 0.8 cm   PISA area: 4.02 cm^2MR flow rate:  102.11 ml/sMR volume:31.46 ml  [email or web address] [id number]     XR CHEST (2 VW)     Result Date: 8/9/2021  EXAMINATION: TWO XRAY VIEWS OF THE CHEST 8/9/2021 1:21 pm COMPARISON: August 3, 2021 HISTORY: ORDERING SYSTEM PROVIDED HISTORY: chest pain TECHNOLOGIST PROVIDED HISTORY: Reason for exam:->chest pain What reading provider will be dictating this exam?->CRC FINDINGS: Redemonstration of interstitial and patchy airspace opacities bilaterally appearing to have increased in right lung base. There is mild blunting of bilateral costophrenic sulci. Mild cardiomegaly. No pneumothorax.      Redemonstration of patchy airspace and interstitial opacities bilaterally increasing in right lung base compared to prior from August 3, 2021. NM Cardiac Stress Test Nuclear Imaging     Result Date: 9/7/2021  Indication:  Chest Pain Clinical History:   Patient has no known history of coronary artery disease. Procedure:  Pharmacologic stress testing was performed with Regadenoson 0.4 mg for 15 seconds. IMAGING: Myocardial perfusion imaging was performed at rest 30-35 minutes following the intravenous injection of 10.9 mCi of (Tc-Sestamibi) followed by 10 ml of Normal Saline. As per infusion protocol, the patient was injected intravenously with 28.6 mCi of (Tc-Sestamibi) followed by 10 ml of Normal Saline. Gated post-stress tomographic imaging was performed 20-25 minutes after stress. FINDINGS: The overall quality of the study was good. Left ventricular cavity size was noted to be enlarged on both rest and stress studies. Rotational analog analysis demonstrated good. The gated SPECT stress imaging in the short, vertical long, and horizontal long axis demonstrated normal homogeneous tracer distribution throughout the myocardium. The resting images show no change. Gated SPECT left ventricular ejection fraction was calculated to be 45% with normal wall motion and thickening. TID ratio 0.96.      1. ECG during the infusion did not change. 2.  The myocardial perfusion imaging was normal without ischemia or infarct. 3.  Overall left ventricular systolic function was abnormal without regional wall motion abnormalities. EF 45%. 4.  No transient ischemic dilatation. 5. Intermediate risk general pharmacologic stress test.  Resident's Assessment and Plan       1.  Chest pain 2/2 CAD vs pericardial effusion    -Pt noted to have mild pericardial effusion on echo from 8/25, possibly from use of hydralazine   -Stress test EF 45%   -Echo EF 65%   -Stress test yesterday noted no ischemic changes, no angina, no arrhythmias   -Cardiology consulted    2. ESRD on HD   -Dialysis 3x per week   -Has received dialysis for past 11 years   -Pre-dialysis Cr this morning was 8.5 (base = 5.5)   -BUN 34   -3.3 liters removed today via HD     3. Anemia of chronic disease vs renal disease anemia   -Hgb baseline = 7-8   -Hgb is 8.1 today   -Receiving EPO MWF   -Ferritin 3,608   -Iron saturation 16   -TIBC 202    4. Hypertensive urgency/emergency   -BP remains high today, above 492 systolic   -Likely 2/2 renal disease   -Home meds = labetalol, nifedipine, losartan   -In hospital, was receiving labetalol once daily, pt reports he takes it TID at home   -Return to labetalol TID   -Clonidine was increased to 0.3 TID    5.  SOB 2/2 pneumonia vs pulmonary edema   -Notes SOB worsens around when he has HDl   -Using nasal cannula while on HD, otherwise on room air   -RLL consolidation- improved since admission   -Was treated for pneumonia 1 month ago; aspergillus at time   -Respiratory cultures negative   -Elevated BAL galactomannan    -Aspergillus?   -Follow B-d-glucan   -Per ID: follow up/treat as outpatient      Ishan Rocha, M3  Attending physician: Dr. Nithin Rolon

## 2021-09-08 NOTE — FLOWSHEET NOTE
09/08/21 1048   Vital Signs   BP (!) 179/117   Temp 98.6 °F (37 °C)   Pulse 80   Resp 18   Weight 108 lb 3.9 oz (49.1 kg)   Weight Method Bed scale   Percent Weight Change -5.76   Pain Assessment   Pain Assessment 0-10   Pain Level 0   Post-Hemodialysis Assessment   Post-Treatment Procedures Blood returned; Access bleeding time < 10 minutes   Machine Disinfection Process Exterior Machine Disinfection   Rinseback Volume (ml) 300 ml   Total Liters Processed (l/min) 99.1 l/min   Dialyzer Clearance Lightly streaked   Duration of Treatment (minutes) 240 minutes   Heparin amount administered during treatment (units) 0 units   Hemodialysis Intake (ml) 300 ml   Hemodialysis Output (ml) 3300 ml   NET Removed (ml) 3000 ml   Tolerated Treatment Good   Patient Response to Treatment tolerated well, profile B, refill noted   Bilateral Breath Sounds Clear   Edema None

## 2021-09-08 NOTE — CONSULTS
Lizeth Antony  1985  Date of Service: 9/8/2021    Reason for Consultation: We were asked to see Lizeth Antony by Dr. Lawyer Ashly DO  regarding an abnormal stress test.    History of Chief Complaint: This is a 43 y.o. male known to our group through Dr. Tino Reyes. They have a history of hypertension, end-stage renal disease-dialysis dependent. He also has a history of hypothyroidism, anxiety/depression, and noncompliance. 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 Kera Joyce He is somewhat of difficult historian. However, he states that he took a drink of water which he states then made him short of breath and struggle to breathe. He states that it was after that that he developed a sharp pain on the left side of his chest that then bounced back and forth between the left and right side of his chest.  He states that this occurred at rest and lasted for several minutes. He is a difficult historian but he states that it recurred at least once more after that. However, it has not recurred since then. REVIEW OF SYSTEMS:   Heart: as above   Lungs: as above   Eyes: denies changes in vision or discharge. Ears: denies changes in hearing or pain. Nose: denies epistaxis or masses   Throat: denies sore throat or trouble swallowing. Neuro: denies numbness, tingling, tremors. Skin: denies rashes or itching. : denies hematuria, dysuria   GI: denies vomiting, diarrhea   Psych: denies mood changed, anxiety, depression.     CURRENT MEDICATIONS:  Current Facility-Administered Medications   Medication Dose Route Frequency Provider Last Rate Last Admin    heparin (porcine) injection 5,000 Units  5,000 Units SubCUTAneous Q8H Ruperto Castano MD   5,000 Units at 09/08/21 1441    perflutren lipid microspheres (DEFINITY) injection 1.65 mg  1.5 mL IntraVENous ONCE PRN Shannon Westfall, APRN - GIO        labetalol (TYLENOL) tablet 650 mg  650 mg Oral Q4H PRN Yoselin Cespedes MD   650 mg at 09/07/21 1651        ALLERGIES:  Allergies   Allergen Reactions    Tylenol [Acetaminophen] Itching    Levofloxacin Itching     No rash       MEDICAL HISTORY:  Past Medical History:   Diagnosis Date    (HFpEF) heart failure with preserved ejection fraction ([facility]     stage III    Anxiety 9/19/2015    AVF (arteriovenous fistula) ([facility] 4/30/2018    Chronic kidney disease     CKD since early childhood per pt and on HD ~ 11 years    Depression     Encounter regarding vascular access for dialysis for ESRD ([facility] 4/30/2018    History of ruptured Berry aneurysm ([facility] 10/20/2015    Hypertension     on meds for ~ 11 years    Hypothyroidism     Intracranial hemorrhage (HCC)     was d/t ruptured aneurysm - pt underwent neurosurgery for clipping of the aneurysm    Neutropenia ([facility]     Noncompliance w/medication treatment due to intermit use of medication 11/3/2015    Pre-transplant evaluation for kidney transplant 4/5/2017       SURGICAL HISTORY:  Past Surgical History:   Procedure Laterality Date    BRAIN ANEURYSM SURGERY Right 3-4 years ago    Intracranial aneurysm clipping surgery 3-4 years ago, after rupture of aneurysm causing ICH    BRONCHOSCOPY N/A 8/3/2021    BRONCHOSCOPY DIAGNOSTIC OR CELL [address] ONLY performed by Dwain Restrepo MD at [facility] Left 11 years ago    UPPER GASTROINTESTINAL ENDOSCOPY N/A 1/3/2019    EGD BIOPSY performed [facility] Willi Louis MD at [facility] END[facility]COPY       FAMILY HISTORY:  Family History   Problem Relation Age of Onset    Kidney Disease Paternal Grandmother     Cancer Neg Hx     Heart Disease Neg Hx        SOCIAL HISTORY:  Social History     Socioeconomic History    Marital status:      Spouse name: None    Number of children: 4    Years of education: None    Highest education level: High school graduate   Occupational History    None   Tobacco Use bilaterally. No use of accessory muscles. Poor air movement. ABDOMEN:  Soft, non-tender. Normal bowel sounds. EXTREMITIES:  Full ROM x4. No bilateral lower extremity edema. Good distal pulses. EYES:  PERRL, normal lids & conjunctiva. No icterus. ENT: no external masses, no bleeding. Moist mucosa. Normal lips formation. NEURO: Full ROM x 4, EOMI, no tremors. SKIN:  Warm, dry and intact. Normal turgor, no petechia. Phych: alert & oriented x3. Normal  Judgement & insight. Currently not agitated or anxious. EKG: Sinus rhythm, 97 bpm, nl axis, nonspecific ST - T wave changes. Possible pericarditis. Poor R wave progression. Labs:  Recent Labs     09/06/21  0440 09/06/21  0440 09/06/21  1255 09/07/21  0546 09/08/21  0645   WBC 7.3  --   --  6.0 6.0   HGB 7.6*  --   --  8.9* 8.1*   HCT 23.5*   < > 26.3* 28.3* 24.1*   MCV 96.3  --   --  96.6 94.5     --   --  347 313    < > = values in this interval not displayed. Recent Labs     09/06/21  0440 09/07/21  0546 09/08/21  0645    139 135   K 4.1 3.5 3.7   CL 94* 96* 95*   CO2 28 26 27   PHOS 4.5  --   --    BUN 37* 24* 34*   CREATININE 8.2* 5.5* 8.5*   MG 2.7*  --   --      No results for input(s): PROTIME, INR in the last 72 hours. No results for input(s): CKTOTAL, CKMB, CKMBINDEX, TROPONINI in the last 72 hours. No results for input(s): BNP in the last 72 hours. No results for input(s): CHOL, HDL, TRIG in the last 72 hours. Invalid input(s): CHOLHDLR, LDLCALCU  No results for input(s): TROPHS in the last 72 hours. Assessment:   1. Noncardiac chest pain as described above. 2. End-stage renal disease-dialysis dependent. 3. Chronic pericardial effusion. Most likely due to his renal disease. 4. Hypertension. 5. Hypothyroidism  6. Anxiety/depression. 7. Intracranial bleed from a berry aneurysm  8. LVH and stage II diastolic dysfunction due to his hypertension.   9. Moderate mitral regurgitation. Recommendations:  1. Consulted for an abnormal stress test.  The stress test shows no ischemia or myocardial infarction. Ejection fraction is 45%. This is a gated study in this ejection fraction can be normal for this study. 2. A repeat limited echocardiogram has been ordered. 3. Continue treating his end-stage renal disease to help his chronic pericardial effusion. The echo will also look at that. 4. I will add hydralazine but I will defer further hypertension management to nephrology/hypertension. Thank you for allowing me to participate in your patient's care. [address] [facility] [place], [address] [facility]  Interventional Cardiology    Note: This report was completed using computerized voice recognition software. Every effort has been made to ensure accuracy, however; and invert and computerized transcription errors may be present.

## 2021-09-08 NOTE — CARE COORDINATION
9/8/21 Update CM Note: patient remains on telemetry . He remains with elevated bp 182/111. Catapress bid has been added. Patient completed dialysis today. Stress test revealed EF 45%. Cardiology consult pending for today. Plan remains to return home at discharge.  Yasmin Diez RN Cm

## 2021-09-08 NOTE — PROGRESS NOTES
Department of Internal Medicine  Nephrology Attending Progress Note        SUBJECTIVE:  Mr Darlyn Neves continues to complain of some diaphoresis at bedtime, infectious work-up in progress, stress test negative for ischemia, he is using oxygen at times. At home he was taking his labetalol nifedipine and losartan every morning, he was taking his labetalol 3 times daily and this was changed to once daily during his hospitalization.     PMH  ESRD on hemodialysis for 21 yrs  History of intracranial hemorrhage with clipping of aneurysm on the right side  Anemia of chronic kidney disease status post PREETI therapy and recent course of IV iron  Left forearm AV fistula  Secondary hyperparathyroidism of renal origin  Hypertension  Hypothyroidism on replacement therapy  History of pericardial effusion thought to be secondary to hydralazine/ minoxidil which have been stopped  History of heart failure with preserved ejection fraction  Work-up for infiltrative cardiac process such as amyloid negative    Physical Exam:    Vitals:    09/08/21 1200   BP: (!) 180/117   Pulse: 78   Resp: 18   Temp:    SpO2:        I/O last 24 hours:  Intake/Output inaccurate, dialysis removed 3300    Weight: 48 kg postdialysis    General Appearance:  no acute distress  Head: normocephalic, atraumatic  Cardiovascular: regular rate and rhythm, no murmurs, gallops, or rubs  Respiratory:   Decreased breath sounds no wheezing  Gastrointestinal: soft, nontender, nondistended  Ext: no edema good bruit and thrill over left forearm fistula  Neuro: aaox3  Skin: dry, no rash    DATA:    CBC with Differential:    Lab Results   Component Value Date    WBC 6.0 09/08/2021    RBC 2.55 09/08/2021    HGB 8.1 09/08/2021    HCT 24.1 09/08/2021     09/08/2021    MCV 94.5 09/08/2021    MCH 31.8 09/08/2021    MCHC 33.6 09/08/2021    RDW 17.2 09/08/2021    LYMPHOPCT 26.7 09/08/2021    MONOPCT 12.0 09/08/2021    BASOPCT 0.7 09/08/2021    MONOSABS 0.72 09/08/2021    LYMPHSABS 1.61 09/08/2021    EOSABS 0.28 09/08/2021    BASOSABS 0.04 09/08/2021     BMP:    Lab Results   Component Value Date     09/08/2021    K 3.7 09/08/2021    K 3.9 08/09/2021    CL 95 09/08/2021    CO2 27 09/08/2021    BUN 34 09/08/2021    LABALBU 4.0 09/04/2021    CREATININE 8.5 09/08/2021    CALCIUM 9.2 09/08/2021    GFRAA 9 09/08/2021    LABGLOM 9 09/08/2021    GLUCOSE 81 09/08/2021          heparin (porcine)  5,000 Units SubCUTAneous Q8H    labetalol  300 mg Oral 2 times per day    cloNIDine  0.3 mg Oral TID    epoetin katlyn-epbx  10,000 Units SubCUTAneous Once per day on Mon Wed Fri    diphenhydrAMINE  25 mg IntraVENous Q6H    sodium chloride flush  5-40 mL IntraVENous 2 times per day    losartan  100 mg Oral QPM    NIFEdipine  90 mg Oral Daily    pantoprazole  40 mg Oral Daily    sevelamer  800 mg Oral TID WC    lidocaine  1 patch TransDERmal Daily      sodium chloride      sodium chloride       perflutren lipid microspheres, labetalol, sodium chloride, sodium chloride flush, sodium chloride, ondansetron **OR** ondansetron, polyethylene glycol, hydrALAZINE, acetaminophen    IMPRESSION/RECOMMENDATIONS:      End-stage renal disease on Monday Wednesday Friday dialysis schedule, patient losing weight? Anemia continue PREETI therapy will give a dose of Ferrlecit as iron level actually dropped from August  Secondary hyperparathyroid disease  Blood pressure control worse today change labetalol to 300 twice daily  CT suggesting worsening infiltrates right upper lobe , but improvement in right lower lobe, which may be related to fluid removal from dialysis, awaiting decision by ID as to whether to initiate antifungal therapy, pt continues to have nocturnal diaphoresis  Pericardial effusion does appear to have improved with discontinuation of minoxidil and hydralazine,   Increased echo texture?   Work-up has been negative for amyloid may need to schedule cardiac MRI as inpatient as he will need several dialysis treatments if IV gadolinium contrast required.        Lynne Lerner MD  9/8/2021 12:38 PM

## 2021-09-09 VITALS
DIASTOLIC BLOOD PRESSURE: 80 MMHG | HEART RATE: 88 BPM | OXYGEN SATURATION: 99 % | TEMPERATURE: 97.8 F | SYSTOLIC BLOOD PRESSURE: 160 MMHG | HEIGHT: 66 IN | BODY MASS INDEX: 17.49 KG/M2 | WEIGHT: 108.8 LBS | RESPIRATION RATE: 18 BRPM

## 2021-09-09 PROBLEM — R07.9 CHEST PAIN: Status: RESOLVED | Noted: 2019-07-31 | Resolved: 2021-09-09

## 2021-09-09 LAB
(1,3)-BETA-D-GLUCAN (FUNGITELL) INTERPRETATION: POSITIVE
(1,3)-BETA-D-GLUCAN (FUNGITELL): 85 PG/ML
ANION GAP SERPL CALCULATED.3IONS-SCNC: 12 MMOL/L (ref 7–16)
BASOPHILS ABSOLUTE: 0.05 E9/L (ref 0–0.2)
BASOPHILS RELATIVE PERCENT: 0.9 % (ref 0–2)
BUN BLDV-MCNC: 21 MG/DL (ref 6–20)
CALCIUM SERPL-MCNC: 9.5 MG/DL (ref 8.6–10.2)
CHLORIDE BLD-SCNC: 98 MMOL/L (ref 98–107)
CO2: 29 MMOL/L (ref 22–29)
CREAT SERPL-MCNC: 5.5 MG/DL (ref 0.7–1.2)
EOSINOPHILS ABSOLUTE: 0.27 E9/L (ref 0.05–0.5)
EOSINOPHILS RELATIVE PERCENT: 4.7 % (ref 0–6)
GFR AFRICAN AMERICAN: 14
GFR NON-AFRICAN AMERICAN: 14 ML/MIN/1.73
GLUCOSE BLD-MCNC: 88 MG/DL (ref 74–99)
HCT VFR BLD CALC: 30.1 % (ref 37–54)
HEMOGLOBIN: 9.5 G/DL (ref 12.5–16.5)
IMMATURE GRANULOCYTES #: 0.02 E9/L
IMMATURE GRANULOCYTES %: 0.3 % (ref 0–5)
LYMPHOCYTES ABSOLUTE: 1.56 E9/L (ref 1.5–4)
LYMPHOCYTES RELATIVE PERCENT: 27.2 % (ref 20–42)
MCH RBC QN AUTO: 30.4 PG (ref 26–35)
MCHC RBC AUTO-ENTMCNC: 31.6 % (ref 32–34.5)
MCV RBC AUTO: 96.2 FL (ref 80–99.9)
MONOCYTES ABSOLUTE: 0.87 E9/L (ref 0.1–0.95)
MONOCYTES RELATIVE PERCENT: 15.2 % (ref 2–12)
NEUTROPHILS ABSOLUTE: 2.97 E9/L (ref 1.8–7.3)
NEUTROPHILS RELATIVE PERCENT: 51.7 % (ref 43–80)
PDW BLD-RTO: 16.7 FL (ref 11.5–15)
PLATELET # BLD: 378 E9/L (ref 130–450)
PMV BLD AUTO: 8.7 FL (ref 7–12)
POTASSIUM SERPL-SCNC: 4.4 MMOL/L (ref 3.5–5)
RBC # BLD: 3.13 E12/L (ref 3.8–5.8)
SODIUM BLD-SCNC: 139 MMOL/L (ref 132–146)
WBC # BLD: 5.7 E9/L (ref 4.5–11.5)

## 2021-09-09 PROCEDURE — 36415 COLL VENOUS BLD VENIPUNCTURE: CPT

## 2021-09-09 PROCEDURE — 80048 BASIC METABOLIC PNL TOTAL CA: CPT

## 2021-09-09 PROCEDURE — 99231 SBSQ HOSP IP/OBS SF/LOW 25: CPT | Performed by: INTERNAL MEDICINE

## 2021-09-09 PROCEDURE — 2580000003 HC RX 258: Performed by: INTERNAL MEDICINE

## 2021-09-09 PROCEDURE — 6370000000 HC RX 637 (ALT 250 FOR IP): Performed by: INTERNAL MEDICINE

## 2021-09-09 PROCEDURE — 6360000002 HC RX W HCPCS: Performed by: INTERNAL MEDICINE

## 2021-09-09 PROCEDURE — 2700000000 HC OXYGEN THERAPY PER DAY

## 2021-09-09 PROCEDURE — 85025 COMPLETE CBC W/AUTO DIFF WBC: CPT

## 2021-09-09 PROCEDURE — 93308 TTE F-UP OR LMTD: CPT

## 2021-09-09 PROCEDURE — 99238 HOSP IP/OBS DSCHRG MGMT 30/<: CPT | Performed by: INTERNAL MEDICINE

## 2021-09-09 PROCEDURE — 2140000000 HC CCU INTERMEDIATE R&B

## 2021-09-09 RX ORDER — NIFEDIPINE 90 MG/1
90 TABLET, EXTENDED RELEASE ORAL DAILY
Qty: 30 TABLET | Refills: 3 | Status: SHIPPED | OUTPATIENT
Start: 2021-09-09 | End: 2022-01-22

## 2021-09-09 RX ORDER — LABETALOL 300 MG/1
300 TABLET, FILM COATED ORAL 2 TIMES DAILY
Qty: 60 TABLET | Refills: 3 | Status: SHIPPED | OUTPATIENT
Start: 2021-09-09 | End: 2022-03-01 | Stop reason: SDUPTHER

## 2021-09-09 RX ADMIN — SEVELAMER CARBONATE 800 MG: 800 TABLET, FILM COATED ORAL at 08:20

## 2021-09-09 RX ADMIN — CLONIDINE HYDROCHLORIDE 0.3 MG: 0.1 TABLET ORAL at 13:46

## 2021-09-09 RX ADMIN — HEPARIN SODIUM 5000 UNITS: 10000 INJECTION INTRAVENOUS; SUBCUTANEOUS at 08:21

## 2021-09-09 RX ADMIN — DIPHENHYDRAMINE HYDROCHLORIDE 25 MG: 50 INJECTION, SOLUTION INTRAMUSCULAR; INTRAVENOUS at 11:55

## 2021-09-09 RX ADMIN — SODIUM CHLORIDE, PRESERVATIVE FREE 10 ML: 5 INJECTION INTRAVENOUS at 05:25

## 2021-09-09 RX ADMIN — LABETALOL HYDROCHLORIDE 300 MG: 200 TABLET, FILM COATED ORAL at 08:21

## 2021-09-09 RX ADMIN — SEVELAMER CARBONATE 800 MG: 800 TABLET, FILM COATED ORAL at 11:55

## 2021-09-09 RX ADMIN — PANTOPRAZOLE SODIUM 40 MG: 40 TABLET, DELAYED RELEASE ORAL at 08:21

## 2021-09-09 RX ADMIN — CLONIDINE HYDROCHLORIDE 0.3 MG: 0.1 TABLET ORAL at 08:21

## 2021-09-09 RX ADMIN — Medication 10 ML: at 08:27

## 2021-09-09 RX ADMIN — DIPHENHYDRAMINE HYDROCHLORIDE 25 MG: 50 INJECTION, SOLUTION INTRAMUSCULAR; INTRAVENOUS at 05:41

## 2021-09-09 RX ADMIN — HYDRALAZINE HYDROCHLORIDE 50 MG: 50 TABLET, FILM COATED ORAL at 05:12

## 2021-09-09 RX ADMIN — NIFEDIPINE 90 MG: 30 TABLET, EXTENDED RELEASE ORAL at 08:20

## 2021-09-09 ASSESSMENT — PAIN SCALES - GENERAL: PAINLEVEL_OUTOF10: 0

## 2021-09-09 NOTE — CARE COORDINATION
9/9/21 Update CM Note: transportation set up thru [facility] commuity plan. Spoke with Azul at [phone] and time of transport arrival will be a 3 hour window from 2:20pm today. [address] number: [id number]. Will call the floor on arrival to the [facility].  Javad Jorgensen RN Cm

## 2021-09-09 NOTE — PROGRESS NOTES
Department of Internal Medicine  Nephrology Attending Progress Note        SUBJECTIVE: Mr Tony Webb is feeling better, his blood pressures are better . He underwent 2D echo .   Discussed with Dr. Rico Patel he does not think he will need MRI with gadolinium at this time     PMH  ESRD on hemodialysis for 21 yrs  History of intracranial hemorrhage with clipping of aneurysm on the right side  Anemia of chronic kidney disease status post PREETI therapy and recent course of IV iron  Left forearm AV fistula  Secondary hyperparathyroidism of renal origin  Hypertension  Hypothyroidism on replacement therapy  History of pericardial effusion thought to be secondary to hydralazine/ minoxidil which have been stopped  History of heart failure with preserved ejection fraction  Work-up for infiltrative cardiac process such as amyloid negative    physical Exam:    Vitals:    09/09/21 0815   BP: 112/73   Pulse: 86   Resp: 18   Temp: 97.3 °F (36.3 °C)   SpO2: 99%       I/O last 24 hours:  Intake/Output 840/3300    Weight: 108    General Appearance:  no acute distress  Head: normocephalic, atraumatic  Cardiovascular: regular rate and rhythm, no murmurs, gallops, or rubs  Respiratory:   Decreased breath sounds no wheezing  Gastrointestinal: soft, nontender, nondistended  Ext: no edema good bruit and thrill over left forearm fistula  Neuro: aaox3  Skin: dry, no rash    DATA:    CBC with Differential:    Lab Results   Component Value Date    WBC 5.7 09/09/2021    RBC 3.13 09/09/2021    HGB 9.5 09/09/2021    HCT 30.1 09/09/2021     09/09/2021    MCV 96.2 09/09/2021    MCH 30.4 09/09/2021    MCHC 31.6 09/09/2021    RDW 16.7 09/09/2021    LYMPHOPCT 27.2 09/09/2021    MONOPCT 15.2 09/09/2021    BASOPCT 0.9 09/09/2021    MONOSABS 0.87 09/09/2021    LYMPHSABS 1.56 09/09/2021    EOSABS 0.27 09/09/2021    BASOSABS 0.05 09/09/2021     BMP:    Lab Results   Component Value Date     09/09/2021    K 4.4 09/09/2021    K 3.9 08/09/2021    CL 98 09/09/2021    CO2 29 09/09/2021    BUN 21 09/09/2021    LABALBU 4.0 09/04/2021    CREATININE 5.5 09/09/2021    CALCIUM 9.5 09/09/2021    GFRAA 14 09/09/2021    LABGLOM 14 09/09/2021    GLUCOSE 88 09/09/2021     Magnesium:    Lab Results   Component Value Date    MG 2.7 09/06/2021     Phosphorus:    Lab Results   Component Value Date    PHOS 4.5 09/06/2021          heparin (porcine)  5,000 Units SubCUTAneous Q8H    labetalol  300 mg Oral 2 times per day    cloNIDine  0.3 mg Oral TID    epoetin katlyn-epbx  10,000 Units SubCUTAneous Once per day on Mon Wed Fri    diphenhydrAMINE  25 mg IntraVENous Q6H    sodium chloride flush  5-40 mL IntraVENous 2 times per day    losartan  100 mg Oral QPM    NIFEdipine  90 mg Oral Daily    pantoprazole  40 mg Oral Daily    sevelamer  800 mg Oral TID WC    lidocaine  1 patch TransDERmal Daily      sodium chloride      sodium chloride       perflutren lipid microspheres, labetalol, sodium chloride, sodium chloride flush, sodium chloride, ondansetron **OR** ondansetron, polyethylene glycol, hydrALAZINE, acetaminophen    IMPRESSION/RECOMMENDATIONS:      End-stage renal disease on Monday Wednesday Friday dialysis schedule, patient losing weight? Will need to verify wt  Anemia continue PREETI therapy will give a dose of Ferrlecit as iron level actually dropped from August  Secondary hyperparathyroid disease  Blood pressure control improving  CT suggesting worsening infiltrates right upper lobe , but improvement in right lower lobe, which may be related to fluid removal from dialysis, awaiting decision by ID as to whether to initiate antifungal therapy,   Pericardial effusion does appear to have improved with discontinuation of minoxidil and hydralazine,   Increased echo texture?   Work-up has been negative for amyloid       Carlota Romano MD  9/9/2021 12:17 PM

## 2021-09-09 NOTE — PLAN OF CARE
Problem: Pain:  Goal: Control of acute pain  Description: Control of acute pain  Outcome: Met This Shift     Problem:  Activity:  Goal: Ability to tolerate increased activity will improve  Description: Ability to tolerate increased activity will improve  Outcome: Ongoing     Problem: Nutritional:  Goal: Maintenance of adequate nutrition will improve  Description: Maintenance of adequate nutrition will improve  Outcome: Ongoing

## 2021-09-09 NOTE — PROGRESS NOTES
[facility]  Internal Medicine Residency Program  Progress Note - House Team       Patient:  Yasmin Rowe 43 y.o. male   MRN: [id number]       Date of Service: 9/9/2021    CC: SOB and chest pain   Overnight events: None    Subjective     Patient was examined in the bed this morning. He had his dialysis yesterday. No new complaint over night. No chest pain or shortness of breath present. Patient denied any fever, chill, nausea, vomiting, diarrhea, constipation. Objective     · Vitals: /83   Pulse 81   Temp 97.2 °F (36.2 °C) (Temporal)   Resp 18   Wt 108 lb 3.9 oz (49.1 kg)   SpO2 100%   BMI 17.47 kg/m²   · Net IO Since Admission: -6,300 mL [09/09/21 0603]      · General appearance: cachectic. · Skin:  No rashes or lesions  · HEENT: Head: Normocephalic, no lesions, without obvious abnormality. · Neck: no adenopathy, no carotid bruit, no JVD, supple, symmetrical, trachea midline and thyroid not enlarged, symmetric, no tenderness/mass/nodules  · Lungs: clear to auscultation bilaterally  · Heart: regular rate and rhythm, S1, S2 normal, no murmur, click, rub or gallop  · Abdomen: soft, non-tender; bowel sounds normal; no masses,  no organomegaly  · Extremities: extremities normal, atraumatic, no cyanosis or edema  · Neurologic: Mental status: Alert, oriented, thought content appropriate      Diet:   ADULT DIET; Regular; Low Sodium (2 gm);  Low Potassium (Less than 3000 mg/day)      Pertinent Labs & Imaging Studies   denice  CBC:   Recent Labs     09/07/21  0546 09/08/21  0645 09/09/21  0443   WBC 6.0 6.0 5.7   HGB 8.9* 8.1* 9.5*   HCT 28.3* 24.1* 30.1*   MCV 96.6 94.5 96.2    313 378       BMP:    Recent Labs     09/07/21  0546 09/08/21  0645    135   K 3.5 3.7   CL 96* 95*   CO2 26 27   BUN 24* 34*   CREATININE 5.5* 8.5*   GLUCOSE 90 81       Fasting Lipid Panel:    Lab Results   Component Value Date    CHOL 157 10/21/2015    TRIG 82 10/21/2015    HDL 52 10/21/2015 Imaging Studies:     ECHO LIMITED  Result Date: 8/25/2021  Transthoracic Echocardiography Report (TTE)     Left Ventricle  Left ventricle is normal in size . Moderate left ventricular concentric hypertrophy noted. No regional wall motion abnormalities seen. Normal left ventricular ejection fraction. Ejection fraction is visually estimated at 65%. There is doppler evidence of stage II diastolic dysfunction. Right Ventricle  Normal right ventricular size and function. Left Atrium  The left atrium is severely dilated. Interatrial septum appears intact. Right Atrium  Normal right atrium size. Mitral Valve  Normal mitral leaflet thickness with restricted motion of posterior  leaflet. Moderate posteriorly directed mitral regurgitation. ERO 0.2cm2   Tricuspid Valve  The tricuspid valve appears structurally normal.   Aortic Valve  Structurally normal aortic valve. Pulmonic Valve  The pulmonic valve was not well visualized. Pericardial Effusion  There is a trivial circumferential pericardial effusion noted. Aorta  Aortic root dimension within normal limits.        Conclusions     Summary  Left ventricle is normal in size . Moderate left ventricular concentric hypertrophy noted. No regional wall motion abnormalities seen. Normal left ventricular ejection fraction. There is doppler evidence of stage II diastolic dysfunction. The left atrium is severely dilated. Normal mitral leaflet thickness with restricted motion of posterior  leaflet. Moderate posteriorly directed mitral regurgitation. There is a trivial circumferential pericardial effusion noted. CT CHEST WO CONTRAST    Result Date: 9/7/2021  Improving aeration of the right lower lobe. Patchy ground-glass opacities in the right upper lobe have increased from previous and may be related to infiltrates from pneumonia. Improving aeration of the right middle lobe and left lung.  Small residual right pleural effusion and small pericardial effusion. Areas of adenopathy are similar to previous. These could be reactive or due to metastatic disease/lymphoproliferative process. Correlate clinically. Other chronic appearing findings. XR CHEST PORTABLE    Result Date: 9/6/2021  EXAMINATION: ONE XRAY VIEW OF THE CHEST 9/6/2021 3:38 pm COMPARISON: August 3 - September 5 and CT chest July 31, 2021 HISTORY: ORDERING SYSTEM PROVIDED HISTORY: pulm infiltrate TECHNOLOGIST PROVIDED HISTORY: Reason for exam:->pulm infiltrate What reading provider will be dictating this exam?->CRC FINDINGS: Persistent bilateral infiltrates throughout the mid lower aspect of both lungs which are ill-defined vague and scattered but the confluent densities have diminished in the mid lower aspect of the right lung particular since the study of September 4. Heart is enlarged. There is minimal thickening of the lateral pleural margin but no conspicuous pleural effusions are seen. Some improvement of the infiltrates predominant in the mid lower aspect of the right lung since the study of September 4. NM Cardiac Stress Test Nuclear Imaging    Result Date: 9/7/2021  The overall quality of the study was good. Left ventricular cavity size was noted to be enlarged on both rest and stress studies. Rotational analog analysis demonstrated good. The gated SPECT stress imaging in the short, vertical long, and horizontal long axis demonstrated normal homogeneous tracer distribution throughout the myocardium. The resting images show no change. Gated SPECT left ventricular ejection fraction was calculated to be 45% with normal wall motion and thickening. TID ratio 0.96.     1.  ECG during the infusion did not change. 2.  The myocardial perfusion imaging was normal without ischemia or infarct. 3.  Overall left ventricular systolic function was abnormal without regional wall motion abnormalities. EF 45%. 4.  No transient ischemic dilatation. 5.   Intermediate risk general pharmacologic stress test.         Notable Cultures:      Blood cultures   Blood Culture, Routine   Date Value Ref Range Status   08/09/2021 5 Days no growth  Final       Consults:  Cardiology   Nephrology  Pulmonology     Procedures:  None    Resident's Assessment and Plan       Peng Jay is a 43 y.o. 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 She is currently being  managed for       Chest pain 2/2? Chronic pericardial effusion vs CAD  · Echo on 08/25/2021 showed trivial circumferential pericardial effusion  · Cardiac stress test on 09/07/2021 no chest pain, arrhythmia, EKG changes and no stress-induced ischemia. · In cardiac stress test nuclear imaging, infusion did not change any myocardial perfusion; no ischemia or infarct overall left ventricular systolic function was abnormal with no regional wall abnormality; ejection fraction 45% and it was noted during clinical diagnosis consider pharmacologic stress test  · Cardiology consulted and repeat echo ordered per cardiology  · Discharge planning per cardiology clearance. Hypertensive urgency 2/2 end-stage renal disease  · BP today 123/83  · Continue home medications labetalol, losartan, nifedipine. 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   · Beta D glucan positive  · ID on board  · Aspergillus galactomannan antigen assay pending      Normocytic anemia 2/2 CKD vs anemia due to chronic disease  · Hb 9.5, MCV 96.2 (on 09/09/2021 )  · Iron 33, Ferritin 3608, TIBC 202, sats 16% (on 09/06/021)  · Reticulocyte 3.2% (on 09/06/021)  · Follow H&H daily  · Transfuse PRBC if Hb <7gm /dl   · Received 1 units of blood since admission; on EPO       CKD stage IV on HD (3 times per week)  · Last dialysis yesterday  · Creatinine 5.5, GFR 14 (09/09/2021)  · Calcium 9.5 (09/09/2021)   · phosphorus 4.5 (09/06/2021)  · High-sensitivity troponin was 83 on 09/04/2021  · Vit D 19 (on 08/20/2021),  (on 01/05/2019*)  · Currently on sevelamer for H/P hyperphostaemia  · I/O -6.3 L since admission(3.3 L from dialysis)  · Monitor I/O   · Monitor BMP daily  · Nephrology on board       PT/OT evaluation: Not indicated   DVT prophylaxis: [address]  GI prophylaxis:  Protonix   Disposition: home       Yessenia Jones MD, PGY-1  Attending physician: Dr. Gianfranco Kasper.

## 2021-09-09 NOTE — PROGRESS NOTES
PROGRESS NOTE     CARDIOLOGY    Chief complaint: Seen today for follow up, management & recommendations for an abnormal ejection fraction on his stress test.    He denies chest pain or shortness of breath today. He was sitting on the bed eating dinner. He is comfortable and in no distress. Wt Readings from Last 3 Encounters:   09/09/21 108 lb 12.8 oz (49.4 kg)   08/20/21 125 lb (56.7 kg)   08/09/21 110 lb (49.9 kg)     Temp Readings from Last 3 Encounters:   09/09/21 97.8 °F (36.6 °C) (Temporal)   08/20/21 98.7 °F (37.1 °C) (Oral)   08/10/21 97.9 °F (36.6 °C) (Temporal)     BP Readings from Last 3 Encounters:   09/09/21 (!) 160/80   08/20/21 112/69   08/10/21 (!) 153/100     Pulse Readings from Last 3 Encounters:   09/09/21 88   08/20/21 94   08/10/21 97         Intake/Output Summary (Last 24 hours) at 9/9/2021 1859  Last data filed at 9/9/2021 1441  Gross per 24 hour   Intake 670 ml   Output 0 ml   Net 670 ml       Recent Labs     09/07/21  0546 09/08/21  0645 09/09/21  0443   WBC 6.0 6.0 5.7   HGB 8.9* 8.1* 9.5*   HCT 28.3* 24.1* 30.1*   MCV 96.6 94.5 96.2    313 378     Recent Labs     09/07/21  0546 09/08/21  0645 09/09/21  0443    135 139   K 3.5 3.7 4.4   CL 96* 95* 98   CO2 26 27 29   BUN 24* 34* 21*   CREATININE 5.5* 8.5* 5.5*     No results for input(s): PROTIME, INR in the last 72 hours. No results for input(s): CKTOTAL, CKMB, CKMBINDEX, TROPONINI in the last 72 hours. No results for input(s): BNP in the last 72 hours. No results for input(s): CHOL, HDL, TRIG in the last 72 hours. Invalid input(s): CHOLHDLR, LDLCALCU  No results for input(s): TROPHS in the last 72 hours.       heparin (porcine) injection 5,000 Units, Q8H  perflutren lipid microspheres (DEFINITY) injection 1.65 mg, ONCE PRN  labetalol (NORMODYNE) tablet 300 mg, 2 times per day  cloNIDine (CATAPRES) tablet 0.3 mg, TID  epoetin katlyn-epbx (RETACRIT) injection 10,000 Units, Once per day on Mon Wed Fri  labetalol (NORMODYNE;TRANDATE) injection 10 mg, Q6H PRN  0.9 % sodium chloride infusion, PRN  diphenhydrAMINE (BENADRYL) injection 25 mg, Q6H  sodium chloride flush 0.9 % injection 5-40 mL, 2 times per day  sodium chloride flush 0.9 % injection 5-40 mL, PRN  0.9 % sodium chloride infusion, PRN  ondansetron (ZOFRAN-ODT) disintegrating tablet 4 mg, Q8H PRN   Or  ondansetron (ZOFRAN) injection 4 mg, Q6H PRN  polyethylene glycol (GLYCOLAX) packet 17 g, Daily PRN  losartan (COZAAR) tablet 100 mg, QPM  NIFEdipine (PROCARDIA XL) extended release tablet 90 mg, Daily  pantoprazole (PROTONIX) tablet 40 mg, Daily  sevelamer (RENVELA) tablet 800 mg, TID WC  lidocaine 4 % external patch 1 patch, Daily  hydrALAZINE (APRESOLINE) injection 10 mg, Q6H PRN  acetaminophen (TYLENOL) tablet 650 mg, Q4H PRN        Review of systems:     Heart: as above   Lungs: as above   Eyes: denies changes in vision or discharge. Ears: denies changes in hearing or pain. Nose: denies epistaxis or masses   Throat: denies sore throat or trouble swallowing. Neuro: denies numbness, tingling, tremors. Skin: denies rashes or itching. : denies hematuria, dysuria   GI: denies vomiting, diarrhea   Psych: denies mood changed, anxiety, depression. Physical exam:    Constitutional: A&O x3, communicates well, no acute distress. Eyes: extraocular muscles intact, PERRL. Normal lids & conjunctiva. No icterus. ENT: clear, no bleeding. No external masses. Lips normal formation. Neck: supple, full ROM, no JVD, no bruits, no lymphadenopathy. No masses. trachea midline. Heart: regular rate & rhythm, normal S1 & S2, II/VI systolic murmur, S4 gallop. No heave. Lungs: CTA. No accessory muscles. Mildly decreased air movement. Abd: soft, non-tender. Normal bowel sounds. Neuro: Full ROM X 4, EOMI, no tremors. EXT: No bilateral lower extremity edema  Skin: warm, dry, intact. Good turgor.   Psych: A&O x 3, normal behavior, not anxious. Assessment/Recommendations  1. Ejection fraction 45% on recent stress test.  Echo from today states ejection fraction 45 to 50%. I reviewed the images myself. His ejection fraction is at least 50%. It appears overall in the low normal range. He does have at least moderate concentric LVH which is most likely due to his significant hypertension. 2. Chronic pericardial effusion. This is still small and there is no tamponade. 3. Moderate mitral vegetation. This would also benefit from better blood pressure and afterload reduction control. 4. Moderate tricuspid regurgitation. 5. Hypertension. Very difficult to control. I will defer further to nephrology. Adjustments have been made today. 6. Hypothyroidism  7. Anxiety/depression  8. 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 9. Cardiology will sign off. Note: This report was completed using computerized voice recognition software. Every effort has been made to ensure accuracy, however; and invert and computerized transcription errors may be present.

## 2021-09-10 ENCOUNTER — TELEPHONE (OUTPATIENT)
Dept: INTERNAL MEDICINE | Age: 36
End: 2021-09-10

## 2021-09-10 LAB
ASPERGILLUS GALACTO AG: NEGATIVE
ASPERGILLUS GALACTO INDEX: 0.23

## 2021-09-10 NOTE — TELEPHONE ENCOUNTER
[facility] Trans[facility] Initial Follow Up Call    Outreach made within 2 business days of discharge: Yes    Patient: Rory Adams Patient : 1985   MRN: [id number]  Reason for Admission: There are no discharge diagnoses documented for the most recent discharge. Discharge Date: 9/10/21       Spoke with Van Dorman    Discharge department/facility: [facility]    [facility] [place]:  Was patient able to fill all prescriptions: Yes  Was patient instructed to bring all medications to the follow-up visit: Yes  Is patient taking all medications as directed in the discharge summary?  Yes  Does patient understand their discharge instructions: Yes  Does patient have questions or concerns that need addressed prior to 7-14 day follow up office visit: no    Appointment with PCP within 7-14 days    Follow Up  Future Appointments   Date Time Provider Jean Joshi   2021  1:30 PM [address], DO [facility]   2021 10:30 AM Maryanne Shepherd APRN - CNP [facility]   10/12/2021  8:45 AM Diamond Rouse MD [facility] CARD[facility]   2022 10:00 AM Domonique Welch MD [facility]/MED [facility]       Adry Brownie, [address] [place]

## 2021-09-13 RX ORDER — PANTOPRAZOLE SODIUM 40 MG/1
40 TABLET, DELAYED RELEASE ORAL DAILY
Qty: 30 TABLET | Refills: 0 | OUTPATIENT
Start: 2021-09-13

## 2021-09-13 RX ORDER — LABETALOL 200 MG/1
TABLET, FILM COATED ORAL
Qty: 90 TABLET | Refills: 0 | OUTPATIENT
Start: 2021-09-13

## 2021-09-13 NOTE — TELEPHONE ENCOUNTER
Last Appointment:  Visit date not found  Future Appointments   Date Time Provider Jean Joshi   9/20/2021  1:30 PM [address], DO ACC [facility]   9/28/2021 10:30 AM KENDRA [place] - CNP [facility]   10/12/2021  8:45 AM Rosemarie Norton MD [facility] CARDIO [facility]   7/20/2022 10:00 AM Lam Perry MD [facility]

## 2021-09-21 LAB
AFB CULTURE (MYCOBACTERIA): NORMAL
AFB SMEAR: NORMAL

## 2021-09-21 NOTE — DISCHARGE SUMMARY
[address]  Discharge Summary    PCP: Madonan Huggins DO    Admit Date:9/4/2021  Discharge Date: 9/21/2021    Admission Diagnosis:   · Hypertensive urgency  · Chest pain 2/2 Chronic pericardial effusion vs CAD  · ESRD on dialysis  · Anemia  ·  SOB 2/2 pulmonary edema vs pneumonia      Discharge Diagnosis:  · Chest pain 2/2 Chronic pericardial effusion vs CAD  · Hypertensive urgency 2/2 end-stage renal disease  · CKD stage IV on HD (3 times per week)  · Positive beta D glucan   · Normocytic anemia 2/2 CKD vs anemia due to chronic disease  · SOB 2/2 Pneumonia vs Pulmonary edema     Hospital Course: The patient is a 43 y.o. male with a past medical history of HTN, ESRD on dialusis, Depression, Anxiety, ICH, Hypothyroidism, AVF, HFpEF, Neutropenia and Hx of ruptued Tavares aneurysm who presents for chest pain.     Patient having chest pain for the past couple days.  Intermittent, worse with exertion, improves with rest, sharp sensation, graded as 9/10. After taking Hydralazine, he grades the pain as 8/10. States he gets very short of breath on exertion. Does have history of end-stage renal disease, he is on dialysis, last dialysis treatment was yesterday. He reports headache, nausea, fatigue and SOB. He denies any fever, chills, vomiting, paresthesias, blurred vision and neck pain. Vitals: Tachycardia 100s, /131  Labs: CO2 32, Cr 5.7, Troponin 83, AST 42, Hb 6.9, Hct 22.5.   CXR: Multifocal bilateral pneumonia more prominent within the right lung, Marked cardiomegaly and Trace bilateral pleural effusions  EKG: Sinus tachycardia and possible LA enlargement  Covid 19 negative  Respiratory panel negative  Meds:  mg, Hydralazine 10 mg, Zofran 4 mg and Benadryl 25 mg     During this admission, on 09/07/2021, Cardiac stress test on 09/07/2021 no chest pain, arrhythmia, EKG changes and no stress-induced ischemia.  In cardiac stress test nuclear imaging, infusion did not change any myocardial perfusion; no ischemia or infarct overall left ventricular systolic function was abnormal with no regional wall abnormality; ejection fraction 45% and it was noted during clinical diagnosis consider pharmacologic stress test  Cardiology consulted and repeat echo ordered per cardiology. Echo from 09/09/2021 states ejection fraction 45 to 50% with LVH and chronic small pericardial effusion and no tamponade. Echo also showed moderate mitral vegetation, tricuspid regurgitation. Cardiology recommended, patient will benefit from better blood pressure and afterload reduction control. During this hospital stay, his BP medication clonidine 0.3 mg was increased from twice daily to three times daily and Labetalol 300 mg from one daily to two times daily . He had a history of pneumonia so pulmonology was consulted, per pulmonology, Out patient follow up after 4-6 weeks. Due to his previous history of Beta D glucan positive ; it was repeated again and it came positive again. Aspergillus Galactomannan was ordered but result was pending during discharge; ID was on board. On this admission patients Hb was 6.9 and patient received 1 unit of blood transfusion, Hb on 09/09/2021 was 94. For follow up visit, patients BP medication changed to Labetalol 300 mg twice daily and Nifedipine 90 mg daily. Started clonidine 0.3 mg mg three times daily. Patient also needs to follow up with Pulmonology in 4-6 weeks and Infectious diseases outpatient for positive beta D glucan. · Vitals: /83   Pulse 81   Temp 97.2 °F (36.2 °C) (Temporal)   Resp 18   Wt 108 lb 3.9 oz (49.1 kg)   SpO2 100%   BMI 17.47 kg/m²   · Net IO Since Admission: -6,300 mL [09/09/21 0603]        § General appearance: cachectic. § Skin:  No rashes or lesions  § HEENT: Head: Normocephalic, no lesions, without obvious abnormality.   § Neck: no adenopathy, no carotid bruit, no JVD, supple, symmetrical, trachea midline and thyroid not enlarged, symmetric, no tenderness/mass/nodules  § Lungs: clear to auscultation bilaterally  § Heart: regular rate and rhythm, S1, S2 normal, no murmur, click, rub or gallop  § Abdomen: soft, non-tender; bowel sounds normal; no masses,  no organomegaly  § Extremities: extremities normal, atraumatic, no cyanosis or edema  § Neurologic: Mental status: Alert, oriented, thought content appropriate    Pending test results:   1. Aspergillus galactomannan    Consults:  1. ID  2. Cardiology  3. pulmonology    Procedures:  1. Stress testing   2. Echo    Condition at discharge: Stable    Disposition: home    Discharge Medications:     Medication List      CHANGE how you take these medications    labetalol 300 MG tablet  Commonly known as: NORMODYNE  Take 1 tablet by mouth 2 times daily  What changed:   · when to take this  · Another medication with the same name was removed. Continue taking this medication, and follow the directions you see here. NIFEdipine 90 MG extended release tablet  Commonly known as: PROCARDIA XL  Take 1 tablet by mouth daily  What changed:   · when to take this  · Another medication with the same name was removed. Continue taking this medication, and follow the directions you see here.         CONTINUE taking these medications    cloNIDine 0.3 MG tablet  Commonly known as: CATAPRES     diphenhydrAMINE 25 MG tablet  Commonly known as: BENADRYL     losartan 100 MG tablet  Commonly known as: COZAAR  Take 1 tablet by mouth every evening     megestrol 20 MG tablet  Commonly known as: MEGACE     Melatonin 5 MG Caps     MIRCERA IJ     Nephro-Jerry 0.8 MG Tabs     pantoprazole 40 MG tablet  Commonly known as: PROTONIX  TAKE 1 TABLET BY MOUTH DAILY     sevelamer 800 MG tablet  Commonly known as: RENVELA           Where to Get Your Medications      These medications were sent to [facility] [place] [address]    Phone: [phone]   · labetalol 300 MG tablet  · NIFEdipine 90 MG extended release tablet        Diet: Regular  Activity: As tolerated     Continue your medications as prescribed  Monitor blood pressure daily. Have dialysis as schedule. Follow with kidney doctor. Be compliant with medications  Go to ER if experience chest pain, shortness of breath, fever or chills. We will follow in 1 week with Internal Medicine clinic. Follow up with Pulmonology in 4-6 weeks   Follow up with Infectious diseases outpatient, call to make an appointment     Follow up  Internal Medicine Ambulatory Clinic in 1 week. Call [phone] ([address]  Internal Medicine Clinic) if there are any appointment changes or other issues. It is very important that you keep this appointment.       Electronically signed by Sara Rick MD on 9/9/2021 at 1:32 PM          Gladys Delatorre MD  PGY-1   5:30 PM 9/21/2021

## 2021-10-08 RX ORDER — PANTOPRAZOLE SODIUM 40 MG/1
TABLET, DELAYED RELEASE ORAL
Qty: 30 TABLET | Refills: 0 | Status: SHIPPED
Start: 2021-10-08 | End: 2022-04-09

## 2021-10-08 RX ORDER — LABETALOL 200 MG/1
TABLET, FILM COATED ORAL
Qty: 90 TABLET | Refills: 0 | Status: SHIPPED
Start: 2021-10-08 | End: 2022-01-22

## 2021-10-12 ENCOUNTER — OFFICE VISIT (OUTPATIENT)
Dept: CARDIOLOGY CLINIC | Age: 36
End: 2021-10-12
Payer: MEDICAID

## 2021-10-12 VITALS
HEIGHT: 66 IN | RESPIRATION RATE: 20 BRPM | HEART RATE: 89 BPM | BODY MASS INDEX: 19.13 KG/M2 | DIASTOLIC BLOOD PRESSURE: 100 MMHG | WEIGHT: 119 LBS | SYSTOLIC BLOOD PRESSURE: 190 MMHG

## 2021-10-12 DIAGNOSIS — I31.39 PERICARDIAL EFFUSION: ICD-10-CM

## 2021-10-12 DIAGNOSIS — R07.89 ATYPICAL CHEST PAIN: Primary | ICD-10-CM

## 2021-10-12 DIAGNOSIS — I50.32 CHRONIC DIASTOLIC (CONGESTIVE) HEART FAILURE (HCC): ICD-10-CM

## 2021-10-12 DIAGNOSIS — I10 ESSENTIAL HYPERTENSION: ICD-10-CM

## 2021-10-12 DIAGNOSIS — Z99.2 STAGE 5 CHRONIC KIDNEY DISEASE ON CHRONIC DIALYSIS (HCC): ICD-10-CM

## 2021-10-12 DIAGNOSIS — N18.6 STAGE 5 CHRONIC KIDNEY DISEASE ON CHRONIC DIALYSIS (HCC): ICD-10-CM

## 2021-10-12 PROCEDURE — G8420 CALC BMI NORM PARAMETERS: HCPCS | Performed by: INTERNAL MEDICINE

## 2021-10-12 PROCEDURE — 99215 OFFICE O/P EST HI 40 MIN: CPT | Performed by: INTERNAL MEDICINE

## 2021-10-12 PROCEDURE — 93000 ELECTROCARDIOGRAM COMPLETE: CPT | Performed by: INTERNAL MEDICINE

## 2021-10-12 PROCEDURE — G8484 FLU IMMUNIZE NO ADMIN: HCPCS | Performed by: INTERNAL MEDICINE

## 2021-10-12 PROCEDURE — G8427 DOCREV CUR MEDS BY ELIG CLIN: HCPCS | Performed by: INTERNAL MEDICINE

## 2021-10-12 PROCEDURE — 1036F TOBACCO NON-USER: CPT | Performed by: INTERNAL MEDICINE

## 2021-10-12 RX ORDER — HYDRALAZINE HYDROCHLORIDE 50 MG/1
50 TABLET, FILM COATED ORAL 3 TIMES DAILY
COMMUNITY
End: 2021-10-26 | Stop reason: SDUPTHER

## 2021-10-12 NOTE — PROGRESS NOTES
musculoskeletal and psychiatric are negative.     Past Medical History:  Past Medical History:   Diagnosis Date    (HFpEF) heart failure with preserved ejection fraction ([facility]     stage III    Anxiety 9/19/2015    AVF (arteriovenous fistula) ([facility] 4/30/2018    Chronic kidney disease     CKD since early childhood per pt and on HD ~ 11 years    Depression     Encounter regarding vascular access for dialysis for ESRD ([facility] 4/30/2018    History of ruptured [place] aneurysm ([facility] 10/20/2015    Hypertension     on meds for ~ 11 years    Hypothyroidism     Intracranial hemorrhage (HCC)     was d/t ruptured aneurysm - pt underwent neurosurgery for clipping of the aneurysm    Neutropenia ([facility]     Noncompliance w/medication treatment due to intermit use of medication 11/3/2015    Pre-transplant evaluation for kidney transplant 4/5/2017       Past Surgical History:  Past Surgical History:   Procedure Laterality Date    BRAIN ANEURYSM SURGERY Right 3-4 years ago    Intracranial aneurysm clipping surgery 3-4 years ago, after rupture of aneurysm causing ICH    BRONCHOSCOPY N/A 8/3/2021    BRONCHOSCOPY DIAGNOSTIC OR CELL [address] ONLY performed by Sena Wen MD at [facility] Left 11 years ago    UPPER GASTROINTESTINAL ENDOSCOPY N/A 1/3/2019    EGD BIOPSY performed by Mayito Jiang MD at [facility] ENDOSCOPY       Family History:  Family History   Problem Relation Age of Onset    Kidney Disease Paternal Grandmother     Cancer Neg Hx     Heart Disease Neg Hx        Social History:  Social History     Socioeconomic History    Marital status:      Spouse name: Not on file    Number of children: 3    Years of education: Not on file    Highest education level: High school graduate   Occupational History    Not on file   Tobacco Use    Smoking status: Never Smoker    Smokeless tobacco: Never Used    Tobacco comment: only smokes marijuana daily   Vaping Use    Vaping Take 5 mg by mouth nightly as needed       Methoxy PEG-Epoetin Beta (MIRCERA IJ) Infuse 60 mcg intravenously every 14 days       cloNIDine (CATAPRES) 0.3 MG tablet Take 0.3 mg by mouth 3 times daily       megestrol (MEGACE) 20 MG tablet Take 20 mg by mouth 3 times daily       losartan (COZAAR) 100 MG tablet Take 1 tablet by mouth every evening 30 tablet 3    B Complex-C-Folic Acid (NEPHRO-JOHNNY) 0.8 MG TABS Take 1 tablet by mouth daily       sevelamer (RENVELA) 800 MG tablet Take by mouth See Admin Instructions 2 tablets by mouth three times a day with meals and 1 tablet with snacks       No current facility-administered medications for this visit. Physical Exam:  BP (!) 200/110   Pulse 89   Resp 20   Ht 5' 6\" (1.676 m)   Wt 119 lb (54 kg)   BMI 19.21 kg/m²   Wt Readings from Last 3 Encounters:   10/12/21 119 lb (54 kg)   09/09/21 108 lb 12.8 oz (49.4 kg)   08/20/21 125 lb (56.7 kg)     The patient is awake, alert and in no discomfort or distress. No gross musculoskeletal deformity is present. No significant skin or nail changes are present. Gross examination of head, eyes, nose and throat are negative. Jugular venous pressure is normal and no carotid bruits are present. Normal respiratory effort is noted with no accessory muscle usage present. Lung fields demonstrate minimal right basilar rales and rhonchi with remaining lung fields clear to ascultation. Cardiac examination is notable for a regular rate and rhythm with no palpable thrill. A fourth heart sound with no pericardial rub and a soft systolic murmur at the left sternal border are identified. A benign abdominal examination is present with no masses or organomegaly. Intact pulses are present throughout all extremities and no peripheral edema is present. No focal neurologic deficits are present.     Laboratory Tests:  Lab Results   Component Value Date    CREATININE 5.5 (H) 09/09/2021    BUN 21 (H) 09/09/2021     09/09/2021    K 4.4 09/09/2021    CL 98 09/09/2021    CO2 29 09/09/2021     No results found for: BNP  Lab Results   Component Value Date    WBC 5.7 09/09/2021    RBC 3.13 09/09/2021    HGB 9.5 09/09/2021    HCT 30.1 09/09/2021    MCV 96.2 09/09/2021    MCH 30.4 09/09/2021    MCHC 31.6 09/09/2021    RDW 16.7 09/09/2021     09/09/2021    MPV 8.7 09/09/2021     No results for input(s): ALKPHOS, ALT, AST, PROT, BILITOT, BILIDIR, LABALBU in the last 72 hours.   Lab Results   Component Value Date    MG 2.7 09/06/2021     Lab Results   Component Value Date    PROTIME 11.6 03/15/2021    INR 1.1 03/15/2021     Lab Results   Component Value Date    TSH 4.260 07/15/2021     No components found for: CHLPL  Lab Results   Component Value Date    TRIG 82 10/21/2015     Lab Results   Component Value Date    HDL 52 10/21/2015     Lab Results   Component Value Date    LDLCALC 89 10/21/2015       Cardiac Tests:  ECG: A resting electrocardiogram demonstrates evidence of sinus rhythm with biatrial enlargement and nonspecific ST changes  Chest X-ray: A chest x-ray during recent hospitalization reviewed at the time of evaluation demonstrates no evidence of cardiomegaly with suggestive evidence of a right upper and middle lobe infiltrate as well is that of a potential left upper lobe infiltrate with findings supported by CT scan and reportedly improving in the right middle and left upper lobe  Last Echocardiogram: An echocardiogram of September, 2021 demonstrated evidence of a mildly dilated left ventricular chamber with mild concentric left ventricular hypertrophy with mild left ventricular systolic dysfunction estimated ejection fraction of 45 to 50% with biatrial enlargement and moderate mitral regurgitation with a small pericardial effusion  Last stress test: A gated vasodilator myocardial perfusion imaging study of September, 2021 demonstrated no evidence of perfusion abnormality and with left ventricular systolic function at the lower limits of normal      ASSESSMENT / PLAN: On a clinical basis, the patient appears relatively compensated for cardiovascular standpoint with his atypical chest discomfort not suggestive of an ischemic component and based on its characteristics potentially mildly related to that of his pericardial disease with needs of aggressive dialysis and as appropriate the potential consideration of colchicine in the face of his chronic pericardial effusion. I will defer these decisions to the nephrology service with potential needs of dose modification of colchicine if initiated. In addition, I will defer further management of his hypertension to the nephrology service and I discussed with him the potential adverse effects of his nonsteroidal anti-inflammatory therapy both on fluid retention and hypertension with further analgesia defer to your discretion. Continued careful monitoring of his volume status including that of his weight will be necessary in conjunction with optimizing dialysis. Continue aggressive risk factor modification of blood pressure and serum lipids will remain essential to reducing risk of future atherosclerotic development. I presently plan his clinical reevaluation in approximately 6 months and would happily reevaluate him in the interim should additional cardiovascular difficulties or concerns arise. The patient's current medication list, allergies, problem list and results of all previously ordered testing were reviewed at today's visit. Follow-up office visit in 6 months      Note: This report was completed using computerized voice recognition software. Every effort has been made to ensure accuracy, however; inadvertent computerized transcription errors may be present. Rachel Brooke.  Claribel Rao, [address] [place] [address] Cardiology    An electronic copy of this follow-up progress note was forwarded to Dr. Hay Costello and Eloy Oliveira

## 2021-10-26 RX ORDER — HYDRALAZINE HYDROCHLORIDE 50 MG/1
50 TABLET, FILM COATED ORAL 3 TIMES DAILY
Qty: 90 TABLET | Refills: 0 | Status: SHIPPED
Start: 2021-10-26 | End: 2022-03-01 | Stop reason: SDUPTHER

## 2021-10-26 NOTE — TELEPHONE ENCOUNTER
Please inform patient he needs appointment scheduled before any further refills, no show in September after hospital discharge.     Electronically signed by Art Fields DO on 10/26/2021 at 4:38 PM

## 2021-11-10 ENCOUNTER — HOSPITAL ENCOUNTER (OUTPATIENT)
Age: 36
Discharge: HOME | End: 2021-11-10
Payer: MEDICAID

## 2021-11-10 DIAGNOSIS — J18.9 PNEUMONIA OF RIGHT LOWER LOBE DUE TO INFECTIOUS ORGANISM: ICD-10-CM

## 2021-11-10 DIAGNOSIS — J18.9 PNEUMONIA OF BOTH LUNGS DUE TO INFECTIOUS ORGANISM, UNSPECIFIED PART OF LUNG: ICD-10-CM

## 2021-11-10 LAB
ALBUMIN SERPL-MCNC: 4.9 G/DL (ref 3.5–5.2)
ALP BLD-CCNC: 113 U/L (ref 40–129)
ALT SERPL-CCNC: 15 U/L (ref 0–40)
ANION GAP SERPL CALCULATED.3IONS-SCNC: 17 MMOL/L (ref 7–16)
AST SERPL-CCNC: 25 U/L (ref 0–39)
BASOPHILS ABSOLUTE: 0.05 E9/L (ref 0–0.2)
BASOPHILS RELATIVE PERCENT: 0.9 % (ref 0–2)
BILIRUB SERPL-MCNC: 0.5 MG/DL (ref 0–1.2)
BUN BLDV-MCNC: 7 MG/DL (ref 6–20)
C-REACTIVE PROTEIN: 1.5 MG/DL (ref 0–0.4)
CALCIUM SERPL-MCNC: 9.7 MG/DL (ref 8.6–10.2)
CHLORIDE BLD-SCNC: 100 MMOL/L (ref 98–107)
CO2: 30 MMOL/L (ref 22–29)
CREAT SERPL-MCNC: 3.2 MG/DL (ref 0.7–1.2)
EOSINOPHILS ABSOLUTE: 0.22 E9/L (ref 0.05–0.5)
EOSINOPHILS RELATIVE PERCENT: 4 % (ref 0–6)
GFR AFRICAN AMERICAN: 26
GFR NON-AFRICAN AMERICAN: 26 ML/MIN/1.73
GLUCOSE BLD-MCNC: 77 MG/DL (ref 74–99)
HCT VFR BLD CALC: 37.2 % (ref 37–54)
HEMOGLOBIN: 11.5 G/DL (ref 12.5–16.5)
IMMATURE GRANULOCYTES #: 0.02 E9/L
IMMATURE GRANULOCYTES %: 0.4 % (ref 0–5)
LYMPHOCYTES ABSOLUTE: 1.35 E9/L (ref 1.5–4)
LYMPHOCYTES RELATIVE PERCENT: 24.7 % (ref 20–42)
MCH RBC QN AUTO: 30.7 PG (ref 26–35)
MCHC RBC AUTO-ENTMCNC: 30.9 % (ref 32–34.5)
MCV RBC AUTO: 99.2 FL (ref 80–99.9)
MONOCYTES ABSOLUTE: 0.4 E9/L (ref 0.1–0.95)
MONOCYTES RELATIVE PERCENT: 7.3 % (ref 2–12)
NEUTROPHILS ABSOLUTE: 3.42 E9/L (ref 1.8–7.3)
NEUTROPHILS RELATIVE PERCENT: 62.7 % (ref 43–80)
PDW BLD-RTO: 17.6 FL (ref 11.5–15)
PLATELET # BLD: 287 E9/L (ref 130–450)
PMV BLD AUTO: 9.6 FL (ref 7–12)
POTASSIUM SERPL-SCNC: 3.3 MMOL/L (ref 3.5–5)
RBC # BLD: 3.75 E12/L (ref 3.8–5.8)
SEDIMENTATION RATE, ERYTHROCYTE: 40 MM/HR (ref 0–15)
SODIUM BLD-SCNC: 147 MMOL/L (ref 132–146)
TOTAL PROTEIN: 8.6 G/DL (ref 6.4–8.3)
WBC # BLD: 5.5 E9/L (ref 4.5–11.5)

## 2021-11-10 PROCEDURE — 86140 C-REACTIVE PROTEIN: CPT

## 2021-11-10 PROCEDURE — 87449 NOS EACH ORGANISM AG IA: CPT

## 2021-11-10 PROCEDURE — 85025 COMPLETE CBC W/AUTO DIFF WBC: CPT

## 2021-11-10 PROCEDURE — 80053 COMPREHEN METABOLIC PANEL: CPT

## 2021-11-10 PROCEDURE — 36415 COLL VENOUS BLD VENIPUNCTURE: CPT

## 2021-11-10 PROCEDURE — 87305 ASPERGILLUS AG IA: CPT

## 2021-11-10 PROCEDURE — 86360 T CELL ABSOLUTE COUNT/RATIO: CPT

## 2021-11-10 PROCEDURE — 85651 RBC SED RATE NONAUTOMATED: CPT

## 2021-11-10 PROCEDURE — 82784 ASSAY IGA/IGD/IGG/IGM EACH: CPT

## 2021-11-10 PROCEDURE — 82787 IGG 1 2 3 OR 4 EACH: CPT

## 2021-11-10 PROCEDURE — 86359 T CELLS TOTAL COUNT: CPT

## 2021-11-11 LAB
IGA: 307 MG/DL (ref 70–400)
IGG: 1204 MG/DL (ref 700–1600)
IGM: 53 MG/DL (ref 40–230)

## 2021-11-13 LAB
(1,3)-BETA-D-GLUCAN (FUNGITELL) INTERPRETATION: POSITIVE
(1,3)-BETA-D-GLUCAN (FUNGITELL): 100 PG/ML
ABSOLUTE CD 3: 1069 CELLS/UL (ref 570–2400)
ABSOLUTE CD 4 HELPER: 576 CELLS/UL (ref 430–1800)
ABSOLUTE CD 8 (SUPP): 447 CELLS/UL (ref 210–1200)
CD4/CD8 RATIO: 1.29 RATIO (ref 0.8–3.9)
IGG 1: 780 MG/DL (ref 240–1118)
IGG 2: 240 MG/DL (ref 124–549)
IGG 3: 119 MG/DL (ref 21–134)
IGG 4: 9 MG/DL (ref 1–123)
LYMPH SUBSET INFORMATION: NORMAL

## 2021-11-14 LAB
ASPERGILLUS GALACTO AG: NEGATIVE
ASPERGILLUS GALACTO INDEX: 0.14

## 2022-01-11 DIAGNOSIS — J18.9 PNEUMONIA OF BOTH LUNGS DUE TO INFECTIOUS ORGANISM, UNSPECIFIED PART OF LUNG: Primary | ICD-10-CM

## 2022-01-21 ENCOUNTER — HOSPITAL ENCOUNTER (EMERGENCY)
Age: 37
Discharge: HOME | DRG: 137 | End: 2022-01-21
Attending: STUDENT IN AN ORGANIZED HEALTH CARE EDUCATION/TRAINING PROGRAM
Payer: MEDICAID

## 2022-01-21 ENCOUNTER — APPOINTMENT (OUTPATIENT)
Dept: GENERAL RADIOLOGY | Age: 37
DRG: 137 | End: 2022-01-21
Payer: MEDICAID

## 2022-01-21 VITALS
SYSTOLIC BLOOD PRESSURE: 160 MMHG | HEART RATE: 78 BPM | TEMPERATURE: 97.4 F | RESPIRATION RATE: 18 BRPM | DIASTOLIC BLOOD PRESSURE: 99 MMHG | OXYGEN SATURATION: 94 %

## 2022-01-21 DIAGNOSIS — Z99.2 ESRD ON DIALYSIS (HCC): ICD-10-CM

## 2022-01-21 DIAGNOSIS — U07.1 COVID-19: Primary | ICD-10-CM

## 2022-01-21 DIAGNOSIS — N18.6 ESRD ON DIALYSIS (HCC): ICD-10-CM

## 2022-01-21 LAB
ALBUMIN SERPL-MCNC: 4.4 G/DL (ref 3.5–5.2)
ALP BLD-CCNC: 86 U/L (ref 40–129)
ALT SERPL-CCNC: 18 U/L (ref 0–40)
ANION GAP SERPL CALCULATED.3IONS-SCNC: 14 MMOL/L (ref 7–16)
AST SERPL-CCNC: 39 U/L (ref 0–39)
BASOPHILS ABSOLUTE: 0.05 E9/L (ref 0–0.2)
BASOPHILS RELATIVE PERCENT: 0.9 % (ref 0–2)
BILIRUB SERPL-MCNC: 0.6 MG/DL (ref 0–1.2)
BUN BLDV-MCNC: 13 MG/DL (ref 6–20)
CALCIUM SERPL-MCNC: 9.3 MG/DL (ref 8.6–10.2)
CHLORIDE BLD-SCNC: 99 MMOL/L (ref 98–107)
CO2: 28 MMOL/L (ref 22–29)
CREAT SERPL-MCNC: 4.1 MG/DL (ref 0.7–1.2)
EKG ATRIAL RATE: 81 BPM
EKG P AXIS: 63 DEGREES
EKG P-R INTERVAL: 284 MS
EKG Q-T INTERVAL: 426 MS
EKG QRS DURATION: 128 MS
EKG QTC CALCULATION (BAZETT): 460 MS
EKG R AXIS: -77 DEGREES
EKG T AXIS: 33 DEGREES
EKG VENTRICULAR RATE: 70 BPM
EOSINOPHILS ABSOLUTE: 0.19 E9/L (ref 0.05–0.5)
EOSINOPHILS RELATIVE PERCENT: 3.3 % (ref 0–6)
GFR AFRICAN AMERICAN: 20
GFR NON-AFRICAN AMERICAN: 20 ML/MIN/1.73
GLUCOSE BLD-MCNC: 72 MG/DL (ref 74–99)
HCT VFR BLD CALC: 36.1 % (ref 37–54)
HEMOGLOBIN: 11.2 G/DL (ref 12.5–16.5)
IMMATURE GRANULOCYTES #: 0.01 E9/L
IMMATURE GRANULOCYTES %: 0.2 % (ref 0–5)
LYMPHOCYTES ABSOLUTE: 1.39 E9/L (ref 1.5–4)
LYMPHOCYTES RELATIVE PERCENT: 24.3 % (ref 20–42)
MCH RBC QN AUTO: 29.8 PG (ref 26–35)
MCHC RBC AUTO-ENTMCNC: 31 % (ref 32–34.5)
MCV RBC AUTO: 96 FL (ref 80–99.9)
MONOCYTES ABSOLUTE: 0.44 E9/L (ref 0.1–0.95)
MONOCYTES RELATIVE PERCENT: 7.7 % (ref 2–12)
NEUTROPHILS ABSOLUTE: 3.64 E9/L (ref 1.8–7.3)
NEUTROPHILS RELATIVE PERCENT: 63.6 % (ref 43–80)
PDW BLD-RTO: 17.6 FL (ref 11.5–15)
PLATELET # BLD: 192 E9/L (ref 130–450)
PMV BLD AUTO: 9.9 FL (ref 7–12)
POTASSIUM REFLEX MAGNESIUM: 4.3 MMOL/L (ref 3.5–5)
PRO-BNP: ABNORMAL PG/ML (ref 0–125)
RBC # BLD: 3.76 E12/L (ref 3.8–5.8)
REASON FOR REJECTION: NORMAL
REJECTED TEST: NORMAL
SARS-COV-2, NAAT: DETECTED
SODIUM BLD-SCNC: 141 MMOL/L (ref 132–146)
TOTAL PROTEIN: 7.7 G/DL (ref 6.4–8.3)
TROPONIN, HIGH SENSITIVITY: 94 NG/L (ref 0–11)
TROPONIN, HIGH SENSITIVITY: 95 NG/L (ref 0–11)
WBC # BLD: 5.7 E9/L (ref 4.5–11.5)

## 2022-01-21 PROCEDURE — 80053 COMPREHEN METABOLIC PANEL: CPT

## 2022-01-21 PROCEDURE — 84484 ASSAY OF TROPONIN QUANT: CPT

## 2022-01-21 PROCEDURE — 85025 COMPLETE CBC W/AUTO DIFF WBC: CPT

## 2022-01-21 PROCEDURE — 71045 X-RAY EXAM CHEST 1 VIEW: CPT

## 2022-01-21 PROCEDURE — 6360000002 HC RX W HCPCS: Performed by: STUDENT IN AN ORGANIZED HEALTH CARE EDUCATION/TRAINING PROGRAM

## 2022-01-21 PROCEDURE — 87635 SARS-COV-2 COVID-19 AMP PRB: CPT

## 2022-01-21 PROCEDURE — 99284 EMERGENCY DEPT VISIT MOD MDM: CPT

## 2022-01-21 PROCEDURE — 83880 ASSAY OF NATRIURETIC PEPTIDE: CPT

## 2022-01-21 PROCEDURE — 6370000000 HC RX 637 (ALT 250 FOR IP): Performed by: STUDENT IN AN ORGANIZED HEALTH CARE EDUCATION/TRAINING PROGRAM

## 2022-01-21 PROCEDURE — 93010 ELECTROCARDIOGRAM REPORT: CPT | Performed by: INTERNAL MEDICINE

## 2022-01-21 PROCEDURE — 96374 THER/PROPH/DIAG INJ IV PUSH: CPT

## 2022-01-21 PROCEDURE — 93005 ELECTROCARDIOGRAM TRACING: CPT | Performed by: STUDENT IN AN ORGANIZED HEALTH CARE EDUCATION/TRAINING PROGRAM

## 2022-01-21 RX ORDER — ONDANSETRON 2 MG/ML
4 INJECTION INTRAMUSCULAR; INTRAVENOUS ONCE
Status: COMPLETED | OUTPATIENT
Start: 2022-01-21 | End: 2022-01-21

## 2022-01-21 RX ORDER — DIPHENHYDRAMINE HCL 25 MG
25 TABLET ORAL ONCE
Status: COMPLETED | OUTPATIENT
Start: 2022-01-21 | End: 2022-01-21

## 2022-01-21 RX ADMIN — ONDANSETRON HYDROCHLORIDE 4 MG: 2 SOLUTION INTRAMUSCULAR; INTRAVENOUS at 15:08

## 2022-01-21 RX ADMIN — DIPHENHYDRAMINE HCL 25 MG: 25 TABLET ORAL at 15:29

## 2022-01-21 ASSESSMENT — ENCOUNTER SYMPTOMS
BACK PAIN: 0
SINUS PRESSURE: 0
SORE THROAT: 0
NAUSEA: 0
VOMITING: 0
EYE DISCHARGE: 0
WHEEZING: 0
EYE REDNESS: 0
DIARRHEA: 0
ABDOMINAL PAIN: 0
COUGH: 0
SHORTNESS OF BREATH: 1
EYE PAIN: 0

## 2022-01-21 NOTE — ED PROVIDER NOTES
Chief Complaint   Patient presents with    Chest Pain     X 1 DAY, was sent from dialysis       patient is a 69-year-old male with history of end-stage renal disease on dialysis presents today for chest pain or shortness of breath. Symptoms have been ongoing for the past 2 days. States he only completed half of his dialysis treatment today as he did develop chest pain during this treatment. Describes a heavy pressure in the mid of his chest.  Pain does not radiate anywhere. Not aggravated or alleviated any specific factors. He did take aspirin today. He denies fevers, chills, cough, lightheadedness or dizziness. He is vaccinated for COVID. No recent travel or surgery    The history is provided by the patient. No  was used. Review of Systems   Constitutional: Negative for chills and fever. HENT: Negative for ear pain, sinus pressure and sore throat. Eyes: Negative for pain, discharge and redness. Respiratory: Positive for shortness of breath. Negative for cough and wheezing. Cardiovascular: Positive for chest pain. Negative for palpitations and leg swelling. Gastrointestinal: Negative for abdominal pain, diarrhea, nausea and vomiting. Genitourinary: Negative for dysuria and frequency. Musculoskeletal: Negative for arthralgias and back pain. Skin: Negative for rash and wound. Neurological: Negative for weakness and headaches. Hematological: Negative for adenopathy. All other systems reviewed and are negative. Physical Exam  Vitals and nursing note reviewed. Constitutional:       General: He is not in acute distress. Appearance: Normal appearance. He is well-developed. He is not ill-appearing. HENT:      Head: Normocephalic and atraumatic. Eyes:      Pupils: Pupils are equal, round, and reactive to light. Cardiovascular:      Rate and Rhythm: Normal rate and regular rhythm. Pulses: Normal pulses. Heart sounds: Normal heart sounds. No murmur heard. Pulmonary:      Effort: Pulmonary effort is normal. No respiratory distress. Breath sounds: Normal breath sounds. No wheezing or rales. Abdominal:      General: Bowel sounds are normal.      Palpations: Abdomen is soft. Tenderness: There is no abdominal tenderness. There is no guarding or rebound. Musculoskeletal:      Cervical back: Normal range of motion and neck supple. Comments: Fistula LUE   Skin:     General: Skin is warm and dry. Capillary Refill: Capillary refill takes less than 2 seconds. Neurological:      General: No focal deficit present. Mental Status: He is alert and oriented to person, place, and time. Cranial Nerves: No cranial nerve deficit.       Coordination: Coordination normal.          Procedures     Labs Reviewed   COVID-19, RAPID - Abnormal; Notable for the following components:       Result Value    SARS-CoV-2, NAAT DETECTED (*)     All other components within normal limits   CBC WITH AUTO DIFFERENTIAL - Abnormal; Notable for the following components:    RBC 3.76 (*)     Hemoglobin 11.2 (*)     Hematocrit 36.1 (*)     MCHC 31.0 (*)     RDW 17.6 (*)     Lymphocytes Absolute 1.39 (*)     All other components within normal limits   COMPREHENSIVE METABOLIC PANEL W/ REFLEX TO MG FOR LOW K - Abnormal; Notable for the following components:    Glucose 72 (*)     CREATININE 4.1 (*)     All other components within normal limits    Narrative:     CALL  Ramirez  H34 tel. ,  Rejected Test Name/Called to: [address] [place] [address], 01/21/2022 12:36, by [address] [facility] - Abnormal; Notable for the following components:    Pro-BNP >70,000 (*)     All other components within normal limits    Narrative:     CALL  Ramirez  H34 tel. ,  Rejected Test Name/Called to: Tamra Kay RN, 01/21/2022 12:36, by Quynh Borrero   TROPONIN - Abnormal; Notable for the following components:    Troponin, High Sensitivity 94 (*)     All other components within normal limits   TROPONIN - Abnormal; Notable for the following components:    Troponin, High Sensitivity 95 (*)     All other components within normal limits   SPECIMEN REJECTION    Narrative:     CALL  Ramirez  H34 tel. ,  Rejected Test Name/Called to: Yesi Rizzo RN, 01/21/2022 12:36, by Cielo Dent     XR CHEST PORTABLE   Final Result   Bilateral pneumonia with worsening at the right base in the left mid lung. Cardiomegaly. EKG #1:   I personally interpreted this EKG  Time:  1110    Rate: 92  Rhythm: Sinus. Interpretation: normal sinus rhythm. MDM  Number of Diagnoses or Management Options  COVID-19  ESRD on dialysis [facility])  Diagnosis management comments: Patient is a 25 Emergency Room male presents today for chest pain after dialysis. On physical exam heart and lungs are clear to auscultation. Vitals are stable, he is not hypoxic. Labs and imaging obtained. Patient noted to be COVID-positive as of today, he is fully vaccinated. Was able to ambulate in the department without becoming hypoxic. Troponin of 94, delta 95, EKG shows no ischemic changes. This is his baseline. Likely related to his chronic kidney disease and dialysis and unlikely ACS in etiology. With benign work-up he will be discharged home and instructed to follow-up with PCP. Return precautions given. Vitals stable for discharge home.        Amount and/or Complexity of Data Reviewed  Clinical lab tests: reviewed           ED Course as of 01/21/22 1617   Fri Jan 21, 2022   1313 Patient is not hypoxic with ambulation [KG]      ED Course User Index  [KG] Mary Garcia DO       --------------------------------------------- PAST HISTORY ---------------------------------------------  Past Medical History:  has a past medical history of (HFpEF) heart failure with preserved ejection fraction ([address] Anxiety, AVF (arteriovenous fistula) ([facility] Chronic kidney disease, Depression, Encounter regarding vascular access for dialysis for ESRD [facility]), History of ruptured [place] aneurysm [facility]), Hypertension, Hypothyroidism, Intracranial hemorrhage ([facility] Neutropenia ([facility] Noncompliance w/medication treatment due to intermit use of medication, and Pre-transplant evaluation for kidney transplant. Past Surgical History:  has a past surgical history that includes Brain aneurysm surgery (Right, 3-4 years ago); Dialysis fistula creation (Left, 11 years ago); Upper gastrointestinal endoscopy (N/A, 1/3/2019); and bronchoscopy (N/A, 8/3/2021). Social History:  reports that he has never smoked. He has never used smokeless tobacco. He reports previous drug use. Drug: Marijuana Roland Dasen). He reports that he does not drink alcohol. Family History: family history includes Kidney Disease in his paternal grandmother. The patients home medications have been reviewed.     Allergies: Tylenol [acetaminophen] and Levofloxacin    -------------------------------------------------- RESULTS -------------------------------------------------  Labs:  Results for orders placed or performed during the hospital encounter of 01/21/22   COVID-19, Rapid    Specimen: Nasopharyngeal Swab   Result Value Ref Range    SARS-CoV-2, NAAT DETECTED (A) Not Detected   CBC auto differential   Result Value Ref Range    WBC 5.7 4.5 - 11.5 E9/L    RBC 3.76 (L) 3.80 - 5.80 E12/L    Hemoglobin 11.2 (L) 12.5 - 16.5 g/dL    Hematocrit 36.1 (L) 37.0 - 54.0 %    MCV 96.0 80.0 - 99.9 fL    MCH 29.8 26.0 - 35.0 pg    MCHC 31.0 (L) 32.0 - 34.5 %    RDW 17.6 (H) 11.5 - 15.0 fL    Platelets 434 168 - 133 E9/L    MPV 9.9 7.0 - 12.0 fL    Neutrophils % 63.6 43.0 - 80.0 %    Immature Granulocytes % 0.2 0.0 - 5.0 %    Lymphocytes % 24.3 20.0 - 42.0 %    Monocytes % 7.7 2.0 - 12.0 %    Eosinophils % 3.3 0.0 - 6.0 %    Basophils % 0.9 0.0 - 2.0 %    Neutrophils Absolute 3.64 1.80 - 7.30 E9/L    Immature Granulocytes # 0.01 E9/L    Lymphocytes Absolute 1.39 (L) 1.50 - 4.00 E9/L    Monocytes Absolute 0.44 0.10 - 0.95 E9/L Eosinophils Absolute 0.19 0.05 - 0.50 E9/L    Basophils Absolute 0.05 0.00 - 0.20 E9/L   Comprehensive Metabolic Panel w/ Reflex to MG   Result Value Ref Range    Sodium 141 132 - 146 mmol/L    Potassium reflex Magnesium 4.3 3.5 - 5.0 mmol/L    Chloride 99 98 - 107 mmol/L    CO2 28 22 - 29 mmol/L    Anion Gap 14 7 - 16 mmol/L    Glucose 72 (L) 74 - 99 mg/dL    BUN 13 6 - 20 mg/dL    CREATININE 4.1 (H) 0.7 - 1.2 mg/dL    GFR Non-African American 20 >=60 mL/min/1.73    GFR African American 20     Calcium 9.3 8.6 - 10.2 mg/dL    Total Protein 7.7 6.4 - 8.3 g/dL    Albumin 4.4 3.5 - 5.2 g/dL    Total Bilirubin 0.6 0.0 - 1.2 mg/dL    Alkaline Phosphatase 86 40 - 129 U/L    ALT 18 0 - 40 U/L    AST 39 0 - 39 U/L   Brain Natriuretic Peptide   Result Value Ref Range    Pro-BNP >70,000 (H) 0 - 125 pg/mL   SPECIMEN REJECTION   Result Value Ref Range    Rejected Test trop     Reason for Rejection see below    Troponin   Result Value Ref Range    Troponin, High Sensitivity 94 (H) 0 - 11 ng/L   Troponin   Result Value Ref Range    Troponin, High Sensitivity 95 (H) 0 - 11 ng/L   EKG 12 Lead   Result Value Ref Range    Ventricular Rate 70 BPM    Atrial Rate 81 BPM    P-R Interval 284 ms    QRS Duration 128 ms    Q-T Interval 426 ms    QTc Calculation (Bazett) 460 ms    P Axis 63 degrees    R Axis -77 degrees    T Axis 33 degrees       Radiology:  XR CHEST PORTABLE   Final Result   Bilateral pneumonia with worsening at the right base in the left mid lung. Cardiomegaly. ------------------------- NURSING NOTES AND VITALS REVIEWED ---------------------------  Date / Time Roomed:  1/21/2022 10:53 AM  ED Bed Assignment:  27/13    The nursing notes within the ED encounter and vital signs as below have been reviewed.    BP (!) 160/98   Pulse 84   Temp 97.4 °F (36.3 °C) (Oral)   Resp 16   SpO2 94%   Oxygen Saturation Interpretation: Normal      ------------------------------------------ PROGRESS NOTES ------------------------------------------  I have spoken with the patient and discussed todays results, in addition to providing specific details for the plan of care and counseling regarding the diagnosis and prognosis. Their questions are answered at this time and they are agreeable with the plan. I discussed at length with them reasons for immediate return here for re evaluation. They will followup with primary care by calling their office tomorrow. --------------------------------- ADDITIONAL PROVIDER NOTES ---------------------------------  At this time the patient is without objective evidence of an acute process requiring hospitalization or inpatient management. They have remained hemodynamically stable throughout their entire ED visit and are stable for discharge with outpatient follow-up. The plan has been discussed in detail and they are aware of the specific conditions for emergent return, as well as the importance of follow-up. Discharge Medication List as of 1/21/2022  3:49 PM          Diagnosis:  1. COVID-19    2. ESRD on dialysis [facility])        Disposition:  Patient's disposition: Discharge to home  Patient's condition is stable.             Adebayo Vergara DO  Resident  01/21/22 [id number]

## 2022-01-21 NOTE — ED NOTES
93% while ambulating without oxygen, sitting in room not on oxygen 95%.      Casie Latham RN  01/21/22 1257

## 2022-01-22 ENCOUNTER — APPOINTMENT (OUTPATIENT)
Dept: CT IMAGING | Age: 37
DRG: 137 | End: 2022-01-22
Payer: MEDICAID

## 2022-01-22 ENCOUNTER — HOSPITAL ENCOUNTER (INPATIENT)
Age: 37
LOS: 7 days | Discharge: HOME | DRG: 137 | End: 2022-01-29
Attending: STUDENT IN AN ORGANIZED HEALTH CARE EDUCATION/TRAINING PROGRAM | Admitting: INTERNAL MEDICINE
Payer: MEDICAID

## 2022-01-22 DIAGNOSIS — Z99.2 ESRD ON DIALYSIS (HCC): ICD-10-CM

## 2022-01-22 DIAGNOSIS — J96.01 ACUTE RESPIRATORY FAILURE WITH HYPOXIA (HCC): Primary | ICD-10-CM

## 2022-01-22 DIAGNOSIS — N18.6 ESRD ON DIALYSIS (HCC): ICD-10-CM

## 2022-01-22 DIAGNOSIS — U07.1 COVID-19: ICD-10-CM

## 2022-01-22 LAB
ALBUMIN SERPL-MCNC: 4 G/DL (ref 3.5–5.2)
ALP BLD-CCNC: 87 U/L (ref 40–129)
ALT SERPL-CCNC: 18 U/L (ref 0–40)
ANION GAP SERPL CALCULATED.3IONS-SCNC: 16 MMOL/L (ref 7–16)
AST SERPL-CCNC: 37 U/L (ref 0–39)
BASOPHILS ABSOLUTE: 0.04 E9/L (ref 0–0.2)
BASOPHILS RELATIVE PERCENT: 0.9 % (ref 0–2)
BILIRUB SERPL-MCNC: 0.6 MG/DL (ref 0–1.2)
BUN BLDV-MCNC: 33 MG/DL (ref 6–20)
CALCIUM SERPL-MCNC: 9.4 MG/DL (ref 8.6–10.2)
CHLORIDE BLD-SCNC: 100 MMOL/L (ref 98–107)
CO2: 27 MMOL/L (ref 22–29)
CREAT SERPL-MCNC: 7.3 MG/DL (ref 0.7–1.2)
EOSINOPHILS ABSOLUTE: 0.18 E9/L (ref 0.05–0.5)
EOSINOPHILS RELATIVE PERCENT: 3.9 % (ref 0–6)
GFR AFRICAN AMERICAN: 10
GFR NON-AFRICAN AMERICAN: 10 ML/MIN/1.73
GLUCOSE BLD-MCNC: 87 MG/DL (ref 74–99)
HCT VFR BLD CALC: 31.9 % (ref 37–54)
HEMOGLOBIN: 9.7 G/DL (ref 12.5–16.5)
IMMATURE GRANULOCYTES #: 0.02 E9/L
IMMATURE GRANULOCYTES %: 0.4 % (ref 0–5)
LIPASE: 27 U/L (ref 13–60)
LYMPHOCYTES ABSOLUTE: 1.2 E9/L (ref 1.5–4)
LYMPHOCYTES RELATIVE PERCENT: 25.9 % (ref 20–42)
MCH RBC QN AUTO: 30 PG (ref 26–35)
MCHC RBC AUTO-ENTMCNC: 30.4 % (ref 32–34.5)
MCV RBC AUTO: 98.8 FL (ref 80–99.9)
MONOCYTES ABSOLUTE: 0.25 E9/L (ref 0.1–0.95)
MONOCYTES RELATIVE PERCENT: 5.4 % (ref 2–12)
NEUTROPHILS ABSOLUTE: 2.95 E9/L (ref 1.8–7.3)
NEUTROPHILS RELATIVE PERCENT: 63.5 % (ref 43–80)
PDW BLD-RTO: 17.3 FL (ref 11.5–15)
PLATELET # BLD: 147 E9/L (ref 130–450)
PMV BLD AUTO: 9.4 FL (ref 7–12)
POTASSIUM REFLEX MAGNESIUM: 5.1 MMOL/L (ref 3.5–5)
PROCALCITONIN: 0.76 NG/ML (ref 0–0.08)
RBC # BLD: 3.23 E12/L (ref 3.8–5.8)
SODIUM BLD-SCNC: 143 MMOL/L (ref 132–146)
TOTAL PROTEIN: 6.8 G/DL (ref 6.4–8.3)
TROPONIN, HIGH SENSITIVITY: 98 NG/L (ref 0–11)
TROPONIN, HIGH SENSITIVITY: 99 NG/L (ref 0–11)
WBC # BLD: 4.6 E9/L (ref 4.5–11.5)

## 2022-01-22 PROCEDURE — 6360000002 HC RX W HCPCS: Performed by: INTERNAL MEDICINE

## 2022-01-22 PROCEDURE — 6360000002 HC RX W HCPCS

## 2022-01-22 PROCEDURE — 99285 EMERGENCY DEPT VISIT HI MDM: CPT

## 2022-01-22 PROCEDURE — 93005 ELECTROCARDIOGRAM TRACING: CPT | Performed by: STUDENT IN AN ORGANIZED HEALTH CARE EDUCATION/TRAINING PROGRAM

## 2022-01-22 PROCEDURE — 6370000000 HC RX 637 (ALT 250 FOR IP): Performed by: INTERNAL MEDICINE

## 2022-01-22 PROCEDURE — 84145 PROCALCITONIN (PCT): CPT

## 2022-01-22 PROCEDURE — 6360000002 HC RX W HCPCS: Performed by: FAMILY MEDICINE

## 2022-01-22 PROCEDURE — 74176 CT ABD & PELVIS W/O CONTRAST: CPT

## 2022-01-22 PROCEDURE — 96365 THER/PROPH/DIAG IV INF INIT: CPT

## 2022-01-22 PROCEDURE — 6360000002 HC RX W HCPCS: Performed by: STUDENT IN AN ORGANIZED HEALTH CARE EDUCATION/TRAINING PROGRAM

## 2022-01-22 PROCEDURE — 96372 THER/PROPH/DIAG INJ SC/IM: CPT

## 2022-01-22 PROCEDURE — 6360000004 HC RX CONTRAST MEDICATION: Performed by: RADIOLOGY

## 2022-01-22 PROCEDURE — 2140000000 HC CCU INTERMEDIATE R&B

## 2022-01-22 PROCEDURE — 36415 COLL VENOUS BLD VENIPUNCTURE: CPT

## 2022-01-22 PROCEDURE — 2700000000 HC OXYGEN THERAPY PER DAY

## 2022-01-22 PROCEDURE — 2500000003 HC RX 250 WO HCPCS: Performed by: STUDENT IN AN ORGANIZED HEALTH CARE EDUCATION/TRAINING PROGRAM

## 2022-01-22 PROCEDURE — 71275 CT ANGIOGRAPHY CHEST: CPT

## 2022-01-22 PROCEDURE — 84484 ASSAY OF TROPONIN QUANT: CPT

## 2022-01-22 PROCEDURE — 5A1D70Z PERFORMANCE OF URINARY FILTRATION, INTERMITTENT, LESS THAN 6 HOURS PER DAY: ICD-10-PCS | Performed by: INTERNAL MEDICINE

## 2022-01-22 PROCEDURE — 2580000003 HC RX 258: Performed by: INTERNAL MEDICINE

## 2022-01-22 PROCEDURE — 96375 TX/PRO/DX INJ NEW DRUG ADDON: CPT

## 2022-01-22 PROCEDURE — 80053 COMPREHEN METABOLIC PANEL: CPT

## 2022-01-22 PROCEDURE — 83690 ASSAY OF LIPASE: CPT

## 2022-01-22 PROCEDURE — 85025 COMPLETE CBC W/AUTO DIFF WBC: CPT

## 2022-01-22 PROCEDURE — 02HV33Z INSERTION OF INFUSION DEVICE INTO SUPERIOR VENA CAVA, PERCUTANEOUS APPROACH: ICD-10-PCS | Performed by: INTERNAL MEDICINE

## 2022-01-22 RX ORDER — ONDANSETRON 2 MG/ML
4 INJECTION INTRAMUSCULAR; INTRAVENOUS ONCE
Status: COMPLETED | OUTPATIENT
Start: 2022-01-22 | End: 2022-01-22

## 2022-01-22 RX ORDER — CLONIDINE HYDROCHLORIDE 0.1 MG/1
0.3 TABLET ORAL 3 TIMES DAILY
Status: DISCONTINUED | OUTPATIENT
Start: 2022-01-22 | End: 2022-01-29 | Stop reason: HOSPADM

## 2022-01-22 RX ORDER — CHOLECALCIFEROL (VITAMIN D3) 10 MCG
1 TABLET ORAL DAILY
Status: DISCONTINUED | OUTPATIENT
Start: 2022-01-22 | End: 2022-01-29 | Stop reason: HOSPADM

## 2022-01-22 RX ORDER — LABETALOL 100 MG/1
300 TABLET, FILM COATED ORAL 2 TIMES DAILY
Status: DISCONTINUED | OUTPATIENT
Start: 2022-01-22 | End: 2022-01-29 | Stop reason: HOSPADM

## 2022-01-22 RX ORDER — MECOBALAMIN 5000 MCG
5 TABLET,DISINTEGRATING ORAL NIGHTLY PRN
Status: DISCONTINUED | OUTPATIENT
Start: 2022-01-22 | End: 2022-01-29 | Stop reason: HOSPADM

## 2022-01-22 RX ORDER — MEGESTROL ACETATE 20 MG/1
20 TABLET ORAL 3 TIMES DAILY
Status: DISCONTINUED | OUTPATIENT
Start: 2022-01-22 | End: 2022-01-29 | Stop reason: HOSPADM

## 2022-01-22 RX ORDER — SEVELAMER CARBONATE 800 MG/1
1600 TABLET, FILM COATED ORAL 3 TIMES DAILY
Status: DISCONTINUED | OUTPATIENT
Start: 2022-01-22 | End: 2022-01-29 | Stop reason: HOSPADM

## 2022-01-22 RX ORDER — NICOTINE 21 MG/24HR
1 PATCH, TRANSDERMAL 24 HOURS TRANSDERMAL DAILY
Status: DISCONTINUED | OUTPATIENT
Start: 2022-01-22 | End: 2022-01-29 | Stop reason: HOSPADM

## 2022-01-22 RX ORDER — NIFEDIPINE 60 MG/1
60 TABLET, EXTENDED RELEASE ORAL DAILY
Status: DISCONTINUED | OUTPATIENT
Start: 2022-01-22 | End: 2022-01-29 | Stop reason: HOSPADM

## 2022-01-22 RX ORDER — HYDRALAZINE HYDROCHLORIDE 20 MG/ML
10 INJECTION INTRAMUSCULAR; INTRAVENOUS EVERY 6 HOURS PRN
Status: DISCONTINUED | OUTPATIENT
Start: 2022-01-22 | End: 2022-01-29 | Stop reason: HOSPADM

## 2022-01-22 RX ORDER — MORPHINE SULFATE 2 MG/ML
4 INJECTION, SOLUTION INTRAMUSCULAR; INTRAVENOUS ONCE
Status: COMPLETED | OUTPATIENT
Start: 2022-01-22 | End: 2022-01-22

## 2022-01-22 RX ORDER — DIPHENHYDRAMINE HYDROCHLORIDE 50 MG/ML
50 INJECTION INTRAMUSCULAR; INTRAVENOUS ONCE
Status: COMPLETED | OUTPATIENT
Start: 2022-01-22 | End: 2022-01-22

## 2022-01-22 RX ORDER — POLYETHYLENE GLYCOL 3350 17 G/17G
17 POWDER, FOR SOLUTION ORAL DAILY PRN
Status: DISCONTINUED | OUTPATIENT
Start: 2022-01-22 | End: 2022-01-23

## 2022-01-22 RX ORDER — HEPARIN SODIUM 10000 [USP'U]/ML
5000 INJECTION, SOLUTION INTRAVENOUS; SUBCUTANEOUS EVERY 8 HOURS
Status: DISCONTINUED | OUTPATIENT
Start: 2022-01-23 | End: 2022-01-26

## 2022-01-22 RX ORDER — ASCORBIC ACID 500 MG
500 TABLET ORAL DAILY
Status: DISCONTINUED | OUTPATIENT
Start: 2022-01-22 | End: 2022-01-29 | Stop reason: HOSPADM

## 2022-01-22 RX ORDER — ONDANSETRON 2 MG/ML
4 INJECTION INTRAMUSCULAR; INTRAVENOUS EVERY 6 HOURS PRN
Status: DISCONTINUED | OUTPATIENT
Start: 2022-01-22 | End: 2022-01-29 | Stop reason: HOSPADM

## 2022-01-22 RX ORDER — SODIUM CHLORIDE 9 MG/ML
25 INJECTION, SOLUTION INTRAVENOUS PRN
Status: DISCONTINUED | OUTPATIENT
Start: 2022-01-22 | End: 2022-01-29 | Stop reason: HOSPADM

## 2022-01-22 RX ORDER — LOSARTAN POTASSIUM 50 MG/1
100 TABLET ORAL EVERY EVENING
Status: DISCONTINUED | OUTPATIENT
Start: 2022-01-22 | End: 2022-01-29 | Stop reason: HOSPADM

## 2022-01-22 RX ORDER — ZINC SULFATE 50(220)MG
50 CAPSULE ORAL DAILY
Status: DISCONTINUED | OUTPATIENT
Start: 2022-01-22 | End: 2022-01-29 | Stop reason: HOSPADM

## 2022-01-22 RX ORDER — DIPHENHYDRAMINE HCL 25 MG
25 TABLET ORAL 2 TIMES DAILY PRN
Status: DISCONTINUED | OUTPATIENT
Start: 2022-01-22 | End: 2022-01-29 | Stop reason: HOSPADM

## 2022-01-22 RX ORDER — SODIUM CHLORIDE 0.9 % (FLUSH) 0.9 %
5-40 SYRINGE (ML) INJECTION EVERY 12 HOURS SCHEDULED
Status: DISCONTINUED | OUTPATIENT
Start: 2022-01-22 | End: 2022-01-29 | Stop reason: HOSPADM

## 2022-01-22 RX ORDER — SODIUM CHLORIDE 0.9 % (FLUSH) 0.9 %
5-40 SYRINGE (ML) INJECTION PRN
Status: DISCONTINUED | OUTPATIENT
Start: 2022-01-22 | End: 2022-01-29 | Stop reason: HOSPADM

## 2022-01-22 RX ORDER — LABETALOL HYDROCHLORIDE 5 MG/ML
10 INJECTION, SOLUTION INTRAVENOUS ONCE
Status: COMPLETED | OUTPATIENT
Start: 2022-01-22 | End: 2022-01-22

## 2022-01-22 RX ORDER — ONDANSETRON 2 MG/ML
INJECTION INTRAMUSCULAR; INTRAVENOUS
Status: COMPLETED
Start: 2022-01-22 | End: 2022-01-22

## 2022-01-22 RX ORDER — SODIUM CHLORIDE 0.9 % (FLUSH) 0.9 %
10 SYRINGE (ML) INJECTION PRN
Status: DISCONTINUED | OUTPATIENT
Start: 2022-01-22 | End: 2022-01-22 | Stop reason: SDUPTHER

## 2022-01-22 RX ORDER — NIFEDIPINE 60 MG/1
90 TABLET, FILM COATED, EXTENDED RELEASE ORAL DAILY
COMMUNITY
Start: 2022-01-14

## 2022-01-22 RX ORDER — DIPHENHYDRAMINE HYDROCHLORIDE 50 MG/ML
25 INJECTION INTRAMUSCULAR; INTRAVENOUS ONCE
Status: COMPLETED | OUTPATIENT
Start: 2022-01-22 | End: 2022-01-22

## 2022-01-22 RX ORDER — MORPHINE SULFATE 2 MG/ML
1 INJECTION, SOLUTION INTRAMUSCULAR; INTRAVENOUS EVERY 4 HOURS PRN
Status: DISCONTINUED | OUTPATIENT
Start: 2022-01-22 | End: 2022-01-29 | Stop reason: HOSPADM

## 2022-01-22 RX ORDER — HEPARIN SODIUM 10000 [USP'U]/ML
5000 INJECTION, SOLUTION INTRAVENOUS; SUBCUTANEOUS EVERY 8 HOURS
Status: DISCONTINUED | OUTPATIENT
Start: 2022-01-22 | End: 2022-01-22

## 2022-01-22 RX ORDER — PANTOPRAZOLE SODIUM 40 MG/1
40 TABLET, DELAYED RELEASE ORAL DAILY
Status: DISCONTINUED | OUTPATIENT
Start: 2022-01-22 | End: 2022-01-29 | Stop reason: HOSPADM

## 2022-01-22 RX ORDER — BENZONATATE 100 MG/1
100 CAPSULE ORAL 3 TIMES DAILY PRN
Status: DISCONTINUED | OUTPATIENT
Start: 2022-01-22 | End: 2022-01-29 | Stop reason: HOSPADM

## 2022-01-22 RX ORDER — DEXAMETHASONE SODIUM PHOSPHATE 10 MG/ML
10 INJECTION INTRAMUSCULAR; INTRAVENOUS ONCE
Status: COMPLETED | OUTPATIENT
Start: 2022-01-22 | End: 2022-01-22

## 2022-01-22 RX ORDER — HYDRALAZINE HYDROCHLORIDE 50 MG/1
50 TABLET, FILM COATED ORAL 3 TIMES DAILY
Status: DISCONTINUED | OUTPATIENT
Start: 2022-01-22 | End: 2022-01-29 | Stop reason: HOSPADM

## 2022-01-22 RX ORDER — DEXAMETHASONE SODIUM PHOSPHATE 10 MG/ML
6 INJECTION INTRAMUSCULAR; INTRAVENOUS EVERY 24 HOURS
Status: DISCONTINUED | OUTPATIENT
Start: 2022-01-23 | End: 2022-01-29 | Stop reason: HOSPADM

## 2022-01-22 RX ADMIN — HYALURONIDASE (HUMAN RECOMBINANT) 25.5 UNITS: 150 INJECTION, SOLUTION SUBCUTANEOUS at 15:00

## 2022-01-22 RX ADMIN — PANTOPRAZOLE SODIUM 40 MG: 40 TABLET, DELAYED RELEASE ORAL at 21:19

## 2022-01-22 RX ADMIN — IPRATROPIUM BROMIDE AND ALBUTEROL 1 PUFF: 20; 100 SPRAY, METERED RESPIRATORY (INHALATION) at 23:29

## 2022-01-22 RX ADMIN — IPRATROPIUM BROMIDE AND ALBUTEROL 1 PUFF: 20; 100 SPRAY, METERED RESPIRATORY (INHALATION) at 21:15

## 2022-01-22 RX ADMIN — LABETALOL HYDROCHLORIDE 10 MG: 5 INJECTION INTRAVENOUS at 13:50

## 2022-01-22 RX ADMIN — NEPHROCAP 1 MG: 1 CAP ORAL at 21:18

## 2022-01-22 RX ADMIN — ONDANSETRON 4 MG: 2 INJECTION INTRAMUSCULAR; INTRAVENOUS at 16:32

## 2022-01-22 RX ADMIN — CLONIDINE HYDROCHLORIDE 0.3 MG: 0.1 TABLET ORAL at 23:28

## 2022-01-22 RX ADMIN — DIPHENHYDRAMINE HYDROCHLORIDE 25 MG: 50 INJECTION, SOLUTION INTRAMUSCULAR; INTRAVENOUS at 23:37

## 2022-01-22 RX ADMIN — DEXAMETHASONE SODIUM PHOSPHATE 10 MG: 10 INJECTION INTRAMUSCULAR; INTRAVENOUS at 16:14

## 2022-01-22 RX ADMIN — IOPAMIDOL 75 ML: 755 INJECTION, SOLUTION INTRAVENOUS at 16:59

## 2022-01-22 RX ADMIN — ONDANSETRON 4 MG: 2 INJECTION INTRAMUSCULAR; INTRAVENOUS at 13:50

## 2022-01-22 RX ADMIN — Medication 10 ML: at 21:59

## 2022-01-22 RX ADMIN — MORPHINE SULFATE 4 MG: 2 INJECTION, SOLUTION INTRAMUSCULAR; INTRAVENOUS at 16:15

## 2022-01-22 RX ADMIN — HYDRALAZINE HYDROCHLORIDE 10 MG: 20 INJECTION INTRAMUSCULAR; INTRAVENOUS at 17:04

## 2022-01-22 RX ADMIN — DIPHENHYDRAMINE HYDROCHLORIDE 50 MG: 50 INJECTION, SOLUTION INTRAMUSCULAR; INTRAVENOUS at 16:15

## 2022-01-22 RX ADMIN — NIFEDIPINE 60 MG: 60 TABLET, EXTENDED RELEASE ORAL at 21:18

## 2022-01-22 RX ADMIN — Medication 1000 MG: at 13:49

## 2022-01-22 RX ADMIN — MEGESTROL ACETATE 20 MG: 20 TABLET ORAL at 21:19

## 2022-01-22 RX ADMIN — LABETALOL HYDROCHLORIDE 300 MG: 100 TABLET, FILM COATED ORAL at 21:18

## 2022-01-22 RX ADMIN — HYDRALAZINE HYDROCHLORIDE 50 MG: 50 TABLET, FILM COATED ORAL at 18:49

## 2022-01-22 RX ADMIN — LOSARTAN POTASSIUM 100 MG: 50 TABLET, FILM COATED ORAL at 18:50

## 2022-01-22 RX ADMIN — CEFEPIME HYDROCHLORIDE 1000 MG: 1 INJECTION, POWDER, FOR SOLUTION INTRAMUSCULAR; INTRAVENOUS at 23:37

## 2022-01-22 RX ADMIN — HYDRALAZINE HYDROCHLORIDE 50 MG: 50 TABLET, FILM COATED ORAL at 23:28

## 2022-01-22 RX ADMIN — ONDANSETRON HYDROCHLORIDE 4 MG: 2 SOLUTION INTRAMUSCULAR; INTRAVENOUS at 16:32

## 2022-01-22 RX ADMIN — CLONIDINE HYDROCHLORIDE 0.3 MG: 0.1 TABLET ORAL at 18:48

## 2022-01-22 RX ADMIN — ONDANSETRON 4 MG: 2 INJECTION INTRAMUSCULAR; INTRAVENOUS at 23:40

## 2022-01-22 RX ADMIN — MORPHINE SULFATE 1 MG: 2 INJECTION, SOLUTION INTRAMUSCULAR; INTRAVENOUS at 21:32

## 2022-01-22 ASSESSMENT — ENCOUNTER SYMPTOMS
DIARRHEA: 0
NAUSEA: 1
EYE DISCHARGE: 0
WHEEZING: 0
ABDOMINAL PAIN: 0
EYE REDNESS: 0
VOMITING: 1
BACK PAIN: 0
SINUS PRESSURE: 0
COUGH: 0
SORE THROAT: 0
SHORTNESS OF BREATH: 1
EYE PAIN: 0

## 2022-01-22 ASSESSMENT — PAIN DESCRIPTION - PAIN TYPE: TYPE: ACUTE PAIN

## 2022-01-22 ASSESSMENT — PAIN SCALES - GENERAL
PAINLEVEL_OUTOF10: 9
PAINLEVEL_OUTOF10: 9
PAINLEVEL_OUTOF10: 10
PAINLEVEL_OUTOF10: 9
PAINLEVEL_OUTOF10: 9

## 2022-01-22 ASSESSMENT — PAIN DESCRIPTION - ORIENTATION: ORIENTATION: LEFT

## 2022-01-22 ASSESSMENT — PAIN DESCRIPTION - FREQUENCY: FREQUENCY: CONTINUOUS

## 2022-01-22 ASSESSMENT — PAIN DESCRIPTION - LOCATION
LOCATION: GENERALIZED
LOCATION: CHEST

## 2022-01-22 ASSESSMENT — PAIN DESCRIPTION - DESCRIPTORS: DESCRIPTORS: STABBING;CONSTANT

## 2022-01-22 NOTE — ED NOTES
Pt arrives from home via EMS. Pt was diagnosed with Covid yesterday and now he is SOB and has CP. Unable to take his meds because of nausea.      Luis A Sim RN  01/22/22 [id number]

## 2022-01-22 NOTE — ED NOTES
Pts IV infiltrated with calcium gluconate infusing. MD and Pharmacist notified.      Lizbet Saavedra, NOEL  01/22/22 [id number]

## 2022-01-22 NOTE — ED NOTES
Bed: 35  Expected date:   Expected time:   Means of arrival:   Comments:  [address] [place] [address] [address], RN  01/22/22  [address]

## 2022-01-22 NOTE — ED PROVIDER NOTES
Chief Complaint   Patient presents with    Shortness of Breath     tested + for Covid yesterday, 90% on RA for EMS, body aches, diaphoretic and CP       Patient is a 42-year-old male with a history of end-stage renal disease on dialysis who is COVID-positive as yesterday presents today for cough, shortness of breath and fatigue. He was seen in the ER yesterday, at that time was diagnosed with COVID. He has received both of his vaccine and booster. He states he felt worse this morning, had multiple episodes of nausea and vomited up his home blood pressure medications. He did call EMS, on arrival he is noted to be hypoxic with O2 saturation of 88% on room air. Does not wear oxygen at baseline. His last dialysis treatment was yesterday, however he had a shortened treatment at that time. Denies fevers or chest pain    The history is provided by the patient and the EMS personnel. No  was used. Review of Systems   Constitutional: Positive for fatigue. Negative for chills and fever. HENT: Negative for ear pain, sinus pressure and sore throat. Eyes: Negative for pain, discharge and redness. Respiratory: Positive for shortness of breath. Negative for cough and wheezing. Cardiovascular: Negative for chest pain. Gastrointestinal: Positive for nausea and vomiting. Negative for abdominal pain and diarrhea. Genitourinary: Negative for dysuria and frequency. Musculoskeletal: Negative for arthralgias and back pain. Skin: Negative for rash and wound. Neurological: Negative for weakness and headaches. Hematological: Negative for adenopathy. Psychiatric/Behavioral: Negative for behavioral problems and confusion. All other systems reviewed and are negative. Physical Exam  Vitals and nursing note reviewed. Constitutional:       General: He is not in acute distress. Appearance: Normal appearance. He is well-developed. He is not ill-appearing.    HENT:      Head: Normocephalic and atraumatic. Eyes:      Pupils: Pupils are equal, round, and reactive to light. Cardiovascular:      Rate and Rhythm: Regular rhythm. Tachycardia present. Pulses: Normal pulses. Heart sounds: Normal heart sounds. No murmur heard. Pulmonary:      Effort: Pulmonary effort is normal. No respiratory distress. Breath sounds: Normal breath sounds. No wheezing or rales. Comments: Crackles at the lung bases bilaterally  Abdominal:      General: Bowel sounds are normal.      Palpations: Abdomen is soft. Tenderness: There is no abdominal tenderness. There is no guarding or rebound. Musculoskeletal:      Cervical back: Normal range of motion and neck supple. Right lower leg: No edema. Left lower leg: No edema. Skin:     General: Skin is warm and dry. Capillary Refill: Capillary refill takes less than 2 seconds. Neurological:      General: No focal deficit present. Mental Status: He is alert and oriented to person, place, and time. Cranial Nerves: No cranial nerve deficit. Coordination: Coordination normal.          Procedures   Central Line Placement Procedure Note    Indication: vascular access    Consent: The patient provided verbal consent for this procedure. Procedure: The patient was positioned appropriately and the skin over the right femoral vein was prepped with betadine and draped in a sterile fashion. Local anesthesia was obtained by infiltration using 1% Lidocaine without epinephrine. A large bore needle was used to identify the vein. A guide wire was then inserted into the vein through the needle. A triple lumen catheter was then inserted into the vessel over the guide wire using the Seldinger technique. All ports showed good, free flowing blood return and were flushed with saline solution. The catheter was then securely fastened to the skin with suture at 18 cm.   Two sutures were placed into the kit included tube clamp, proximal eyelets and a suture end from each of the securing sutures was extended around the catheter and tied to the proximal eyelets as an added measure to prevent dislodgement. An antibiotic disk was placed and the site was then covered with a sterile dressing. A post procedure X-ray was not indicated. The patient tolerated the procedure well.     Complications: None            Labs Reviewed   CBC WITH AUTO DIFFERENTIAL - Abnormal; Notable for the following components:       Result Value    RBC 3.23 (*)     Hemoglobin 9.7 (*)     Hematocrit 31.9 (*)     MCHC 30.4 (*)     RDW 17.3 (*)     Lymphocytes Absolute 1.20 (*)     All other components within normal limits   COMPREHENSIVE METABOLIC PANEL W/ REFLEX TO MG FOR LOW K - Abnormal; Notable for the following components:    Potassium reflex Magnesium 5.1 (*)     BUN 33 (*)     CREATININE 7.3 (*)     All other components within normal limits   TROPONIN - Abnormal; Notable for the following components:    Troponin, High Sensitivity 98 (*)     All other components within normal limits   TROPONIN - Abnormal; Notable for the following components:    Troponin, High Sensitivity 99 (*)     All other components within normal limits   PROCALCITONIN - Abnormal; Notable for the following components:    Procalcitonin 0.76 (*)     All other components within normal limits   CBC WITH AUTO DIFFERENTIAL - Abnormal; Notable for the following components:    RBC 3.10 (*)     Hemoglobin 9.3 (*)     Hematocrit 30.7 (*)     MCHC 30.3 (*)     RDW 17.2 (*)     Platelets 056 (*)     Neutrophils % 87.1 (*)     Lymphocytes % 8.4 (*)     Lymphocytes Absolute 0.65 (*)     Eosinophils Absolute 0.01 (*)     All other components within normal limits   COMPREHENSIVE METABOLIC PANEL - Abnormal; Notable for the following components:    Potassium 6.9 (*)     Chloride 94 (*)     CO2 18 (*)     Anion Gap 25 (*)     Glucose 124 (*)     BUN 54 (*)     CREATININE 8.2 (*)      (*)      (*) All other components within normal limits    Narrative:     CALL  [place] [id number] tel. ,  Chemistry results called to and read back by Mikhail Jimenez RN, 01/23/2022  05:19, by Dianne Rodriguez - Abnormal; Notable for the following components:    Magnesium 2.9 (*)     All other components within normal limits    Narrative:     CALL  [place] [id number] tel. ,  Chemistry results called to and read back by Mikhail Jimenez RN, 01/23/2022  05:19, by Peyton Singh   PHOSPHORUS - Abnormal; Notable for the following components:    Phosphorus 8.2 (*)     All other components within normal limits    Narrative:     [id number] [employer] tel. ,  Chemistry results called to and read back by Mikhail Jimenez RN, 01/23/2022  05:19, by Sergio Sanders - Abnormal; Notable for the following components:    CRP 3.4 (*)     All other components within normal limits    Narrative:     CALL  [place] [id number] tel. ,  Chemistry results called to and read back by Mikhail Jimenez RN, 01/23/2022  05:19, by KOTA   PROCALCITONIN - Abnormal; Notable for the following components:    Procalcitonin 10.53 (*)     All other components within normal limits    Narrative:     CALL  [place] [phone] tel. ,  Chemistry results called to and read back by Mikhail Jimenez RN, 01/23/2022  05:19, by Peyton Singh   VITAMIN D 25 HYDROXY - Abnormal; Notable for the following components:    Vit D, 25-Hydroxy 17 (*)     All other components within normal limits    Narrative:     CALL  [place] [id number] tel. ,  Chemistry results called to and read back by Mikhail Jimenez RN, 01/23/2022  05:19, by Jennifer Burrell - Abnormal; Notable for the following components:    Pro-BNP >70,000 (*)     All other components within normal limits    Narrative:     CALL  [place] [id number] tel. ,  Chemistry results called to and read back by Mikhail Jimenez RN, 01/23/2022  05:19, by [address] - Abnormal; Notable for the following components:    Potassium 6.1 (*)     Chloride 95 (*)     Anion Gap 18 (*)     Glucose 118 (*)     BUN 64 (*)     CREATININE 9.1 (*)     All other components within normal limits    Narrative:     CALL  [facility]  [address] tel. ,  Chemistry results called to and read back by Yoan Oneal, 01/23/2022  10:48, by [facility]   01/23/2022  10:03   LACTIC ACID, PLASMA - Abnormal; Notable for the following components:    Lactic Acid 2.5 (*)     All other components within normal limits   POCT GLUCOSE - Abnormal; Notable for the following components:    Meter Glucose 124 (*)     All other components within normal limits   POCT GLUCOSE - Abnormal; Notable for the following components:    Meter Glucose 154 (*)     All other components within normal limits   CULTURE, MRSA, SCREENING   CULTURE, RESPIRATORY   CULTURE, BLOOD 1   CULTURE, BLOOD 2   LIPASE   D-DIMER, QUANTITATIVE   FIBRINOGEN   FERRITIN    Narrative:     CALL  [place]  [address] tel. ,  Chemistry results called to and read back by Gerardo Gurrola RN, 01/23/2022  05:19, by Elysia Batista   TSH WITHOUT REFLEX    Narrative:     CALL  [place]  [address] tel. ,  Chemistry results called to and read back by Gerardo Gurrola RN, 01/23/2022  05:19, by Pedro Schreiber    Narrative:     Linda Robb  H[address] tel. ,  Chemistry results called to and read back by Gerardo Gurrola RN, 01/23/2022  05:19, by [address]   SERUM DRUG SCREEN   POCT GLUCOSE   POCT GLUCOSE   POCT GLUCOSE   POCT GLUCOSE   POCT GLUCOSE   POCT GLUCOSE   POCT GLUCOSE   POCT GLUCOSE   POCT GLUCOSE     CTA PULMONARY W CONTRAST   Final Result   1. Findings consistent with CHF with prominent cardiomegaly, right pleural   effusion, right heart reflux suggested, and pulmonary edema. 2. Possibility of COVID pneumonia amidst the pulmonary opacification to also   be considered. 3. No acute pulmonary embolus is identified. CT ABDOMEN PELVIS WO CONTRAST Additional Contrast? None   Final Result   1. Mild pelvic ascites. 2. Possible constipation. 3. Renal atrophy and osteodystrophy.            EKG #1:   I personally interpreted this EKG  Time:  1301    Rate: 105  Rhythm: Sinus. Interpretation: Sinus rhythm, normal axis, no ST elevation, peaked T waves. MDM  Number of Diagnoses or Management Options  Acute respiratory failure with hypoxia ([address]  COVID-19  ESRD on dialysis [facility])  Diagnosis management comments: Patient is a 47-year-old male with a history of end-stage renal disease on dialysis COVID-positive who came in today for worsening shortness of breath. He was noted to be hypoxic on arrival 88%, he was placed on 4 L nasal cannula. Heart and lungs clear to auscultation. Patient has fistula to left upper extremity, ultrasound IV was placed in the right upper extremity, CTA, IV did infiltrate calcium gluconate was running, hyaluronidase was injected locally around site, this was discussed with pharmacy. We were unable to use left upper extremity, I did infiltrate in right upper extremity patient did consent to a right femoral CVC for us to obtain a CTA to evaluate for pulmonary embolism. Electrolytes are normal, troponin is at baseline, EKG shows no acute ischemic changes. CTA shows findings consistent with CHF, no evidence of PE noted. Patient will be admitted to the hospital for acute respiratory failure with hypoxia with associated COVID-19.   Patient will also need dialysis as he did receive contrast.       Amount and/or Complexity of Data Reviewed  Clinical lab tests: reviewed                  --------------------------------------------- PAST HISTORY ---------------------------------------------  Past Medical History:  has a past medical history of (HFpEF) heart failure with preserved ejection fraction ([address] Anxiety, AVF (arteriovenous fistula) ([facility] [address] Chronic kidney disease, Depression, Encounter regarding vascular access for dialysis for ESRD [facility]), History of ruptured Berry aneurysm ([facility] [address] Hypertension, Hypothyroidism, Intracranial hemorrhage ([facility] [address] Neutropenia ([facility] [address] Noncompliance w/medication treatment due to intermit use of medication, and Pre-transplant evaluation for kidney transplant. Past Surgical History:  has a past surgical history that includes Brain aneurysm surgery (Right, 3-4 years ago); Dialysis fistula creation (Left, 11 years ago); Upper gastrointestinal endoscopy (N/A, 1/3/2019); and bronchoscopy (N/A, 8/3/2021). Social History:  reports that he has never smoked. He has never used smokeless tobacco. He reports previous drug use. Drug: Marijuana [place]). He reports that he does not drink alcohol. Family History: family history includes Kidney Disease in his paternal grandmother. The patients home medications have been reviewed.     Allergies: Tylenol [acetaminophen] and Levofloxacin    -------------------------------------------------- RESULTS -------------------------------------------------    LABS:  Results for orders placed or performed during the hospital encounter of 01/22/22   CBC Auto Differential   Result Value Ref Range    WBC 4.6 4.5 - 11.5 E9/L    RBC 3.23 (L) 3.80 - 5.80 E12/L    Hemoglobin 9.7 (L) 12.5 - 16.5 g/dL    Hematocrit 31.9 (L) 37.0 - 54.0 %    MCV 98.8 80.0 - 99.9 fL    MCH 30.0 26.0 - 35.0 pg    MCHC 30.4 (L) 32.0 - 34.5 %    RDW 17.3 (H) 11.5 - 15.0 fL    Platelets 136 428 - 277 E9/L    MPV 9.4 7.0 - 12.0 fL    Neutrophils % 63.5 43.0 - 80.0 %    Immature Granulocytes % 0.4 0.0 - 5.0 %    Lymphocytes % 25.9 20.0 - 42.0 %    Monocytes % 5.4 2.0 - 12.0 %    Eosinophils % 3.9 0.0 - 6.0 %    Basophils % 0.9 0.0 - 2.0 %    Neutrophils Absolute 2.95 1.80 - 7.30 E9/L    Immature Granulocytes # 0.02 E9/L    Lymphocytes Absolute 1.20 (L) 1.50 - 4.00 E9/L    Monocytes Absolute 0.25 0.10 - 0.95 E9/L    Eosinophils Absolute 0.18 0.05 - 0.50 E9/L    Basophils Absolute 0.04 0.00 - 0.20 E9/L   Comprehensive Metabolic Panel w/ Reflex to MG   Result Value Ref Range    Sodium 143 132 - 146 mmol/L    Potassium reflex Magnesium 5.1 (H) 3.5 - 5.0 mmol/L Chloride 100 98 - 107 mmol/L    CO2 27 22 - 29 mmol/L    Anion Gap 16 7 - 16 mmol/L    Glucose 87 74 - 99 mg/dL    BUN 33 (H) 6 - 20 mg/dL    CREATININE 7.3 (H) 0.7 - 1.2 mg/dL    GFR Non-African American 10 >=60 mL/min/1.73    GFR African American 10     Calcium 9.4 8.6 - 10.2 mg/dL    Total Protein 6.8 6.4 - 8.3 g/dL    Albumin 4.0 3.5 - 5.2 g/dL    Total Bilirubin 0.6 0.0 - 1.2 mg/dL    Alkaline Phosphatase 87 40 - 129 U/L    ALT 18 0 - 40 U/L    AST 37 0 - 39 U/L   Troponin   Result Value Ref Range    Troponin, High Sensitivity 98 (H) 0 - 11 ng/L   Troponin   Result Value Ref Range    Troponin, High Sensitivity 99 (H) 0 - 11 ng/L   Lipase   Result Value Ref Range    Lipase 27 13 - 60 U/L   Procalcitonin   Result Value Ref Range    Procalcitonin 0.76 (H) 0.00 - 0.08 ng/mL   CBC WITH AUTO DIFFERENTIAL   Result Value Ref Range    WBC 7.8 4.5 - 11.5 E9/L    RBC 3.10 (L) 3.80 - 5.80 E12/L    Hemoglobin 9.3 (L) 12.5 - 16.5 g/dL    Hematocrit 30.7 (L) 37.0 - 54.0 %    MCV 99.0 80.0 - 99.9 fL    MCH 30.0 26.0 - 35.0 pg    MCHC 30.3 (L) 32.0 - 34.5 %    RDW 17.2 (H) 11.5 - 15.0 fL    Platelets 300 (L) 214 - 450 E9/L    MPV 11.5 7.0 - 12.0 fL    Neutrophils % 87.1 (H) 43.0 - 80.0 %    Immature Granulocytes % 0.6 0.0 - 5.0 %    Lymphocytes % 8.4 (L) 20.0 - 42.0 %    Monocytes % 3.7 2.0 - 12.0 %    Eosinophils % 0.1 0.0 - 6.0 %    Basophils % 0.1 0.0 - 2.0 %    Neutrophils Absolute 6.75 1.80 - 7.30 E9/L    Immature Granulocytes # 0.05 E9/L    Lymphocytes Absolute 0.65 (L) 1.50 - 4.00 E9/L    Monocytes Absolute 0.29 0.10 - 0.95 E9/L    Eosinophils Absolute 0.01 (L) 0.05 - 0.50 E9/L    Basophils Absolute 0.01 0.00 - 0.20 E9/L   COMPREHENSIVE METABOLIC PANEL   Result Value Ref Range    Sodium 137 132 - 146 mmol/L    Potassium 6.9 (HH) 3.5 - 5.0 mmol/L    Chloride 94 (L) 98 - 107 mmol/L    CO2 18 (L) 22 - 29 mmol/L    Anion Gap 25 (H) 7 - 16 mmol/L    Glucose 124 (H) 74 - 99 mg/dL    BUN 54 (H) 6 - 20 mg/dL    CREATININE 8.2 (HH) 0.7 - 1.2 mg/dL    GFR Non-African American 9 >=60 mL/min/1.73    GFR African American 9     Calcium 9.5 8.6 - 10.2 mg/dL    Total Protein 7.0 6.4 - 8.3 g/dL    Albumin 4.2 3.5 - 5.2 g/dL    Total Bilirubin 0.8 0.0 - 1.2 mg/dL    Alkaline Phosphatase 96 40 - 129 U/L     (H) 0 - 40 U/L     (H) 0 - 39 U/L   D-dimer, quantitative   Result Value Ref Range    D-Dimer, Quant 3400 ng/mL DDU   Fibrinogen   Result Value Ref Range    Fibrinogen 449 225 - 540 mg/dL   FERRITIN   Result Value Ref Range    Ferritin 82,610 ng/mL   Magnesium   Result Value Ref Range    Magnesium 2.9 (H) 1.6 - 2.6 mg/dL   Phosphorus   Result Value Ref Range    Phosphorus 8.2 (H) 2.5 - 4.5 mg/dL   C-REACTIVE PROTEIN   Result Value Ref Range    CRP 3.4 (H) 0.0 - 0.4 mg/dL   TSH without Reflex   Result Value Ref Range    TSH 3.030 0.270 - 4.200 uIU/mL   Procalcitonin   Result Value Ref Range    Procalcitonin 10.53 (H) 0.00 - 0.08 ng/mL   Vitamin D 25 Hydroxy   Result Value Ref Range    Vit D, 25-Hydroxy 17 (L) 30 - 100 ng/mL   Lipase   Result Value Ref Range    Lipase 26 13 - 60 U/L   Brain Natriuretic Peptide   Result Value Ref Range    Pro-BNP >70,000 (H) 0 - 125 pg/mL   Basic metabolic panel   Result Value Ref Range    Sodium 135 132 - 146 mmol/L    Potassium 6.1 (H) 3.5 - 5.0 mmol/L    Chloride 95 (L) 98 - 107 mmol/L    CO2 22 22 - 29 mmol/L    Anion Gap 18 (H) 7 - 16 mmol/L    Glucose 118 (H) 74 - 99 mg/dL    BUN 64 (H) 6 - 20 mg/dL    CREATININE 9.1 (HH) 0.7 - 1.2 mg/dL    GFR Non-African American 8 >=60 mL/min/1.73    GFR African American 8     Calcium 9.8 8.6 - 10.2 mg/dL   Lactic acid, plasma   Result Value Ref Range    Lactic Acid 2.5 (H) 0.5 - 2.2 mmol/L   POCT Glucose   Result Value Ref Range    Meter Glucose 81 74 - 99 mg/dL   POCT Glucose   Result Value Ref Range    Meter Glucose 124 (H) 74 - 99 mg/dL   POCT Glucose   Result Value Ref Range    Meter Glucose 154 (H) 74 - 99 mg/dL   EKG 12 Lead   Result Value Ref Range Ventricular Rate 105 BPM    Atrial Rate 105 BPM    P-R Interval 176 ms    QRS Duration 80 ms    Q-T Interval 340 ms    QTc Calculation (Bazett) 449 ms    P Axis 73 degrees    R Axis 74 degrees    T Axis 87 degrees       RADIOLOGY:  CTA PULMONARY W CONTRAST   Final Result   1. Findings consistent with CHF with prominent cardiomegaly, right pleural   effusion, right heart reflux suggested, and pulmonary edema. 2. Possibility of COVID pneumonia amidst the pulmonary opacification to also   be considered. 3. No acute pulmonary embolus is identified. CT ABDOMEN PELVIS WO CONTRAST Additional Contrast? None   Final Result   1. Mild pelvic ascites. 2. Possible constipation. 3. Renal atrophy and osteodystrophy.                 ------------------------- NURSING NOTES AND VITALS REVIEWED ---------------------------  Date / Time Roomed:  1/22/2022 12:44 PM  ED Bed Assignment:  [id number]    The nursing notes within the ED encounter and vital signs as below have been reviewed.      Patient Vitals for the past 24 hrs:   BP Temp Temp src Pulse Resp SpO2 Height   01/23/22 0932 (!) 154/89   93      01/23/22 0913 137/76 98.7 °F (37.1 °C) Temporal 90  95 %    01/23/22 0600 (!) 164/84   92 20 98 %    01/23/22 0500 (!) 164/84   88 21 96 %    01/23/22 0400 138/66   82 20 96 %    01/23/22 0300 (!) 208/107   71 22 93 %    01/23/22 0200 (!) 234/136   79 22 95 %    01/23/22 0130 (!) 247/146   81 26     01/23/22 0109 (!) 198/126 97.5 °F (36.4 °C)  90      01/22/22 2326 (!) 206/130 97.7 °F (36.5 °C)  95  98 %    01/22/22 2000    92      01/22/22 1815 (!) 200/134 97.9 °F (36.6 °C) Oral 97 30 99 % 5' 6\" (1.676 m)   01/22/22 1728 (!) 195/132         01/22/22 1705 (!) 185/128   109 28 96 %    01/22/22 1502 (!) 194/148   102 (!) 32 96 %    01/22/22 1345 (!) 201/136   98 23 100 %    01/22/22 1244 (!) 197/135   100 22 99 %        Oxygen Saturation Interpretation: Abnormal    ------------------------------------------ PROGRESS NOTES ------------------------------------------    Counseling:  I have spoken with the patient and discussed todays results, in addition to providing specific details for the plan of care and counseling regarding the diagnosis and prognosis. Their questions are answered at this time and they are agreeable with the plan of admission.    --------------------------------- ADDITIONAL PROVIDER NOTES ---------------------------------  Consultations:  Spoke with On Call. Discussed case. They will admit the patient. This patient's ED course included: a personal history and physicial examination    This patient has remained hemodynamically stable during their ED course.       Medications   ondansetron (ZOFRAN) injection 4 mg (4 mg IntraVENous Given 1/23/22 0704)   hydrALAZINE (APRESOLINE) injection 10 mg (10 mg IntraVENous Given 1/22/22 1704)   nicotine (NICODERM CQ) 14 MG/24HR 1 patch (1 patch TransDERmal Patch Applied 1/23/22 0932)   albuterol-ipratropium (COMBIVENT RESPIMAT)  MCG/ACT inhaler 1 puff (1 puff Inhalation Given 1/23/22 0606)   benzonatate (TESSALON) capsule 100 mg (has no administration in time range)   dexamethasone (DECADRON) injection 6 mg (6 mg IntraVENous Given 1/23/22 0933)   ascorbic acid (VITAMIN C) tablet 500 mg (500 mg Oral Given 1/23/22 0933)   zinc sulfate (ZINCATE) capsule 50 mg (50 mg Oral Given 1/23/22 0933)   b complex-C-folic acid (NEPHROCAPS) capsule 1 mg (1 mg Oral Given 1/23/22 0933)   cloNIDine (CATAPRES) tablet 0.3 mg (0.3 mg Oral Given 1/23/22 0933)   diphenhydrAMINE (BENADRYL) tablet 25 mg (25 mg Oral Given 1/23/22 0654)   hydrALAZINE (APRESOLINE) tablet 50 mg (50 mg Oral Given 1/23/22 0933)   labetalol (NORMODYNE) tablet 300 mg (300 mg Oral Given 1/23/22 0932)   losartan (COZAAR) tablet 100 mg (100 mg Oral Given 1/22/22 1850)   megestrol (MEGACE) tablet 20 mg (20 mg Oral Given 1/23/22 0933)   melatonin disintegrating tablet 5 mg (has no administration in time range)   NIFEdipine (PROCARDIA XL) extended release tablet 60 mg (60 mg Oral Given 1/23/22 0933)   pantoprazole (PROTONIX) tablet 40 mg (40 mg Oral Given 1/23/22 0933)   sevelamer (RENVELA) tablet 1,600 mg (1,600 mg Oral Given 1/23/22 0931)   sodium chloride flush 0.9 % injection 5-40 mL ( IntraVENous Canceled Entry 1/23/22 0934)   sodium chloride flush 0.9 % injection 5-40 mL (has no administration in time range)   0.9 % sodium chloride infusion (has no administration in time range)   morphine (PF) injection 1 mg (1 mg IntraVENous Given 1/23/22 0641)   heparin (porcine) injection 5,000 Units (has no administration in time range)   cefepime (MAXIPIME) 1000 mg IVPB minibag (0 mg IntraVENous Stopped 1/23/22 0327)   niCARdipine (CARDENE) 50 mg in sodium chloride 0.9 % 250 mL infusion ( IntraVENous Held by provider 1/23/22 1038)   glucose (GLUTOSE) 40 % oral gel 15 g (has no administration in time range)   dextrose 50 % IV solution (has no administration in time range)   glucagon (rDNA) injection 1 mg (has no administration in time range)   dextrose 5 % solution (has no administration in time range)   vitamin D (ERGOCALCIFEROL) capsule 50,000 Units (50,000 Units Oral Given 1/23/22 0933)     Followed by   Vitamin D (CHOLECALCIFEROL) tablet 1,000 Units (has no administration in time range)   polyethylene glycol (GLYCOLAX) packet 17 g (17 g Oral Given 1/23/22 0934)   vancomycin 1000 mg IVPB in 250 mL D5W addavial (has no administration in time range)   ondansetron (ZOFRAN) injection 4 mg (4 mg IntraVENous Given 1/22/22 1350)   labetalol (NORMODYNE;TRANDATE) injection 10 mg (10 mg IntraVENous Given 1/22/22 1350)   calcium gluconate 1000 mg in dextrose 5% 100 mL IVPB (0 mg IntraVENous Stopped 1/22/22 1445)   dexamethasone (DECADRON) injection 10 mg (10 mg IntraVENous Given 1/22/22 1614)   diphenhydrAMINE (BENADRYL) injection 50 mg (50 mg IntraVENous Given 1/22/22 1615)   hyaluronidase human (HYLENEX) injection 25.5 Units (25.5 Units SubCUTAneous Given 1/22/22 1500)   morphine (PF) injection 4 mg (4 mg IntraVENous Given 1/22/22 1615)   iopamidol (ISOVUE-370) 76 % injection 75 mL (75 mLs IntraVENous Given 1/22/22 1659)   diphenhydrAMINE (BENADRYL) injection 25 mg (25 mg IntraVENous Given 1/22/22 2337)   labetalol (NORMODYNE;TRANDATE) injection 10 mg (10 mg IntraVENous Given 1/23/22 0125)   HYDROmorphone (DILAUDID) injection 0.5 mg (0.5 mg IntraVENous Given 1/23/22 0130)   insulin regular (HUMULIN R;NOVOLIN R) injection 10 Units (10 Units IntraVENous Given 1/23/22 0613)     And   dextrose 50 % IV solution (25 g IntraVENous Given 1/23/22 0614)   calcium gluconate 1,000 mg in dextrose 5 % 100 mL IVPB (0 mg IntraVENous Stopped 1/23/22 0934)         Diagnosis:  1. Acute respiratory failure with hypoxia ([address]    2. COVID-19    3. ESRD on dialysis [facility])      CRITICAL CARE:   32 MINUTES. Please note that the withdrawal or failure to initiate urgent interventions for this patient would likely result in a life threatening deterioration or permanent disability. Accordingly this patient received the above mentioned time, excluding separately billable procedures. Disposition:  Patient's disposition: Admit to telemetry  Patient's condition is stable.              Michael Hare DO  Resident  01/23/22 0727

## 2022-01-22 NOTE — ED NOTES
Pt with very labored breathing, pulse ox 92% on 2l O2 increased to [address], RN  01/22/22 2736

## 2022-01-22 NOTE — H&P
Hospitalist History & Physical      PCP: Jonny Jacobs DO    Date of Service: Pt seen/examined on 1/22/2022 and is     Admitted to Inpatient with expected LOS greater than two midnights due to medical therapy. Chief Complaint:  had concerns including Shortness of Breath (tested + for Covid yesterday, 90% on RA for EMS, body aches, diaphoretic and CP). History Of Present Illness:    Mr. Bandar Floyd, a 43y.o. year old male  who  has a past medical history of (HFpEF) heart failure with preserved ejection fraction ([address] Anxiety, AVF (arteriovenous fistula) ([facility] Chronic kidney disease, Depression, Encounter regarding vascular access for dialysis for ESRD [facility]), History of ruptured Berry aneurysm ([facility] [facility] Hypertension, Hypothyroidism, Intracranial hemorrhage ([facility] [address] Neutropenia ([facility] [address] Noncompliance w/medication treatment due to intermit use of medication, and Pre-transplant evaluation for kidney transplant. who presents to the ED with CP/SOB and retching. The patient states that he started feeling ill after dialysis yesterday; therefore he presented to the ED with complaints of fever, SOB and CP yesterday. He was tested for COVID and was positive, The patient did not show any evidence of hypoxia and was discharged home. He presents back to the hospital todau with worsening dry cough, SOB and CP, retching. The patient states that he has CP on the left; which feels like pressure with no radiation. The patient also reports fever but no chills. +nausea and retching; nut no vomiting. +abd pain, epigastric and bloating. He does not make urine. Can't recall when he had a BM. No diarrhea.   +generalized weakness. In the ED, the patient had an EKG that showed: sinus tachycardia, LVH; trops were 98, 95, 94 and as per the ED this is the patient's baseline with his ESRD. On 8/10/21: trop was in the 100's. The patient is having a CTA of the chest completed.  BP was elevated and was given an dose of labetalol; the patient has not taken his BP meds today because of the retching. The patient was noted to Desaturate to 88% and is placed on 4L of O2. He also received a dose of morphine, Benadryl (he has itching associated with dialysis). ROS: As per the HPI. Past Medical History:   Diagnosis Date    (HFpEF) heart failure with preserved ejection fraction ([facility] [address]     stage III    Anxiety 9/19/2015    AVF (arteriovenous fistula) ([facility] [address] [facility] 4/30/2018    Chronic kidney disease     CKD since early childhood per pt and on HD ~ 11 years    Depression     Encounter regarding vascular access for dialysis for ESRD ([facility] [address] 4/30/2018    History of ruptured Berry aneurysm ([facility] [address] 10/20/2015    Hypertension     on meds for ~ 11 years    Hypothyroidism     Intracranial hemorrhage (HCC)     was d/t ruptured aneurysm - pt underwent neurosurgery for clipping of the aneurysm    Neutropenia ([address]     Noncompliance w/medication treatment due to intermit use of medication 11/3/2015    Pre-transplant evaluation for kidney transplant 4/5/2017       Past Surgical History:   Procedure Laterality Date    BRAIN ANEURYSM SURGERY Right 3-4 years ago    Intracranial aneurysm clipping surgery 3-4 years ago, after rupture of aneurysm causing ICH    BRONCHOSCOPY N/A 8/3/2021    BRONCHOSCOPY DIAGNOSTIC OR CELL [address] ONLY performed by Akiko Gamez MD at [facility] Left 11 years ago    UPPER GASTROINTESTINAL ENDOSCOPY N/A 1/3/2019    EGD BIOPSY performed by oMnica Mayorga MD at [address] [place] [address]       Prior to Admission medications    Medication Sig Start Date End Date Taking?  Authorizing Provider   NIFEdipine (ADALAT CC) 60 MG extended release tablet Take 60 mg by mouth daily  1/14/22  Yes Historical Provider, MD   hydrALAZINE (APRESOLINE) 50 MG tablet Take 1 tablet by mouth 3 times daily 10/26/21  Yes Pedro Costello DO   pantoprazole (PROTONIX) 40 MG tablet TAKE 1 TABLET BY MOUTH DAILY *EMERGENCY REFILL* 10/8/21  Yes Leonard Bautista MD   labetalol (NORMODYNE) 300 MG tablet Take 1 tablet by mouth 2 times daily 9/9/21  Yes Eh Alvarado MD   diphenhydrAMINE (BENADRYL) 25 MG tablet Take 25 mg by mouth 2 times daily as needed for Itching, Allergies or Sleep   Yes Historical Provider, MD   Melatonin 5 MG CAPS Take 5 mg by mouth nightly as needed  8/19/21  Yes Historical Provider, MD   cloNIDine (CATAPRES) 0.3 MG tablet Take 0.3 mg by mouth 3 times daily  4/7/21  Yes Historical Provider, MD   megestrol (MEGACE) 20 MG tablet Take 20 mg by mouth 3 times daily  6/11/21  Yes Historical Provider, MD   losartan (COZAAR) 100 MG tablet Take 1 tablet by mouth every evening 8/20/21  Yes Brittnee Chaudhary,    B Complex-C-Folic Acid (NEPHRO-JOHNNY) 0.8 MG TABS Take 1 tablet by mouth daily  4/23/21  Yes Historical Provider, MD   sevelamer (RENVELA) 800 MG tablet Take 2 tablets by mouth 3 times daily    Yes Historical Provider, MD   Methoxy PEG-Epoetin Beta (MIRCERA IJ) Infuse 60 mcg intravenously every 14 days  8/13/21 8/12/22  Historical Provider, MD         Allergies:  Tylenol [acetaminophen] and Levofloxacin    Social History:    TOBACCO:   reports that he has never smoked. He has never used smokeless tobacco.  ETOH:   reports no history of alcohol use. Family History:    Reviewed in detail and negative for DM, CAD, Cancer, CVA. Positive as follows\"      Problem Relation Age of Onset    Kidney Disease Paternal Grandmother     Cancer Neg Hx     Heart Disease Neg Hx        REVIEW OF SYSTEMS:   Pertinent positives as noted in the HPI. All other systems reviewed and negative. PHYSICAL EXAM:  BP (!) 194/148   Pulse 102   Resp (!) 32   SpO2 96%   General appearance: No apparent distress, appears stated age and cooperative; emaciated. HEENT: Normal cephalic, atraumatic without obvious deformity. Pupils equal, round, and reactive to light. Extra ocular muscles intact. Conjunctivae/corneas clear. Neck: Supple, with full range of motion. No jugular venous distention. Trachea midline. Respiratory: Diminished breath sounds with crackles at the bases. Cardiovascular:  Normal S1, S2; tachycardic; +flow murmuir  Abdomen: +Distension +BS TTP in the epigastric region  Musculoskeletal: No clubbing, cyanosis, edema of bilateral lower extremities. Brisk capillary refill. Skin: Normal skin color. No rashes or lesions. Fistula in the left arm. Neurologic:  Neurovascularly intact without any focal sensory/motor deficits. Cranial nerves: II-XII intact, grossly non-focal.  Psychiatric: Alert and oriented, thought content appropriate, normal insight    Reviewed EKG and CXR personally      CBC:   Recent Labs     01/21/22 1137 01/22/22  1307   WBC 5.7 4.6   RBC 3.76* 3.23*   HGB 11.2* 9.7*   HCT 36.1* 31.9*   MCV 96.0 98.8   RDW 17.6* 17.3*    147     BMP:   Recent Labs     01/21/22  1137 01/22/22  1307    143   K 4.3 5.1*   CL 99 100   CO2 28 27   BUN 13 33*   CREATININE 4.1* 7.3*     LFT:  Recent Labs     01/21/22  1137 01/22/22  1307   PROT 7.7 6.8   ALKPHOS 86 87   ALT 18 18   AST 39 37   BILITOT 0.6 0.6     CE:  No results for input(s): CKTOTAL, TROPONINI in the last 72 hours. PT/INR: No results for input(s): INR, APTT in the last 72 hours. BNP: No results for input(s): BNP in the last 72 hours.   ESR:   Lab Results   Component Value Date    SEDRATE 40 (H) 11/10/2021     CRP:   Lab Results   Component Value Date    CRP 1.5 (H) 11/10/2021     D Dimer:   Lab Results   Component Value Date    DDIMER 738 07/31/2021      Folate and B12:   Lab Results   Component Value Date    KWUPMIIV91 764 02/10/2020   ,   Lab Results   Component Value Date    FOLATE 10.5 02/10/2020     Lactic Acid:   Lab Results   Component Value Date    LACTA 0.7 11/11/2018     Thyroid Studies:   Lab Results   Component Value Date    TSH 4.260 (H) 07/15/2021    U8NRODA 82.05 09/18/2015    K2FMHXT 7.8 03/20/2016       Oupatient labs:  Lab Results   Component Value Date    CHOL 157 10/21/2015    TRIG 82 10/21/2015    HDL 52 10/21/2015    LDLCALC 89 10/21/2015    TSH 4.260 (H) 07/15/2021    INR 1.1 03/15/2021       Urinalysis:  No results found for: NITRU, WBCUA, BACTERIA, RBCUA, BLOODU, SPECGRAV, GLUCOSEU    Imaging:  XR CHEST PORTABLE    Result Date: 1/21/2022  EXAMINATION: ONE XRAY VIEW OF THE CHEST 1/21/2022 10:52 am COMPARISON: 6 September 2021 HISTORY: ORDERING SYSTEM PROVIDED HISTORY: chest pain TECHNOLOGIST PROVIDED HISTORY: Reason for exam:->chest pain What reading provider will be dictating this exam?->CRC FINDINGS: There is bilateral pneumonia with interval progression at the right base and a new right pleural effusion. Pneumonia on the left appears mildly progressive in the mid lung. The heart is stably enlarged. The pulmonary vascularity is normal.     Bilateral pneumonia with worsening at the right base in the left mid lung. Cardiomegaly. ASSESSMENT:    Active Problems:    Acute respiratory failure with hypoxia (HCC)  Resolved Problems:    * No resolved hospital problems. *      PLAN:    1.) COVID PNA: CTA of the chest is pending. C/w decadron, Vitamin C, Zinc, Vitamin d levels are pending. Combivent ATC. Monitor inflammatory markers. The patient is fully vaccinated with his booster. Will check a pro-calcitonin- elevated at 0.76- Patient empirically on cefepime; MRSA swab ordered. . No remdesivir secondary to renal disease. 2.) Acute respiratory failure: patient on 4L. of Oxygen. 3.) Abd pain/retching: Lfts are normal. Check a lipase and CT abd/pelvis pending. Zofran PRN. 4.) ESRD on HD: nephrology is on consult   5.) HTN urgency: uncontrolled: restart home medications stat; patient did not take any medications this morning. Hydralazine PRN. Consider Cardene drip or nitro patch if patient remains uncontrolled. 6.) CP: Tele; CTA of the chest is pending. Morphine PRN.  May be secondary to #1.   7.) GERD: protonix   8.) Smoking cessation: Nicoderm patch ordered. 9.) Malnutrition: C/w megace/dietician is on consult   10.)  H/o berry aneurysm and clipping in 2015  11.) CHF   12.) H/o of hypothyroidism as per the records- appears patient is not on med's at home- as per the med rec. Will check a TSH in the am.    13.) DVT proph: Will be heparin Sq for now if there is no PE      Diet: No diet orders on file  Code Status: Prior  Surrogate decision maker confirmed with patient:   Extended Emergency Contact Information  Primary Emergency Contact: Davy Brantley, [address] of [address] [place] [address] Phone: [phone]  Mobile Phone: [phone]  Relation: Other   needed? No    DVT Prophylaxis: []Lovenox []Heparin []PCD [] [address]  []Encouraged ambulation  Disposition: []Med/Surg [] Intermediate [] ICU/CCU  Admit status: [] Observation [] Inpatient     +++++++++++++++++++++++++++++++++++++++++++++++++  Gaston Uriarte MD  [address]  +++++++++++++++++++++++++++++++++++++++++++++++++  NOTE: This report was transcribed using voice recognition software. Every effort was made to ensure accuracy; however, inadvertent computerized transcription errors may be present.

## 2022-01-22 NOTE — ED NOTES
x2 RNs attempted IV placement for lab collection and medication administration. Unsuccessful at IV placement. Ultrasound to guide IV placement.       Angelo Bond RN  01/22/22 4184

## 2022-01-22 NOTE — PROGRESS NOTES
Radiology Procedure Waiver   Name: Richard Leblanc  : 1985  MRN: [id number]    Date:  22    Time: 3:35 PM EST    Benefits of immediately proceeding with Radiology exam(s) without pre-testing outweigh the risks or are not indicated as specified below and therefore the following is/are being waived:    [] Pregnancy test   [] Patients LMP on-time and regular.   [] Patient had Tubal Ligation or has other Contraception Device. [] Patient  is Menopausal or Premenarcheal.    [] Patient had Full or Partial Hysterectomy. [] Protocol for Iodine allergy    [] MRI Questionnaire     [x] BUN/Creatinine   [] Patient age w/no hx of renal dysfunction. [] Patient on Dialysis. [] Recent Normal Labs. Electronically signed by Araseli Lr DO on 22 at 3:35 PM EST          Patient with history of end-stage renal disease on dialysis.   Please use right femoral CVC

## 2022-01-23 LAB
ACETAMINOPHEN LEVEL: <5 MCG/ML (ref 10–30)
ALBUMIN SERPL-MCNC: 4.2 G/DL (ref 3.5–5.2)
ALP BLD-CCNC: 96 U/L (ref 40–129)
ALT SERPL-CCNC: 224 U/L (ref 0–40)
ANION GAP SERPL CALCULATED.3IONS-SCNC: 18 MMOL/L (ref 7–16)
ANION GAP SERPL CALCULATED.3IONS-SCNC: 25 MMOL/L (ref 7–16)
AST SERPL-CCNC: 365 U/L (ref 0–39)
BASOPHILS ABSOLUTE: 0.01 E9/L (ref 0–0.2)
BASOPHILS RELATIVE PERCENT: 0.1 % (ref 0–2)
BILIRUB SERPL-MCNC: 0.8 MG/DL (ref 0–1.2)
BUN BLDV-MCNC: 54 MG/DL (ref 6–20)
BUN BLDV-MCNC: 64 MG/DL (ref 6–20)
C-REACTIVE PROTEIN: 3.4 MG/DL (ref 0–0.4)
CALCIUM SERPL-MCNC: 9.5 MG/DL (ref 8.6–10.2)
CALCIUM SERPL-MCNC: 9.8 MG/DL (ref 8.6–10.2)
CHLORIDE BLD-SCNC: 94 MMOL/L (ref 98–107)
CHLORIDE BLD-SCNC: 95 MMOL/L (ref 98–107)
CO2: 18 MMOL/L (ref 22–29)
CO2: 22 MMOL/L (ref 22–29)
CREAT SERPL-MCNC: 8.2 MG/DL (ref 0.7–1.2)
CREAT SERPL-MCNC: 9.1 MG/DL (ref 0.7–1.2)
D DIMER: 3400 NG/ML DDU
EKG ATRIAL RATE: 105 BPM
EKG P AXIS: 73 DEGREES
EKG P-R INTERVAL: 176 MS
EKG Q-T INTERVAL: 340 MS
EKG QRS DURATION: 80 MS
EKG QTC CALCULATION (BAZETT): 449 MS
EKG R AXIS: 74 DEGREES
EKG T AXIS: 87 DEGREES
EKG VENTRICULAR RATE: 105 BPM
EOSINOPHILS ABSOLUTE: 0.01 E9/L (ref 0.05–0.5)
EOSINOPHILS RELATIVE PERCENT: 0.1 % (ref 0–6)
ETHANOL: <10 MG/DL (ref 0–0.08)
FERRITIN: NORMAL NG/ML
FIBRINOGEN: 449 MG/DL (ref 225–540)
GFR AFRICAN AMERICAN: 8
GFR AFRICAN AMERICAN: 9
GFR NON-AFRICAN AMERICAN: 8 ML/MIN/1.73
GFR NON-AFRICAN AMERICAN: 9 ML/MIN/1.73
GLUCOSE BLD-MCNC: 118 MG/DL (ref 74–99)
GLUCOSE BLD-MCNC: 124 MG/DL (ref 74–99)
HCT VFR BLD CALC: 30.7 % (ref 37–54)
HEMOGLOBIN: 9.3 G/DL (ref 12.5–16.5)
IMMATURE GRANULOCYTES #: 0.05 E9/L
IMMATURE GRANULOCYTES %: 0.6 % (ref 0–5)
LACTIC ACID: 2.5 MMOL/L (ref 0.5–2.2)
LIPASE: 26 U/L (ref 13–60)
LYMPHOCYTES ABSOLUTE: 0.65 E9/L (ref 1.5–4)
LYMPHOCYTES RELATIVE PERCENT: 8.4 % (ref 20–42)
MAGNESIUM: 2.9 MG/DL (ref 1.6–2.6)
MCH RBC QN AUTO: 30 PG (ref 26–35)
MCHC RBC AUTO-ENTMCNC: 30.3 % (ref 32–34.5)
MCV RBC AUTO: 99 FL (ref 80–99.9)
METER GLUCOSE: 122 MG/DL (ref 74–99)
METER GLUCOSE: 124 MG/DL (ref 74–99)
METER GLUCOSE: 154 MG/DL (ref 74–99)
METER GLUCOSE: 81 MG/DL (ref 74–99)
MONOCYTES ABSOLUTE: 0.29 E9/L (ref 0.1–0.95)
MONOCYTES RELATIVE PERCENT: 3.7 % (ref 2–12)
NEUTROPHILS ABSOLUTE: 6.75 E9/L (ref 1.8–7.3)
NEUTROPHILS RELATIVE PERCENT: 87.1 % (ref 43–80)
PDW BLD-RTO: 17.2 FL (ref 11.5–15)
PHOSPHORUS: 8.2 MG/DL (ref 2.5–4.5)
PLATELET # BLD: 123 E9/L (ref 130–450)
PMV BLD AUTO: 11.5 FL (ref 7–12)
POTASSIUM SERPL-SCNC: 6.1 MMOL/L (ref 3.5–5)
POTASSIUM SERPL-SCNC: 6.9 MMOL/L (ref 3.5–5)
PRO-BNP: ABNORMAL PG/ML (ref 0–125)
PROCALCITONIN: 10.53 NG/ML (ref 0–0.08)
RBC # BLD: 3.1 E12/L (ref 3.8–5.8)
SALICYLATE, SERUM: <0.3 MG/DL (ref 0–30)
SODIUM BLD-SCNC: 135 MMOL/L (ref 132–146)
SODIUM BLD-SCNC: 137 MMOL/L (ref 132–146)
TOTAL PROTEIN: 7 G/DL (ref 6.4–8.3)
TRICYCLIC ANTIDEPRESSANTS SCREEN SERUM: NEGATIVE NG/ML
TSH SERPL DL<=0.05 MIU/L-ACNC: 3.03 UIU/ML (ref 0.27–4.2)
VITAMIN D 25-HYDROXY: 17 NG/ML (ref 30–100)
WBC # BLD: 7.8 E9/L (ref 4.5–11.5)

## 2022-01-23 PROCEDURE — 6360000002 HC RX W HCPCS

## 2022-01-23 PROCEDURE — 2140000000 HC CCU INTERMEDIATE R&B

## 2022-01-23 PROCEDURE — 84100 ASSAY OF PHOSPHORUS: CPT

## 2022-01-23 PROCEDURE — 82077 ASSAY SPEC XCP UR&BREATH IA: CPT

## 2022-01-23 PROCEDURE — 93005 ELECTROCARDIOGRAM TRACING: CPT | Performed by: INTERNAL MEDICINE

## 2022-01-23 PROCEDURE — 80053 COMPREHEN METABOLIC PANEL: CPT

## 2022-01-23 PROCEDURE — 85025 COMPLETE CBC W/AUTO DIFF WBC: CPT

## 2022-01-23 PROCEDURE — 93010 ELECTROCARDIOGRAM REPORT: CPT | Performed by: INTERNAL MEDICINE

## 2022-01-23 PROCEDURE — 6370000000 HC RX 637 (ALT 250 FOR IP)

## 2022-01-23 PROCEDURE — 83690 ASSAY OF LIPASE: CPT

## 2022-01-23 PROCEDURE — 2500000003 HC RX 250 WO HCPCS

## 2022-01-23 PROCEDURE — 84443 ASSAY THYROID STIM HORMONE: CPT

## 2022-01-23 PROCEDURE — 85384 FIBRINOGEN ACTIVITY: CPT

## 2022-01-23 PROCEDURE — 80143 DRUG ASSAY ACETAMINOPHEN: CPT

## 2022-01-23 PROCEDURE — 2580000003 HC RX 258: Performed by: INTERNAL MEDICINE

## 2022-01-23 PROCEDURE — 6360000002 HC RX W HCPCS: Performed by: INTERNAL MEDICINE

## 2022-01-23 PROCEDURE — 82306 VITAMIN D 25 HYDROXY: CPT

## 2022-01-23 PROCEDURE — 86140 C-REACTIVE PROTEIN: CPT

## 2022-01-23 PROCEDURE — 85378 FIBRIN DEGRADE SEMIQUANT: CPT

## 2022-01-23 PROCEDURE — 2700000000 HC OXYGEN THERAPY PER DAY

## 2022-01-23 PROCEDURE — 80179 DRUG ASSAY SALICYLATE: CPT

## 2022-01-23 PROCEDURE — 90935 HEMODIALYSIS ONE EVALUATION: CPT

## 2022-01-23 PROCEDURE — 83880 ASSAY OF NATRIURETIC PEPTIDE: CPT

## 2022-01-23 PROCEDURE — 80307 DRUG TEST PRSMV CHEM ANLYZR: CPT

## 2022-01-23 PROCEDURE — 84145 PROCALCITONIN (PCT): CPT

## 2022-01-23 PROCEDURE — 82728 ASSAY OF FERRITIN: CPT

## 2022-01-23 PROCEDURE — 82962 GLUCOSE BLOOD TEST: CPT

## 2022-01-23 PROCEDURE — 36620 INSERTION CATHETER ARTERY: CPT

## 2022-01-23 PROCEDURE — 36620 INSERTION CATHETER ARTERY: CPT | Performed by: INTERNAL MEDICINE

## 2022-01-23 PROCEDURE — 99255 IP/OBS CONSLTJ NEW/EST HI 80: CPT | Performed by: INTERNAL MEDICINE

## 2022-01-23 PROCEDURE — 83605 ASSAY OF LACTIC ACID: CPT

## 2022-01-23 PROCEDURE — 36415 COLL VENOUS BLD VENIPUNCTURE: CPT

## 2022-01-23 PROCEDURE — 6370000000 HC RX 637 (ALT 250 FOR IP): Performed by: INTERNAL MEDICINE

## 2022-01-23 PROCEDURE — 83735 ASSAY OF MAGNESIUM: CPT

## 2022-01-23 PROCEDURE — 80048 BASIC METABOLIC PNL TOTAL CA: CPT

## 2022-01-23 PROCEDURE — 87040 BLOOD CULTURE FOR BACTERIA: CPT

## 2022-01-23 PROCEDURE — 2580000003 HC RX 258

## 2022-01-23 RX ORDER — LABETALOL HYDROCHLORIDE 5 MG/ML
10 INJECTION, SOLUTION INTRAVENOUS ONCE
Status: COMPLETED | OUTPATIENT
Start: 2022-01-23 | End: 2022-01-23

## 2022-01-23 RX ORDER — DEXTROSE MONOHYDRATE 25 G/50ML
12.5 INJECTION, SOLUTION INTRAVENOUS PRN
Status: DISCONTINUED | OUTPATIENT
Start: 2022-01-23 | End: 2022-01-29 | Stop reason: HOSPADM

## 2022-01-23 RX ORDER — POLYETHYLENE GLYCOL 3350 17 G/17G
17 POWDER, FOR SOLUTION ORAL DAILY
Status: DISCONTINUED | OUTPATIENT
Start: 2022-01-23 | End: 2022-01-29 | Stop reason: HOSPADM

## 2022-01-23 RX ORDER — LABETALOL HYDROCHLORIDE 5 MG/ML
INJECTION, SOLUTION INTRAVENOUS
Status: COMPLETED
Start: 2022-01-23 | End: 2022-01-23

## 2022-01-23 RX ORDER — DEXTROSE MONOHYDRATE 50 MG/ML
100 INJECTION, SOLUTION INTRAVENOUS PRN
Status: DISCONTINUED | OUTPATIENT
Start: 2022-01-23 | End: 2022-01-29 | Stop reason: HOSPADM

## 2022-01-23 RX ORDER — NICOTINE POLACRILEX 4 MG
15 LOZENGE BUCCAL PRN
Status: DISCONTINUED | OUTPATIENT
Start: 2022-01-23 | End: 2022-01-29 | Stop reason: HOSPADM

## 2022-01-23 RX ORDER — DEXTROSE MONOHYDRATE 25 G/50ML
25 INJECTION, SOLUTION INTRAVENOUS ONCE
Status: COMPLETED | OUTPATIENT
Start: 2022-01-23 | End: 2022-01-23

## 2022-01-23 RX ORDER — VITAMIN B COMPLEX
1000 TABLET ORAL DAILY
Status: DISCONTINUED | OUTPATIENT
Start: 2022-04-17 | End: 2022-01-29 | Stop reason: HOSPADM

## 2022-01-23 RX ORDER — ERGOCALCIFEROL 1.25 MG/1
50000 CAPSULE ORAL WEEKLY
Status: DISCONTINUED | OUTPATIENT
Start: 2022-01-23 | End: 2022-01-29 | Stop reason: HOSPADM

## 2022-01-23 RX ORDER — PIPERACILLIN SODIUM, TAZOBACTAM SODIUM 3; .375 G/15ML; G/15ML
INJECTION, POWDER, LYOPHILIZED, FOR SOLUTION INTRAVENOUS
Status: DISPENSED
Start: 2022-01-23 | End: 2022-01-24

## 2022-01-23 RX ADMIN — LABETALOL HYDROCHLORIDE 10 MG: 5 INJECTION, SOLUTION INTRAVENOUS at 01:25

## 2022-01-23 RX ADMIN — SEVELAMER CARBONATE 1600 MG: 800 TABLET, FILM COATED ORAL at 19:36

## 2022-01-23 RX ADMIN — DIPHENHYDRAMINE HYDROCHLORIDE 25 MG: 25 TABLET ORAL at 16:30

## 2022-01-23 RX ADMIN — ONDANSETRON 4 MG: 2 INJECTION INTRAMUSCULAR; INTRAVENOUS at 19:36

## 2022-01-23 RX ADMIN — Medication 0.5 MG: at 01:30

## 2022-01-23 RX ADMIN — ONDANSETRON 4 MG: 2 INJECTION INTRAMUSCULAR; INTRAVENOUS at 12:46

## 2022-01-23 RX ADMIN — HYDRALAZINE HYDROCHLORIDE 50 MG: 50 TABLET, FILM COATED ORAL at 14:23

## 2022-01-23 RX ADMIN — CLONIDINE HYDROCHLORIDE 0.3 MG: 0.1 TABLET ORAL at 09:33

## 2022-01-23 RX ADMIN — MORPHINE SULFATE 1 MG: 2 INJECTION, SOLUTION INTRAMUSCULAR; INTRAVENOUS at 19:36

## 2022-01-23 RX ADMIN — OXYCODONE HYDROCHLORIDE AND ACETAMINOPHEN 500 MG: 500 TABLET ORAL at 09:33

## 2022-01-23 RX ADMIN — IPRATROPIUM BROMIDE AND ALBUTEROL 1 PUFF: 20; 100 SPRAY, METERED RESPIRATORY (INHALATION) at 12:00

## 2022-01-23 RX ADMIN — LABETALOL HYDROCHLORIDE 300 MG: 100 TABLET, FILM COATED ORAL at 09:32

## 2022-01-23 RX ADMIN — SODIUM CHLORIDE 5 MG/HR: 9 INJECTION, SOLUTION INTRAVENOUS at 02:01

## 2022-01-23 RX ADMIN — Medication 10 UNITS: at 06:13

## 2022-01-23 RX ADMIN — DEXTROSE MONOHYDRATE 25 G: 25 INJECTION, SOLUTION INTRAVENOUS at 06:14

## 2022-01-23 RX ADMIN — SODIUM CHLORIDE 5 MG/HR: 9 INJECTION, SOLUTION INTRAVENOUS at 09:47

## 2022-01-23 RX ADMIN — ERGOCALCIFEROL 50000 UNITS: 1.25 CAPSULE ORAL at 09:33

## 2022-01-23 RX ADMIN — HYDRALAZINE HYDROCHLORIDE 10 MG: 20 INJECTION INTRAMUSCULAR; INTRAVENOUS at 21:01

## 2022-01-23 RX ADMIN — MORPHINE SULFATE 1 MG: 2 INJECTION, SOLUTION INTRAMUSCULAR; INTRAVENOUS at 12:45

## 2022-01-23 RX ADMIN — MEGESTROL ACETATE 20 MG: 20 TABLET ORAL at 09:33

## 2022-01-23 RX ADMIN — SEVELAMER CARBONATE 1600 MG: 800 TABLET, FILM COATED ORAL at 14:23

## 2022-01-23 RX ADMIN — PANTOPRAZOLE SODIUM 40 MG: 40 TABLET, DELAYED RELEASE ORAL at 09:33

## 2022-01-23 RX ADMIN — MORPHINE SULFATE 1 MG: 2 INJECTION, SOLUTION INTRAMUSCULAR; INTRAVENOUS at 06:41

## 2022-01-23 RX ADMIN — LOSARTAN POTASSIUM 100 MG: 50 TABLET, FILM COATED ORAL at 18:30

## 2022-01-23 RX ADMIN — MEGESTROL ACETATE 20 MG: 20 TABLET ORAL at 14:22

## 2022-01-23 RX ADMIN — POLYETHYLENE GLYCOL 3350 17 G: 17 POWDER, FOR SOLUTION ORAL at 09:34

## 2022-01-23 RX ADMIN — VANCOMYCIN HYDROCHLORIDE 1000 MG: 1 INJECTION, POWDER, LYOPHILIZED, FOR SOLUTION INTRAVENOUS at 16:45

## 2022-01-23 RX ADMIN — DEXAMETHASONE SODIUM PHOSPHATE 6 MG: 10 INJECTION INTRAMUSCULAR; INTRAVENOUS at 09:33

## 2022-01-23 RX ADMIN — HYDROMORPHONE HYDROCHLORIDE 0.5 MG: 1 INJECTION, SOLUTION INTRAMUSCULAR; INTRAVENOUS; SUBCUTANEOUS at 01:30

## 2022-01-23 RX ADMIN — Medication 10 ML: at 21:36

## 2022-01-23 RX ADMIN — CALCIUM GLUCONATE 1000 MG: 98 INJECTION, SOLUTION INTRAVENOUS at 08:11

## 2022-01-23 RX ADMIN — HYDRALAZINE HYDROCHLORIDE 50 MG: 50 TABLET, FILM COATED ORAL at 19:36

## 2022-01-23 RX ADMIN — CLONIDINE HYDROCHLORIDE 0.3 MG: 0.1 TABLET ORAL at 19:35

## 2022-01-23 RX ADMIN — IPRATROPIUM BROMIDE AND ALBUTEROL 1 PUFF: 20; 100 SPRAY, METERED RESPIRATORY (INHALATION) at 18:30

## 2022-01-23 RX ADMIN — NIFEDIPINE 60 MG: 60 TABLET, EXTENDED RELEASE ORAL at 09:33

## 2022-01-23 RX ADMIN — DIPHENHYDRAMINE HYDROCHLORIDE 25 MG: 25 TABLET ORAL at 06:54

## 2022-01-23 RX ADMIN — HYDRALAZINE HYDROCHLORIDE 50 MG: 50 TABLET, FILM COATED ORAL at 09:33

## 2022-01-23 RX ADMIN — LABETALOL HYDROCHLORIDE 300 MG: 100 TABLET, FILM COATED ORAL at 20:29

## 2022-01-23 RX ADMIN — SEVELAMER CARBONATE 1600 MG: 800 TABLET, FILM COATED ORAL at 09:31

## 2022-01-23 RX ADMIN — NEPHROCAP 1 MG: 1 CAP ORAL at 09:33

## 2022-01-23 RX ADMIN — MEGESTROL ACETATE 20 MG: 20 TABLET ORAL at 19:34

## 2022-01-23 RX ADMIN — CLONIDINE HYDROCHLORIDE 0.3 MG: 0.1 TABLET ORAL at 14:23

## 2022-01-23 RX ADMIN — ZINC SULFATE 220 MG (50 MG) CAPSULE 50 MG: CAPSULE at 09:33

## 2022-01-23 RX ADMIN — IPRATROPIUM BROMIDE AND ALBUTEROL 1 PUFF: 20; 100 SPRAY, METERED RESPIRATORY (INHALATION) at 06:06

## 2022-01-23 RX ADMIN — ONDANSETRON 4 MG: 2 INJECTION INTRAMUSCULAR; INTRAVENOUS at 07:04

## 2022-01-23 ASSESSMENT — PAIN DESCRIPTION - DESCRIPTORS
DESCRIPTORS: ACHING;DISCOMFORT

## 2022-01-23 ASSESSMENT — PAIN DESCRIPTION - PROGRESSION
CLINICAL_PROGRESSION: NOT CHANGED

## 2022-01-23 ASSESSMENT — PAIN DESCRIPTION - ONSET
ONSET: GRADUAL

## 2022-01-23 ASSESSMENT — PAIN SCALES - GENERAL
PAINLEVEL_OUTOF10: 8
PAINLEVEL_OUTOF10: 8
PAINLEVEL_OUTOF10: 2
PAINLEVEL_OUTOF10: 4
PAINLEVEL_OUTOF10: 9
PAINLEVEL_OUTOF10: 8
PAINLEVEL_OUTOF10: 9

## 2022-01-23 ASSESSMENT — PAIN DESCRIPTION - ORIENTATION
ORIENTATION: MID

## 2022-01-23 ASSESSMENT — PAIN DESCRIPTION - FREQUENCY
FREQUENCY: CONTINUOUS

## 2022-01-23 ASSESSMENT — PAIN DESCRIPTION - PAIN TYPE
TYPE: ACUTE PAIN

## 2022-01-23 ASSESSMENT — PAIN DESCRIPTION - LOCATION
LOCATION: HEAD
LOCATION: BACK;HEAD
LOCATION: BACK;HEAD

## 2022-01-23 ASSESSMENT — PAIN - FUNCTIONAL ASSESSMENT
PAIN_FUNCTIONAL_ASSESSMENT: PREVENTS OR INTERFERES SOME ACTIVE ACTIVITIES AND ADLS

## 2022-01-23 NOTE — PROCEDURES
Arterial line placement    Procedure: Right Femoral arterial line placement. Indications: Continuous monitoring of blood pressure in a patient with hypertension urgency/emergency on cardene drip. Anesthesia: Local infiltration of 1% lidocaine. Consent:  The patient provided verbal consent for this procedure. Technique: Time Out: Immediately prior to the procedure a \"timeout\" was called to verify the correct patient and procedure. Procedure was done using strict aseptic technique. Anton's test was performed and was normal. Right Femoral site was cleaned with chloraprep and draped. Right Femoral was identified, then Lidocaine 1% was infiltrated locally. Arterial line was inserted, a good blood flow was obtained, after which guidewire was inserted all the way with no resistance. Then the canula was inserted and needle with guidewire was withdrawn. Pulsatile bright red blood flow was observed. The canula was connected to BP monitoring apparatus and a good quality waveform was noted. Then the canula was secured with 2 stay sutures of 2-0 silk after Lidocaine infiltration, following which dressing was applied. Number of sticks: 1. Number of Kits used: 1. Complications: No immediate complication. Estimated blood loss: About 1 ml. Comment: Patient tolerated the procedure well. Yue Castrejon MD PGY-1  1/23/2022 3:55 AM        [place]  Department of Pulmonary, Critical Care and Sleep Medicine  [address] [facility]  Department of Internal Medicine  Attending Procedural Note Addendum Statement    This procedure was supervised. The critical elements of this procedure was monitored and, if needed, I was available for the needs of the procedure.      Wynn Cushing, DO, FCCP, [place]

## 2022-01-23 NOTE — CONSULTS
Medical Intensive Care Unit     [facility]  Resident Consult Note    Date and time: 1/23/2022 1:40 AM  Patient's name:  Ana Oconnell  Medical Record Number: [id number]  Patient's account/billing number: [de-identified]  Patient's YOB: 1985  Age: 43 y.o. Date of Admission: 1/22/2022 12:44 PM  Length of stay during current admission: 1    Primary Care Physician: Samy Duffy DO  ICU Attending Physician: Dr. Maldonado Crystal    Code Status: Full Code    Reason for ICU admission: Hypertensive urgency/emergency    History of Present Illness: The patient is a 68-year-old -American male with a past medical history of ESRD on HD every MWF, resistant hypertension, HFpEF (EF 45 to 50%), hypothyroidism, ICH 2/2 ruptured aneurysm s/p clipping in 2016, presented in the ED for worsening dry cough, shortness of breath and chest pain. Patient presented in the ED on 1/21 after started feeling unwell after dialysis. He complained of fever, shortness of breath and chest pain, was found to be COVID-positive but the rest of the work-up was unremarkable and was then discharged. Patient then presented again on 1/22 for worsening dry cough, shortness of breath and chest pain. He notes that his chest pain is on the left, more like pleuritic, as he described it as worse during inspiration  Also reports of fever, nausea and generalized weakness. He denies headache, chills, leg pain/swelling and blurring of vision. Patient arrived in the ED hypoxic, saturating in the 80s and was then placed on oxygen supplementation initially on 2 L NC which was then increased to 4 L. Labs were remarkable for proBNP >70,000, troponin of 95, 98, 99 which seems to be patient's baseline due to ESRD, hemoglobin of 11.2. EKG showed sinus tachycardia.   CTA pulmonary showed findings consistent with CHF with prominent cardiomegaly, right pleural effusion, right heart reflux and pulmonary edema with possibility of COVID-pneumonia. CT A/P showed mild pelvic ascites, possible constipation and renal atrophy and osteodystrophy. BP was also noted to be elevated at 197/135 and was given 1 dose of labetalol. Patient did not take his medications this morning due to nausea. He also received a dose of morphine and Benadryl. Patient was then admitted on the floors for COVID-19 pneumonia. RRT was called on 1/23 for persistent elevations of blood pressures. Upon arrival of the team, BP was 198/126 and other vital signs stable. He was complaining of headache and pain at the femoral TLC site which was placed earlier today. According to the patient's nurse, SBP never dropped below 190 mmHg despite giving him all his home antihypertensive medications, as well as hydralazine and labetalol PRN. EKG showed normal sinus rhythm. Decision was made to transfer the patient to the unit to start Cardene drip and for closer monitoring and further management.        CURRENT VENTILATION STATUS:   [] Ventilator  [] BIPAP  [x] Nasal Cannula [] Room Air      SECRETIONS   Amount:  [] Small [] Moderate  [] Large [x] None    Color:     [] White [] Colored  [] Bloody    SEDATION:  RAAS Score:  [] Propofol gtt  [] Versed gtt  [] Fentanyl gtt  [x] No Sedation    PARALYZED:  [x] No    [] Yes    VASOPRESSORS:  [x] No    [] Yes    If yes -   [] Levophed       [] Dopamine     [] Vasopressin       [] Dobutamine  [] Phenylephrine         [] Epinephrine    CENTRAL LINES:     [] No   [x] Yes   (Date of Insertion: 1/22/22  )           If yes -     [] Right IJ     [] Left IJ [x] Right Femoral [] Left Femoral                   [] Right Subclavian [] Left Subclavian     LEAL'S CATHETER:   [x] No   [] Yes  (Date of Insertion:   )     URINE OUTPUT:            [] Good   [] Low              [x] Anuric    REVIEW OF SYSTEMS:    · Constitutional: + fever, no chills, no change in weight; decreased appetite  · HEENT: No blurred vision, no ear problems, no sore throat, no rhinorrhea. · Respiratory: + cough, + sputum production, + pleuritic chest pain, + shortness of breath  · Cardiology: No angina, no dyspnea on exertion, no paroxysmal nocturnal dyspnea, + orthopnea, no palpitation, no leg swelling. · Gastroenterology: No dysphagia, no reflux; no abdominal pain, + nausea and vomiting; + constipation; diarrhea. No hematochezia   · Genitourinary: No dysuria, no frequency, hesitancy; no hematuria  · Musculoskeletal: no joint pain, no myalgia, no change in range of movement  · Neurology: no focal weakness in extremities, no slurred speech, no double vision, no tingling or numbness sensation  · Endocrinology: no temperature intolerance, no polyphagia, polydipsia or polyuria  · Hematology: no increased bleeding, no bruising, no lymphadenopathy  · Skin: no skin changes noticed by patient  · Psychology: no depressed mood, no suicidal ideation    OBJECTIVE:     VITAL SIGNS:  BP (!) 198/126   Pulse 90   Temp 97.5 °F (36.4 °C)   Resp 30   Ht 5' 6\" (1.676 m)   SpO2 98%   BMI 19.21 kg/m²   Tmax over 24 hours:  Temp (24hrs), Av.7 °F (36.5 °C), Min:97.5 °F (36.4 °C), Max:97.9 °F (36.6 °C)      Patient Vitals for the past 6 hrs:   BP Temp Pulse SpO2   22 0109 (!) 198/126 97.5 °F (36.4 °C) 90    22 2326 (!) 206/130 97.7 °F (36.5 °C) 95 98 %   22        No intake or output data in the 24 hours ending 22 0140  Wt Readings from Last 2 Encounters:   10/12/21 119 lb (54 kg)   21 108 lb 12.8 oz (49.4 kg)     Body mass index is 19.21 kg/m².       PHYSICAL EXAMINATION:  General appearance - oriented to person, place, and time and acyanotic, in no respiratory distress  Mental status - alert, oriented to person, place, and time, normal mood, behavior, speech, dress, motor activity, and thought processes  Eyes - pupils equal and reactive, extraocular eye movements intact, sclera anicteric  Ears - hearing grossly normal bilaterally  Nose - normal nontender sinuses  Mouth - mucous membranes moist, pharynx normal without lesions  Neck - supple, no significant adenopathy  Chest - rales noted bilateral bases  Heart - normal rate and regular rhythm, systolic murmur 2/6 at 2nd left intercostal space  Abdomen - soft, nontender, nondistended, no masses or organomegaly  Neurological - alert, oriented, normal speech, no focal findings or movement disorder noted}  Extremities - peripheral pulses normal, no pedal edema, no clubbing or cyanosis  Skin - normal coloration and turgor, no rashes, no suspicious skin lesions noted      Any additional physical findings:    MEDICATIONS:  Scheduled Meds:   labetalol  10 mg IntraVENous Once    labetalol        nicotine  1 patch TransDERmal Daily    albuterol-ipratropium  1 puff Inhalation 4 times per day    dexamethasone  6 mg IntraVENous Q24H    ascorbic acid  500 mg Oral Daily    zinc sulfate  50 mg Oral Daily    b complex-C-folic acid  1 mg Oral Daily    cloNIDine  0.3 mg Oral TID    hydrALAZINE  50 mg Oral TID    labetalol  300 mg Oral BID    losartan  100 mg Oral QPM    megestrol  20 mg Oral TID    NIFEdipine  60 mg Oral Daily    pantoprazole  40 mg Oral Daily    sevelamer  1,600 mg Oral TID    sodium chloride flush  5-40 mL IntraVENous 2 times per day    heparin (porcine)  5,000 Units SubCUTAneous Q8H    cefepime  1,000 mg IntraVENous Q24H     Continuous Infusions:   niCARdipene (CARDENE) 50 mg in 250 mL 0.9 % sodium chloride infusion      sodium chloride       PRN Meds:   ondansetron, 4 mg, Q6H PRN  hydrALAZINE, 10 mg, Q6H PRN  benzonatate, 100 mg, TID PRN  diphenhydrAMINE, 25 mg, BID PRN  melatonin, 5 mg, Nightly PRN  sodium chloride flush, 5-40 mL, PRN  sodium chloride, 25 mL, PRN  polyethylene glycol, 17 g, Daily PRN  morphine, 1 mg, Q4H PRN        VENT SETTINGS (Comprehensive) (if applicable):  Vent Information  SpO2: 98 %  Additional Respiratory  Assessments  Pulse: 90  Resp: 30  SpO2: 98 %    ABGs:   No results for input(s): PH, PCO2, PO2, HCO3, BE, O2SAT in the last 72 hours. Laboratory findings:  Complete Blood Count:   Recent Labs     01/21/22 1137 01/22/22  1307   WBC 5.7 4.6   HGB 11.2* 9.7*   HCT 36.1* 31.9*    147        Last 3 Blood Glucose:   Recent Labs     01/21/22 1137 01/22/22  1307   GLUCOSE 72* 87        PT/INR:    Lab Results   Component Value Date    PROTIME 11.6 03/15/2021    INR 1.1 03/15/2021     PTT:    Lab Results   Component Value Date    APTT 36.9 02/10/2020       Comprehensive Metabolic Profile:   Recent Labs     01/21/22 1137 01/21/22 1137 01/22/22  1307     --  143   K 4.3  --  5.1*   CL 99  --  100   CO2 28  --  27   BUN 13  --  33*   CREATININE 4.1*  --  7.3*   GLUCOSE 72*  --  87   CALCIUM 9.3  --  9.4   PROT 7.7   < > 6.8   LABALBU 4.4   < > 4.0   BILITOT 0.6   < > 0.6   ALKPHOS 86   < > 87   AST 39   < > 37   ALT 18   < > 18    < > = values in this interval not displayed. Magnesium:   Lab Results   Component Value Date    MG 2.7 09/06/2021     Phosphorus:   Lab Results   Component Value Date    PHOS 4.5 09/06/2021     Ionized Calcium:   Lab Results   Component Value Date    CAION 1.15 09/22/2015      Troponin: No results for input(s): TROPONINI in the last 72 hours. Radiology/Imaging:   CTA PULMONARY W CONTRAST   Final Result   1. Findings consistent with CHF with prominent cardiomegaly, right pleural   effusion, right heart reflux suggested, and pulmonary edema. 2. Possibility of COVID pneumonia amidst the pulmonary opacification to also   be considered. 3. No acute pulmonary embolus is identified. CT ABDOMEN PELVIS WO CONTRAST Additional Contrast? None   Final Result   1. Mild pelvic ascites. 2. Possible constipation. 3. Renal atrophy and osteodystrophy.                ASSESSMENT  And PLAN:         Neurologic  Hx of intracranial aneurysm s/p clipping (2015) - stable  - pt had hx of ruptured berry aneurysm in 2015    Cardiovascular  Hypertensive emergency  - patient came in with elevated /135  - RRT called for persistent hypertension despite resuming home meds and giving PRN labetalol and hydralazine, transferred to ICU  - has history of resistant hypertension; admitted multiple times for hypertensive urgency/emergency last year  - home meds: on nifedepine 60mg daily, hydralazine 50mg TID, clonidine 0.4mg TID, losartan 100mg nightly and labetalol 300mg BID  - MAP during the RRT was 150mmHg  Plan:  - Start cardene drip, MAP goal 115 for the 1st 2 hours then BP <160/100 over the next 6 hours  - wean cardene drip as BP and MAP tolerates  - Continue home meds    Hx of HFpEF (EF 45-50%)  - Echo done in 9/2021 showed EF 45-50% with mild concentric LV hypertrophy and mildly dilated LV, mild-mod LA dilation, mod MR and TR present  - CTA pulm consistent with CHF with prominent cardiomegaly  - came in for SOB and worsening cough  - proBNP >70,000 (baseline)  - pt anuric, cannot give diuretics  Plan:  - Continue to monitor  - For dialysis    Pulmonary  Acute hypoxic respiratory failure 2/2 COVID-19 pneumonia, right pleural effusion, CHF  - pt complains of SOB and worsening dry cough  - COVID + on 1/21  - CTA pulm consistent with CHF, R pleural effusion  - procal 0.76 -> 10.53  Plan:  - Continue decadron 6mg daily  - Continue vitamin cocktail  - Continue breathing treatments combivent  - Continue tessalon  - Continue Cefepime   - Obtain blood and respi cultures    Pulmonary edema   - CTA chest showed pulmonary edema  Plan:  - For dialysis    Gastrointestinal  Protein-calorie malnutrition  - Continue megace    Transaminitis likely 2/2 hypertensive emergency   - ALT 18 -> 224  - AST 37 -> 365  Plan:  - Monitor LFTs daily for now    Mild pelvic ascites  -  CT A/P revealed mild pelvic ascites  Plan:  - Will monitor for now    Possible constipation  - on glycolax PRN    Infectious Disease  COVID-19 pneumonia  - not started on remdesivir due to renal failure  - on decadron 6mg daily, vitamin cocktail  - no remdesivir due to renal failure     Endocrine  Vitamin D deficiency  - Vit D level 17  Plan:  - Start Vit D 50,000u weekly x 12 then Vit D 1000 dailyl    Hx of hypothyrodism  - currently not on synthroid  - TSH 3.030  Plan:  - Continue to monitor    Genitourinary/Renal  ESRD on HD  - Crea 7.3 on admission > 8.2 today  - on HD every MWF; last session 1/21 but only finished half of his usual duration  Plan:  - Nephrology consult, follow recommendations  - Continue HD scheduled MWF  - For emergent dialysis today    Hyperkalemia 2/2 ESRD  - K 5.1 on admission > 6.9 today  Plan:  - Treat with Ca gluc, insulin 10u, D50  - Repeat BMP in 1 hour  - For emergent dialysis today    Hypermagnesemia 2/2 ESRD  - Mg 2.9 today  Plan:  - nephrology on board, follow recommendations   - For emergent dialysis today    Hyperphosphatemia 2/2 ESRD  - Phos 8.2  - on sevelamer 800mg 2 tabs TID at home  Plan:  - Continue sevelamer  - Nephrology following, follow recommendations  - For emergent dialysis today     Hematology/Oncology  Normocytic anemia 2/2 anemia of chronic disease    - Hgb 9.7 on admission >> 9.3 today  - baseline Hgb 8-9  - on EPO every 2 weeks  Plan:  - Monitor CBC daily    WEAN PER PROTOCOL:  [] No   [x] Yes  [] N/A    ICU PROPHYLAXIS:  Stress ulcer:  [x] PPI Agent  [] [id number] [] Sucralfate  [] Other:  VTE:   [] Enoxaparin  [] Unfract. Heparin Subcut  [] EPC Cuffs    NUTRITION:  [] NPO [] Tube Feeding (Specify: ) [] TPN  [x] PO (Diet: ADULT DIET; Regular; Low Sodium (2 gm);  Low Potassium (Less than 3000 mg/day))    INSULIN DRIP:   [x] No   [] Yes    CONSULTATION NEEDED:   [] No   [x] Yes    FAMILY UPDATED:    [x] No   [] Yes    TRANSFER OUT OF ICU:   [x] No   [] Yes    Argie Nissen, MD, PGY-1  Attending Physician: Dr. Estrada Cornea  1/23/2022, 1:40 AM     [facility]  Department of Pulmonary, Critical Care and Sleep Medicine  [address] [facility]  Department of Internal Medicine      During multidisciplinary team rounds Parker Ruiz is a 43 y.o. male was seen, examined and discussed. This is confirmation that I have personally seen and examined the patient and that the key elements of the encounter were performed by me (> 85 % time). The medications & laboratory data was discussed and adjusted where necessary. The radiographic images were reviewed or with radiologist or consultant if felt dis-concordant with the exam or history. The above findings were corroborated, plans confirmed and changes made if needed. Family is updated at the bedside as available. Key issues of the case were discussed among consultants. Critical Care time is documented if appropriate.       Beena Young DO, FACP, FCCP, [facility]maguer,

## 2022-01-23 NOTE — FLOWSHEET NOTE
01/23/22 1630   Vital Signs   BP (!) 167/78   Temp 97.8 °F (36.6 °C)   Pulse 88   Resp 18   Pain Assessment   Pain Assessment 0-10   Pain Level 2   Post-Hemodialysis Assessment   Post-Treatment Procedures Blood returned; Access bleeding time < 10 minutes   Machine Disinfection Process Exterior Machine Disinfection   Rinseback Volume (ml) 300 ml   Dialyzer Clearance Clear   Duration of Treatment (minutes) 180 minutes   Hemodialysis Output (ml) 2000 ml   NET Removed (ml) 1700 ml   Tolerated Treatment Good   Patient Response to Treatment tolerated well   Bilateral Breath Sounds Diminished   Edema Generalized   Physician Notified?  Yes

## 2022-01-23 NOTE — SIGNIFICANT EVENT
Rapid Response Team Note  Date of event: 1/23/2022   Time of event: 1252 am   Parker Ruiz 43y.o. year old male   YOB: 1985   Admit date:  1/22/2022   Location: [address] [place] [address]-A   Witnessed? : [x]Yes  [] No  Monitored? : [x]Yes  [] No  Code status: [x] Full  [] DNR-CCA  []DNR-CC  ______________________________________________________________________  Reason for RRT:    [] RR < 8     [] RR > 28   [] SpO2 <90%   [] HR < 40 bpm   [] HR > 130 bpm  [x] SBP >200 mmHg    [] SpO2 <90%   [] LOC   [] Seizures    [] Significant Bleeding Event    [] Other:   Subjective:   CTSP regarding the above event. Upon arrival, pt's BP was 198/126 and other VS stable. C/o HA and pain at femoral TLC site which was placed earlier today. Aox4. Per RN, pt's SBP never dropped below 190 despite giving him all his home PO meds as well as hydralazine and labetalol prn. Pt is on Labetalol, hydralazine, Clonidine, losartan and Nifedipine at home and all were resumed on admission. EKG showed NSR. Decision was made to transfer him down to the MICU to start Cardene gtt.    Objective:   Vital signs: Temp: 97.5/BP: 198/126/RR: 20/HR: 90  Initial Condition:  Conscious   [] Yes  [] No     Breathing [] Yes  [] No     Pulse  [] Yes  [] No    Airway:   [] Open/ Clear     Intervention: [] None  [] Pooled secretions     [] Suctioned  [] Stridor      [] Intubation    Lungs:   [] Symmetrical chest rise/ CTABL Intervention: [] None  [] Use of accessory muscles    [] NIV (CPAP/BiPAP)  [] Cyanosis      [] Nasal Oxygen/Mask  [] Wheezing       [] ABG             [] CXR  [] Other:   Circulation:   Rhythm:  [] Sinus [] Other:  Intervention: [] None            [] IV Access  [] Peripheral              [] Central            [] EKG            [] Cardioversion            [] Defibrillation     Capillary Refill:  [] > 2 seconds [x] < 2 seconds    Neurologic:   [] NIHSS      [x] Pupillary Response:  Equal and reactive   Response to pain:   [x] Yes  [] No  Follow commands:  [x] Yes  [] No  Facial asymmetry:  [] Yes  [x] No  Motor strength:  [] Equal  [] Focal deficit:   Diagnostic Test:  Blood Sugar:  81 mg/dL  EKG:    NSR  ABG:      Lab Results   Component Value Date    PH 7.325 03/15/2016    PCO2 55.4 03/15/2016    PO2 218.5 03/15/2016    HCO3 28.2 03/15/2016    O2SAT 99.5 03/15/2016     Medication(s) Given:  []  Narcan (Naloxone)   []  Romazicon (Flumazenil)    []  Breathing Treatment    []  Steroids (Prednisone, Solu-Medrol)  []  Adenosine  [] Cardiac Medicines:     Infusion(s):   [] Normal Saline    Amount:  cc/hr      [] Lactate Ringers      [] Other:     Imaging:   [] CXR:   [] Normal         [] Pneumothorax         [] Pulmonary Edema  [] Infiltrate          [] CT Head  [] Normal          [] ICB          [] SAH          [] Other:     Laboratory Tests:   none      Teams Assessment and Plan:  1. Hypertensive urgency/emergency - uncontrolled with home meds and IV prn       Plan   Transfer to MICU   Place A line   Start Cardene gtt   ? ?  ?   Disposition:  [] No transfer   [] Transfer to monitor floor  [x] Transfer to: [x] MICU [] NICU [] CCU [] SICU    Patients family updated: [] Yes  [x] No   Discussed with:  [x] Critical Care Intensivist: Dr. Chelsie Singh      [] Primary Care Provider:       [] Other:?    Ivonne Wade MD PGY-3  1/23/2022 2:01 AM  Attending Physician: Dr Chelsie Singh

## 2022-01-23 NOTE — PROGRESS NOTES
Hospitalist Progress Note      SYNOPSIS: Patient admitted on 1/22/2022 for shortness of breath, COVID-19 infection    Mr. Lexy Redd, a 43y.o. year old male  who  has a past medical history of (HFpEF) heart failure with preserved ejection fraction ([address] Anxiety, AVF (arteriovenous fistula) ([facility] [address] Chronic kidney disease, Depression, Encounter regarding vascular access for dialysis for ESRD [facility]), History of ruptured Berry aneurysm ([facility] [address] Hypertension, Hypothyroidism, Intracranial hemorrhage ([facility] [address] Neutropenia ([facility] [address] Noncompliance w/medication treatment due to intermit use of medication, and Pre-transplant evaluation for kidney transplant. who presents to the ED with CP/SOB and retching. The patient states that he started feeling ill after dialysis yesterday; therefore he presented to the ED with complaints of fever, SOB and CP yesterday. He was tested for COVID and was positive, The patient did not show any evidence of hypoxia and was discharged home.       He presents back to the hospital todau with worsening dry cough, SOB and CP, retching. The patient states that he has CP on the left; which feels like pressure with no radiation. The patient also reports fever but no chills. +nausea and retching; no vomiting. +abd pain, epigastric and bloating. He does not make urine. Can't recall when he had a BM. No diarrhea.   +generalized weakness.      In the ED, the patient had an EKG that showed: sinus tachycardia, LVH; trops were 98, 95, 94 and as per the ED this is the patient's baseline with his ESRD. On 8/10/21: trop was in the 100's. The patient is having a CTA of the chest completed. BP was elevated and was given an dose of labetalol; the patient has not taken his BP meds today because of the retching.      The patient was noted to Desaturate to 88% and is placed on 4L of O2. He also received a dose of morphine, Benadryl (he has itching associated with dialysis).      Patient was noted with hypertension with systolic blood pressure greater than 200 mmHg. He was transferred to the ICU and initiated on Cardene drip    SUBJECTIVE:    Patient seen and examined  Records reviewed. Patient resting in bed comfortably in the ICU. Patient on Cardene drip    DIET: ADULT DIET; Regular; Low Sodium (2 gm); Low Potassium (Less than 3000 mg/day)  CODE: Full Code    Intake/Output Summary (Last 24 hours) at 1/23/2022 1237  Last data filed at 1/23/2022 0600  Gross per 24 hour   Intake 135.5 ml   Output    Net 135.5 ml       OBJECTIVE:    BP (!) 154/89   Pulse 93   Temp 98.7 °F (37.1 °C) (Temporal)   Resp 20   Ht 5' 6\" (1.676 m)   SpO2 95%   BMI 19.21 kg/m²     General appearance: No apparent distress, appears stated age and cooperative. HEENT:  Conjunctivae/corneas clear. Normocephalic, atraumatic. Neck: Supple. No jugular venous distention. Respiratory: Clear to auscultation, bilaterally. Equal and symmetric chest excursion with inspiration. Cardiovascular: Regular rate and rhythm. Normal S1-S2. Abdomen: Soft, nontender, nondistended  Musculoskeletal: No clubbing, cyanosis, no lower extremity edema, bilaterally. Brisk capillary refill. Skin:  No rashes  on visible skin  Neurologic: awake, alert and following commands     ASSESSMENT:    Active Problems:    Acute respiratory failure with hypoxia (HCC)  Resolved Problems:    * No resolved hospital problems.  *  Hypertensive emergency  Heart failure with ejection fraction 45-50%  COVID-19 pneumonia  Pulmonary edema  Stage renal disease on hemodialysis    PLAN:    Critical care recommendations  Infectious disease recommendations regarding COVID-19 pneumonia  Titrate off of Cardene drip as able; titrate antihypertensive agents  Nephrology recommendations hemodialysis today secondary to hyperkalemia    DISPOSITION: to be determined    Medications:  REVIEWED DAILY    Infusion Medications    [Held by provider] niCARdipene (CARDENE) 50 mg in 250 mL 0.9 % sodium chloride infusion Stopped (01/23/22 1100)    dextrose      sodium chloride       Scheduled Medications    vitamin D  50,000 Units Oral Weekly    Followed by   Maricruz Davies ON 4/17/2022] Vitamin D  1,000 Units Oral Daily    polyethylene glycol  17 g Oral Daily    vancomycin  1,000 mg IntraVENous Once    nicotine  1 patch TransDERmal Daily    albuterol-ipratropium  1 puff Inhalation 4 times per day    dexamethasone  6 mg IntraVENous Q24H    ascorbic acid  500 mg Oral Daily    zinc sulfate  50 mg Oral Daily    b complex-C-folic acid  1 mg Oral Daily    cloNIDine  0.3 mg Oral TID    hydrALAZINE  50 mg Oral TID    labetalol  300 mg Oral BID    losartan  100 mg Oral QPM    megestrol  20 mg Oral TID    NIFEdipine  60 mg Oral Daily    pantoprazole  40 mg Oral Daily    sevelamer  1,600 mg Oral TID    sodium chloride flush  5-40 mL IntraVENous 2 times per day    heparin (porcine)  5,000 Units SubCUTAneous Q8H    cefepime  1,000 mg IntraVENous Q24H     PRN Meds: glucose, dextrose, glucagon (rDNA), dextrose, ondansetron, hydrALAZINE, benzonatate, diphenhydrAMINE, melatonin, sodium chloride flush, sodium chloride, morphine    Labs:     Recent Labs     01/21/22  1137 01/22/22  1307 01/23/22  0419   WBC 5.7 4.6 7.8   HGB 11.2* 9.7* 9.3*   HCT 36.1* 31.9* 30.7*    147 123*       Recent Labs     01/22/22  1307 01/23/22  0419 01/23/22  0940    137 135   K 5.1* 6.9* 6.1*    94* 95*   CO2 27 18* 22   BUN 33* 54* 64*   CREATININE 7.3* 8.2* 9.1*   CALCIUM 9.4 9.5 9.8   PHOS  --  8.2*  --        Recent Labs     01/21/22  1137 01/22/22  1307 01/22/22  1901 01/23/22  0419   PROT 7.7 6.8  --  7.0   ALKPHOS 86 87  --  96   ALT 18 18  --  224*   AST 39 37  --  365*   BILITOT 0.6 0.6  --  0.8   LIPASE  --   --  27 26       No results for input(s): INR in the last 72 hours. No results for input(s): Yohan Weir in the last 72 hours.     Chronic labs:    Lab Results   Component Value Date    CHOL 157 10/21/2015 TRIG 82 10/21/2015    HDL 52 10/21/2015    LDLCALC 89 10/21/2015    TSH 3.030 01/23/2022    INR 1.1 03/15/2021       Radiology: REVIEWED DAILY    +++++++++++++++++++++++++++++++++++++++++++++++++  DO Jeferson Alston [address]  +++++++++++++++++++++++++++++++++++++++++++++++++  NOTE: This report was transcribed using voice recognition software. Every effort was made to ensure accuracy; however, inadvertent computerized transcription errors may be present.

## 2022-01-23 NOTE — PROGRESS NOTES
Pharmacy Consultation Note  (Antibiotic Dosing and Monitoring)    Initial consult date: 1/23/2022  Consulting physician/provider: Sofia Catalan DO  Drug: Vancomycin  Indication: Sepsis of Unknown Etiology    Age/  Gender Height Weight IBW  Allergy Information   36 y.o./male 5' 6\" (167.6 cm)       Patient weight not recorded   Tylenol [acetaminophen] and Levofloxacin      Renal Function:  Recent Labs     01/22/22  1307 01/23/22  0419 01/23/22  0940   BUN 33* 54* 64*   CREATININE 7.3* 8.2* 9.1*       Intake/Output Summary (Last 24 hours) at 1/23/2022 1056  Last data filed at 1/23/2022 0600  Gross per 24 hour   Intake 135.5 ml   Output    Net 135.5 ml       Vancomycin Monitoring:  Trough:  No results for input(s): VANCOTROUGH in the last 72 hours. Random:  No results for input(s): VANCORANDOM in the last 72 hours. Vancomycin Administration Times:  Recent vancomycin administrations      No vancomycin IV orders with administrations found. Orders not given:          vancomycin 1000 mg IVPB in 250 mL D5W addavial                Assessment:  · Patient is a 43 y.o. male who has been initiated on vancomycin  · CrCl cannot be calculated (Unknown ideal weight. ).   · To dose vancomycin, pharmacy will be utilizing dosing based off of levels due to patient requiring hemodialysis    Plan:  · Vancomycin 1000 mg IV x 1  · Will check vancomycin levels with AM labs 1/24  · Will continue to monitor renal function   · Clinical pharmacy to follow      Ronnie Sutton PharmD   Pharmacy Resident   Phone: [phone]  1/23/2022 10:57 AM

## 2022-01-23 NOTE — CONSULTS
Nephrology Consult  [facility]  Sandhya Avila MD    CC:   esrd    HPI:   The pt is a 44 yo male with a pmh of esrd on hd mwf, htn, hypothryoidism, ICH, HFpEF, medication noncompliance who presents with vomiting, sob, chest pain. He was found to be covid positive. He is on 4 L o2. His last hd was Friday. His sbp has been 190s-200. He wasn't able to take his outpt bp meds due to vomiting. He was given his oral clonidine, hydralazine, labetolol, cozaar, procardia without much imrprovement yesterday. He was given iv labetolol also. He was started on a cardene drip and transferred to the icu. His labs today show na 137 l 5.1>6.9, co2 18, bun 54, cr 8.2, ca 9.5, p 8.2, alb 4.2, wbc 7.8, hgb 9.3, plt 123. He is to have dialysis today due to his k of 6.9. ct of the chest did not show a PE. History is obtained from the chart due to covid isolation.      PMH:    Past Medical History:   Diagnosis Date    (HFpEF) heart failure with preserved ejection fraction ([address]     stage III    Anxiety 9/19/2015    AVF (arteriovenous fistula) ([address] 4/30/2018    Chronic kidney disease     CKD since early childhood per pt and on HD ~ 11 years    Depression     Encounter regarding vascular access for dialysis for ESRD ([address] 4/30/2018    History of ruptured Berry aneurysm ([address] 10/20/2015    Hypertension     on meds for ~ 11 years    Hypothyroidism     Intracranial hemorrhage (HCC)     was d/t ruptured aneurysm - pt underwent neurosurgery for clipping of the aneurysm    Neutropenia ([address]     Noncompliance w/medication treatment due to intermit use of medication 11/3/2015    Pre-transplant evaluation for kidney transplant 4/5/2017       Patient Active Problem List   Diagnosis    Stage 5 chronic kidney disease on chronic dialysis ([address]    H/O intracranial hemorrhage and aneurysm clipping on the right side    Anemia, chronic disease    Anxiety    Noncompliance    AVF (arteriovenous fistula) (HCC)    Severe protein-calorie malnutrition ([facility] [address]    Venous hypertension, chronic, left    Iron deficiency    Secondary hyperparathyroidism of renal origin ([facility] [address]    Atypical chest pain    Essential hypertension    Chronic diastolic (congestive) heart failure (HCC)    Acute respiratory failure with hypoxia (HCC)    Pneumonia    Pericardial effusion    Anemia       Meds:     vitamin D  50,000 Units Oral Weekly    Followed by   Jessi Syed ON 4/17/2022] Vitamin D  1,000 Units Oral Daily    polyethylene glycol  17 g Oral Daily    nicotine  1 patch TransDERmal Daily    albuterol-ipratropium  1 puff Inhalation 4 times per day    dexamethasone  6 mg IntraVENous Q24H    ascorbic acid  500 mg Oral Daily    zinc sulfate  50 mg Oral Daily    b complex-C-folic acid  1 mg Oral Daily    cloNIDine  0.3 mg Oral TID    hydrALAZINE  50 mg Oral TID    labetalol  300 mg Oral BID    losartan  100 mg Oral QPM    megestrol  20 mg Oral TID    NIFEdipine  60 mg Oral Daily    pantoprazole  40 mg Oral Daily    sevelamer  1,600 mg Oral TID    sodium chloride flush  5-40 mL IntraVENous 2 times per day    heparin (porcine)  5,000 Units SubCUTAneous Q8H    cefepime  1,000 mg IntraVENous Q24H        niCARdipene (CARDENE) 50 mg in 250 mL 0.9 % sodium chloride infusion 7.5 mg/hr (01/23/22 0645)    dextrose      sodium chloride         Meds prn:     glucose, dextrose, glucagon (rDNA), dextrose, ondansetron, hydrALAZINE, benzonatate, diphenhydrAMINE, melatonin, sodium chloride flush, sodium chloride, morphine    Meds prior to admission:     No current facility-administered medications on file prior to encounter.      Current Outpatient Medications on File Prior to Encounter   Medication Sig Dispense Refill    NIFEdipine (ADALAT CC) 60 MG extended release tablet Take 60 mg by mouth daily       hydrALAZINE (APRESOLINE) 50 MG tablet Take 1 tablet by mouth 3 times daily 90 tablet 0    pantoprazole (PROTONIX) 40 MG tablet TAKE 1 TABLET BY MOUTH DAILY *EMERGENCY REFILL* 30 tablet 0    labetalol (NORMODYNE) 300 MG tablet Take 1 tablet by mouth 2 times daily 60 tablet 3    diphenhydrAMINE (BENADRYL) 25 MG tablet Take 25 mg by mouth 2 times daily as needed for Itching, Allergies or Sleep      Melatonin 5 MG CAPS Take 5 mg by mouth nightly as needed       cloNIDine (CATAPRES) 0.3 MG tablet Take 0.3 mg by mouth 3 times daily       megestrol (MEGACE) 20 MG tablet Take 20 mg by mouth 3 times daily       losartan (COZAAR) 100 MG tablet Take 1 tablet by mouth every evening 30 tablet 3    B Complex-C-Folic Acid (NEPHRO-JOHNNY) 0.8 MG TABS Take 1 tablet by mouth daily       sevelamer (RENVELA) 800 MG tablet Take 2 tablets by mouth 3 times daily       Methoxy PEG-Epoetin Beta (MIRCERA IJ) Infuse 60 mcg intravenously every 14 days          Allergies:    Tylenol [acetaminophen] and Levofloxacin    Social History:     reports that he has never smoked. He has never used smokeless tobacco. He reports previous drug use. Drug: Marijuana Alpheus Syl). He reports that he does not drink alcohol.     Family History:         Problem Relation Age of Onset    Kidney Disease Paternal Grandmother     Cancer Neg Hx     Heart Disease Neg Hx          Physical Exam:    Pt not examined due to covid isolation      Patient Vitals for the past 24 hrs:   BP Temp Temp src Pulse Resp SpO2 Height   01/23/22 0932 (!) 154/89   93      01/23/22 0913 137/76 98.7 °F (37.1 °C) Temporal 90  95 %    01/23/22 0600 (!) 164/84   92 20 98 %    01/23/22 0500 (!) 164/84   88 21 96 %    01/23/22 0400 138/66   82 20 96 %    01/23/22 0300 (!) 208/107   71 22 93 %    01/23/22 0200 (!) 234/136   79 22 95 %    01/23/22 0130 (!) 247/146   81 26     01/23/22 0109 (!) 198/126 97.5 °F (36.4 °C)  90      01/22/22 2326 (!) 206/130 97.7 °F (36.5 °C)  95  98 %    01/22/22 2000    92      01/22/22 1815 (!) 200/134 97.9 °F (36.6 °C) Oral 97 30 99 % 5' 6\" (1.676 m)   01/22/22 Range Status   02/10/2020 830 211 - 946 pg/mL Final       Folate   Date Value Ref Range Status   02/10/2020 10.5 4.8 - 24.2 ng/mL Final         No results found for: VOL, APPEARANCE, COLORU, LABSPEC, LABPH, LEUKBLD, NITRU, GLUCOSEU, KETUA, UROBILINOGEN, KETUA, UROBILINOGEN, BILIRUBINUR, OCBU    No results found for: LUZ MARIA, CREURRAN, MACREATRATIO, OSMOU    No components found for: URIC    No results found for: LIPIDPAN      Assessment and Plan:    1. esrd  Hd mwf  For emergency hd today due to hyperkalemia    2. htn  Uncontrolled  On cardene drip  H/o medication noncompliance and vomiting  On orals procardia, clonidine, cozaar, labetolol, and hydralazine    3. Sec hyperparathryoidism  p 8.2  Noncompliance with renvela    4. Anemia  Continue sierra    5. covid pna  Started on steroids  Per icu    6. Metabolic acidosis  For hd today    Geralyn Opitz.  Levy Hsieh MD

## 2022-01-24 PROBLEM — E43 SEVERE PROTEIN-CALORIE MALNUTRITION (HCC): Chronic | Status: ACTIVE | Noted: 2022-01-24

## 2022-01-24 LAB
ALBUMIN SERPL-MCNC: 3.9 G/DL (ref 3.5–5.2)
ALBUMIN SERPL-MCNC: 4.2 G/DL (ref 3.5–5.2)
ALP BLD-CCNC: 92 U/L (ref 40–129)
ALP BLD-CCNC: 92 U/L (ref 40–129)
ALT SERPL-CCNC: 1669 U/L (ref 0–40)
ALT SERPL-CCNC: 1720 U/L (ref 0–40)
ANION GAP SERPL CALCULATED.3IONS-SCNC: 17 MMOL/L (ref 7–16)
AST SERPL-CCNC: 1584 U/L (ref 0–39)
AST SERPL-CCNC: 1616 U/L (ref 0–39)
BASOPHILS ABSOLUTE: 0.01 E9/L (ref 0–0.2)
BASOPHILS RELATIVE PERCENT: 0.1 % (ref 0–2)
BILIRUB SERPL-MCNC: 0.8 MG/DL (ref 0–1.2)
BILIRUB SERPL-MCNC: 0.8 MG/DL (ref 0–1.2)
BILIRUBIN DIRECT: 0.2 MG/DL (ref 0–0.3)
BILIRUBIN, INDIRECT: 0.6 MG/DL (ref 0–1)
BUN BLDV-MCNC: 52 MG/DL (ref 6–20)
CALCIUM SERPL-MCNC: 9.2 MG/DL (ref 8.6–10.2)
CHLORIDE BLD-SCNC: 93 MMOL/L (ref 98–107)
CO2: 25 MMOL/L (ref 22–29)
CREAT SERPL-MCNC: 6.4 MG/DL (ref 0.7–1.2)
EOSINOPHILS ABSOLUTE: 0 E9/L (ref 0.05–0.5)
EOSINOPHILS RELATIVE PERCENT: 0 % (ref 0–6)
GFR AFRICAN AMERICAN: 12
GFR NON-AFRICAN AMERICAN: 12 ML/MIN/1.73
GLUCOSE BLD-MCNC: 119 MG/DL (ref 74–99)
HCT VFR BLD CALC: 28.8 % (ref 37–54)
HEMOGLOBIN: 9 G/DL (ref 12.5–16.5)
IMMATURE GRANULOCYTES #: 0.07 E9/L
IMMATURE GRANULOCYTES %: 0.5 % (ref 0–5)
LYMPHOCYTES ABSOLUTE: 0.93 E9/L (ref 1.5–4)
LYMPHOCYTES RELATIVE PERCENT: 6.7 % (ref 20–42)
MAGNESIUM: 2.5 MG/DL (ref 1.6–2.6)
MCH RBC QN AUTO: 29.7 PG (ref 26–35)
MCHC RBC AUTO-ENTMCNC: 31.3 % (ref 32–34.5)
MCV RBC AUTO: 95 FL (ref 80–99.9)
MONOCYTES ABSOLUTE: 0.59 E9/L (ref 0.1–0.95)
MONOCYTES RELATIVE PERCENT: 4.2 % (ref 2–12)
NEUTROPHILS ABSOLUTE: 12.37 E9/L (ref 1.8–7.3)
NEUTROPHILS RELATIVE PERCENT: 88.5 % (ref 43–80)
PDW BLD-RTO: 17.1 FL (ref 11.5–15)
PHOSPHORUS: 6.6 MG/DL (ref 2.5–4.5)
PLATELET # BLD: 146 E9/L (ref 130–450)
PMV BLD AUTO: 10.4 FL (ref 7–12)
POTASSIUM SERPL-SCNC: 4.8 MMOL/L (ref 3.5–5)
RBC # BLD: 3.03 E12/L (ref 3.8–5.8)
SODIUM BLD-SCNC: 135 MMOL/L (ref 132–146)
TOTAL PROTEIN: 6.6 G/DL (ref 6.4–8.3)
TOTAL PROTEIN: 6.9 G/DL (ref 6.4–8.3)
VANCOMYCIN RANDOM: 28.1 MCG/ML (ref 5–40)
WBC # BLD: 14 E9/L (ref 4.5–11.5)

## 2022-01-24 PROCEDURE — 85025 COMPLETE CBC W/AUTO DIFF WBC: CPT

## 2022-01-24 PROCEDURE — 2580000003 HC RX 258: Performed by: INTERNAL MEDICINE

## 2022-01-24 PROCEDURE — 6360000002 HC RX W HCPCS: Performed by: INTERNAL MEDICINE

## 2022-01-24 PROCEDURE — 6370000000 HC RX 637 (ALT 250 FOR IP)

## 2022-01-24 PROCEDURE — 6370000000 HC RX 637 (ALT 250 FOR IP): Performed by: INTERNAL MEDICINE

## 2022-01-24 PROCEDURE — 84100 ASSAY OF PHOSPHORUS: CPT

## 2022-01-24 PROCEDURE — 80202 ASSAY OF VANCOMYCIN: CPT

## 2022-01-24 PROCEDURE — 80053 COMPREHEN METABOLIC PANEL: CPT

## 2022-01-24 PROCEDURE — 80076 HEPATIC FUNCTION PANEL: CPT

## 2022-01-24 PROCEDURE — 36415 COLL VENOUS BLD VENIPUNCTURE: CPT

## 2022-01-24 PROCEDURE — 83735 ASSAY OF MAGNESIUM: CPT

## 2022-01-24 PROCEDURE — 2140000000 HC CCU INTERMEDIATE R&B

## 2022-01-24 PROCEDURE — 2700000000 HC OXYGEN THERAPY PER DAY

## 2022-01-24 PROCEDURE — 90935 HEMODIALYSIS ONE EVALUATION: CPT

## 2022-01-24 RX ADMIN — LOSARTAN POTASSIUM 100 MG: 50 TABLET, FILM COATED ORAL at 18:21

## 2022-01-24 RX ADMIN — CLONIDINE HYDROCHLORIDE 0.3 MG: 0.1 TABLET ORAL at 09:24

## 2022-01-24 RX ADMIN — CEFEPIME HYDROCHLORIDE 1000 MG: 1 INJECTION, POWDER, FOR SOLUTION INTRAMUSCULAR; INTRAVENOUS at 22:03

## 2022-01-24 RX ADMIN — LABETALOL HYDROCHLORIDE 300 MG: 100 TABLET, FILM COATED ORAL at 22:01

## 2022-01-24 RX ADMIN — HYDRALAZINE HYDROCHLORIDE 50 MG: 50 TABLET, FILM COATED ORAL at 09:24

## 2022-01-24 RX ADMIN — CEFEPIME HYDROCHLORIDE 1000 MG: 1 INJECTION, POWDER, FOR SOLUTION INTRAMUSCULAR; INTRAVENOUS at 00:40

## 2022-01-24 RX ADMIN — CLONIDINE HYDROCHLORIDE 0.3 MG: 0.1 TABLET ORAL at 20:21

## 2022-01-24 RX ADMIN — POLYETHYLENE GLYCOL 3350 17 G: 17 POWDER, FOR SOLUTION ORAL at 09:38

## 2022-01-24 RX ADMIN — HEPARIN SODIUM 5000 UNITS: 10000 INJECTION INTRAVENOUS; SUBCUTANEOUS at 05:07

## 2022-01-24 RX ADMIN — IPRATROPIUM BROMIDE AND ALBUTEROL 1 PUFF: 20; 100 SPRAY, METERED RESPIRATORY (INHALATION) at 18:23

## 2022-01-24 RX ADMIN — PANTOPRAZOLE SODIUM 40 MG: 40 TABLET, DELAYED RELEASE ORAL at 09:24

## 2022-01-24 RX ADMIN — Medication 10 ML: at 22:02

## 2022-01-24 RX ADMIN — ONDANSETRON 4 MG: 2 INJECTION INTRAMUSCULAR; INTRAVENOUS at 20:26

## 2022-01-24 RX ADMIN — NIFEDIPINE 60 MG: 60 TABLET, EXTENDED RELEASE ORAL at 09:24

## 2022-01-24 RX ADMIN — ZINC SULFATE 220 MG (50 MG) CAPSULE 50 MG: CAPSULE at 09:23

## 2022-01-24 RX ADMIN — NEPHROCAP 1 MG: 1 CAP ORAL at 09:23

## 2022-01-24 RX ADMIN — MORPHINE SULFATE 1 MG: 2 INJECTION, SOLUTION INTRAMUSCULAR; INTRAVENOUS at 18:30

## 2022-01-24 RX ADMIN — HEPARIN SODIUM 5000 UNITS: 10000 INJECTION INTRAVENOUS; SUBCUTANEOUS at 21:30

## 2022-01-24 RX ADMIN — MORPHINE SULFATE 1 MG: 2 INJECTION, SOLUTION INTRAMUSCULAR; INTRAVENOUS at 00:50

## 2022-01-24 RX ADMIN — IPRATROPIUM BROMIDE AND ALBUTEROL 1 PUFF: 20; 100 SPRAY, METERED RESPIRATORY (INHALATION) at 12:00

## 2022-01-24 RX ADMIN — LABETALOL HYDROCHLORIDE 300 MG: 100 TABLET, FILM COATED ORAL at 09:24

## 2022-01-24 RX ADMIN — IPRATROPIUM BROMIDE AND ALBUTEROL 1 PUFF: 20; 100 SPRAY, METERED RESPIRATORY (INHALATION) at 00:40

## 2022-01-24 RX ADMIN — DIPHENHYDRAMINE HYDROCHLORIDE 25 MG: 25 TABLET ORAL at 09:37

## 2022-01-24 RX ADMIN — DIPHENHYDRAMINE HYDROCHLORIDE 25 MG: 25 TABLET ORAL at 20:21

## 2022-01-24 RX ADMIN — Medication 10 ML: at 09:00

## 2022-01-24 RX ADMIN — MORPHINE SULFATE 1 MG: 2 INJECTION, SOLUTION INTRAMUSCULAR; INTRAVENOUS at 05:08

## 2022-01-24 RX ADMIN — MEGESTROL ACETATE 20 MG: 20 TABLET ORAL at 22:02

## 2022-01-24 RX ADMIN — DEXAMETHASONE SODIUM PHOSPHATE 6 MG: 10 INJECTION INTRAMUSCULAR; INTRAVENOUS at 09:25

## 2022-01-24 RX ADMIN — VANCOMYCIN HYDROCHLORIDE 500 MG: 500 INJECTION, POWDER, LYOPHILIZED, FOR SOLUTION INTRAVENOUS at 18:21

## 2022-01-24 RX ADMIN — MEGESTROL ACETATE 20 MG: 20 TABLET ORAL at 09:24

## 2022-01-24 RX ADMIN — SEVELAMER CARBONATE 1600 MG: 800 TABLET, FILM COATED ORAL at 09:24

## 2022-01-24 RX ADMIN — MORPHINE SULFATE 1 MG: 2 INJECTION, SOLUTION INTRAMUSCULAR; INTRAVENOUS at 09:31

## 2022-01-24 RX ADMIN — SEVELAMER CARBONATE 1600 MG: 800 TABLET, FILM COATED ORAL at 20:21

## 2022-01-24 RX ADMIN — OXYCODONE HYDROCHLORIDE AND ACETAMINOPHEN 500 MG: 500 TABLET ORAL at 09:24

## 2022-01-24 RX ADMIN — HYDRALAZINE HYDROCHLORIDE 50 MG: 50 TABLET, FILM COATED ORAL at 20:21

## 2022-01-24 ASSESSMENT — PAIN SCALES - GENERAL
PAINLEVEL_OUTOF10: 9
PAINLEVEL_OUTOF10: 0
PAINLEVEL_OUTOF10: 0
PAINLEVEL_OUTOF10: 9
PAINLEVEL_OUTOF10: 0
PAINLEVEL_OUTOF10: 9

## 2022-01-24 NOTE — PROGRESS NOTES
Department of Internal Medicine  Nephrology Attending Progress Note    SUBJECTIVE:  We are following this patient for end-stage renal failure . 1/23: The pt is a 44 yo male with a pmh of esrd on hd mwf, htn, hypothryoidism, ICH, HFpEF, medication noncompliance who presents with vomiting, sob, chest pain. He was found to be covid positive. He is on 4 L o2. His last hd was Friday. His sbp has been 190s-200. He wasn't able to take his outpt bp meds due to vomiting. He was given his oral clonidine, hydralazine, labetolol, cozaar, procardia without much imrprovement yesterday. He was given iv labetolol also. He was started on a cardene drip and transferred to the icu. His labs today show na 137 l 5.1>6.9, co2 18, bun 54, cr 8.2, ca 9.5, p 8.2, alb 4.2, wbc 7.8, hgb 9.3, plt 123. He is to have dialysis today due to his k of 6.9. ct of the chest did not show a PE. History is obtained from the chart due to covid isolation.      1/24: pt sp hd yesterday due to k of 6.9. for hd again today, pt seen through window of hd due to covid isolation, chart reviewed    PROBLEM LIST:    Patient Active Problem List   Diagnosis    Stage 5 chronic kidney disease on chronic dialysis ([address]    H/O intracranial hemorrhage and aneurysm clipping on the right side    Anemia, chronic disease    Anxiety    Noncompliance    AVF (arteriovenous fistula) ([address]    Severe protein-calorie malnutrition ([address]    Venous hypertension, chronic, left    Iron deficiency    Secondary hyperparathyroidism of renal origin ([address]    Atypical chest pain    Essential hypertension    Chronic diastolic (congestive) heart failure (HCC)    Acute respiratory failure with hypoxia (HCC)    Pneumonia    Pericardial effusion    Anemia        PAST MEDICAL HISTORY:    Past Medical History:   Diagnosis Date    (HFpEF) heart failure with preserved ejection fraction ([address]     stage III    Anxiety 9/19/2015    AVF (arteriovenous fistula) ([address] 4/30/2018    Chronic kidney disease     CKD since early childhood per pt and on HD ~ 11 years    Depression     Encounter regarding vascular access for dialysis for ESRD ([facility] 4/30/2018    History of ruptured Berry aneurysm ([facility] [address] 10/20/2015    Hypertension     on meds for ~ 11 years    Hypothyroidism     Intracranial hemorrhage (HCC)     was d/t ruptured aneurysm - pt underwent neurosurgery for clipping of the aneurysm    Neutropenia ([facility] [address]     Noncompliance w/medication treatment due to intermit use of medication 11/3/2015    Pre-transplant evaluation for kidney transplant 4/5/2017       MEDS (scheduled):   vitamin D  50,000 Units Oral Weekly    Followed by   Dmitri Rod ON 4/17/2022] Vitamin D  1,000 Units Oral Daily    polyethylene glycol  17 g Oral Daily    vancomycin (VANCOCIN) intermittent dosing (placeholder)   Other RX Placeholder    nicotine  1 patch TransDERmal Daily    albuterol-ipratropium  1 puff Inhalation 4 times per day    dexamethasone  6 mg IntraVENous Q24H    ascorbic acid  500 mg Oral Daily    zinc sulfate  50 mg Oral Daily    b complex-C-folic acid  1 mg Oral Daily    cloNIDine  0.3 mg Oral TID    hydrALAZINE  50 mg Oral TID    labetalol  300 mg Oral BID    losartan  100 mg Oral QPM    megestrol  20 mg Oral TID    NIFEdipine  60 mg Oral Daily    pantoprazole  40 mg Oral Daily    sevelamer  1,600 mg Oral TID    sodium chloride flush  5-40 mL IntraVENous 2 times per day    heparin (porcine)  5,000 Units SubCUTAneous Q8H    cefepime  1,000 mg IntraVENous Q24H       MEDS (infusions):   [Held by provider] niCARdipene (CARDENE) 50 mg in 250 mL 0.9 % sodium chloride infusion Stopped (01/23/22 1100)    dextrose      sodium chloride         MEDS (prn):  glucose, dextrose, glucagon (rDNA), dextrose, ondansetron, hydrALAZINE, benzonatate, diphenhydrAMINE, melatonin, sodium chloride flush, sodium chloride, morphine    DIET:    ADULT DIET; Regular; Low Sodium (2 gm);  Low Potassium (Less than 3000 mg/day)      PHYSICAL EXAM:      Patient Vitals for the past 24 hrs:   BP Temp Temp src Pulse Resp SpO2 Height Weight   01/24/22 1105       5' 6\" (1.676 m) 119 lb (54 kg)   01/24/22 0900 (!) 125/99 98.9 °F (37.2 °C) Temporal 92 18 99 %     01/24/22 0514 (!) 137/92 99.1 °F (37.3 °C) Oral 90 16 100 %     01/23/22 2206 (!) 157/99 98.9 °F (37.2 °C) Temporal 94 18 94 %     01/23/22 2115 138/88          01/23/22 2100 (!) 208/118   90       01/23/22 2000 (!) 171/88 99.9 °F (37.7 °C) Temporal 93 19      01/23/22 1900 (!) 171/88   94 23      01/23/22 1630 (!) 167/78 97.8 °F (36.6 °C) Tympanic 88 18      01/23/22 1530 (!) 172/83   90       01/23/22 1515 (!) 166/72   91       01/23/22 1500 (!) 171/88   92       01/23/22 1445 (!) 169/76   94       01/23/22 1430 (!) 172/81   93       01/23/22 1423 (!) 174/88  [facility]      01/23/22 1415 (!) 169/82   83       01/23/22 1400 (!) 166/81   88 21      01/23/22 1345 (!) 172/81   88       01/23/22 1330 (!) 170/88   86       01/23/22 1300 (!) 155/88   92 23      01/23/22 1255 (!) 152/82 98.1 °F (36.7 °C)  92 16      01/23/22 1200 (!) 140/77   83 26 94 %            Intake/Output Summary (Last 24 hours) at 1/24/2022 1115  Last data filed at 1/23/2022 1834  Gross per 24 hour   Intake 155 ml   Output 2000 ml   Net -1845 ml       Wt Readings from Last 3 Encounters:   01/24/22 119 lb (54 kg)   10/12/21 119 lb (54 kg)   09/09/21 108 lb 12.8 oz (49.4 kg)       PE    Pt  Not examined due to covid isolation      DATA:      Recent Labs     01/22/22  1307 01/23/22  0419 01/24/22  0445   WBC 4.6 7.8 14.0*   HGB 9.7* 9.3* 9.0*   HCT 31.9* 30.7* 28.8*   MCV 98.8 99.0 95.0    123* 146     Recent Labs     01/23/22  0419 01/23/22  0940 01/24/22  0445    135 135   K 6.9* 6.1* 4.8   CL 94* 95* 93*   CO2 18* 22 25   BUN 54* 64* 52*   CREATININE 8.2* 9.1* 6.4*   LABGLOM 9 8 12   GLUCOSE

## 2022-01-24 NOTE — PROGRESS NOTES
Pharmacy Consultation Note  (Antibiotic Dosing and Monitoring)    Initial consult date: 1/23/2022  Consulting physician/provider: Melania Rene DO  Drug: Vancomycin  Indication: Sepsis of Unknown Etiology    Age/  Gender Height Weight IBW  Allergy Information   36 y.o./male 5' 6\" (167.6 cm) 119 lb (54 kg)   Ideal body weight: 63.8 kg (140 lb 10.5 oz)   Tylenol [acetaminophen] and Levofloxacin      Renal Function:  Recent Labs     01/23/22  0419 01/23/22  0940 01/24/22  0445   BUN 54* 64* 52*   CREATININE 8.2* 9.1* 6.4*       Intake/Output Summary (Last 24 hours) at 1/24/2022 0744  Last data filed at 1/23/2022 1834  Gross per 24 hour   Intake 155 ml   Output 2000 ml   Net -1845 ml       Vancomycin Monitoring:  Trough:  No results for input(s): VANCOTROUGH in the last 72 hours. Random:    Recent Labs     01/24/22  0445   VANCORANDOM 28.1     Vancomycin Administration Times:  Recent vancomycin administrations                   vancomycin 1000 mg IVPB in 250 mL D5W addavial (mg) 1,000 mg New Bag 01/23/22 1645              Date  SCr/CrCl       or HD Drug/Dose Time   Given Level(s)   (Time)   1/23 HD x 3hrs Vancomycin 1000 mg IV once 1645    1/24 HD  Vancomycin 500 mg IV once <1800> 28.1 mcg/mL   (5451)                   Assessment:  · Patient is a 43 y.o. male who has been initiated on vancomycin  Estimated Creatinine Clearance: 12 mL/min (A) (based on SCr of 6.4 mg/dL (H)). · To dose vancomycin, pharmacy will be utilizing dosing based off of levels due to patient requiring hemodialysis   · HD on MWF  · 1/24: Random level = 28.1 mcg/mL (~12 hrs post dose)    Plan:  · Give vancomycin 500 mg IV x1 after dialysis  · Will check vancomycin level tomorrow with AM labs  · Will continue to monitor renal function   · Clinical pharmacy to follow    SURGERY SPECIALTY [facility] MCKAYLA Rod, PharmD 1/24/2022 7:46 AM

## 2022-01-24 NOTE — PROGRESS NOTES
Comprehensive Nutrition Assessment    Type and Reason for Visit:  Initial,Consult    Nutrition Recommendations/Plan: Recommend and start Nepro renal supplement BID and Boost Pudding daily to help meet increased nutritional needs from dialysis and decreased po intake of meals. Nutrition Assessment:  Patient adm w/ decreased po intake of meals PTA ; adm w/ SOB and acute respiratory failure with hypoxia ; pt COVID-19 positive ; s/p RRT 1/23 ; noted pulmonary edema ; hx of IC hemorrhage and CHF ; ESRD w/ HD ; hx of severe malnutrition ; pt does meet criteria for severe malnutrition ; on Megace ; will start ONS    Malnutrition Assessment:  Malnutrition Status:  Severe malnutrition    Context:  Chronic Illness     Findings of the 6 clinical characteristics of malnutrition:  Energy Intake:  7 - 75% or less estimated energy requirements for 1 month or longer  Weight Loss:  Unable to assess (d/t no actual weights available since admission)     Body Fat Loss:  7 - Severe body fat loss Triceps,Orbital   Muscle Mass Loss:  7 - Severe muscle mass loss Temples (temporalis),Clavicles (pectoralis & deltoids)  Fluid Accumulation:  No significant fluid accumulation     Strength:  Not Performed    Estimated Daily Nutrient Needs:  Energy (kcal):  1650--1750 (REE 1404 x 1.2 SF); Weight Used for Energy Requirements:  Current     Protein (g):  70-85 (1.3-1.5g/kg CBW); Weight Used for Protein Requirements:  Current        Fluid (ml/day):  per renal; Method Used for Fluid Requirements:  Standard Renal      Nutrition Related Findings:  -I&Os (-1.7 L), no edema, active BS, SOB, A&O x 4, muscle/fat wasting, HD      Wounds:  None       Current Nutrition Therapies:    ADULT DIET; Regular; Low Sodium (2 gm);  Low Potassium (Less than 3000 mg/day)    Anthropometric Measures:  · Height: 5' 6\" (167.6 cm)  · Current Body Weight: 119 lb (54 kg) (1/24, estimated)   · Usual Body Weight:  (UTO ; EMR shows past weights of 119# no method on 10/12/21 and 110# actual on 8/9/21)     · Ideal Body Weight: 142 lbs; % Ideal Body Weight 83.8 %   · BMI: 19.2  · BMI Categories: Normal Weight (BMI 18.5-24. 9)       Nutrition Diagnosis:   · Severe malnutrition,In context of chronic illness related to catabolic illness (hx of ESRD w/ HD) as evidenced by poor intake prior to admission,severe loss of subcutaneous fat,severe muscle loss      Nutrition Interventions:   Food and/or Nutrient Delivery:  Continue Current Diet,Start Oral Nutrition Supplement  Nutrition Education/Counseling:  Education not indicated   Coordination of Nutrition Care:  Continue to monitor while inpatient    Goals:  Patient will consume 50-75% of meals served       Nutrition Monitoring and Evaluation:   Behavioral-Environmental Outcomes:  None Identified   Food/Nutrient Intake Outcomes:  Food and Nutrient Intake,Supplement Intake  Physical Signs/Symptoms Outcomes:  Biochemical Data,Chewing or Swallowing,GI Status,Fluid Status or Edema,Hemodynamic Status,Meal Time Behavior,Nutrition Focused Physical Findings,Skin,Weight     Discharge Planning:     Too soon to determine     Electronically signed by Gini Del Cid RD, LD on 1/24/22 at 11:16 AM EST    Contact: [phone]

## 2022-01-24 NOTE — FLOWSHEET NOTE
01/24/22 1653   Vital Signs   BP (!) 140/69   Temp 98 °F (36.7 °C)   Pulse 80   Resp 18   Weight 116 lb 13.5 oz (53 kg)   Weight Method Bed scale   Percent Weight Change -1.85   Pain Assessment   Pain Assessment 0-10   Pain Level 0   Post-Hemodialysis Assessment   Post-Treatment Procedures Blood returned;Catheter capped, clamped with Citrate x 2 ports   Machine Disinfection Process Acid/Vinegar Clean;Heat Disinfect; Exterior Machine Disinfection   Rinseback Volume (ml) 300 ml   Total Liters Processed (l/min) 63 l/min   Dialyzer Clearance Moderately streaked   Duration of Treatment (minutes) 180 minutes   Hemodialysis Intake (ml) 300 ml   Hemodialysis Output (ml) 2100 ml   NET Removed (ml) 1800 ml   Tolerated Treatment Good   Patient Response to Treatment Tolerated tx well   Bilateral Breath Sounds Diminished   Edema Generalized None

## 2022-01-25 LAB
ALBUMIN SERPL-MCNC: 3.7 G/DL (ref 3.5–5.2)
ALP BLD-CCNC: 85 U/L (ref 40–129)
ALT SERPL-CCNC: 1289 U/L (ref 0–40)
ANION GAP SERPL CALCULATED.3IONS-SCNC: 14 MMOL/L (ref 7–16)
AST SERPL-CCNC: 522 U/L (ref 0–39)
BASOPHILS ABSOLUTE: 0.01 E9/L (ref 0–0.2)
BASOPHILS RELATIVE PERCENT: 0.1 % (ref 0–2)
BILIRUB SERPL-MCNC: 0.7 MG/DL (ref 0–1.2)
BUN BLDV-MCNC: 41 MG/DL (ref 6–20)
C-REACTIVE PROTEIN: 2.3 MG/DL (ref 0–0.4)
CALCIUM SERPL-MCNC: 9.1 MG/DL (ref 8.6–10.2)
CHLORIDE BLD-SCNC: 100 MMOL/L (ref 98–107)
CO2: 28 MMOL/L (ref 22–29)
CREAT SERPL-MCNC: 5.5 MG/DL (ref 0.7–1.2)
D DIMER: >5250 NG/ML DDU
EKG ATRIAL RATE: 93 BPM
EKG P AXIS: 74 DEGREES
EKG P-R INTERVAL: 208 MS
EKG Q-T INTERVAL: 416 MS
EKG QRS DURATION: 110 MS
EKG QTC CALCULATION (BAZETT): 517 MS
EKG R AXIS: 72 DEGREES
EKG T AXIS: 86 DEGREES
EKG VENTRICULAR RATE: 93 BPM
EOSINOPHILS ABSOLUTE: 0 E9/L (ref 0.05–0.5)
EOSINOPHILS RELATIVE PERCENT: 0 % (ref 0–6)
FERRITIN: NORMAL NG/ML
FIBRINOGEN: 334 MG/DL (ref 225–540)
GFR AFRICAN AMERICAN: 14
GFR NON-AFRICAN AMERICAN: 14 ML/MIN/1.73
GLUCOSE BLD-MCNC: 107 MG/DL (ref 74–99)
HCT VFR BLD CALC: 27.9 % (ref 37–54)
HEMOGLOBIN: 8.8 G/DL (ref 12.5–16.5)
IMMATURE GRANULOCYTES #: 0.09 E9/L
IMMATURE GRANULOCYTES %: 0.7 % (ref 0–5)
LYMPHOCYTES ABSOLUTE: 1.46 E9/L (ref 1.5–4)
LYMPHOCYTES RELATIVE PERCENT: 11.8 % (ref 20–42)
MAGNESIUM: 2.6 MG/DL (ref 1.6–2.6)
MCH RBC QN AUTO: 30.6 PG (ref 26–35)
MCHC RBC AUTO-ENTMCNC: 31.5 % (ref 32–34.5)
MCV RBC AUTO: 96.9 FL (ref 80–99.9)
MONOCYTES ABSOLUTE: 0.84 E9/L (ref 0.1–0.95)
MONOCYTES RELATIVE PERCENT: 6.8 % (ref 2–12)
NEUTROPHILS ABSOLUTE: 9.94 E9/L (ref 1.8–7.3)
NEUTROPHILS RELATIVE PERCENT: 80.6 % (ref 43–80)
PDW BLD-RTO: 17.2 FL (ref 11.5–15)
PHOSPHORUS: 4.8 MG/DL (ref 2.5–4.5)
PLATELET # BLD: 186 E9/L (ref 130–450)
PMV BLD AUTO: 11 FL (ref 7–12)
POTASSIUM SERPL-SCNC: 4.3 MMOL/L (ref 3.5–5)
RBC # BLD: 2.88 E12/L (ref 3.8–5.8)
SODIUM BLD-SCNC: 142 MMOL/L (ref 132–146)
TOTAL PROTEIN: 6.3 G/DL (ref 6.4–8.3)
VANCOMYCIN RANDOM: 24.7 MCG/ML (ref 5–40)
WBC # BLD: 12.3 E9/L (ref 4.5–11.5)

## 2022-01-25 PROCEDURE — 85025 COMPLETE CBC W/AUTO DIFF WBC: CPT

## 2022-01-25 PROCEDURE — 36415 COLL VENOUS BLD VENIPUNCTURE: CPT

## 2022-01-25 PROCEDURE — 84100 ASSAY OF PHOSPHORUS: CPT

## 2022-01-25 PROCEDURE — 85378 FIBRIN DEGRADE SEMIQUANT: CPT

## 2022-01-25 PROCEDURE — 80202 ASSAY OF VANCOMYCIN: CPT

## 2022-01-25 PROCEDURE — 6360000002 HC RX W HCPCS: Performed by: INTERNAL MEDICINE

## 2022-01-25 PROCEDURE — 6370000000 HC RX 637 (ALT 250 FOR IP)

## 2022-01-25 PROCEDURE — 86140 C-REACTIVE PROTEIN: CPT

## 2022-01-25 PROCEDURE — 2700000000 HC OXYGEN THERAPY PER DAY

## 2022-01-25 PROCEDURE — 2140000000 HC CCU INTERMEDIATE R&B

## 2022-01-25 PROCEDURE — 80053 COMPREHEN METABOLIC PANEL: CPT

## 2022-01-25 PROCEDURE — 93010 ELECTROCARDIOGRAM REPORT: CPT | Performed by: INTERNAL MEDICINE

## 2022-01-25 PROCEDURE — 82728 ASSAY OF FERRITIN: CPT

## 2022-01-25 PROCEDURE — 6370000000 HC RX 637 (ALT 250 FOR IP): Performed by: INTERNAL MEDICINE

## 2022-01-25 PROCEDURE — 2580000003 HC RX 258: Performed by: INTERNAL MEDICINE

## 2022-01-25 PROCEDURE — 83735 ASSAY OF MAGNESIUM: CPT

## 2022-01-25 PROCEDURE — 85384 FIBRINOGEN ACTIVITY: CPT

## 2022-01-25 RX ADMIN — MORPHINE SULFATE 1 MG: 2 INJECTION, SOLUTION INTRAMUSCULAR; INTRAVENOUS at 15:27

## 2022-01-25 RX ADMIN — MEGESTROL ACETATE 20 MG: 20 TABLET ORAL at 19:42

## 2022-01-25 RX ADMIN — IPRATROPIUM BROMIDE AND ALBUTEROL 1 PUFF: 20; 100 SPRAY, METERED RESPIRATORY (INHALATION) at 19:49

## 2022-01-25 RX ADMIN — LABETALOL HYDROCHLORIDE 300 MG: 100 TABLET, FILM COATED ORAL at 19:42

## 2022-01-25 RX ADMIN — DIPHENHYDRAMINE HYDROCHLORIDE 25 MG: 25 TABLET ORAL at 09:09

## 2022-01-25 RX ADMIN — NEPHROCAP 1 MG: 1 CAP ORAL at 08:55

## 2022-01-25 RX ADMIN — CLONIDINE HYDROCHLORIDE 0.3 MG: 0.1 TABLET ORAL at 15:27

## 2022-01-25 RX ADMIN — OXYCODONE HYDROCHLORIDE AND ACETAMINOPHEN 500 MG: 500 TABLET ORAL at 08:54

## 2022-01-25 RX ADMIN — DIPHENHYDRAMINE HYDROCHLORIDE 25 MG: 25 TABLET ORAL at 19:42

## 2022-01-25 RX ADMIN — LABETALOL HYDROCHLORIDE 300 MG: 100 TABLET, FILM COATED ORAL at 08:54

## 2022-01-25 RX ADMIN — MORPHINE SULFATE 1 MG: 2 INJECTION, SOLUTION INTRAMUSCULAR; INTRAVENOUS at 05:43

## 2022-01-25 RX ADMIN — PANTOPRAZOLE SODIUM 40 MG: 40 TABLET, DELAYED RELEASE ORAL at 08:55

## 2022-01-25 RX ADMIN — MORPHINE SULFATE 1 MG: 2 INJECTION, SOLUTION INTRAMUSCULAR; INTRAVENOUS at 19:38

## 2022-01-25 RX ADMIN — MEGESTROL ACETATE 20 MG: 20 TABLET ORAL at 15:27

## 2022-01-25 RX ADMIN — HEPARIN SODIUM 5000 UNITS: 10000 INJECTION INTRAVENOUS; SUBCUTANEOUS at 06:37

## 2022-01-25 RX ADMIN — HYDRALAZINE HYDROCHLORIDE 50 MG: 50 TABLET, FILM COATED ORAL at 08:54

## 2022-01-25 RX ADMIN — CLONIDINE HYDROCHLORIDE 0.3 MG: 0.1 TABLET ORAL at 19:38

## 2022-01-25 RX ADMIN — HEPARIN SODIUM 5000 UNITS: 10000 INJECTION INTRAVENOUS; SUBCUTANEOUS at 21:00

## 2022-01-25 RX ADMIN — LOSARTAN POTASSIUM 100 MG: 50 TABLET, FILM COATED ORAL at 19:38

## 2022-01-25 RX ADMIN — Medication 10 ML: at 21:34

## 2022-01-25 RX ADMIN — CLONIDINE HYDROCHLORIDE 0.3 MG: 0.1 TABLET ORAL at 08:54

## 2022-01-25 RX ADMIN — MEGESTROL ACETATE 20 MG: 20 TABLET ORAL at 08:55

## 2022-01-25 RX ADMIN — IPRATROPIUM BROMIDE AND ALBUTEROL 1 PUFF: 20; 100 SPRAY, METERED RESPIRATORY (INHALATION) at 12:17

## 2022-01-25 RX ADMIN — HEPARIN SODIUM 5000 UNITS: 10000 INJECTION INTRAVENOUS; SUBCUTANEOUS at 15:37

## 2022-01-25 RX ADMIN — NIFEDIPINE 60 MG: 60 TABLET, EXTENDED RELEASE ORAL at 08:55

## 2022-01-25 RX ADMIN — POLYETHYLENE GLYCOL 3350 17 G: 17 POWDER, FOR SOLUTION ORAL at 09:00

## 2022-01-25 RX ADMIN — SEVELAMER CARBONATE 1600 MG: 800 TABLET, FILM COATED ORAL at 08:54

## 2022-01-25 RX ADMIN — SEVELAMER CARBONATE 1600 MG: 800 TABLET, FILM COATED ORAL at 19:38

## 2022-01-25 RX ADMIN — ZINC SULFATE 220 MG (50 MG) CAPSULE 50 MG: CAPSULE at 08:55

## 2022-01-25 RX ADMIN — HYDRALAZINE HYDROCHLORIDE 50 MG: 50 TABLET, FILM COATED ORAL at 15:27

## 2022-01-25 RX ADMIN — IPRATROPIUM BROMIDE AND ALBUTEROL 1 PUFF: 20; 100 SPRAY, METERED RESPIRATORY (INHALATION) at 00:03

## 2022-01-25 RX ADMIN — DEXAMETHASONE SODIUM PHOSPHATE 6 MG: 10 INJECTION INTRAMUSCULAR; INTRAVENOUS at 08:55

## 2022-01-25 RX ADMIN — Medication 10 ML: at 09:00

## 2022-01-25 RX ADMIN — IPRATROPIUM BROMIDE AND ALBUTEROL 1 PUFF: 20; 100 SPRAY, METERED RESPIRATORY (INHALATION) at 06:37

## 2022-01-25 RX ADMIN — SEVELAMER CARBONATE 1600 MG: 800 TABLET, FILM COATED ORAL at 15:27

## 2022-01-25 RX ADMIN — HYDRALAZINE HYDROCHLORIDE 50 MG: 50 TABLET, FILM COATED ORAL at 19:38

## 2022-01-25 ASSESSMENT — PAIN SCALES - GENERAL
PAINLEVEL_OUTOF10: 10
PAINLEVEL_OUTOF10: 3
PAINLEVEL_OUTOF10: 0
PAINLEVEL_OUTOF10: 10
PAINLEVEL_OUTOF10: 9

## 2022-01-25 NOTE — PROGRESS NOTES
Hospitalist Progress Note      PCP: Markus Armenta DO    Date of Admission: 1/22/2022  Days in the hospital: Heidi6 Jacky Spaulding Course:   Mr. Abdelrahman Davenport, [age] y. o. year old [de-identified]  has a past medical history of (HFpEF) heart failure with preserved ejection fraction (HCC), Anxiety, AVF (arteriovenous fistula) (HCC), Chronic kidney disease, Depression, Encounter regarding vascular access for dialysis for ESRD ([facility] [address] History of ruptured Berry aneurysm ([facility] [address] Hypertension, Hypothyroidism, Intracranial hemorrhage ([facility] [address] Neutropenia ([facility] [address] Noncompliance w/medication treatment due to intermit use of medication, and Pre-transplant evaluation for kidney transplant. who presents to the ED with CP/SOB and retching. The patient states that he started feeling ill after dialysis yesterday; therefore he presented to the ED with complaints of fever, SOB and CP yesterday. He was tested for COVID and was positive, The patient did not show any evidence of hypoxia and was discharged home.       He presents back to the hospital with worsening dry cough, SOB and CP, retching. The patient states that he has CP on the left; which feels like pressure with no radiation.      In the ED, the patient had an EKG that showed: sinus tachycardia, LVH; trops were 98, 95, 94 and as per the ED this is the patient's baseline with his ESRD. The patient was noted to Desaturate to 88% and is placed on 4L of O2. He also received a dose of morphine, Benadryl (he has itching associated with dialysis).      Patient was noted with hypertension with systolic blood pressure greater than 200 mmHg.   He was transferred to the ICU and initiated on Cardene drip; has since transferred out of the icu and transitioned off nicardipine drip     Subjective  Afebrile  Oxygen requirements going down     Exam:    BP (!) 165/103   Pulse 88   Temp 97.9 °F (36.6 °C) (Oral)   Resp 18   Ht 5' 6\" (1.676 m)   Wt 116 lb 13.5 oz (53 kg)   SpO2 99%   BMI 18.86 kg/m²     HEENT: No pallor, no icterus. Respiratory:  CTA, good air entry. Cardiovascular: RRR, no murmur. Abdomen: Soft, non-tender, BS noted. Neurologic: awake, alert and following commands       Assessment/Plan:  Active Hospital Problems    Diagnosis Date Noted    Severe protein-calorie malnutrition ([facility] [address] [E43] 01/24/2022    Acute respiratory failure with hypoxia (HCC) [J96.01] 07/31/2021     · Hypertensive emergency  · COVID 19 pneumonia  · Pulmonary edema  · ESRD on dialysis  · Transaminitis    Plan:  · Continue Decadron, monitor inflammatory markers  · Wean oxygen as tolerated   · Blood pressure better controlled, continue with current medication, off nicardipine drip  · Follow-up with nephrology, scheduled for dialysis  · Appreciate consultant support   · D/c antibiotics as imaging not consistent with focal pneumonia, procalcitonin negative, leukocytosis only once steroids were on board; continue to monitor for fever   · Continue to monitor LFTs, significant worsening of AST and ALT noted yesterday now improving      Labs:   Recent Labs     01/23/22 0419 01/24/22 0445 01/25/22  0547   WBC 7.8 14.0* 12.3*   HGB 9.3* 9.0* 8.8*   HCT 30.7* 28.8* 27.9*   * 146 186     Recent Labs     01/23/22 0419 01/23/22 0419 01/23/22  0940 01/24/22 0445 01/25/22  0547      < > 135 135 142   K 6.9*   < > 6.1* 4.8 4.3   CL 94*   < > 95* 93* 100   CO2 18*   < > 22 25 28   BUN 54*   < > 64* 52* 41*   CREATININE 8.2*   < > 9.1* 6.4* 5.5*   CALCIUM 9.5   < > 9.8 9.2 9.1   PHOS 8.2*  --   --  6.6* 4.8*    < > = values in this interval not displayed. Recent Labs     01/23/22 0419 01/24/22 0445 01/25/22  0547   * 1,584*  1,616* 522*   * 1,669*  1,720* 1,289*   BILIDIR  --  0.2  --    BILITOT 0.8 0.8  0.8 0.7   ALKPHOS 96 92  92 85     No results for input(s): INR in the last 72 hours. No results for input(s): Sen [place] in the last 72 hours.     Medications: Reviewed    Infusion Medications    [Held by provider] niCARdipene (CARDENE) 50 mg in 250 mL 0.9 % sodium chloride infusion Stopped (01/23/22 1100)    dextrose      sodium chloride       Scheduled Medications    vitamin D  50,000 Units Oral Weekly    Followed by   Avinash Heredia ON 4/17/2022] Vitamin D  1,000 Units Oral Daily    polyethylene glycol  17 g Oral Daily    vancomycin (VANCOCIN) intermittent dosing (placeholder)   Other RX Placeholder    nicotine  1 patch TransDERmal Daily    albuterol-ipratropium  1 puff Inhalation 4 times per day    dexamethasone  6 mg IntraVENous Q24H    ascorbic acid  500 mg Oral Daily    zinc sulfate  50 mg Oral Daily    b complex-C-folic acid  1 mg Oral Daily    cloNIDine  0.3 mg Oral TID    hydrALAZINE  50 mg Oral TID    labetalol  300 mg Oral BID    losartan  100 mg Oral QPM    megestrol  20 mg Oral TID    NIFEdipine  60 mg Oral Daily    pantoprazole  40 mg Oral Daily    sevelamer  1,600 mg Oral TID    sodium chloride flush  5-40 mL IntraVENous 2 times per day    heparin (porcine)  5,000 Units SubCUTAneous Q8H    cefepime  1,000 mg IntraVENous Q24H     PRN Meds: glucose, dextrose, glucagon (rDNA), dextrose, ondansetron, hydrALAZINE, benzonatate, diphenhydrAMINE, melatonin, sodium chloride flush, sodium chloride, morphine      Intake/Output Summary (Last 24 hours) at 1/25/2022 1020  Last data filed at 1/24/2022 1653  Gross per 24 hour   Intake 600 ml   Output 2100 ml   Net -1500 ml     Body mass index is 18.86 kg/m². · Diet  ADULT DIET; Regular; Low Sodium (2 gm); Low Potassium (Less than 3000 mg/day)  ADULT ORAL NUTRITION SUPPLEMENT; Breakfast, Dinner; Renal Oral Supplement  ADULT ORAL NUTRITION SUPPLEMENT; Lunch;  Fortified Pudding Oral Supplement    · Code Status  Full Code       Electronically signed by Jamil Cooney MD on 1/25/2022 at 10:20 AM  [employer]   Please contact me through perfect serve    NOTE: This report was transcribed using voice recognition software. Every effort was made to ensure accuracy; however, inadvertent computerized transcription errors may be present.

## 2022-01-25 NOTE — PROGRESS NOTES
Department of Internal Medicine  Nephrology Attending Progress Note    SUBJECTIVE:  We are following this patient for end-stage renal failure . 1/23: The pt is a 44 yo male with a pmh of esrd on hd mwf, htn, hypothryoidism, ICH, HFpEF, medication noncompliance who presents with vomiting, sob, chest pain. He was found to be covid positive. He is on 4 L o2. His last hd was Friday. His sbp has been 190s-200. He wasn't able to take his outpt bp meds due to vomiting. He was given his oral clonidine, hydralazine, labetolol, cozaar, procardia without much imrprovement yesterday. He was given iv labetolol also. He was started on a cardene drip and transferred to the icu. His labs today show na 137 l 5.1>6.9, co2 18, bun 54, cr 8.2, ca 9.5, p 8.2, alb 4.2, wbc 7.8, hgb 9.3, plt 123. He is to have dialysis today due to his k of 6.9. ct of the chest did not show a PE. History is obtained from the chart due to covid isolation.      1/24: pt sp hd yesterday due to k of 6.9. for hd again today, pt seen through window of hd due to covid isolation, chart reviewed    1/25: pt not seen due to covid, chart reviewed    PROBLEM LIST:    Patient Active Problem List   Diagnosis    Stage 5 chronic kidney disease on chronic dialysis ([address]    H/O intracranial hemorrhage and aneurysm clipping on the right side    Anemia, chronic disease    Anxiety    Noncompliance    AVF (arteriovenous fistula) ([address]    Severe protein-calorie malnutrition ([address]    Venous hypertension, chronic, left    Iron deficiency    Secondary hyperparathyroidism of renal origin ([address]    Atypical chest pain    Essential hypertension    Chronic diastolic (congestive) heart failure (HCC)    Acute respiratory failure with hypoxia (HCC)    Pneumonia    Pericardial effusion    Anemia        PAST MEDICAL HISTORY:    Past Medical History:   Diagnosis Date    (HFpEF) heart failure with preserved ejection fraction ([address]     stage III    Anxiety 9/19/2015    AVF (arteriovenous fistula) ([facility] 4/30/2018    Chronic kidney disease     CKD since early childhood per pt and on HD ~ 11 years    Depression     Encounter regarding vascular access for dialysis for ESRD ([facility] 4/30/2018    History of ruptured Berry aneurysm ([facility] 10/20/2015    Hypertension     on meds for ~ 11 years    Hypothyroidism     Intracranial hemorrhage (HCC)     was d/t ruptured aneurysm - pt underwent neurosurgery for clipping of the aneurysm    Neutropenia ([facility]     Noncompliance w/medication treatment due to intermit use of medication 11/3/2015    Pre-transplant evaluation for kidney transplant 4/5/2017       MEDS (scheduled):   vitamin D  50,000 Units Oral Weekly    Followed by   Gila Trotter ON 4/17/2022] Vitamin D  1,000 Units Oral Daily    polyethylene glycol  17 g Oral Daily    nicotine  1 patch TransDERmal Daily    albuterol-ipratropium  1 puff Inhalation 4 times per day    dexamethasone  6 mg IntraVENous Q24H    ascorbic acid  500 mg Oral Daily    zinc sulfate  50 mg Oral Daily    b complex-C-folic acid  1 mg Oral Daily    cloNIDine  0.3 mg Oral TID    hydrALAZINE  50 mg Oral TID    labetalol  300 mg Oral BID    losartan  100 mg Oral QPM    megestrol  20 mg Oral TID    NIFEdipine  60 mg Oral Daily    pantoprazole  40 mg Oral Daily    sevelamer  1,600 mg Oral TID    sodium chloride flush  5-40 mL IntraVENous 2 times per day    heparin (porcine)  5,000 Units SubCUTAneous Q8H       MEDS (infusions):   [Held by provider] niCARdipene (CARDENE) 50 mg in 250 mL 0.9 % sodium chloride infusion Stopped (01/23/22 1100)    dextrose      sodium chloride         MEDS (prn):  glucose, dextrose, glucagon (rDNA), dextrose, ondansetron, hydrALAZINE, benzonatate, diphenhydrAMINE, melatonin, sodium chloride flush, sodium chloride, morphine    DIET:    ADULT DIET; Regular; Low Sodium (2 gm);  Low Potassium (Less than 3000 mg/day)  ADULT ORAL NUTRITION SUPPLEMENT; Breakfast, Dinner; Renal Oral Supplement  ADULT ORAL NUTRITION SUPPLEMENT; Lunch;  Fortified Pudding Oral Supplement      PHYSICAL EXAM:      Patient Vitals for the past 24 hrs:   BP Temp Temp src Pulse Resp SpO2 Weight   01/25/22 0630      99 %    01/24/22 2010      98 %    01/24/22 2000 (!) 165/103 97.9 °F (36.6 °C) Oral 88 18 99 %    01/24/22 1653 (!) 140/69 98 °F (36.7 °C)  80 18  116 lb 13.5 oz (53 kg)   01/24/22 1630 (!) 150/85   90      01/24/22 1600 (!) 149/75   79      01/24/22 1530 (!) 156/89   86      01/24/22 1500 (!) 173/100   84      01/24/22 1430 (!) 175/104   76      01/24/22 1400 (!) 149/92   78      01/24/22 1332 (!) 154/91 98.2 °F (36.8 °C)  85 20  119 lb 0.8 oz (54 kg)          Intake/Output Summary (Last 24 hours) at 1/25/2022 1214  Last data filed at 1/24/2022 1653  Gross per 24 hour   Intake 300 ml   Output 2100 ml   Net -1800 ml       Wt Readings from Last 3 Encounters:   01/24/22 116 lb 13.5 oz (53 kg)   10/12/21 119 lb (54 kg)   09/09/21 108 lb 12.8 oz (49.4 kg)       PE    Pt  Not examined due to covid isolation      DATA:      Recent Labs     01/23/22  0419 01/24/22  0445 01/25/22  0547   WBC 7.8 14.0* 12.3*   HGB 9.3* 9.0* 8.8*   HCT 30.7* 28.8* 27.9*   MCV 99.0 95.0 96.9   * 146 186     Recent Labs     01/23/22  0940 01/24/22  0445 01/25/22  0547    135 142   K 6.1* 4.8 4.3   CL 95* 93* 100   CO2 22 25 28   BUN 64* 52* 41*   CREATININE 9.1* 6.4* 5.5*   LABGLOM 8 12 14   GLUCOSE 118* 119* 107*   CALCIUM 9.8 9.2 9.1     Recent Labs     01/23/22  0419 01/24/22  0445 01/25/22  0547   * 1,669*  1,720* 1,289*     Lab Results   Component Value Date    LABALBU 3.7 01/25/2022    LABALBU 3.9 01/24/2022    LABALBU 4.2 01/24/2022       Iron studies:  Lab Results   Component Value Date    FERRITIN 16,271 01/25/2022    IRON 33 (L) 09/06/2021    TIBC 202 (L) 09/06/2021     Vitamin B-12   Date Value Ref Range Status   02/10/2020 830 211 - 946 pg/mL Final     Folate Date Value Ref Range Status   02/10/2020 10.5 4.8 - 24.2 ng/mL Final       Bone disease:  Lab Results   Component Value Date    MG 2.6 01/25/2022    PHOS 4.8 (H) 01/25/2022     Vit D, 25-Hydroxy   Date Value Ref Range Status   01/23/2022 17 (L) 30 - 100 ng/mL Final     Comment:     <20 ng/mL. ........... Cristóbal Pesa Deficient  20-30 ng/mL. ......... Cristóbal Pesa Insufficient   ng/mL. ........ Cristóbal Pesa Sufficient  >100 ng/mL. .......... Cristóbal Pesa Toxic       PTH   Date Value Ref Range Status   01/05/2019 418 (H) 15 - 65 pg/mL Final       No components found for: URIC    No results found for: VOL, APPEARANCE, COLORU, LABSPEC, LABPH, LEUKBLD, NITRU, GLUCOSEU, KETUA, UROBILINOGEN, KETUA, UROBILINOGEN, BILIRUBINUR, OCBU    No results found for: LIPIDPAN        IMPRESSION/RECOMMENDATIONS:      1. esrd  Hd mwf  sp emergency hd sun 1/23 due to hyperkalemia  For hd today per his usual schedule     2. htn  Uncontrolled initially  On cardene drip  H/o medication noncompliance and vomiting  On orals procardia, clonidine, cozaar, labetolol, and hydralazine     3. Sec hyperparathryoidism  p 8.2>6.6  Noncompliance with renvela     4. Anemia  Continue sierra     5. covid pna  Started on steroids  Per icu     6. Metabolic acidosis  Improved with hd      Jose David Delgado.  Frank Guerrero MD

## 2022-01-25 NOTE — PLAN OF CARE
Patient's chart updated to reflect:      . - HF care plan, HF education points and HF discharge instructions.  -Orders: 2 gram sodium diet, daily weights, I/O.  -PCP and/or Cardiologist appointment to be scheduled within 7 days of hospital discharge.   -HD patient  -TEJAS Hendrickson, RN RN, BSN  Heart Failure Navigator

## 2022-01-25 NOTE — PROGRESS NOTES
Pharmacy Consultation Note  (Antibiotic Dosing and Monitoring)     Vancomycin has been discontinued   [address] to sign-off   Physician to re-consult pharmacy if future dosing is needed    Thank you for the consult,      Jaspreet Perez 1/25/2022 10:28 AM         Initial consult date: 1/23/2022  Consulting physician/provider: Melania Rene DO  Drug: Vancomycin  Indication: Sepsis of Unknown Etiology    Age/  Gender Height Weight IBW  Allergy Information   36 y.o./male 5' 6\" (167.6 cm) 119 lb (54 kg)   Ideal body weight: 63.8 kg (140 lb 10.5 oz)   Tylenol [acetaminophen] and Levofloxacin      Renal Function:  Recent Labs     01/23/22  0940 01/24/22  0445 01/25/22  0547   BUN 64* 52* 41*   CREATININE 9.1* 6.4* 5.5*       Intake/Output Summary (Last 24 hours) at 1/25/2022 0723  Last data filed at 1/24/2022 1653  Gross per 24 hour   Intake 600 ml   Output 2100 ml   Net -1500 ml       Vancomycin Monitoring:  Trough:  No results for input(s): VANCOTROUGH in the last 72 hours. Random:    Recent Labs     01/24/22  0445 01/25/22  0547   VANCORANDOM 28.1 24.7     Vancomycin Administration Times:  Recent vancomycin administrations                   vancomycin (VANCOCIN) 500 mg in dextrose 5 % 100 mL IVPB (mg) 500 mg New Bag 01/24/22 1821    vancomycin 1000 mg IVPB in 250 mL D5W addavial (mg) 1,000 mg New Bag 01/23/22 1645                Date  SCr/CrCl       or HD Drug/Dose Time   Given Level(s)   (Time)   1/23 HD x 3hrs Vancomycin 1000 mg IV once 1645 1/24 HD  Vancomycin 500 mg IV once <1800> 28.1 mcg/mL   (0445)   1/25    24.7mcg/mL (1648)            Assessment:  · Patient is a 43 y.o. male who has been initiated on vancomycin  Estimated Creatinine Clearance: 14 mL/min (A) (based on SCr of 5.5 mg/dL (H)).   · To dose vancomycin, pharmacy will be utilizing dosing based off of levels due to patient requiring hemodialysis   · HD on MWF  · 1/24: Random level = 28.1 mcg/mL (~12 hrs post dose)    Plan:  ·   · Will check vancomycin level tomorrow with AM labs  · Will continue to monitor renal function   · Clinical pharmacy to follow

## 2022-01-25 NOTE — PLAN OF CARE
Problem: Airway Clearance - Ineffective  Goal: Achieve or maintain patent airway  Outcome: Met This Shift     Problem: Gas Exchange - Impaired  Goal: Absence of hypoxia  Outcome: Met This Shift  Goal: Promote optimal lung function  Outcome: Met This Shift     Problem: Breathing Pattern - Ineffective  Goal: Ability to achieve and maintain a regular respiratory rate  Outcome: Met This Shift     Problem:  Body Temperature -  Risk of, Imbalanced  Goal: Ability to maintain a body temperature within defined limits  Outcome: Met This Shift  Goal: Will regain or maintain usual level of consciousness  Outcome: Met This Shift  Goal: Complications related to the disease process, condition or treatment will be avoided or minimized  Outcome: Met This Shift     Problem: Isolation Precautions - Risk of Spread of Infection  Goal: Prevent transmission of infection  Outcome: Met This Shift     Problem: Nutrition Deficits  Goal: Optimize nutritional status  Outcome: Met This Shift     Problem: Risk for Fluid Volume Deficit  Goal: Maintain normal heart rhythm  Outcome: Met This Shift  Goal: Maintain absence of muscle cramping  Outcome: Met This Shift  Goal: Maintain normal serum potassium, sodium, calcium, phosphorus, and pH  Outcome: Met This Shift     Problem: Loneliness or Risk for Loneliness  Goal: Demonstrate positive use of time alone when socialization is not possible  Outcome: Met This Shift     Problem: Fatigue  Goal: Verbalize increase energy and improved vitality  Outcome: Met This Shift     Problem: Patient Education: Go to Patient Education Activity  Goal: Patient/Family Education  Outcome: Met This Shift     Problem: Pain:  Goal: Pain level will decrease  Description: Pain level will decrease  Outcome: Met This Shift  Goal: Control of acute pain  Description: Control of acute pain  Outcome: Met This Shift  Goal: Control of chronic pain  Description: Control of chronic pain  Outcome: Met This Shift     Problem: Malnutrition  (NI-5.2)  Goal: Food and/or Nutrient Delivery  Description: Individualized approach for food/nutrient provision.   1/24/2022 1116 by Keo Palmer RD, LD  Outcome: Met This Shift

## 2022-01-26 LAB
ALBUMIN SERPL-MCNC: 3.6 G/DL (ref 3.5–5.2)
ALP BLD-CCNC: 83 U/L (ref 40–129)
ALT SERPL-CCNC: 1015 U/L (ref 0–40)
ANION GAP SERPL CALCULATED.3IONS-SCNC: 15 MMOL/L (ref 7–16)
AST SERPL-CCNC: 264 U/L (ref 0–39)
BASOPHILS ABSOLUTE: 0.01 E9/L (ref 0–0.2)
BASOPHILS RELATIVE PERCENT: 0.1 % (ref 0–2)
BILIRUB SERPL-MCNC: 0.8 MG/DL (ref 0–1.2)
BUN BLDV-MCNC: 68 MG/DL (ref 6–20)
CALCIUM SERPL-MCNC: 9.1 MG/DL (ref 8.6–10.2)
CHLORIDE BLD-SCNC: 97 MMOL/L (ref 98–107)
CO2: 26 MMOL/L (ref 22–29)
CREAT SERPL-MCNC: 8 MG/DL (ref 0.7–1.2)
EOSINOPHILS ABSOLUTE: 0.02 E9/L (ref 0.05–0.5)
EOSINOPHILS RELATIVE PERCENT: 0.2 % (ref 0–6)
GFR AFRICAN AMERICAN: 9
GFR NON-AFRICAN AMERICAN: 9 ML/MIN/1.73
GLUCOSE BLD-MCNC: 139 MG/DL (ref 74–99)
HCT VFR BLD CALC: 27.1 % (ref 37–54)
HEMOGLOBIN: 8.5 G/DL (ref 12.5–16.5)
IMMATURE GRANULOCYTES #: 0.11 E9/L
IMMATURE GRANULOCYTES %: 0.9 % (ref 0–5)
LYMPHOCYTES ABSOLUTE: 2.11 E9/L (ref 1.5–4)
LYMPHOCYTES RELATIVE PERCENT: 17.2 % (ref 20–42)
MAGNESIUM: 2.9 MG/DL (ref 1.6–2.6)
MCH RBC QN AUTO: 30 PG (ref 26–35)
MCHC RBC AUTO-ENTMCNC: 31.4 % (ref 32–34.5)
MCV RBC AUTO: 95.8 FL (ref 80–99.9)
MONOCYTES ABSOLUTE: 0.89 E9/L (ref 0.1–0.95)
MONOCYTES RELATIVE PERCENT: 7.3 % (ref 2–12)
NEUTROPHILS ABSOLUTE: 9.12 E9/L (ref 1.8–7.3)
NEUTROPHILS RELATIVE PERCENT: 74.3 % (ref 43–80)
PDW BLD-RTO: 16.9 FL (ref 11.5–15)
PHOSPHORUS: 4.5 MG/DL (ref 2.5–4.5)
PLATELET # BLD: 186 E9/L (ref 130–450)
PMV BLD AUTO: 9.8 FL (ref 7–12)
POTASSIUM SERPL-SCNC: 4.2 MMOL/L (ref 3.5–5)
PROCALCITONIN: 29.84 NG/ML (ref 0–0.08)
RBC # BLD: 2.83 E12/L (ref 3.8–5.8)
SODIUM BLD-SCNC: 138 MMOL/L (ref 132–146)
TOTAL PROTEIN: 6.1 G/DL (ref 6.4–8.3)
WBC # BLD: 12.3 E9/L (ref 4.5–11.5)

## 2022-01-26 PROCEDURE — 6360000002 HC RX W HCPCS: Performed by: INTERNAL MEDICINE

## 2022-01-26 PROCEDURE — 6370000000 HC RX 637 (ALT 250 FOR IP): Performed by: INTERNAL MEDICINE

## 2022-01-26 PROCEDURE — 36415 COLL VENOUS BLD VENIPUNCTURE: CPT

## 2022-01-26 PROCEDURE — 6370000000 HC RX 637 (ALT 250 FOR IP)

## 2022-01-26 PROCEDURE — 83735 ASSAY OF MAGNESIUM: CPT

## 2022-01-26 PROCEDURE — 2700000000 HC OXYGEN THERAPY PER DAY

## 2022-01-26 PROCEDURE — 84145 PROCALCITONIN (PCT): CPT

## 2022-01-26 PROCEDURE — 80053 COMPREHEN METABOLIC PANEL: CPT

## 2022-01-26 PROCEDURE — 2140000000 HC CCU INTERMEDIATE R&B

## 2022-01-26 PROCEDURE — 85025 COMPLETE CBC W/AUTO DIFF WBC: CPT

## 2022-01-26 PROCEDURE — 90935 HEMODIALYSIS ONE EVALUATION: CPT

## 2022-01-26 PROCEDURE — 84100 ASSAY OF PHOSPHORUS: CPT

## 2022-01-26 PROCEDURE — 2580000003 HC RX 258: Performed by: INTERNAL MEDICINE

## 2022-01-26 RX ORDER — HEPARIN SODIUM 10000 [USP'U]/ML
7500 INJECTION, SOLUTION INTRAVENOUS; SUBCUTANEOUS EVERY 8 HOURS
Status: DISCONTINUED | OUTPATIENT
Start: 2022-01-26 | End: 2022-01-29 | Stop reason: HOSPADM

## 2022-01-26 RX ADMIN — IPRATROPIUM BROMIDE AND ALBUTEROL 1 PUFF: 20; 100 SPRAY, METERED RESPIRATORY (INHALATION) at 00:52

## 2022-01-26 RX ADMIN — Medication 10 ML: at 20:59

## 2022-01-26 RX ADMIN — IPRATROPIUM BROMIDE AND ALBUTEROL 1 PUFF: 20; 100 SPRAY, METERED RESPIRATORY (INHALATION) at 05:41

## 2022-01-26 RX ADMIN — HYDRALAZINE HYDROCHLORIDE 50 MG: 50 TABLET, FILM COATED ORAL at 08:04

## 2022-01-26 RX ADMIN — MEGESTROL ACETATE 20 MG: 20 TABLET ORAL at 08:05

## 2022-01-26 RX ADMIN — NIFEDIPINE 60 MG: 60 TABLET, EXTENDED RELEASE ORAL at 08:05

## 2022-01-26 RX ADMIN — HYDRALAZINE HYDROCHLORIDE 50 MG: 50 TABLET, FILM COATED ORAL at 20:57

## 2022-01-26 RX ADMIN — CLONIDINE HYDROCHLORIDE 0.3 MG: 0.1 TABLET ORAL at 13:35

## 2022-01-26 RX ADMIN — MORPHINE SULFATE 1 MG: 2 INJECTION, SOLUTION INTRAMUSCULAR; INTRAVENOUS at 05:11

## 2022-01-26 RX ADMIN — CLONIDINE HYDROCHLORIDE 0.3 MG: 0.1 TABLET ORAL at 08:05

## 2022-01-26 RX ADMIN — HEPARIN SODIUM 7500 UNITS: 10000 INJECTION INTRAVENOUS; SUBCUTANEOUS at 22:00

## 2022-01-26 RX ADMIN — IPRATROPIUM BROMIDE AND ALBUTEROL 1 PUFF: 20; 100 SPRAY, METERED RESPIRATORY (INHALATION) at 13:38

## 2022-01-26 RX ADMIN — HEPARIN SODIUM 5000 UNITS: 10000 INJECTION INTRAVENOUS; SUBCUTANEOUS at 05:42

## 2022-01-26 RX ADMIN — MORPHINE SULFATE 1 MG: 2 INJECTION, SOLUTION INTRAMUSCULAR; INTRAVENOUS at 17:39

## 2022-01-26 RX ADMIN — MORPHINE SULFATE 1 MG: 2 INJECTION, SOLUTION INTRAMUSCULAR; INTRAVENOUS at 13:33

## 2022-01-26 RX ADMIN — NEPHROCAP 1 MG: 1 CAP ORAL at 08:05

## 2022-01-26 RX ADMIN — LABETALOL HYDROCHLORIDE 300 MG: 100 TABLET, FILM COATED ORAL at 21:00

## 2022-01-26 RX ADMIN — POLYETHYLENE GLYCOL 3350 17 G: 17 POWDER, FOR SOLUTION ORAL at 09:00

## 2022-01-26 RX ADMIN — OXYCODONE HYDROCHLORIDE AND ACETAMINOPHEN 500 MG: 500 TABLET ORAL at 08:05

## 2022-01-26 RX ADMIN — MEGESTROL ACETATE 20 MG: 20 TABLET ORAL at 13:35

## 2022-01-26 RX ADMIN — IPRATROPIUM BROMIDE AND ALBUTEROL 1 PUFF: 20; 100 SPRAY, METERED RESPIRATORY (INHALATION) at 17:40

## 2022-01-26 RX ADMIN — DEXAMETHASONE SODIUM PHOSPHATE 6 MG: 10 INJECTION INTRAMUSCULAR; INTRAVENOUS at 08:05

## 2022-01-26 RX ADMIN — CLONIDINE HYDROCHLORIDE 0.3 MG: 0.1 TABLET ORAL at 20:57

## 2022-01-26 RX ADMIN — LABETALOL HYDROCHLORIDE 300 MG: 100 TABLET, FILM COATED ORAL at 08:04

## 2022-01-26 RX ADMIN — HEPARIN SODIUM 7500 UNITS: 10000 INJECTION INTRAVENOUS; SUBCUTANEOUS at 13:40

## 2022-01-26 RX ADMIN — HYDRALAZINE HYDROCHLORIDE 50 MG: 50 TABLET, FILM COATED ORAL at 13:35

## 2022-01-26 RX ADMIN — SEVELAMER CARBONATE 1600 MG: 800 TABLET, FILM COATED ORAL at 08:05

## 2022-01-26 RX ADMIN — PANTOPRAZOLE SODIUM 40 MG: 40 TABLET, DELAYED RELEASE ORAL at 08:05

## 2022-01-26 RX ADMIN — MORPHINE SULFATE 1 MG: 2 INJECTION, SOLUTION INTRAMUSCULAR; INTRAVENOUS at 21:51

## 2022-01-26 RX ADMIN — LOSARTAN POTASSIUM 100 MG: 50 TABLET, FILM COATED ORAL at 17:39

## 2022-01-26 RX ADMIN — Medication 10 ML: at 08:00

## 2022-01-26 RX ADMIN — ZINC SULFATE 220 MG (50 MG) CAPSULE 50 MG: CAPSULE at 08:04

## 2022-01-26 RX ADMIN — SEVELAMER CARBONATE 1600 MG: 800 TABLET, FILM COATED ORAL at 13:35

## 2022-01-26 RX ADMIN — SEVELAMER CARBONATE 1600 MG: 800 TABLET, FILM COATED ORAL at 20:57

## 2022-01-26 RX ADMIN — MEGESTROL ACETATE 20 MG: 20 TABLET ORAL at 20:58

## 2022-01-26 RX ADMIN — DIPHENHYDRAMINE HYDROCHLORIDE 25 MG: 25 TABLET ORAL at 08:05

## 2022-01-26 RX ADMIN — ONDANSETRON 4 MG: 2 INJECTION INTRAMUSCULAR; INTRAVENOUS at 05:13

## 2022-01-26 ASSESSMENT — PAIN SCALES - GENERAL
PAINLEVEL_OUTOF10: 2
PAINLEVEL_OUTOF10: 8
PAINLEVEL_OUTOF10: 6
PAINLEVEL_OUTOF10: 0
PAINLEVEL_OUTOF10: 2
PAINLEVEL_OUTOF10: 0
PAINLEVEL_OUTOF10: 7
PAINLEVEL_OUTOF10: 0
PAINLEVEL_OUTOF10: 2
PAINLEVEL_OUTOF10: 10
PAINLEVEL_OUTOF10: 7

## 2022-01-26 ASSESSMENT — PAIN DESCRIPTION - PAIN TYPE
TYPE: ACUTE PAIN

## 2022-01-26 ASSESSMENT — PAIN DESCRIPTION - LOCATION
LOCATION: GENERALIZED
LOCATION: ABDOMEN

## 2022-01-26 ASSESSMENT — PAIN - FUNCTIONAL ASSESSMENT: PAIN_FUNCTIONAL_ASSESSMENT: PREVENTS OR INTERFERES SOME ACTIVE ACTIVITIES AND ADLS

## 2022-01-26 ASSESSMENT — PAIN DESCRIPTION - PROGRESSION: CLINICAL_PROGRESSION: NOT CHANGED

## 2022-01-26 ASSESSMENT — PAIN DESCRIPTION - DESCRIPTORS: DESCRIPTORS: ACHING

## 2022-01-26 ASSESSMENT — PAIN DESCRIPTION - ONSET: ONSET: GRADUAL

## 2022-01-26 ASSESSMENT — PAIN DESCRIPTION - FREQUENCY: FREQUENCY: CONTINUOUS

## 2022-01-26 ASSESSMENT — PAIN DESCRIPTION - ORIENTATION: ORIENTATION: MID

## 2022-01-26 NOTE — PROGRESS NOTES
Hospitalist Progress Note      PCP: Car Llanes DO    Date of Admission: 1/22/2022  Days in the hospital: [address] Course:   Mr. Abdelrahman Bishop, [facility] y. o. year old [de-identified]  has a past medical history of (HFpEF) heart failure with preserved ejection fraction (HCC), Anxiety, AVF (arteriovenous fistula) (HCC), Chronic kidney disease, Depression, Encounter regarding vascular access for dialysis for ESRD ([facility] History of ruptured Berry aneurysm ([facility] [facility] Hypertension, Hypothyroidism, Intracranial hemorrhage ([facility] [address] [facility] Neutropenia ([facility] [address] Noncompliance w/medication treatment due to intermit use of medication, and Pre-transplant evaluation for kidney transplant. who presents to the ED with CP/SOB and retching. The patient states that he started feeling ill after dialysis yesterday; therefore he presented to the ED with complaints of fever, SOB and CP yesterday. He was tested for COVID and was positive, The patient did not show any evidence of hypoxia and was discharged home.       He presents back to the hospital with worsening dry cough, SOB and CP, retching. The patient states that he has CP on the left; which feels like pressure with no radiation.      In the ED, the patient had an EKG that showed: sinus tachycardia, LVH; trops were 98, 95, 94 and as per the ED this is the patient's baseline with his ESRD. The patient was noted to Desaturate to 88% and is placed on 4L of O2. He also received a dose of morphine, Benadryl (he has itching associated with dialysis).      Patient was noted with hypertension with systolic blood pressure greater than 200 mmHg.   He was transferred to the ICU and initiated on Cardene drip; has since transferred out of the icu and transitioned off nicardipine drip     Subjective  Afebrile  Oxygen requirements going down     Exam:    BP (!) 141/89   Pulse 85   Temp 98 °F (36.7 °C) (Temporal)   Resp 18   Ht 5' 6\" (1.676 m)   Wt 124 lb (56.2 kg)   SpO2 99%   BMI 20.01 kg/m²     HEENT: No pallor, no icterus. Respiratory:  CTA, good air entry. Cardiovascular: RRR, no murmur. Abdomen: Soft, non-tender, BS noted. Neurologic: awake, alert and following commands       Assessment/Plan:  Active Hospital Problems    Diagnosis Date Noted    Severe protein-calorie malnutrition ([facility] [E43] 01/24/2022    Acute respiratory failure with hypoxia (HCC) [J96.01] 07/31/2021     · Hypertensive emergency  · COVID 19 pneumonia  · Pulmonary edema  · ESRD on dialysis  · Transaminitis    Plan:  · Continue Decadron, monitor inflammatory markers  · Wean oxygen as tolerated   · Blood pressure better controlled, continue with current medication, off nicardipine drip  · Follow-up with nephrology, scheduled for dialysis  · Appreciate consultant support   · Continue to monitor LFTs, which are improving   · Patients ferritin and D dimer increased - will increase heparin dosing, CTA negative for PE on admission and patient's oxygen requirements are improving       Labs:   Recent Labs     01/24/22 0445 01/25/22  0547 01/26/22  0521   WBC 14.0* 12.3* 12.3*   HGB 9.0* 8.8* 8.5*   HCT 28.8* 27.9* 27.1*    186 186     Recent Labs     01/24/22 0445 01/25/22  0547 01/26/22  0521    142 138   K 4.8 4.3 4.2   CL 93* 100 97*   CO2 25 28 26   BUN 52* 41* 68*   CREATININE 6.4* 5.5* 8.0*   CALCIUM 9.2 9.1 9.1   PHOS 6.6* 4.8* 4.5     Recent Labs     01/24/22 0445 01/25/22  0547 01/26/22  0521   AST 1,584*  1,616* 522* 264*   ALT 1,669*  1,720* 1,289* 1,015*   BILIDIR 0.2  --   --    BILITOT 0.8  0.8 0.7 0.8   ALKPHOS 92  92 85 83     No results for input(s): INR in the last 72 hours. No results for input(s): Burnice Dillard in the last 72 hours.     Medications:  Reviewed    Infusion Medications    [Held by provider] niCARdipene (CARDENE) 50 mg in 250 mL 0.9 % sodium chloride infusion Stopped (01/23/22 1100)    dextrose      sodium chloride       Scheduled Medications    vitamin D  50,000 Units Oral Weekly    Followed by   Mayito Gamez ON 4/17/2022] Vitamin D  1,000 Units Oral Daily    polyethylene glycol  17 g Oral Daily    nicotine  1 patch TransDERmal Daily    albuterol-ipratropium  1 puff Inhalation 4 times per day    dexamethasone  6 mg IntraVENous Q24H    ascorbic acid  500 mg Oral Daily    zinc sulfate  50 mg Oral Daily    b complex-C-folic acid  1 mg Oral Daily    cloNIDine  0.3 mg Oral TID    hydrALAZINE  50 mg Oral TID    labetalol  300 mg Oral BID    losartan  100 mg Oral QPM    megestrol  20 mg Oral TID    NIFEdipine  60 mg Oral Daily    pantoprazole  40 mg Oral Daily    sevelamer  1,600 mg Oral TID    sodium chloride flush  5-40 mL IntraVENous 2 times per day    heparin (porcine)  5,000 Units SubCUTAneous Q8H     PRN Meds: glucose, dextrose, glucagon (rDNA), dextrose, ondansetron, hydrALAZINE, benzonatate, diphenhydrAMINE, melatonin, sodium chloride flush, sodium chloride, morphine      Intake/Output Summary (Last 24 hours) at 1/26/2022 0904  Last data filed at 1/25/2022 1844  Gross per 24 hour   Intake 0 ml   Output    Net 0 ml     Body mass index is 20.01 kg/m². · Diet  ADULT DIET; Regular; Low Sodium (2 gm); Low Potassium (Less than 3000 mg/day)  ADULT ORAL NUTRITION SUPPLEMENT; Breakfast, Dinner; Renal Oral Supplement  ADULT ORAL NUTRITION SUPPLEMENT; Lunch; Fortified Pudding Oral Supplement    · Code Status  Full Code       Electronically signed by Kimberly Olvera MD on 1/26/2022 at 9:04 AM  [employer]   Please contact me through perfect serve    NOTE: This report was transcribed using voice recognition software. Every effort was made to ensure accuracy; however, inadvertent computerized transcription errors may be present.

## 2022-01-26 NOTE — PROGRESS NOTES
failure with preserved ejection fraction ([facility]     stage III    Anxiety 9/19/2015    AVF (arteriovenous fistula) ([facility] 4/30/2018    Chronic kidney disease     CKD since early childhood per pt and on HD ~ 11 years    Depression     Encounter regarding vascular access for dialysis for ESRD ([facility] 4/30/2018    History of ruptured Berry aneurysm ([facility] 10/20/2015    Hypertension     on meds for ~ 11 years    Hypothyroidism     Intracranial hemorrhage (HCC)     was d/t ruptured aneurysm - pt underwent neurosurgery for clipping of the aneurysm    Neutropenia ([facility]     Noncompliance w/medication treatment due to intermit use of medication 11/3/2015    Pre-transplant evaluation for kidney transplant 4/5/2017       MEDS (scheduled):   heparin (porcine)  7,500 Units SubCUTAneous Q8H    vitamin D  50,000 Units Oral Weekly    Followed by   Shanika Fraser ON 4/17/2022] Vitamin D  1,000 Units Oral Daily    polyethylene glycol  17 g Oral Daily    nicotine  1 patch TransDERmal Daily    albuterol-ipratropium  1 puff Inhalation 4 times per day    dexamethasone  6 mg IntraVENous Q24H    ascorbic acid  500 mg Oral Daily    zinc sulfate  50 mg Oral Daily    b complex-C-folic acid  1 mg Oral Daily    cloNIDine  0.3 mg Oral TID    hydrALAZINE  50 mg Oral TID    labetalol  300 mg Oral BID    losartan  100 mg Oral QPM    megestrol  20 mg Oral TID    NIFEdipine  60 mg Oral Daily    pantoprazole  40 mg Oral Daily    sevelamer  1,600 mg Oral TID    sodium chloride flush  5-40 mL IntraVENous 2 times per day       MEDS (infusions):   [Held by provider] niCARdipene (CARDENE) 50 mg in 250 mL 0.9 % sodium chloride infusion Stopped (01/23/22 1100)    dextrose      sodium chloride         MEDS (prn):  glucose, dextrose, glucagon (rDNA), dextrose, ondansetron, hydrALAZINE, benzonatate, diphenhydrAMINE, melatonin, sodium chloride flush, sodium chloride, morphine    DIET:    ADULT DIET; Regular; Low Sodium (2 gm);  Low Potassium (Less than 3000 mg/day)  ADULT ORAL NUTRITION SUPPLEMENT; Breakfast, Dinner; Renal Oral Supplement  ADULT ORAL NUTRITION SUPPLEMENT; Lunch;  Fortified Pudding Oral Supplement      PHYSICAL EXAM:      Patient Vitals for the past 24 hrs:   BP Temp Temp src Pulse Resp SpO2 Weight   01/26/22 0800 (!) 141/89 98 °F (36.7 °C) Temporal 85 18 99 %    01/26/22 0544       124 lb (56.2 kg)   01/25/22 2008      91 %    01/25/22 1938      93 %    01/25/22 1930 (!) 154/103   82 18 (!) 89 %    01/25/22 1416 (!) 164/119 98 °F (36.7 °C) Oral 89 16            Intake/Output Summary (Last 24 hours) at 1/26/2022 1307  Last data filed at 1/25/2022 1844  Gross per 24 hour   Intake 0 ml   Output    Net 0 ml       Wt Readings from Last 3 Encounters:   01/26/22 124 lb (56.2 kg)   10/12/21 119 lb (54 kg)   09/09/21 108 lb 12.8 oz (49.4 kg)       PE    Pt  Not examined due to covid isolation      DATA:      Recent Labs     01/24/22 0445 01/25/22  0547 01/26/22  0521   WBC 14.0* 12.3* 12.3*   HGB 9.0* 8.8* 8.5*   HCT 28.8* 27.9* 27.1*   MCV 95.0 96.9 95.8    186 186     Recent Labs     01/24/22 0445 01/25/22  0547 01/26/22  0521    142 138   K 4.8 4.3 4.2   CL 93* 100 97*   CO2 25 28 26   BUN 52* 41* 68*   CREATININE 6.4* 5.5* 8.0*   LABGLOM 12 14 9   GLUCOSE 119* 107* 139*   CALCIUM 9.2 9.1 9.1     Recent Labs     01/24/22 0445 01/25/22  0547 01/26/22  0521   ALT 1,669*  1,720* 1,289* 1,015*     Lab Results   Component Value Date    LABALBU 3.6 01/26/2022    LABALBU 3.7 01/25/2022    LABALBU 3.9 01/24/2022    LABALBU 4.2 01/24/2022       Iron studies:  Lab Results   Component Value Date    FERRITIN 16,271 01/25/2022    IRON 33 (L) 09/06/2021    TIBC 202 (L) 09/06/2021     Vitamin B-12   Date Value Ref Range Status   02/10/2020 830 211 - 946 pg/mL Final     Folate   Date Value Ref Range Status   02/10/2020 10.5 4.8 - 24.2 ng/mL Final       Bone disease:  Lab Results   Component Value Date    MG 2.9 (H) 01/26/2022    PHOS 4.5 01/26/2022     Vit D, 25-Hydroxy   Date Value Ref Range Status   01/23/2022 17 (L) 30 - 100 ng/mL Final     Comment:     <20 ng/mL. ........... Edie Sida Deficient  20-30 ng/mL. ......... Edie Sida Insufficient   ng/mL. ........ Edie Sida Sufficient  >100 ng/mL. .......... Edie Sida Toxic       PTH   Date Value Ref Range Status   01/05/2019 418 (H) 15 - 65 pg/mL Final       No components found for: URIC    No results found for: VOL, APPEARANCE, COLORU, LABSPEC, LABPH, LEUKBLD, NITRU, GLUCOSEU, KETUA, UROBILINOGEN, KETUA, UROBILINOGEN, BILIRUBINUR, OCBU    No results found for: LIPIDPAN        IMPRESSION/RECOMMENDATIONS:      1. esrd  Hd mwf  sp emergency hd sun 1/23 due to hyperkalemia  For hd today per his usual schedule     2. htn  Uncontrolled initially  Was On cardene drip  H/o medication noncompliance and vomiting  On orals procardia, clonidine, cozaar, labetolol, and hydralazine     3. Sec hyperparathryoidism  p 8.2>6.6  Noncompliance with renvela     4. Anemia  Continue sierra     5. covid pna  Started on steroids  Per icu     6. Metabolic acidosis  Improved with hd      Edy Keith.  Justin Raines MD

## 2022-01-26 NOTE — PROGRESS NOTES
Patient returned from dialysis alert and oriented to person, place, time and situation, speech clear able to voice concerns no s/s of distress observed.

## 2022-01-26 NOTE — PLAN OF CARE
Problem: Airway Clearance - Ineffective  Goal: Achieve or maintain patent airway  Outcome: Met This Shift     Problem: Gas Exchange - Impaired  Goal: Absence of hypoxia  Outcome: Met This Shift  Goal: Promote optimal lung function  Outcome: Met This Shift     Problem: Breathing Pattern - Ineffective  Goal: Ability to achieve and maintain a regular respiratory rate  Outcome: Met This Shift     Problem:  Body Temperature -  Risk of, Imbalanced  Goal: Ability to maintain a body temperature within defined limits  Outcome: Met This Shift  Goal: Will regain or maintain usual level of consciousness  Outcome: Met This Shift  Goal: Complications related to the disease process, condition or treatment will be avoided or minimized  Outcome: Met This Shift     Problem: Isolation Precautions - Risk of Spread of Infection  Goal: Prevent transmission of infection  Outcome: Met This Shift     Problem: Nutrition Deficits  Goal: Optimize nutritional status  Outcome: Met This Shift     Problem: Risk for Fluid Volume Deficit  Goal: Maintain normal heart rhythm  Outcome: Met This Shift  Goal: Maintain absence of muscle cramping  Outcome: Met This Shift  Goal: Maintain normal serum potassium, sodium, calcium, phosphorus, and pH  Outcome: Met This Shift     Problem: Loneliness or Risk for Loneliness  Goal: Demonstrate positive use of time alone when socialization is not possible  Outcome: Met This Shift     Problem: Fatigue  Goal: Verbalize increase energy and improved vitality  Outcome: Met This Shift     Problem: Patient Education: Go to Patient Education Activity  Goal: Patient/Family Education  Outcome: Met This Shift     Problem: Pain:  Goal: Pain level will decrease  Description: Pain level will decrease  Outcome: Met This Shift  Goal: Control of acute pain  Description: Control of acute pain  Outcome: Met This Shift  Goal: Control of chronic pain  Description: Control of chronic pain  Outcome: Met This Shift     Problem: OXYGENATION/RESPIRATORY FUNCTION  Goal: Patient will maintain patent airway  Outcome: Met This Shift     Problem: HEMODYNAMIC STATUS  Goal: Patient has stable vital signs and fluid balance  Outcome: Met This Shift

## 2022-01-27 LAB
ALBUMIN SERPL-MCNC: 3.6 G/DL (ref 3.5–5.2)
ALP BLD-CCNC: 79 U/L (ref 40–129)
ALT SERPL-CCNC: 796 U/L (ref 0–40)
ANION GAP SERPL CALCULATED.3IONS-SCNC: 13 MMOL/L (ref 7–16)
AST SERPL-CCNC: 163 U/L (ref 0–39)
BASOPHILS ABSOLUTE: 0.01 E9/L (ref 0–0.2)
BASOPHILS RELATIVE PERCENT: 0.1 % (ref 0–2)
BILIRUB SERPL-MCNC: 0.9 MG/DL (ref 0–1.2)
BUN BLDV-MCNC: 37 MG/DL (ref 6–20)
C-REACTIVE PROTEIN: 0.7 MG/DL (ref 0–0.4)
CALCIUM SERPL-MCNC: 9.4 MG/DL (ref 8.6–10.2)
CHLORIDE BLD-SCNC: 99 MMOL/L (ref 98–107)
CO2: 26 MMOL/L (ref 22–29)
CREAT SERPL-MCNC: 6 MG/DL (ref 0.7–1.2)
D DIMER: 2501 NG/ML DDU
EOSINOPHILS ABSOLUTE: 0.18 E9/L (ref 0.05–0.5)
EOSINOPHILS RELATIVE PERCENT: 1.6 % (ref 0–6)
FERRITIN: 4215 NG/ML
FIBRINOGEN: 322 MG/DL (ref 225–540)
GFR AFRICAN AMERICAN: 13
GFR NON-AFRICAN AMERICAN: 13 ML/MIN/1.73
GLUCOSE BLD-MCNC: 107 MG/DL (ref 74–99)
HCT VFR BLD CALC: 28 % (ref 37–54)
HEMOGLOBIN: 8.8 G/DL (ref 12.5–16.5)
IMMATURE GRANULOCYTES #: 0.13 E9/L
IMMATURE GRANULOCYTES %: 1.1 % (ref 0–5)
LYMPHOCYTES ABSOLUTE: 2.83 E9/L (ref 1.5–4)
LYMPHOCYTES RELATIVE PERCENT: 24.4 % (ref 20–42)
MAGNESIUM: 2.6 MG/DL (ref 1.6–2.6)
MCH RBC QN AUTO: 30.2 PG (ref 26–35)
MCHC RBC AUTO-ENTMCNC: 31.4 % (ref 32–34.5)
MCV RBC AUTO: 96.2 FL (ref 80–99.9)
MONOCYTES ABSOLUTE: 1.11 E9/L (ref 0.1–0.95)
MONOCYTES RELATIVE PERCENT: 9.6 % (ref 2–12)
NEUTROPHILS ABSOLUTE: 7.35 E9/L (ref 1.8–7.3)
NEUTROPHILS RELATIVE PERCENT: 63.2 % (ref 43–80)
PDW BLD-RTO: 17 FL (ref 11.5–15)
PHOSPHORUS: 3.8 MG/DL (ref 2.5–4.5)
PLATELET # BLD: 222 E9/L (ref 130–450)
PMV BLD AUTO: 10.8 FL (ref 7–12)
POTASSIUM SERPL-SCNC: 3.5 MMOL/L (ref 3.5–5)
RBC # BLD: 2.91 E12/L (ref 3.8–5.8)
SODIUM BLD-SCNC: 138 MMOL/L (ref 132–146)
TOTAL PROTEIN: 6 G/DL (ref 6.4–8.3)
WBC # BLD: 11.6 E9/L (ref 4.5–11.5)

## 2022-01-27 PROCEDURE — 85025 COMPLETE CBC W/AUTO DIFF WBC: CPT

## 2022-01-27 PROCEDURE — 2140000000 HC CCU INTERMEDIATE R&B

## 2022-01-27 PROCEDURE — 2700000000 HC OXYGEN THERAPY PER DAY

## 2022-01-27 PROCEDURE — 6370000000 HC RX 637 (ALT 250 FOR IP)

## 2022-01-27 PROCEDURE — 87040 BLOOD CULTURE FOR BACTERIA: CPT

## 2022-01-27 PROCEDURE — 6360000002 HC RX W HCPCS: Performed by: INTERNAL MEDICINE

## 2022-01-27 PROCEDURE — 82728 ASSAY OF FERRITIN: CPT

## 2022-01-27 PROCEDURE — 80053 COMPREHEN METABOLIC PANEL: CPT

## 2022-01-27 PROCEDURE — 86140 C-REACTIVE PROTEIN: CPT

## 2022-01-27 PROCEDURE — 85384 FIBRINOGEN ACTIVITY: CPT

## 2022-01-27 PROCEDURE — 84100 ASSAY OF PHOSPHORUS: CPT

## 2022-01-27 PROCEDURE — 2580000003 HC RX 258: Performed by: INTERNAL MEDICINE

## 2022-01-27 PROCEDURE — 6370000000 HC RX 637 (ALT 250 FOR IP): Performed by: INTERNAL MEDICINE

## 2022-01-27 PROCEDURE — 83735 ASSAY OF MAGNESIUM: CPT

## 2022-01-27 PROCEDURE — 85378 FIBRIN DEGRADE SEMIQUANT: CPT

## 2022-01-27 RX ADMIN — PANTOPRAZOLE SODIUM 40 MG: 40 TABLET, DELAYED RELEASE ORAL at 10:10

## 2022-01-27 RX ADMIN — IPRATROPIUM BROMIDE AND ALBUTEROL 1 PUFF: 20; 100 SPRAY, METERED RESPIRATORY (INHALATION) at 06:00

## 2022-01-27 RX ADMIN — HYDRALAZINE HYDROCHLORIDE 50 MG: 50 TABLET, FILM COATED ORAL at 21:24

## 2022-01-27 RX ADMIN — Medication 5 MG: at 21:24

## 2022-01-27 RX ADMIN — LOSARTAN POTASSIUM 100 MG: 50 TABLET, FILM COATED ORAL at 17:45

## 2022-01-27 RX ADMIN — SEVELAMER CARBONATE 1600 MG: 800 TABLET, FILM COATED ORAL at 17:45

## 2022-01-27 RX ADMIN — CLONIDINE HYDROCHLORIDE 0.3 MG: 0.1 TABLET ORAL at 09:54

## 2022-01-27 RX ADMIN — POLYETHYLENE GLYCOL 3350 17 G: 17 POWDER, FOR SOLUTION ORAL at 09:53

## 2022-01-27 RX ADMIN — HYDRALAZINE HYDROCHLORIDE 50 MG: 50 TABLET, FILM COATED ORAL at 16:28

## 2022-01-27 RX ADMIN — SEVELAMER CARBONATE 1600 MG: 800 TABLET, FILM COATED ORAL at 12:19

## 2022-01-27 RX ADMIN — Medication 10 ML: at 21:25

## 2022-01-27 RX ADMIN — SODIUM CHLORIDE, PRESERVATIVE FREE 10 ML: 5 INJECTION INTRAVENOUS at 10:18

## 2022-01-27 RX ADMIN — HYDRALAZINE HYDROCHLORIDE 10 MG: 20 INJECTION INTRAMUSCULAR; INTRAVENOUS at 17:52

## 2022-01-27 RX ADMIN — ONDANSETRON 4 MG: 2 INJECTION INTRAMUSCULAR; INTRAVENOUS at 05:48

## 2022-01-27 RX ADMIN — HEPARIN SODIUM 7500 UNITS: 10000 INJECTION INTRAVENOUS; SUBCUTANEOUS at 06:00

## 2022-01-27 RX ADMIN — SODIUM CHLORIDE, PRESERVATIVE FREE 10 ML: 5 INJECTION INTRAVENOUS at 17:52

## 2022-01-27 RX ADMIN — SEVELAMER CARBONATE 1600 MG: 800 TABLET, FILM COATED ORAL at 09:54

## 2022-01-27 RX ADMIN — MORPHINE SULFATE 1 MG: 2 INJECTION, SOLUTION INTRAMUSCULAR; INTRAVENOUS at 16:27

## 2022-01-27 RX ADMIN — IPRATROPIUM BROMIDE AND ALBUTEROL 1 PUFF: 20; 100 SPRAY, METERED RESPIRATORY (INHALATION) at 12:19

## 2022-01-27 RX ADMIN — DEXAMETHASONE SODIUM PHOSPHATE 6 MG: 10 INJECTION INTRAMUSCULAR; INTRAVENOUS at 09:53

## 2022-01-27 RX ADMIN — SODIUM CHLORIDE, PRESERVATIVE FREE 10 ML: 5 INJECTION INTRAVENOUS at 10:14

## 2022-01-27 RX ADMIN — HEPARIN SODIUM 7500 UNITS: 10000 INJECTION INTRAVENOUS; SUBCUTANEOUS at 16:34

## 2022-01-27 RX ADMIN — SODIUM CHLORIDE, PRESERVATIVE FREE 10 ML: 5 INJECTION INTRAVENOUS at 10:10

## 2022-01-27 RX ADMIN — MEGESTROL ACETATE 20 MG: 20 TABLET ORAL at 09:53

## 2022-01-27 RX ADMIN — IPRATROPIUM BROMIDE AND ALBUTEROL 1 PUFF: 20; 100 SPRAY, METERED RESPIRATORY (INHALATION) at 19:43

## 2022-01-27 RX ADMIN — HEPARIN SODIUM 7500 UNITS: 10000 INJECTION INTRAVENOUS; SUBCUTANEOUS at 22:00

## 2022-01-27 RX ADMIN — LABETALOL HYDROCHLORIDE 300 MG: 100 TABLET, FILM COATED ORAL at 09:54

## 2022-01-27 RX ADMIN — MEGESTROL ACETATE 20 MG: 20 TABLET ORAL at 21:24

## 2022-01-27 RX ADMIN — CLONIDINE HYDROCHLORIDE 0.3 MG: 0.1 TABLET ORAL at 16:28

## 2022-01-27 RX ADMIN — Medication 10 ML: at 09:52

## 2022-01-27 RX ADMIN — MORPHINE SULFATE 1 MG: 2 INJECTION, SOLUTION INTRAMUSCULAR; INTRAVENOUS at 10:09

## 2022-01-27 RX ADMIN — DIPHENHYDRAMINE HYDROCHLORIDE 25 MG: 25 TABLET ORAL at 21:24

## 2022-01-27 RX ADMIN — OXYCODONE HYDROCHLORIDE AND ACETAMINOPHEN 500 MG: 500 TABLET ORAL at 09:54

## 2022-01-27 RX ADMIN — LABETALOL HYDROCHLORIDE 300 MG: 100 TABLET, FILM COATED ORAL at 21:40

## 2022-01-27 RX ADMIN — IPRATROPIUM BROMIDE AND ALBUTEROL 1 PUFF: 20; 100 SPRAY, METERED RESPIRATORY (INHALATION) at 00:00

## 2022-01-27 RX ADMIN — CLONIDINE HYDROCHLORIDE 0.3 MG: 0.1 TABLET ORAL at 21:24

## 2022-01-27 RX ADMIN — NIFEDIPINE 60 MG: 60 TABLET, EXTENDED RELEASE ORAL at 09:53

## 2022-01-27 RX ADMIN — DIPHENHYDRAMINE HYDROCHLORIDE 25 MG: 25 TABLET ORAL at 10:10

## 2022-01-27 RX ADMIN — MORPHINE SULFATE 1 MG: 2 INJECTION, SOLUTION INTRAMUSCULAR; INTRAVENOUS at 05:27

## 2022-01-27 RX ADMIN — HYDRALAZINE HYDROCHLORIDE 50 MG: 50 TABLET, FILM COATED ORAL at 09:53

## 2022-01-27 RX ADMIN — NEPHROCAP 1 MG: 1 CAP ORAL at 09:53

## 2022-01-27 RX ADMIN — MORPHINE SULFATE 1 MG: 2 INJECTION, SOLUTION INTRAMUSCULAR; INTRAVENOUS at 21:25

## 2022-01-27 RX ADMIN — ZINC SULFATE 220 MG (50 MG) CAPSULE 50 MG: CAPSULE at 09:54

## 2022-01-27 RX ADMIN — SODIUM CHLORIDE, PRESERVATIVE FREE 10 ML: 5 INJECTION INTRAVENOUS at 16:28

## 2022-01-27 ASSESSMENT — PAIN SCALES - GENERAL
PAINLEVEL_OUTOF10: 3
PAINLEVEL_OUTOF10: 8
PAINLEVEL_OUTOF10: 0
PAINLEVEL_OUTOF10: 8
PAINLEVEL_OUTOF10: 10
PAINLEVEL_OUTOF10: 10
PAINLEVEL_OUTOF10: 2
PAINLEVEL_OUTOF10: 0

## 2022-01-27 ASSESSMENT — PAIN DESCRIPTION - ORIENTATION
ORIENTATION: MID

## 2022-01-27 ASSESSMENT — PAIN DESCRIPTION - PAIN TYPE
TYPE: ACUTE PAIN

## 2022-01-27 ASSESSMENT — PAIN DESCRIPTION - LOCATION
LOCATION: GENERALIZED
LOCATION: GENERALIZED
LOCATION: CHEST;ABDOMEN
LOCATION: CHEST;ABDOMEN
LOCATION: ABDOMEN

## 2022-01-27 ASSESSMENT — PAIN DESCRIPTION - PROGRESSION
CLINICAL_PROGRESSION: NOT CHANGED

## 2022-01-27 ASSESSMENT — PAIN - FUNCTIONAL ASSESSMENT
PAIN_FUNCTIONAL_ASSESSMENT: PREVENTS OR INTERFERES SOME ACTIVE ACTIVITIES AND ADLS

## 2022-01-27 ASSESSMENT — PAIN DESCRIPTION - FREQUENCY
FREQUENCY: CONTINUOUS

## 2022-01-27 ASSESSMENT — PAIN DESCRIPTION - ONSET
ONSET: GRADUAL
ONSET: ON-GOING
ONSET: GRADUAL
ONSET: GRADUAL
ONSET: ON-GOING

## 2022-01-27 ASSESSMENT — PAIN DESCRIPTION - DESCRIPTORS
DESCRIPTORS: ACHING
DESCRIPTORS: ACHING;CONSTANT;DISCOMFORT
DESCRIPTORS: ACHING;CONSTANT;DISCOMFORT
DESCRIPTORS: ACHING
DESCRIPTORS: ACHING

## 2022-01-27 NOTE — CONSULTS
[facility] CONSULT NOTE    Reason for Consult: Elevated procalcitonin    Requested by: Yolanda Nieto MD      Chief complaint: Shortness of breath    History Obtained From: Patient and EMR    HISTORY OFPRESENT ILLNESS              The patient is a 43 y.o. male with history of ESRD on hemodialysis, hypertension, ruptured intracranial aneurysm s/p clipping in 2016, heart failure, presented on 01/21 with shortness of breath, tested positive for SARS-CoV-2 PCR on 01/21. On admission, he was afebrile, hypoxic with no leukocytosis. Shortly after admission, he was noted have high blood pressure prompting transfer to MICU for hypertensive emergency. Chest CTA showed CHF with prominent cardiomegaly, right pleural effusion, nodular opacities, septal thickening and ground-glass densities, no pulmonary embolus. CT abdomen and pelvis showed mild pelvic ascites, renal atrophy. He developed leukocytosis up to 14,000 on 01/24 decreasing to 11,000 on 01/27. Procalcitonin level has been increasing from 0.76 ng/mL on admission to 29 ng/mL on 01/26. Blood cultures showed no growth to date. Dexamethasone was started since admission. Cefepime and vancomycin were started on admission then stopped on 01/25. ID service was subsequently consulted for further recommendations.       Past Medical History  Past Medical History:   Diagnosis Date    (HFpEF) heart failure with preserved ejection fraction ([address]     stage III    Anxiety 9/19/2015    AVF (arteriovenous fistula) ([address] 4/30/2018    Chronic kidney disease     CKD since early childhood per pt and on HD ~ 11 years    Depression     Encounter regarding vascular access for dialysis for ESRD ([address] 4/30/2018    History of ruptured Berry aneurysm ([address] 10/20/2015    Hypertension     on meds for ~ 11 years    Hypothyroidism     Intracranial hemorrhage (HCC)     was d/t ruptured aneurysm - pt underwent neurosurgery for clipping of the aneurysm    Neutropenia ([address]     Noncompliance w/medication treatment due to intermit use of medication 11/3/2015    Pre-transplant evaluation for kidney transplant 4/5/2017       Current Facility-Administered Medications   Medication Dose Route Frequency Provider Last Rate Last Admin    heparin (porcine) injection 7,500 Units  7,500 Units SubCUTAneous Maureen Gutierrez MD   7,500 Units at 01/27/22 0600    [Held by provider] niCARdipine (CARDENE) 50 mg in sodium chloride 0.9 % 250 mL infusion  3-15 mg/hr IntraVENous Continuous Alix Mccallum MD   Paused at 01/23/22 1100    glucose (GLUTOSE) 40 % oral gel 15 g  15 g Oral PRN Alix Mccallum MD        dextrose 50 % IV solution  12.5 g IntraVENous PRN Alix Mccallum MD        glucagon (rDNA) injection 1 mg  1 mg IntraMUSCular PRN Alix Mccallum MD        dextrose 5 % solution  100 mL/hr IntraVENous PRN Alix Mccallum MD        vitamin D (ERGOCALCIFEROL) capsule 50,000 Units  50,000 Units Oral Weekly Alix Mccallum MD   50,000 Units at 01/23/22 3851    Followed by   Lavelle Sanders ON 4/17/2022] Vitamin D (CHOLECALCIFEROL) tablet 1,000 Units  1,000 Units Oral Daily Alix Mccallum MD        polyethylene glycol [facility]) packet 17 g  17 g Oral Daily Alix Mccallum MD   17 g at 01/27/22 0953    ondansetron (ZOFRAN) injection 4 mg  4 mg IntraVENous Q6H PRN Titus Zamora MD   4 mg at 01/27/22 0548    hydrALAZINE (APRESOLINE) injection 10 mg  10 mg IntraVENous Q6H PRN Titus Zamora MD   10 mg at 01/23/22 2101    nicotine (NICODERM CQ) 14 MG/24HR 1 patch  1 patch TransDERmal Daily Titus Zamora MD   1 patch at 01/23/22 0932    albuterol-ipratropium (COMBIVENT RESPIMAT)  MCG/ACT inhaler 1 puff  1 puff Inhalation 4 times per day Titus Zamora MD   1 puff at 01/27/22 0600    benzonatate (TESSALON) capsule 100 mg  100 mg Oral TID PRN Titus Zamora MD        dexamethasone (DECADRON) injection 6 mg  6 mg IntraVENous Q24H Yohana History  Past Surgical History:   Procedure Laterality Date    BRAIN ANEURYSM SURGERY Right 3-4 years ago    Intracranial aneurysm clipping surgery 3-4 years ago, after rupture of aneurysm causing ICH    BRONCHOSCOPY N/A 8/3/2021    BRONCHOSCOPY DIAGNOSTIC OR CELL [address] ONLY performed by Vandana Wu MD at [address] Left 11 years ago    UPPER GASTROINTESTINAL ENDOSCOPY N/A 1/3/2019    EGD BIOPSY performed by Eula Baker MD at [address] History     Socioeconomic History    Marital status:     Number of children: 3    Highest education level: High school graduate   Occupational History    Not on file   Tobacco Use    Smoking status: Never Smoker    Smokeless tobacco: Never Used    Tobacco comment: only smokes marijuana daily   Vaping Use    Vaping Use: Never used   Substance and Sexual Activity    Alcohol use: No    Drug use: Not Currently     Types: Marijuana Roland Dasen)    Sexual activity: Yes     Partners: Female         Family Medical History  Family History   Problem Relation Age of Onset    Kidney Disease Paternal Grandmother     Cancer Neg Hx     Heart Disease Neg Hx        Review of Systems:  Constitutional: No fever, no chills  Eyes: No vision changes, no retroorbital pain  ENT: No hearing changes, no ear pain  Respiratory: No cough, had dyspnea  Cardiovascular: Had chest pain, no palpitations  Gastrointestinal: No abdominal pain, no diarrhea  Genitourinary: No dysuria, no hematuria  Integumentary: No rash, no itching  Musculoskeletal: No muscle pain, no joint pain  Neurologic: Has headache, no numbness in extremities    Physical Examination:  Vitals:    01/27/22 0200 01/27/22 0600 01/27/22 0615 01/27/22 0945   BP: (!) 150/89  (!) 156/78 (!) 141/87   Pulse: 75  82 80   Resp: 18  16 14   Temp: 98.9 °F (37.2 °C)  97.8 °F (36.6 °C) 97.9 °F (36.6 °C)   TempSrc: Oral  Oral Oral   SpO2:   97% 100%   Weight:  117 lb (53.1 kg)     Height:         Constitutional: Alert, not in distress  Eyes: Sclerae anicteric, no conjunctival erythema  ENT: No buccal lesion, no pharyngeal exudates  Neck: No nuchal rigidity, no cervical adenopathy  Lungs: Clear breath sounds, no crackles, no wheezes  Heart: Regular rate and rhythm, no murmurs  Abdomen: Bowel sounds present, soft, nontender  Skin: Warm and dry, no active dermatoses  Musculoskeletal: No joint erythema, no joint swelling    Labs, imaging, and medical records/notes were personally reviewed. Assessment:  Acute hypoxic respiratory failure  Hypertensive emergency  COVID-19, breakthrough  Elevated procalcitonin  ESRD on hemodialysis    Plan:  Remove right femoral TLC ASAP. Trend procalcitonin. Follow up blood cultures. Monitor clinically off antibiotics for now. Plan was discussed with Dr. Alona Chaves. Thank you for involving me in the care of Jami Spurling. I will continue to follow. Please do not hesitate to call for any questions or concerns.     Electronically signed by Terese Gonsalez MD on 1/27/2022 at 10:12 AM

## 2022-01-27 NOTE — PROGRESS NOTES
Hospitalist Progress Note      PCP: Markus Armenta DO    Date of Admission: 1/22/2022  Days in the hospital: [address] Hospital Course:   Mr. Abdelrahman Davenport, [age]Kisha y. o. year old [de-identified]  has a past medical history of (HFpEF) heart failure with preserved ejection fraction (HCC), Anxiety, AVF (arteriovenous fistula) (HCC), Chronic kidney disease, Depression, Encounter regarding vascular access for dialysis for ESRD ([facility] [address] History of ruptured Berry aneurysm ([facility] [address] Hypertension, Hypothyroidism, Intracranial hemorrhage ([facility] [address] Neutropenia ([facility] [address] Noncompliance w/medication treatment due to intermit use of medication, and Pre-transplant evaluation for kidney transplant. who presents to the ED with CP/SOB and retching. The patient states that he started feeling ill after dialysis yesterday; therefore he presented to the ED with complaints of fever, SOB and CP yesterday. He was tested for COVID and was positive, The patient did not show any evidence of hypoxia and was discharged home.       He presents back to the hospital with worsening dry cough, SOB and CP, retching. The patient states that he has CP on the left; which feels like pressure with no radiation.      In the ED, the patient had an EKG that showed: sinus tachycardia, LVH; trops were 98, 95, 94 and as per the ED this is the patient's baseline with his ESRD. The patient was noted to Desaturate to 88% and is placed on 4L of O2. He also received a dose of morphine, Benadryl (he has itching associated with dialysis).      Patient was noted with hypertension with systolic blood pressure greater than 200 mmHg.   He was transferred to the ICU and initiated on Cardene drip; has since transferred out of the icu and transitioned off nicardipine drip     Subjective  Afebrile  Oxygen requirements going down     Exam:    BP (!) 156/78   Pulse 82   Temp 97.8 °F (36.6 °C) (Oral)   Resp 16   Ht 5' 6\" (1.676 m)   Wt 117 lb (53.1 kg)   SpO2 97%   BMI 18.88 kg/m²     HEENT: No pallor, no icterus. Respiratory:  CTA, good air entry. Cardiovascular: RRR, no murmur. Abdomen: Soft, non-tender, BS noted. Neurologic: awake, alert and following commands       Assessment/Plan:  Active Hospital Problems    Diagnosis Date Noted    Severe protein-calorie malnutrition ([facility] [address] [E43] 01/24/2022    Acute respiratory failure with hypoxia (HCC) [J96.01] 07/31/2021     · Hypertensive emergency  · COVID 19 pneumonia  · Pulmonary edema  · ESRD on dialysis  · Transaminitis    Plan:  · Continue Decadron, monitor inflammatory markers  · Wean oxygen as tolerated   · Blood pressure better controlled, continue with current medication, off nicardipine drip  · Procalcitonin has trended up - I am concerned that there is an occult bacterial process, will consult ID for their input. Antibiotics have been off for 48 hours now but pt has remained afebrile. Repeat blood cultures today. Trend troponins. Remove TLC   · Follow-up with nephrology, scheduled for dialysis  · Appreciate consultant support   · Continue to monitor LFTs, which are improving   · Patients ferritin and D dimer increased - will increase heparin dosing, CTA negative for PE on admission and patient's oxygen requirements are improving       Labs:   Recent Labs     01/25/22  0547 01/26/22  0521 01/27/22  0545   WBC 12.3* 12.3* 11.6*   HGB 8.8* 8.5* 8.8*   HCT 27.9* 27.1* 28.0*    186 222     Recent Labs     01/25/22  0547 01/26/22  0521 01/27/22  0545    138 138   K 4.3 4.2 3.5    97* 99   CO2 28 26 26   BUN 41* 68* 37*   CREATININE 5.5* 8.0* 6.0*   CALCIUM 9.1 9.1 9.4   PHOS 4.8* 4.5 3.8     Recent Labs     01/25/22  0547 01/26/22  0521 01/27/22  0545   * 264* 163*   ALT 1,289* 1,015* 796*   BILITOT 0.7 0.8 0.9   ALKPHOS 85 83 79     No results for input(s): INR in the last 72 hours. No results for input(s): Earnie Lent in the last 72 hours.     Medications:  Reviewed    Infusion Medications    [Held by provider] niCARdipene (CARDENE) 50 mg in 250 mL 0.9 % sodium chloride infusion Stopped (01/23/22 1100)    dextrose      sodium chloride       Scheduled Medications    heparin (porcine)  7,500 Units SubCUTAneous Q8H    vitamin D  50,000 Units Oral Weekly    Followed by   Chasidy Mobley ON 4/17/2022] Vitamin D  1,000 Units Oral Daily    polyethylene glycol  17 g Oral Daily    nicotine  1 patch TransDERmal Daily    albuterol-ipratropium  1 puff Inhalation 4 times per day    dexamethasone  6 mg IntraVENous Q24H    ascorbic acid  500 mg Oral Daily    zinc sulfate  50 mg Oral Daily    b complex-C-folic acid  1 mg Oral Daily    cloNIDine  0.3 mg Oral TID    hydrALAZINE  50 mg Oral TID    labetalol  300 mg Oral BID    losartan  100 mg Oral QPM    megestrol  20 mg Oral TID    NIFEdipine  60 mg Oral Daily    pantoprazole  40 mg Oral Daily    sevelamer  1,600 mg Oral TID    sodium chloride flush  5-40 mL IntraVENous 2 times per day     PRN Meds: glucose, dextrose, glucagon (rDNA), dextrose, ondansetron, hydrALAZINE, benzonatate, diphenhydrAMINE, melatonin, sodium chloride flush, sodium chloride, morphine      Intake/Output Summary (Last 24 hours) at 1/27/2022 0933  Last data filed at 1/27/2022 0810  Gross per 24 hour   Intake 1120 ml   Output 2600 ml   Net -1480 ml     Body mass index is 18.88 kg/m². · Diet  ADULT DIET; Regular; Low Sodium (2 gm); Low Potassium (Less than 3000 mg/day)  ADULT ORAL NUTRITION SUPPLEMENT; Breakfast, Dinner; Renal Oral Supplement  ADULT ORAL NUTRITION SUPPLEMENT; Lunch; Fortified Pudding Oral Supplement    · Code Status  Full Code       Electronically signed by Denzel Daniel MD on 1/27/2022 at 9:33 AM  [employer]   Please contact me through perfect serve    NOTE: This report was transcribed using voice recognition software. Every effort was made to ensure accuracy; however, inadvertent computerized transcription errors may be present.

## 2022-01-27 NOTE — CARE COORDINATION
Care Coordination: Per nursing rounds and attending,  Id has now been consulted. On 1/22- procal 0.76  On 1/23/22 increased to 10.53- procal 29.84. Per ID, remove Fem TLC ASAP, trend Pro-asif, follow up blood cultures. PT is HD pt [id number]( [phone]) but will need to switch to [facility] ( [phone]) Upon discharge. States he follows with Dr Sunny Riddel and prefers [facility] pharmacy as they deliver. Will check with insurance company for transport home, but I have asked him to check with Shahla Mejia, but he is on the road with job and and Cele Mays is his mom and lives out of town. He has a  with [facility] comm plan and usually arranges for independent taxi to take him to HD. May need to check if we can obtain Taxi voucher as a back up.  Will touch base with pt tomorrow to confirm transport    Elinor Castaneda

## 2022-01-27 NOTE — PROGRESS NOTES
Department of Internal Medicine  Nephrology Attending Progress Note    SUBJECTIVE:  We are following this patient for end-stage renal failure . 1/23: The pt is a 44 yo male with a pmh of esrd on hd mwf, htn, hypothryoidism, ICH, HFpEF, medication noncompliance who presents with vomiting, sob, chest pain. He was found to be covid positive. He is on 4 L o2. His last hd was Friday. His sbp has been 190s-200. He wasn't able to take his outpt bp meds due to vomiting. He was given his oral clonidine, hydralazine, labetolol, cozaar, procardia without much imrprovement yesterday. He was given iv labetolol also. He was started on a cardene drip and transferred to the icu. His labs today show na 137 l 5.1>6.9, co2 18, bun 54, cr 8.2, ca 9.5, p 8.2, alb 4.2, wbc 7.8, hgb 9.3, plt 123. He is to have dialysis today due to his k of 6.9. ct of the chest did not show a PE. History is obtained from the chart due to covid isolation.      1/24: pt sp hd yesterday due to k of 6.9. for hd again today, pt seen through window of hd due to covid isolation, chart reviewed    1/25: pt not seen due to covid, chart reviewed    1/26: pt seen through window of hd due to covid, no overnight events, chart reviewed    127: Patient not directly examined due to strict Covid isolation - all aspects of care reviewed with his primary nurse Tawana - improving blood pressure control - for ID consult today for elevated procalcitonin    PROBLEM LIST:    Patient Active Problem List   Diagnosis    Stage 5 chronic kidney disease on chronic dialysis ([address]    H/O intracranial hemorrhage and aneurysm clipping on the right side    Anemia, chronic disease    Anxiety    Noncompliance    AVF (arteriovenous fistula) ([address]    Severe protein-calorie malnutrition ([address]    Venous hypertension, chronic, left    Iron deficiency    Secondary hyperparathyroidism of renal origin ([address]    Atypical chest pain    Essential hypertension    Chronic diastolic (congestive) heart failure (HCC)    Acute respiratory failure with hypoxia (HCC)    Pneumonia    Pericardial effusion    Anemia        PAST MEDICAL HISTORY:    Past Medical History:   Diagnosis Date    (HFpEF) heart failure with preserved ejection fraction (HCC)     stage III    Anxiety 9/19/2015    AVF (arteriovenous fistula) ([facility] [address] 4/30/2018    Chronic kidney disease     CKD since early childhood per pt and on HD ~ 11 years    Depression     Encounter regarding vascular access for dialysis for ESRD ([facility] [address] 4/30/2018    History of ruptured Berry aneurysm ([facility] [address] 10/20/2015    Hypertension     on meds for ~ 11 years    Hypothyroidism     Intracranial hemorrhage (HCC)     was d/t ruptured aneurysm - pt underwent neurosurgery for clipping of the aneurysm    Neutropenia ([facility] [address]     Noncompliance w/medication treatment due to intermit use of medication 11/3/2015    Pre-transplant evaluation for kidney transplant 4/5/2017       MEDS (scheduled):   heparin (porcine)  7,500 Units SubCUTAneous Q8H    vitamin D  50,000 Units Oral Weekly    Followed by   Chasidy Mobley ON 4/17/2022] Vitamin D  1,000 Units Oral Daily    polyethylene glycol  17 g Oral Daily    nicotine  1 patch TransDERmal Daily    albuterol-ipratropium  1 puff Inhalation 4 times per day    dexamethasone  6 mg IntraVENous Q24H    ascorbic acid  500 mg Oral Daily    zinc sulfate  50 mg Oral Daily    b complex-C-folic acid  1 mg Oral Daily    cloNIDine  0.3 mg Oral TID    hydrALAZINE  50 mg Oral TID    labetalol  300 mg Oral BID    losartan  100 mg Oral QPM    megestrol  20 mg Oral TID    NIFEdipine  60 mg Oral Daily    pantoprazole  40 mg Oral Daily    sevelamer  1,600 mg Oral TID    sodium chloride flush  5-40 mL IntraVENous 2 times per day       MEDS (infusions):   [Held by provider] niCARdipene (CARDENE) 50 mg in 250 mL 0.9 % sodium chloride infusion Stopped (01/23/22 1100)    dextrose      sodium chloride         MEDS (prn):  glucose, dextrose, glucagon (rDNA), dextrose, ondansetron, hydrALAZINE, benzonatate, diphenhydrAMINE, melatonin, sodium chloride flush, sodium chloride, morphine    DIET:    ADULT DIET; Regular; Low Sodium (2 gm); Low Potassium (Less than 3000 mg/day)  ADULT ORAL NUTRITION SUPPLEMENT; Breakfast, Dinner; Renal Oral Supplement  ADULT ORAL NUTRITION SUPPLEMENT; Lunch;  Fortified Pudding Oral Supplement      PHYSICAL EXAM:      Patient Vitals for the past 24 hrs:   BP Temp Temp src Pulse Resp SpO2 Weight   01/27/22 0945 (!) 141/87 97.9 °F (36.6 °C) Oral 80 14 100 %    01/27/22 0615 (!) 156/78 97.8 °F (36.6 °C) Oral 82 16 97 %    01/27/22 0600       117 lb (53.1 kg)   01/27/22 0200 (!) 150/89 98.9 °F (37.2 °C) Oral 75 18     01/26/22 2245 (!) 152/82 98.6 °F (37 °C) Oral 72 18 98 %    01/26/22 1728 (!) 153/96 98 °F (36.7 °C) Oral 92 20 94 %    01/26/22 1715 (!) 169/101 98 °F (36.7 °C)  88 18  120 lb 5.9 oz (54.6 kg)   01/26/22 1630 (!) 172/102   84      01/26/22 1558 (!) 162/99   82      01/26/22 1528 (!) 170/104   87      01/26/22 1500 (!) 177/100   86      01/26/22 1430 (!) 172/113   81      01/26/22 1400 (!) 164/100   82      01/26/22 1354 (!) 167/106 98 °F (36.7 °C)  85 20  123 lb 14.4 oz (56.2 kg)   01/26/22 1333 (!) 166/109 98 °F (36.7 °C) Temporal 98 20 95 %           Intake/Output Summary (Last 24 hours) at 1/27/2022 1249  Last data filed at 1/27/2022 1018  Gross per 24 hour   Intake 1160 ml   Output 2600 ml   Net -1440 ml       Wt Readings from Last 3 Encounters:   01/27/22 117 lb (53.1 kg)   10/12/21 119 lb (54 kg)   09/09/21 108 lb 12.8 oz (49.4 kg)       PE    Pt  Not examined due to covid isolation      DATA:      Recent Labs     01/25/22  0547 01/26/22  0521 01/27/22  0545   WBC 12.3* 12.3* 11.6*   HGB 8.8* 8.5* 8.8*   HCT 27.9* 27.1* 28.0*   MCV 96.9 95.8 96.2    186 222     Recent Labs     01/25/22  0547 01/26/22  0521 01/27/22  0545    138 138   K 4.3 4.2 3.5    97* 99   CO2 28 26 26   BUN 41* 68* 37*   CREATININE 5.5* 8.0* 6.0*   LABGLOM 14 9 13   GLUCOSE 107* 139* 107*   CALCIUM 9.1 9.1 9.4     Recent Labs     01/25/22  0547 01/26/22  0521 01/27/22  0545   ALT 1,289* 1,015* 796*     Lab Results   Component Value Date    LABALBU 3.6 01/27/2022    LABALBU 3.6 01/26/2022    LABALBU 3.7 01/25/2022       Iron studies:  Lab Results   Component Value Date    FERRITIN 4,215 01/27/2022    IRON 33 (L) 09/06/2021    TIBC 202 (L) 09/06/2021     Vitamin B-12   Date Value Ref Range Status   02/10/2020 830 211 - 946 pg/mL Final     Folate   Date Value Ref Range Status   02/10/2020 10.5 4.8 - 24.2 ng/mL Final       Bone disease:  Lab Results   Component Value Date    MG 2.6 01/27/2022    PHOS 3.8 01/27/2022     Vit D, 25-Hydroxy   Date Value Ref Range Status   01/23/2022 17 (L) 30 - 100 ng/mL Final     Comment:     <20 ng/mL. ........... Hernán Yoon Deficient  20-30 ng/mL. ......... Hamilton Lever Insufficient   ng/mL. ........ Hamilton Lever Sufficient  >100 ng/mL. .......... Hernán Car Toxic       PTH   Date Value Ref Range Status   01/05/2019 418 (H) 15 - 65 pg/mL Final       No components found for: URIC    No results found for: VOL, APPEARANCE, COLORU, LABSPEC, LABPH, LEUKBLD, NITRU, GLUCOSEU, KETUA, UROBILINOGEN, KETUA, UROBILINOGEN, BILIRUBINUR, OCBU    No results found for: Elle Love        IMPRESSION/RECOMMENDATIONS:      1. esrd  Hd mwf  sp emergency hd sun 1/23 due to hyperkalemia  For hd tomorrow per his usual schedule     2. HTN of ESRD  Uncontrolled initially  Was On cardene drip  H/o medication noncompliance and vomiting  On orals procardia, clonidine, cozaar, labetolol, and hydralazine  Overall improvement since admission     3. Sec hyperparathryoidism  p 8.2 upon admission has trended lower to 3.8  Noncompliance with renvela in the outpatient setting     4. Anemia  Continue sierra     5. covid pna  Started on steroids     6.  Metabolic acidosis  Improved with HD    Jarrell Alvarenga, NP-C    Pt not seen due to covid  Chart reviewed  Hd mwf  Agree with above  Michael Montero MD

## 2022-01-28 LAB
ALBUMIN SERPL-MCNC: 3.5 G/DL (ref 3.5–5.2)
ALP BLD-CCNC: 79 U/L (ref 40–129)
ALT SERPL-CCNC: 598 U/L (ref 0–40)
ANION GAP SERPL CALCULATED.3IONS-SCNC: 19 MMOL/L (ref 7–16)
AST SERPL-CCNC: 104 U/L (ref 0–39)
BASOPHILS ABSOLUTE: 0.01 E9/L (ref 0–0.2)
BASOPHILS RELATIVE PERCENT: 0.1 % (ref 0–2)
BILIRUB SERPL-MCNC: 0.7 MG/DL (ref 0–1.2)
BLOOD CULTURE, ROUTINE: NORMAL
BUN BLDV-MCNC: 58 MG/DL (ref 6–20)
CALCIUM SERPL-MCNC: 9.5 MG/DL (ref 8.6–10.2)
CHLORIDE BLD-SCNC: 97 MMOL/L (ref 98–107)
CO2: 21 MMOL/L (ref 22–29)
CREAT SERPL-MCNC: 8.6 MG/DL (ref 0.7–1.2)
CULTURE, BLOOD 2: NORMAL
EOSINOPHILS ABSOLUTE: 0.18 E9/L (ref 0.05–0.5)
EOSINOPHILS RELATIVE PERCENT: 1.7 % (ref 0–6)
GFR AFRICAN AMERICAN: 8
GFR NON-AFRICAN AMERICAN: 8 ML/MIN/1.73
GLUCOSE BLD-MCNC: 107 MG/DL (ref 74–99)
HCT VFR BLD CALC: 27.2 % (ref 37–54)
HEMOGLOBIN: 8.5 G/DL (ref 12.5–16.5)
IMMATURE GRANULOCYTES #: 0.14 E9/L
IMMATURE GRANULOCYTES %: 1.3 % (ref 0–5)
LYMPHOCYTES ABSOLUTE: 2.51 E9/L (ref 1.5–4)
LYMPHOCYTES RELATIVE PERCENT: 23.2 % (ref 20–42)
MAGNESIUM: 3.2 MG/DL (ref 1.6–2.6)
MCH RBC QN AUTO: 30 PG (ref 26–35)
MCHC RBC AUTO-ENTMCNC: 31.3 % (ref 32–34.5)
MCV RBC AUTO: 96.1 FL (ref 80–99.9)
MONOCYTES ABSOLUTE: 1.07 E9/L (ref 0.1–0.95)
MONOCYTES RELATIVE PERCENT: 9.9 % (ref 2–12)
NEUTROPHILS ABSOLUTE: 6.91 E9/L (ref 1.8–7.3)
NEUTROPHILS RELATIVE PERCENT: 63.8 % (ref 43–80)
PDW BLD-RTO: 17 FL (ref 11.5–15)
PHOSPHORUS: 4.2 MG/DL (ref 2.5–4.5)
PLATELET # BLD: 186 E9/L (ref 130–450)
PMV BLD AUTO: 11.5 FL (ref 7–12)
POTASSIUM SERPL-SCNC: 4.1 MMOL/L (ref 3.5–5)
PROCALCITONIN: 12.77 NG/ML (ref 0–0.08)
RBC # BLD: 2.83 E12/L (ref 3.8–5.8)
SODIUM BLD-SCNC: 137 MMOL/L (ref 132–146)
TOTAL PROTEIN: 6.2 G/DL (ref 6.4–8.3)
TROPONIN, HIGH SENSITIVITY: 79 NG/L (ref 0–11)
WBC # BLD: 10.8 E9/L (ref 4.5–11.5)

## 2022-01-28 PROCEDURE — 6360000002 HC RX W HCPCS: Performed by: INTERNAL MEDICINE

## 2022-01-28 PROCEDURE — 80053 COMPREHEN METABOLIC PANEL: CPT

## 2022-01-28 PROCEDURE — 2580000003 HC RX 258: Performed by: INTERNAL MEDICINE

## 2022-01-28 PROCEDURE — 84484 ASSAY OF TROPONIN QUANT: CPT

## 2022-01-28 PROCEDURE — 83735 ASSAY OF MAGNESIUM: CPT

## 2022-01-28 PROCEDURE — 87040 BLOOD CULTURE FOR BACTERIA: CPT

## 2022-01-28 PROCEDURE — 84145 PROCALCITONIN (PCT): CPT

## 2022-01-28 PROCEDURE — 93005 ELECTROCARDIOGRAM TRACING: CPT | Performed by: INTERNAL MEDICINE

## 2022-01-28 PROCEDURE — 84100 ASSAY OF PHOSPHORUS: CPT

## 2022-01-28 PROCEDURE — 90935 HEMODIALYSIS ONE EVALUATION: CPT

## 2022-01-28 PROCEDURE — 85025 COMPLETE CBC W/AUTO DIFF WBC: CPT

## 2022-01-28 PROCEDURE — 36415 COLL VENOUS BLD VENIPUNCTURE: CPT

## 2022-01-28 PROCEDURE — 2140000000 HC CCU INTERMEDIATE R&B

## 2022-01-28 PROCEDURE — 6370000000 HC RX 637 (ALT 250 FOR IP)

## 2022-01-28 PROCEDURE — 6370000000 HC RX 637 (ALT 250 FOR IP): Performed by: INTERNAL MEDICINE

## 2022-01-28 RX ORDER — ERGOCALCIFEROL 1.25 MG/1
50000 CAPSULE ORAL WEEKLY
Qty: 5 CAPSULE | Refills: 0 | Status: SHIPPED | OUTPATIENT
Start: 2022-01-30 | End: 2022-04-11

## 2022-01-28 RX ORDER — DEXAMETHASONE 6 MG/1
6 TABLET ORAL
Qty: 4 TABLET | Refills: 0 | Status: SHIPPED | OUTPATIENT
Start: 2022-01-28 | End: 2022-02-01

## 2022-01-28 RX ADMIN — LABETALOL HYDROCHLORIDE 300 MG: 100 TABLET, FILM COATED ORAL at 07:46

## 2022-01-28 RX ADMIN — OXYCODONE HYDROCHLORIDE AND ACETAMINOPHEN 500 MG: 500 TABLET ORAL at 07:46

## 2022-01-28 RX ADMIN — LABETALOL HYDROCHLORIDE 300 MG: 100 TABLET, FILM COATED ORAL at 20:27

## 2022-01-28 RX ADMIN — MORPHINE SULFATE 1 MG: 2 INJECTION, SOLUTION INTRAMUSCULAR; INTRAVENOUS at 09:35

## 2022-01-28 RX ADMIN — IPRATROPIUM BROMIDE AND ALBUTEROL 1 PUFF: 20; 100 SPRAY, METERED RESPIRATORY (INHALATION) at 11:57

## 2022-01-28 RX ADMIN — MEGESTROL ACETATE 20 MG: 20 TABLET ORAL at 13:17

## 2022-01-28 RX ADMIN — MEGESTROL ACETATE 20 MG: 20 TABLET ORAL at 20:27

## 2022-01-28 RX ADMIN — HEPARIN SODIUM 7500 UNITS: 10000 INJECTION INTRAVENOUS; SUBCUTANEOUS at 06:00

## 2022-01-28 RX ADMIN — MORPHINE SULFATE 1 MG: 2 INJECTION, SOLUTION INTRAMUSCULAR; INTRAVENOUS at 20:28

## 2022-01-28 RX ADMIN — NIFEDIPINE 60 MG: 60 TABLET, EXTENDED RELEASE ORAL at 07:46

## 2022-01-28 RX ADMIN — Medication 10 ML: at 07:45

## 2022-01-28 RX ADMIN — Medication 10 ML: at 20:28

## 2022-01-28 RX ADMIN — SEVELAMER CARBONATE 1600 MG: 800 TABLET, FILM COATED ORAL at 07:46

## 2022-01-28 RX ADMIN — DIPHENHYDRAMINE HYDROCHLORIDE 25 MG: 25 TABLET ORAL at 20:58

## 2022-01-28 RX ADMIN — IPRATROPIUM BROMIDE AND ALBUTEROL 1 PUFF: 20; 100 SPRAY, METERED RESPIRATORY (INHALATION) at 00:00

## 2022-01-28 RX ADMIN — MEGESTROL ACETATE 20 MG: 20 TABLET ORAL at 07:47

## 2022-01-28 RX ADMIN — HYDRALAZINE HYDROCHLORIDE 50 MG: 50 TABLET, FILM COATED ORAL at 13:17

## 2022-01-28 RX ADMIN — CLONIDINE HYDROCHLORIDE 0.3 MG: 0.1 TABLET ORAL at 13:16

## 2022-01-28 RX ADMIN — DEXAMETHASONE SODIUM PHOSPHATE 6 MG: 10 INJECTION INTRAMUSCULAR; INTRAVENOUS at 07:48

## 2022-01-28 RX ADMIN — CLONIDINE HYDROCHLORIDE 0.3 MG: 0.1 TABLET ORAL at 07:47

## 2022-01-28 RX ADMIN — MORPHINE SULFATE 1 MG: 2 INJECTION, SOLUTION INTRAMUSCULAR; INTRAVENOUS at 04:47

## 2022-01-28 RX ADMIN — ZINC SULFATE 220 MG (50 MG) CAPSULE 50 MG: CAPSULE at 07:47

## 2022-01-28 RX ADMIN — IPRATROPIUM BROMIDE AND ALBUTEROL 1 PUFF: 20; 100 SPRAY, METERED RESPIRATORY (INHALATION) at 05:02

## 2022-01-28 RX ADMIN — MORPHINE SULFATE 1 MG: 2 INJECTION, SOLUTION INTRAMUSCULAR; INTRAVENOUS at 13:16

## 2022-01-28 RX ADMIN — HYDRALAZINE HYDROCHLORIDE 50 MG: 50 TABLET, FILM COATED ORAL at 20:28

## 2022-01-28 RX ADMIN — DIPHENHYDRAMINE HYDROCHLORIDE 25 MG: 25 TABLET ORAL at 05:11

## 2022-01-28 RX ADMIN — NEPHROCAP 1 MG: 1 CAP ORAL at 07:46

## 2022-01-28 RX ADMIN — PANTOPRAZOLE SODIUM 40 MG: 40 TABLET, DELAYED RELEASE ORAL at 07:47

## 2022-01-28 RX ADMIN — SEVELAMER CARBONATE 1600 MG: 800 TABLET, FILM COATED ORAL at 11:57

## 2022-01-28 RX ADMIN — HEPARIN SODIUM 7500 UNITS: 10000 INJECTION INTRAVENOUS; SUBCUTANEOUS at 13:23

## 2022-01-28 RX ADMIN — LOSARTAN POTASSIUM 100 MG: 50 TABLET, FILM COATED ORAL at 20:28

## 2022-01-28 RX ADMIN — CLONIDINE HYDROCHLORIDE 0.3 MG: 0.1 TABLET ORAL at 20:27

## 2022-01-28 RX ADMIN — HYDRALAZINE HYDROCHLORIDE 50 MG: 50 TABLET, FILM COATED ORAL at 07:47

## 2022-01-28 RX ADMIN — HYDRALAZINE HYDROCHLORIDE 10 MG: 20 INJECTION INTRAMUSCULAR; INTRAVENOUS at 04:53

## 2022-01-28 RX ADMIN — POLYETHYLENE GLYCOL 3350 17 G: 17 POWDER, FOR SOLUTION ORAL at 07:46

## 2022-01-28 RX ADMIN — IPRATROPIUM BROMIDE AND ALBUTEROL 1 PUFF: 20; 100 SPRAY, METERED RESPIRATORY (INHALATION) at 20:28

## 2022-01-28 ASSESSMENT — PAIN DESCRIPTION - LOCATION
LOCATION: ABDOMEN

## 2022-01-28 ASSESSMENT — PAIN DESCRIPTION - ORIENTATION: ORIENTATION: MID

## 2022-01-28 ASSESSMENT — PAIN DESCRIPTION - DESCRIPTORS: DESCRIPTORS: ACHING

## 2022-01-28 ASSESSMENT — PAIN SCALES - GENERAL
PAINLEVEL_OUTOF10: 8
PAINLEVEL_OUTOF10: 8
PAINLEVEL_OUTOF10: 4
PAINLEVEL_OUTOF10: 8
PAINLEVEL_OUTOF10: 4
PAINLEVEL_OUTOF10: 2
PAINLEVEL_OUTOF10: 4

## 2022-01-28 ASSESSMENT — PAIN DESCRIPTION - FREQUENCY: FREQUENCY: CONTINUOUS

## 2022-01-28 ASSESSMENT — PAIN DESCRIPTION - PAIN TYPE
TYPE: ACUTE PAIN

## 2022-01-28 ASSESSMENT — PAIN DESCRIPTION - ONSET: ONSET: GRADUAL

## 2022-01-28 ASSESSMENT — PAIN DESCRIPTION - PROGRESSION: CLINICAL_PROGRESSION: NOT CHANGED

## 2022-01-28 ASSESSMENT — PAIN - FUNCTIONAL ASSESSMENT: PAIN_FUNCTIONAL_ASSESSMENT: PREVENTS OR INTERFERES SOME ACTIVE ACTIVITIES AND ADLS

## 2022-01-28 NOTE — PROGRESS NOTES
Patients triple lumen in right fem was removed per order. Patient tolerated removal well. Catheter intact. Now swelling, redness or bleeding from site. Pressure was held. Patient has no complaints at removal site. Clean, dry dressing applied, no bleeding at this time.

## 2022-01-28 NOTE — PROGRESS NOTES
[facility] PROGRESS NOTE      Chief complaint: Follow-up of elevated procalcitonin    The patient is a 43 y.o. male with history of ESRD on hemodialysis, hypertension, ruptured intracranial aneurysm s/p clipping in 2016, heart failure, presented on 01/21 with shortness of breath, tested positive for SARS-CoV-2 PCR on 01/21. On admission, he was afebrile, hypoxic with no leukocytosis. Shortly after admission, he was noted have high blood pressure prompting transfer to MICU for hypertensive emergency. Chest CTA showed CHF with prominent cardiomegaly, right pleural effusion, nodular opacities, septal thickening and ground-glass densities, no pulmonary embolus. CT abdomen and pelvis showed mild pelvic ascites, renal atrophy. He developed leukocytosis up to 14,000 on 01/24 decreasing to 11,000 on 01/27. Procalcitonin level has been increasing from 0.76 ng/mL on admission to 29 ng/mL on 01/26. Blood cultures showed no growth. Dexamethasone was started since admission. Cefepime and vancomycin were started on admission then stopped on 01/25. Right femoral TLC inserted on admission was removed on 01/27. Subjective: Patient was seen and examined. No chills, no abdominal pain, no diarrhea, no rash, no itching. He reports chest and body pains. Objective:    Vitals:    01/28/22 0730   BP: (!) 160/100   Pulse: 93   Resp: 18   Temp: 98 °F (36.7 °C)   SpO2: 97%     Constitutional: Alert, not in distress  Respiratory: Clear breath sounds, no crackles, no wheezes  Cardiovascular: Regular rate and rhythm, no murmurs  Gastrointestinal: Bowel sounds present, soft, nontender  Skin: Warm and dry, no active dermatoses  Musculoskeletal: No joint swelling, no joint erythema    Labs, imaging, and medical records/notes were personally reviewed.     Assessment:  Acute hypoxic respiratory failure  Hypertensive emergency  COVID-19, breakthrough  Elevated procalcitonin  ESRD on hemodialysis    Recommendations:  Monitor clinically off antibiotics for now. Ubaldo was discussed with Dr. Zach Gunn.     Thank you for involving me in the care of [facility]. I will sign off for now. Please do not hesitate to call for any questions, concerns, or new findings.     Electronically signed by Macho Gamez MD on 1/28/2022 at 9:24 AM

## 2022-01-28 NOTE — PROGRESS NOTES
Called dialysis to see if patient was currently having chest pain and the patient denied when asked by dialysis RN. Stated earlier he was having some discomfort but not anymore. Will complete EKG when patient gets back from dialysis.

## 2022-01-28 NOTE — PROGRESS NOTES
Pt SaO2 94% on room air at rest.    Pt ambulated 150 feet in room on room air with SaO2 88-92%. Pt recovered on room air at rest to 96%.

## 2022-01-28 NOTE — CARE COORDINATION
Care Coordination:  Discussed with attending physician for possible discharge home. Pt has [facility] comm plan and nurses can call to set up at [phone] to check if he has transport available for covid patients.  Back up plan is for [employer]    Maine Hernandez

## 2022-01-28 NOTE — FLOWSHEET NOTE
01/28/22 1720   Vital Signs   BP (!) 161/99   Temp 98 °F (36.7 °C)   Pulse 83   Resp 20   Weight 116 lb 10 oz (52.9 kg)   Percent Weight Change -3.64   Post-Hemodialysis Assessment   Post-Treatment Procedures Blood returned; Access bleeding time < 10 minutes   Machine Disinfection Process Acid/Vinegar Clean;Heat Disinfect; Exterior Machine Disinfection   Rinseback Volume (ml) 300 ml   Total Liters Processed (l/min) 65.3 l/min   Dialyzer Clearance Moderately streaked   Duration of Treatment (minutes) 180 minutes   Hemodialysis Intake (ml) 300 ml   Hemodialysis Output (ml) 2300 ml   NET Removed (ml) 2000 ml   Tolerated Treatment Good   Patient Response to Treatment tolerated tx well with a net uf of 2000cc removed. Needle sites held until hemostasis achieved. Bandaids and 2x2's applied. AVF pos. for thrill and bruit. Bilateral Breath Sounds Diminished   Edema Left upper extremity   Physician Notified?  No

## 2022-01-28 NOTE — PROGRESS NOTES
Department of Internal Medicine  Nephrology Attending Progress Note    SUBJECTIVE:  We are following this patient for end-stage renal failure . 1/23: The pt is a 42 yo male with a pmh of esrd on hd mwf, htn, hypothryoidism, ICH, HFpEF, medication noncompliance who presents with vomiting, sob, chest pain. He was found to be covid positive. He is on 4 L o2. His last hd was Friday. His sbp has been 190s-200. He wasn't able to take his outpt bp meds due to vomiting. He was given his oral clonidine, hydralazine, labetolol, cozaar, procardia without much imrprovement yesterday. He was given iv labetolol also. He was started on a cardene drip and transferred to the icu. His labs today show na 137 l 5.1>6.9, co2 18, bun 54, cr 8.2, ca 9.5, p 8.2, alb 4.2, wbc 7.8, hgb 9.3, plt 123. He is to have dialysis today due to his k of 6.9. ct of the chest did not show a PE. History is obtained from the chart due to covid isolation.      1/28: Patient not directly examined due to strict Covid isolation - all aspects of care reviewed with nursing - walking pulse ox without oxygen remained above 96% - improving blood pressure control      MEDS (scheduled):   heparin (porcine)  7,500 Units SubCUTAneous Q8H    vitamin D  50,000 Units Oral Weekly    Followed by   Mayito Gamez ON 4/17/2022] Vitamin D  1,000 Units Oral Daily    polyethylene glycol  17 g Oral Daily    nicotine  1 patch TransDERmal Daily    albuterol-ipratropium  1 puff Inhalation 4 times per day    dexamethasone  6 mg IntraVENous Q24H    ascorbic acid  500 mg Oral Daily    zinc sulfate  50 mg Oral Daily    b complex-C-folic acid  1 mg Oral Daily    cloNIDine  0.3 mg Oral TID    hydrALAZINE  50 mg Oral TID    labetalol  300 mg Oral BID    losartan  100 mg Oral QPM    megestrol  20 mg Oral TID    NIFEdipine  60 mg Oral Daily    pantoprazole  40 mg Oral Daily    sevelamer  1,600 mg Oral TID    sodium chloride flush  5-40 mL IntraVENous 2 times per day MEDS (infusions):   [Held by provider] niCARdipene (CARDENE) 50 mg in 250 mL 0.9 % sodium chloride infusion Stopped (01/23/22 1100)    dextrose      sodium chloride         MEDS (prn):  glucose, dextrose, glucagon (rDNA), dextrose, ondansetron, hydrALAZINE, benzonatate, diphenhydrAMINE, melatonin, sodium chloride flush, sodium chloride, morphine    DIET:    ADULT DIET; Regular; Low Sodium (2 gm); Low Potassium (Less than 3000 mg/day)  ADULT ORAL NUTRITION SUPPLEMENT; Breakfast, Dinner; Renal Oral Supplement  ADULT ORAL NUTRITION SUPPLEMENT; Lunch;  Fortified Pudding Oral Supplement      PHYSICAL EXAM:      Patient Vitals for the past 24 hrs:   BP Temp Temp src Pulse Resp SpO2 Weight   01/28/22 0730 (!) 160/100 98 °F (36.7 °C) Oral 93 18 97 %    01/28/22 0645 (!) 155/98     99 %    01/28/22 0600       121 lb (54.9 kg)   01/28/22 0500 (!) 196/108 97.8 °F (36.6 °C) Oral 86 16 100 %    01/27/22 2330 (!) 156/101 98.8 °F (37.1 °C) Oral 78 18 99 %    01/27/22 1930 (!) 178/105 98.1 °F (36.7 °C) Oral 85 18 92 %    01/27/22 1745 (!) 192/102     94 %    01/27/22 1630 (!) 195/110 98.4 °F (36.9 °C) Oral 82 16 96 %           Intake/Output Summary (Last 24 hours) at 1/28/2022 1244  Last data filed at 1/28/2022 0730  Gross per 24 hour   Intake 2600 ml   Output 0 ml   Net 2600 ml       Wt Readings from Last 3 Encounters:   01/28/22 121 lb (54.9 kg)   10/12/21 119 lb (54 kg)   09/09/21 108 lb 12.8 oz (49.4 kg)       PE    Pt  Not examined due to covid isolation      DATA:      Recent Labs     01/26/22  0521 01/27/22  0545 01/28/22  0504   WBC 12.3* 11.6* 10.8   HGB 8.5* 8.8* 8.5*   HCT 27.1* 28.0* 27.2*   MCV 95.8 96.2 96.1    222 186     Recent Labs     01/26/22  0521 01/27/22  0545 01/28/22  0504    138 137   K 4.2 3.5 4.1   CL 97* 99 97*   CO2 26 26 21*   BUN 68* 37* 58*   CREATININE 8.0* 6.0* 8.6*   LABGLOM 9 13 8   GLUCOSE 139* 107* 107*   CALCIUM 9.1 9.4 9.5     Recent Labs 01/26/22  0521 01/27/22  0545 01/28/22  0504   ALT 1,015* 796* 598*     Lab Results   Component Value Date    LABALBU 3.5 01/28/2022    LABALBU 3.6 01/27/2022    LABALBU 3.6 01/26/2022       Iron studies:  Lab Results   Component Value Date    FERRITIN 4,215 01/27/2022    IRON 33 (L) 09/06/2021    TIBC 202 (L) 09/06/2021     Vitamin B-12   Date Value Ref Range Status   02/10/2020 830 211 - 946 pg/mL Final     Folate   Date Value Ref Range Status   02/10/2020 10.5 4.8 - 24.2 ng/mL Final       Bone disease:  Lab Results   Component Value Date    MG 3.2 (H) 01/28/2022    PHOS 4.2 01/28/2022     Vit D, 25-Hydroxy   Date Value Ref Range Status   01/23/2022 17 (L) 30 - 100 ng/mL Final     Comment:     <20 ng/mL. ........... Heidy Brittle Deficient  20-30 ng/mL. ......... Heidy Brittle Insufficient   ng/mL. ........ Heidy Brittle Sufficient  >100 ng/mL. .......... Heidy Brittle Toxic       PTH   Date Value Ref Range Status   01/05/2019 418 (H) 15 - 65 pg/mL Final       No components found for: URIC    No results found for: VOL, APPEARANCE, COLORU, LABSPEC, LABPH, LEUKBLD, NITRU, GLUCOSEU, KETUA, UROBILINOGEN, KETUA, UROBILINOGEN, BILIRUBINUR, OCBU    No results found for: LIPIDPAN        IMPRESSION/RECOMMENDATIONS:      1. ESRD  Hd mwf in the outpatient setting  sp emergency HD sun 1/23 due to hyperkalemia  Continue HD per his normal MWF schedule     2. HTN of ESRD  Uncontrolled initially  Was On cardene drip  H/o medication noncompliance and vomiting  On orals procardia, clonidine, cozaar, labetolol, and hydralazine  Overall improvement since admission     3. Sec hyperparathryoidism  Phosphorus 8.2 upon admission has trended lower to 3.8  Noncompliance with renvela in the outpatient setting     4. Anemia  Continue sierra     5. covid pna  Management per ID    6.  Metabolic acidosis  Improved with HD      NAILA Danielle      Patient seen and examined all key components of the physical performed independently , case discussed with NP, all pertinent labs and radiologic tests personally reviewed agree with above. Joao Couch MD      Department of Internal Medicine  Section of Nephrology  Dialysis Note        PROCEDURE:  Patient seen on hemodialysis at 3:11 PM    PHYSICIAN:  [facility]ever[facility] Kanner, M.D., [facility]    INDICATION:  End-stage renal disease    RX:  See dialysis flowsheet for specifics on access, blood flow rate, dialysate baths, duration of dialysis, anticoagulation and other technical information.     COMMENTS:  Procedure in progress and tolerated       Joao Couch MD, MD

## 2022-01-28 NOTE — PROGRESS NOTES
EKG showed normal sinus rhythm. Troponin re-ordered as STAT. Lab stated they could not do an ADD ON because the test is time sensitive.

## 2022-01-29 VITALS
RESPIRATION RATE: 16 BRPM | OXYGEN SATURATION: 100 % | DIASTOLIC BLOOD PRESSURE: 100 MMHG | HEIGHT: 66 IN | HEART RATE: 86 BPM | TEMPERATURE: 97.9 F | WEIGHT: 116.62 LBS | SYSTOLIC BLOOD PRESSURE: 154 MMHG | BODY MASS INDEX: 18.74 KG/M2

## 2022-01-29 RX ADMIN — IPRATROPIUM BROMIDE AND ALBUTEROL 1 PUFF: 20; 100 SPRAY, METERED RESPIRATORY (INHALATION) at 00:00

## 2022-01-29 ASSESSMENT — PAIN SCALES - GENERAL: PAINLEVEL_OUTOF10: 0

## 2022-01-29 NOTE — PROGRESS NOTES
[facility] arrived to transport patient. IV in left shoulder removed and telemetry monitor removed. Patient has discharge paperwork and was educated. Patient alert and oriented x4. Had no complaints upon discharge. All belongings are with him, confirmed with him he had his wallet, cell phone and keys.

## 2022-01-29 NOTE — PROGRESS NOTES
Called [facility] for patients transport home. Reservation # is [id number]. Expected pickup time is between 1am to 2AM. Patient is aware of discharge and transport information.

## 2022-01-30 NOTE — DISCHARGE SUMMARY
Hospital Medicine Discharge Summary    Patient ID: Misty Booth      Patient's PCP: Car Llanes DO    Admit Date: 1/22/2022     Discharge Date: 1/29/2022      Admitting Physician: Hola Lee MD     Discharge Physician: Teena Nash MD     Discharge Diagnoses: Active Hospital Problems    Diagnosis Date Noted    Severe protein-calorie malnutrition ([facility] [E43] 01/24/2022    Acute respiratory failure with hypoxia ([facility] [J96.01] 07/31/2021       The patient was seen and examined on day of discharge and this discharge summary is in conjunction with any daily progress note from day of discharge. Hospital Course:   Mr. Abdelrahman Goldberg M 70 y. o. year old [de-identified]  has a past medical history of (HFpEF) heart failure with preserved ejection fraction (HCC), Anxiety, AVF (arteriovenous fistula) (HCC), Chronic kidney disease, Depression, Encounter regarding vascular access for dialysis for ESRD ([facility] History of ruptured Berry aneurysm ([facility] Hypertension, Hypothyroidism, Intracranial hemorrhage ([address] Neutropenia ([address] Noncompliance w/medication treatment due to intermit use of medication, and Pre-transplant evaluation for kidney transplant. who presents to the ED with CP/SOB and retching. The patient states that he started feeling ill after dialysis yesterday; therefore he presented to the ED with complaints of fever, SOB and CP yesterday. He was tested for COVID and was positive, The patient did not show any evidence of hypoxia and was discharged home.       He presents back to the hospital with worsening dry cough, SOB and CP, retching. The patient states that he has CP on the left; which feels like pressure with no radiation.      In the ED, the patient had an EKG that showed: sinus tachycardia, LVH; trops were 98, 95, 94 and as per the ED this is the patient's baseline with his ESRD. The patient was noted to Desaturate to 88% and is placed on 4L of O2.  He also received a dose of suggested, and pulmonary edema. 2. Possibility of COVID pneumonia amidst the pulmonary opacification to also   be considered. 3. No acute pulmonary embolus is identified. CT ABDOMEN PELVIS WO CONTRAST Additional Contrast? None   Final Result   1. Mild pelvic ascites. 2. Possible constipation. 3. Renal atrophy and osteodystrophy. Disposition:  home     Discharge Instructions/Follow-up:  Keep scheduled follow up appointments. Take medications as prescribed. Code Status:  Prior     Activity: activity as tolerated    Diet: regular diet    Labs:  For convenience and continuity at follow-up the following most recent labs are provided:      CBC:    Lab Results   Component Value Date    WBC 10.8 01/28/2022    HGB 8.5 01/28/2022    HCT 27.2 01/28/2022     01/28/2022       Renal:    Lab Results   Component Value Date     01/28/2022    K 4.1 01/28/2022    K 5.1 01/22/2022    CL 97 01/28/2022    CO2 21 01/28/2022    BUN 58 01/28/2022    CREATININE 8.6 01/28/2022    CALCIUM 9.5 01/28/2022    PHOS 4.2 01/28/2022       Discharge Medications:     Discharge Medication List as of 1/28/2022  7:48 PM           Details   dexamethasone (DECADRON) 6 MG tablet Take 1 tablet by mouth daily (with breakfast) for 4 days, Disp-4 tablet, R-0Normal      vitamin D (ERGOCALCIFEROL) 1.25 MG (14229 UT) CAPS capsule Take 1 capsule by mouth once a week for 11 doses, Disp-5 capsule, R-0Normal              Details   NIFEdipine (ADALAT CC) 60 MG extended release tablet Take 60 mg by mouth daily Historical Med      hydrALAZINE (APRESOLINE) 50 MG tablet Take 1 tablet by mouth 3 times daily, Disp-90 tablet, R-0Normal      pantoprazole (PROTONIX) 40 MG tablet TAKE 1 TABLET BY MOUTH DAILY *EMERGENCY REFILL*, Disp-30 tablet, R-0Normal      labetalol (NORMODYNE) 300 MG tablet Take 1 tablet by mouth 2 times daily, Disp-60 tablet, R-3Normal      diphenhydrAMINE (BENADRYL) 25 MG tablet Take 25 mg by mouth 2 times daily as needed for Itching, Allergies or SleepHistorical Med      Melatonin 5 MG CAPS Take 5 mg by mouth nightly as needed Historical Med      Methoxy PEG-Epoetin Beta (MIRCERA IJ) Infuse 60 mcg intravenously every 14 days Historical Med      cloNIDine (CATAPRES) 0.3 MG tablet Take 0.3 mg by mouth 3 times daily Historical Med      megestrol (MEGACE) 20 MG tablet Take 20 mg by mouth 3 times daily Historical Med      losartan (COZAAR) 100 MG tablet Take 1 tablet by mouth every evening, Disp-30 tablet, R-3Normal      B Complex-C-Folic Acid (NEPHRO-JOHNNY) 0.8 MG TABS Take 1 tablet by mouth daily Historical Med      sevelamer (RENVELA) 800 MG tablet Take 2 tablets by mouth 3 times daily Historical Med             Time Spent on discharge is more than 45 minutes in the examination, evaluation, counseling and review of medications and discharge plan.       Signed:    Sri Ocampo MD   1/30/2022

## 2022-01-31 ENCOUNTER — CARE COORDINATION (OUTPATIENT)
Dept: CASE MANAGEMENT | Age: 37
End: 2022-01-31

## 2022-01-31 LAB
EKG ATRIAL RATE: 87 BPM
EKG P AXIS: 69 DEGREES
EKG P-R INTERVAL: 166 MS
EKG Q-T INTERVAL: 380 MS
EKG QRS DURATION: 88 MS
EKG QTC CALCULATION (BAZETT): 457 MS
EKG R AXIS: 48 DEGREES
EKG T AXIS: 63 DEGREES
EKG VENTRICULAR RATE: 87 BPM

## 2022-01-31 PROCEDURE — 93010 ELECTROCARDIOGRAM REPORT: CPT | Performed by: INTERNAL MEDICINE

## 2022-01-31 NOTE — CARE COORDINATION
COVID-19 Transitions of Care Call  Call within 2 business days of discharge: Yes    Patient: Ana Oconnell Patient : 1985   MRN: [id number]  Reason for Admission: Acute respiratory failure with hypoxia  Discharge Date: 22 RARS: Readmission Risk Score: 25.9 ( )      Last Discharge [facility]       Complaint Diagnosis Description Type Department Provider    22 Shortness of Breath Acute respiratory failure with hypoxia ([address] . .. ED to Hosp-Admission (Discharged) (ADMITTED) Nurys Tang MD; Lukas Fallon. .. Challenges to be reviewed by the provider   Additional needs identified to be addressed with provider: No  none                 Encounter was not routed to provider for escalation. Method of communication with provider: none. Discussed COVID-19 related testing which was: available at this time. Test results were: positive. Patient informed of results, if available? Yes.~Pt previously aware of +COVID-19 results (+22)    Current Symptoms: no new symptoms and no worsening symptoms. Pt discharged from [facility] on  with dx acute resp failure with hypoxia d/t COVID-19. Pt states that he feels \"pretty good\" today. Pt denies any sob, cough, wheezing, CP/Chest tightness, or chest congestion. Pt denies any n/v or abd pain. Pt reports that he is able to eat and drink without issue. Pt does admit to generalized body aches. Pt is HD 3x/wk. Pt scheduled to go to [facility] in [facility] until he is able to return to the [facility] location. Pt is scheduled for HD tomorrow and has transportation already set up through his insurance to take him there ([employer]). Pt did not voice any needs/concerns at this time. Pt agreeable to ongoing outreaches from this CTN. Reviewed New or Changed Meds: yes    Do you have what you need at home?    Durable Medical Equipment ordered at discharge: None   Home Health/Outpatient orders at discharge: none   Was patient discharged with a pulse oximeter? No     Patient education provided: Reviewed appropriate site of care based on symptoms and resources available to patient including: PCP, Urgent care clinics and When to call 911. Follow up appointment scheduled within 7 days of discharge: no. If no appointment scheduled, scheduling offered: yes~Pt declined stating that he will call on his own. CTN will route message to pcp's office staff requesting they f/u with the pt to schedule a HFU appt. Future Appointments   Date Time Provider Jean Joshi   7/20/2022 10:00 AM Ori Bravo MD [facility]       Interventions: Scheduled appointment with PCP-see above  Obtained and reviewed discharge summary and/or continuity of care documents  Assessment and support for treatment adherence and medication management-Reviewed new medications at discharge. Pt states that his pharmacy to deliver his medications. Pt states that he is contacting [address] today to get an estimated time it will be delivered. Reviewed discharge instructions, medical action plan and red flags with patient who verbalized understanding. Plan for follow-up call in 7-10 days based on severity of symptoms and risk factors. Plan for next call: symptom management-any worsening in COVID-19 symptoms?  follow up appointment-Has pt either scheduled or attended his HFU appt with his pcp? Provided contact information for future needs.     Shiloh Law RN

## 2022-02-01 LAB — BLOOD CULTURE, ROUTINE: NORMAL

## 2022-02-02 LAB
BLOOD CULTURE, ROUTINE: NORMAL
CULTURE, BLOOD 2: NORMAL

## 2022-02-05 ENCOUNTER — HOSPITAL ENCOUNTER (EMERGENCY)
Age: 37
Discharge: HOME | End: 2022-02-05
Attending: STUDENT IN AN ORGANIZED HEALTH CARE EDUCATION/TRAINING PROGRAM
Payer: MEDICAID

## 2022-02-05 ENCOUNTER — APPOINTMENT (OUTPATIENT)
Dept: ULTRASOUND IMAGING | Age: 37
End: 2022-02-05
Payer: MEDICAID

## 2022-02-05 ENCOUNTER — APPOINTMENT (OUTPATIENT)
Dept: GENERAL RADIOLOGY | Age: 37
End: 2022-02-05
Payer: MEDICAID

## 2022-02-05 VITALS
TEMPERATURE: 98.9 F | OXYGEN SATURATION: 96 % | HEART RATE: 87 BPM | DIASTOLIC BLOOD PRESSURE: 117 MMHG | RESPIRATION RATE: 20 BRPM | SYSTOLIC BLOOD PRESSURE: 192 MMHG

## 2022-02-05 DIAGNOSIS — Z99.2 ENCOUNTER FOR DIALYSIS (HCC): Primary | ICD-10-CM

## 2022-02-05 DIAGNOSIS — D72.829 LEUKOCYTOSIS, UNSPECIFIED TYPE: ICD-10-CM

## 2022-02-05 DIAGNOSIS — M79.89 LEFT ARM SWELLING: ICD-10-CM

## 2022-02-05 DIAGNOSIS — R07.9 CHEST PAIN, UNSPECIFIED TYPE: ICD-10-CM

## 2022-02-05 DIAGNOSIS — Z99.2 ESRD ON HEMODIALYSIS (HCC): ICD-10-CM

## 2022-02-05 DIAGNOSIS — D64.9 CHRONIC ANEMIA: ICD-10-CM

## 2022-02-05 DIAGNOSIS — N18.6 ESRD ON HEMODIALYSIS (HCC): ICD-10-CM

## 2022-02-05 LAB
ALBUMIN SERPL-MCNC: 4.1 G/DL (ref 3.5–5.2)
ALP BLD-CCNC: 90 U/L (ref 40–129)
ALT SERPL-CCNC: 105 U/L (ref 0–40)
ANION GAP SERPL CALCULATED.3IONS-SCNC: 21 MMOL/L (ref 7–16)
APTT: 22.2 SEC (ref 24.5–35.1)
AST SERPL-CCNC: 43 U/L (ref 0–39)
BASOPHILS ABSOLUTE: 0.05 E9/L (ref 0–0.2)
BASOPHILS RELATIVE PERCENT: 0.3 % (ref 0–2)
BILIRUB SERPL-MCNC: 0.8 MG/DL (ref 0–1.2)
BUN BLDV-MCNC: 53 MG/DL (ref 6–20)
CALCIUM SERPL-MCNC: 9.6 MG/DL (ref 8.6–10.2)
CHLORIDE BLD-SCNC: 100 MMOL/L (ref 98–107)
CO2: 22 MMOL/L (ref 22–29)
CREAT SERPL-MCNC: 9.6 MG/DL (ref 0.7–1.2)
EKG ATRIAL RATE: 97 BPM
EKG P AXIS: 70 DEGREES
EKG P-R INTERVAL: 182 MS
EKG Q-T INTERVAL: 368 MS
EKG QRS DURATION: 82 MS
EKG QTC CALCULATION (BAZETT): 467 MS
EKG R AXIS: 57 DEGREES
EKG T AXIS: 80 DEGREES
EKG VENTRICULAR RATE: 97 BPM
EOSINOPHILS ABSOLUTE: 0.24 E9/L (ref 0.05–0.5)
EOSINOPHILS RELATIVE PERCENT: 1.4 % (ref 0–6)
GFR AFRICAN AMERICAN: 7
GFR NON-AFRICAN AMERICAN: 7 ML/MIN/1.73
GLUCOSE BLD-MCNC: 75 MG/DL (ref 74–99)
HCT VFR BLD CALC: 27.5 % (ref 37–54)
HEMOGLOBIN: 8.3 G/DL (ref 12.5–16.5)
IMMATURE GRANULOCYTES #: 0.11 E9/L
IMMATURE GRANULOCYTES %: 0.7 % (ref 0–5)
INR BLD: 1.1
LYMPHOCYTES ABSOLUTE: 1.65 E9/L (ref 1.5–4)
LYMPHOCYTES RELATIVE PERCENT: 9.9 % (ref 20–42)
MCH RBC QN AUTO: 29.9 PG (ref 26–35)
MCHC RBC AUTO-ENTMCNC: 30.2 % (ref 32–34.5)
MCV RBC AUTO: 98.9 FL (ref 80–99.9)
MONOCYTES ABSOLUTE: 0.78 E9/L (ref 0.1–0.95)
MONOCYTES RELATIVE PERCENT: 4.7 % (ref 2–12)
NEUTROPHILS ABSOLUTE: 13.78 E9/L (ref 1.8–7.3)
NEUTROPHILS RELATIVE PERCENT: 83 % (ref 43–80)
PDW BLD-RTO: 17.8 FL (ref 11.5–15)
PLATELET # BLD: 182 E9/L (ref 130–450)
PMV BLD AUTO: 9.7 FL (ref 7–12)
POTASSIUM REFLEX MAGNESIUM: 5.5 MMOL/L (ref 3.5–5)
PRO-BNP: ABNORMAL PG/ML (ref 0–125)
PROTHROMBIN TIME: 12.6 SEC (ref 9.3–12.4)
RBC # BLD: 2.78 E12/L (ref 3.8–5.8)
SODIUM BLD-SCNC: 143 MMOL/L (ref 132–146)
TOTAL PROTEIN: 7.2 G/DL (ref 6.4–8.3)
TROPONIN, HIGH SENSITIVITY: 135 NG/L (ref 0–11)
TROPONIN, HIGH SENSITIVITY: 142 NG/L (ref 0–11)
WBC # BLD: 16.6 E9/L (ref 4.5–11.5)

## 2022-02-05 PROCEDURE — 93971 EXTREMITY STUDY: CPT | Performed by: RADIOLOGY

## 2022-02-05 PROCEDURE — 85730 THROMBOPLASTIN TIME PARTIAL: CPT

## 2022-02-05 PROCEDURE — 93005 ELECTROCARDIOGRAM TRACING: CPT | Performed by: EMERGENCY MEDICINE

## 2022-02-05 PROCEDURE — 80053 COMPREHEN METABOLIC PANEL: CPT

## 2022-02-05 PROCEDURE — 36415 COLL VENOUS BLD VENIPUNCTURE: CPT

## 2022-02-05 PROCEDURE — 85610 PROTHROMBIN TIME: CPT

## 2022-02-05 PROCEDURE — 93971 EXTREMITY STUDY: CPT

## 2022-02-05 PROCEDURE — 99285 EMERGENCY DEPT VISIT HI MDM: CPT

## 2022-02-05 PROCEDURE — 93010 ELECTROCARDIOGRAM REPORT: CPT | Performed by: INTERNAL MEDICINE

## 2022-02-05 PROCEDURE — 85025 COMPLETE CBC W/AUTO DIFF WBC: CPT

## 2022-02-05 PROCEDURE — 84484 ASSAY OF TROPONIN QUANT: CPT

## 2022-02-05 PROCEDURE — 71045 X-RAY EXAM CHEST 1 VIEW: CPT

## 2022-02-05 PROCEDURE — 6370000000 HC RX 637 (ALT 250 FOR IP): Performed by: STUDENT IN AN ORGANIZED HEALTH CARE EDUCATION/TRAINING PROGRAM

## 2022-02-05 PROCEDURE — 83880 ASSAY OF NATRIURETIC PEPTIDE: CPT

## 2022-02-05 RX ORDER — ONDANSETRON 2 MG/ML
4 INJECTION INTRAMUSCULAR; INTRAVENOUS ONCE
Status: DISCONTINUED | OUTPATIENT
Start: 2022-02-05 | End: 2022-02-05

## 2022-02-05 RX ORDER — HYDROCODONE BITARTRATE AND ACETAMINOPHEN 5; 325 MG/1; MG/1
2 TABLET ORAL ONCE
Status: COMPLETED | OUTPATIENT
Start: 2022-02-05 | End: 2022-02-05

## 2022-02-05 RX ORDER — ONDANSETRON 4 MG/1
4 TABLET, FILM COATED ORAL ONCE
Status: DISCONTINUED | OUTPATIENT
Start: 2022-02-05 | End: 2022-02-05

## 2022-02-05 RX ORDER — ONDANSETRON 4 MG/1
4 TABLET, ORALLY DISINTEGRATING ORAL ONCE
Status: COMPLETED | OUTPATIENT
Start: 2022-02-05 | End: 2022-02-05

## 2022-02-05 RX ADMIN — ONDANSETRON 4 MG: 4 TABLET, ORALLY DISINTEGRATING ORAL at 07:41

## 2022-02-05 RX ADMIN — HYDROCODONE BITARTRATE AND ACETAMINOPHEN 2 TABLET: 5; 325 TABLET ORAL at 07:41

## 2022-02-05 ASSESSMENT — PAIN SCALES - GENERAL: PAINLEVEL_OUTOF10: 10

## 2022-02-05 NOTE — ED PROVIDER NOTES
ATTENDING PROVIDER ATTESTATION:     Harika Garcia presented to the emergency department for evaluation of Chest Pain (mid sternal non radiating. missed dialysis yesterday d/t snow. hx HTN was unable to keep BP meds down today. )   and was initially evaluated by the Medical Resident. See Original ED Note for H&P and ED course above. I have reviewed and discussed the case, including pertinent history (medical, surgical, family and social) and exam findings with the Medical Resident assigned to Harika Garcia. I have personally performed and/or participated in the history, exam, medical decision making, and procedures and agree with all pertinent clinical information and any additional changes or corrections are noted below in my assessment and plan. I have discussed this patient in detail with the resident, and provided the instruction and education,    I have reviewed my findings and recommendations with the assigned Medical Resident, Harika Garcia and members of family present at the time of disposition. I have performed a history and physical examination of this patient and directly supervised the resident caring for this patient. History of Present Illness: This patient presents to the ED for Missed dialysis as well as left upper extremity swelling. Patient states that he is scheduled for dialysis today but was having some left upper extremity swelling so he came to the emergency department stat. He has had multiple episodes of this in the past.  States he has had to have his fistula \"cleaned out. \"  He is unsure what sort of procedure was done. 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  His fistula has been present since he was 14years old for 21 years now. He also states he is substernal chest aching. Not better or worse with exertion.   The patient states that he has had this pain in the past.  States it has never been evaluated but he has multiple stress tests noted in the EMR with the most recent being in September 2021. It was an optimal stress test that was negative on September 4. Review of Systems:   A complete review of systems was performed and pertinent positives and negatives are stated within HPI, all other systems reviewed and are negative.    --------------------------------------------- PAST HISTORY ---------------------------------------------  Past Medical History:  has a past medical history of (HFpEF) heart failure with preserved ejection fraction ([facility] Anxiety, AVF (arteriovenous fistula) ([facility] Chronic kidney disease, Depression, Encounter regarding vascular access for dialysis for ESRD [facility]), History of ruptured Berry aneurysm ([facility] Hypertension, Hypothyroidism, Intracranial hemorrhage ([facility] Neutropenia ([facility] Noncompliance w/medication treatment due to intermit use of medication, and Pre-transplant evaluation for kidney transplant. Past Surgical History:  has a past surgical history that includes Brain aneurysm surgery (Right, 3-4 years ago); Dialysis fistula creation (Left, 11 years ago); Upper gastrointestinal endoscopy (N/A, 1/3/2019); and bronchoscopy (N/A, 8/3/2021). Social History:  reports that he has never smoked. He has never used smokeless tobacco. He reports previous drug use. Drug: Marijuana Don Safer). He reports that he does not drink alcohol. Family History: family history includes Kidney Disease in his paternal grandmother. Unless otherwise noted, family history is non contributory    The patients home medications have been reviewed. Allergies: Tylenol [acetaminophen] and Levofloxacin    Physical Exam:  Constitutional: Appears in no distress  Head: Normocephalic, atraumatic  Eyes: Non-icteric slcera, no conjunctival injection  ENT: Moist mucous membranes,  Neck: Trachea midline, no JVD  Respiratory: Nonlabored respirations. Lungs clear to auscultation bilaterally, no wheezes, rales, or rhonchi. Cardiovascular: Regular rate. Regular rhythm. No murmurs, S3 gallop, no rubs. Gastrointestinal: Abdomen Soft, Non tender, Non distended. No rebound tenderness, guarding, or rigidity. Extremities: No lower extremity edema  Genitourinary: No CVA tenderness, no suprapubic tenderness  Musculoskeletal: Moves all extremities, no deformity  Skin: Left upper extremity AV fistula, palpable thrill, normal bruit is noted  Neurologic: Alert, symmetric facies, no aphasia    My Medical Decision Making:   Patient presents emergency department with multiple complaints including chest pain, left upper extremity swelling, work-up was initiated for this will obtain US duplex of the left upper extremity as well. EKG:  EKG interpreted by myself as a ventricular rate of 97 beats per there is a P wave for recurrence complex cures durations within normal its QT/QTc is normal.  No ST segment elevation depression there is T wave inversion in aVL. T wave inversion in V2. These changes are demonstrated on prior EKG. Patient EKG is interpreted myself as normal sinus rhythm no ischemia or infarct unchanged from prior. Lab/imaging: Reviewed. The patient does have an elevated troponin but he is an ESRD patient with a creatinine of 9.6 this is above his normal baseline. He is hyperkalemic at 5.5 without EKG changes at this time. Delta troponin is 7. His baseline troponin is about 100 normally. He had an optimal nuclear stress test back in September. His upper extremity ultrasound is negative for any DVT at this time. Plain film of the chest reveals no evidence of pneumonia. Patient does have a nonspecific leukocytosis of 16.6 but no infectious symptoms at this time. No evidence of cellulitis, does not make urine so urinary tract infection is highly unlikely in this patient. No ekg changes sso pt will not require dialysis her in the ED.      We spoke with the dialysis center they will be able to take him to dialysis at 11 AM.  He was provided with a cab to dialysis will be discharged with outpatient follow-up. IMPRESSION:   1. Encounter for dialysis [facility])    2. Chest pain, unspecified type    3. ESRD on hemodialysis ([facility]    4. Leukocytosis, unspecified type        I, Dr. Liu Camejo, am the primary provider of record. I directly supervised any procedures performed by the resident and was present for the procedure including all critical portions of the procedure. Liu Camejo, DO  Emergency Medicine    NOTE: This report was transcribed using voice recognition software.  Every effort was made to ensure accuracy; however, inadvertent computerized transcription errors may be present       Felicity Quinn DO  02/05/22 1027

## 2022-02-05 NOTE — ED NOTES
Erika from [facility] said pt's appt time is at 11am, however if he is a little late that's ok, they will still give him dialysis today.       Naresh Gleason RN  02/05/22 1016

## 2022-02-05 NOTE — ED NOTES
Dialysis called to inform this nurse that pt has an appt today at [facility] [address] [place] [address] in [place] @ 11:45am.    Address: [address], [place], [place]     Karen Trotter RN  02/05/22 [id number]

## 2022-02-05 NOTE — ED PROVIDER NOTES
Chief Complaint   Patient presents with    Chest Pain     mid sternal non radiating. missed dialysis yesterday d/t snow. hx HTN was unable to keep BP meds down today. HPI  Jyoti Hickey is a 43 y.o. male with a PMHx significant for CKD on HD who presents with chest pain, nausea and needing to be dialyzed. The complaints are acute, moderate in severity, worsened by missed dialysis and improved by nothing. On arrival, the patient states that due to the snowstorm that occurred over the last 48 hours, he was unable to be dialyzed yesterday. He states that he called the dialysis center this morning and they told him he was on the schedule and they could not get him in. He also told me he was having some chest pain and advised him to come to the ER. He describes the chest pain as a pressure sensation that localizes to his mid chest and does not radiate anywhere. He does endorse some mild nausea, but states that he has not vomited. The patient denies recent trauma, fever, chills, fatigue, HA, dizziness, lightheadedness, paresthesias, generalized weakness, confusion, forgetfulness, vision changes, eye redness, congestion, rhinorrhea, sore throat, neck pain, palpitations, LE edema, SOB, cough, wheezing, abdominal pain, diarrhea, constipation, hematochezia, melena, dysuria, hematuria, flank pain, decreased UOP, myalgias, arthralgias, ROM issues, swelling, rashes and erythema. ROS is otherwise negative. The patient is currently taking no blood thinners. Tobacco Hx:   reports that he has never smoked. He has never used smokeless tobacco.    Alcohol Hx:   reports no history of alcohol use. Illicit Drug Hx:   reports previous drug use. Drug: Marijuana [facility]). The history is provided by the patient.     Last Tetanus (if applicable): n/a    Review of Systems:   A complete review of systems was performed and pertinent positives and negatives are stated within the HPI, all other systems reviewed and are negative.     Physical Exam:  GEN: Cooperative, well-developed, well-nourished adult in NAD; pt appears stated age  Head: Normocephalic and atraumatic; no tenderness to palpation anywhere  Eyes: PERRL, EOMI, conjunctiva clear, cornea without gross abnormality, no visual deficits noted b/l  Ears: External ear normal-appearing and non-tender b/l; no hearing deficit noted  Nose: Nares patent and free of debris; septum midline; mucosa appears normal; no drainage noted  Throat: Oral mucosa dry with no lesions noted; dentition wnl; no posterior pharyngeal erythema or edema; tonsils are not swollen and there is no exudate noted; no post-nasal drip  Neck: Supple and symmetrical with midline trachea; no cervical or supraclavicular lymphadenopathy noted; thyroid not palpated, but no tenderness or masses noted in the region; normal ROM with no meningeal signs  Lungs: LCTAB with no wheezes, rhonchi or rales noted; no respiratory distress, breathing unlabored   CV: RRR, no murmurs, there is a gallop noted (per patient this has been present for long time), no rubs noted on auscultation, UE and LE distal pulses intact b/l +2/4 with no cyanosis noted; no LE edema noted b/l; capillary refill < 2 seconds fistula and left upper extremity with palpable thrill; left upper extremity is edematous throughout and somewhat tender to palpation  ABD: Soft, non-tender, non-distended, no organomegaly, hernias or masses noted  /Rectal: no suprapubic tenderness; remainder of exam deferred  MSK: UEs and LEs without notable trauma, ROM restriction or tenderness to palpation b/l; normal strength throughout  Skin: Skin warm and dry without notable rash, erythema, bruising or lesion; normal turgor  Neuro: A&O x3; CN II-XII appear grossly intact; no focal deficits appreciated; pt thoughtful in discussion and able to answer all questions without issue  Psych: normal attention with no obvious AVH, normal mood, normal Range    WBC 16.6 (H) 4.5 - 11.5 E9/L    RBC 2.78 (L) 3.80 - 5.80 E12/L    Hemoglobin 8.3 (L) 12.5 - 16.5 g/dL    Hematocrit 27.5 (L) 37.0 - 54.0 %    MCV 98.9 80.0 - 99.9 fL    MCH 29.9 26.0 - 35.0 pg    MCHC 30.2 (L) 32.0 - 34.5 %    RDW 17.8 (H) 11.5 - 15.0 fL    Platelets 713 668 - 174 E9/L    MPV 9.7 7.0 - 12.0 fL    Neutrophils % 83.0 (H) 43.0 - 80.0 %    Immature Granulocytes % 0.7 0.0 - 5.0 %    Lymphocytes % 9.9 (L) 20.0 - 42.0 %    Monocytes % 4.7 2.0 - 12.0 %    Eosinophils % 1.4 0.0 - 6.0 %    Basophils % 0.3 0.0 - 2.0 %    Neutrophils Absolute 13.78 (H) 1.80 - 7.30 E9/L    Immature Granulocytes # 0.11 E9/L    Lymphocytes Absolute 1.65 1.50 - 4.00 E9/L    Monocytes Absolute 0.78 0.10 - 0.95 E9/L    Eosinophils Absolute 0.24 0.05 - 0.50 E9/L    Basophils Absolute 0.05 0.00 - 0.20 E9/L   Comprehensive Metabolic Panel w/ Reflex to MG   Result Value Ref Range    Sodium 143 132 - 146 mmol/L    Potassium reflex Magnesium 5.5 (H) 3.5 - 5.0 mmol/L    Chloride 100 98 - 107 mmol/L    CO2 22 22 - 29 mmol/L    Anion Gap 21 (H) 7 - 16 mmol/L    Glucose 75 74 - 99 mg/dL    BUN 53 (H) 6 - 20 mg/dL    CREATININE 9.6 (HH) 0.7 - 1.2 mg/dL    GFR Non-African American 7 >=60 mL/min/1.73    GFR African American 7     Calcium 9.6 8.6 - 10.2 mg/dL    Total Protein 7.2 6.4 - 8.3 g/dL    Albumin 4.1 3.5 - 5.2 g/dL    Total Bilirubin 0.8 0.0 - 1.2 mg/dL    Alkaline Phosphatase 90 40 - 129 U/L     (H) 0 - 40 U/L    AST 43 (H) 0 - 39 U/L   Troponin   Result Value Ref Range    Troponin, High Sensitivity 142 (H) 0 - 11 ng/L   Brain Natriuretic Peptide   Result Value Ref Range    Pro-BNP >70,000 (H) 0 - 125 pg/mL   Protime-INR   Result Value Ref Range    Protime 12.6 (H) 9.3 - 12.4 sec    INR 1.1    APTT   Result Value Ref Range    aPTT 22.2 (L) 24.5 - 35.1 sec   Troponin   Result Value Ref Range    Troponin, High Sensitivity 135 (H) 0 - 11 ng/L   EKG 12 Lead   Result Value Ref Range    Ventricular Rate 97 BPM    Atrial Rate 97 BPM    P-R Interval 182 ms    QRS Duration 82 ms    Q-T Interval 368 ms    QTc Calculation (Bazett) 467 ms    P Axis 70 degrees    R Axis 57 degrees    T Axis 80 degrees       RADIOLOGY: Interpreted by Radiologist unless otherwise noted. US DUP UPPER EXTREMITY LEFT VENOUS   Final Result   Within the visualized vessels there is no evidence for deep venous   thrombosis               XR CHEST PORTABLE   Final Result   Improved pulmonary aeration. EKG: As interpreted by this ER physician. Rate: 97 bpm  Rhythm: Sinus  Axis: Normal  ST Segments: No acute changes  T-waves: No acute changes  Interpretation: NSR, voltage criteria for LVH  Comparison: stable as compared to patient's most recent EKG    Oxygen Saturation Interpretation: normal    Meds Given:  Medications   HYDROcodone-acetaminophen (NORCO) 5-325 MG per tablet 2 tablet (2 tablets Oral Given 2/5/22 0741)   ondansetron (ZOFRAN-ODT) disintegrating tablet 4 mg (4 mg Oral Given 2/5/22 0741)       Procedures:  No procedures performed. --------------------------------- PROGRESS NOTES / ADDITIONAL PROVIDER NOTES ---------------------------------  Consultations:  As outlined below. ED Course:  ED Course as of 02/06/22 2334   Sat Feb 05, 2022   1736 Patient's dialysis center called and informed us that if he is able to be discharged in time, there is a spot for him to be dialyzed at 1145. [ML]      ED Course User Index  [ML] Michel Warner DO       11:34 PM: All results were discussed with the patient and I have provided specific details regarding the plan of care, diagnosis and associated prognosis. The patient tolerated the visit well and, at the time of discharge, was without objective evidence of hemodynamic instability or an acute process requiring hospitalization and inpatient management. The patient was seen by myself and the assigned attending physician, Dr. Rosalina Pelayo, who agreed with my assessment and plan as laid out herein.  The importance of follow-up was discussed at the end of the visit and I recommended that the patient be seen by their primary care physician in 2-3 days. The patient verbalized their understanding and agreement with the plan as presented and stated their intention to follow up. Reasons to return to the ER or seek immediate evaluation by a medical provider were discussed at length and all questions were answered to the highest degree of accuracy possible based on the current information available. At this time the patient is stable and safe for discharge. MDM:  Patient presented with chest pain and nausea following missed dialysis. On arrival, the patient was hypertensive with remaining VS wnl. EKG and physical exam were  as documented above. Labs were remarkable with findings consistent with renal failure. Leukocytosis of 17,000 noted. Chronic anemia, hgb 8.3. Milk hyperkalemia noted. CXR was negative for acute cardiopulmonary pathology. Duplex US of the LUE was negative for DVT. Given the lack of findings necessitating further emergent intervention or hospitalization, the decision was made to discharge the patient with recommendations for follow up and symptomatic care. Reasons to return to the ER were discussed and all questions were answered. The patient was stable at the time of their disposition. Discharge Medication List as of 2/5/2022 10:22 AM          Diagnosis:  1. Encounter for dialysis [facility])    2. Chest pain, unspecified type    3. ESRD on hemodialysis ([address]    4. Leukocytosis, unspecified type    5. Chronic anemia    6. Left arm swelling        Disposition:  Patient's disposition: Discharge to home. Patient's condition is stable. This patient was seen, examined and treated with Dr. Ashlyn Tena. All pertinent aspects of the patient's care were discussed with the attending physician.        Lawanda Syed DO  Resident  02/06/22 [id number]

## 2022-02-05 NOTE — ED NOTES
Bed: 21  Expected date:   Expected time:   Means of arrival:   Comments:     Jessi Rodriguez RN  02/05/22 0700

## 2022-02-05 NOTE — ED NOTES
Pt uses [employer] for transportation to and from Dialysis. Attempting to call and set up transportation.       Joanne Edgar RN  02/05/22 8179

## 2022-02-07 ENCOUNTER — TELEPHONE (OUTPATIENT)
Dept: OTHER | Facility: CLINIC | Age: 37
End: 2022-02-07

## 2022-02-07 ENCOUNTER — CARE COORDINATION (OUTPATIENT)
Dept: CASE MANAGEMENT | Age: 37
End: 2022-02-07

## 2022-02-07 NOTE — CARE COORDINATION
COVID-19 Follow Up Call    Challenges to be reviewed by the provider   Additional needs identified to be addressed with provider: No  none                 Encounter was not routed to provider for escalation. Method of communication with provider:  none. Contacted the patient by telephone to follow up after hospital visit. Status: improved. CTN noted pt had presented to ED over the weekend on 2/5/22 with c/o missing his HD session, LUE swelling, and c/o CP. Spoke to pt today in f/u. Pt reports that he is feeling \"pretty good\" today. Pt denies any further CP. Pt denies sob, cough, wheezing, or chest tightness/congestion. Pt had an US LUE on 2/5/22 which was negative for DVT. Pt states that he is attending his HD sessions and next session will be on Wed this week. Pt is provided rides by his insurance through [employer]. Pt aware of f/u appt scheduled with his pcp on 2/10/22. Pt voiced no needs/concerns today. Pt was agreeable for continued outreaches from this CTN. Interventions to address identified needs: Scheduled appointment with PCP-pt is scheduled for a f/u appt with his pcp on 2/10/22    [facility] follow up appointment(s):   Future Appointments   Date Time Provider Jean Joshi   2/10/2022  2:30 PM [address], DO [facility]   7/20/2022 10:00 AM Mauri So MD [facility]       Follow up appointment completed? No.    Provided contact information for future needs. Plan for follow-up call in 7-10 days based on severity of symptoms and risk factors. Plan for next call: symptom management-any worrisome s/sx of worsening in condition? any return CP?  follow up appointment-Did pt attend HFU appt?     Oscar Mac RN

## 2022-02-14 ENCOUNTER — NURSE TRIAGE (OUTPATIENT)
Dept: OTHER | Facility: CLINIC | Age: 37
End: 2022-02-14

## 2022-02-14 NOTE — TELEPHONE ENCOUNTER
Received call from [place] at [facility] with [employer]. Subjective: Caller states on 2/5/22 seen in ER,missed ER F/U appointment with PCP due to conflict with dialysis treatment. Reports being short of breath on exertion. States + covid 2/5/22    Current Symptoms: Short of breath with exertion, had stop walking yesterday to catch his breath. Onset: 5 days ago    Associated Symptoms: NA    Pain Severity: Denies    Temperature: Sweating at times,did not take    What has been tried: Inhaler     Recommended disposition: See PCP within next 4 hours. UCC/ER if no appt. available    Care advice provided, patient verbalizes understanding; denies any other questions or concerns; instructed to call back for any new or worsening symptoms. Writer provided warm transfer to  at [facility] for appointment scheduling     Attention Provider: Thank you for allowing me to participate in the care of your patient. The patient was connected to triage in response to information provided to the ECC/PSC. Please do not respond through this encounter as the response is not directed to a shared pool.     Reason for Disposition   MILD difficulty breathing (e.g., minimal/no SOB at rest, SOB with walking, pulse < 100) of new-onset or worse than normal    Protocols used: BREATHING DIFFICULTY-ADULT-OH

## 2022-02-17 ENCOUNTER — CARE COORDINATION (OUTPATIENT)
Dept: CASE MANAGEMENT | Age: 37
End: 2022-02-17

## 2022-02-17 NOTE — CARE COORDINATION
[address] Transitions Follow Up Call    2022    Patient: Crissy Paul  Patient : 1985   MRN: [id number]  Reason for Admission: Acute resp failure with hypoxia  Discharge Date: 22 RARS: Readmission Risk Score: 25.9 ( )        Attempted to reach the patient for sub COVID Monitoring Care Transition call post hospital discharge. Message left with CTN's contact information requesting return phone call. Will attempt outreach again.      Follow Up  Future Appointments   Date Time Provider Jean Joshi   3/1/2022 10:00 AM Chris Ferris MD [facility]   2022 10:00 AM Eugenio Licea MD [facility]       Meena Morataya RN

## 2022-02-22 ENCOUNTER — CARE COORDINATION (OUTPATIENT)
Dept: CASE MANAGEMENT | Age: 37
End: 2022-02-22

## 2022-02-22 NOTE — CARE COORDINATION
[address] Transitions Follow Up Call    2022    Patient: Andre Hartley  Patient : 1985   MRN: [id number]  Reason for Admission: acute resp failure with hypoxia  Discharge Date: 22 RARS: Readmission Risk Score: 25.9 ( )       Patient resolved from the Care Transitions episode on 22    Patient currently reports that the following symptoms have improved:  shortness of breath, fast heart rate and no new/worsening symptoms. Spoke with pt today who states that he has been doing \"pretty good. \" Pt reports to ongoing sob with increased exertion, such as walking up steps. Pt states that if he overexerts himself he will become mildly sob and feels as if his heart his racing. Pt states that this has been ongoing since he had COVID. Pt states that he tries not to overexert himself to avoid this. Pt denies any wheezing, cough, chest pain/chest tightness, or dizziness/lightheadedness. Pt states that he plans on discussing this with his pcp on his f/u on 3/1/22. Pt reports to improvement in his L arm as far as his recent c/o swelling. Pt states that the swelling has come down considerably and does not feel as if this is an issue anymore. Pt is aware of his f/u with his pcp on 3/1/22 and has transportation to the appt that is provided by his insurance. Pt did not voice any needs/concerns. Pt was agreeable to today being the final outreach from this CTN. CTN to sign off today. No further outreach scheduled with this CTN/ACM. Episode of Care resolved. Patient has this CTN/ACM contact information if future needs arise.

## 2022-03-01 ENCOUNTER — OFFICE VISIT (OUTPATIENT)
Dept: INTERNAL MEDICINE | Age: 37
End: 2022-03-01
Payer: MEDICAID

## 2022-03-01 VITALS
RESPIRATION RATE: 16 BRPM | TEMPERATURE: 98.4 F | DIASTOLIC BLOOD PRESSURE: 100 MMHG | HEART RATE: 82 BPM | SYSTOLIC BLOOD PRESSURE: 156 MMHG | HEIGHT: 66 IN | BODY MASS INDEX: 18.37 KG/M2 | WEIGHT: 114.3 LBS

## 2022-03-01 DIAGNOSIS — E43 SEVERE PROTEIN-CALORIE MALNUTRITION (HCC): Chronic | ICD-10-CM

## 2022-03-01 DIAGNOSIS — I50.22 CHRONIC SYSTOLIC CONGESTIVE HEART FAILURE (HCC): ICD-10-CM

## 2022-03-01 DIAGNOSIS — N18.6 STAGE 5 CHRONIC KIDNEY DISEASE ON CHRONIC DIALYSIS (HCC): ICD-10-CM

## 2022-03-01 DIAGNOSIS — I77.0 AVF (ARTERIOVENOUS FISTULA) (HCC): Chronic | ICD-10-CM

## 2022-03-01 DIAGNOSIS — N25.81 SECONDARY HYPERPARATHYROIDISM (HCC): ICD-10-CM

## 2022-03-01 DIAGNOSIS — D63.8 ANEMIA, CHRONIC DISEASE: ICD-10-CM

## 2022-03-01 DIAGNOSIS — I10 ESSENTIAL HYPERTENSION: Primary | ICD-10-CM

## 2022-03-01 DIAGNOSIS — Z99.2 STAGE 5 CHRONIC KIDNEY DISEASE ON CHRONIC DIALYSIS (HCC): ICD-10-CM

## 2022-03-01 DIAGNOSIS — Z91.199 NONCOMPLIANCE: ICD-10-CM

## 2022-03-01 PROBLEM — I50.32 CHRONIC DIASTOLIC (CONGESTIVE) HEART FAILURE (HCC): Status: RESOLVED | Noted: 2021-07-14 | Resolved: 2022-03-01

## 2022-03-01 PROCEDURE — G8427 DOCREV CUR MEDS BY ELIG CLIN: HCPCS | Performed by: INTERNAL MEDICINE

## 2022-03-01 PROCEDURE — 1036F TOBACCO NON-USER: CPT | Performed by: INTERNAL MEDICINE

## 2022-03-01 PROCEDURE — 99212 OFFICE O/P EST SF 10 MIN: CPT | Performed by: INTERNAL MEDICINE

## 2022-03-01 PROCEDURE — 99213 OFFICE O/P EST LOW 20 MIN: CPT | Performed by: INTERNAL MEDICINE

## 2022-03-01 PROCEDURE — G8484 FLU IMMUNIZE NO ADMIN: HCPCS | Performed by: INTERNAL MEDICINE

## 2022-03-01 PROCEDURE — G8419 CALC BMI OUT NRM PARAM NOF/U: HCPCS | Performed by: INTERNAL MEDICINE

## 2022-03-01 RX ORDER — DIPHENHYDRAMINE HCL 25 MG
25 TABLET ORAL 2 TIMES DAILY PRN
Qty: 30 TABLET | Refills: 2 | Status: SHIPPED | OUTPATIENT
Start: 2022-03-01

## 2022-03-01 RX ORDER — HYDRALAZINE HYDROCHLORIDE 50 MG/1
50 TABLET, FILM COATED ORAL 3 TIMES DAILY
Qty: 90 TABLET | Refills: 3 | Status: SHIPPED | OUTPATIENT
Start: 2022-03-01

## 2022-03-01 RX ORDER — LOSARTAN POTASSIUM 100 MG/1
100 TABLET ORAL EVERY EVENING
Qty: 30 TABLET | Refills: 3 | Status: ON HOLD
Start: 2022-03-01 | End: 2022-04-13 | Stop reason: HOSPADM

## 2022-03-01 RX ORDER — LABETALOL 300 MG/1
300 TABLET, FILM COATED ORAL 2 TIMES DAILY
Qty: 60 TABLET | Refills: 3 | Status: SHIPPED | OUTPATIENT
Start: 2022-03-01

## 2022-03-01 RX ORDER — CLONIDINE HYDROCHLORIDE 0.3 MG/1
0.3 TABLET ORAL 3 TIMES DAILY
Qty: 90 TABLET | Refills: 3 | Status: SHIPPED | OUTPATIENT
Start: 2022-03-01

## 2022-03-01 ASSESSMENT — ENCOUNTER SYMPTOMS
SORE THROAT: 0
VOMITING: 0
ALLERGIC/IMMUNOLOGIC NEGATIVE: 1
SHORTNESS OF BREATH: 1
SINUS PAIN: 0
CHEST TIGHTNESS: 0
EYES NEGATIVE: 1
SINUS PRESSURE: 0
DIARRHEA: 0
NAUSEA: 0
BLOOD IN STOOL: 0
RHINORRHEA: 0
WHEEZING: 0
TROUBLE SWALLOWING: 0
COUGH: 0
ABDOMINAL PAIN: 0

## 2022-03-01 NOTE — PROGRESS NOTES
[address]  InternalMedicine Residency Program  ACC Note      SUBJECTIVE:  CC: had concerns including Check-Up. HPI:Abdelrahman Bhatia presented to the [facility] for a routine visit.      Resistant hypertension  On 5 medication combination, nifedipine, hydralazine, clonidine, labetalol, losartan  BP on this visit is 156/100, moderately elevated, patient is not aware that he is taking losartan,   information reinforced    ESRD on HD,  Seen by nephrologist, on HD Monday Wednesday Friday, causes of ESRD unknown, considering amyloidosis versus PCKD in the setting of history of brain aneurysm  Patient compliant    Chronic heart failure with preserved ejection fraction  most recent echo done 6 months ago ejection fraction 65%,  At one point ejection fraction is low down to 45%  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  History of negative fat pad biopsy  TTE demonstrated possibility of infiltrative disease of the heart  If concern for amyloidosis, consider serum electrophoresis, repeat biopsy of the tissue, and serum and urine kappa Lambda light chain    Hx of pneumonia  At one point had a bronc and BAL sample shows Aspergillus Vanna dubliniensis  Symptom was dyspnea on exertion  Physical examination showed crackles in the right lower lung  Was properly treated per ID  Also has a history of COVID-19 infection  Currently lung auscultation is clear, no crackles rales rhonchi's,, no wheezing, reports slight dyspnea, particularly on 1-2 flights of stairs climbing  On albuterol and ipratropium inhaler, reporting working well    Hyperparathyroidism, secondary to renal causes    Elevated TSH likely secondary to renal causes    History of pericardial effusion    HCM  Patient is faxed for COVID-19 clinic for revaccination, but still got COVID-19 infection post vaccination  Patient is vaccinated for hepatitis B in the dialysis center per patient report    Review of Systems   Constitutional: Positive for fatigue (mild). Negative for activity change, appetite change, chills, fever and unexpected weight change. HENT: Negative for congestion, ear discharge, ear pain, rhinorrhea, sinus pressure, sinus pain, sore throat and trouble swallowing. Eyes: Negative. Respiratory: Positive for shortness of breath (mild). Negative for cough, chest tightness and wheezing. Cardiovascular: Negative for chest pain, palpitations and leg swelling. Gastrointestinal: Negative for abdominal pain, blood in stool, diarrhea, nausea and vomiting. Endocrine: Negative for cold intolerance and heat intolerance. Genitourinary: Negative. Musculoskeletal: Negative for arthralgias, gait problem, joint swelling and myalgias. Skin: Negative. Allergic/Immunologic: Negative. Neurological: Negative for dizziness, tremors, syncope, speech difficulty, weakness, light-headedness and numbness. Hematological: Does not bruise/bleed easily. Psychiatric/Behavioral: Negative.         Outpatient Medications Marked as Taking for the 3/1/22 encounter (Office Visit) with Neal Szymanski MD   Medication Sig Dispense Refill    hydrALAZINE (APRESOLINE) 50 MG tablet Take 1 tablet by mouth 3 times daily 90 tablet 3    labetalol (NORMODYNE) 300 MG tablet Take 1 tablet by mouth 2 times daily 60 tablet 3    diphenhydrAMINE (BENADRYL) 25 MG tablet Take 1 tablet by mouth 2 times daily as needed for Itching, Allergies or Sleep 30 tablet 2    cloNIDine (CATAPRES) 0.3 MG tablet Take 1 tablet by mouth 3 times daily 90 tablet 3    losartan (COZAAR) 100 MG tablet Take 1 tablet by mouth every evening 30 tablet 3    albuterol-ipratropium (COMBIVENT RESPIMAT)  MCG/ACT AERS inhaler Inhale 1 puff into the lungs every 6 hours 1 each 3    vitamin D (ERGOCALCIFEROL) 1.25 MG (81358 UT) CAPS capsule Take 1 capsule by mouth once a week for 11 doses 5 capsule 0    NIFEdipine (ADALAT CC) 60 MG extended release tablet Take 60 mg by mouth daily       Melatonin 5 MG CAPS Take 5 mg by mouth nightly as needed       Methoxy PEG-Epoetin Beta (MIRCERA IJ) Infuse 60 mcg intravenously every 14 days       megestrol (MEGACE) 20 MG tablet Take 20 mg by mouth 3 times daily       B Complex-C-Folic Acid (NEPHRO-JOHNNY) 0.8 MG TABS Take 1 tablet by mouth daily       sevelamer (RENVELA) 800 MG tablet Take 2 tablets by mouth 3 times daily          I have reviewed all pertinent PMHx, PSHx, FamHx, SocialHx, medications, and allergiesand updated history as appropriate. OBJECTIVE:    VS: BP (!) 156/100   Pulse 82   Temp 98.4 °F (36.9 °C) (Temporal)   Resp 16   Ht 5' 6\" (1.676 m)   Wt 114 lb 4.8 oz (51.8 kg)   BMI 18.45 kg/m²   Physical Exam  Vitals reviewed. Constitutional:       Appearance: Normal appearance. HENT:      Head: Normocephalic and atraumatic. Right Ear: Ear canal and external ear normal.      Left Ear: Ear canal and external ear normal.      Nose: Nose normal.      Mouth/Throat:      Mouth: Mucous membranes are moist.   Eyes:      Extraocular Movements: Extraocular movements intact. Conjunctiva/sclera: Conjunctivae normal.      Pupils: Pupils are equal, round, and reactive to light. Cardiovascular:      Rate and Rhythm: Normal rate and regular rhythm. Pulses: Normal pulses. Heart sounds: Normal heart sounds. No murmur heard. Pulmonary:      Effort: Pulmonary effort is normal. No respiratory distress. Breath sounds: Normal breath sounds. No wheezing, rhonchi or rales. Chest:      Chest wall: No tenderness. Abdominal:      General: Abdomen is flat. Bowel sounds are normal. There is no distension. Tenderness: There is no abdominal tenderness. There is no guarding. Genitourinary:     Comments: anuric  Musculoskeletal:         General: No swelling or tenderness. Normal range of motion. Cervical back: Normal range of motion and neck supple. Skin:     General: Skin is warm.       Capillary Refill: Capillary refill takes less than 2 seconds. Neurological:      General: No focal deficit present. Mental Status: He is alert and oriented to person, place, and time. Motor: No weakness. Psychiatric:         Mood and Affect: Mood normal.         Behavior: Behavior normal.         Assessment/PLAN:  1. Essential hypertension  2. AVF (arteriovenous fistula) ([facility] [address]  3. Secondary hyperparathyroidism ([facility]  4. Stage 5 chronic kidney disease on chronic dialysis ([facility] [address]  5. Noncompliance  6. Severe protein-calorie malnutrition ([facility] [address]  7.  Chronic systolic congestive heart failure (HCC)    Plan  Continue current 5 medication combination of antihypertensive meds  Patient is not aware of losartan, patient made aware of this visit and information reinforced  Continued ESRD HD and sevelamer vit D per nephrologist  Continue inhaler  Continue Megace appetite is reasonable    RTC: 3 months    I have reviewed my findings and recommendations with Andrews Galarza and Dr Laura Patel MD PGY-1   3/1/2022 11:13 AM

## 2022-03-01 NOTE — PROGRESS NOTES
[facility] [address]  Internal Medicine Residency Clinic    Attending Physician Statement  I have discussed the case, including pertinent history and exam findings with the resident physician. I agree with the assessment, plan and orders as documented by the resident. I have reviewed all pertinent PMHx, PSHx, FamHx, SocialHx, medications, and allergies and updated history as appropriate. Patient presents for routine follow up of medical problems. ESRD on dialysis  Resistant hypertension  Probable amyloidosis  Recent COVID pneumonia in January  Doing OK, compliant to dialysis, euvolemic state except mild SOB with exertion. Pt has not been on losartan and BP is elevated today, recommended to resume losartan. Remainder of medical problems as per resident note.     Christina Castillo MD  3/1/2022 10:47 AM

## 2022-04-09 ENCOUNTER — APPOINTMENT (OUTPATIENT)
Dept: CT IMAGING | Age: 37
DRG: 425 | End: 2022-04-09
Payer: MEDICAID

## 2022-04-09 ENCOUNTER — HOSPITAL ENCOUNTER (INPATIENT)
Age: 37
LOS: 4 days | Discharge: HOME | DRG: 425 | End: 2022-04-13
Attending: EMERGENCY MEDICINE | Admitting: INTERNAL MEDICINE
Payer: MEDICAID

## 2022-04-09 ENCOUNTER — APPOINTMENT (OUTPATIENT)
Dept: GENERAL RADIOLOGY | Age: 37
DRG: 425 | End: 2022-04-09
Payer: MEDICAID

## 2022-04-09 DIAGNOSIS — I50.22 CHRONIC SYSTOLIC CONGESTIVE HEART FAILURE (HCC): ICD-10-CM

## 2022-04-09 DIAGNOSIS — R07.89 ATYPICAL CHEST PAIN: Primary | ICD-10-CM

## 2022-04-09 DIAGNOSIS — J96.01 ACUTE RESPIRATORY FAILURE WITH HYPOXIA (HCC): ICD-10-CM

## 2022-04-09 DIAGNOSIS — N18.6 ESRD (END STAGE RENAL DISEASE) (HCC): ICD-10-CM

## 2022-04-09 DIAGNOSIS — J81.0 ACUTE PULMONARY EDEMA (HCC): ICD-10-CM

## 2022-04-09 DIAGNOSIS — E87.70 HYPERVOLEMIA, UNSPECIFIED HYPERVOLEMIA TYPE: ICD-10-CM

## 2022-04-09 LAB
ALBUMIN SERPL-MCNC: 4.9 G/DL (ref 3.5–5.2)
ALP BLD-CCNC: 98 U/L (ref 40–129)
ALT SERPL-CCNC: 20 U/L (ref 0–40)
ANION GAP SERPL CALCULATED.3IONS-SCNC: 17 MMOL/L (ref 7–16)
AST SERPL-CCNC: 46 U/L (ref 0–39)
BASOPHILS ABSOLUTE: 0.06 E9/L (ref 0–0.2)
BASOPHILS RELATIVE PERCENT: 1 % (ref 0–2)
BILIRUB SERPL-MCNC: 0.7 MG/DL (ref 0–1.2)
BUN BLDV-MCNC: 26 MG/DL (ref 6–20)
CALCIUM SERPL-MCNC: 9.9 MG/DL (ref 8.6–10.2)
CHLORIDE BLD-SCNC: 97 MMOL/L (ref 98–107)
CO2: 28 MMOL/L (ref 22–29)
CREAT SERPL-MCNC: 6.1 MG/DL (ref 0.7–1.2)
EOSINOPHILS ABSOLUTE: 0.3 E9/L (ref 0.05–0.5)
EOSINOPHILS RELATIVE PERCENT: 4.8 % (ref 0–6)
GFR AFRICAN AMERICAN: 13
GFR NON-AFRICAN AMERICAN: 13 ML/MIN/1.73
GLUCOSE BLD-MCNC: 91 MG/DL (ref 74–99)
HCT VFR BLD CALC: 35.1 % (ref 37–54)
HEMOGLOBIN: 10.9 G/DL (ref 12.5–16.5)
IMMATURE GRANULOCYTES #: 0.03 E9/L
IMMATURE GRANULOCYTES %: 0.5 % (ref 0–5)
LYMPHOCYTES ABSOLUTE: 1.26 E9/L (ref 1.5–4)
LYMPHOCYTES RELATIVE PERCENT: 20.1 % (ref 20–42)
MCH RBC QN AUTO: 31.6 PG (ref 26–35)
MCHC RBC AUTO-ENTMCNC: 31.1 % (ref 32–34.5)
MCV RBC AUTO: 101.7 FL (ref 80–99.9)
MONOCYTES ABSOLUTE: 0.57 E9/L (ref 0.1–0.95)
MONOCYTES RELATIVE PERCENT: 9.1 % (ref 2–12)
NEUTROPHILS ABSOLUTE: 4.05 E9/L (ref 1.8–7.3)
NEUTROPHILS RELATIVE PERCENT: 64.5 % (ref 43–80)
PDW BLD-RTO: 17.7 FL (ref 11.5–15)
PLATELET # BLD: 165 E9/L (ref 130–450)
PMV BLD AUTO: 9.8 FL (ref 7–12)
POTASSIUM REFLEX MAGNESIUM: 4.8 MMOL/L (ref 3.5–5)
PRO-BNP: ABNORMAL PG/ML (ref 0–125)
RBC # BLD: 3.45 E12/L (ref 3.8–5.8)
REASON FOR REJECTION: NORMAL
REASON FOR REJECTION: NORMAL
REJECTED TEST: NORMAL
REJECTED TEST: NORMAL
SODIUM BLD-SCNC: 142 MMOL/L (ref 132–146)
TOTAL PROTEIN: 7.8 G/DL (ref 6.4–8.3)
TROPONIN, HIGH SENSITIVITY: 83 NG/L (ref 0–11)
TROPONIN, HIGH SENSITIVITY: 84 NG/L (ref 0–11)
WBC # BLD: 6.3 E9/L (ref 4.5–11.5)

## 2022-04-09 PROCEDURE — 84484 ASSAY OF TROPONIN QUANT: CPT

## 2022-04-09 PROCEDURE — 99285 EMERGENCY DEPT VISIT HI MDM: CPT

## 2022-04-09 PROCEDURE — 80053 COMPREHEN METABOLIC PANEL: CPT

## 2022-04-09 PROCEDURE — 6370000000 HC RX 637 (ALT 250 FOR IP): Performed by: EMERGENCY MEDICINE

## 2022-04-09 PROCEDURE — 93005 ELECTROCARDIOGRAM TRACING: CPT | Performed by: STUDENT IN AN ORGANIZED HEALTH CARE EDUCATION/TRAINING PROGRAM

## 2022-04-09 PROCEDURE — 1200000000 HC SEMI PRIVATE

## 2022-04-09 PROCEDURE — 71275 CT ANGIOGRAPHY CHEST: CPT

## 2022-04-09 PROCEDURE — 36415 COLL VENOUS BLD VENIPUNCTURE: CPT

## 2022-04-09 PROCEDURE — 83880 ASSAY OF NATRIURETIC PEPTIDE: CPT

## 2022-04-09 PROCEDURE — 85025 COMPLETE CBC W/AUTO DIFF WBC: CPT

## 2022-04-09 PROCEDURE — 6360000004 HC RX CONTRAST MEDICATION: Performed by: RADIOLOGY

## 2022-04-09 PROCEDURE — 96376 TX/PRO/DX INJ SAME DRUG ADON: CPT

## 2022-04-09 PROCEDURE — 96375 TX/PRO/DX INJ NEW DRUG ADDON: CPT

## 2022-04-09 PROCEDURE — 96374 THER/PROPH/DIAG INJ IV PUSH: CPT

## 2022-04-09 PROCEDURE — 71045 X-RAY EXAM CHEST 1 VIEW: CPT

## 2022-04-09 PROCEDURE — 6360000002 HC RX W HCPCS: Performed by: STUDENT IN AN ORGANIZED HEALTH CARE EDUCATION/TRAINING PROGRAM

## 2022-04-09 PROCEDURE — 2580000003 HC RX 258: Performed by: RADIOLOGY

## 2022-04-09 RX ORDER — ONDANSETRON 2 MG/ML
4 INJECTION INTRAMUSCULAR; INTRAVENOUS ONCE
Status: COMPLETED | OUTPATIENT
Start: 2022-04-09 | End: 2022-04-09

## 2022-04-09 RX ORDER — HYDRALAZINE HYDROCHLORIDE 25 MG/1
50 TABLET, FILM COATED ORAL ONCE
Status: COMPLETED | OUTPATIENT
Start: 2022-04-09 | End: 2022-04-09

## 2022-04-09 RX ORDER — NIFEDIPINE 30 MG/1
90 TABLET, EXTENDED RELEASE ORAL ONCE
Status: COMPLETED | OUTPATIENT
Start: 2022-04-09 | End: 2022-04-09

## 2022-04-09 RX ORDER — DIPHENHYDRAMINE HYDROCHLORIDE 50 MG/ML
25 INJECTION INTRAMUSCULAR; INTRAVENOUS ONCE
Status: COMPLETED | OUTPATIENT
Start: 2022-04-09 | End: 2022-04-09

## 2022-04-09 RX ORDER — LOSARTAN POTASSIUM 25 MG/1
100 TABLET ORAL ONCE
Status: COMPLETED | OUTPATIENT
Start: 2022-04-09 | End: 2022-04-09

## 2022-04-09 RX ORDER — CLONIDINE HYDROCHLORIDE 0.1 MG/1
0.3 TABLET ORAL ONCE
Status: COMPLETED | OUTPATIENT
Start: 2022-04-09 | End: 2022-04-09

## 2022-04-09 RX ORDER — LABETALOL 100 MG/1
300 TABLET, FILM COATED ORAL ONCE
Status: COMPLETED | OUTPATIENT
Start: 2022-04-09 | End: 2022-04-09

## 2022-04-09 RX ORDER — LIDOCAINE AND PRILOCAINE 25; 25 MG/G; MG/G
CREAM TOPICAL PRN
COMMUNITY

## 2022-04-09 RX ORDER — SODIUM CHLORIDE 0.9 % (FLUSH) 0.9 %
10 SYRINGE (ML) INJECTION PRN
Status: DISCONTINUED | OUTPATIENT
Start: 2022-04-09 | End: 2022-04-13 | Stop reason: HOSPADM

## 2022-04-09 RX ADMIN — Medication 10 ML: at 19:10

## 2022-04-09 RX ADMIN — CLONIDINE HYDROCHLORIDE 0.3 MG: 0.1 TABLET ORAL at 19:39

## 2022-04-09 RX ADMIN — LOSARTAN POTASSIUM 100 MG: 25 TABLET, FILM COATED ORAL at 19:39

## 2022-04-09 RX ADMIN — LABETALOL HYDROCHLORIDE 300 MG: 100 TABLET, FILM COATED ORAL at 19:39

## 2022-04-09 RX ADMIN — ONDANSETRON 4 MG: 2 INJECTION INTRAMUSCULAR; INTRAVENOUS at 19:01

## 2022-04-09 RX ADMIN — DIPHENHYDRAMINE HYDROCHLORIDE 25 MG: 50 INJECTION, SOLUTION INTRAMUSCULAR; INTRAVENOUS at 19:01

## 2022-04-09 RX ADMIN — IOPAMIDOL 65 ML: 755 INJECTION, SOLUTION INTRAVENOUS at 19:10

## 2022-04-09 RX ADMIN — ONDANSETRON 4 MG: 2 INJECTION INTRAMUSCULAR; INTRAVENOUS at 11:38

## 2022-04-09 RX ADMIN — DIPHENHYDRAMINE HYDROCHLORIDE 25 MG: 50 INJECTION, SOLUTION INTRAMUSCULAR; INTRAVENOUS at 11:38

## 2022-04-09 RX ADMIN — NIFEDIPINE 90 MG: 30 TABLET, EXTENDED RELEASE ORAL at 20:09

## 2022-04-09 RX ADMIN — HYDRALAZINE HYDROCHLORIDE 50 MG: 25 TABLET, FILM COATED ORAL at 19:39

## 2022-04-09 ASSESSMENT — ENCOUNTER SYMPTOMS
WHEEZING: 0
SHORTNESS OF BREATH: 1
APNEA: 0
ABDOMINAL DISTENTION: 0
ABDOMINAL PAIN: 0
CHEST TIGHTNESS: 0
DIARRHEA: 0
COLOR CHANGE: 0
BLOOD IN STOOL: 0
EYE REDNESS: 0
NAUSEA: 0
COUGH: 0
VOMITING: 0
SORE THROAT: 0
SINUS PRESSURE: 0
BACK PAIN: 0

## 2022-04-09 ASSESSMENT — PAIN - FUNCTIONAL ASSESSMENT: PAIN_FUNCTIONAL_ASSESSMENT: 0-10

## 2022-04-09 ASSESSMENT — PAIN SCALES - GENERAL: PAINLEVEL_OUTOF10: 9

## 2022-04-09 NOTE — Clinical Note
Discharge Plan[de-identified] Other/[facility])   Telemetry/Cardiac Monitoring Required?: Yes

## 2022-04-09 NOTE — ED PROVIDER NOTES
ATTENDING PROVIDER ATTESTATION:     Zackary Sands presented to the emergency department for evaluation of Chest Pain (L chest pain x2 days, present when talking and it is reproducible to palpation. No pleuritic aggravation, no radiation. Pt reports SOB and nausea (no emesis). No diaphoresis. Given 2 ntg w mild relief. )   and was initially evaluated by the Medical Resident. See Original ED Note for H&P and ED course above. I have reviewed and discussed the case, including pertinent history (medical, surgical, family and social) and exam findings with the Medical Resident assigned to Zackary Wicho. I have personally performed and/or participated in the history, exam, medical decision making, and procedures and agree with all pertinent clinical information. I, Dr. Lawyer Barbara MD, am the primary provider of this record. I have reviewed my findings and recommendations with the assigned Medical Resident, Zackary Sands and members of family present at the time of disposition. My findings/plan: The primary encounter diagnosis was Atypical chest pain. Diagnoses of ESRD (end stage renal disease) ([address] Acute respiratory failure with hypoxia ([address] and Acute pulmonary edema ([address] were also pertinent to this visit. New Prescriptions    No medications on file     Lawyer Barbara MD      HPI     Patient is a 43 y.o. male presents with a chief complaint of chest pain  This has been occurring for 2 days. Patient states that it gets better with nothing. Patient states that it gets worse with nothing. Patient states that it is moderate in severity. Patient states it was acute in onset. Patient is a 49-year-old male with history of ESRD, HFpEF on the emergency room with chest pain and shortness of breath. Patient states that his chest pain started yesterday while watching TV. Patient stated he attended dialysis with a chest pain and noted minimal improvement after dialysis.   Patient states he was given multiple rounds of clonidine during dialysis yesterday for blood pressure control. Patient notes that he is also short of breath and nauseous. Patient states there is no radiation of the chest pain. Currently patient is saturating 90% on room air. Patient also complains of itchiness. States that it is associated with his dialysis treatments. Review of Systems   Constitutional: Negative for activity change, appetite change, fatigue and fever. HENT: Negative for congestion, hearing loss, nosebleeds, sinus pressure, sneezing and sore throat. Eyes: Negative for redness and visual disturbance. Respiratory: Positive for shortness of breath. Negative for apnea, cough, chest tightness and wheezing. Cardiovascular: Positive for chest pain. Negative for palpitations and leg swelling. Gastrointestinal: Negative for abdominal distention, abdominal pain, blood in stool, diarrhea, nausea and vomiting. Endocrine: Negative for cold intolerance and heat intolerance. Genitourinary:        Patient ESRD on dialysis and does not produce uring   Musculoskeletal: Negative for arthralgias, back pain, myalgias and neck stiffness. Skin: Negative for color change and pallor. Patient was noted left arm   Neurological: Negative for dizziness, facial asymmetry, light-headedness and headaches. Psychiatric/Behavioral: Negative for agitation, behavioral problems and confusion. All other systems reviewed and are negative. Physical Exam  Vitals and nursing note reviewed. Constitutional:       General: He is not in acute distress. Appearance: He is well-developed. He is not diaphoretic. HENT:      Head: Normocephalic and atraumatic. Nose: No congestion. Mouth/Throat:      Mouth: Mucous membranes are moist.   Eyes:      General: No scleral icterus. Pupils: Pupils are equal, round, and reactive to light. Neck:      Thyroid: No thyromegaly. Vascular: No JVD.       Trachea: No tracheal deviation. Cardiovascular:      Rate and Rhythm: Normal rate and regular rhythm. Heart sounds: Normal heart sounds. No murmur heard. No friction rub. No gallop. Pulmonary:      Effort: Pulmonary effort is normal. No respiratory distress. Breath sounds: Normal breath sounds. No wheezing or rales. Chest:      Chest wall: Tenderness present. Abdominal:      General: Bowel sounds are normal. There is no distension. Palpations: Abdomen is soft. There is no mass. Tenderness: There is no abdominal tenderness. There is no guarding or rebound. Musculoskeletal:         General: Normal range of motion. Cervical back: No rigidity or tenderness. Skin:     General: Skin is warm and dry. Capillary Refill: Capillary refill takes less than 2 seconds. Coloration: Skin is not pale. Findings: No rash. Comments: Fistula left arm   Neurological:      Mental Status: He is alert and oriented to person, place, and time. Cranial Nerves: No cranial nerve deficit. Psychiatric:         Behavior: Behavior normal.         Thought Content: Thought content normal.         Judgment: Judgment normal.          Procedures     MDM     ED Course as of 04/10/22 1027   Sat Apr 09, 2022   1113 I reviewed the patient's chart. ECHO 9/9/21:   Mild concentric left ventricular hypertrophy. Mildly dilated left ventricle. Ejection fraction is visually estimated at 45 to 50%. The left atrium is mild-moderately dilated. Normal right ventricular size and function. Mildly enlarged right atrium size. Moderate mitral regurgitation is present. Moderate tricuspid regurgitation. [JA]   1121 EKG: This EKG is signed and interpreted by me.     Rate: 96  Rhythm: Sinus  Interpretation: Normal sinus rhythm, normal FL interval, normal QRS, normal QT interval, no acute ST or T wave changes  Comparison: stable as compared to patient's most recent EKG     [JA]   1342 Patient is a 25-year-old male presenting with a 2 days history of sharp left sided chest pain that radiates across his chest, intermittent, better with nitro which was given to him in HD yesterday. Pain is worse with talking, unrelated to exertion. He reports associated shortness of breath. No fevers or cough. No prior history of CAD. He denies history of DVT/LE. He is not currently anticoagulated. No recent travel/immobilization or leg swelling/calf pain. []   4157 Patient had negative cardiac stress in 9/2021 [JA]   [id number] I reviewed Care Everywhere. Patient had recent cardiac MRI in [place]. Patient follows with Dr. Allan Henley locally with nephrology. He is currently being evaluated for kidney transplant at [facility]. [JA]   0778 On evaluation, the patient is awake and alert with stable vital signs and no acute respiratory distress. He has clear lung sounds. He has a graft to his left upper extremity with a palpable thrill. No leg edema or calf tenderness. No abdominal tenderness [JA]   1351 Patient is PERC negative with no DVT/PE risk factors. I reviewed his chart and he had recent CTA chest in January which showed no evidence of PE [JA]   2000 Clinically patient has no pneumonia. No fevers, leukocytosis, or cough. [JA]   2030 Patient desatted. Dr. Siomara Watt spoke with Dr. Allan Henley who will admit the patient for dialysis. [BB]   2106 Spoke with [facility] med who will see the patient. [BB]      ED Course User Index  [BB] Genna Hammans, DO  [JA] Conard Oppenheim, MD      Patient is a 43 y.o. male presenting with chest pain. Patient had chest x-ray which shows right lower lung field pneumonia. CTA pulmonary was performed and showed cardiomegaly with pulmonary vascular congestion and findings suggestive of interstitial pulmonary edema. With a trace right pleural effusion. CMP noted a creatinine of 6.1. BNP greater than 70,000. Consult was placed to nephrology.   Patient was noted to become hypoxic during his stay in the emergency department when he was walking to the bathroom back and also noted that he became dizzy. Patient was placed on 2 L oxygen. Consult was placed to internal medicine. Plan for admission for hemodialysis. Patient's chest pain is atypical for cardiac pain. High-sensitivity troponin is stable x2 (83-->84). Troponin elevation likely due to end-stage renal disease. No ischemic changes on EKG. ED Course as of 04/10/22 1027   Sat Apr 09, 2022   1113 I reviewed the patient's chart. ECHO 9/9/21:   Mild concentric left ventricular hypertrophy. Mildly dilated left ventricle. Ejection fraction is visually estimated at 45 to 50%. The left atrium is mild-moderately dilated. Normal right ventricular size and function. Mildly enlarged right atrium size. Moderate mitral regurgitation is present. Moderate tricuspid regurgitation. [JA]   1121 EKG: This EKG is signed and interpreted by me. Rate: 96  Rhythm: Sinus  Interpretation: Normal sinus rhythm, normal SD interval, normal QRS, normal QT interval, no acute ST or T wave changes  Comparison: stable as compared to patient's most recent EKG     [JA]   1342 Patient is a 67-year-old male presenting with a 2 days history of sharp left sided chest pain that radiates across his chest, intermittent, better with nitro which was given to him in HD yesterday. Pain is worse with talking, unrelated to exertion. He reports associated shortness of breath. No fevers or cough. No prior history of CAD. He denies history of DVT/LE. He is not currently anticoagulated. No recent travel/immobilization or leg swelling/calf pain. [LR]   9304 Patient had negative cardiac stress in 9/2021 [JA]   [id number] I reviewed Care Everywhere. Patient had recent cardiac MRI in [facility]. Patient follows with Dr. Radames Jasso locally with nephrology. He is currently being evaluated for kidney transplant at [facility].  [JA]   4622 On evaluation, the patient is awake and alert with stable vital signs and no acute respiratory distress. He has clear lung sounds. He has a graft to his left upper extremity with a palpable thrill. No leg edema or calf tenderness. No abdominal tenderness [JA]   1351 Patient is PERC negative with no DVT/PE risk factors. I reviewed his chart and he had recent CTA chest in January which showed no evidence of PE [JA]   2000 Clinically patient has no pneumonia. No fevers, leukocytosis, or cough. [JA]   2030 Patient desatted. Dr. Hazel Dance spoke with Dr. Yury Liz who will admit the patient for dialysis. [BB]   2106 Spoke with [facility] med who will see the patient. [BB]      ED Course User Index  [BB] Gabino Kirkpatrick DO  [JA] Lucille Majano MD       --------------------------------------------- PAST HISTORY ---------------------------------------------  Past Medical History:  has a past medical history of (HFpEF) heart failure with preserved ejection fraction ([address] Anxiety, AVF (arteriovenous fistula) ([address] Chronic diastolic (congestive) heart failure ([address] Chronic kidney disease, Depression, Encounter regarding vascular access for dialysis for ESRD [facility]), History of ruptured Berry aneurysm ([facility] Hypertension, Hypothyroidism, Intracranial hemorrhage ([facility] Neutropenia ([address] Noncompliance w/medication treatment due to intermit use of medication, and Pre-transplant evaluation for kidney transplant. Past Surgical History:  has a past surgical history that includes Brain aneurysm surgery (Right, 3-4 years ago); Dialysis fistula creation (Left, 11 years ago); Upper gastrointestinal endoscopy (N/A, 1/3/2019); and bronchoscopy (N/A, 8/3/2021). Social History:  reports that he has never smoked. He has never used smokeless tobacco. He reports previous drug use. He reports that he does not drink alcohol. Family History: family history includes Kidney Disease in his paternal grandmother. The patients home medications have been reviewed.     Allergies: Tylenol [acetaminophen] and Levofloxacin    -------------------------------------------------- RESULTS -------------------------------------------------    LABS:  Results for orders placed or performed during the hospital encounter of 04/09/22   Respiratory Panel, Molecular, with COVID-19 (Restricted: peds pts or suitable admitted adults)    Specimen: Nasopharyngeal   Result Value Ref Range    Adenovirus by PCR Not Detected Not Detected    Bordetella parapertussis by PCR Not Detected Not Detected    Bordetella pertussis by PCR Not Detected Not Detected    Chlamydophilia pneumoniae by PCR Not Detected Not Detected    Coronavirus 229E by PCR Not Detected Not Detected    Coronavirus HKU1 by PCR Not Detected Not Detected    Coronavirus NL63 by PCR Not Detected Not Detected    Coronavirus OC43 by PCR Not Detected Not Detected    SARS-CoV-2, PCR Not Detected Not Detected    Human Metapneumovirus by PCR Not Detected Not Detected    Human Rhinovirus/Enterovirus by PCR Not Detected Not Detected    Influenza A by PCR Not Detected Not Detected    Influenza B by PCR Not Detected Not Detected    Mycoplasma pneumoniae by PCR Not Detected Not Detected    Parainfluenza Virus 1 by PCR Not Detected Not Detected    Parainfluenza Virus 2 by PCR Not Detected Not Detected    Parainfluenza Virus 3 by PCR Not Detected Not Detected    Parainfluenza Virus 4 by PCR Not Detected Not Detected    Respiratory Syncytial Virus by PCR Not Detected Not Detected   Comprehensive Metabolic Panel w/ Reflex to MG   Result Value Ref Range    Sodium 142 132 - 146 mmol/L    Potassium reflex Magnesium 4.8 3.5 - 5.0 mmol/L    Chloride 97 (L) 98 - 107 mmol/L    CO2 28 22 - 29 mmol/L    Anion Gap 17 (H) 7 - 16 mmol/L    Glucose 91 74 - 99 mg/dL    BUN 26 (H) 6 - 20 mg/dL    CREATININE 6.1 (H) 0.7 - 1.2 mg/dL    GFR Non-African American 13 >=60 mL/min/1.73    GFR African American 13     Calcium 9.9 8.6 - 10.2 mg/dL    Total Protein 7.8 6.4 - 8.3 g/dL    Albumin 4.9 3.5 - 5.2 g/dL    Total Bilirubin 0.7 0.0 - 1.2 mg/dL    Alkaline Phosphatase 98 40 - 129 U/L    ALT 20 0 - 40 U/L    AST 46 (H) 0 - 39 U/L   Brain Natriuretic Peptide   Result Value Ref Range    Pro-BNP >70,000 (H) 0 - 125 pg/mL   Troponin   Result Value Ref Range    Troponin, High Sensitivity 83 (H) 0 - 11 ng/L   SPECIMEN REJECTION   Result Value Ref Range    Rejected Test CBCWD     Reason for Rejection see below    CBC with Auto Differential   Result Value Ref Range    WBC 6.3 4.5 - 11.5 E9/L    RBC 3.45 (L) 3.80 - 5.80 E12/L    Hemoglobin 10.9 (L) 12.5 - 16.5 g/dL    Hematocrit 35.1 (L) 37.0 - 54.0 %    .7 (H) 80.0 - 99.9 fL    MCH 31.6 26.0 - 35.0 pg    MCHC 31.1 (L) 32.0 - 34.5 %    RDW 17.7 (H) 11.5 - 15.0 fL    Platelets 628 647 - 566 E9/L    MPV 9.8 7.0 - 12.0 fL    Neutrophils % 64.5 43.0 - 80.0 %    Immature Granulocytes % 0.5 0.0 - 5.0 %    Lymphocytes % 20.1 20.0 - 42.0 %    Monocytes % 9.1 2.0 - 12.0 %    Eosinophils % 4.8 0.0 - 6.0 %    Basophils % 1.0 0.0 - 2.0 %    Neutrophils Absolute 4.05 1.80 - 7.30 E9/L    Immature Granulocytes # 0.03 E9/L    Lymphocytes Absolute 1.26 (L) 1.50 - 4.00 E9/L    Monocytes Absolute 0.57 0.10 - 0.95 E9/L    Eosinophils Absolute 0.30 0.05 - 0.50 E9/L    Basophils Absolute 0.06 0.00 - 0.20 E9/L   SPECIMEN REJECTION   Result Value Ref Range    Rejected Test TRP5     Reason for Rejection see below    Troponin   Result Value Ref Range    Troponin, High Sensitivity 84 (H) 0 - 11 ng/L   CBC with Auto Differential   Result Value Ref Range    WBC 6.3 4.5 - 11.5 E9/L    RBC 3.43 (L) 3.80 - 5.80 E12/L    Hemoglobin 10.8 (L) 12.5 - 16.5 g/dL    Hematocrit 35.5 (L) 37.0 - 54.0 %    .5 (H) 80.0 - 99.9 fL    MCH 31.5 26.0 - 35.0 pg    MCHC 30.4 (L) 32.0 - 34.5 %    RDW 17.1 (H) 11.5 - 15.0 fL    Platelets 860 817 - 386 E9/L    MPV 9.4 7.0 - 12.0 fL    Neutrophils % 63.4 43.0 - 80.0 %    Immature Granulocytes % 0.5 0.0 - 5.0 %    Lymphocytes % 21.0 20.0 - 42.0 %    Monocytes Acetaminophen Level <5.0 (L) 10.0 - 15.9 mcg/mL    Salicylate, Serum <8.7 0.0 - 30.0 mg/dL    TCA Scrn NEGATIVE Cutoff:300 ng/mL   Procalcitonin   Result Value Ref Range    Procalcitonin 0.59 (H) 0.00 - 0.08 ng/mL   TSH   Result Value Ref Range    TSH 3.630 0.270 - 4.200 uIU/mL   T4, Free   Result Value Ref Range    T4 Free 1.36 0.93 - 1.70 ng/dL   Lipid panel   Result Value Ref Range    Cholesterol, Total 126 0 - 199 mg/dL    Triglycerides 104 0 - 149 mg/dL    HDL 39 >40 mg/dL    LDL Calculated 66 0 - 99 mg/dL    VLDL Cholesterol Calculated 21 mg/dL   Hemoglobin A1C   Result Value Ref Range    Hemoglobin A1C 4.1 4.0 - 5.6 %   Troponin   Result Value Ref Range    Troponin, High Sensitivity 92 (H) 0 - 11 ng/L       RADIOLOGY:  CTA PULMONARY W CONTRAST   Final Result   1. No evidence of pulmonary embolism. 2. Cardiomegaly with pulmonary vascular congestion and findings suggestive of   interstitial pulmonary edema. 3. Centrilobular nodular opacities are present bilaterally as well as more   confluent opacities in right lung base could suggest pneumonia on background   of interstitial pulmonary edema. 4. Trace right pleural effusion. 5. Enlarged pulmonary outflow tract suggesting pulmonary arterial   hypertension. 6. Stable mediastinal and bilateral axillary lymphadenopathy. XR CHEST PORTABLE   Final Result   Right lower lung field pneumonia.                 ------------------------- NURSING NOTES AND VITALS REVIEWED ---------------------------  Date / Time Roomed:  4/9/2022 11:09 AM  ED Bed Assignment:  [id number]    The nursing notes within the ED encounter and vital signs as below have been reviewed.      Patient Vitals for the past 24 hrs:   BP Temp Temp src Pulse Resp SpO2 Height Weight   04/10/22 0815 (!) 141/99 -- -- -- -- -- -- --   04/10/22 0700 (!) 146/98 -- -- 87 29 98 % -- --   04/10/22 0636 -- -- -- -- 25 100 % -- --   04/10/22 0600 (!) 143/95 -- -- 86 20 98 % -- --   04/10/22 0300 (!) 141/95 -- -- 86 16 97 % -- --   04/10/22 0230 (!) 141/92 -- -- 78 17 -- -- --   04/10/22 0200 (!) 140/92 -- -- 80 21 95 % -- --   04/10/22 0130 (!) 156/104 -- -- 77 17 -- -- --   04/10/22 0100 (!) 157/108 -- -- 79 18 97 % -- --   04/10/22 0030 (!) 179/117 -- -- 83 21 -- -- --   04/10/22 0000 (!) 152/103 -- -- 87 16 96 % -- --   04/09/22 2330 (!) 163/112 -- -- 80 23 -- -- --   04/09/22 2300 (!) 161/114 -- -- 79 17 97 % -- --   04/09/22 2230 (!) 155/105 -- -- 77 19 -- -- --   04/09/22 2200 (!) 160/109 -- -- 82 20 98 % -- --   04/09/22 2130 (!) 159/114 -- -- 81 21 -- -- --   04/09/22 2100 (!) 168/114 -- -- 84 24 99 % -- --   04/09/22 2030 (!) 171/109 -- -- 88 27 -- -- --   04/09/22 1930 (!) 168/109 -- -- -- -- -- -- --   04/09/22 1837 (!) 158/104 -- -- 82 19 -- -- --   04/09/22 1747 (!) 158/105 -- -- 83 23 -- -- --   04/09/22 1715 -- 97.8 °F (36.6 °C) Oral -- -- -- -- --   04/09/22 1500 (!) 135/94 -- -- 84 21 -- -- --   04/09/22 1330 134/85 -- -- 86 14 -- -- --   04/09/22 1106 (!) 146/93 -- -- 90 14 99 % 5' 6\" (1.676 m) 150 lb (68 kg)       Oxygen Saturation Interpretation: Normal    ------------------------------------------ PROGRESS NOTES ------------------------------------------  See ED course for reevaluation    Counseling:  I have spoken with the patient and discussed todays results, in addition to providing specific details for the plan of care and counseling regarding the diagnosis and prognosis. Their questions are answered at this time and they are agreeable with the plan of admission.    --------------------------------- ADDITIONAL PROVIDER NOTES ---------------------------------  Consultations:   Spoke with Dr. Michael Barrientos. Discussed case. He stated he would like the patient to get dialysis tomorrow.  This patient's ED course included: a personal history and physicial examination, re-evaluation prior to disposition, cardiac monitoring and continuous pulse oximetry     This patient has remained hemodynamically stable during their ED course. Diagnosis:  1. Atypical chest pain    2. ESRD (end stage renal disease) ([facility] [address]    3. Acute respiratory failure with hypoxia (HCC)    4. Acute pulmonary edema (HCC)        Disposition:  Patient's disposition: Admit to telemetry  Patient's condition is Stable      Patient was seen and evaluated by myself and my attending Bryan Sow MD. Assessment and Plan discussed with attending provider, please see attestation for final plan of care. This note was done using dictation software and there may be some grammatical errors associated with this.     Renee Lam MD, PhD       Liv Valdez DO  Resident  04/09/22 [id number]       Renee Lam MD  04/10/22 [phone]

## 2022-04-09 NOTE — ED NOTES
Radiology Procedure Waiver   Name: Dedrick Self  : 1985  MRN: [id number]    Date:  22    Time: 4:23 PM EDT    Benefits of immediately proceeding with Radiology exam(s) without pre-testing outweigh the risks or are not indicated as specified below and therefore the following is/are being waived:    [] Pregnancy test   [] Patients LMP on-time and regular.   [] Patient had Tubal Ligation or has other Contraception Device. [] Patient  is Menopausal or Premenarcheal.    [] Patient had Full or Partial Hysterectomy. [] Protocol for Iodine allergy    [] MRI Questionnaire     [x] BUN/Creatinine   [] Patient age w/no hx of renal dysfunction. [x] Patient on Dialysis. [] Recent Normal Labs.   Electronically signed by Lucille Majano MD on 22 at 4:23 PM EDT               Lucille Majano MD  [id number]

## 2022-04-09 NOTE — ED NOTES
Provider notified of CT's protocol for pt to receive dialysis within 24 hours after receiving IV contrast. Will follow up.       Katarina Skinner RN  04/09/22 3139

## 2022-04-10 ENCOUNTER — APPOINTMENT (OUTPATIENT)
Dept: GENERAL RADIOLOGY | Age: 37
DRG: 425 | End: 2022-04-10
Payer: MEDICAID

## 2022-04-10 LAB
ACETAMINOPHEN LEVEL: <5 MCG/ML (ref 10–30)
ADENOVIRUS BY PCR: NOT DETECTED
ALBUMIN SERPL-MCNC: 4 G/DL (ref 3.5–5.2)
ALBUMIN SERPL-MCNC: 4.3 G/DL (ref 3.5–5.2)
ALP BLD-CCNC: 85 U/L (ref 40–129)
ALP BLD-CCNC: 91 U/L (ref 40–129)
ALT SERPL-CCNC: 14 U/L (ref 0–40)
ALT SERPL-CCNC: 15 U/L (ref 0–40)
ANION GAP SERPL CALCULATED.3IONS-SCNC: 12 MMOL/L (ref 7–16)
ANION GAP SERPL CALCULATED.3IONS-SCNC: 14 MMOL/L (ref 7–16)
ANION GAP SERPL CALCULATED.3IONS-SCNC: 17 MMOL/L (ref 7–16)
ANION GAP SERPL CALCULATED.3IONS-SCNC: 20 MMOL/L (ref 7–16)
AST SERPL-CCNC: 20 U/L (ref 0–39)
AST SERPL-CCNC: 39 U/L (ref 0–39)
BASOPHILS ABSOLUTE: 0.06 E9/L (ref 0–0.2)
BASOPHILS ABSOLUTE: 0.06 E9/L (ref 0–0.2)
BASOPHILS RELATIVE PERCENT: 0.9 % (ref 0–2)
BASOPHILS RELATIVE PERCENT: 1 % (ref 0–2)
BILIRUB SERPL-MCNC: 0.6 MG/DL (ref 0–1.2)
BILIRUB SERPL-MCNC: 0.7 MG/DL (ref 0–1.2)
BILIRUBIN DIRECT: 0.2 MG/DL (ref 0–0.3)
BILIRUBIN DIRECT: <0.2 MG/DL (ref 0–0.3)
BILIRUBIN, INDIRECT: 0.4 MG/DL (ref 0–1)
BILIRUBIN, INDIRECT: NORMAL MG/DL (ref 0–1)
BORDETELLA PARAPERTUSSIS BY PCR: NOT DETECTED
BORDETELLA PERTUSSIS BY PCR: NOT DETECTED
BUN BLDV-MCNC: 15 MG/DL (ref 6–20)
BUN BLDV-MCNC: 16 MG/DL (ref 6–20)
BUN BLDV-MCNC: 36 MG/DL (ref 6–20)
BUN BLDV-MCNC: 37 MG/DL (ref 6–20)
CALCIUM IONIZED: 1.19 MMOL/L (ref 1.15–1.33)
CALCIUM IONIZED: 1.22 MMOL/L (ref 1.15–1.33)
CALCIUM SERPL-MCNC: 9.2 MG/DL (ref 8.6–10.2)
CALCIUM SERPL-MCNC: 9.2 MG/DL (ref 8.6–10.2)
CALCIUM SERPL-MCNC: 9.3 MG/DL (ref 8.6–10.2)
CALCIUM SERPL-MCNC: 9.4 MG/DL (ref 8.6–10.2)
CHLAMYDOPHILIA PNEUMONIAE BY PCR: NOT DETECTED
CHLORIDE BLD-SCNC: 95 MMOL/L (ref 98–107)
CHLORIDE BLD-SCNC: 97 MMOL/L (ref 98–107)
CHOLESTEROL, TOTAL: 126 MG/DL (ref 0–199)
CO2: 19 MMOL/L (ref 22–29)
CO2: 26 MMOL/L (ref 22–29)
CO2: 27 MMOL/L (ref 22–29)
CO2: 30 MMOL/L (ref 22–29)
CORONAVIRUS 229E BY PCR: NOT DETECTED
CORONAVIRUS HKU1 BY PCR: NOT DETECTED
CORONAVIRUS NL63 BY PCR: NOT DETECTED
CORONAVIRUS OC43 BY PCR: NOT DETECTED
CREAT SERPL-MCNC: 4.4 MG/DL (ref 0.7–1.2)
CREAT SERPL-MCNC: 4.7 MG/DL (ref 0.7–1.2)
CREAT SERPL-MCNC: 7.6 MG/DL (ref 0.7–1.2)
CREAT SERPL-MCNC: 8.3 MG/DL (ref 0.7–1.2)
EOSINOPHILS ABSOLUTE: 0.4 E9/L (ref 0.05–0.5)
EOSINOPHILS ABSOLUTE: 0.4 E9/L (ref 0.05–0.5)
EOSINOPHILS RELATIVE PERCENT: 6.3 % (ref 0–6)
EOSINOPHILS RELATIVE PERCENT: 6.6 % (ref 0–6)
ETHANOL: <10 MG/DL (ref 0–0.08)
FOLATE: 18 NG/ML (ref 4.8–24.2)
GFR AFRICAN AMERICAN: 10
GFR AFRICAN AMERICAN: 17
GFR AFRICAN AMERICAN: 18
GFR AFRICAN AMERICAN: 9
GFR NON-AFRICAN AMERICAN: 10 ML/MIN/1.73
GFR NON-AFRICAN AMERICAN: 17 ML/MIN/1.73
GFR NON-AFRICAN AMERICAN: 18 ML/MIN/1.73
GFR NON-AFRICAN AMERICAN: 9 ML/MIN/1.73
GLUCOSE BLD-MCNC: 120 MG/DL (ref 74–99)
GLUCOSE BLD-MCNC: 85 MG/DL (ref 74–99)
GLUCOSE BLD-MCNC: 88 MG/DL (ref 74–99)
GLUCOSE BLD-MCNC: 90 MG/DL (ref 74–99)
HBA1C MFR BLD: 4.1 % (ref 4–5.6)
HCT VFR BLD CALC: 34.5 % (ref 37–54)
HCT VFR BLD CALC: 35.5 % (ref 37–54)
HDLC SERPL-MCNC: 39 MG/DL
HEMOGLOBIN: 10.6 G/DL (ref 12.5–16.5)
HEMOGLOBIN: 10.8 G/DL (ref 12.5–16.5)
HUMAN METAPNEUMOVIRUS BY PCR: NOT DETECTED
HUMAN RHINOVIRUS/ENTEROVIRUS BY PCR: NOT DETECTED
IMMATURE GRANULOCYTES #: 0.03 E9/L
IMMATURE GRANULOCYTES #: 0.03 E9/L
IMMATURE GRANULOCYTES %: 0.5 % (ref 0–5)
IMMATURE GRANULOCYTES %: 0.5 % (ref 0–5)
INFLUENZA A BY PCR: NOT DETECTED
INFLUENZA B BY PCR: NOT DETECTED
LDL CHOLESTEROL CALCULATED: 66 MG/DL (ref 0–99)
LYMPHOCYTES ABSOLUTE: 0.98 E9/L (ref 1.5–4)
LYMPHOCYTES ABSOLUTE: 1.33 E9/L (ref 1.5–4)
LYMPHOCYTES RELATIVE PERCENT: 16.2 % (ref 20–42)
LYMPHOCYTES RELATIVE PERCENT: 21 % (ref 20–42)
MAGNESIUM: 2.3 MG/DL (ref 1.6–2.6)
MAGNESIUM: 2.3 MG/DL (ref 1.6–2.6)
MAGNESIUM: 2.9 MG/DL (ref 1.6–2.6)
MAGNESIUM: 3 MG/DL (ref 1.6–2.6)
MCH RBC QN AUTO: 31.5 PG (ref 26–35)
MCH RBC QN AUTO: 31.8 PG (ref 26–35)
MCHC RBC AUTO-ENTMCNC: 30.4 % (ref 32–34.5)
MCHC RBC AUTO-ENTMCNC: 30.7 % (ref 32–34.5)
MCV RBC AUTO: 103.5 FL (ref 80–99.9)
MCV RBC AUTO: 103.6 FL (ref 80–99.9)
MONOCYTES ABSOLUTE: 0.48 E9/L (ref 0.1–0.95)
MONOCYTES ABSOLUTE: 0.5 E9/L (ref 0.1–0.95)
MONOCYTES RELATIVE PERCENT: 7.9 % (ref 2–12)
MONOCYTES RELATIVE PERCENT: 7.9 % (ref 2–12)
MYCOPLASMA PNEUMONIAE BY PCR: NOT DETECTED
NEUTROPHILS ABSOLUTE: 4 E9/L (ref 1.8–7.3)
NEUTROPHILS ABSOLUTE: 4.09 E9/L (ref 1.8–7.3)
NEUTROPHILS RELATIVE PERCENT: 63.4 % (ref 43–80)
NEUTROPHILS RELATIVE PERCENT: 67.8 % (ref 43–80)
PARAINFLUENZA VIRUS 1 BY PCR: NOT DETECTED
PARAINFLUENZA VIRUS 2 BY PCR: NOT DETECTED
PARAINFLUENZA VIRUS 3 BY PCR: NOT DETECTED
PARAINFLUENZA VIRUS 4 BY PCR: NOT DETECTED
PDW BLD-RTO: 16.9 FL (ref 11.5–15)
PDW BLD-RTO: 17.1 FL (ref 11.5–15)
PHOSPHORUS: 3.2 MG/DL (ref 2.5–4.5)
PHOSPHORUS: 3.3 MG/DL (ref 2.5–4.5)
PHOSPHORUS: 5 MG/DL (ref 2.5–4.5)
PHOSPHORUS: 5 MG/DL (ref 2.5–4.5)
PLATELET # BLD: 131 E9/L (ref 130–450)
PLATELET # BLD: 143 E9/L (ref 130–450)
PMV BLD AUTO: 9.4 FL (ref 7–12)
PMV BLD AUTO: 9.8 FL (ref 7–12)
POTASSIUM SERPL-SCNC: 3.9 MMOL/L (ref 3.5–5)
POTASSIUM SERPL-SCNC: 4.2 MMOL/L (ref 3.5–5)
POTASSIUM SERPL-SCNC: 4.4 MMOL/L (ref 3.5–5)
POTASSIUM SERPL-SCNC: 4.6 MMOL/L (ref 3.5–5)
PROCALCITONIN: 0.59 NG/ML (ref 0–0.08)
PROCALCITONIN: 0.64 NG/ML (ref 0–0.08)
PROCALCITONIN: 0.66 NG/ML (ref 0–0.08)
RBC # BLD: 3.33 E12/L (ref 3.8–5.8)
RBC # BLD: 3.43 E12/L (ref 3.8–5.8)
REASON FOR REJECTION: NORMAL
REJECTED TEST: NORMAL
RESPIRATORY SYNCYTIAL VIRUS BY PCR: NOT DETECTED
SALICYLATE, SERUM: <0.3 MG/DL (ref 0–30)
SARS-COV-2, PCR: NOT DETECTED
SODIUM BLD-SCNC: 136 MMOL/L (ref 132–146)
SODIUM BLD-SCNC: 137 MMOL/L (ref 132–146)
SODIUM BLD-SCNC: 138 MMOL/L (ref 132–146)
SODIUM BLD-SCNC: 140 MMOL/L (ref 132–146)
T4 FREE: 1.36 NG/DL (ref 0.93–1.7)
TOTAL PROTEIN: 6.5 G/DL (ref 6.4–8.3)
TOTAL PROTEIN: 7.1 G/DL (ref 6.4–8.3)
TRICYCLIC ANTIDEPRESSANTS SCREEN SERUM: NEGATIVE NG/ML
TRIGL SERPL-MCNC: 104 MG/DL (ref 0–149)
TROPONIN, HIGH SENSITIVITY: 92 NG/L (ref 0–11)
TSH SERPL DL<=0.05 MIU/L-ACNC: 3.63 UIU/ML (ref 0.27–4.2)
VITAMIN B-12: 829 PG/ML (ref 211–946)
VLDLC SERPL CALC-MCNC: 21 MG/DL
WBC # BLD: 6 E9/L (ref 4.5–11.5)
WBC # BLD: 6.3 E9/L (ref 4.5–11.5)

## 2022-04-10 PROCEDURE — 6360000002 HC RX W HCPCS: Performed by: INTERNAL MEDICINE

## 2022-04-10 PROCEDURE — 36415 COLL VENOUS BLD VENIPUNCTURE: CPT

## 2022-04-10 PROCEDURE — 90935 HEMODIALYSIS ONE EVALUATION: CPT | Performed by: INTERNAL MEDICINE

## 2022-04-10 PROCEDURE — 93005 ELECTROCARDIOGRAM TRACING: CPT | Performed by: INTERNAL MEDICINE

## 2022-04-10 PROCEDURE — 6370000000 HC RX 637 (ALT 250 FOR IP): Performed by: INTERNAL MEDICINE

## 2022-04-10 PROCEDURE — 80307 DRUG TEST PRSMV CHEM ANLYZR: CPT

## 2022-04-10 PROCEDURE — 80179 DRUG ASSAY SALICYLATE: CPT

## 2022-04-10 PROCEDURE — 80048 BASIC METABOLIC PNL TOTAL CA: CPT

## 2022-04-10 PROCEDURE — 5A1D70Z PERFORMANCE OF URINARY FILTRATION, INTERMITTENT, LESS THAN 6 HOURS PER DAY: ICD-10-PCS | Performed by: INTERNAL MEDICINE

## 2022-04-10 PROCEDURE — 80076 HEPATIC FUNCTION PANEL: CPT

## 2022-04-10 PROCEDURE — 84100 ASSAY OF PHOSPHORUS: CPT

## 2022-04-10 PROCEDURE — 0202U NFCT DS 22 TRGT SARS-COV-2: CPT

## 2022-04-10 PROCEDURE — 94640 AIRWAY INHALATION TREATMENT: CPT

## 2022-04-10 PROCEDURE — 1200000000 HC SEMI PRIVATE

## 2022-04-10 PROCEDURE — 2700000000 HC OXYGEN THERAPY PER DAY

## 2022-04-10 PROCEDURE — 71045 X-RAY EXAM CHEST 1 VIEW: CPT

## 2022-04-10 PROCEDURE — 80143 DRUG ASSAY ACETAMINOPHEN: CPT

## 2022-04-10 PROCEDURE — 99222 1ST HOSP IP/OBS MODERATE 55: CPT | Performed by: INTERNAL MEDICINE

## 2022-04-10 PROCEDURE — 84443 ASSAY THYROID STIM HORMONE: CPT

## 2022-04-10 PROCEDURE — 82746 ASSAY OF FOLIC ACID SERUM: CPT

## 2022-04-10 PROCEDURE — 83036 HEMOGLOBIN GLYCOSYLATED A1C: CPT

## 2022-04-10 PROCEDURE — 6370000000 HC RX 637 (ALT 250 FOR IP): Performed by: STUDENT IN AN ORGANIZED HEALTH CARE EDUCATION/TRAINING PROGRAM

## 2022-04-10 PROCEDURE — 82077 ASSAY SPEC XCP UR&BREATH IA: CPT

## 2022-04-10 PROCEDURE — 84145 PROCALCITONIN (PCT): CPT

## 2022-04-10 PROCEDURE — 94664 DEMO&/EVAL PT USE INHALER: CPT

## 2022-04-10 PROCEDURE — 84484 ASSAY OF TROPONIN QUANT: CPT

## 2022-04-10 PROCEDURE — 85025 COMPLETE CBC W/AUTO DIFF WBC: CPT

## 2022-04-10 PROCEDURE — 83735 ASSAY OF MAGNESIUM: CPT

## 2022-04-10 PROCEDURE — 82330 ASSAY OF CALCIUM: CPT

## 2022-04-10 PROCEDURE — 82607 VITAMIN B-12: CPT

## 2022-04-10 PROCEDURE — 84439 ASSAY OF FREE THYROXINE: CPT

## 2022-04-10 PROCEDURE — 2580000003 HC RX 258: Performed by: INTERNAL MEDICINE

## 2022-04-10 PROCEDURE — 80061 LIPID PANEL: CPT

## 2022-04-10 RX ORDER — SODIUM CHLORIDE 0.9 % (FLUSH) 0.9 %
5-40 SYRINGE (ML) INJECTION EVERY 12 HOURS SCHEDULED
Status: DISCONTINUED | OUTPATIENT
Start: 2022-04-10 | End: 2022-04-13 | Stop reason: HOSPADM

## 2022-04-10 RX ORDER — CLONIDINE HYDROCHLORIDE 0.1 MG/1
0.3 TABLET ORAL 3 TIMES DAILY
Status: DISCONTINUED | OUTPATIENT
Start: 2022-04-10 | End: 2022-04-13 | Stop reason: HOSPADM

## 2022-04-10 RX ORDER — LOSARTAN POTASSIUM 25 MG/1
100 TABLET ORAL EVERY EVENING
Status: DISCONTINUED | OUTPATIENT
Start: 2022-04-10 | End: 2022-04-13

## 2022-04-10 RX ORDER — POLYETHYLENE GLYCOL 3350 17 G/17G
17 POWDER, FOR SOLUTION ORAL DAILY PRN
Status: DISCONTINUED | OUTPATIENT
Start: 2022-04-10 | End: 2022-04-13 | Stop reason: HOSPADM

## 2022-04-10 RX ORDER — ARFORMOTEROL TARTRATE 15 UG/2ML
15 SOLUTION RESPIRATORY (INHALATION) 2 TIMES DAILY
Status: DISCONTINUED | OUTPATIENT
Start: 2022-04-10 | End: 2022-04-13 | Stop reason: HOSPADM

## 2022-04-10 RX ORDER — NITROGLYCERIN 0.4 MG/1
0.4 TABLET SUBLINGUAL EVERY 5 MIN PRN
Status: DISCONTINUED | OUTPATIENT
Start: 2022-04-10 | End: 2022-04-13 | Stop reason: HOSPADM

## 2022-04-10 RX ORDER — IPRATROPIUM BROMIDE AND ALBUTEROL SULFATE 2.5; .5 MG/3ML; MG/3ML
1 SOLUTION RESPIRATORY (INHALATION) EVERY 4 HOURS PRN
Status: DISCONTINUED | OUTPATIENT
Start: 2022-04-10 | End: 2022-04-13 | Stop reason: HOSPADM

## 2022-04-10 RX ORDER — DIPHENHYDRAMINE HCL 25 MG
25 TABLET ORAL 2 TIMES DAILY PRN
Status: DISCONTINUED | OUTPATIENT
Start: 2022-04-10 | End: 2022-04-13 | Stop reason: HOSPADM

## 2022-04-10 RX ORDER — SODIUM CHLORIDE 0.9 % (FLUSH) 0.9 %
5-40 SYRINGE (ML) INJECTION PRN
Status: DISCONTINUED | OUTPATIENT
Start: 2022-04-10 | End: 2022-04-13 | Stop reason: HOSPADM

## 2022-04-10 RX ORDER — HYDRALAZINE HYDROCHLORIDE 25 MG/1
50 TABLET, FILM COATED ORAL 3 TIMES DAILY
Status: DISCONTINUED | OUTPATIENT
Start: 2022-04-10 | End: 2022-04-13 | Stop reason: HOSPADM

## 2022-04-10 RX ORDER — TRAMADOL HYDROCHLORIDE 50 MG/1
25 TABLET ORAL ONCE
Status: COMPLETED | OUTPATIENT
Start: 2022-04-10 | End: 2022-04-10

## 2022-04-10 RX ORDER — HYDRALAZINE HYDROCHLORIDE 20 MG/ML
10 INJECTION INTRAMUSCULAR; INTRAVENOUS EVERY 4 HOURS PRN
Status: DISCONTINUED | OUTPATIENT
Start: 2022-04-10 | End: 2022-04-13 | Stop reason: HOSPADM

## 2022-04-10 RX ORDER — SEVELAMER CARBONATE 800 MG/1
1600 TABLET, FILM COATED ORAL 3 TIMES DAILY
Status: DISCONTINUED | OUTPATIENT
Start: 2022-04-10 | End: 2022-04-13 | Stop reason: HOSPADM

## 2022-04-10 RX ORDER — MECOBALAMIN 5000 MCG
5 TABLET,DISINTEGRATING ORAL NIGHTLY PRN
Status: DISCONTINUED | OUTPATIENT
Start: 2022-04-10 | End: 2022-04-13 | Stop reason: HOSPADM

## 2022-04-10 RX ORDER — LIDOCAINE 4 G/G
1 PATCH TOPICAL DAILY
Status: DISCONTINUED | OUTPATIENT
Start: 2022-04-10 | End: 2022-04-13 | Stop reason: HOSPADM

## 2022-04-10 RX ORDER — LABETALOL 100 MG/1
300 TABLET, FILM COATED ORAL 2 TIMES DAILY
Status: DISCONTINUED | OUTPATIENT
Start: 2022-04-10 | End: 2022-04-13 | Stop reason: HOSPADM

## 2022-04-10 RX ORDER — SODIUM CHLORIDE 9 MG/ML
INJECTION, SOLUTION INTRAVENOUS PRN
Status: DISCONTINUED | OUTPATIENT
Start: 2022-04-10 | End: 2022-04-13 | Stop reason: HOSPADM

## 2022-04-10 RX ORDER — ONDANSETRON 4 MG/1
4 TABLET, ORALLY DISINTEGRATING ORAL EVERY 8 HOURS PRN
Status: DISCONTINUED | OUTPATIENT
Start: 2022-04-10 | End: 2022-04-13 | Stop reason: HOSPADM

## 2022-04-10 RX ORDER — BUDESONIDE 0.5 MG/2ML
0.5 INHALANT ORAL 2 TIMES DAILY
Status: DISCONTINUED | OUTPATIENT
Start: 2022-04-10 | End: 2022-04-13 | Stop reason: HOSPADM

## 2022-04-10 RX ORDER — HEPARIN SODIUM 10000 [USP'U]/ML
5000 INJECTION, SOLUTION INTRAVENOUS; SUBCUTANEOUS EVERY 8 HOURS SCHEDULED
Status: DISCONTINUED | OUTPATIENT
Start: 2022-04-10 | End: 2022-04-13 | Stop reason: HOSPADM

## 2022-04-10 RX ADMIN — ARFORMOTEROL TARTRATE 15 MCG: 15 SOLUTION RESPIRATORY (INHALATION) at 06:36

## 2022-04-10 RX ADMIN — LABETALOL HYDROCHLORIDE 300 MG: 100 TABLET, FILM COATED ORAL at 16:24

## 2022-04-10 RX ADMIN — LABETALOL HYDROCHLORIDE 300 MG: 100 TABLET, FILM COATED ORAL at 23:43

## 2022-04-10 RX ADMIN — DIPHENHYDRAMINE HYDROCHLORIDE 25 MG: 25 TABLET ORAL at 14:50

## 2022-04-10 RX ADMIN — LOSARTAN POTASSIUM 100 MG: 25 TABLET, FILM COATED ORAL at 16:24

## 2022-04-10 RX ADMIN — CLONIDINE HYDROCHLORIDE 0.3 MG: 0.1 TABLET ORAL at 14:50

## 2022-04-10 RX ADMIN — Medication 10 ML: at 21:25

## 2022-04-10 RX ADMIN — HEPARIN SODIUM 5000 UNITS: 10000 INJECTION INTRAVENOUS; SUBCUTANEOUS at 06:14

## 2022-04-10 RX ADMIN — SEVELAMER CARBONATE 1600 MG: 800 TABLET, FILM COATED ORAL at 16:25

## 2022-04-10 RX ADMIN — BUDESONIDE 500 MCG: 0.5 INHALANT RESPIRATORY (INHALATION) at 06:36

## 2022-04-10 RX ADMIN — BUDESONIDE 500 MCG: 0.5 INHALANT RESPIRATORY (INHALATION) at 19:55

## 2022-04-10 RX ADMIN — TRAMADOL HYDROCHLORIDE 25 MG: 50 TABLET, COATED ORAL at 15:50

## 2022-04-10 RX ADMIN — TRAMADOL HYDROCHLORIDE 25 MG: 50 TABLET, COATED ORAL at 21:18

## 2022-04-10 RX ADMIN — SEVELAMER CARBONATE 1600 MG: 800 TABLET, FILM COATED ORAL at 21:17

## 2022-04-10 RX ADMIN — ARFORMOTEROL TARTRATE 15 MCG: 15 SOLUTION RESPIRATORY (INHALATION) at 19:55

## 2022-04-10 RX ADMIN — CLONIDINE HYDROCHLORIDE 0.3 MG: 0.1 TABLET ORAL at 21:18

## 2022-04-10 RX ADMIN — NITROGLYCERIN 0.4 MG: 0.4 TABLET, ORALLY DISINTEGRATING SUBLINGUAL at 07:58

## 2022-04-10 RX ADMIN — NITROGLYCERIN 0.4 MG: 0.4 TABLET, ORALLY DISINTEGRATING SUBLINGUAL at 14:53

## 2022-04-10 RX ADMIN — DIPHENHYDRAMINE HYDROCHLORIDE 25 MG: 25 TABLET ORAL at 06:22

## 2022-04-10 RX ADMIN — NITROGLYCERIN 0.4 MG: 0.4 TABLET, ORALLY DISINTEGRATING SUBLINGUAL at 19:02

## 2022-04-10 RX ADMIN — HYDRALAZINE HYDROCHLORIDE 50 MG: 25 TABLET, FILM COATED ORAL at 14:50

## 2022-04-10 RX ADMIN — HYDRALAZINE HYDROCHLORIDE 50 MG: 25 TABLET, FILM COATED ORAL at 21:17

## 2022-04-10 ASSESSMENT — PAIN DESCRIPTION - LOCATION: LOCATION: GENERALIZED

## 2022-04-10 ASSESSMENT — PAIN DESCRIPTION - FREQUENCY: FREQUENCY: CONTINUOUS

## 2022-04-10 ASSESSMENT — PAIN SCALES - GENERAL
PAINLEVEL_OUTOF10: 10
PAINLEVEL_OUTOF10: 0
PAINLEVEL_OUTOF10: 5
PAINLEVEL_OUTOF10: 0
PAINLEVEL_OUTOF10: 3
PAINLEVEL_OUTOF10: 5

## 2022-04-10 ASSESSMENT — PAIN DESCRIPTION - DESCRIPTORS: DESCRIPTORS: ACHING;CONSTANT;DISCOMFORT

## 2022-04-10 ASSESSMENT — PAIN DESCRIPTION - PAIN TYPE: TYPE: ACUTE PAIN

## 2022-04-10 ASSESSMENT — PAIN DESCRIPTION - ORIENTATION: ORIENTATION: RIGHT;LEFT

## 2022-04-10 NOTE — ED NOTES
Nurse to nurse called for room Erika Dominiquejal [address] [place] [address]  04/10/22 0960

## 2022-04-10 NOTE — H&P
[facility]  Internal Medicine Residency Program  History and Physical    Patient:  Giovanna Flaherty 43 y.o. male MRN: [id number]     Date of Service: 4/10/2022    Hospital Day: 2      Chief complaint: had concerns including Chest Pain (L chest pain x2 days, present when talking and it is reproducible to palpation. No pleuritic aggravation, no radiation. Pt reports SOB and nausea (no emesis). No diaphoresis. Given 2 ntg w mild relief. ). History of Present Illness   History was obtained from  EMR and Patient     The patient is a 43 y.o. male with PMH of ESRD on HD (MWF HD, known to Dr. Raina Tracy service), HTN, ICH d/t ruptured saccular aneurysm s/p coiling, hypothyroidism, HFpEF (EF45-50%) from  Home, presented to the ED complaining of chest pain x2 days. The pt states that the pain began during his previous HD session on 4/8/2022 and has been persistent since that time. No accident/injury to precipitate pain. Pt does state that he experiences this symptom commonly during hemodialysis, but that normally it abates after the session ends, unlike this current time. When asked to characterize his chest pain, pt describes 9/10 constant, dull chest pain beginning on the R chest wall and radiating to the L, no radiation to the back, shoulders, or jaw. It is not exacerbated by movement or alleviated by rest. It is associated with some nausea but no vomiting or diuresis. Of note, the pt has had a stress test in 2021 that showed no reversible perfusion defect or abnormality suggestive of ischemic damage. On arrival to the ED, the pt was hemodynamically stable, hypertensive to 168/114mmhg. Mild hypoxia correcting to 99% O2 saturation when placed on 3L NC - the pt does not use supplemental O2 at home. Labs notable for Cr 6.1, pro-BNP >70,000, and troponin 83-->84, which is not significantly different from baseline.  CTA was performed, which was (-)ve for PE, also showed centrilobular nodular opacities bilaterally, concerning for RLL pneumonia, as well as trace R pleural effusion and suggestion of pulmonary HTN. CXR redemonstrated the RLL infiltrate vs edema, as well as pulmonary vascular congestion diffusely, suggestive of pulmonary edema. The pt was given his home antihypertensive medications in the ED, Nephrology was consulted given the pt's recent exposure to contrast, and he was admitted under [facility] Medicine for further evaluation and stabilization.       Past Medical History:      Diagnosis Date    (HFpEF) heart failure with preserved ejection fraction ([address]     stage III    Anxiety 9/19/2015    AVF (arteriovenous fistula) (HCC) 4/30/2018    Chronic diastolic (congestive) heart failure (HCC) 7/14/2021    Concerns of Amyloidosis on TTE 8/2021    Chronic kidney disease     CKD since early childhood per pt and on HD ~ 11 years    Depression     Encounter regarding vascular access for dialysis for ESRD ([address] 4/30/2018    History of ruptured Berry aneurysm ([facility] [address] 10/20/2015    Hypertension     on meds for ~ 11 years    Hypothyroidism     Intracranial hemorrhage (HCC)     was d/t ruptured aneurysm - pt underwent neurosurgery for clipping of the aneurysm    Neutropenia ([address]     Noncompliance w/medication treatment due to intermit use of medication 11/3/2015    Pre-transplant evaluation for kidney transplant 4/5/2017       Past Surgical History:        Procedure Laterality Date    BRAIN ANEURYSM SURGERY Right 3-4 years ago    Intracranial aneurysm clipping surgery 3-4 years ago, after rupture of aneurysm causing ICH    BRONCHOSCOPY N/A 8/3/2021    BRONCHOSCOPY DIAGNOSTIC OR CELL [address] ONLY performed by Mari Villafana MD at [facility] Left 11 years ago    UPPER GASTROINTESTINAL ENDOSCOPY N/A 1/3/2019    EGD BIOPSY performed by Bernett Mortimer, MD at [address] [place] [address]       Medications Prior to Admission:    Prior to Admission medications    Medication Sig Start Date End Date Taking? Authorizing Provider   lidocaine-prilocaine (EMLA) 2.5-2.5 % cream Apply topically as needed for Pain Apply topically as needed. Yes Historical Provider, MD   hydrALAZINE (APRESOLINE) 50 MG tablet Take 1 tablet by mouth 3 times daily 3/1/22   Joy Diaz MD   labetalol (NORMODYNE) 300 MG tablet Take 1 tablet by mouth 2 times daily 3/1/22   Joy Diaz MD   diphenhydrAMINE (BENADRYL) 25 MG tablet Take 1 tablet by mouth 2 times daily as needed for Itching, Allergies or Sleep 3/1/22   Joy Diaz MD   cloNIDine (CATAPRES) 0.3 MG tablet Take 1 tablet by mouth 3 times daily 3/1/22   Joy Diaz MD   losartan (COZAAR) 100 MG tablet Take 1 tablet by mouth every evening 3/1/22 6/29/22  Joy Diaz MD   albuterol-ipratropium (COMBIVENT RESPIMAT)  MCG/ACT AERS inhaler Inhale 1 puff into the lungs every 6 hours  Patient taking differently: Inhale 2 puffs into the lungs every 6 hours  3/1/22   Joy Diaz MD   vitamin D (ERGOCALCIFEROL) 1.25 MG (46757 UT) CAPS capsule Take 1 capsule by mouth once a week for 11 doses 1/30/22 4/11/22  Brittany Lam MD   NIFEdipine (ADALAT CC) 60 MG extended release tablet Take 90 mg by mouth daily  1/14/22   Historical Provider, MD   Melatonin 5 MG CAPS Take 5 mg by mouth nightly as needed  8/19/21   Historical Provider, MD   Methoxy PEG-Epoetin Beta (MIRCERA IJ) Infuse 60 mcg intravenously every 14 days  8/13/21 8/12/22  Historical Provider, MD   sevelamer (RENVELA) 800 MG tablet Take 2 tablets by mouth 3 times daily     Historical Provider, MD       Allergies:  Tylenol [acetaminophen] and Levofloxacin    Social History:   TOBACCO:   reports that he has never smoked. He has never used smokeless tobacco.  ETOH:   reports no history of alcohol use.      Family History:       Problem Relation Age of Onset    Kidney Disease Paternal Grandmother     Cancer Neg Hx     Heart Disease Neg Hx        REVIEW OF SYSTEMS:     Constitutional: (-) fever, (-) chills  Respiratory: (+) Dyspnea with exertion (+) wheeze (-) cough   Cardiovascular: (+) chest pain and (+) palpitations.  GI:  (+) nausea, (-) vomiting, (-) diarrhea, (-) abdomen pain.  Urology: Pt anuric    Physical Exam     · Vitals: BP (!) 143/95   Pulse 86   Temp 97.8 °F (36.6 °C) (Oral)   Resp 20   Ht 5' 6\" (1.676 m)   Wt 150 lb (68 kg)   SpO2 99%   BMI 24.21 kg/m²        · Constitutional: Alert, conversational. Renelda Ismael commands. In no apparent distress. · Head: Normocephalic and atraumatic. · Eyes: EOMI, conjunctiva normal, (-) scleral icterus. Mucus membranes moist.  · Mouth: Mucus membranes moist. Oropharynx clear. No deviation of the tongue or uvula. Normal dentition. · Neck: (-)  swelling, trachea midline   · Respiratory: NC in place. Soft crackles in all lung fields bilaterally, worse in the bilateral bases. (-)  wheezes, (-)  rales, (-)  Rhonchi. No increased work of breathing or respiratory distress   · Cardiovascular: RRR. (-)  murmurs, (-) gallops,  (-) rubs. S1 and S2 were normal.   · GI:  Abdomen soft, non-tender, non-distended. (+) BS. (-) guarding, (-) rigidity. · Extremities: Warm and well perfused. (-) clubbing, (-) cyanosis. 2+ distal pulses. (-) peripheral edema. (-)Sacral pitting edema. · Neurologic:  No focal neurological deficits.  No speech slurring or tremor      Labs and Imaging Studies   Basic Labs  Recent Labs     04/09/22  1136 04/10/22  0308    140   K 4.8 4.2   CL 97* 97*   CO2 28 26   BUN 26* 36*   CREATININE 6.1* 7.6*   GLUCOSE 91 88   CALCIUM 9.9 9.4       Recent Labs     04/09/22  1217 04/10/22  0308   WBC 6.3 6.3   RBC 3.45* 3.43*   HGB 10.9* 10.8*   HCT 35.1* 35.5*   .7* 103.5*   MCH 31.6 31.5   MCHC 31.1* 30.4*   RDW 17.7* 17.1*    143   MPV 9.8 9.4       HFP:    Lab Results   Component Value Date    PROT 6.5 04/10/2022     U/A:  No components found for: Jurline Tirado, USPGRAV, UPH, UPROTEIN, UGLUCOSE, UKETONE, UBILI, UBLOOD, UNITRITE, Ayse Copa, USQEPI, URENEPI, UWBC, URBC, UBACTERIA, UHYALINE  PT/INR:  No results found for: PTINR    Imaging Studies:     XR CHEST PORTABLE    Result Date: 4/9/2022  EXAMINATION: ONE XRAY VIEW OF THE CHEST 4/9/2022 1:31 pm COMPARISON: The previous study performed 02/05/2022. HISTORY: ORDERING SYSTEM PROVIDED HISTORY: chest pain TECHNOLOGIST PROVIDED HISTORY: Reason for exam:->chest pain What reading provider will be dictating this exam?->CRC FINDINGS: Cardiomegaly is again identified. A patchy right lower lung field infiltrate is seen. No active pleural disease is identified. The mediastinal structures are without significant interval change. The visualized bony thorax is unremarkable for the patient's age. Right lower lung field pneumonia. CTA PULMONARY W CONTRAST    Result Date: 4/9/2022  EXAMINATION: CTA OF THE CHEST 4/9/2022 7:08 pm TECHNIQUE: CTA of the chest was performed after the administration of intravenous contrast.  Multiplanar reformatted images are provided for review. MIP images are provided for review. Dose modulation, iterative reconstruction, and/or weight based adjustment of the mA/kV was utilized to reduce the radiation dose to as low as reasonably achievable. COMPARISON: 01/22/2022 HISTORY: ORDERING SYSTEM PROVIDED HISTORY: CP TECHNOLOGIST PROVIDED HISTORY: Reason for exam:->CP Decision Support Exception - unselect if not a suspected or confirmed emergency medical condition->Emergency Medical Condition (MA) What reading provider will be dictating this exam?->CRC FINDINGS: No filling defect in the pulmonary arteries to suggest pulmonary arterial embolism. There is cardiomegaly. No pericardial effusion. Enlarged pulmonary outflow tract measuring up to 46 mm. Pulmonary arteries are enlarged. Smooth interlobular septal thickening throughout both lungs. Mosaic ground-glass opacities bilaterally. Nodular centrilobular opacities are also present bilaterally.   More confluent evaluation:  Not indicated at this time    DVT prophylaxis/ GI prophylaxis: heparin/Diet   Disposition: Continue current medical management      Merrill Calixto DO, PGY-2  Attending physician: Dr. Lavon Faith   Preceptor: Dr Lavon Faith

## 2022-04-10 NOTE — PROGRESS NOTES
[facility]  Internal Medicine Residency Program  Progress Note - House Team     Patient:  Sherie Barclay 43 y.o. male MRN: [id number]     Date of Service: 4/10/2022     CC: Right sided chest pain radiating to left  Overnight events: No acute events    Subjective     Overnight patient had no acute events. Patient was seen and examined this morning at bedside in no acute distress. Patient still complaining of right sided chest pain that radiates to left side of his chest. Patient claims relief upon sitting up. Patient is for HD today. Will re-evaluate chest pain after HD. Objective     Physical Exam:  Vitals: BP (!) 170/98   Pulse 85   Temp 98.4 °F (36.9 °C) (Oral)   Resp 18   Ht 5' 6\" (1.676 m)   Wt 145 lb 1 oz (65.8 kg)   SpO2 97%   BMI 23.41 kg/m²     I & O - 24hr: I/O this shift: In: 0   Out: 2800    General Appearance: alert, appears stated age and cooperative  HEENT:  Head: Normal, normocephalic, atraumatic. Neck: no carotid bruit, no JVD and supple, symmetrical, trachea midline  Lung: + bibasilar crackles, no wheezing or rhonchi, equal chest expansion, no retractions  Heart: regular rate and rhythm, S1, S2 normal, no murmur, loud heart sounds, no click, rub or gallop  Abdomen: full, distended, non-tender; bowel sounds normal; no masses,  no organomegaly  Extremities:  extremities normal, atraumatic, no cyanosis or edema  Musculokeletal: No joint swelling, no muscle tenderness. ROM normal in all joints of extremities.    Neurologic: Mental status: Alert, oriented x3, thought content appropriate, CN 2-12 grossly intact  Subject  Pertinent Labs & Imaging Studies   denice  CBC with Differential:    Lab Results   Component Value Date    WBC 6.0 04/10/2022    RBC 3.33 04/10/2022    HGB 10.6 04/10/2022    HCT 34.5 04/10/2022     04/10/2022    .6 04/10/2022    MCH 31.8 04/10/2022    MCHC 30.7 04/10/2022    RDW 16.9 04/10/2022    LYMPHOPCT 16.2 04/10/2022    MONOPCT 7.9 04/10/2022    BASOPCT 1.0 04/10/2022    MONOSABS 0.48 04/10/2022    LYMPHSABS 0.98 04/10/2022    EOSABS 0.40 04/10/2022    BASOSABS 0.06 04/10/2022     CMP:    Lab Results   Component Value Date     04/10/2022    K 3.9 04/10/2022    K 4.8 04/09/2022    CL 95 04/10/2022    CO2 30 04/10/2022    BUN 16 04/10/2022    CREATININE 4.7 04/10/2022    GFRAA 17 04/10/2022    LABGLOM 17 04/10/2022    GLUCOSE 85 04/10/2022    PROT 7.1 04/10/2022    LABALBU 4.3 04/10/2022    CALCIUM 9.3 04/10/2022    BILITOT 0.7 04/10/2022    ALKPHOS 91 04/10/2022    AST 39 04/10/2022    ALT 15 04/10/2022     Hepatic Function Panel:    Lab Results   Component Value Date    ALKPHOS 91 04/10/2022    ALT 15 04/10/2022    AST 39 04/10/2022    PROT 7.1 04/10/2022    BILITOT 0.7 04/10/2022    BILIDIR <0.2 04/10/2022    IBILI see below 04/10/2022    LABALBU 4.3 04/10/2022     Ionized Calcium:  No results found for: IONCA  Magnesium:    Lab Results   Component Value Date    MG 2.3 04/10/2022     Phosphorus:    Lab Results   Component Value Date    PHOS 3.3 04/10/2022     TSH:    Lab Results   Component Value Date    TSH 3.630 04/10/2022     VITAMIN B12: No components found for: B12  FOLATE:    Lab Results   Component Value Date    FOLATE 18.0 04/10/2022     IRON:    Lab Results   Component Value Date    IRON 33 09/06/2021     Iron Saturation:  No components found for: PERCENTFE  TIBC:    Lab Results   Component Value Date    TIBC 202 09/06/2021     FERRITIN:    Lab Results   Component Value Date    FERRITIN 4,215 01/27/2022       CTA PULMONARY W CONTRAST   Final Result   1. No evidence of pulmonary embolism. 2. Cardiomegaly with pulmonary vascular congestion and findings suggestive of   interstitial pulmonary edema. 3. Centrilobular nodular opacities are present bilaterally as well as more   confluent opacities in right lung base could suggest pneumonia on background   of interstitial pulmonary edema. 4. Trace right pleural effusion.    5. Enlarged pulmonary outflow tract suggesting pulmonary arterial   hypertension. 6. Stable mediastinal and bilateral axillary lymphadenopathy. XR CHEST PORTABLE   Final Result   Right lower lung field pneumonia. XR CHEST PORTABLE    (Results Pending)        Resident's Assessment and Plan     Assessment and Plan:    Atypical chest pain likely 2/2 fluid overload vs costochondritis vs pneumonia  ESRD on HD (MWF) - known to Dr. Jovi Louis  HFpEF (EF 45-50%)   Hx of HTN  Hx of ICH 2/2 ruptured saccular aneursym s/p coiling  Hx of hypothyroidism    PLAN:   For HD today per nephro  Follow nephro recs  Reassess chest pain after HD  Order EKG and CXR after HD to reasses right lower lobe opacities, will hold off on antibiotics for now  Pulmonology consulted   Continue home antihypertensive meds  Monitor VS      PT/OT evaluation:  For eval and treat  DVT prophylaxis/ GI prophylaxis: heparin/protonix  Disposition: continue current care     Addis Levy MD, PGY-1  Attending physician: Dr. Charlene Delvalle

## 2022-04-10 NOTE — CONSULTS
Consult Note  NEPHROLOGY    Reason for Consult: Management of dialysis needs    Requesting Physician: Ethan Becerra DO    Chief Complaint: Admitted with chest pain    History Obtained From:  patient, electronic medical record    History of Present Ilness:       Stephen Mares a 43 y. o. male with a history of hypertension, ESRD HD dependent MWF, HFpEF and several other medical problems who now presents to ED on 4/9/2022 with chest pain. Vitals upon arrival included blood pressure 146/93, temperature 97.8, pulse 90, respirations 14 and 99% O2 saturation. Pertinent labs included sodium 142, potassium 4.8, chloride 97, CO2 28, BUN 26 and creatinine 6.1. CBC included WBC 6.3, hemoglobin 10.9, hematocrit 35.1 and platelet count 629. CTA of the chest showed right lower lung field pneumonia. Patient was subsequently admitted with atypical chest pain. Patient currently seen on hemodialysis. He is alert and oriented. States that the chest pain began during his most recent dialysis outpatient session on 4/8/2022. He denies any radiation to his back shoulders or jaw. He denies any shortness of breath at rest.  He also denies any nausea, vomiting or diarrhea. He is oliguric.       Past Medical History:        Diagnosis Date    (HFpEF) heart failure with preserved ejection fraction ([address]     stage III    Anxiety 9/19/2015    AVF (arteriovenous fistula) (HCC) 4/30/2018    Chronic diastolic (congestive) heart failure (HCC) 7/14/2021    Concerns of Amyloidosis on TTE 8/2021    Chronic kidney disease     CKD since early childhood per pt and on HD ~ 11 years    Depression     Encounter regarding vascular access for dialysis for ESRD ([address] 4/30/2018    History of ruptured Berry aneurysm ([address] 10/20/2015    Hypertension     on meds for ~ 11 years    Hypothyroidism     Intracranial hemorrhage (HCC)     was d/t ruptured aneurysm - pt underwent neurosurgery for clipping of the aneurysm    Neutropenia ([address]     Noncompliance w/medication treatment due to intermit use of medication 11/3/2015    Pre-transplant evaluation for kidney transplant 4/5/2017       Past Surgical History:        Procedure Laterality Date    BRAIN ANEURYSM SURGERY Right 3-4 years ago    Intracranial aneurysm clipping surgery 3-4 years ago, after rupture of aneurysm causing ICH    BRONCHOSCOPY N/A 8/3/2021    BRONCHOSCOPY DIAGNOSTIC OR CELL [address] ONLY performed by Jennifer Paredes MD at [facility] Left 11 years ago    UPPER GASTROINTESTINAL ENDOSCOPY N/A 1/3/2019    EGD BIOPSY performed by Kaylah Moreno MD at [facility] ENDOSCOPY       Current Medications:    Current Facility-Administered Medications: cloNIDine (CATAPRES) tablet 0.3 mg, 0.3 mg, Oral, TID  hydrALAZINE (APRESOLINE) tablet 50 mg, 50 mg, Oral, TID  labetalol (NORMODYNE) tablet 300 mg, 300 mg, Oral, BID  losartan (COZAAR) tablet 100 mg, 100 mg, Oral, QPM  melatonin disintegrating tablet 5 mg, 5 mg, Oral, Nightly PRN  sevelamer (RENVELA) tablet 1,600 mg, 1,600 mg, Oral, TID  sodium chloride flush 0.9 % injection 5-40 mL, 5-40 mL, IntraVENous, 2 times per day  sodium chloride flush 0.9 % injection 5-40 mL, 5-40 mL, IntraVENous, PRN  0.9 % sodium chloride infusion, , IntraVENous, PRN  polyethylene glycol (GLYCOLAX) packet 17 g, 17 g, Oral, Daily PRN  trimethobenzamide (TIGAN) injection 200 mg, 200 mg, IntraMUSCular, Q6H PRN  pantoprazole (PROTONIX) 40 mg in sodium chloride (PF) 10 mL injection, 40 mg, IntraVENous, Daily  heparin (porcine) injection 5,000 Units, 5,000 Units, SubCUTAneous, 3 times per day  ipratropium-albuterol (DUONEB) nebulizer solution 1 ampule, 1 ampule, Inhalation, Q4H PRN  Arformoterol Tartrate (BROVANA) nebulizer solution 15 mcg, 15 mcg, Nebulization, BID  budesonide (PULMICORT) nebulizer suspension 500 mcg, 0.5 mg, Nebulization, BID  lidocaine 4 % external patch 1 patch, 1 patch, TransDERmal, Daily  hydrALAZINE (APRESOLINE) injection 10 mg, 10 mg, IntraVENous, Q4H PRN  diphenhydrAMINE (BENADRYL) tablet 25 mg, 25 mg, Oral, BID PRN  nitroGLYCERIN (NITROSTAT) SL tablet 0.4 mg, 0.4 mg, SubLINGual, Q5 Min PRN  sodium chloride flush 0.9 % injection 10 mL, 10 mL, IntraVENous, PRN    Allergies:  Tylenol [acetaminophen] and Levofloxacin    Social History:        Social History:  reports that he has never smoked. He has never used smokeless tobacco. He reports previous drug use. He reports that he does not drink alcohol.       Family History:     Family History   Problem Relation Age of Onset    Kidney Disease Paternal Grandmother     Cancer Neg Hx     Heart Disease Neg Hx          Review of Systems:       Pertinent positives stated above in HPI. All other systems were reviewed and were negative.     Physical exam:   Constitutional:  VITALS:  BP (!) 178/124   Pulse 94   Temp 99.1 °F (37.3 °C)   Resp 20   Ht 5' 6\" (1.676 m)   Wt 149 lb 14.6 oz (68 kg)   SpO2 98%   BMI 24.20 kg/m²   CURRENT TEMPERATURE:  Temp: 99.1 °F (37.3 °C)  CURRENT RESPIRATORY RATE:  Resp: 20  CURRENT PULSE:  Pulse: 94  CURRENT BLOOD PRESSURE:  BP: (!) 178/124  24HR BLOOD PRESSURE RANGE:  Systolic (43MEB), HUV:281 , Min:134 , XOO:005   ; Diastolic (38ZSO), SJT:763, Min:85, Max:128    24HR INTAKE/OUTPUT:  No intake or output data in the 24 hours ending 04/10/22 1248  Gen: alert, awake, nad  Skin: no rash, turgor wnl  Heent:  eomi, mmm  Neck: No bruits or jvd noted  Cardiovascular:  S1, S2 without m/r/g  Respiratory: Few crackles in the bases  Abdomen:  +bs, soft, nt, nd  Ext: No lower extremity edema  Psychiatric: mood and affect appropriate  Musculoskeletal:  Rom, muscular strength intact    DATA:      CBC:   Lab Results   Component Value Date    WBC 6.3 04/10/2022    RBC 3.43 04/10/2022    HGB 10.8 04/10/2022    HCT 35.5 04/10/2022    .5 04/10/2022    MCH 31.5 04/10/2022    MCHC 30.4 04/10/2022    RDW 17.1 04/10/2022     04/10/2022    MPV 9.4 04/10/2022     BMP: Lab Results   Component Value Date     04/10/2022    K 4.4 04/10/2022    K 4.8 04/09/2022    CL 97 04/10/2022    CO2 27 04/10/2022    BUN 37 04/10/2022    LABALBU 4.0 04/10/2022    CREATININE 8.3 04/10/2022    CALCIUM 9.2 04/10/2022    GFRAA 9 04/10/2022    LABGLOM 9 04/10/2022    GLUCOSE 120 04/10/2022     Magnesium:    Lab Results   Component Value Date    MG 3.0 04/10/2022     Phosphorus:    Lab Results   Component Value Date    PHOS 5.0 04/10/2022       RAD:  XR CHEST PORTABLE    Result Date: 4/9/2022  EXAMINATION: ONE XRAY VIEW OF THE CHEST 4/9/2022 1:31 pm COMPARISON: The previous study performed 02/05/2022. HISTORY: ORDERING SYSTEM PROVIDED HISTORY: chest pain TECHNOLOGIST PROVIDED HISTORY: Reason for exam:->chest pain What reading provider will be dictating this exam?->CRC FINDINGS: Cardiomegaly is again identified. A patchy right lower lung field infiltrate is seen. No active pleural disease is identified. The mediastinal structures are without significant interval change. The visualized bony thorax is unremarkable for the patient's age. Right lower lung field pneumonia. CTA PULMONARY W CONTRAST    Result Date: 4/9/2022  EXAMINATION: CTA OF THE CHEST 4/9/2022 7:08 pm TECHNIQUE: CTA of the chest was performed after the administration of intravenous contrast.  Multiplanar reformatted images are provided for review. MIP images are provided for review. Dose modulation, iterative reconstruction, and/or weight based adjustment of the mA/kV was utilized to reduce the radiation dose to as low as reasonably achievable.  COMPARISON: 01/22/2022 HISTORY: ORDERING SYSTEM PROVIDED HISTORY: CP TECHNOLOGIST PROVIDED HISTORY: Reason for exam:->CP Decision Support Exception - unselect if not a suspected or confirmed emergency medical condition->Emergency Medical Condition (MA) What reading provider will be dictating this exam?->CRC FINDINGS: No filling defect in the pulmonary arteries to suggest Patient seen and examined all key components of the physical performed independently , case discussed with NP, all pertinent labs and radiologic tests personally reviewed agree with above. Geronimo Rizo MD     Department of Internal Medicine  Section of Nephrology  Dialysis Note        PROCEDURE:  Patient seen on hemodialysis     [facility]ICIAN[facility]  [facility] Marcus M.D., [facility]    INDICATION:  End-stage renal disease    RX:  See dialysis flowsheet for specifics on access, blood flow rate, dialysate baths, duration of dialysis, anticoagulation and other technical information.     COMMENTS:  Procedure in progress and tolerated       Geronimo Rizo MD, MD

## 2022-04-10 NOTE — ED NOTES
Pt resting in bed with easy respirations, fistula for dialysis is in left arm, call bell within reach, connected to monitor, stable vital signs, and elevated BP which meds were given for per med orders.       Pratik Martins RN  04/09/22 2018

## 2022-04-10 NOTE — PROGRESS NOTES
Patient has overdue labs on admission to floor. Lab attempted to draw but was unsuccessful. They recommended him getting them at dialysis but they did not draw them there. IV team perfect served to see if they could assist in drawing labs.

## 2022-04-10 NOTE — PROGRESS NOTES
[facility]  Internal Medicine Residency / House Medicine Service      Initial H and P    Attending Physician Statement  I have discussed the case, including pertinent history and exam findings with the resident and the team. I have reviewed all significant past medical history, surgical history, social history, family history, allergies, and home medications independently. I have seen and examined the patient and the key elements of the encounter have been performed by me. I agree with the assessment, plan and orders as documented by the resident.       Case Discussed During AM Rounds   Admitted on 4/9 s/p assessment in the ED for ongoing chest pain x 2 days   CTA chest was completed by ED team without findings of PE, but abnormal findings including adenopathy, edema, and possible infiltrate    Given ESRD on HD- patient requires HD today due to contrast and edema seen on imaging    Chest pain has been ongoing and hs troponin has remained stable- chronically elevated in the presence of ESRD on HD     Respiratory panel is unremarkable    Reviewed charts, previous imaging, and work-up   Resident re-assessment planned post HD today     Chest Pain- ongoing complaints for 3 days    Left and right sided at different times    No improvement with prior HD    Troponin is stable- chronically elevated in the presence of ESRD   Pain at times improved with leaning forward- no current pericardial rub or knock appreciated on exam   NO diffuse ST elevation seen on EKG   Repeat EKG today    Monitor cardiac enzymes   HD today    Pulmonary consultation given abnormal CTA chest- chronic changes with additional concerns- ? Repeat bronchoscopy    Cardiac MRI in Dec 2021 at [facility]- no significant pericardial disease    Consider Cardiology assessment     Acute on chronic diastolic heart failure    HD today    Monitor volume status   Nephrology following     Abnormal CTA chest   Pulmonary consultation     ESRD on HD HD today    Nephrology following     HTN   HD today   Resume home meds    Disposition- admit to telemetry   Remainder of medical problems as per resident note.       Bryan Sow MD, [address]   Internal Medicine Residency Faculty

## 2022-04-10 NOTE — FLOWSHEET NOTE
04/10/22 1325   Vital Signs   BP (!) 168/106   Temp 97.7 °F (36.5 °C)   Pulse 98   Resp 18   SpO2 98 %   Weight 145 lb 1 oz (65.8 kg)   Weight Method Estimated   Percent Weight Change -3.24   Pain Assessment   Pain Assessment 0-10   Pain Level 0   Post-Hemodialysis Assessment   Post-Treatment Procedures Blood returned; Access bleeding time < 10 minutes   Machine Disinfection Process Acid/Vinegar Clean;Heat Disinfect; Exterior Machine Disinfection   Rinseback Volume (ml) 300 ml   Total Liters Processed (l/min) 65.4 l/min   Dialyzer Clearance Lightly streaked   Duration of Treatment (minutes) 210 minutes   Heparin amount administered during treatment (units) 0 units   Hemodialysis Intake (ml) 0 ml   Hemodialysis Output (ml) 2800 ml   NET Removed (ml) 2500 ml   Tolerated Treatment Good   Patient Response to Treatment pt tolerated treatment    Bilateral Breath Sounds Clear;Diminished   Edema Generalized   Edema Generalized Trace   pt tolerated treatment well; net UF 2500mL; pt stable upon discharge

## 2022-04-10 NOTE — ED NOTES
Pts BP after coming back from the restroom was 193/129. Dr. Carey Oliveira notified and said to re-check BP in 30min.       Lukasz Miller RN  04/09/22 2015

## 2022-04-10 NOTE — ED NOTES
Dialysis notified patient to [phone] once dialysis is complete      Brittany Aburto RN  04/10/22 8532

## 2022-04-10 NOTE — ED NOTES
Pt /109 @ 2030 re-check was 168/114 @ [address], RN  04/09/22 2100       Angelo Rocha RN  04/09/22 2101

## 2022-04-11 LAB
ALBUMIN SERPL-MCNC: 3.9 G/DL (ref 3.5–5.2)
ALP BLD-CCNC: 81 U/L (ref 40–129)
ALT SERPL-CCNC: 15 U/L (ref 0–40)
ANION GAP SERPL CALCULATED.3IONS-SCNC: 12 MMOL/L (ref 7–16)
ANION GAP SERPL CALCULATED.3IONS-SCNC: 18 MMOL/L (ref 7–16)
ANISOCYTOSIS: ABNORMAL
AST SERPL-CCNC: 39 U/L (ref 0–39)
BASOPHILS ABSOLUTE: 0 E9/L (ref 0–0.2)
BASOPHILS RELATIVE PERCENT: 0.9 % (ref 0–2)
BILIRUB SERPL-MCNC: 0.7 MG/DL (ref 0–1.2)
BILIRUBIN DIRECT: <0.2 MG/DL (ref 0–0.3)
BILIRUBIN, INDIRECT: ABNORMAL MG/DL (ref 0–1)
BUN BLDV-MCNC: 13 MG/DL (ref 6–20)
BUN BLDV-MCNC: 28 MG/DL (ref 6–20)
C-REACTIVE PROTEIN: 2.4 MG/DL (ref 0–0.4)
CALCIUM IONIZED: 1.19 MMOL/L (ref 1.15–1.33)
CALCIUM SERPL-MCNC: 9.5 MG/DL (ref 8.6–10.2)
CALCIUM SERPL-MCNC: 9.7 MG/DL (ref 8.6–10.2)
CHLORIDE BLD-SCNC: 96 MMOL/L (ref 98–107)
CHLORIDE BLD-SCNC: 97 MMOL/L (ref 98–107)
CO2: 23 MMOL/L (ref 22–29)
CO2: 28 MMOL/L (ref 22–29)
CREAT SERPL-MCNC: 3.7 MG/DL (ref 0.7–1.2)
CREAT SERPL-MCNC: 6.5 MG/DL (ref 0.7–1.2)
EKG ATRIAL RATE: 84 BPM
EKG ATRIAL RATE: 96 BPM
EKG P AXIS: 68 DEGREES
EKG P AXIS: 70 DEGREES
EKG P-R INTERVAL: 182 MS
EKG P-R INTERVAL: 192 MS
EKG Q-T INTERVAL: 378 MS
EKG Q-T INTERVAL: 412 MS
EKG QRS DURATION: 84 MS
EKG QRS DURATION: 92 MS
EKG QTC CALCULATION (BAZETT): 477 MS
EKG QTC CALCULATION (BAZETT): 486 MS
EKG R AXIS: 44 DEGREES
EKG R AXIS: 66 DEGREES
EKG T AXIS: 76 DEGREES
EKG T AXIS: 82 DEGREES
EKG VENTRICULAR RATE: 84 BPM
EKG VENTRICULAR RATE: 96 BPM
EOSINOPHILS ABSOLUTE: 0.39 E9/L (ref 0.05–0.5)
EOSINOPHILS RELATIVE PERCENT: 8.7 % (ref 0–6)
GFR AFRICAN AMERICAN: 12
GFR AFRICAN AMERICAN: 22
GFR NON-AFRICAN AMERICAN: 12 ML/MIN/1.73
GFR NON-AFRICAN AMERICAN: 22 ML/MIN/1.73
GLUCOSE BLD-MCNC: 139 MG/DL (ref 74–99)
GLUCOSE BLD-MCNC: 58 MG/DL (ref 74–99)
HCT VFR BLD CALC: 35.7 % (ref 37–54)
HEMOGLOBIN: 10.7 G/DL (ref 12.5–16.5)
HYPOCHROMIA: ABNORMAL
INR BLD: 1.1
LYMPHOCYTES ABSOLUTE: 0.86 E9/L (ref 1.5–4)
LYMPHOCYTES RELATIVE PERCENT: 19.1 % (ref 20–42)
MAGNESIUM: 2.4 MG/DL (ref 1.6–2.6)
MAGNESIUM: 2.7 MG/DL (ref 1.6–2.6)
MCH RBC QN AUTO: 31.8 PG (ref 26–35)
MCHC RBC AUTO-ENTMCNC: 30 % (ref 32–34.5)
MCV RBC AUTO: 105.9 FL (ref 80–99.9)
METER GLUCOSE: 85 MG/DL (ref 74–99)
MONOCYTES ABSOLUTE: 0.45 E9/L (ref 0.1–0.95)
MONOCYTES RELATIVE PERCENT: 9.6 % (ref 2–12)
NEUTROPHILS ABSOLUTE: 2.84 E9/L (ref 1.8–7.3)
NEUTROPHILS RELATIVE PERCENT: 62.6 % (ref 43–80)
OVALOCYTES: ABNORMAL
PDW BLD-RTO: 16.8 FL (ref 11.5–15)
PHOSPHORUS: 2.5 MG/DL (ref 2.5–4.5)
PHOSPHORUS: 4.6 MG/DL (ref 2.5–4.5)
PLATELET # BLD: 90 E9/L (ref 130–450)
PLATELET CONFIRMATION: NORMAL
PMV BLD AUTO: 11.2 FL (ref 7–12)
POIKILOCYTES: ABNORMAL
POTASSIUM SERPL-SCNC: 3.6 MMOL/L (ref 3.5–5)
POTASSIUM SERPL-SCNC: 5.5 MMOL/L (ref 3.5–5)
PROTHROMBIN TIME: 12.4 SEC (ref 9.3–12.4)
RBC # BLD: 3.37 E12/L (ref 3.8–5.8)
SEDIMENTATION RATE, ERYTHROCYTE: 10 MM/HR (ref 0–15)
SMUDGE CELLS: ABNORMAL
SODIUM BLD-SCNC: 137 MMOL/L (ref 132–146)
SODIUM BLD-SCNC: 137 MMOL/L (ref 132–146)
TOTAL PROTEIN: 6.3 G/DL (ref 6.4–8.3)
WBC # BLD: 4.5 E9/L (ref 4.5–11.5)

## 2022-04-11 PROCEDURE — 6360000002 HC RX W HCPCS: Performed by: INTERNAL MEDICINE

## 2022-04-11 PROCEDURE — 85651 RBC SED RATE NONAUTOMATED: CPT

## 2022-04-11 PROCEDURE — 85610 PROTHROMBIN TIME: CPT

## 2022-04-11 PROCEDURE — 82962 GLUCOSE BLOOD TEST: CPT

## 2022-04-11 PROCEDURE — 80076 HEPATIC FUNCTION PANEL: CPT

## 2022-04-11 PROCEDURE — 6370000000 HC RX 637 (ALT 250 FOR IP): Performed by: INTERNAL MEDICINE

## 2022-04-11 PROCEDURE — 86706 HEP B SURFACE ANTIBODY: CPT

## 2022-04-11 PROCEDURE — 86140 C-REACTIVE PROTEIN: CPT

## 2022-04-11 PROCEDURE — 99233 SBSQ HOSP IP/OBS HIGH 50: CPT | Performed by: INTERNAL MEDICINE

## 2022-04-11 PROCEDURE — 93010 ELECTROCARDIOGRAM REPORT: CPT | Performed by: INTERNAL MEDICINE

## 2022-04-11 PROCEDURE — 97161 PT EVAL LOW COMPLEX 20 MIN: CPT

## 2022-04-11 PROCEDURE — 2580000003 HC RX 258: Performed by: INTERNAL MEDICINE

## 2022-04-11 PROCEDURE — 80074 ACUTE HEPATITIS PANEL: CPT

## 2022-04-11 PROCEDURE — A4216 STERILE WATER/SALINE, 10 ML: HCPCS | Performed by: INTERNAL MEDICINE

## 2022-04-11 PROCEDURE — 85025 COMPLETE CBC W/AUTO DIFF WBC: CPT

## 2022-04-11 PROCEDURE — 87449 NOS EACH ORGANISM AG IA: CPT

## 2022-04-11 PROCEDURE — 2700000000 HC OXYGEN THERAPY PER DAY

## 2022-04-11 PROCEDURE — 83735 ASSAY OF MAGNESIUM: CPT

## 2022-04-11 PROCEDURE — C9113 INJ PANTOPRAZOLE SODIUM, VIA: HCPCS | Performed by: INTERNAL MEDICINE

## 2022-04-11 PROCEDURE — 97530 THERAPEUTIC ACTIVITIES: CPT

## 2022-04-11 PROCEDURE — 36415 COLL VENOUS BLD VENIPUNCTURE: CPT

## 2022-04-11 PROCEDURE — 6370000000 HC RX 637 (ALT 250 FOR IP): Performed by: STUDENT IN AN ORGANIZED HEALTH CARE EDUCATION/TRAINING PROGRAM

## 2022-04-11 PROCEDURE — 80048 BASIC METABOLIC PNL TOTAL CA: CPT

## 2022-04-11 PROCEDURE — 84100 ASSAY OF PHOSPHORUS: CPT

## 2022-04-11 PROCEDURE — 82330 ASSAY OF CALCIUM: CPT

## 2022-04-11 PROCEDURE — 90935 HEMODIALYSIS ONE EVALUATION: CPT

## 2022-04-11 PROCEDURE — 94640 AIRWAY INHALATION TREATMENT: CPT

## 2022-04-11 PROCEDURE — 1200000000 HC SEMI PRIVATE

## 2022-04-11 RX ORDER — DEXTROSE MONOHYDRATE 25 G/50ML
12.5 INJECTION, SOLUTION INTRAVENOUS PRN
Status: DISCONTINUED | OUTPATIENT
Start: 2022-04-11 | End: 2022-04-13 | Stop reason: HOSPADM

## 2022-04-11 RX ORDER — SENNA PLUS 8.6 MG/1
1 TABLET ORAL NIGHTLY
Status: DISCONTINUED | OUTPATIENT
Start: 2022-04-11 | End: 2022-04-13 | Stop reason: HOSPADM

## 2022-04-11 RX ORDER — LABETALOL HYDROCHLORIDE 5 MG/ML
10 INJECTION, SOLUTION INTRAVENOUS EVERY 4 HOURS PRN
Status: DISCONTINUED | OUTPATIENT
Start: 2022-04-11 | End: 2022-04-13 | Stop reason: HOSPADM

## 2022-04-11 RX ORDER — DEXTROSE MONOHYDRATE 50 MG/ML
100 INJECTION, SOLUTION INTRAVENOUS PRN
Status: DISCONTINUED | OUTPATIENT
Start: 2022-04-11 | End: 2022-04-13 | Stop reason: HOSPADM

## 2022-04-11 RX ORDER — NIFEDIPINE 30 MG/1
90 TABLET, EXTENDED RELEASE ORAL DAILY
Status: DISCONTINUED | OUTPATIENT
Start: 2022-04-11 | End: 2022-04-11

## 2022-04-11 RX ORDER — NIFEDIPINE 30 MG/1
60 TABLET, EXTENDED RELEASE ORAL DAILY
Status: DISCONTINUED | OUTPATIENT
Start: 2022-04-11 | End: 2022-04-13 | Stop reason: HOSPADM

## 2022-04-11 RX ORDER — DOCUSATE SODIUM 100 MG/1
100 CAPSULE, LIQUID FILLED ORAL DAILY
Status: DISCONTINUED | OUTPATIENT
Start: 2022-04-11 | End: 2022-04-13 | Stop reason: HOSPADM

## 2022-04-11 RX ORDER — PANTOPRAZOLE SODIUM 40 MG/1
40 TABLET, DELAYED RELEASE ORAL
Status: DISCONTINUED | OUTPATIENT
Start: 2022-04-12 | End: 2022-04-13 | Stop reason: HOSPADM

## 2022-04-11 RX ADMIN — DIPHENHYDRAMINE HYDROCHLORIDE 25 MG: 25 TABLET ORAL at 06:03

## 2022-04-11 RX ADMIN — DOCUSATE SODIUM 100 MG: 100 CAPSULE, LIQUID FILLED ORAL at 18:12

## 2022-04-11 RX ADMIN — NITROGLYCERIN 0.4 MG: 0.4 TABLET, ORALLY DISINTEGRATING SUBLINGUAL at 02:42

## 2022-04-11 RX ADMIN — ARFORMOTEROL TARTRATE 15 MCG: 15 SOLUTION RESPIRATORY (INHALATION) at 08:08

## 2022-04-11 RX ADMIN — CLONIDINE HYDROCHLORIDE 0.3 MG: 0.1 TABLET ORAL at 10:10

## 2022-04-11 RX ADMIN — CLONIDINE HYDROCHLORIDE 0.3 MG: 0.1 TABLET ORAL at 22:28

## 2022-04-11 RX ADMIN — LABETALOL HYDROCHLORIDE 300 MG: 100 TABLET, FILM COATED ORAL at 10:10

## 2022-04-11 RX ADMIN — BUDESONIDE 500 MCG: 0.5 INHALANT RESPIRATORY (INHALATION) at 08:08

## 2022-04-11 RX ADMIN — LABETALOL HYDROCHLORIDE 300 MG: 100 TABLET, FILM COATED ORAL at 22:30

## 2022-04-11 RX ADMIN — Medication 10 ML: at 10:09

## 2022-04-11 RX ADMIN — CLONIDINE HYDROCHLORIDE 0.3 MG: 0.1 TABLET ORAL at 14:26

## 2022-04-11 RX ADMIN — DIPHENHYDRAMINE HYDROCHLORIDE 25 MG: 25 TABLET ORAL at 18:14

## 2022-04-11 RX ADMIN — ARFORMOTEROL TARTRATE 15 MCG: 15 SOLUTION RESPIRATORY (INHALATION) at 20:01

## 2022-04-11 RX ADMIN — LOSARTAN POTASSIUM 100 MG: 25 TABLET, FILM COATED ORAL at 18:12

## 2022-04-11 RX ADMIN — BUDESONIDE 500 MCG: 0.5 INHALANT RESPIRATORY (INHALATION) at 20:01

## 2022-04-11 RX ADMIN — Medication 5 MG: at 22:33

## 2022-04-11 RX ADMIN — SEVELAMER CARBONATE 1600 MG: 800 TABLET, FILM COATED ORAL at 22:30

## 2022-04-11 RX ADMIN — SENNOSIDES 8.6 MG: 8.6 TABLET, COATED ORAL at 22:29

## 2022-04-11 RX ADMIN — HYDRALAZINE HYDROCHLORIDE 50 MG: 25 TABLET, FILM COATED ORAL at 10:09

## 2022-04-11 RX ADMIN — SEVELAMER CARBONATE 1600 MG: 800 TABLET, FILM COATED ORAL at 10:10

## 2022-04-11 RX ADMIN — HYDRALAZINE HYDROCHLORIDE 50 MG: 25 TABLET, FILM COATED ORAL at 22:27

## 2022-04-11 RX ADMIN — SODIUM CHLORIDE 40 MG: 9 INJECTION INTRAMUSCULAR; INTRAVENOUS; SUBCUTANEOUS at 10:07

## 2022-04-11 RX ADMIN — NIFEDIPINE 60 MG: 30 TABLET, EXTENDED RELEASE ORAL at 18:12

## 2022-04-11 NOTE — PROGRESS NOTES
Physical Therapy Initial Assessment     Name: Jacoby Ocampo  : 1985  MRN: [id number]      Date of Service: 2022    Evaluating PT:  Britta Toribio PT, DPT [id number]    Room #:  5227/0718-B  Diagnosis:  Acute pulmonary edema ([facility] [J81.0]  Atypical chest pain [R07.89]  ESRD (end stage renal disease) ([address] [N18.6]  Volume overload [E87.70]  Acute respiratory failure with hypoxia ([address] [J96.01]  PMHx/PSHx:    Past Medical History:   Diagnosis Date    (HFpEF) heart failure with preserved ejection fraction ([facility]     stage III    Anxiety 2015    AVF (arteriovenous fistula) ([facility] 2018    Chronic diastolic (congestive) heart failure ([address] 2021    Concerns of Amyloidosis on TTE 2021    Chronic kidney disease     CKD since early childhood per pt and on HD ~ 11 years    Depression     Encounter regarding vascular access for dialysis for ESRD ([facility] 2018    History of ruptured Berry aneurysm ([facility] 10/20/2015    Hypertension     on meds for ~ 11 years    Hypothyroidism     Intracranial hemorrhage (HCC)     was d/t ruptured aneurysm - pt underwent neurosurgery for clipping of the aneurysm    Neutropenia ([facility]     Noncompliance w/medication treatment due to intermit use of medication 11/3/2015    Pre-transplant evaluation for kidney transplant 2017     Procedure/Surgery:  None this admission    Reason for admission: chest pain, dyspnea   Precautions:  2L O2 NC, low fall risk   Equipment Needs:  None at this time     SUBJECTIVE:  Pt lives alone in second floor apartment (one story) with 24 steps to enter. Pt notes stairs have handrail but ever since he had COVID he has had trouble climbing stairs d/t fatigue. Pt ambulated with no AD PTA. OBJECTIVE:   Initial Evaluation  Date: 2022 Treatment Short Term/ Long Term   Goals   AM-PAC 6 Clicks      Was pt agreeable to Eval/treatment? Yes     Does pt have pain?  Chest pain (not changed since admitted)     Bed Mobility  Rolling: independent  Supine to sit: independent  Sit to supine: independent  Scooting: independent  Rolling: independent  Supine to sit: independent  Sit to supine: independent  Scooting: independent   Transfers Sit to stand: SBA  Stand to sit: SBA  Stand pivot: SBA  Sit to stand: independent  Stand to sit: independent  Stand pivot: independent   Ambulation    200 feet with no AD SBA  >200 feet with no AD independent   Stair negotiation: ascended and descended  NT  24 steps with rail independent   ROM BUE:  Refer to OT eval   BLE:  WNL     Strength BUE:  Refer to OT eval   BLE:  WNL     Balance Sitting EOB:  independent  Dynamic Standing:  SBA  Sitting EOB:  independent  Dynamic Standing:  independent     Pt is A & O x 4  Sensation:  Pt denies numbness and tingling to extremities  Edema:  None noted     Vitals:  Blood Pressure at rest -- Blood Pressure post session --   Heart Rate at rest -- Heart Rate post session --   SPO2 at rest 100 SPO2 post session 94     Therapeutic Exercises:  none performed this visit    Patient education  Pt educated on activity pacing, home safety and role of PT. Patient response to education:   Pt verbalized understanding Pt demonstrated skill Pt requires further education in this area   yes yes Reinforce      ASSESSMENT:    Conditions Requiring Skilled Therapeutic Intervention:  [x]Decreased strength     []Decreased ROM  [x]Decreased functional mobility  []Decreased balance   [x]Decreased endurance   []Decreased posture  []Decreased sensation  []Decreased coordination   []Decreased vision  []Decreased safety awareness   [x]Increased pain     Comments:  Pt in bed on arrival; RN cleared pt for PT. Pt removed oxygen upon sitting up at EOB - O2 100% seated EOB on room air. Pt able to perform transfers safety without physical assistance. Pt notes he tends to have SOB, fatigue with most activities since he has had COVID.  He also notes upon standing that his legs feel tired/weak from not moving them. SBA given throughout standing mobility today d/t weakness. Pt reported fatigue after bout of ambulation but did not require standing/seated rest break throughout. Pt noted fatigue after ambulation but SpO2 >94% throughout session. Pt left in bed with all needs met at end of session. Treatment:  Patient practiced and was instructed in the following treatment:     Bed mobility: performed with cues for safety awareness and proper hand placement to promote improved functional independence.  Transfer Training: with cuing for pacing, safety   Gait training: in hallway with cues for pacing, safety     Pt's/ family goals   1. Return home safely. Prognosis is good for reaching above PT goals. Patient and or family understand(s) diagnosis, prognosis, and plan of care.   yes    PHYSICAL THERAPY PLAN OF CARE:    PT POC is established based on physician order and patient diagnosis     Referring provider/PT Order:     PT evaluation and treat  Start:  04/10/22 0215,   End:  04/10/22 0215,   ONE TIME,   Standing Count:  1 Occurrences,   R         Beulah L Alannah, DO     Diagnosis:  Acute pulmonary edema (HCC) [J81.0]  Atypical chest pain [R07.89]  ESRD (end stage renal disease) ([facility] [N18.6]  Volume overload [E87.70]  Acute respiratory failure with hypoxia ([facility] [J96.01]  Specific instructions for next treatment:  Increase ambulation distance, trial stairs     Current Treatment Recommendations:   [x] Strengthening to improve independence with functional mobility   [] ROM to improve independence with functional mobility   [] Balance Training to improve static/dynamic balance and to reduce fall risk  [x] Endurance Training to improve activity tolerance during functional mobility   [] Transfer Training to improve safety and independence with all functional transfers   [x] Gait Training to improve gait mechanics, endurance and assess need for appropriate assistive device  [x] Stair Training in preparation for safe discharge home and/or into the community   [] Positioning to prevent skin breakdown and contractures  [x] Safety and Education Training   [] Patient/Caregiver Education   [] HEP  [] Other     PT long term treatment goals are located in above grid  Frequency of treatments: 2-5x/week x 1-2 weeks. Time in  1038  Time out  1104    Total Treatment Time  10 minutes      Evaluation Time includes thorough review of current medical information, gathering information on past medical history/social history and prior level of function, completion of standardized testing/informal observation of tasks, assessment of data and education on plan of care and goals.     CPT codes:  [x] Low Complexity PT evaluation 78929  [] Moderate Complexity PT evaluation 45007  [] High Complexity PT evaluation 77988  [] PT Re-evaluation 36547  [] Gait training 43152 -- minutes  [] Manual therapy [id number] -- minutes  [x] Therapeutic activities 09885 10 minutes  [] Therapeutic exercises 43503 -- minutes  [] Neuromuscular reeducation 68274 -- minutes     Ankit Warren, PT, DPT  [id number]

## 2022-04-11 NOTE — PROGRESS NOTES
[facility]  Nephrology Progress Note    History of Present Illness from 4/10 Consult Note: \"Abdelrahman Valentin is a 43 y. o. male with a history of hypertension, ESRD HD dependent MWF, HFpEF and several other medical problems who now presents to ED on 4/9/2022 with chest pain. Vitals upon arrival included blood pressure 146/93, temperature 97.8, pulse 90, respirations 14 and 99% O2 saturation. Pertinent labs included sodium 142, potassium 4.8, chloride 97, CO2 28, BUN 26 and creatinine 6.1. CBC included WBC 6.3, hemoglobin 10.9, hematocrit 35.1 and platelet count 337. CTA of the chest showed right lower lung field pneumonia. Patient was subsequently admitted with atypical chest pain.      Patient currently seen on hemodialysis. He is alert and oriented. States that the chest pain began during his most recent dialysis outpatient session on 4/8/2022. He denies any radiation to his back shoulders or jaw. He denies any shortness of breath at rest.  He also denies any nausea, vomiting or diarrhea. He is oliguric. \"    Subjective:    4/11: Patient was seen and examined. He does report intermittent shortness of breath. Denies any current abdominal pain or nausea.     PMH:    Past Medical History:   Diagnosis Date    (HFpEF) heart failure with preserved ejection fraction ([address]     stage III    Anxiety 9/19/2015    AVF (arteriovenous fistula) (HCC) 4/30/2018    Chronic diastolic (congestive) heart failure (HCC) 7/14/2021    Concerns of Amyloidosis on TTE 8/2021    Chronic kidney disease     CKD since early childhood per pt and on HD ~ 11 years    Depression     Encounter regarding vascular access for dialysis for ESRD ([address] 4/30/2018    History of ruptured Berry aneurysm ([address] 10/20/2015    Hypertension     on meds for ~ 11 years    Hypothyroidism     Intracranial hemorrhage (HCC)     was d/t ruptured aneurysm - pt underwent neurosurgery for clipping of the aneurysm    Neutropenia ([address]     Noncompliance w/medication treatment due to intermit use of medication 11/3/2015    Pre-transplant evaluation for kidney transplant 4/5/2017     Patient Active Problem List   Diagnosis    Stage 5 chronic kidney disease on chronic dialysis ([facility]    H/O intracranial hemorrhage and aneurysm clipping on the right side    Anemia, chronic disease    Anxiety    Noncompliance    AVF (arteriovenous fistula) (HCC)    Severe protein-calorie malnutrition (HCC)    Venous hypertension, chronic, left    Iron deficiency    Secondary hyperparathyroidism of renal origin ([facility]    Atypical chest pain    Essential hypertension    Acute respiratory failure with hypoxia (HCC)    Pneumonia    Pericardial effusion    Anemia    Chronic systolic congestive heart failure (HCC)    Acute pulmonary edema (HCC)    Volume overload     Meds:     cloNIDine  0.3 mg Oral TID    hydrALAZINE  50 mg Oral TID    labetalol  300 mg Oral BID    losartan  100 mg Oral QPM    sevelamer  1,600 mg Oral TID    sodium chloride flush  5-40 mL IntraVENous 2 times per day    pantoprazole (PROTONIX) 40 mg injection  40 mg IntraVENous Daily    heparin (porcine)  5,000 Units SubCUTAneous 3 times per day    Arformoterol Tartrate  15 mcg Nebulization BID    budesonide  0.5 mg Nebulization BID    lidocaine  1 patch TransDERmal Daily      dextrose      sodium chloride       Meds prn:     glucose, dextrose, glucagon (rDNA), dextrose, melatonin, sodium chloride flush, sodium chloride, polyethylene glycol, ipratropium-albuterol, hydrALAZINE, diphenhydrAMINE, nitroGLYCERIN, ondansetron, sodium chloride flush    Meds prior to admission:     No current facility-administered medications on file prior to encounter. Current Outpatient Medications on File Prior to Encounter   Medication Sig Dispense Refill    lidocaine-prilocaine (EMLA) 2.5-2.5 % cream Apply topically as needed for Pain Apply topically as needed.       hydrALAZINE (APRESOLINE) 50 MG tablet Take 1 tablet by mouth 3 times daily 90 tablet 3    labetalol (NORMODYNE) 300 MG tablet Take 1 tablet by mouth 2 times daily 60 tablet 3    diphenhydrAMINE (BENADRYL) 25 MG tablet Take 1 tablet by mouth 2 times daily as needed for Itching, Allergies or Sleep 30 tablet 2    cloNIDine (CATAPRES) 0.3 MG tablet Take 1 tablet by mouth 3 times daily 90 tablet 3    losartan (COZAAR) 100 MG tablet Take 1 tablet by mouth every evening 30 tablet 3    albuterol-ipratropium (COMBIVENT RESPIMAT)  MCG/ACT AERS inhaler Inhale 1 puff into the lungs every 6 hours (Patient taking differently: Inhale 2 puffs into the lungs every 6 hours ) 1 each 3    vitamin D (ERGOCALCIFEROL) 1.25 MG (94427 UT) CAPS capsule Take 1 capsule by mouth once a week for 11 doses 5 capsule 0    NIFEdipine (ADALAT CC) 60 MG extended release tablet Take 90 mg by mouth daily       Melatonin 5 MG CAPS Take 5 mg by mouth nightly as needed       Methoxy PEG-Epoetin Beta (MIRCERA IJ) Infuse 60 mcg intravenously every 14 days       sevelamer (RENVELA) 800 MG tablet Take 2 tablets by mouth 3 times daily        Allergies:    Tylenol [acetaminophen] and Levofloxacin    Social History:     reports that he has never smoked. He has never used smokeless tobacco. He reports previous drug use. He reports that he does not drink alcohol.     Family History:         Problem Relation Age of Onset    Kidney Disease Paternal Grandmother     Cancer Neg Hx     Heart Disease Neg Hx      Physical Exam:    Patient Vitals for the past 24 hrs:   BP Temp Temp src Pulse Resp SpO2 Weight   04/11/22 0815 (!) 190/120 97.3 °F (36.3 °C) Temporal 82 18 99 % --   04/11/22 0810 -- -- -- -- -- 99 % --   04/11/22 0809 -- -- -- -- -- 99 % --   04/11/22 0243 -- -- -- -- -- -- 142 lb 13.7 oz (64.8 kg)   04/10/22 1900 (!) 151/102 98.8 °F (37.1 °C) Oral 89 18 100 % --   04/10/22 1730 (!) 156/100 -- -- -- -- -- --   04/10/22 1600 (!) 170/98 -- -- -- -- 97 % --   04/10/22 7537 (!) 168/108 98.4 °F (36.9 °C) Oral 85 18 97 % --   04/10/22 1325 (!) 168/106 97.7 °F (36.5 °C) -- 98 18 98 % 145 lb 1 oz (65.8 kg)   04/10/22 1300 (!) 197/139 -- -- 97 -- -- --   04/10/22 1230 (!) 178/124 -- -- 94 -- -- --   04/10/22 1200 (!) 191/128 -- -- 98 -- -- --   04/10/22 1130 (!) 180/120 -- -- 93 -- -- --   04/10/22 1100 (!) 160/109 -- -- 84 -- -- --   04/10/22 1045 (!) 168/106 -- -- 84 -- -- --   04/10/22 1015 (!) 160/105 99.1 °F (37.3 °C) -- 81 20 98 % 149 lb 14.6 oz (68 kg)       Intake/Output Summary (Last 24 hours) at 4/11/2022 0850  Last data filed at 4/10/2022 1325  Gross per 24 hour   Intake 0 ml   Output 2800 ml   Net -2800 ml     General: Awake, alert, no acute distress  Neck: no JVD noted  Cardiovascular: regular rate and rhythm, no rub  Respiratory: Clear bilaterally upper, diminished bases bilaterally. Unlabored  Gastrointestinal: soft, nontender, nondistended, active bowel sounds  Ext: No edema  Neuro: Awake, alert, answers questions appropriately  Skin: dry, no rash on exposed extremities    Data:    Recent Labs     04/10/22  0308 04/10/22  1543 04/11/22  0505   WBC 6.3 6.0 4.5   HGB 10.8* 10.6* 10.7*   HCT 35.5* 34.5* 35.7*   .5* 103.6* 105.9*    131 90*     Recent Labs     04/10/22  1450 04/10/22  1543 04/11/22  0505    137 137   K 4.6 3.9 5.5*   CL 97* 95* 96*   CO2 19* 30* 23   CREATININE 4.4* 4.7* 6.5*   BUN 15 16 28*   LABGLOM 18 17 12   GLUCOSE 90 85 58*   CALCIUM 9.2 9.3 9.7   PHOS 3.2 3.3 4.6*   MG 2.3 2.3 2.7*     Vit D, 25-Hydroxy   Date Value Ref Range Status   01/23/2022 17 (L) 30 - 100 ng/mL Final     Comment:     <20 ng/mL. ........... Bula Dayhoff Deficient  20-30 ng/mL. ......... Bula Dayhoff Insufficient   ng/mL. ........ Bula Dayhoff Sufficient  >100 ng/mL. .......... Bula Dayhoff Toxic       PTH   Date Value Ref Range Status   01/05/2019 418 (H) 15 - 65 pg/mL Final     Recent Labs     04/10/22  0308 04/10/22  1450 04/11/22  0505   ALT 14 15 15   AST 20 39 39   ALKPHOS 85 91 81   BILITOT 0.6 0.7 0.7 BILIDIR 0.2 <0.2 <0.2     Recent Labs     04/10/22  0308 04/10/22  1450 04/11/22  0505   LABALBU 4.0 4.3 3.9     Ferritin   Date Value Ref Range Status   01/27/2022 4,215 ng/mL Final     Comment:     FERRITIN Reference Ranges:  Adult Males   20 - 60 years:    30 - 400 ng/mL  Adult females 16 - 61 years:    15 - 150 ng/mL  Adults greater than 60 years:   no established reference range  Pediatrics:                     no established reference range       Iron   Date Value Ref Range Status   09/06/2021 33 (L) 59 - 158 mcg/dL Final     TIBC   Date Value Ref Range Status   09/06/2021 202 (L) 250 - 450 mcg/dL Final     Vitamin B-12   Date Value Ref Range Status   04/10/2022 829 211 - 946 pg/mL Final     Folate   Date Value Ref Range Status   04/10/2022 18.0 4.8 - 24.2 ng/mL Final     No results found for: VOL, APPEARANCE, COLORU, LABSPEC, LABPH, LEUKBLD, NITRU, GLUCOSEU, KETUA, UROBILINOGEN, KETUA, UROBILINOGEN, BILIRUBINUR, OCBU    No results found for: LUZ MARIA, CREURRAN, MACREATRATIO, OSMOU    No components found for: URIC    No results found for: LIPIDPAN    Assessment and Plan:    1. ESRD on HD   OP [facility] MWF   via left AVF  S/p emergent dialysis 4/10 after receiving IV dye with CTA  Continue MWF while inpatient     2. HTN   BP goal<140/80  BP above goal  Follow on current regimen and with HD     3. Secondary hyperparathryoidism of renal origin  Phosphorus 4.6  PTH noted from 3/14/2022 in the outpatient setting was 591  On Renvela  Follow     4. Anemia  Hemoglobin target 10-12  Hemoglobin 10.7 today  No PREETI as hemoglobin at target  Transfuse for hemoglobin<7  Follow     5. Chest pain  Management per primary    6.   Hyperkalemia  Likely in setting of ESRD  K+ 5.5 (moderately hemolyzed)  Low potassium diet  For HD today  Follow    KENDRA Casey - CNP   Pt seen and examined agree with above  For hd today  Renato Beltre MD

## 2022-04-11 NOTE — CARE COORDINATION
Per notes here for chest pain sob and nausea. ESRD on HD (MWF HD, known to Dr. Amezcua Bal service), fistula for dialysis is in left arm. Pulmonology consult. - ·currently spo2 on ra 94%. PT minerva 20/24. Attempted to meet with patient whom is currently off unit. Will follow up for any discharge needs. Electronically signed by Lanette Batista RN on 4/11/2022 at 2:44 PM'

## 2022-04-11 NOTE — PROGRESS NOTES
[facility] [address]  Internal Medicine Residency Program  Progress Note - House Team     Patient:  Mercedes Boo 43 y.o. male MRN: [id number]     Date of Service: 4/11/2022     CC:   Chief Complaint   Patient presents with    Chest Pain     L chest pain x2 days, present when talking and it is reproducible to palpation. No pleuritic aggravation, no radiation. Pt reports SOB and nausea (no emesis). No diaphoresis. Given 2 ntg w mild relief. Days since admission: 2      Subjective     Overnight events: No acute overnight events    Patient seen and examined at bedside this morning in no acute distress. Patient was sitting up on the edge of the bed comfortably. He states he is still having chest pain. He states he feels better when sitting up. He describes the chest pain as someone sitting on his chest and at other times a sharp pain radiating from the right side of his chest to the left. He is no longer having aches and muscle pains since having dialysis. He denies N/V at this time. Patient has blood tinged sputum this morning, denies this ever happening before. He says he gets SOB from just walking a few feet. He also states he has been waking up at night drenched in sweat. Denies decrease in appetite. Objective     Physical Exam:  Vitals: BP (!) 185/120   Pulse 85   Temp 98.1 °F (36.7 °C)   Resp 18   Ht 5' 6\" (1.676 m)   Wt 109 lb 5.6 oz (49.6 kg)   SpO2 94%   BMI 17.65 kg/m²     I & O - 24hr: No intake or output data in the 24 hours ending 04/11/22 1421   General Appearance: alert, appears stated age and cooperative  HEENT:  Head: Normocephalic, no lesions, without obvious abnormality. Neck: no adenopathy, no JVD, supple, symmetrical, trachea midline and thyroid not enlarged, symmetric, no tenderness/mass/nodules  Lung: RLL crackles. Mild expiratory wheezing throughout.    Heart: regular rate and rhythm, S1, S2 normal, no murmur, click, rub or gallop and prominent apical impulse Abdomen: soft, Epigastric tendeness; bowel sounds normal; no masses,  no organomegaly   Extremities:  extremities normal, atraumatic, no cyanosis or edema  Musculokeletal: No joint swelling, no muscle tenderness. ROM normal in all joints of extremities. Neurologic: Mental status: Alert, oriented, thought content appropriate  Subject  Pertinent Information & Imaging Studies, Consults   denice  CBC:   Lab Results   Component Value Date    WBC 4.5 04/11/2022    RBC 3.37 04/11/2022    HGB 10.7 04/11/2022    HCT 35.7 04/11/2022    .9 04/11/2022    MCH 31.8 04/11/2022    MCHC 30.0 04/11/2022    RDW 16.8 04/11/2022    PLT 90 04/11/2022    MPV 11.2 04/11/2022     BMP:    Lab Results   Component Value Date     04/11/2022    K 5.5 04/11/2022    K 4.8 04/09/2022    CL 96 04/11/2022    CO2 23 04/11/2022    BUN 28 04/11/2022    LABALBU 3.9 04/11/2022    CREATININE 6.5 04/11/2022    CALCIUM 9.7 04/11/2022    GFRAA 12 04/11/2022    LABGLOM 12 04/11/2022    GLUCOSE 58 04/11/2022     PT/INR:    Lab Results   Component Value Date    PROTIME 12.6 02/05/2022    INR 1.1 02/05/2022     Last 3 Troponin:    Lab Results   Component Value Date    TROPONINI 0.02 03/15/2021    TROPONINI 0.02 03/15/2021    TROPONINI 0.02 02/17/2021       IMAGING:   Imaging Studies:    XR CHEST PORTABLE    Result Date: 4/10/2022  Improved aeration of the right lower lobe compared to 04/09/2022. Cardiomegaly. XR CHEST PORTABLE    Result Date: 4/9/2022  Right lower lung field pneumonia. CTA PULMONARY W CONTRAST    Result Date: 4/9/2022  1. No evidence of pulmonary embolism. 2. Cardiomegaly with pulmonary vascular congestion and findings suggestive of interstitial pulmonary edema. 3. Centrilobular nodular opacities are present bilaterally as well as more confluent opacities in right lung base could suggest pneumonia on background of interstitial pulmonary edema. 4. Trace right pleural effusion.  5. Enlarged pulmonary outflow tract suggesting pulmonary arterial hypertension. 6. Stable mediastinal and bilateral axillary lymphadenopathy. OXYGENATION: 99% 2L o2    DIET: low sodium         Student's Assessment and Plan     Peng Jay is a 43 y.o. male with  has a past medical history of (HFpEF) heart failure with preserved ejection fraction ([address] Anxiety, AVF (arteriovenous fistula) ([facility]), Chronic diastolic (congestive) heart failure ([address] Chronic kidney disease, Depression, Encounter regarding vascular access for dialysis for ESRD [facility]), History of ruptured Berry aneurysm ([facility] [address] Hypertension, Hypothyroidism, Intracranial hemorrhage ([address] Neutropenia ([facility] [address] Noncompliance w/medication treatment due to intermit use of medication, and Pre-transplant evaluation for kidney transplant. came here with CC   Chief Complaint   Patient presents with    Chest Pain     L chest pain x2 days, present when talking and it is reproducible to palpation. No pleuritic aggravation, no radiation. Pt reports SOB and nausea (no emesis). No diaphoresis. Given 2 ntg w mild relief.            Consults:   Pulmonology   Nephrology     Assessment/Plan   Atypical chest pain likely 2/2 fluid overload vs costochondritis vs pneumonia vs paracarditis    Monitor chest pain after HD  Follow Pulmonology recs for pulmonary etiology  Breathing treatments  No abx at this time procal .66  Respiratory panel negative - repeat   Wean O2  CXR improving   F/u Sed rate   F/u HIV and CD4 and hepatitis   ESRD on HD (MWF) - known to Dr. Maren Pulido  Nephrology following - HD  HFpEF (EF 45-50%)    Continue home medications   Losartan 100mg  Nifedipine 90mg  labetalol 100 mg BID  Hx of HTN  Hydralazine 50mg TID   Hx of ICH 2/2 ruptured saccular aneursym s/p coiling  Hx of hypothyroidism         DVT ppx: heparin   GI ppx: Protonix        Code Status:   full      [place] Clam, [facility]  Attending physician: Dr. Sneha Vazquez  Department of Internal Medicine  Internal Medicine Residency / [facility] case was discussed, including pertinent history and examination findings with the multidisciplinary team during bedside rounds. I have seen and examined the patient and the key elements of the encounter have been performed by myself. I have read and reviewed the documentation. If needed or disputed the following findings, counseling and treatment options which have been corrected and communicated back to the patient, family if applicable and contributing physicians. I agree with the assessment, plan and orders as noted by the resident. RUFFIN in [place] was negative for cardiac issues, Now with hemoptysis? He has significant evidence of PH on CT scan will get MRI cardiac from [place] and recheck sputum. Sarcoid was discussed.  Check Quantaferon for TB    Autumn Trejo DO , Shaq Bonner  Professor of Internal Medicine  Pulmonary, [facility]  Internal Medicine Academic Faculty

## 2022-04-11 NOTE — PROGRESS NOTES
[facility] [address]  Internal Medicine Residency Program  Progress Note - House Team     Patient:  Ruthy Vega 43 y.o. male MRN: [id number]     Date of Service: 4/11/2022     CC: Right sided chest pain radiating to left  Overnight events: Chest pain, nausea    Subjective     Overnight patient complained of chest pain which was similar to his initial chief complaint. Patient was given tramadol 25 mg x 1 dose which relieved his symptom overnight. Patient also had nausea but refused Tigan. He was given zofran with relief of nausea. Patient was seen and examined this morning at bedside in no acute distress. Patient still has intermittent right sided chest pain which is improved with sitting up. He also complained of coughing up possibly blood tinged blood this morning. Objective     Physical Exam:  Vitals: BP (!) 160/107   Pulse 79   Temp 98.6 °F (37 °C)   Resp 18   Ht 5' 6\" (1.676 m)   Wt 106 lb 4.2 oz (48.2 kg)   SpO2 94%   BMI 17.15 kg/m²     I & O - 24hr: I/O this shift:  In: 300   Out: 3000    General Appearance: alert, appears stated age and cooperative  HEENT:  Head: Normal, normocephalic, atraumatic. Neck: no carotid bruit, no JVD and supple, symmetrical, trachea midline  Lung: + bibasilar crackles, no wheezing or rhonchi, equal chest expansion, no retractions  Heart: regular rate and rhythm, S1, S2 normal, +thrill, +diastolic murmur, no click, rub or gallop  Abdomen: full, distended, non-tender; bowel sounds normal; no masses,  no organomegaly  Extremities:  extremities normal, atraumatic, no cyanosis or edema  Musculokeletal: No joint swelling, no muscle tenderness. ROM normal in all joints of extremities.    Neurologic: Mental status: Alert, oriented x3, thought content appropriate, CN 2-12 grossly intact  Subject  Pertinent Labs & Imaging Studies   denice  CBC with Differential:    Lab Results   Component Value Date    WBC 4.5 04/11/2022    RBC 3.37 04/11/2022    HGB 10.7 04/11/2022 HCT 35.7 04/11/2022    PLT 90 04/11/2022    .9 04/11/2022    MCH 31.8 04/11/2022    MCHC 30.0 04/11/2022    RDW 16.8 04/11/2022    LYMPHOPCT 19.1 04/11/2022    MONOPCT 9.6 04/11/2022    BASOPCT 0.9 04/11/2022    MONOSABS 0.45 04/11/2022    LYMPHSABS 0.86 04/11/2022    EOSABS 0.39 04/11/2022    BASOSABS 0.00 04/11/2022     CMP:    Lab Results   Component Value Date     04/11/2022    K 5.5 04/11/2022    K 4.8 04/09/2022    CL 96 04/11/2022    CO2 23 04/11/2022    BUN 28 04/11/2022    CREATININE 6.5 04/11/2022    GFRAA 12 04/11/2022    LABGLOM 12 04/11/2022    GLUCOSE 58 04/11/2022    PROT 6.3 04/11/2022    LABALBU 3.9 04/11/2022    CALCIUM 9.7 04/11/2022    BILITOT 0.7 04/11/2022    ALKPHOS 81 04/11/2022    AST 39 04/11/2022    ALT 15 04/11/2022     Hepatic Function Panel:    Lab Results   Component Value Date    ALKPHOS 81 04/11/2022    ALT 15 04/11/2022    AST 39 04/11/2022    PROT 6.3 04/11/2022    BILITOT 0.7 04/11/2022    BILIDIR <0.2 04/11/2022    IBILI see below 04/11/2022    LABALBU 3.9 04/11/2022     Ionized Calcium:  No results found for: IONCA  Magnesium:    Lab Results   Component Value Date    MG 2.7 04/11/2022     Phosphorus:    Lab Results   Component Value Date    PHOS 4.6 04/11/2022     TSH:    Lab Results   Component Value Date    TSH 3.630 04/10/2022     VITAMIN B12: No components found for: B12  FOLATE:    Lab Results   Component Value Date    FOLATE 18.0 04/10/2022     IRON:    Lab Results   Component Value Date    IRON 33 09/06/2021     Iron Saturation:  No components found for: PERCENTFE  TIBC:    Lab Results   Component Value Date    TIBC 202 09/06/2021     FERRITIN:    Lab Results   Component Value Date    FERRITIN 4,215 01/27/2022       XR CHEST PORTABLE   Final Result   Improved aeration of the right lower lobe compared to 04/09/2022. Cardiomegaly. CTA PULMONARY W CONTRAST   Final Result   1. No evidence of pulmonary embolism.    2. Cardiomegaly with pulmonary vascular congestion and findings suggestive of   interstitial pulmonary edema. 3. Centrilobular nodular opacities are present bilaterally as well as more   confluent opacities in right lung base could suggest pneumonia on background   of interstitial pulmonary edema. 4. Trace right pleural effusion. 5. Enlarged pulmonary outflow tract suggesting pulmonary arterial   hypertension. 6. Stable mediastinal and bilateral axillary lymphadenopathy. XR CHEST PORTABLE   Final Result   Right lower lung field pneumonia. Resident's Assessment and Plan     Assessment and Plan:    Atypical chest pain likely 2/2 pulmonary congestion vs pericarditis vs infectious lung process  ESRD on HD (MWF) - known to Dr. Kaden Lopez  HFpEF (EF 45-50%)   Hx of HTN  Hx of ICH 2/2 ruptured saccular aneursym s/p coiling  Hx of hypothyroidism    PLAN:   For HD per nephro, messaged Dr. Kwesi Schrader and she is agreeable with daily HD for patient as he seems fluid overloaded   Follow nephro recs  Ordered sed rate, CRP, INR  Await respiratory culture results  Follow pulmonology recs: possible plan for bronch? Continued nifedipine from home meds, changed parameters for PRN hydralazine and added PRN labetalol for better BP control  Monitor VS      PT/OT evaluation: For eval and treat  DVT prophylaxis/ GI prophylaxis: heparin/protonix  Disposition: continue current care     Anmol Tejada MD, PGY-1  Attending physician: Dr. Victorina Matthews  Department of Internal Medicine  Internal Medicine Residency / [address] [place] [address] case was discussed, including pertinent history and examination findings with the multidisciplinary team during bedside rounds. I have seen and examined the patient and the key elements of the encounter have been performed by myself. I have read and reviewed the documentation.  If needed or disputed the following findings, counseling and treatment options which have been corrected and communicated back to the patient, family if applicable and contributing physicians. I agree with the assessment, plan and orders as noted by the resident.       Enedina Martínez DO, Chalice Round  Professor of Internal Medicine  Pulmonary, Critical Care & Sleep Medicine  Internal Medicine Academic Faculty

## 2022-04-11 NOTE — CONSULTS
Pulmonary Consultation    Admit Date: 4/9/2022  Requesting Physician: Hector Or, DO    CC:  RLL Opacities, mediastinal and bilateral axillary lymphadenopathy    HPI:  Lucita Ayala is a 43 y.o. male previous smoker for 5 years quitting 2 years ago with a PMH of ESRD on HS, chronic nausea, CHF, HTN, hypothyroid, ruptured brain aneurysm s/p clipping, covid 3 months ago, and hx bronch RLL BAL with  +Aspergillus 1.89  8/3/2021 who presented to the ED with complains of chest pain with rest and activity along with dyspnea for the past 3 days prior to admission. No cough, known fevers or chills but does wake up with night sweats. Has chronic nausea, no vomiting.        PMH:    Past Medical History:   Diagnosis Date    (HFpEF) heart failure with preserved ejection fraction ([address]     stage III    Anxiety 9/19/2015    AVF (arteriovenous fistula) (HCC) 4/30/2018    Chronic diastolic (congestive) heart failure (HCC) 7/14/2021    Concerns of Amyloidosis on TTE 8/2021    Chronic kidney disease     CKD since early childhood per pt and on HD ~ 11 years    Depression     Encounter regarding vascular access for dialysis for ESRD ([address] 4/30/2018    History of ruptured Berry aneurysm ([address] 10/20/2015    Hypertension     on meds for ~ 11 years    Hypothyroidism     Intracranial hemorrhage (HCC)     was d/t ruptured aneurysm - pt underwent neurosurgery for clipping of the aneurysm    Neutropenia ([address]     Noncompliance w/medication treatment due to intermit use of medication 11/3/2015    Pre-transplant evaluation for kidney transplant 4/5/2017     PSH:    Past Surgical History:   Procedure Laterality Date    BRAIN ANEURYSM SURGERY Right 3-4 years ago    Intracranial aneurysm clipping surgery 3-4 years ago, after rupture of aneurysm causing ICH    BRONCHOSCOPY N/A 8/3/2021    BRONCHOSCOPY DIAGNOSTIC OR CELL [address] ONLY performed by Jamey Campbell MD at [facility] 11 years ago    UPPER GASTROINTESTINAL ENDOSCOPY N/A 1/3/2019    EGD BIOPSY performed by Bernett Mortimer, MD at [facility] ENDOSCOPY          Respiratory ROS: night sweats, cp, dyspnea. Otherwise, a complete review of systems is undertaken and is negative. Social History:  Social History     Socioeconomic History    Marital status:      Spouse name: Not on file    Number of children: 3    Years of education: Not on file    Highest education level: High school graduate   Occupational History    Not on file   Tobacco Use    Smoking status: Never Smoker    Smokeless tobacco: Never Used   Vaping Use    Vaping Use: Never used   Substance and Sexual Activity    Alcohol use: No    Drug use: Not Currently    Sexual activity: Yes     Partners: Female   Other Topics Concern    Not on file   Social History Narrative    Not on file     Social Determinants of Health     Financial Resource Strain: Medium Risk    Difficulty of Paying Living Expenses: Somewhat hard   Food Insecurity: No Food Insecurity    Worried About Running Out of Food in the Last Year: Never true    Juan of Food in the Last Year: Never true   Transportation Needs:     Lack of Transportation (Medical): Not on file    Lack of Transportation (Non-Medical):  Not on file   Physical Activity:     Days of Exercise per Week: Not on file    Minutes of Exercise per Session: Not on file   Stress:     Feeling of Stress : Not on file   Social Connections:     Frequency of Communication with Friends and Family: Not on file    Frequency of Social Gatherings with Friends and Family: Not on file    Attends Sabianist Services: Not on file    Active Member of Clubs or Organizations: Not on file    Attends Club or Organization Meetings: Not on file    Marital Status: Not on file   Intimate Partner Violence:     Fear of Current or Ex-Partner: Not on file    Emotionally Abused: Not on file    Physically Abused: Not on file    Sexually Abused: Not on file   Housing Stability:     Unable to Pay for Housing in the Last Year: Not on file    Number of Places Lived in the Last Year: Not on file    Unstable Housing in the Last Year: Not on file       Family History:  Family History   Problem Relation Age of Onset    Kidney Disease Paternal Grandmother     Cancer Neg Hx     Heart Disease Neg Hx        Medications:     sodium chloride        cloNIDine  0.3 mg Oral TID    hydrALAZINE  50 mg Oral TID    labetalol  300 mg Oral BID    losartan  100 mg Oral QPM    sevelamer  1,600 mg Oral TID    sodium chloride flush  5-40 mL IntraVENous 2 times per day    pantoprazole (PROTONIX) 40 mg injection  40 mg IntraVENous Daily    heparin (porcine)  5,000 Units SubCUTAneous 3 times per day    Arformoterol Tartrate  15 mcg Nebulization BID    budesonide  0.5 mg Nebulization BID    lidocaine  1 patch TransDERmal Daily         Vitals:    VITALS:  BP (!) 151/102   Pulse 89   Temp 98.8 °F (37.1 °C) (Oral)   Resp 18   Ht 5' 6\" (1.676 m)   Wt 142 lb 13.7 oz (64.8 kg)   SpO2 100%   BMI 23.06 kg/m²   24HR INTAKE/OUTPUT:      Intake/Output Summary (Last 24 hours) at 2022 0640  Last data filed at 4/10/2022 1325  Gross per 24 hour   Intake 0 ml   Output 2800 ml   Net -2800 ml     CURRENT PULSE OXIMETRY:  SpO2: 100 %  24HR PULSE OXIMETRY RANGE:  SpO2  Av.1 %  Min: 97 %  Max: 100 %      EXAM:  General: No distress. Eyes: PERRL. No sclera icterus. No conjunctival injection. ENT: No discharge. Pharynx clear. Neck: Trachea midline. Normal thyroid. Resp: No accessory muscle use. Exp wheezing throughout. No crackles. No rhonchi. CV: Tachycardic. Regular rhythm. No mumur or rub. ABD: Non-tender. Non-distended. No masses. No organmegaly. Normal bowel sounds. Skin: Warm and dry. No nodule on exposed extremities. No rash on exposed extremities. Lymph: No cervical LAD. No supraclavicular LAD. Ext: No joint deformity. No clubbing. No cyanosis.  No Detected   Chlamydophilia pneumoniae by PCR Latest Ref Range: Not Detected  Not Detected   Mycoplasma pneumoniae by PCR Latest Ref Range: Not Detected  Not Detected   SARS-CoV-2, PCR Latest Ref Range: Not Detected  Not Detected   Bordetella pertussis by PCR Latest Ref Range: Not Detected  Not Detected     ABG:   No results for input(s): PH, PO2, PCO2, HCO3, BE, O2SAT in the last 72 hours. Films:  XR CHEST  4/10/2022: Improved aeration of the right lower lobe. There is no effusion or pneumothorax. Cardiomegaly. The osseous structures are without acute process. Improved aeration of the right lower lobe compared to 04/09/2022. Cardiomegaly. XR CHEST 4/9/2022: Cardiomegaly is again identified. A patchy right lower lung field infiltrate is seen. No active pleural disease is identified. The mediastinal structures are without significant interval change. The visualized bony thorax is unremarkable for the patient's age. Right lower lung field pneumonia. CTA PULMONARY   4/9/2022: No filling defect in the pulmonary arteries to suggest pulmonary arterial embolism. There is cardiomegaly. No pericardial effusion. Enlarged pulmonary outflow tract measuring up to 46 mm. Pulmonary arteries are enlarged. Smooth interlobular septal thickening throughout both lungs. Mosaic ground-glass opacities bilaterally. Nodular centrilobular opacities are also present bilaterally. More confluent airspace opacities are located in right lower lobe. Subsegmental atelectasis also present in lung bases with trace right pleural effusion. No pneumothorax. Redemonstration of mediastinal lymphadenopathy. Redemonstration of bilateral axillary lymphadenopathy. View of the upper abdomen shows moderate atrophy of both kidneys. 1. No evidence of pulmonary embolism. 2. Cardiomegaly with pulmonary vascular congestion and findings suggestive of interstitial pulmonary edema.  3. Centrilobular nodular opacities are present bilaterally as well as more confluent opacities in right lung base could suggest pneumonia on background of interstitial pulmonary edema. 4. Trace right pleural effusion. 5. Enlarged pulmonary outflow tract suggesting pulmonary arterial hypertension. 6. Stable mediastinal and bilateral axillary lymphadenopathy. Assessment:  · Pneumonia  · Mediastinal/axillary lymphadenopathy  · ESRD on HD  · HTN  · Chronic anemia      Plan:  · Currently on 3L, wean to keep pox >90%  · Procal 0.59->0.66, treated by ID in past with zosyn/augmentin 08/21. Will discuss antibiotic therapy with Dr. Paulette Figueroa, may need ID consult given hx. · Previous bronch 08/0321 with +aspergillus. Cell count/differential revealing lymphocytes at 67%, CD4 425. Cytology with benign bronchial cells, squamous cells, pulmonary macrophages. Pneumocystis carinii negative, AFB negative - may need repeat bronch. Will discuss with Dr. Paulette Figueroa. · Respiratory panel negative. · Continue Pulmicort and brovanacarsononebs. · ProBNP >70,000 - HD pt   · HD per renal  · IS      Thank you for allowing us to see this patient in consultation. Please contact us with any questions. Electronically signed by KENDRA Michelle CNP on 4/11/2022 at 6:40 AM    Attending Attestation Note:    Patient seen and examined with NP. I agree with above. In addition, the following apply:    CTA Chest 4/9/2022:  1. No evidence of pulmonary embolism. 2. Cardiomegaly with pulmonary vascular congestion and findings suggestive of   interstitial pulmonary edema. 3. Centrilobular nodular opacities are present bilaterally as well as more   confluent opacities in right lung base could suggest pneumonia on background   of interstitial pulmonary edema. 4. Trace right pleural effusion. 5. Enlarged pulmonary outflow tract suggesting pulmonary arterial   hypertension.    6. Stable mediastinal and bilateral axillary lymphadenopathy.     - Continue Pulmicort and brovana, ele  - S/P bronchoscopy 8/3/2021, (+) galactomannan, (+) 1,3 B D glucan (fungitell)  - outpatient ID follow up   - procalcitonin is less impressive as patient is ESRD, no role for antibiotics at this time  - CLAY negative, ANCA (< 1:20), IgE < 2, IgG subclass WNL, IgG WNL, IgA WNL, IgM WNL  - BNP > 70,000  - HD per nephrology   - check respiratory panel, ESR, CRP, repeat 1,3 BD glucan, urine legionella, urine strep pneumo, blood cultures, troponin, EKG   - no role for thoracentesis or bronchoscopy at this time     Zayra Mina MD  4/11/2022  11:39 AM

## 2022-04-11 NOTE — PLAN OF CARE
Problem: Pain:  Goal: Pain level will decrease  Description: Pain level will decrease  4/11/2022 0053 by Barbara Reed RN  Outcome: Met This Shift  4/10/2022 2005 by Daryl Wiley RN  Outcome: Met This Shift  Goal: Control of acute pain  Description: Control of acute pain  4/10/2022 2005 by Daryl Wiley RN  Outcome: Met This Shift

## 2022-04-11 NOTE — FLOWSHEET NOTE
04/11/22 1631   Vital Signs   BP (!) 160/107   Temp 98.6 °F (37 °C)   Pulse 79   Weight 106 lb 4.2 oz (48.2 kg)   Weight Method Bed scale   Percent Weight Change -2.82   Post-Hemodialysis Assessment   Post-Treatment Procedures Blood returned; Access bleeding time < 10 minutes   Machine Disinfection Process Acid/Vinegar Clean;Heat Disinfect; Exterior Machine Disinfection   Rinseback Volume (ml) 300 ml   Total Liters Processed (l/min) 57.6 l/min   Dialyzer Clearance Lightly streaked   Duration of Treatment (minutes) 180 minutes   Hemodialysis Intake (ml) 300 ml   Hemodialysis Output (ml) 3000 ml   NET Removed (ml) 2700 ml   Tolerated Treatment Good   Patient Response to Treatment tolerated well, blood returned, needles pulled, sites held, stasis achieved, bandaids applied, +thirll, +bruit

## 2022-04-12 LAB
ALBUMIN SERPL-MCNC: 4.3 G/DL (ref 3.5–5.2)
ALP BLD-CCNC: 98 U/L (ref 40–129)
ALT SERPL-CCNC: 13 U/L (ref 0–40)
ANION GAP SERPL CALCULATED.3IONS-SCNC: 14 MMOL/L (ref 7–16)
ANION GAP SERPL CALCULATED.3IONS-SCNC: 14 MMOL/L (ref 7–16)
AST SERPL-CCNC: 22 U/L (ref 0–39)
BASOPHILS ABSOLUTE: 0.04 E9/L (ref 0–0.2)
BASOPHILS ABSOLUTE: 0.05 E9/L (ref 0–0.2)
BASOPHILS RELATIVE PERCENT: 1 % (ref 0–2)
BASOPHILS RELATIVE PERCENT: 1.2 % (ref 0–2)
BILIRUB SERPL-MCNC: 0.7 MG/DL (ref 0–1.2)
BILIRUBIN DIRECT: 0.2 MG/DL (ref 0–0.3)
BILIRUBIN, INDIRECT: 0.5 MG/DL (ref 0–1)
BUN BLDV-MCNC: 24 MG/DL (ref 6–20)
BUN BLDV-MCNC: 35 MG/DL (ref 6–20)
CALCIUM IONIZED: 1.23 MMOL/L (ref 1.15–1.33)
CALCIUM SERPL-MCNC: 9.6 MG/DL (ref 8.6–10.2)
CALCIUM SERPL-MCNC: 9.7 MG/DL (ref 8.6–10.2)
CHLORIDE BLD-SCNC: 95 MMOL/L (ref 98–107)
CHLORIDE BLD-SCNC: 96 MMOL/L (ref 98–107)
CO2: 27 MMOL/L (ref 22–29)
CO2: 29 MMOL/L (ref 22–29)
CREAT SERPL-MCNC: 5.7 MG/DL (ref 0.7–1.2)
CREAT SERPL-MCNC: 7 MG/DL (ref 0.7–1.2)
EOSINOPHILS ABSOLUTE: 0.38 E9/L (ref 0.05–0.5)
EOSINOPHILS ABSOLUTE: 0.39 E9/L (ref 0.05–0.5)
EOSINOPHILS RELATIVE PERCENT: 9.2 % (ref 0–6)
EOSINOPHILS RELATIVE PERCENT: 9.6 % (ref 0–6)
FIBRINOGEN: 432 MG/DL (ref 225–540)
GFR AFRICAN AMERICAN: 11
GFR AFRICAN AMERICAN: 14
GFR NON-AFRICAN AMERICAN: 11 ML/MIN/1.73
GFR NON-AFRICAN AMERICAN: 14 ML/MIN/1.73
GLUCOSE BLD-MCNC: 147 MG/DL (ref 74–99)
GLUCOSE BLD-MCNC: 83 MG/DL (ref 74–99)
HAV IGM SER IA-ACNC: NORMAL
HBV SURFACE AB TITR SER: REACTIVE {TITER}
HCT VFR BLD CALC: 40 % (ref 37–54)
HCT VFR BLD CALC: 41.9 % (ref 37–54)
HEMOGLOBIN: 11.9 G/DL (ref 12.5–16.5)
HEMOGLOBIN: 13 G/DL (ref 12.5–16.5)
HEPATITIS B CORE IGM ANTIBODY: NORMAL
HEPATITIS B SURFACE ANTIGEN INTERPRETATION: NORMAL
HEPATITIS C ANTIBODY INTERPRETATION: NORMAL
IMMATURE GRANULOCYTES #: 0.02 E9/L
IMMATURE GRANULOCYTES #: 0.02 E9/L
IMMATURE GRANULOCYTES %: 0.5 % (ref 0–5)
IMMATURE GRANULOCYTES %: 0.5 % (ref 0–5)
LYMPHOCYTES ABSOLUTE: 1.07 E9/L (ref 1.5–4)
LYMPHOCYTES ABSOLUTE: 1.12 E9/L (ref 1.5–4)
LYMPHOCYTES RELATIVE PERCENT: 26.4 % (ref 20–42)
LYMPHOCYTES RELATIVE PERCENT: 27.1 % (ref 20–42)
MAGNESIUM: 2.9 MG/DL (ref 1.6–2.6)
MAGNESIUM: 3.1 MG/DL (ref 1.6–2.6)
MCH RBC QN AUTO: 31.1 PG (ref 26–35)
MCH RBC QN AUTO: 31.9 PG (ref 26–35)
MCHC RBC AUTO-ENTMCNC: 29.8 % (ref 32–34.5)
MCHC RBC AUTO-ENTMCNC: 31 % (ref 32–34.5)
MCV RBC AUTO: 102.7 FL (ref 80–99.9)
MCV RBC AUTO: 104.4 FL (ref 80–99.9)
MONOCYTES ABSOLUTE: 0.43 E9/L (ref 0.1–0.95)
MONOCYTES ABSOLUTE: 0.55 E9/L (ref 0.1–0.95)
MONOCYTES RELATIVE PERCENT: 10.1 % (ref 2–12)
MONOCYTES RELATIVE PERCENT: 13.9 % (ref 2–12)
NEUTROPHILS ABSOLUTE: 1.89 E9/L (ref 1.8–7.3)
NEUTROPHILS ABSOLUTE: 2.24 E9/L (ref 1.8–7.3)
NEUTROPHILS RELATIVE PERCENT: 47.9 % (ref 43–80)
NEUTROPHILS RELATIVE PERCENT: 52.6 % (ref 43–80)
PDW BLD-RTO: 16.1 FL (ref 11.5–15)
PDW BLD-RTO: 16.2 FL (ref 11.5–15)
PHOSPHORUS: 3.7 MG/DL (ref 2.5–4.5)
PHOSPHORUS: 4.6 MG/DL (ref 2.5–4.5)
PLATELET # BLD: 130 E9/L (ref 130–450)
PLATELET # BLD: 87 E9/L (ref 130–450)
PLATELET CONFIRMATION: NORMAL
PMV BLD AUTO: 10.2 FL (ref 7–12)
PMV BLD AUTO: 11 FL (ref 7–12)
POTASSIUM SERPL-SCNC: 3.9 MMOL/L (ref 3.5–5)
POTASSIUM SERPL-SCNC: 4.3 MMOL/L (ref 3.5–5)
PROCALCITONIN: 0.64 NG/ML (ref 0–0.08)
RBC # BLD: 3.83 E12/L (ref 3.8–5.8)
RBC # BLD: 4.08 E12/L (ref 3.8–5.8)
SODIUM BLD-SCNC: 137 MMOL/L (ref 132–146)
SODIUM BLD-SCNC: 138 MMOL/L (ref 132–146)
TOTAL PROTEIN: 7.3 G/DL (ref 6.4–8.3)
WBC # BLD: 4 E9/L (ref 4.5–11.5)
WBC # BLD: 4.3 E9/L (ref 4.5–11.5)

## 2022-04-12 PROCEDURE — 36415 COLL VENOUS BLD VENIPUNCTURE: CPT

## 2022-04-12 PROCEDURE — 6360000002 HC RX W HCPCS: Performed by: INTERNAL MEDICINE

## 2022-04-12 PROCEDURE — 90945 DIALYSIS ONE EVALUATION: CPT | Performed by: INTERNAL MEDICINE

## 2022-04-12 PROCEDURE — 80048 BASIC METABOLIC PNL TOTAL CA: CPT

## 2022-04-12 PROCEDURE — 80076 HEPATIC FUNCTION PANEL: CPT

## 2022-04-12 PROCEDURE — 6370000000 HC RX 637 (ALT 250 FOR IP): Performed by: INTERNAL MEDICINE

## 2022-04-12 PROCEDURE — 86022 PLATELET ANTIBODIES: CPT

## 2022-04-12 PROCEDURE — 84145 PROCALCITONIN (PCT): CPT

## 2022-04-12 PROCEDURE — 84100 ASSAY OF PHOSPHORUS: CPT

## 2022-04-12 PROCEDURE — 83735 ASSAY OF MAGNESIUM: CPT

## 2022-04-12 PROCEDURE — 99233 SBSQ HOSP IP/OBS HIGH 50: CPT | Performed by: INTERNAL MEDICINE

## 2022-04-12 PROCEDURE — 85384 FIBRINOGEN ACTIVITY: CPT

## 2022-04-12 PROCEDURE — 1200000000 HC SEMI PRIVATE

## 2022-04-12 PROCEDURE — 86481 TB AG RESPONSE T-CELL SUSP: CPT

## 2022-04-12 PROCEDURE — 85025 COMPLETE CBC W/AUTO DIFF WBC: CPT

## 2022-04-12 PROCEDURE — 82330 ASSAY OF CALCIUM: CPT

## 2022-04-12 PROCEDURE — 94640 AIRWAY INHALATION TREATMENT: CPT

## 2022-04-12 PROCEDURE — 6370000000 HC RX 637 (ALT 250 FOR IP): Performed by: STUDENT IN AN ORGANIZED HEALTH CARE EDUCATION/TRAINING PROGRAM

## 2022-04-12 RX ADMIN — DIPHENHYDRAMINE HYDROCHLORIDE 25 MG: 25 TABLET ORAL at 11:34

## 2022-04-12 RX ADMIN — PANTOPRAZOLE SODIUM 40 MG: 40 TABLET, DELAYED RELEASE ORAL at 06:02

## 2022-04-12 RX ADMIN — ARFORMOTEROL TARTRATE 15 MCG: 15 SOLUTION RESPIRATORY (INHALATION) at 19:46

## 2022-04-12 RX ADMIN — NIFEDIPINE 60 MG: 30 TABLET, EXTENDED RELEASE ORAL at 11:35

## 2022-04-12 RX ADMIN — Medication 5 MG: at 20:41

## 2022-04-12 RX ADMIN — LABETALOL HYDROCHLORIDE 300 MG: 100 TABLET, FILM COATED ORAL at 11:35

## 2022-04-12 RX ADMIN — DIPHENHYDRAMINE HYDROCHLORIDE 25 MG: 25 TABLET ORAL at 20:42

## 2022-04-12 RX ADMIN — LOSARTAN POTASSIUM 100 MG: 25 TABLET, FILM COATED ORAL at 17:12

## 2022-04-12 RX ADMIN — SENNOSIDES 8.6 MG: 8.6 TABLET, COATED ORAL at 20:42

## 2022-04-12 RX ADMIN — SEVELAMER CARBONATE 1600 MG: 800 TABLET, FILM COATED ORAL at 20:42

## 2022-04-12 RX ADMIN — CLONIDINE HYDROCHLORIDE 0.3 MG: 0.1 TABLET ORAL at 13:55

## 2022-04-12 RX ADMIN — CLONIDINE HYDROCHLORIDE 0.3 MG: 0.1 TABLET ORAL at 20:42

## 2022-04-12 RX ADMIN — SEVELAMER CARBONATE 1600 MG: 800 TABLET, FILM COATED ORAL at 13:56

## 2022-04-12 RX ADMIN — LABETALOL HYDROCHLORIDE 300 MG: 100 TABLET, FILM COATED ORAL at 20:41

## 2022-04-12 RX ADMIN — BUDESONIDE 500 MCG: 0.5 INHALANT RESPIRATORY (INHALATION) at 19:46

## 2022-04-12 RX ADMIN — HYDRALAZINE HYDROCHLORIDE 50 MG: 25 TABLET, FILM COATED ORAL at 20:42

## 2022-04-12 RX ADMIN — HYDRALAZINE HYDROCHLORIDE 50 MG: 25 TABLET, FILM COATED ORAL at 13:56

## 2022-04-12 ASSESSMENT — PAIN SCALES - GENERAL
PAINLEVEL_OUTOF10: 0

## 2022-04-12 NOTE — PROGRESS NOTES
[facility]  Nephrology Progress Note    History of Present Illness from 4/10 Consult Note: \"Abdelrahman Valentin is a 43 y. o. male with a history of hypertension, ESRD HD dependent MWF, HFpEF and several other medical problems who now presents to ED on 4/9/2022 with chest pain. Vitals upon arrival included blood pressure 146/93, temperature 97.8, pulse 90, respirations 14 and 99% O2 saturation. Pertinent labs included sodium 142, potassium 4.8, chloride 97, CO2 28, BUN 26 and creatinine 6.1. CBC included WBC 6.3, hemoglobin 10.9, hematocrit 35.1 and platelet count 458. CTA of the chest showed right lower lung field pneumonia. Patient was subsequently admitted with atypical chest pain.      Patient currently seen on hemodialysis. He is alert and oriented. States that the chest pain began during his most recent dialysis outpatient session on 4/8/2022. He denies any radiation to his back shoulders or jaw. He denies any shortness of breath at rest.  He also denies any nausea, vomiting or diarrhea. He is oliguric. \"    Subjective:    4/12: Patient was seen and examined on HD. He reports that he feels \"good\" today. He denies any current chest pain or shortness of breath. Denies any abdominal pain or nausea.     PMH:    Past Medical History:   Diagnosis Date    (HFpEF) heart failure with preserved ejection fraction ([address]     stage III    Anxiety 9/19/2015    AVF (arteriovenous fistula) (HCC) 4/30/2018    Chronic diastolic (congestive) heart failure (HCC) 7/14/2021    Concerns of Amyloidosis on TTE 8/2021    Chronic kidney disease     CKD since early childhood per pt and on HD ~ 11 years    Depression     Encounter regarding vascular access for dialysis for ESRD ([address] 4/30/2018    History of ruptured Berry aneurysm ([address] 10/20/2015    Hypertension     on meds for ~ 11 years    Hypothyroidism     Intracranial hemorrhage (HCC)     was d/t ruptured aneurysm - pt underwent neurosurgery for clipping of the aneurysm    Neutropenia (HCC)     Noncompliance w/medication treatment due to intermit use of medication 11/3/2015    Pre-transplant evaluation for kidney transplant 4/5/2017     Patient Active Problem List   Diagnosis    Stage 5 chronic kidney disease on chronic dialysis ([facility]    H/O intracranial hemorrhage and aneurysm clipping on the right side    Anemia, chronic disease    Anxiety    Noncompliance    AVF (arteriovenous fistula) (HCC)    Severe protein-calorie malnutrition (HCC)    Venous hypertension, chronic, left    Iron deficiency    Secondary hyperparathyroidism of renal origin ([facility]    Atypical chest pain    Essential hypertension    Acute respiratory failure with hypoxia (HCC)    Pneumonia    Pericardial effusion    Anemia    Chronic systolic congestive heart failure (HCC)    Acute pulmonary edema (HCC)    Volume overload     Meds:     pantoprazole  40 mg Oral QAM AC    NIFEdipine  60 mg Oral Daily    docusate sodium  100 mg Oral Daily    senna  1 tablet Oral Nightly    cloNIDine  0.3 mg Oral TID    hydrALAZINE  50 mg Oral TID    labetalol  300 mg Oral BID    losartan  100 mg Oral QPM    sevelamer  1,600 mg Oral TID    sodium chloride flush  5-40 mL IntraVENous 2 times per day    heparin (porcine)  5,000 Units SubCUTAneous 3 times per day    Arformoterol Tartrate  15 mcg Nebulization BID    budesonide  0.5 mg Nebulization BID    lidocaine  1 patch TransDERmal Daily      dextrose      sodium chloride       Meds prn:     glucose, dextrose, glucagon (rDNA), dextrose, labetalol, melatonin, sodium chloride flush, sodium chloride, polyethylene glycol, ipratropium-albuterol, hydrALAZINE, diphenhydrAMINE, nitroGLYCERIN, ondansetron, sodium chloride flush    Meds prior to admission:     No current facility-administered medications on file prior to encounter.      Current Outpatient Medications on File Prior to Encounter   Medication Sig Dispense Refill    lidocaine-prilocaine (EMLA) 2.5-2.5 % cream Apply topically as needed for Pain Apply topically as needed.  hydrALAZINE (APRESOLINE) 50 MG tablet Take 1 tablet by mouth 3 times daily 90 tablet 3    labetalol (NORMODYNE) 300 MG tablet Take 1 tablet by mouth 2 times daily 60 tablet 3    diphenhydrAMINE (BENADRYL) 25 MG tablet Take 1 tablet by mouth 2 times daily as needed for Itching, Allergies or Sleep 30 tablet 2    cloNIDine (CATAPRES) 0.3 MG tablet Take 1 tablet by mouth 3 times daily 90 tablet 3    losartan (COZAAR) 100 MG tablet Take 1 tablet by mouth every evening 30 tablet 3    albuterol-ipratropium (COMBIVENT RESPIMAT)  MCG/ACT AERS inhaler Inhale 1 puff into the lungs every 6 hours (Patient taking differently: Inhale 2 puffs into the lungs every 6 hours ) 1 each 3    vitamin D (ERGOCALCIFEROL) 1.25 MG (77872 UT) CAPS capsule Take 1 capsule by mouth once a week for 11 doses 5 capsule 0    NIFEdipine (ADALAT CC) 60 MG extended release tablet Take 90 mg by mouth daily       Melatonin 5 MG CAPS Take 5 mg by mouth nightly as needed       Methoxy PEG-Epoetin Beta (MIRCERA IJ) Infuse 60 mcg intravenously every 14 days       sevelamer (RENVELA) 800 MG tablet Take 2 tablets by mouth 3 times daily        Allergies:    Tylenol [acetaminophen] and Levofloxacin    Social History:     reports that he has never smoked. He has never used smokeless tobacco. He reports previous drug use. He reports that he does not drink alcohol.     Family History:         Problem Relation Age of Onset    Kidney Disease Paternal Grandmother     Cancer Neg Hx     Heart Disease Neg Hx      Physical Exam:    Patient Vitals for the past 24 hrs:   BP Temp Temp src Pulse Resp SpO2 Weight   04/12/22 0804 131/81 -- -- 78 -- -- --   04/12/22 0734 139/83 -- -- 76 -- -- --   04/12/22 0704 (!) 141/93 -- -- 76 -- -- --   04/12/22 0633 (!) 147/94 -- -- 79 -- -- --   04/12/22 0628 (!) 150/104 97.9 °F (36.6 °C) -- 79 18 -- 104 lb 11.5 oz (47.5 kg) 04/11/22 2003 -- -- -- -- -- 96 % --   04/11/22 1745 (!) 159/104 98.2 °F (36.8 °C) Oral 80 18 99 % --   04/11/22 1631 (!) 160/107 98.6 °F (37 °C) -- 79 -- -- 106 lb 4.2 oz (48.2 kg)   04/11/22 1601 (!) 166/108 -- -- 81 -- -- --   04/11/22 1531 (!) 179/119 -- -- 83 -- -- --   04/11/22 1501 (!) 192/119 -- -- 88 -- -- --   04/11/22 1431 (!) 180/118 -- -- 87 -- -- --   04/11/22 1401 (!) 185/120 -- -- 85 -- -- --   04/11/22 1335 (!) 169/109 -- -- 84 -- -- --   04/11/22 1301 (!) 166/107 -- -- 84 -- -- --   04/11/22 1255 (!) 164/112 98.1 °F (36.7 °C) -- 86 -- -- 109 lb 5.6 oz (49.6 kg)   04/11/22 1200 (!) 173/121 97.4 °F (36.3 °C) Oral 88 18 94 % --   04/11/22 1152 -- -- -- 82 -- -- --       Intake/Output Summary (Last 24 hours) at 4/12/2022 0829  Last data filed at 4/11/2022 1631  Gross per 24 hour   Intake 300 ml   Output 3000 ml   Net -2700 ml     General: Awake, alert, no acute distress  Neck: no JVD noted  Cardiovascular: regular rate and rhythm, no rub  Respiratory: Clear bilaterally upper, diminished bases bilaterally. Unlabored  Gastrointestinal: soft, nontender, nondistended, active bowel sounds  Ext: No edema; left AVF  Neuro: Awake, alert, answers questions appropriately  Skin: dry, no rash on exposed extremities    Data:    Recent Labs     04/10/22  1543 04/11/22  0505 04/12/22  0456   WBC 6.0 4.5 4.3*   HGB 10.6* 10.7* 11.9*   HCT 34.5* 35.7* 40.0   .6* 105.9* 104.4*    90* 87*     Recent Labs     04/10/22  1543 04/11/22  0505 04/11/22  1752    137 137   K 3.9 5.5* 3.6   CL 95* 96* 97*   CO2 30* 23 28   CREATININE 4.7* 6.5* 3.7*   BUN 16 28* 13   LABGLOM 17 12 22   GLUCOSE 85 58* 139*   CALCIUM 9.3 9.7 9.5   PHOS 3.3 4.6* 2.5   MG 2.3 2.7* 2.4     Vit D, 25-Hydroxy   Date Value Ref Range Status   01/23/2022 17 (L) 30 - 100 ng/mL Final     Comment:     <20 ng/mL. ........... Martha Money Deficient  20-30 ng/mL. ......... Martha Money Insufficient   ng/mL. ........ Martha Money Sufficient  >100 ng/mL. .......... Martha Money Toxic PTH   Date Value Ref Range Status   01/05/2019 418 (H) 15 - 65 pg/mL Final     Recent Labs     04/10/22  1450 04/11/22  0505 04/12/22  0456   ALT 15 15 13   AST 39 39 22   ALKPHOS 91 81 98   BILITOT 0.7 0.7 0.7   BILIDIR <0.2 <0.2 0.2     Recent Labs     04/10/22  1450 04/11/22  0505 04/12/22  0456   LABALBU 4.3 3.9 4.3     Ferritin   Date Value Ref Range Status   01/27/2022 4,215 ng/mL Final     Comment:     FERRITIN Reference Ranges:  Adult Males   20 - 60 years:    30 - 400 ng/mL  Adult females 16 - 61 years:    15 - 150 ng/mL  Adults greater than 60 years:   no established reference range  Pediatrics:                     no established reference range       Iron   Date Value Ref Range Status   09/06/2021 33 (L) 59 - 158 mcg/dL Final     TIBC   Date Value Ref Range Status   09/06/2021 202 (L) 250 - 450 mcg/dL Final     Vitamin B-12   Date Value Ref Range Status   04/10/2022 829 211 - 946 pg/mL Final     Folate   Date Value Ref Range Status   04/10/2022 18.0 4.8 - 24.2 ng/mL Final     No results found for: VOL, APPEARANCE, COLORU, LABSPEC, LABPH, LEUKBLD, NITRU, GLUCOSEU, KETUA, UROBILINOGEN, KETUA, UROBILINOGEN, BILIRUBINUR, OCBU    No results found for: LUZ MARIA, CREURRAN, MACREATRATIO, OSMOU    No components found for: URIC    No results found for: LIPIDPAN    Assessment and Plan:    1. ESRD on HD   OP [facility] MWF   via left AVF  S/p emergent dialysis 4/10 after receiving IV dye with CTA  Continue MWF while inpatient     2. HTN   BP goal<140/80  BP nearing goal  Follow on current regimen and with HD     3. Secondary hyperparathryoidism of renal origin  Phosphorus 3.7 today    PTH noted from 3/14/2022 in the outpatient setting was 591  On Renvela  Follow     4. Anemia  Hemoglobin target 10-12  Hemoglobin 11.9 today  No PREETI as hemoglobin at target  Transfuse for hemoglobin<7  Follow     5. Chest pain  Management per primary    6.   Hyperkalemia  Likely in setting of ESRD  K+ 3.9 today   Low potassium diet  For HD today  Follow    KENDRA Flynn - CNP     Patient seen and examined all key components of the physical performed independently , case discussed with NP, all pertinent labs and radiologic tests personally reviewed agree with above.       Sean Naranjo MD

## 2022-04-12 NOTE — PROGRESS NOTES
Pulmonary Progress Note    Admit Date: 2022  Requesting Physician: Zafar Cooper DO     SUBJECTIVE:   Seen in dialysis, eating breakfast  Feels good today, no dyspnea, cough or CP  On room air      Medications:     dextrose      sodium chloride        pantoprazole  40 mg Oral QAM AC    NIFEdipine  60 mg Oral Daily    docusate sodium  100 mg Oral Daily    senna  1 tablet Oral Nightly    cloNIDine  0.3 mg Oral TID    hydrALAZINE  50 mg Oral TID    labetalol  300 mg Oral BID    losartan  100 mg Oral QPM    sevelamer  1,600 mg Oral TID    sodium chloride flush  5-40 mL IntraVENous 2 times per day    [Held by provider] heparin (porcine)  5,000 Units SubCUTAneous 3 times per day    Arformoterol Tartrate  15 mcg Nebulization BID    budesonide  0.5 mg Nebulization BID    lidocaine  1 patch TransDERmal Daily         Vitals:    VITALS:  BP (!) 135/92   Pulse 71   Temp 97.9 °F (36.6 °C)   Resp 18   Ht 5' 6\" (1.676 m)   Wt 100 lb 8.5 oz (45.6 kg)   SpO2 99%   BMI 16.23 kg/m²   24HR INTAKE/OUTPUT:      Intake/Output Summary (Last 24 hours) at 2022 0914  Last data filed at 2022 0834  Gross per 24 hour   Intake 600 ml   Output 5500 ml   Net -4900 ml     CURRENT PULSE OXIMETRY:  SpO2: 99 %  24HR PULSE OXIMETRY RANGE:  SpO2  Av %  Min: 94 %  Max: 99 %      EXAM:  General: No distress. Eyes: PERRL. No sclera icterus. No conjunctival injection. ENT: No discharge. Pharynx clear. Neck: Trachea midline. Normal thyroid. Resp: No accessory muscle use. Diminished throughout, no wheezing. No crackles. No rhonchi. CV: Tachycardic. Regular rhythm. No mumur or rub. ABD: Non-tender. Non-distended. No masses. No organmegaly. Normal bowel sounds. Skin: Warm and dry. No nodule on exposed extremities. No rash on exposed extremities. Lymph: No cervical LAD. No supraclavicular LAD. Ext: No joint deformity. No clubbing. No cyanosis. No edema.  LUE with HD graft +thrill/bruit  Neuro: Awake. Follows commands      Lab Results:  CBC:   Recent Labs     04/10/22  1543 04/11/22  0505 04/12/22  0456   WBC 6.0 4.5 4.3*   HGB 10.6* 10.7* 11.9*   HCT 34.5* 35.7* 40.0   .6* 105.9* 104.4*    90* 87*     BMP:   Recent Labs     04/10/22  1543 04/11/22  0505 04/11/22  1752    137 137   K 3.9 5.5* 3.6   CL 95* 96* 97*   CO2 30* 23 28   PHOS 3.3 4.6* 2.5   BUN 16 28* 13   CREATININE 4.7* 6.5* 3.7*     LFT:   Recent Labs     04/10/22  1450 04/11/22  0505 04/12/22  0456   ALKPHOS 91 81 98   ALT 15 15 13   AST 39 39 22   PROT 7.1 6.3* 7.3   BILITOT 0.7 0.7 0.7   BILIDIR <0.2 <0.2 0.2   LABALBU 4.3 3.9 4.3     PT/INR:   Recent Labs     04/11/22  1751   PROTIME 12.4   INR 1.1       Cultures:  Results for Malorie Momin (MRN [id number]) as of 4/10/2022    Ref.  Range 4/10/2022 08:18   Influenza A by PCR Latest Ref Range: Not Detected  Not Detected   Influenza B by PCR Latest Ref Range: Not Detected  Not Detected   Adenovirus by PCR Latest Ref Range: Not Detected  Not Detected   Coronavirus 229E by PCR Latest Ref Range: Not Detected  Not Detected   Coronavirus HKU1 by PCR Latest Ref Range: Not Detected  Not Detected   Coronavirus NL63 by PCR Latest Ref Range: Not Detected  Not Detected   Coronavirus OC43 by PCR Latest Ref Range: Not Detected  Not Detected   Human Metapneumovirus by PCR Latest Ref Range: Not Detected  Not Detected   Human Rhinovirus/Enterovirus by PCR Latest Ref Range: Not Detected  Not Detected   Parainfluenza Virus 1 by PCR Latest Ref Range: Not Detected  Not Detected   Parainfluenza Virus 2 by PCR Latest Ref Range: Not Detected  Not Detected   Parainfluenza Virus 3 by PCR Latest Ref Range: Not Detected  Not Detected   Parainfluenza Virus 4 by PCR Latest Ref Range: Not Detected  Not Detected   Respiratory Syncytial Virus by PCR Latest Ref Range: Not Detected  Not Detected   Bordetella parapertussis by PCR Latest Ref Range: Not Detected  Not Detected Chlamydophilia pneumoniae by PCR Latest Ref Range: Not Detected  Not Detected   Mycoplasma pneumoniae by PCR Latest Ref Range: Not Detected  Not Detected   SARS-CoV-2, PCR Latest Ref Range: Not Detected  Not Detected   Bordetella pertussis by PCR Latest Ref Range: Not Detected  Not Detected     ABG:   No results for input(s): PH, PO2, PCO2, HCO3, BE, O2SAT in the last 72 hours. Films:  XR CHEST  4/10/2022: Improved aeration of the right lower lobe. There is no effusion or pneumothorax. Cardiomegaly. The osseous structures are without acute process. Improved aeration of the right lower lobe compared to 04/09/2022. Cardiomegaly. XR CHEST 4/9/2022: Cardiomegaly is again identified. A patchy right lower lung field infiltrate is seen. No active pleural disease is identified. The mediastinal structures are without significant interval change. The visualized bony thorax is unremarkable for the patient's age. Right lower lung field pneumonia. CTA PULMONARY   4/9/2022: No filling defect in the pulmonary arteries to suggest pulmonary arterial embolism. There is cardiomegaly. No pericardial effusion. Enlarged pulmonary outflow tract measuring up to 46 mm. Pulmonary arteries are enlarged. Smooth interlobular septal thickening throughout both lungs. Mosaic ground-glass opacities bilaterally. Nodular centrilobular opacities are also present bilaterally. More confluent airspace opacities are located in right lower lobe. Subsegmental atelectasis also present in lung bases with trace right pleural effusion. No pneumothorax. Redemonstration of mediastinal lymphadenopathy. Redemonstration of bilateral axillary lymphadenopathy. View of the upper abdomen shows moderate atrophy of both kidneys. 1. No evidence of pulmonary embolism. 2. Cardiomegaly with pulmonary vascular congestion and findings suggestive of interstitial pulmonary edema.  3. Centrilobular nodular opacities are present bilaterally as well as more confluent opacities in right lung base could suggest pneumonia on background of interstitial pulmonary edema. 4. Trace right pleural effusion. 5. Enlarged pulmonary outflow tract suggesting pulmonary arterial hypertension. 6. Stable mediastinal and bilateral axillary lymphadenopathy. Assessment:  · Pneumonia  · Mediastinal/axillary lymphadenopathy  · ESRD on HD  · HTN  · Chronic anemia      Plan:  · On room air. O2 if needed. · Procal 0.59->0.66 (renal pt), treated by ID in past with zosyn/augmentin 08/21. No antibiotic therapy indicated at this time. Outpt ID follow up. · Previous bronch 08/0321 with (+) galactomannan, (+) 1,3 B D glucan (fungitell). Cell count/differential revealing lymphocytes at 67%, CD4 425. Cytology with benign bronchial cells, squamous cells, pulmonary macrophages. Pneumocystis carinii negative, AFB negative. · Respiratory panel negative. · Continue Pulmicort and brovana, duonebs. · CRP 2.4, ESR 10. CLAY negative, ANCA (< 1:20), IgE < 2, IgG subclass WNL, IgG WNL, IgA WNL, IgM WNL. repeat 1,3 BD glucan   · ProBNP >70,000 - ESRD   · HD per renal  · IS      KENDRA Alvarez - CNP  4/12/2022  9:14 AM    Seen and evaluated, agree with above assessment and plan  CT scan of the chest during this hospitalization was compared with January 2022, improvement noticed. Keep watching off antibiotics, work-up so far negative for specific etiology. Right ventricle and right atrium looks enlarged by CT scan of the chest.  Last echocardiogram September 2021 with  moderate MR and mild right atrium and ventricle distention. Favor to repeat echocardiogram and proceed with right-sided cardiac cath if concern for pulmonary hypertension & valvulopathy.   If discharged home, follow-up with pulmonary in 2 to 4 weeks

## 2022-04-12 NOTE — FLOWSHEET NOTE
04/12/22 0834   Vital Signs   BP (!) 135/92   Temp 97.9 °F (36.6 °C)   Pulse 71   Resp 18   Weight 100 lb 8.5 oz (45.6 kg)   Weight Method Bed scale   Percent Weight Change -4   Pain Assessment   Pain Assessment 0-10   Pain Level 0   Post-Hemodialysis Assessment   Post-Treatment Procedures Blood returned; Access bleeding time < 10 minutes   Machine Disinfection Process Acid/Vinegar Clean;Bleach; Machine Absence of [employer]; Exterior Machine Disinfection   Rinseback Volume (ml) 300 ml   Total Liters Processed (l/min) 0 l/min   Dialyzer Clearance Lightly streaked   Duration of Treatment (minutes) 120 minutes   Heparin amount administered during treatment (units) 0 units   Hemodialysis Intake (ml) 300 ml   Hemodialysis Output (ml) 2500 ml   NET Removed (ml) 2200 ml   Tolerated Treatment Good   Patient Response to Treatment tolerated well, profile B, refill noted, 2200cc fluid removal   Bilateral Breath Sounds Clear   Edema Generalized None

## 2022-04-12 NOTE — PROGRESS NOTES
[facility]  Internal Medicine Residency Program  Progress Note - House Team     Patient:  Yasmin Rowe 43 y.o. male MRN: [id number]     Date of Service: 4/12/2022     CC: right sided chest pain, radiating to left     Days since admission: 3    Subjective     Overnight events: No acute overnight events, patient complained of constipation - started on colace and senna     Patient seen and examined at beside this morning in no acute distress. He had just returned from HD, but said they only took fluid off of him. Patient had no chest pain this morning upon returning, but was having some foot cramping. He denies any N/V. Denies any abdominal pain. Denies any headache. Patient says he feels his breathing is improving. Objective     Physical Exam:  Vitals: BP (!) 141/93   Pulse 81   Temp 98.1 °F (36.7 °C) (Oral)   Resp 18   Ht 5' 6\" (1.676 m)   Wt 100 lb 8.5 oz (45.6 kg)   SpO2 98%   BMI 16.23 kg/m²     I & O - 24hr:   Intake/Output Summary (Last 24 hours) at 4/12/2022 1438  Last data filed at 4/12/2022 1145  Gross per 24 hour   Intake 1080 ml   Output 5500 ml   Net -4420 ml      General Appearance: alert, appears stated age and cooperative  HEENT:  Head: Normocephalic, no lesions, without obvious abnormality. Neck: no adenopathy, no JVD, supple, symmetrical, trachea midline and thyroid not enlarged, symmetric, no tenderness/mass/nodules  Lung: clear to auscultation bilaterally  Heart: Left sternal boarder systolic murmur. Laterally displaced PMI. Abdomen: soft, non-tender; bowel sounds normal; no masses,  no organomegaly  Extremities:  extremities normal, atraumatic, no cyanosis or edema  Musculokeletal: No joint swelling, no muscle tenderness. ROM normal in all joints of extremities.    Neurologic: Mental status: Alert, oriented, thought content appropriate  Subject  Pertinent Information & Imaging Studies, Consults   denice  CBC:   Lab Results   Component Value Date    WBC 4.0 04/12/2022 RBC 4.08 04/12/2022    HGB 13.0 04/12/2022    HCT 41.9 04/12/2022    .7 04/12/2022    MCH 31.9 04/12/2022    MCHC 31.0 04/12/2022    RDW 16.1 04/12/2022     04/12/2022    MPV 11.0 04/12/2022     BMP:    Lab Results   Component Value Date     04/12/2022    K 3.9 04/12/2022    K 4.8 04/09/2022    CL 96 04/12/2022    CO2 27 04/12/2022    BUN 24 04/12/2022    LABALBU 4.3 04/12/2022    CREATININE 5.7 04/12/2022    CALCIUM 9.6 04/12/2022    GFRAA 14 04/12/2022    LABGLOM 14 04/12/2022    GLUCOSE 147 04/12/2022       IMAGING:   Imaging Studies:    XR CHEST PORTABLE    Result Date: 4/10/2022  Improved aeration of the right lower lobe compared to 04/09/2022. Cardiomegaly. XR CHEST PORTABLE    Result Date: 4/9/2022  Right lower lung field pneumonia. CTA PULMONARY W CONTRAST    Result Date: 4/9/2022  1. No evidence of pulmonary embolism. 2. Cardiomegaly with pulmonary vascular congestion and findings suggestive of interstitial pulmonary edema. 3. Centrilobular nodular opacities are present bilaterally as well as more confluent opacities in right lung base could suggest pneumonia on background of interstitial pulmonary edema. 4. Trace right pleural effusion. 5. Enlarged pulmonary outflow tract suggesting pulmonary arterial hypertension. 6. Stable mediastinal and bilateral axillary lymphadenopathy.                     OXYGENATION: 99% room air     DIET: Low salt        Student's Assessment and Plan     Ruthy Vega is a 43 y.o. male with  has a past medical history of (HFpEF) heart failure with preserved ejection fraction ([address] Anxiety, AVF (arteriovenous fistula) (HCC), Chronic diastolic (congestive) heart failure ([address] Chronic kidney disease, Depression, Encounter regarding vascular access for dialysis for ESRD [facility]), History of ruptured Berry aneurysm ([address] Hypertension, Hypothyroidism, Intracranial hemorrhage ([address] Neutropenia ([address] Noncompliance w/medication treatment due to intermit use of medication, and Pre-transplant evaluation for kidney transplant. came here with CC   Chief Complaint   Patient presents with    Chest Pain     L chest pain x2 days, present when talking and it is reproducible to palpation. No pleuritic aggravation, no radiation. Pt reports SOB and nausea (no emesis). No diaphoresis. Given 2 ntg w mild relief.                 Consults:   Pulmonology   Nephrology      Assessment/Plan   Atypical chest pain likely 2/2 fluid overload vs costochondritis vs pneumonia vs paracarditis    Monitor chest pain after HD  Follow Pulmonology recs   Breathing treatments, incentive spirometry   Respiratory panel negative   No abx at this time procal .66  Room air - O2 NC as needed  Echocardiogram   F/u Sed rate - 10 , CRP 2.4  F/u HIV and CD4 and hepatitis   Repeat Procal  TB work up   ESRD on HD ([facility]) - known to Dr. Chris Chisholm  Nephrology following - HD  Discuss possibility of daily HD  Educated patient on importance of diet  3. 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 Kamila Sarah  Department of Internal Medicine  Internal Medicine Residency / [address] [place] [address] case was discussed, including pertinent history and examination findings with the multidisciplinary team during bedside rounds. I have seen and examined the patient and the key elements of the encounter have been performed by myself. I have read and reviewed the documentation.  If needed or disputed the following findings, counseling and treatment options which have been corrected and communicated back to the patient, family if applicable and contributing physicians. I agree with the assessment, plan and orders as noted by the resident.       Asif Mora DO , Ann Nguyen  Professor of Internal Medicine  Pulmonary, Critical Care & Sleep Medicine  Internal Medicine Academic Faculty

## 2022-04-12 NOTE — CARE COORDINATION
Per notes here for chest pain sob and nausea. ESRD on HD. Bun today 24 creat 5. 7. Currently not on any o2. Met with patient to discuss discharge plan. He is from home- lives alone. He comes to  clinic here and uses [employer]. He goes to [address] [place] [address] time 4198. He is independent. He owns no DME or oxygen. His discharge plan is home and he will need transportation thru his insurance. Cm/sw to follow for any needs. Electronically signed by Shanice Portillo RN on 4/12/2022 at 2:38 PM

## 2022-04-12 NOTE — PROGRESS NOTES
[facility] [address]  Internal Medicine Residency Program  Progress Note - House Team     Patient:  Ariel Trejo 43 y.o. male MRN: [id number]     Date of Service: 4/12/2022     CC: Chest pain  Overnight events: no acute      Subjective     Patient was seen and examined this morning at bedside in no acute distress. Overnight no acute events noted. Patient was seen this morning following dialysis, patient states that he is doing well overall, states that he did have some minor chest pain last night however since dialysis has now resolved. Patient has no new complaints states that the dialysis is really helped with his pain and he feels like his chest pain has greatly improved since admission. Was concerned with his platelets this morning as we did see drop from 1 31-90 887 as such HIT work-up was sent. On repeat CBC this afternoon however his platelets returned to 130. At this time we will pause heparin as we await results of HIT work-up proceeded as necessary. Pulmonology is following patient and has recommended echo at this time to evaluate for further pulmonary hypertension. At this time no other acute issues we will continue to monitor and follow patient as appropriate. Objective     Physical Exam:  Vitals: BP (!) 141/93   Pulse 81   Temp 98.1 °F (36.7 °C) (Oral)   Resp 18   Ht 5' 6\" (1.676 m)   Wt 100 lb 8.5 oz (45.6 kg)   SpO2 98%   BMI 16.23 kg/m²     I & O - 24hr: I/O this shift:  In: 1260 [P.O.:960]  Out: 2500    Physical Exam  Constitutional:       Appearance: Normal appearance. Eyes:      Pupils: Pupils are equal, round, and reactive to light. Cardiovascular:      Rate and Rhythm: Normal rate and regular rhythm. Pulses: Normal pulses. Heart sounds: Murmur heard. Systolic murmur is present. Pulmonary:      Effort: Pulmonary effort is normal.      Breath sounds: Normal breath sounds. No decreased breath sounds, wheezing, rhonchi or rales.    Abdominal: General: Abdomen is flat. There is no distension. Palpations: Abdomen is soft. Tenderness: There is no abdominal tenderness. Musculoskeletal:         General: Normal range of motion. Cervical back: Normal range of motion. Skin:     General: Skin is warm and dry. Capillary Refill: Capillary refill takes less than 2 seconds. Neurological:      General: No focal deficit present. Mental Status: He is alert and oriented to person, place, and time.    Psychiatric:         Mood and Affect: Mood normal.         Behavior: Behavior normal.       Subject  Pertinent Labs & Imaging Studies   denice  CBC:   Lab Results   Component Value Date    WBC 4.0 04/12/2022    RBC 4.08 04/12/2022    HGB 13.0 04/12/2022    HCT 41.9 04/12/2022    .7 04/12/2022    MCH 31.9 04/12/2022    MCHC 31.0 04/12/2022    RDW 16.1 04/12/2022     04/12/2022    MPV 11.0 04/12/2022     CBC with Differential:    Lab Results   Component Value Date    WBC 4.0 04/12/2022    RBC 4.08 04/12/2022    HGB 13.0 04/12/2022    HCT 41.9 04/12/2022     04/12/2022    .7 04/12/2022    MCH 31.9 04/12/2022    MCHC 31.0 04/12/2022    RDW 16.1 04/12/2022    LYMPHOPCT 27.1 04/12/2022    MONOPCT 13.9 04/12/2022    BASOPCT 1.0 04/12/2022    MONOSABS 0.55 04/12/2022    LYMPHSABS 1.07 04/12/2022    EOSABS 0.38 04/12/2022    BASOSABS 0.04 04/12/2022     WBC:    Lab Results   Component Value Date    WBC 4.0 04/12/2022     Platelets:    Lab Results   Component Value Date     04/12/2022     Hemoglobin/Hematocrit:    Lab Results   Component Value Date    HGB 13.0 04/12/2022    HCT 41.9 04/12/2022     CMP:    Lab Results   Component Value Date     04/12/2022    K 3.9 04/12/2022    K 4.8 04/09/2022    CL 96 04/12/2022    CO2 27 04/12/2022    BUN 24 04/12/2022    CREATININE 5.7 04/12/2022    GFRAA 14 04/12/2022    LABGLOM 14 04/12/2022    GLUCOSE 147 04/12/2022    PROT 7.3 04/12/2022    LABALBU 4.3 04/12/2022 CALCIUM 9.6 04/12/2022    BILITOT 0.7 04/12/2022    ALKPHOS 98 04/12/2022    AST 22 04/12/2022    ALT 13 04/12/2022     BMP:    Lab Results   Component Value Date     04/12/2022    K 3.9 04/12/2022    K 4.8 04/09/2022    CL 96 04/12/2022    CO2 27 04/12/2022    BUN 24 04/12/2022    LABALBU 4.3 04/12/2022    CREATININE 5.7 04/12/2022    CALCIUM 9.6 04/12/2022    GFRAA 14 04/12/2022    LABGLOM 14 04/12/2022    GLUCOSE 147 04/12/2022       XR CHEST PORTABLE   Final Result   Improved aeration of the right lower lobe compared to 04/09/2022. Cardiomegaly. CTA PULMONARY W CONTRAST   Final Result   1. No evidence of pulmonary embolism. 2. Cardiomegaly with pulmonary vascular congestion and findings suggestive of   interstitial pulmonary edema. 3. Centrilobular nodular opacities are present bilaterally as well as more   confluent opacities in right lung base could suggest pneumonia on background   of interstitial pulmonary edema. 4. Trace right pleural effusion. 5. Enlarged pulmonary outflow tract suggesting pulmonary arterial   hypertension. 6. Stable mediastinal and bilateral axillary lymphadenopathy. XR CHEST PORTABLE   Final Result   Right lower lung field pneumonia.               Resident's Assessment and Plan     Assessment:    ESRD on HD (MWF) - Follows with Dr. Karina Ding  HFpEF  Atypical chest pain 2/2 volume overload  Thrombocytopenia (resolved?)- Platelet count dropped from 131 to 90, HIT work up sent, repeat CBC platelet kingsley to 340  Hx HTN  Hx ICH 2/2 ruptured saccular aneursym s/p coiling  Hx Hypothyroidism    Plan:  Continue Dialysis per recommendation of Nephrology, currently daily dialysis  Resp Culture negative, follow TSPOT for TB work up  Per Pulm no plans for Bronch or Thoracentesis at this time  Per Pulm plan to obtain ECHO  Per Pulm continue on breathing treatments (Brovana, Pulmicort, Douneb)  Conitinue Nifedipine, Clonidine, Hydralazine TID, Labetalol BID and PRNs for BP control  Holding heparin, HIT work up sent follow peripheral smear and HIT Ab  Follow BP      PT/OT evaluation: Following  DVT prophylaxis/ GI prophylaxis: PCD/Protonix  Disposition: continue current care     Brittny Mortensen MD, PGY-1  Attending physician: Dr. Narendra Cummings  Department of Internal Medicine  Internal Medicine Residency / [address] [place] [address] case was discussed, including pertinent history and examination findings with the multidisciplinary team during bedside rounds. I have seen and examined the patient and the key elements of the encounter have been performed by myself. I have read and reviewed the documentation. If needed or disputed the following findings, counseling and treatment options which have been corrected and communicated back to the patient, family if applicable and contributing physicians. I agree with the assessment, plan and orders as noted by the resident.       Florence Orona DO , Mai Bautista  Professor of Internal Medicine  Pulmonary, Critical Care & Sleep Medicine  Internal Medicine Academic Faculty

## 2022-04-12 NOTE — PROGRESS NOTES
Attempted to start IV hl for prn BP meds if needed and had tournaquet on ight arm assessing veins when transport walked in to take patient to dialysis. Patient prefers ultrasound machine for IV starts states he is a hard stick. Will make day nurse aware.

## 2022-04-13 VITALS
RESPIRATION RATE: 18 BRPM | OXYGEN SATURATION: 97 % | BODY MASS INDEX: 16.3 KG/M2 | TEMPERATURE: 97.4 F | DIASTOLIC BLOOD PRESSURE: 95 MMHG | WEIGHT: 101.41 LBS | SYSTOLIC BLOOD PRESSURE: 129 MMHG | HEART RATE: 84 BPM | HEIGHT: 66 IN

## 2022-04-13 LAB
ALBUMIN SERPL-MCNC: 4.2 G/DL (ref 3.5–5.2)
ALBUMIN SERPL-MCNC: 4.7 G/DL (ref 3.5–5.2)
ALP BLD-CCNC: 108 U/L (ref 40–129)
ALP BLD-CCNC: 93 U/L (ref 40–129)
ALT SERPL-CCNC: 12 U/L (ref 0–40)
ALT SERPL-CCNC: 13 U/L (ref 0–40)
ANION GAP SERPL CALCULATED.3IONS-SCNC: 17 MMOL/L (ref 7–16)
AST SERPL-CCNC: 18 U/L (ref 0–39)
AST SERPL-CCNC: 22 U/L (ref 0–39)
BASOPHILS ABSOLUTE: 0.04 E9/L (ref 0–0.2)
BASOPHILS ABSOLUTE: 0.05 E9/L (ref 0–0.2)
BASOPHILS RELATIVE PERCENT: 0.9 % (ref 0–2)
BASOPHILS RELATIVE PERCENT: 1.1 % (ref 0–2)
BILIRUB SERPL-MCNC: 0.6 MG/DL (ref 0–1.2)
BILIRUB SERPL-MCNC: 0.7 MG/DL (ref 0–1.2)
BILIRUBIN DIRECT: 0.2 MG/DL (ref 0–0.3)
BILIRUBIN DIRECT: 0.2 MG/DL (ref 0–0.3)
BILIRUBIN, INDIRECT: 0.4 MG/DL (ref 0–1)
BILIRUBIN, INDIRECT: 0.5 MG/DL (ref 0–1)
BUN BLDV-MCNC: 47 MG/DL (ref 6–20)
CALCIUM IONIZED: 1.23 MMOL/L (ref 1.15–1.33)
CALCIUM IONIZED: 1.24 MMOL/L (ref 1.15–1.33)
CALCIUM SERPL-MCNC: 10 MG/DL (ref 8.6–10.2)
CHLORIDE BLD-SCNC: 94 MMOL/L (ref 98–107)
CO2: 23 MMOL/L (ref 22–29)
CREAT SERPL-MCNC: 8.8 MG/DL (ref 0.7–1.2)
EOSINOPHILS ABSOLUTE: 0.49 E9/L (ref 0.05–0.5)
EOSINOPHILS ABSOLUTE: 0.52 E9/L (ref 0.05–0.5)
EOSINOPHILS RELATIVE PERCENT: 10.9 % (ref 0–6)
EOSINOPHILS RELATIVE PERCENT: 11.5 % (ref 0–6)
GFR AFRICAN AMERICAN: 8
GFR NON-AFRICAN AMERICAN: 8 ML/MIN/1.73
GLUCOSE BLD-MCNC: 95 MG/DL (ref 74–99)
HCT VFR BLD CALC: 39.4 % (ref 37–54)
HCT VFR BLD CALC: 43.4 % (ref 37–54)
HEMOGLOBIN: 12.4 G/DL (ref 12.5–16.5)
HEMOGLOBIN: 13.3 G/DL (ref 12.5–16.5)
IMMATURE GRANULOCYTES #: 0.01 E9/L
IMMATURE GRANULOCYTES #: 0.01 E9/L
IMMATURE GRANULOCYTES %: 0.2 % (ref 0–5)
IMMATURE GRANULOCYTES %: 0.2 % (ref 0–5)
LV EF: 48 %
LVEF MODALITY: NORMAL
LYMPHOCYTES ABSOLUTE: 0.98 E9/L (ref 1.5–4)
LYMPHOCYTES ABSOLUTE: 1.28 E9/L (ref 1.5–4)
LYMPHOCYTES RELATIVE PERCENT: 23.1 % (ref 20–42)
LYMPHOCYTES RELATIVE PERCENT: 26.9 % (ref 20–42)
MAGNESIUM: 3.1 MG/DL (ref 1.6–2.6)
MCH RBC QN AUTO: 31.1 PG (ref 26–35)
MCH RBC QN AUTO: 31.3 PG (ref 26–35)
MCHC RBC AUTO-ENTMCNC: 30.6 % (ref 32–34.5)
MCHC RBC AUTO-ENTMCNC: 31.5 % (ref 32–34.5)
MCV RBC AUTO: 101.6 FL (ref 80–99.9)
MCV RBC AUTO: 99.5 FL (ref 80–99.9)
MONOCYTES ABSOLUTE: 0.5 E9/L (ref 0.1–0.95)
MONOCYTES ABSOLUTE: 0.51 E9/L (ref 0.1–0.95)
MONOCYTES RELATIVE PERCENT: 10.7 % (ref 2–12)
MONOCYTES RELATIVE PERCENT: 11.8 % (ref 2–12)
NEUTROPHILS ABSOLUTE: 2.23 E9/L (ref 1.8–7.3)
NEUTROPHILS ABSOLUTE: 2.38 E9/L (ref 1.8–7.3)
NEUTROPHILS RELATIVE PERCENT: 50.2 % (ref 43–80)
NEUTROPHILS RELATIVE PERCENT: 52.5 % (ref 43–80)
PATHOLOGIST REVIEW: NORMAL
PDW BLD-RTO: 15.7 FL (ref 11.5–15)
PDW BLD-RTO: 15.9 FL (ref 11.5–15)
PHOSPHORUS: 4.8 MG/DL (ref 2.5–4.5)
PLATELET # BLD: 153 E9/L (ref 130–450)
PLATELET # BLD: 182 E9/L (ref 130–450)
PMV BLD AUTO: 10.1 FL (ref 7–12)
PMV BLD AUTO: 10.9 FL (ref 7–12)
POTASSIUM SERPL-SCNC: 4.6 MMOL/L (ref 3.5–5)
RBC # BLD: 3.96 E12/L (ref 3.8–5.8)
RBC # BLD: 4.27 E12/L (ref 3.8–5.8)
SODIUM BLD-SCNC: 134 MMOL/L (ref 132–146)
TOTAL PROTEIN: 7.1 G/DL (ref 6.4–8.3)
TOTAL PROTEIN: 8 G/DL (ref 6.4–8.3)
WBC # BLD: 4.3 E9/L (ref 4.5–11.5)
WBC # BLD: 4.8 E9/L (ref 4.5–11.5)

## 2022-04-13 PROCEDURE — 6370000000 HC RX 637 (ALT 250 FOR IP): Performed by: INTERNAL MEDICINE

## 2022-04-13 PROCEDURE — 6360000002 HC RX W HCPCS: Performed by: STUDENT IN AN ORGANIZED HEALTH CARE EDUCATION/TRAINING PROGRAM

## 2022-04-13 PROCEDURE — 85025 COMPLETE CBC W/AUTO DIFF WBC: CPT

## 2022-04-13 PROCEDURE — 82330 ASSAY OF CALCIUM: CPT

## 2022-04-13 PROCEDURE — 97165 OT EVAL LOW COMPLEX 30 MIN: CPT

## 2022-04-13 PROCEDURE — 90935 HEMODIALYSIS ONE EVALUATION: CPT | Performed by: INTERNAL MEDICINE

## 2022-04-13 PROCEDURE — 80076 HEPATIC FUNCTION PANEL: CPT

## 2022-04-13 PROCEDURE — 80048 BASIC METABOLIC PNL TOTAL CA: CPT

## 2022-04-13 PROCEDURE — 83735 ASSAY OF MAGNESIUM: CPT

## 2022-04-13 PROCEDURE — 93306 TTE W/DOPPLER COMPLETE: CPT

## 2022-04-13 PROCEDURE — 99239 HOSP IP/OBS DSCHRG MGMT >30: CPT | Performed by: INTERNAL MEDICINE

## 2022-04-13 PROCEDURE — 6370000000 HC RX 637 (ALT 250 FOR IP): Performed by: STUDENT IN AN ORGANIZED HEALTH CARE EDUCATION/TRAINING PROGRAM

## 2022-04-13 PROCEDURE — 2580000003 HC RX 258: Performed by: INTERNAL MEDICINE

## 2022-04-13 PROCEDURE — 36415 COLL VENOUS BLD VENIPUNCTURE: CPT

## 2022-04-13 PROCEDURE — 84100 ASSAY OF PHOSPHORUS: CPT

## 2022-04-13 RX ORDER — LOSARTAN POTASSIUM 100 MG/1
150 TABLET ORAL EVERY EVENING
Qty: 135 TABLET | Refills: 0 | Status: SHIPPED | OUTPATIENT
Start: 2022-04-13 | End: 2022-08-10

## 2022-04-13 RX ORDER — LOSARTAN POTASSIUM 50 MG/1
150 TABLET ORAL EVERY EVENING
Qty: 270 TABLET | Refills: 0 | Status: SHIPPED | OUTPATIENT
Start: 2022-04-13 | End: 2022-04-13

## 2022-04-13 RX ORDER — LOSARTAN POTASSIUM 50 MG/1
150 TABLET ORAL EVERY EVENING
Status: DISCONTINUED | OUTPATIENT
Start: 2022-04-13 | End: 2022-04-13 | Stop reason: HOSPADM

## 2022-04-13 RX ADMIN — HYDRALAZINE HYDROCHLORIDE 50 MG: 25 TABLET, FILM COATED ORAL at 14:50

## 2022-04-13 RX ADMIN — Medication 10 ML: at 10:42

## 2022-04-13 RX ADMIN — CLONIDINE HYDROCHLORIDE 0.3 MG: 0.1 TABLET ORAL at 14:51

## 2022-04-13 RX ADMIN — HYDRALAZINE HYDROCHLORIDE 10 MG: 20 INJECTION INTRAMUSCULAR; INTRAVENOUS at 00:19

## 2022-04-13 RX ADMIN — DIPHENHYDRAMINE HYDROCHLORIDE 25 MG: 25 TABLET ORAL at 14:53

## 2022-04-13 RX ADMIN — SEVELAMER CARBONATE 1600 MG: 800 TABLET, FILM COATED ORAL at 14:51

## 2022-04-13 RX ADMIN — CLONIDINE HYDROCHLORIDE 0.3 MG: 0.1 TABLET ORAL at 10:41

## 2022-04-13 RX ADMIN — DIPHENHYDRAMINE HYDROCHLORIDE 25 MG: 25 TABLET ORAL at 05:39

## 2022-04-13 RX ADMIN — ONDANSETRON 4 MG: 4 TABLET, ORALLY DISINTEGRATING ORAL at 00:19

## 2022-04-13 RX ADMIN — PANTOPRAZOLE SODIUM 40 MG: 40 TABLET, DELAYED RELEASE ORAL at 05:11

## 2022-04-13 RX ADMIN — NIFEDIPINE 60 MG: 30 TABLET, EXTENDED RELEASE ORAL at 10:42

## 2022-04-13 RX ADMIN — LABETALOL HYDROCHLORIDE 300 MG: 100 TABLET, FILM COATED ORAL at 10:41

## 2022-04-13 RX ADMIN — SEVELAMER CARBONATE 1600 MG: 800 TABLET, FILM COATED ORAL at 10:42

## 2022-04-13 RX ADMIN — HYDRALAZINE HYDROCHLORIDE 50 MG: 25 TABLET, FILM COATED ORAL at 10:41

## 2022-04-13 ASSESSMENT — PAIN SCALES - GENERAL
PAINLEVEL_OUTOF10: 0

## 2022-04-13 NOTE — PROGRESS NOTES
OCCUPATIONAL THERAPY INITIAL EVALUATION    [facility] [address] [place] [address]     Date:2022  Patient Name: Peng Jay  MRN: [id number]  : 1985  Room: [address] [place] [address]      Evaluating OT: Apolonia Banerjee OTR/L     Referring Provider::Beulah Salazar DO      Specific Provider Orders/Date: OT evaluation and treatment 4/10/22    AM-PAC Daily Activity Raw Score:     Recommended Adaptive Equipment: no needs     Diagnosis: Acute pulmonary edema ([address] [J81.0]  Atypical chest pain [R07.89]  ESRD (end stage renal disease) ([address] [N18.6]  Volume overload [E87.70]  Acute respiratory failure with hypoxia ([address] [J96.01]  Pertinent Medical History: has a past medical history of (HFpEF) heart failure with preserved ejection fraction ([address] Anxiety, AVF (arteriovenous fistula) ([address] Chronic diastolic (congestive) heart failure ([address] Chronic kidney disease, Depression, Encounter regarding vascular access for dialysis for ESRD ([facility] History of ruptured Berry aneurysm ([facility] Hypertension, Hypothyroidism, Intracranial hemorrhage ([address] Neutropenia ([address] Noncompliance w/medication treatment due to intermit use of medication, and Pre-transplant evaluation for kidney transplant. Precautions:  Falls,      Assessment of current deficits   [] Functional mobility   []ADLs  [] Strength               []Cognition   [] Functional transfers   [] IADLs         [] Safety Awareness   []Endurance   [] Fine Coordination              [] Balance      [] Vision/perception   []Sensation    []Gross Motor Coordination  [] ROM  [] Delirium                   [] Motor Control     OT PLAN OF CARE   OT POC based on physician orders, patient diagnosis and results of clinical assessment    Frequency/Duration : NA  Specific OT Treatment to include: NA      Home Living: Pt lives alone in a 2nd floor apt with 24 step(s) to enter and 1 rail(s to apt.    Bathroom setup: walk in shower  Equipment owned: none  Prior Level of Function: Independent  with ADLs , Independent  with IADLs; using no AD for ambulation. Driving: no  Occupation:not working    Pain Level: none  Cognition: A&O: 4/4; Follows 2 step directions   Memory:  good    Sequencing:  good    Problem solving:  good    Judgement/safety:  good      Functional Assessment:   Initial Eval Status  Date: 4/13/21   Feeding Independent    Grooming Independent    UB Dressing Independent    LB Dressing Independent    Bathing Independent   Toileting Independent    Bed Mobility  Supine to sit: Independent   Sit to supine: Independent    Functional Transfers Sit to stand: Independent   Stand to sit: Independent   Stand pivot: Independent    Functional Mobility Indep with no AD   Balance Sitting:     Static:  wfl    Dynamic:wfl  Standing: wfl   Activity Tolerance wfl   Visual/  Perceptual Glasses: yes            Hand dominance: R  UE ROM: RUE:  WFL  LUE:  WFL  Strength: RUE: grossly 5/5 LUE: grossly 5/5   Strength: B WFL  Fine Motor Coordination:  B WFL    Hearing: WFL  Sensation:  No c/o numbness or tingling  Tone:  WFL  Edema: None noted                            Comments/Treatment: Upon arrival, patient in bed and agreeable to OT. Pt demonstrating good standing balance for  independence with ADLs/mobility and good understanding of education and safety techniques. At end of session, patient in bed with call light and phone within reach, all lines and tubes intact. Recommend d/c from OT d/t no acute skilled needs at this time. Evaluation time includes thorough review of current medical information, gathering information on past medical & social history & PLOF, completion of standardized testing, informal observation of tasks, consultation with other medical professions/disciplines, assessment of data & development of POC/goals.      Evaluation Complexity: Low    Time In: 1:11  Time Out: 1:20      Min Units   OT Eval Low [id number]  x     OT Eval Medium [id number]      OT Eval High [id number]       OT Re-Eval [id number]       Therapeutic Ex [address]       Therapeutic Activities [id number]       ADL/Self Care [id number]       Orthotic Management [id number]       Neuro Re-Ed [id number]       Non-Billable Time           Jigar Goodson, OTR/L #[id number]

## 2022-04-13 NOTE — PLAN OF CARE
Problem: Pain:  Goal: Pain level will decrease  Description: Pain level will decrease  Outcome: Completed  Goal: Control of acute pain  Description: Control of acute pain  Outcome: Completed  Goal: Control of chronic pain  Description: Control of chronic pain  Outcome: Completed     Problem: OXYGENATION/RESPIRATORY FUNCTION  Goal: Patient will maintain patent airway  Outcome: Completed     Problem: HEMODYNAMIC STATUS  Goal: Patient has stable vital signs and fluid balance  Outcome: Completed

## 2022-04-13 NOTE — PROGRESS NOTES
[facility]  Nephrology Progress Note    History of Present Illness from 4/10 Consult Note: \"Abdelrahman Valentin is a 43 y. o. male with a history of hypertension, ESRD HD dependent MWF, HFpEF and several other medical problems who now presents to ED on 4/9/2022 with chest pain. Vitals upon arrival included blood pressure 146/93, temperature 97.8, pulse 90, respirations 14 and 99% O2 saturation. Pertinent labs included sodium 142, potassium 4.8, chloride 97, CO2 28, BUN 26 and creatinine 6.1. CBC included WBC 6.3, hemoglobin 10.9, hematocrit 35.1 and platelet count 577. CTA of the chest showed right lower lung field pneumonia. Patient was subsequently admitted with atypical chest pain.      Patient currently seen on hemodialysis. He is alert and oriented. States that the chest pain began during his most recent dialysis outpatient session on 4/8/2022. He denies any radiation to his back shoulders or jaw. He denies any shortness of breath at rest.  He also denies any nausea, vomiting or diarrhea. He is oliguric. \"    Subjective:    4/13: Patient was seen and examined on HD. He reports that he feels \"pretty good. \"  He denies any current chest pain or shortness of breath. He denies any abdominal pain or nausea. He does report intermittent shortness of breath with exertion. He denies any issues overnight.     PMH:    Past Medical History:   Diagnosis Date    (HFpEF) heart failure with preserved ejection fraction ([address]     stage III    Anxiety 9/19/2015    AVF (arteriovenous fistula) (HCC) 4/30/2018    Chronic diastolic (congestive) heart failure ([address] 7/14/2021    Concerns of Amyloidosis on TTE 8/2021    Chronic kidney disease     CKD since early childhood per pt and on HD ~ 11 years    Depression     Encounter regarding vascular access for dialysis for ESRD ([address] 4/30/2018    History of ruptured Berry aneurysm ([address] 10/20/2015    Hypertension     on meds for ~ 11 years    Hypothyroidism     Intracranial hemorrhage (HCC)     was d/t ruptured aneurysm - pt underwent neurosurgery for clipping of the aneurysm    Neutropenia ([facility]     Noncompliance w/medication treatment due to intermit use of medication 11/3/2015    Pre-transplant evaluation for kidney transplant 4/5/2017     Patient Active Problem List   Diagnosis    Stage 5 chronic kidney disease on chronic dialysis ([facility]    H/O intracranial hemorrhage and aneurysm clipping on the right side    Anemia, chronic disease    Anxiety    Noncompliance    AVF (arteriovenous fistula) (HCC)    Severe protein-calorie malnutrition (HCC)    Venous hypertension, chronic, left    Iron deficiency    Secondary hyperparathyroidism of renal origin ([facility]    Atypical chest pain    Essential hypertension    Acute respiratory failure with hypoxia (HCC)    Pneumonia    Pericardial effusion    Anemia    Chronic systolic congestive heart failure (HCC)    Acute pulmonary edema (HCC)    Volume overload     Meds:     pantoprazole  40 mg Oral QAM AC    NIFEdipine  60 mg Oral Daily    docusate sodium  100 mg Oral Daily    senna  1 tablet Oral Nightly    cloNIDine  0.3 mg Oral TID    hydrALAZINE  50 mg Oral TID    labetalol  300 mg Oral BID    losartan  100 mg Oral QPM    sevelamer  1,600 mg Oral TID    sodium chloride flush  5-40 mL IntraVENous 2 times per day    heparin (porcine)  5,000 Units SubCUTAneous 3 times per day    Arformoterol Tartrate  15 mcg Nebulization BID    budesonide  0.5 mg Nebulization BID    lidocaine  1 patch TransDERmal Daily      dextrose      sodium chloride       Meds prn:     perflutren lipid microspheres, glucose, dextrose, glucagon (rDNA), dextrose, labetalol, melatonin, sodium chloride flush, sodium chloride, polyethylene glycol, ipratropium-albuterol, hydrALAZINE, diphenhydrAMINE, nitroGLYCERIN, ondansetron, sodium chloride flush    Meds prior to admission:     No current facility-administered medications on file prior to encounter. Current Outpatient Medications on File Prior to Encounter   Medication Sig Dispense Refill    lidocaine-prilocaine (EMLA) 2.5-2.5 % cream Apply topically as needed for Pain Apply topically as needed.  hydrALAZINE (APRESOLINE) 50 MG tablet Take 1 tablet by mouth 3 times daily 90 tablet 3    labetalol (NORMODYNE) 300 MG tablet Take 1 tablet by mouth 2 times daily 60 tablet 3    diphenhydrAMINE (BENADRYL) 25 MG tablet Take 1 tablet by mouth 2 times daily as needed for Itching, Allergies or Sleep 30 tablet 2    cloNIDine (CATAPRES) 0.3 MG tablet Take 1 tablet by mouth 3 times daily 90 tablet 3    losartan (COZAAR) 100 MG tablet Take 1 tablet by mouth every evening 30 tablet 3    albuterol-ipratropium (COMBIVENT RESPIMAT)  MCG/ACT AERS inhaler Inhale 1 puff into the lungs every 6 hours (Patient taking differently: Inhale 2 puffs into the lungs every 6 hours ) 1 each 3    vitamin D (ERGOCALCIFEROL) 1.25 MG (89736 UT) CAPS capsule Take 1 capsule by mouth once a week for 11 doses 5 capsule 0    NIFEdipine (ADALAT CC) 60 MG extended release tablet Take 90 mg by mouth daily       Melatonin 5 MG CAPS Take 5 mg by mouth nightly as needed       Methoxy PEG-Epoetin Beta (MIRCERA IJ) Infuse 60 mcg intravenously every 14 days       sevelamer (RENVELA) 800 MG tablet Take 2 tablets by mouth 3 times daily        Allergies:    Tylenol [acetaminophen] and Levofloxacin    Social History:     reports that he has never smoked. He has never used smokeless tobacco. He reports previous drug use. He reports that he does not drink alcohol.     Family History:         Problem Relation Age of Onset    Kidney Disease Paternal Grandmother     Cancer Neg Hx     Heart Disease Neg Hx      Physical Exam:    Patient Vitals for the past 24 hrs:   BP Temp Temp src Pulse Resp SpO2 Weight   04/13/22 0833 135/84 -- -- 95 -- -- --   04/13/22 0803 134/84 -- -- 83 -- -- --   04/13/22 0734 126/76 -- -- 85 -- -- -- 04/13/22 0705 138/84 -- -- 81 -- -- --   04/13/22 0627 134/77 -- -- 77 -- -- --   04/13/22 0622 (!) 141/88 -- -- 79 18 -- 104 lb 0.9 oz (47.2 kg)   04/13/22 0530 -- -- -- -- -- -- 103 lb 6.3 oz (46.9 kg)   04/13/22 0000 (!) 164/124 98.1 °F (36.7 °C) Temporal 86 16 99 % --   04/12/22 2319 (!) 139/102 98.4 °F (36.9 °C) Temporal 75 18 100 % --   04/12/22 1946 -- -- -- -- 18 95 % --   04/12/22 1355 (!) 141/93 -- -- 81 -- -- --   04/12/22 1132 -- -- -- 78 -- -- --   04/12/22 1100 (!) 137/104 98.1 °F (36.7 °C) Oral 78 18 98 % --       Intake/Output Summary (Last 24 hours) at 4/13/2022 0849  Last data filed at 4/13/2022 0531  Gross per 24 hour   Intake 1200 ml   Output --   Net 1200 ml     General: Awake, alert, no acute distress  Neck: no JVD noted  Cardiovascular: regular rate and rhythm, no rub  Respiratory: Clear bilaterally upper, diminished bases bilaterally. Unlabored  Gastrointestinal: soft, nontender, nondistended, active bowel sounds  Ext: No edema; left AVF  Neuro: Awake, alert, answers questions appropriately  Skin: dry, no rash on exposed extremities    Data:    Recent Labs     04/12/22  0456 04/12/22  1337 04/13/22  0620   WBC 4.3* 4.0* 4.3*   HGB 11.9* 13.0 12.4*   HCT 40.0 41.9 39.4   .4* 102.7* 99.5   PLT 87* 130 153     Recent Labs     04/12/22  0845 04/12/22  1757 04/13/22  0620    138 134   K 3.9 4.3 4.6   CL 96* 95* 94*   CO2 27 29 23   CREATININE 5.7* 7.0* 8.8*   BUN 24* 35* 47*   LABGLOM 14 11 8   GLUCOSE 147* 83 95   CALCIUM 9.6 9.7 10.0   PHOS 3.7 4.6* 4.8*   MG 2.9* 3.1* 3.1*     Vit D, 25-Hydroxy   Date Value Ref Range Status   01/23/2022 17 (L) 30 - 100 ng/mL Final     Comment:     <20 ng/mL. ........... Shaaron Money Deficient  20-30 ng/mL. ......... Shaaron Money Insufficient   ng/mL. ........ Shaaron Money Sufficient  >100 ng/mL. .......... Shaaron Money Toxic       PTH   Date Value Ref Range Status   01/05/2019 418 (H) 15 - 65 pg/mL Final     Recent Labs     04/11/22  0505 04/12/22  0456 04/13/22  0620   ALT 15 13 12   AST 39 22 22   ALKPHOS 81 98 93   BILITOT 0.7 0.7 0.6   BILIDIR <0.2 0.2 0.2     Recent Labs     04/11/22  0505 04/12/22  0456 04/13/22  0620   LABALBU 3.9 4.3 4.2     Ferritin   Date Value Ref Range Status   01/27/2022 4,215 ng/mL Final     Comment:     FERRITIN Reference Ranges:  Adult Males   20 - 60 years:    30 - 400 ng/mL  Adult females 16 - 61 years:    15 - 150 ng/mL  Adults greater than 60 years:   no established reference range  Pediatrics:                     no established reference range       Iron   Date Value Ref Range Status   09/06/2021 33 (L) 59 - 158 mcg/dL Final     TIBC   Date Value Ref Range Status   09/06/2021 202 (L) 250 - 450 mcg/dL Final     Vitamin B-12   Date Value Ref Range Status   04/10/2022 829 211 - 946 pg/mL Final     Folate   Date Value Ref Range Status   04/10/2022 18.0 4.8 - 24.2 ng/mL Final     No results found for: VOL, APPEARANCE, COLORU, LABSPEC, LABPH, LEUKBLD, NITRU, GLUCOSEU, KETUA, UROBILINOGEN, KETUA, UROBILINOGEN, BILIRUBINUR, OCBU    No results found for: LUZ MARIA, CREURRAN, MACREATRATIO, OSMOU    No components found for: URIC    No results found for: LIPIDPAN    Assessment and Plan:    1. ESRD on HD   OP [address] MWF   via left AVF  S/p emergent dialysis 4/10 after receiving IV dye with CTA  Continue MWF while inpatient     2. HTN   BP goal<140/80  BP at/near goal  Follow on current regimen and with HD     3. Secondary hyperparathryoidism of renal origin  Phosphorus 4.8 today  PTH noted from 3/14/2022 in the outpatient setting was 591  On Renvela  Follow     4. Anemia  Hemoglobin target 10-12  Hemoglobin 12.4 today  PREETI for hemoglobin<10  Transfuse for hemoglobin<7  Follow     5. Chest pain  Management per primary    6.   Hyperkalemia  Likely in setting of ESRD  K+ 4.6 today   Low potassium diet  Follow    Bonnie Chavez APRN - CNP     Patient seen and examined all key components of the physical performed independently , case discussed with NP, all pertinent labs and radiologic tests personally reviewed agree with above.       Renal Procedure: hemodialysis    William Devi MD

## 2022-04-13 NOTE — PROGRESS NOTES
[facility]  Internal Medicine Residency Program  Progress Note - House Team     Patient:  Selvin Lackey 43 y.o. male MRN: [id number]     Date of Service: 4/13/2022     CC: Right sided chest pain, radiating to left    Days since admission: 4    Subjective     Overnight events: No acute overnight events    Patient seen and examined in dialysis today in no acute distress. Patient feeling much better. He denies chest pain this morning. He has not had any chest pain since yesterday morning. Patient says he is breathing much better and has not needed supplemental oxygen overnight. Denies N/V and abdominal pain. Denies issues with appetite. Objective     Physical Exam:  Vitals: BP (!) 129/95   Pulse 84   Temp 97.4 °F (36.3 °C) (Temporal)   Resp 18   Ht 5' 6\" (1.676 m)   Wt 101 lb 6.6 oz (46 kg)   SpO2 97%   BMI 16.37 kg/m²     I & O - 24hr:   Intake/Output Summary (Last 24 hours) at 4/13/2022 1301  Last data filed at 4/13/2022 0957  Gross per 24 hour   Intake 1020 ml   Output 2100 ml   Net -1080 ml      General Appearance: alert, appears stated age and cooperative  HEENT:  Head: Normocephalic, no lesions, without obvious abnormality. Neck: no adenopathy, no carotid bruit, no JVD, supple, symmetrical, trachea midline and thyroid not enlarged, symmetric, no tenderness/mass/nodules  Lung: clear to auscultation bilaterally  Heart: systolic ejection murmer, decreased in intensity. Palpable thrill no longer felt on exam this morning. Abdomen: soft, non-tender; bowel sounds normal; no masses,  no organomegaly  Extremities:  extremities normal, atraumatic, no cyanosis or edema. AV hemodialysis shunt patent in LUE   Musculokeletal: No joint swelling, no muscle tenderness. ROM normal in all joints of extremities.    Neurologic: Mental status: Alert, oriented, thought content appropriate  Subject  Pertinent Information & Imaging Studies, Consults   denice  CBC:   Lab Results   Component Value Date    WBC 4.3 04/13/2022    RBC 3.96 04/13/2022    HGB 12.4 04/13/2022    HCT 39.4 04/13/2022    MCV 99.5 04/13/2022    MCH 31.3 04/13/2022    MCHC 31.5 04/13/2022    RDW 15.7 04/13/2022     04/13/2022    MPV 10.1 04/13/2022     BMP:    Lab Results   Component Value Date     04/13/2022    K 4.6 04/13/2022    K 4.8 04/09/2022    CL 94 04/13/2022    CO2 23 04/13/2022    BUN 47 04/13/2022    LABALBU 4.2 04/13/2022    CREATININE 8.8 04/13/2022    CALCIUM 10.0 04/13/2022    GFRAA 8 04/13/2022    LABGLOM 8 04/13/2022    GLUCOSE 95 04/13/2022       IMAGING:   Imaging Studies:    XR CHEST PORTABLE    Result Date: 4/10/2022  Improved aeration of the right lower lobe compared to 04/09/2022. Cardiomegaly. XR CHEST PORTABLE    Result Date: 4/9/2022  Right lower lung field pneumonia. CTA PULMONARY W CONTRAST    Result Date: 4/9/2022  1. No evidence of pulmonary embolism. 2. Cardiomegaly with pulmonary vascular congestion and findings suggestive of interstitial pulmonary edema. 3. Centrilobular nodular opacities are present bilaterally as well as more confluent opacities in right lung base could suggest pneumonia on background of interstitial pulmonary edema. 4. Trace right pleural effusion. 5. Enlarged pulmonary outflow tract suggesting pulmonary arterial hypertension. 6. Stable mediastinal and bilateral axillary lymphadenopathy.                   OXYGENATION: 99% room air    DIET: Low sodium         Student's Assessment and Plan     Lynne Mora is a 43 y.o. male with  has a past medical history of (HFpEF) heart failure with preserved ejection fraction ([address] Anxiety, AVF (arteriovenous fistula) (HCC), Chronic diastolic (congestive) heart failure ([address] Chronic kidney disease, Depression, Encounter regarding vascular access for dialysis for ESRD [facility]), History of ruptured Berry aneurysm ([address] Hypertension, Hypothyroidism, Intracranial hemorrhage ([address] Neutropenia ([facility] Noncompliance w/medication treatment due to intermit use of medication, and Pre-transplant evaluation for kidney transplant. came here with CC   Chief Complaint   Patient presents with    Chest Pain     L chest pain x2 days, present when talking and it is reproducible to palpation. No pleuritic aggravation, no radiation. Pt reports SOB and nausea (no emesis). No diaphoresis. Given 2 ntg w mild relief. Consults:   Pulmonology   Nephrology      Assessment/Plan   Atypical chest pain likely 2/2 fluid overload vs costochondritis vs pneumonia vs paracarditis    Monitor chest pain after HD  Follow Pulmonology recs   Breathing treatments, incentive spirometry   Respiratory panel negative   No abx at this time procal .66  Room air - O2 NC as needed  Echocardiogram   F/u Sed rate - 10 , CRP 2.4  F/u HIV and CD4 and hepatitis   Procal - 0.64  TB work up   Increase Losartan 150mg   ESRD on HD (MWF) - known to Dr. Simone Talbert  Nephrology following - HD  Continue MQF HD - f/u with nephrology outpatient   Educated patient on importance of diet and not missing HD appointments   3.  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

## 2022-04-13 NOTE — PROGRESS NOTES
Pulmonary Progress Note    Admit Date: 2022  Requesting Physician: Gt Zarate DO     SUBJECTIVE:   Resting in bed back from HD, on room air. Denies significant cough. Has been up walking halls without dyspnea. Medications:     dextrose      sodium chloride        pantoprazole  40 mg Oral QAM AC    NIFEdipine  60 mg Oral Daily    docusate sodium  100 mg Oral Daily    senna  1 tablet Oral Nightly    cloNIDine  0.3 mg Oral TID    hydrALAZINE  50 mg Oral TID    labetalol  300 mg Oral BID    losartan  100 mg Oral QPM    sevelamer  1,600 mg Oral TID    sodium chloride flush  5-40 mL IntraVENous 2 times per day    heparin (porcine)  5,000 Units SubCUTAneous 3 times per day    Arformoterol Tartrate  15 mcg Nebulization BID    budesonide  0.5 mg Nebulization BID    lidocaine  1 patch TransDERmal Daily         Vitals:    VITALS:  BP (!) 129/95   Pulse 84   Temp 97.4 °F (36.3 °C) (Temporal)   Resp 18   Ht 5' 6\" (1.676 m)   Wt 101 lb 6.6 oz (46 kg)   SpO2 97%   BMI 16.37 kg/m²   24HR INTAKE/OUTPUT:      Intake/Output Summary (Last 24 hours) at 2022 1044  Last data filed at 2022 0957  Gross per 24 hour   Intake 1500 ml   Output 2100 ml   Net -600 ml     CURRENT PULSE OXIMETRY:  SpO2: 97 %  24HR PULSE OXIMETRY RANGE:  SpO2  Av.8 %  Min: 95 %  Max: 100 %      EXAM:  General: No distress. ENT: No discharge. Pharynx clear. Neck: Trachea midline. Resp: No accessory muscle use. Diminished throughout, no wheezing. No crackles. No rhonchi. CV: Tachycardic. Regular rhythm. No mumur or rub. ABD: Non-tender. Non-distended. Normal bowel sounds. Skin: Warm and dry. No rash on exposed extremities. Ext: No joint deformity. No cyanosis. No edema. LUE with HD graft   Neuro: Awake.  Follows commands      Lab Results:  CBC:   Recent Labs     22  1337 22  0530 22  0620   WBC 4.0* 4.8 4.3*   HGB 13.0 13.3 12.4*   HCT 41.9 43.4 39.4   .7* 101.6* 99.5    182 153     BMP:   Recent Labs     04/12/22  0845 04/12/22  1757 04/13/22  0620    138 134   K 3.9 4.3 4.6   CL 96* 95* 94*   CO2 27 29 23   PHOS 3.7 4.6* 4.8*   BUN 24* 35* 47*   CREATININE 5.7* 7.0* 8.8*     LFT:   Recent Labs     04/12/22  0456 04/13/22  0530 04/13/22  0620   ALKPHOS 98 108 93   ALT 13 13 12   AST 22 18 22   PROT 7.3 8.0 7.1   BILITOT 0.7 0.7 0.6   BILIDIR 0.2 0.2 0.2   LABALBU 4.3 4.7 4.2     PT/INR:   Recent Labs     04/11/22  1751   PROTIME 12.4   INR 1.1       Cultures:  Results for Alonzo Fulton (MRN [id number]) as of 4/10/2022    Ref.  Range 4/10/2022 08:18   Influenza A by PCR Latest Ref Range: Not Detected  Not Detected   Influenza B by PCR Latest Ref Range: Not Detected  Not Detected   Adenovirus by PCR Latest Ref Range: Not Detected  Not Detected   Coronavirus 229E by PCR Latest Ref Range: Not Detected  Not Detected   Coronavirus HKU1 by PCR Latest Ref Range: Not Detected  Not Detected   Coronavirus NL63 by PCR Latest Ref Range: Not Detected  Not Detected   Coronavirus OC43 by PCR Latest Ref Range: Not Detected  Not Detected   Human Metapneumovirus by PCR Latest Ref Range: Not Detected  Not Detected   Human Rhinovirus/Enterovirus by PCR Latest Ref Range: Not Detected  Not Detected   Parainfluenza Virus 1 by PCR Latest Ref Range: Not Detected  Not Detected   Parainfluenza Virus 2 by PCR Latest Ref Range: Not Detected  Not Detected   Parainfluenza Virus 3 by PCR Latest Ref Range: Not Detected  Not Detected   Parainfluenza Virus 4 by PCR Latest Ref Range: Not Detected  Not Detected   Respiratory Syncytial Virus by PCR Latest Ref Range: Not Detected  Not Detected   Bordetella parapertussis by PCR Latest Ref Range: Not Detected  Not Detected   Chlamydophilia pneumoniae by PCR Latest Ref Range: Not Detected  Not Detected   Mycoplasma pneumoniae by PCR Latest Ref Range: Not Detected  Not Detected   SARS-CoV-2, PCR Latest Ref Range: Not Detected  Not Detected arterial hypertension. 6. Stable mediastinal and bilateral axillary lymphadenopathy. Assessment:  · Pneumonia  · Mediastinal/axillary lymphadenopathy  · ESRD on HD  · HTN  · Chronic anemia      Plan:  · On room air. O2 if needed. · Procal 0.59->0.66 (renal pt), treated by ID in past with zosyn/augmentin 08/21. No antibiotic therapy indicated at this time. Outpt ID follow up. · Previous bronch 08/03/21 with (+) galactomannan, (+) 1,3 B D glucan (fungitell). Cell count/differential revealing lymphocytes at 67%, CD4 425. Cytology with benign bronchial cells, squamous cells, pulmonary macrophages. Pneumocystis carinii negative, AFB negative. · Chest ct improving this admission, watching off antibiotics and remains stable  · Respiratory panel negative. · Continue Pulmicort and brovana, duonebs. · CRP 2.4, ESR 10. CLAY negative, ANCA (< 1:20), IgE < 2, IgG subclass WNL, IgG WNL, IgA WNL, IgM WNL. repeat 1,3 BD glucan   · ProBNP >70,000 - ESRD   · HD per renal  · Encourage IS  · Right ventricle and right atrium enlarged on chest ct. Last echo 09/2021 with  moderate MR and mild right atrium and ventricle distention. Plan to repeat echocardiogram and proceed with right-sided cardiac cath if concern for pulmonary hypertension & valvulopathy. Echo pending. KENDRA Robles CNP  4/13/2022  10:44 AM    Seen and evaluated, and agree with above assessment and plan  Feeling better after hemodialysis today  Echocardiogram was just done, results pending.   Okay to be discharged home from pulmonary perspective, follow-up as an outpatient in 2 to 4 weeks to discuss the results of the echocardiogram concern with worsening of pulmonary hypertension and need for further work-up

## 2022-04-13 NOTE — PLAN OF CARE
Problem: Pain:  Goal: Pain level will decrease  Description: Pain level will decrease  4/12/2022 2316 by Rad Dominguez RN  Outcome: Met This Shift  4/12/2022 1137 by Mee Au  Outcome: Ongoing  Goal: Control of acute pain  Description: Control of acute pain  4/12/2022 2316 by Rad Dominguez RN  Outcome: Met This Shift  4/12/2022 1137 by Mee Au  Outcome: Ongoing  Goal: Control of chronic pain  Description: Control of chronic pain  4/12/2022 2316 by Rad Dominguez RN  Outcome: Met This Shift  4/12/2022 1137 by Mee Au  Outcome: Ongoing     Problem: OXYGENATION/RESPIRATORY FUNCTION  Goal: Patient will maintain patent airway  4/12/2022 2316 by Rad Dominguez RN  Outcome: Met This Shift  4/12/2022 1137 by Mee Au  Outcome: Ongoing     Problem: HEMODYNAMIC STATUS  Goal: Patient has stable vital signs and fluid balance  4/12/2022 2316 by Rad Dominguez RN  Outcome: Met This Shift  4/12/2022 1137 by Mee Au  Outcome: Ongoing

## 2022-04-13 NOTE — DISCHARGE SUMMARY
[address]  Discharge Summary    PCP: Jermaine Ribeiro DO    Admit Date:4/9/2022  Discharge Date: 4/13/2022    Admission Diagnosis:   1. Atypical Chest pain 2/2 multifactorial d/t volume overload  2. ESRD on HD with recent IV contrast exposure   3. HFpEF (EF 45-50%)  4. Hx HTN    Discharge Diagnosis:  1. ESRD on HD (MWF) - Follows with Dr. Mai Cleary  2. HFpEF  3. Atypical chest pain 2/2 volume overload  4. Thrombocytopenia (resolved?)- Platelet count dropped from 131 to 90, HIT work up sent, repeat CBC platelet kingsley to 618  5. Hx HTN  6. 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 Hx Hypothyroidism      Hospital Course: The patient is a 43 y.o. male with PMH of ESRD on HD ([facility] HD, known to Dr. Amezcua Bal service), HTN, ICH d/t ruptured saccular aneurysm s/p coiling, hypothyroidism, HFpEF (EF45-50%) from  Home, presented to the ED complaining of chest pain x2 days. The pt states that the pain began during his previous HD session on 4/8/2022 and has been persistent since that time. No accident/injury to precipitate pain. Pt does state that he experiences this symptom commonly during hemodialysis, but that normally it abates after the session ends, unlike this current time. When asked to characterize his chest pain, pt describes 9/10 constant, dull chest pain beginning on the R chest wall and radiating to the L, no radiation to the back, shoulders, or jaw. It is not exacerbated by movement or alleviated by rest. It is associated with some nausea but no vomiting or diuresis. Of note, the pt has had a stress test in 2021 that showed no reversible perfusion defect or abnormality suggestive of ischemic damage. On arrival to the ED, the pt was hemodynamically stable, hypertensive to 168/114mmhg. Mild hypoxia correcting to 99% O2 saturation when placed on 3L NC - the pt does not use supplemental O2 at home.  Labs notable for Cr 6.1, pro-BNP >70,000, and troponin 83-->84, which is not significantly different from baseline. CTA was performed, which was (-)ve for PE, also showed centrilobular nodular opacities bilaterally, concerning for RLL pneumonia, as well as trace R pleural effusion and suggestion of pulmonary HTN. CXR redemonstrated the RLL infiltrate vs edema, as well as pulmonary vascular congestion diffusely, suggestive of pulmonary edema. The pt was given his home antihypertensive medications in the ED, Nephrology was consulted given the pt's recent exposure to contrast.   Patient received dialysis per nephrology. Pulmonology was consulted due to concerns about chest pain and pleural effusion. Per nephrology patient received daily dialysis while admitted for volume overload. By day 3 of admission patient states that his chest pain had resolved with dialysis removing fluid. Pulmonology followed patient had no plans for bronchoscopy or thoracentesis as they felt the pleural effusion was stable, they did order an echo for evaluation of pulmonary hypertension. On discharge patient will be sent home on his standard medication regimen with his losartan being increased to 150 mg in the setting of his heart failure. Is recommended that patient continues to follow-up with pulmonology and nephrology as an outpatient. Patient was counseled on the importance of maintaining strict adherence to his renal diet along with the following strictly with nephrology and dialysis. Patient was agreeable to these changes. Patient will also need to be followed for outpatient sleep study. Significant findings (history and exam, laboratory, radiological, pathology, other tests):   Physical Exam  Constitutional:       Appearance: Normal appearance. Eyes:      Pupils: Pupils are equal, round, and reactive to light. Cardiovascular:      Rate and Rhythm: Normal rate and regular rhythm. Chest Wall: PMI is displaced. Pulses: Normal pulses. Heart sounds: Murmur heard. Pulmonary:      Effort: Pulmonary effort is normal.      Breath sounds: Normal breath sounds. No wheezing or rhonchi. Abdominal:      General: Abdomen is flat. Bowel sounds are normal. There is no distension. Palpations: Abdomen is soft. Tenderness: There is no abdominal tenderness. Musculoskeletal:         General: Normal range of motion. Cervical back: Normal range of motion. Right lower leg: No edema. Left lower leg: No edema. Skin:     General: Skin is warm and dry. Capillary Refill: Capillary refill takes less than 2 seconds. Neurological:      General: No focal deficit present. Mental Status: He is alert and oriented to person, place, and time. Psychiatric:         Mood and Affect: Mood normal.         Behavior: Behavior normal.       ·     Pending test results:   1. ECHO  2. Sleep Study    Consults:  1. Pulmonology   2. Nephrology     Procedures:  1.  Dialysis     Condition at discharge: Stable    Disposition: home    Discharge Medications:     Medication List      CHANGE how you take these medications    albuterol-ipratropium  MCG/ACT Aers inhaler  Commonly known as: Combivent Respimat  Inhale 1 puff into the lungs every 6 hours  What changed: how much to take     losartan 100 MG tablet  Commonly known as: COZAAR  Take 1.5 tablets by mouth every evening  What changed: how much to take        CONTINUE taking these medications    cloNIDine 0.3 MG tablet  Commonly known as: CATAPRES  Take 1 tablet by mouth 3 times daily     diphenhydrAMINE 25 MG tablet  Commonly known as: BENADRYL  Take 1 tablet by mouth 2 times daily as needed for Itching, Allergies or Sleep     hydrALAZINE 50 MG tablet  Commonly known as: APRESOLINE  Take 1 tablet by mouth 3 times daily     labetalol 300 MG tablet  Commonly known as: NORMODYNE  Take 1 tablet by mouth 2 times daily     lidocaine-prilocaine 2.5-2.5 % cream  Commonly known as: EMLA     Melatonin 5 MG Caps     MIRCERA IJ     NIFEdipine 60 MG extended release tablet  Commonly known as: ADALAT CC     sevelamer 800 MG tablet  Commonly known as: RENVELA     vitamin D 1.25 MG (24200 UT) Caps capsule  Commonly known as: ERGOCALCIFEROL  Take 1 capsule by mouth once a week for 11 doses           Where to Get Your Medications      These medications were sent to [facility] [address]    Phone: [phone]   · losartan 100 MG tablet        Internal medicine    Follow ups   Please follow up with the internal medicine clinic at [facility] appointment has been scheduled for 04/22/22 @ 9 am    Please keep all other follow up appointments:  o Nephrology  o Pulmonology     Changes in healthcare    Please take all medications as indicated above   Diet: regular diet    Activity: activity as tolerated   Additional labs, testing or imaging needed after discharge   o None   Please contact us if you have any concerns, wish to change or make an appointment:  [facility] Internal medicine clinic    Phone: [phone]   Fax: [phone] [address]   Or please call the nurse line at [phone].  Should you have further questions in regards to this visit, you can review your clinical note and after visit summary document on your [employer] account. Other questions can be directed to our nurse line at [phone].  Other than any new prescriptions given to you today, the list of home medications on this After Visit Summary are based on information provided to us from you and your healthcare providers. This information, including the list, dose, and frequency of medications is only as accurate as the information you provided. If you have any questions or concerns about your home medications, please contact your Primary Care Physician for further clarification.       Sandrita Dietrich MD  PGY-1 2:56 PM 4/13/2022    [address] [facility]  Department of Internal Medicine  Internal Medicine Residency / [address] case was discussed, including pertinent history and examination findings with the multidisciplinary team during bedside rounds. I have seen and examined the patient and the key elements of the encounter have been performed by myself. I have read and reviewed the documentation. If needed or disputed the following findings, counseling and treatment options which have been corrected and communicated back to the patient, family if applicable and contributing physicians. I agree with the assessment, plan and orders as noted by the resident.       Jason Manriquez DO , Keri Hannon  Professor of Internal Medicine  Pulmonary, Critical Care & Sleep Medicine  Internal Medicine Academic Faculty

## 2022-04-13 NOTE — CARE COORDINATION
Remains on room air. Echo ordered. Patient complains of intermittent SOB on exertion. HD continues MWF, His discharge plan is home and he will need transportation thru his insurance [employer] . Tarah Griffith Cm/quang to follow for any needs. Pt has [facility] comm plan and nurses can call to set up at [phone] for transport. Back up plan is for taxi voucher.   Joe Quinonez RN   [phone]

## 2022-04-13 NOTE — PROGRESS NOTES
Called [employer] transportation and they put him in for transportation with trip # [id number]. It can be up to 3-4 hours to find transport.

## 2022-04-14 LAB
(1,3)-BETA-D-GLUCAN (FUNGITELL) INTERPRETATION: POSITIVE
(1,3)-BETA-D-GLUCAN (FUNGITELL): 126 PG/ML

## 2022-04-15 LAB — HEPARIN PF4 ANTIBODY: 0.62 OD

## 2022-04-24 NOTE — PROGRESS NOTES
Physician Progress Note      PATIENT:               Jimbo Fields  [facility] #:                  [id number]  :                       1985  ADMIT DATE:       2022 11:09 AM  DISCH DATE:        2022 6:09 PM  RESPONDING  PROVIDER #:        Rodney Walker DO          QUERY TEXT:    Dear Dr. Ana Toledo,    Patient admitted with fluid overload. Noted documentation of chronic diastolic   heart failure or just HFpEF with no acuity in all but one progress note on   chart and Acute on chronic diastolic heart failure in Progress note on 4/10. If possible, please document in progress notes and discharge summary if you   are evaluating and /or treating any of the following: The medical record reflects the following:  Risk Factors: ESRD on dialysis, Known history of diastolic heart failure  Clinical Indicators: Admitted for \"Fluid overload\", TTE result summary:   \"Moderate to severe concentric left ventricular hypertrophy. Left ventricle is   mildly enlarged . Left ventricular diastolic filling is elevated . Ejection   fraction is visually estimated at 45 to 50%. The left atrium is severely   dilated. Mildly dilated right ventricle with normal systolic function\", BNP   >67672, o2 saturations on arrival were in low 90's  Treatment: Cardiac consult, Hemodialysis, Close pt monitoring, Strict I&O    Thank you,  Belle Dance BSN, RN  Clinical Documentation Improvement  [email or web address]  Cell phone: [phone]  Options provided:  -- Acute on Chronic diastolic heart failure confirmed and chronic diastolic   heart failure ruled out  -- Chronic diastolic heart failure confirmed and Acute on Chronic diastolic   heart failure ruled out  -- Other - I will add my own diagnosis  -- Disagree - Not applicable / Not valid  -- Disagree - Clinically unable to determine / Unknown  -- Refer to Clinical Documentation Reviewer    PROVIDER RESPONSE TEXT:    Provider disagreed with this query.     Query created by: Cole Epperson

## 2022-05-10 LAB
COMMENT: NORMAL
REPORT: NORMAL

## 2022-06-14 ENCOUNTER — TELEPHONE (OUTPATIENT)
Dept: VASCULAR SURGERY | Age: 37
End: 2022-06-14

## 2022-06-14 NOTE — TELEPHONE ENCOUNTER
Called to confirm appointment for 6/15/22 at 10 a.m, left message with date, time, and phone number for patient

## 2022-06-15 ENCOUNTER — OFFICE VISIT (OUTPATIENT)
Dept: VASCULAR SURGERY | Age: 37
End: 2022-06-15
Payer: MEDICAID

## 2022-06-15 VITALS — BODY MASS INDEX: 16.07 KG/M2 | HEIGHT: 66 IN | WEIGHT: 100 LBS

## 2022-06-15 DIAGNOSIS — I87.302: Primary | Chronic | ICD-10-CM

## 2022-06-15 PROCEDURE — G8427 DOCREV CUR MEDS BY ELIG CLIN: HCPCS | Performed by: SURGERY

## 2022-06-15 PROCEDURE — G8419 CALC BMI OUT NRM PARAM NOF/U: HCPCS | Performed by: SURGERY

## 2022-06-15 PROCEDURE — 99213 OFFICE O/P EST LOW 20 MIN: CPT | Performed by: SURGERY

## 2022-06-15 PROCEDURE — 1036F TOBACCO NON-USER: CPT | Performed by: SURGERY

## 2022-06-15 NOTE — PROGRESS NOTES
BRONCHOSCOPY N/A 8/3/2021    BRONCHOSCOPY DIAGNOSTIC OR CELL [address] ONLY performed by Kvng Carter MD at [facility] Left 11 years ago    UPPER GASTROINTESTINAL ENDOSCOPY N/A 1/3/2019    EGD BIOPSY performed by Pepe Farooq MD at [address] [place] [address]     Current Medications:   Prior to Admission medications    Medication Sig Start Date End Date Taking? Authorizing Provider   losartan (COZAAR) 100 MG tablet Take 1.5 tablets by mouth every evening 4/13/22 7/12/22 Yes Denisse Brown MD   lidocaine-prilocaine (EMLA) 2.5-2.5 % cream Apply topically as needed for Pain Apply topically as needed.    Yes Historical Provider, MD   hydrALAZINE (APRESOLINE) 50 MG tablet Take 1 tablet by mouth 3 times daily 3/1/22  Yes Joy Diaz MD   labetalol (NORMODYNE) 300 MG tablet Take 1 tablet by mouth 2 times daily 3/1/22  Yes Joy Diaz MD   diphenhydrAMINE (BENADRYL) 25 MG tablet Take 1 tablet by mouth 2 times daily as needed for Itching, Allergies or Sleep 3/1/22  Yes Joy Diaz MD   cloNIDine (CATAPRES) 0.3 MG tablet Take 1 tablet by mouth 3 times daily 3/1/22  Yes Joy Diaz MD   albuterol-ipratropium (COMBIVENT RESPIMAT)  MCG/ACT AERS inhaler Inhale 1 puff into the lungs every 6 hours  Patient taking differently: Inhale 2 puffs into the lungs every 6 hours  3/1/22  Yes Joy Diaz MD   NIFEdipine (ADALAT CC) 60 MG extended release tablet Take 90 mg by mouth daily  1/14/22  Yes Historical Provider, MD   Melatonin 5 MG CAPS Take 5 mg by mouth nightly as needed  8/19/21  Yes Historical Provider, MD   Methoxy PEG-Epoetin Beta (MIRCERA IJ) Infuse 60 mcg intravenously every 14 days  8/13/21 8/12/22 Yes Historical Provider, MD   sevelamer (RENVELA) 800 MG tablet Take 2 tablets by mouth 3 times daily    Yes Historical Provider, MD   vitamin D (ERGOCALCIFEROL) 1.25 MG (98035 UT) CAPS capsule Take 1 capsule by mouth once a week for 11 doses 1/30/22 4/11/22  Richard Cade MD Allergies:  Tylenol [acetaminophen] and Levofloxacin    Social History     Socioeconomic History    Marital status:      Spouse name: Not on file    Number of children: 3    Years of education: Not on file    Highest education level: High school graduate   Occupational History    Not on file   Tobacco Use    Smoking status: Never Smoker    Smokeless tobacco: Never Used   Vaping Use    Vaping Use: Never used   Substance and Sexual Activity    Alcohol use: No    Drug use: Not Currently    Sexual activity: Yes     Partners: Female   Other Topics Concern    Not on file   Social History Narrative    Not on file     Social Determinants of Health     Financial Resource Strain: Medium Risk    Difficulty of Paying Living Expenses: Somewhat hard   Food Insecurity: No Food Insecurity    Worried About Running Out of Food in the Last Year: Never true    Juan of Food in the Last Year: Never true   Transportation Needs:     Lack of Transportation (Medical): Not on file    Lack of Transportation (Non-Medical):  Not on file   Physical Activity:     Days of Exercise per Week: Not on file    Minutes of Exercise per Session: Not on file   Stress:     Feeling of Stress : Not on file   Social Connections:     Frequency of Communication with Friends and Family: Not on file    Frequency of Social Gatherings with Friends and Family: Not on file    Attends Mandaeism Services: Not on file    Active Member of [address] [place] [address] [employer] or Organizations: Not on file    Attends Club or Organization Meetings: Not on file    Marital Status: Not on file   Intimate Partner Violence:     Fear of Current or Ex-Partner: Not on file    Emotionally Abused: Not on file    Physically Abused: Not on file    Sexually Abused: Not on file   Housing Stability:     Unable to Pay for Housing in the Last Year: Not on file    Number of [place] in the Last Year: Not on file    Unstable Housing in the Last Year: Not on file Family History   Problem Relation Age of Onset    Kidney Disease Paternal Grandmother     Cancer Neg Hx     Heart Disease Neg Hx        REVIEW OF SYSTEMS:    Constitutional: Negative for activity change, appetite change, chills, diaphoresis, fatigue, fever and unexpected weight change. HEENT: Negative for congestion, ear discharge, ear pain, hearing loss, nosebleeds, rhinorrhea, sinus pain, sore throat, tinnitus, trouble swallowing and voice change. Eyes: Negative for photophobia, pain, discharge, redness, itching and visual disturbance. Respiratory: Negative for apnea, cough, chest tightness, shortness of breath and wheezing. Cardiovascular: Negative for chest pain, palpitations and leg swelling. Gastrointestinal: Negative for abdominal distention, abdominal pain, blood in stool, constipation, diarrhea, nausea and vomiting. Endocrine: Negative for cold intolerance, heat intolerance, polydipsia, polyphagia and polyuria. Genitourinary: Negative for decreased urine volume, difficulty urinating, dysuria, frequency, hematuria and urgency. Musculoskeletal: Negative for arthralgias, back pain, gait problem, joint swelling and neck pain. Skin: Negative for color change, pallor, rash and wound. Allergic/Immunologic: Negative for environmental allergies, food allergies and immunocompromised state. Neurological: Negative for dizziness, syncope, facial asymmetry, speech difficulty, weakness, light-headedness, numbness and headaches. Hematological: Negative for adenopathy. Does not bruise/bleed easily. Psychiatric/Behavioral: Negative for agitation, confusion, sleep disturbance and suicidal ideas. The patient is not nervous/anxious. PHYSICAL EXAM:  There were no vitals filed for this visit.   General Appearance: alert and oriented to person, place and time, well developed and well- nourished, in no acute distress  Skin: warm and dry, no rash or erythema  Head: normocephalic and atraumatic  Eyes: extraocular eye movements intact, conjunctivae normal  ENT: external ear and ear canal normal bilaterally, nose without deformity  Pulmonary/Chest: clear to auscultation bilaterally- no wheezes, rales or rhonchi, normal air movement, no respiratory distress  Cardiovascular: normal rate, regular rhythm, normal S1 and S2, no murmurs, no carotid bruits  Abdomen: soft, non-tender, non-distended, normal bowel sounds, no masses or organomegaly  Musculoskeletal: normal range of motion, no joint swelling, deformity or tenderness  Neurologic: no cranial nerve deficit, gait, coordination and speech normal  Extremities: Left arm demonstrates good skin integrity. There is no ulcerations. He has a palpable radial pulse. Problem List Items Addressed This Visit     Venous hypertension, chronic, left - Primary (Chronic)          #1 left arm AV fistula with a history of aneurysmal degeneration. He has had this fistula now since he has been 14years old. Right now is having some pain and discomfort over the lying the left arm arteriovenous fistula. Most likely this secondary to outflow obstruction issues. He also has left arm swelling. This is worse compared to the prior exams. From our standpoint we will plan a fistulogram.    Risk-benefit and alternatives were discussed with the patient he understands and wishes to proceed he can continue to use his access. He will be scheduled this Monday    No follow-ups on file.

## 2022-06-16 RX ORDER — SODIUM CHLORIDE 9 MG/ML
INJECTION, SOLUTION INTRAVENOUS PRN
Status: CANCELLED | OUTPATIENT
Start: 2022-06-16

## 2022-06-16 RX ORDER — SODIUM CHLORIDE 0.9 % (FLUSH) 0.9 %
5-40 SYRINGE (ML) INJECTION PRN
Status: CANCELLED | OUTPATIENT
Start: 2022-06-16

## 2022-06-16 RX ORDER — SODIUM CHLORIDE 0.9 % (FLUSH) 0.9 %
5-40 SYRINGE (ML) INJECTION EVERY 12 HOURS SCHEDULED
Status: CANCELLED | OUTPATIENT
Start: 2022-06-16

## 2022-06-17 ENCOUNTER — TELEPHONE (OUTPATIENT)
Dept: CARDIAC CATH/INVASIVE PROCEDURES | Age: 37
End: 2022-06-17

## 2022-06-17 NOTE — TELEPHONE ENCOUNTER
Reminded patient of scheduled procedure on  6/20   Instructions given and COVID questionnaire completed.

## 2022-06-20 ENCOUNTER — HOSPITAL ENCOUNTER (OUTPATIENT)
Dept: CARDIAC CATH/INVASIVE PROCEDURES | Age: 37
Discharge: HOME | End: 2022-06-20

## 2022-06-21 ENCOUNTER — TELEPHONE (OUTPATIENT)
Dept: VASCULAR SURGERY | Age: 37
End: 2022-06-21

## 2022-06-21 NOTE — TELEPHONE ENCOUNTER
Spoke with Susanne at [facility], rescheduled (L) arm fistulogram with Dr. Angel Robb 6/27/22 at 7:30 a.m,; she will notify and instruct the pt.

## 2022-06-22 PROBLEM — Z99.2 ENCOUNTER REGARDING VASCULAR ACCESS FOR DIALYSIS FOR END-STAGE RENAL DISEASE (HCC): Status: ACTIVE | Noted: 2022-06-22

## 2022-06-22 PROBLEM — N18.6 ENCOUNTER REGARDING VASCULAR ACCESS FOR DIALYSIS FOR END-STAGE RENAL DISEASE (HCC): Status: ACTIVE | Noted: 2022-06-22

## 2022-06-24 ENCOUNTER — TELEPHONE (OUTPATIENT)
Dept: CARDIAC CATH/INVASIVE PROCEDURES | Age: 37
End: 2022-06-24

## 2022-06-24 NOTE — TELEPHONE ENCOUNTER
Left voicemail remindking patient of scheduled procedure on 6/27/22. Instructions given. Problem: Falls - Risk of:  Goal: Will remain free from falls  Description: Will remain free from falls  Outcome: Ongoing     Problem: Falls - Risk of:  Goal: Absence of physical injury  Description: Absence of physical injuryPt free from falls this shift, uses call light and assistive devices appropriately.    Outcome: Ongoing     Problem: Discharge Planning:  Goal: Participates in care planning  Description: Participates in care planning  Outcome: Ongoing     Problem: Discharge Planning:  Goal: Discharged to appropriate level of care  Description: Discharged to appropriate level of care  Outcome: Ongoing     Problem: Fluid Volume - Imbalance:  Goal: Absence of imbalanced fluid volume signs and symptoms  Description: Absence of imbalanced fluid volume signs and symptoms  Outcome: Ongoing   Hemodilaysis completed today for 2 hours  Problem: Pain:  Goal: Control of acute pain  Description: Control of acute pain  Outcome: Ongoing     Problem: Pain:  Goal: Control of chronic pain  Description: Control of chronic pain  Outcome: Ongoing     Problem: Serum Glucose Level - Abnormal:  Goal: Ability to maintain appropriate glucose levels will improve to within specified parameters  Description: Ability to maintain appropriate glucose levels will improve to within specified parameters  Outcome: Ongoing     Problem: Fluid Volume:  Goal: Will show no signs or symptoms of fluid imbalance  Description: Will show no signs or symptoms of fluid imbalance  Outcome: Ongoing     Problem: Cardiac:  Goal: Ability to maintain vital signs within normal range will improve  Description: Ability to maintain vital signs within normal range will improve  Outcome: Ongoing   Cardene gtt remains infusing for hypertension

## 2022-06-27 ENCOUNTER — HOSPITAL ENCOUNTER (OUTPATIENT)
Dept: CARDIAC CATH/INVASIVE PROCEDURES | Age: 37
Discharge: HOME | End: 2022-06-27
Payer: MEDICAID

## 2022-06-27 VITALS
WEIGHT: 150 LBS | RESPIRATION RATE: 18 BRPM | HEART RATE: 88 BPM | TEMPERATURE: 97.9 F | SYSTOLIC BLOOD PRESSURE: 153 MMHG | HEIGHT: 66 IN | DIASTOLIC BLOOD PRESSURE: 95 MMHG | BODY MASS INDEX: 24.11 KG/M2 | OXYGEN SATURATION: 98 %

## 2022-06-27 DIAGNOSIS — Z99.2 ENCOUNTER REGARDING VASCULAR ACCESS FOR DIALYSIS FOR END-STAGE RENAL DISEASE (HCC): ICD-10-CM

## 2022-06-27 DIAGNOSIS — N18.6 ENCOUNTER REGARDING VASCULAR ACCESS FOR DIALYSIS FOR END-STAGE RENAL DISEASE (HCC): ICD-10-CM

## 2022-06-27 PROCEDURE — 36907 BALO ANGIOP CTR DIALYSIS SEG: CPT

## 2022-06-27 PROCEDURE — C1769 GUIDE WIRE: HCPCS

## 2022-06-27 PROCEDURE — C1894 INTRO/SHEATH, NON-LASER: HCPCS

## 2022-06-27 PROCEDURE — 2500000003 HC RX 250 WO HCPCS

## 2022-06-27 PROCEDURE — 6360000002 HC RX W HCPCS

## 2022-06-27 PROCEDURE — 36902 INTRO CATH DIALYSIS CIRCUIT: CPT | Performed by: SURGERY

## 2022-06-27 PROCEDURE — 2709999900 HC NON-CHARGEABLE SUPPLY

## 2022-06-27 PROCEDURE — C1725 CATH, TRANSLUMIN NON-LASER: HCPCS

## 2022-06-27 PROCEDURE — 36901 INTRO CATH DIALYSIS CIRCUIT: CPT

## 2022-06-27 RX ORDER — METHYLPREDNISOLONE SODIUM SUCCINATE 125 MG/2ML
125 INJECTION, POWDER, LYOPHILIZED, FOR SOLUTION INTRAMUSCULAR; INTRAVENOUS ONCE
Status: DISCONTINUED | OUTPATIENT
Start: 2022-06-27 | End: 2022-06-28 | Stop reason: HOSPADM

## 2022-06-27 RX ORDER — DIPHENHYDRAMINE HYDROCHLORIDE 50 MG/ML
50 INJECTION INTRAMUSCULAR; INTRAVENOUS ONCE
Status: DISCONTINUED | OUTPATIENT
Start: 2022-06-27 | End: 2022-06-28 | Stop reason: HOSPADM

## 2022-06-27 RX ORDER — SODIUM CHLORIDE 0.9 % (FLUSH) 0.9 %
5-40 SYRINGE (ML) INJECTION EVERY 12 HOURS SCHEDULED
Status: DISCONTINUED | OUTPATIENT
Start: 2022-06-27 | End: 2022-06-28 | Stop reason: HOSPADM

## 2022-06-27 RX ORDER — SODIUM CHLORIDE 9 MG/ML
INJECTION, SOLUTION INTRAVENOUS PRN
Status: DISCONTINUED | OUTPATIENT
Start: 2022-06-27 | End: 2022-06-28 | Stop reason: HOSPADM

## 2022-06-27 RX ORDER — SODIUM CHLORIDE 0.9 % (FLUSH) 0.9 %
5-40 SYRINGE (ML) INJECTION PRN
Status: DISCONTINUED | OUTPATIENT
Start: 2022-06-27 | End: 2022-06-28 | Stop reason: HOSPADM

## 2022-06-27 NOTE — OP NOTE
Operative Note      Patient: Polo Syed  YOB: 1985  MRN: [id number]    Date of procedure: 6/27/2022    Preoperative diagnosis malfunctioning left arm arteriovenous fistula with pain and discomfort    Postoperative diagnosis: Same with central venous stenosis at the level of the subclavian with reflux into the jugular system    Findings: See above  Additional findings. The left arm arteriovenous fistula is widely patent. Aneurysmal degeneration is noted. Reflux into the arterial system was not performed. Prior bedside ultrasound demonstrated widely patent anastomosis. Central venous system demonstrated collateralization into the jugular system with what appeared to be a stenosis at the level of the subclavian. Surgeon: Deirdre Floyd MD    Assistant: None    Anesthesia: Local lidocaine. Additional medication secondary to his dye allergy Benadryl and Solu-Medrol were given see nursing notes. Also labetalol was given secondary to his hypertension. Estimated blood loss: Less than 10 cc    Estimated contrast: See nursing notes    Procedure:  #1 fistulogram of the left upper extremity with central venous imaging  #2 balloon angioplasty of subclavian with a 9 x 4 balloon balloon in 2 segments. Description of the procedure: After risk benefits and alternatives were discussed with the patient consent was achieved. The day the procedure the patient was brought to the Cath Lab and placed in supine position. He was prepped and draped in standard sterile fashion, taken verify the person the operative site as well as procedure. Lidocaine was used infiltrate the skin and subcutaneous tissue of the left arm arteriovenous access a micropuncture needle micropuncture wire and sheath were used to gain access to the left arm arteriovenous fistula. The micropuncture sheath was advanced. Benadryl and Solu-Medrol were administered.   Images were then taken through the micropuncture sheath demonstrated a widely patent fistula. Aneurysmal degeneration is noted. There is a an indwelling stent in the axilla which appears to be occluded. Central venous imaging demonstrated collateralization at the level of the subclavian. There was filling into the SVC. We then placed a Glidewire advantage into the inferior vena cava under fluoroscopy. We sequentially dilated with a 5 [place], 6 [place] and then a 6 [place] sheath/flushed with heparinized saline solution. A Vokle 9 x 4 balloon was delivered to the subclavian. Balloon angioplasty was performed in 2 segments. Repeat fistulogram was performed demonstrating much less collateralization with brisk filling into superior vena cava and decompression of the fistula on physical exam    At this point is completed the procedure. A figure-of-eight 3-0 nylon suture was placed at the entry site everything was taken out and pressure held.         Electronically signed by Gil Delatorre MD on 6/27/2022 at 8:21 AM

## 2022-06-27 NOTE — H&P
HISTORY OF PRESENT ILLNESS:                 The patient is a 43 y.o. male who returns for follow-up evaluation of left arm aneurysmal fistula. This is been aneurysmal for years now. He has had prior interventions secondary to central venous issues. He has stents that are been placed. He also has reflux into the arm. He has some pain and discomfort now in the axilla associated with the fistula. It is also rating down his arm. This is a new issue for him currently. He denies any significant prolonged bleeding. And otherwise is running well. The fistula is been in place for several years.   He is concerned about the swelling underneath his axilla and the discomfort associated with the swelling over the vein.     Past Medical History:    Past Medical History             Diagnosis Date    (HFpEF) heart failure with preserved ejection fraction ([address]       stage III    Anxiety 9/19/2015    AVF (arteriovenous fistula) (HCC) 4/30/2018    Chronic diastolic (congestive) heart failure (HCC) 7/14/2021     Concerns of Amyloidosis on TTE 8/2021    Chronic kidney disease       CKD since early childhood per pt and on HD ~ 11 years    Depression      Encounter regarding vascular access for dialysis for ESRD ([address] 4/30/2018    History of ruptured Berry aneurysm ([address] 10/20/2015    Hypertension       on meds for ~ 11 years    Hypothyroidism      Intracranial hemorrhage (HCC)       was d/t ruptured aneurysm - pt underwent neurosurgery for clipping of the aneurysm    Neutropenia ([address]      Noncompliance w/medication treatment due to intermit use of medication 11/3/2015    Pre-transplant evaluation for kidney transplant 4/5/2017         Past Surgical History:    Past Surgical History             Procedure Laterality Date    BRAIN ANEURYSM SURGERY Right 3-4 years ago     Intracranial aneurysm clipping surgery 3-4 years ago, after rupture of aneurysm causing ICH    BRONCHOSCOPY N/A 8/3/2021     BRONCHOSCOPY DIAGNOSTIC OR CELL [address] ONLY performed by Charley Jennings MD at [facility] Left 11 years ago    UPPER GASTROINTESTINAL ENDOSCOPY N/A 1/3/2019     EGD BIOPSY performed by Thelma Albert MD at [address] [place] [address]         Current Medications:   Home Medications           Prior to Admission medications    Medication Sig Start Date End Date Taking?  Authorizing Provider   losartan (COZAAR) 100 MG tablet Take 1.5 tablets by mouth every evening 4/13/22 7/12/22 Yes Soni Pimentel MD   lidocaine-prilocaine (EMLA) 2.5-2.5 % cream Apply topically as needed for Pain Apply topically as needed.     Yes Historical Provider, MD   hydrALAZINE (APRESOLINE) 50 MG tablet Take 1 tablet by mouth 3 times daily 3/1/22   Yes Lenny Cisse MD   labetalol (NORMODYNE) 300 MG tablet Take 1 tablet by mouth 2 times daily 3/1/22   Yes Lenny Cisse MD   diphenhydrAMINE (BENADRYL) 25 MG tablet Take 1 tablet by mouth 2 times daily as needed for Itching, Allergies or Sleep 3/1/22   Yes Lenny Cisse MD   cloNIDine (CATAPRES) 0.3 MG tablet Take 1 tablet by mouth 3 times daily 3/1/22   Yes Lenny Cisse MD   albuterol-ipratropium (COMBIVENT RESPIMAT)  MCG/ACT AERS inhaler Inhale 1 puff into the lungs every 6 hours  Patient taking differently: Inhale 2 puffs into the lungs every 6 hours  3/1/22   Yes Lenny Cisse MD   NIFEdipine (ADALAT CC) 60 MG extended release tablet Take 90 mg by mouth daily  1/14/22   Yes Historical Provider, MD   Melatonin 5 MG CAPS Take 5 mg by mouth nightly as needed  8/19/21   Yes Historical Provider, MD   Methoxy PEG-Epoetin Beta (MIRCERA IJ) Infuse 60 mcg intravenously every 14 days  8/13/21 8/12/22 Yes Historical Provider, MD   sevelamer (RENVELA) 800 MG tablet Take 2 tablets by mouth 3 times daily      Yes Historical Provider, MD   vitamin D (ERGOCALCIFEROL) 1.25 MG (44008 UT) CAPS capsule Take 1 capsule by mouth once a week for 11 doses 1/30/22 4/11/22   Pia Teixeira MD    Allergies:  Tylenol [acetaminophen] and Levofloxacin     Social History               Socioeconomic History    Marital status:        Spouse name: Not on file    Number of children: 3    Years of education: Not on file    Highest education level: High school graduate   Occupational History    Not on file   Tobacco Use    Smoking status: Never Smoker    Smokeless tobacco: Never Used   Vaping Use    Vaping Use: Never used   Substance and Sexual Activity    Alcohol use: No    Drug use: Not Currently    Sexual activity: Yes       Partners: Female   Other Topics Concern    Not on file   Social History Narrative    Not on file      Social Determinants of Health          Financial Resource Strain: Medium Risk    Difficulty of Paying Living Expenses: Somewhat hard   Food Insecurity: No Food Insecurity    Worried About Running Out of Food in the Last Year: Never true    Juan of Food in the Last Year: Never true   Transportation Needs:     Lack of Transportation (Medical): Not on file    Lack of Transportation (Non-Medical):  Not on file   Physical Activity:     Days of Exercise per Week: Not on file    Minutes of Exercise per Session: Not on file   Stress:     Feeling of Stress : Not on file   Social Connections:     Frequency of Communication with Friends and Family: Not on file    Frequency of Social Gatherings with Friends and Family: Not on file    Attends Nondenominational Services: Not on file    Active Member of Clubs or Organizations: Not on file    Attends Club or Organization Meetings: Not on file    Marital Status: Not on file   Intimate Partner Violence:     Fear of Current or Ex-Partner: Not on file    Emotionally Abused: Not on file    Physically Abused: Not on file    Sexually Abused: Not on file   Housing Stability:     Unable to Pay for Housing in the Last Year: Not on file    Number of [place] in the Last Year: Not on file    Unstable Housing in the Last Year: Not on file            Family History         Family History   Problem Relation Age of Onset    Kidney Disease Paternal Grandmother      Cancer Neg Hx      Heart Disease Neg Hx              REVIEW OF SYSTEMS:     Constitutional: Negative for activity change, appetite change, chills, diaphoresis, fatigue, fever and unexpected weight change. HEENT: Negative for congestion, ear discharge, ear pain, hearing loss, nosebleeds, rhinorrhea, sinus pain, sore throat, tinnitus, trouble swallowing and voice change. Eyes: Negative for photophobia, pain, discharge, redness, itching and visual disturbance. Respiratory: Negative for apnea, cough, chest tightness, shortness of breath and wheezing. Cardiovascular: Negative for chest pain, palpitations and leg swelling. Gastrointestinal: Negative for abdominal distention, abdominal pain, blood in stool, constipation, diarrhea, nausea and vomiting. Endocrine: Negative for cold intolerance, heat intolerance, polydipsia, polyphagia and polyuria. Genitourinary: Negative for decreased urine volume, difficulty urinating, dysuria, frequency, hematuria and urgency. Musculoskeletal: Negative for arthralgias, back pain, gait problem, joint swelling and neck pain. Skin: Negative for color change, pallor, rash and wound. Allergic/Immunologic: Negative for environmental allergies, food allergies and immunocompromised state. Neurological: Negative for dizziness, syncope, facial asymmetry, speech difficulty, weakness, light-headedness, numbness and headaches. Hematological: Negative for adenopathy. Does not bruise/bleed easily. Psychiatric/Behavioral: Negative for agitation, confusion, sleep disturbance and suicidal ideas. The patient is not nervous/anxious.                PHYSICAL EXAM:  There were no vitals filed for this visit.   General Appearance: alert and oriented to person, place and time, well developed and well- nourished, in no acute distress  Skin: warm and dry, no rash or erythema  Head: normocephalic and atraumatic  Eyes: extraocular eye movements intact, conjunctivae normal  ENT: external ear and ear canal normal bilaterally, nose without deformity  Pulmonary/Chest: clear to auscultation bilaterally- no wheezes, rales or rhonchi, normal air movement, no respiratory distress  Cardiovascular: normal rate, regular rhythm, normal S1 and S2, no murmurs, no carotid bruits  Abdomen: soft, non-tender, non-distended, normal bowel sounds, no masses or organomegaly  Musculoskeletal: normal range of motion, no joint swelling, deformity or tenderness  Neurologic: no cranial nerve deficit, gait, coordination and speech normal  Extremities: Left arm demonstrates good skin integrity. There is no ulcerations. He has a palpable radial pulse.          Problem List Items Addressed This Visit           Venous hypertension, chronic, left - Primary (Chronic)              #1 left arm AV fistula with a history of aneurysmal degeneration. He has had this fistula now since he has been 14years old.     Right now is having some pain and discomfort over the lying the left arm arteriovenous fistula. Most likely this secondary to outflow obstruction issues. He also has left arm swelling. This is worse compared to the prior exams. From our standpoint we will plan a fistulogram.     Risk-benefit and alternatives were discussed with the patient he understands and wishes to proceed he can continue to use his access. He will be scheduled this Monday     No follow-ups on file.                   History and physical updated    Will give steroids and benadryl    [place]

## 2022-08-02 ENCOUNTER — TELEPHONE (OUTPATIENT)
Dept: VASCULAR SURGERY | Age: 37
End: 2022-08-02

## 2022-08-09 ENCOUNTER — TELEPHONE (OUTPATIENT)
Dept: VASCULAR SURGERY | Age: 37
End: 2022-08-09

## 2022-08-10 ENCOUNTER — OFFICE VISIT (OUTPATIENT)
Dept: VASCULAR SURGERY | Age: 37
End: 2022-08-10
Payer: MEDICAID

## 2022-08-10 VITALS — SYSTOLIC BLOOD PRESSURE: 128 MMHG | DIASTOLIC BLOOD PRESSURE: 84 MMHG

## 2022-08-10 DIAGNOSIS — T82.898A ANEURYSM OF ARTERIOVENOUS DIALYSIS FISTULA, INITIAL ENCOUNTER (HCC): Primary | ICD-10-CM

## 2022-08-10 PROCEDURE — 1036F TOBACCO NON-USER: CPT | Performed by: SURGERY

## 2022-08-10 PROCEDURE — G8427 DOCREV CUR MEDS BY ELIG CLIN: HCPCS | Performed by: SURGERY

## 2022-08-10 PROCEDURE — 99213 OFFICE O/P EST LOW 20 MIN: CPT | Performed by: SURGERY

## 2022-08-10 PROCEDURE — G8420 CALC BMI NORM PARAMETERS: HCPCS | Performed by: SURGERY

## 2022-08-10 NOTE — PROGRESS NOTES
Vascular Surgery Outpatient Progress Note      Chief Complaint   Patient presents with    Circulatory Problem     S/P fistuloplasty. HISTORY OF PRESENT ILLNESS:                The patient is a 43 y.o. male who returns for follow-up evaluation of left arm aneurysmal fistula. This is been aneurysmal for years now. He has had prior interventions secondary to central venous issues. Currently he denies any arm pain or discomfort he denies any prolonged bleeding. He denies any arterial steal or venous hypertension symptoms of the left upper extremity. He denies any active chest pain or shortness of breath he status post balloon angioplasty of the outflow tract.         Past Medical History:        Diagnosis Date    (HFpEF) heart failure with preserved ejection fraction ([address]     stage III    Anxiety 9/19/2015    AVF (arteriovenous fistula) ([address] 4/30/2018    Chronic diastolic (congestive) heart failure ([address] 7/14/2021    Concerns of Amyloidosis on TTE 8/2021    Chronic kidney disease     CKD since early childhood per pt and on HD ~ 11 years    Depression     Encounter regarding vascular access for dialysis for ESRD ([address] 4/30/2018    History of ruptured Berry aneurysm ([address] 10/20/2015    Hypertension     on meds for ~ 11 years    Hypothyroidism     Intracranial hemorrhage ([address]     was d/t ruptured aneurysm - pt underwent neurosurgery for clipping of the aneurysm    Neutropenia ([address]     Noncompliance w/medication treatment due to intermit use of medication 11/3/2015    Pre-transplant evaluation for kidney transplant 4/5/2017     Past Surgical History:        Procedure Laterality Date    BRAIN ANEURYSM SURGERY Right 3-4 years ago    Intracranial aneurysm clipping surgery 3-4 years ago, after rupture of aneurysm causing ICH    BRONCHOSCOPY N/A 8/3/2021    BRONCHOSCOPY DIAGNOSTIC OR CELL [address] ONLY performed by Aracelis Spain MD at [address] Left 11 years ago    UPPER GASTROINTESTINAL ENDOSCOPY N/A 1/3/2019    EGD BIOPSY performed by Charlee Valdovinos MD at [address] [place] [address]     Current Medications:   Prior to Admission medications    Medication Sig Start Date End Date Taking? Authorizing Provider   losartan (COZAAR) 100 MG tablet Take 1.5 tablets by mouth every evening 4/13/22 8/10/22 Yes Carmencita Rivera MD   lidocaine-prilocaine (EMLA) 2.5-2.5 % cream Apply topically as needed for Pain Apply topically as needed. Yes Historical Provider, MD   hydrALAZINE (APRESOLINE) 50 MG tablet Take 1 tablet by mouth 3 times daily 3/1/22  Yes Mariia Feliz MD   labetalol (NORMODYNE) 300 MG tablet Take 1 tablet by mouth 2 times daily 3/1/22  Yes Mariia Feliz MD   diphenhydrAMINE (BENADRYL) 25 MG tablet Take 1 tablet by mouth 2 times daily as needed for Itching, Allergies or Sleep 3/1/22  Yes Mariia Feliz MD   cloNIDine (CATAPRES) 0.3 MG tablet Take 1 tablet by mouth 3 times daily 3/1/22  Yes Mariia Feliz MD   albuterol-ipratropium (COMBIVENT RESPIMAT)  MCG/ACT AERS inhaler Inhale 1 puff into the lungs every 6 hours  Patient taking differently: Inhale 2 puffs into the lungs in the morning and 2 puffs at noon and 2 puffs in the evening and 2 puffs before bedtime. 3/1/22  Yes Mariia Feliz MD   NIFEdipine (ADALAT CC) 60 MG extended release tablet Take 90 mg by mouth daily  1/14/22  Yes Historical Provider, MD   Melatonin 5 MG CAPS Take 5 mg by mouth nightly as needed  8/19/21  Yes Historical Provider, MD   Methoxy PEG-Epoetin Beta (MIRCERA IJ) Infuse 60 mcg intravenously every 14 days  8/13/21 8/12/22 Yes Historical Provider, MD   sevelamer (RENVELA) 800 MG tablet Take 2 tablets by mouth 3 times daily    Yes Historical Provider, MD   vitamin D (ERGOCALCIFEROL) 1.25 MG (59303 UT) CAPS capsule Take 1 capsule by mouth once a week for 11 doses 1/30/22 4/11/22  Rhiannon Guevara MD     Allergies:   Iv dye [iodides], Tylenol [acetaminophen], and Levofloxacin    Social History     Socioeconomic History Marital status:      Spouse name: Not on file    Number of children: 4    Years of education: Not on file    Highest education level: High school graduate   Occupational History    Not on file   Tobacco Use    Smoking status: Never    Smokeless tobacco: Never   Vaping Use    Vaping Use: Never used   Substance and Sexual Activity    Alcohol use: No    Drug use: Not Currently    Sexual activity: Yes     Partners: Female   Other Topics Concern    Not on file   Social History Narrative    Not on file     Social Determinants of Health     Financial Resource Strain: Not on file   Food Insecurity: Not on file   Transportation Needs: Not on file   Physical Activity: Not on file   Stress: Not on file   Social Connections: Not on file   Intimate Partner Violence: Not on file   Housing Stability: Not on file        Family History   Problem Relation Age of Onset    Kidney Disease Paternal Grandmother     Cancer Neg Hx     Heart Disease Neg Hx        REVIEW OF SYSTEMS:    Constitutional: Negative for activity change, appetite change, chills, diaphoresis, fatigue, fever and unexpected weight change. HEENT: Negative for congestion, ear discharge, ear pain, hearing loss, nosebleeds, rhinorrhea, sinus pain, sore throat, tinnitus, trouble swallowing and voice change. Eyes: Negative for photophobia, pain, discharge, redness, itching and visual disturbance. Respiratory: Negative for apnea, cough, chest tightness, shortness of breath and wheezing. Cardiovascular: Negative for chest pain, palpitations and leg swelling. Gastrointestinal: Negative for abdominal distention, abdominal pain, blood in stool, constipation, diarrhea, nausea and vomiting. Endocrine: Negative for cold intolerance, heat intolerance, polydipsia, polyphagia and polyuria. Genitourinary: Negative for decreased urine volume, difficulty urinating, dysuria, frequency, hematuria and urgency.    Musculoskeletal: Negative for arthralgias, back pain, gait problem, joint swelling and neck pain. Skin: Negative for color change, pallor, rash and wound. Allergic/Immunologic: Negative for environmental allergies, food allergies and immunocompromised state. Neurological: Negative for dizziness, syncope, facial asymmetry, speech difficulty, weakness, light-headedness, numbness and headaches. Hematological: Negative for adenopathy. Does not bruise/bleed easily. Psychiatric/Behavioral: Negative for agitation, confusion, sleep disturbance and suicidal ideas. The patient is not nervous/anxious. Operative Note        Patient: Maikel Karimi  YOB: 1985  MRN: [id number]     Date of procedure: 6/27/2022     Preoperative diagnosis malfunctioning left arm arteriovenous fistula with pain and discomfort     Postoperative diagnosis: Same with central venous stenosis at the level of the subclavian with reflux into the jugular system     Findings: See above  Additional findings. The left arm arteriovenous fistula is widely patent. Aneurysmal degeneration is noted. Reflux into the arterial system was not performed. Prior bedside ultrasound demonstrated widely patent anastomosis. Central venous system demonstrated collateralization into the jugular system with what appeared to be a stenosis at the level of the subclavian. Surgeon: Ra Esquivel MD     Assistant: None     Anesthesia: Local lidocaine. Additional medication secondary to his dye allergy Benadryl and Solu-Medrol were given see nursing notes. Also labetalol was given secondary to his hypertension. Estimated blood loss: Less than 10 cc     Estimated contrast: See nursing notes     Procedure:  #1 fistulogram of the left upper extremity with central venous imaging  #2 balloon angioplasty of subclavian with a 9 x 4 balloon balloon in 2 segments. Description of the procedure: After risk benefits and alternatives were discussed with the patient consent was achieved.   The day the procedure the patient was brought to the Cath Lab and placed in supine position. He was prepped and draped in standard sterile fashion, taken verify the person the operative site as well as procedure. Lidocaine was used infiltrate the skin and subcutaneous tissue of the left arm arteriovenous access a micropuncture needle micropuncture wire and sheath were used to gain access to the left arm arteriovenous fistula. The micropuncture sheath was advanced. Benadryl and Solu-Medrol were administered. Images were then taken through the micropuncture sheath demonstrated a widely patent fistula. Aneurysmal degeneration is noted. There is a an indwelling stent in the axilla which appears to be occluded. Central venous imaging demonstrated collateralization at the level of the subclavian. There was filling into the SVC. We then placed a Glidewire advantage into the inferior vena cava under fluoroscopy. We sequentially dilated with a 5 [place], 6 [place] and then a 6 [place] sheath/flushed with heparinized saline solution. A Ubi 9 x 4 balloon was delivered to the subclavian. Balloon angioplasty was performed in 2 segments. Repeat fistulogram was performed demonstrating much less collateralization with brisk filling into superior vena cava and decompression of the fistula on physical exam     At this point is completed the procedure. A figure-of-eight 3-0 nylon suture was placed at the entry site everything was taken out and pressure held.            Electronically signed by Urban Chino MD on 6/27/2022 at 8:21 AM      PHYSICAL EXAM:  Vitals:    08/10/22 0914   BP: 128/84     General Appearance: alert and oriented to person, place and time, well developed and well- nourished, in no acute distress  Skin: warm and dry, no rash or erythema  Head: normocephalic and atraumatic  Eyes: extraocular eye movements intact, conjunctivae normal  ENT: external ear and ear canal normal bilaterally, nose without deformity  Pulmonary/Chest: clear to auscultation bilaterally- no wheezes, rales or rhonchi, normal air movement, no respiratory distress  Cardiovascular: normal rate, regular rhythm, normal S1 and S2, no murmurs, no carotid bruits  Abdomen: soft, non-tender, non-distended, normal bowel sounds, no masses or organomegaly  Musculoskeletal: normal range of motion, no joint swelling, deformity or tenderness  Neurologic: no cranial nerve deficit, gait, coordination and speech normal  Extremities: Left arm demonstrates good skin integrity. There is no ulcerations. He has a palpable radial pulse. Nice thrill is noted in the access. Good skin integrity is noted    Problem List Items Addressed This Visit       Aneurysm of arteriovenous dialysis fistula ([address] - Primary     Status post balloon angioplasty. Overall he is doing well he has no current complaints. He is running well on dialysis with no prolonged bleeding no ulcerations. He denies any chest pain or shortness of breath. He will follow-up in 6 months. No follow-ups on file.

## 2023-12-12 ENCOUNTER — APPOINTMENT (OUTPATIENT)
Dept: GENERAL RADIOLOGY | Age: 38
End: 2023-12-12
Payer: MEDICAID

## 2023-12-12 ENCOUNTER — HOSPITAL ENCOUNTER (OUTPATIENT)
Age: 38
Setting detail: OBSERVATION
Discharge: HOME | End: 2023-12-13
Admitting: INTERNAL MEDICINE
Payer: MEDICAID

## 2023-12-12 DIAGNOSIS — J81.0 ACUTE PULMONARY EDEMA (HCC): ICD-10-CM

## 2023-12-12 DIAGNOSIS — R07.9 CHEST PAIN, UNSPECIFIED TYPE: ICD-10-CM

## 2023-12-12 DIAGNOSIS — I42.9 CARDIOMYOPATHY, UNSPECIFIED TYPE (HCC): Primary | ICD-10-CM

## 2023-12-12 PROBLEM — I1A.0 RESISTANT HYPERTENSION: Status: ACTIVE | Noted: 2023-12-12

## 2023-12-12 PROBLEM — Z98.890 STATUS POST CARDIAC CATHETERIZATION: Status: ACTIVE | Noted: 2023-12-12

## 2023-12-12 PROBLEM — Z98.890 S/P CARDIAC CATHETERIZATION: Status: ACTIVE | Noted: 2023-12-12

## 2023-12-12 PROBLEM — I42.8 NONISCHEMIC CARDIOMYOPATHY (HCC): Status: ACTIVE | Noted: 2023-12-12

## 2023-12-12 LAB
ECHO BSA: 1.78 M2
GLUCOSE BLD-MCNC: 166 MG/DL (ref 74–99)

## 2023-12-12 PROCEDURE — 71045 X-RAY EXAM CHEST 1 VIEW: CPT

## 2023-12-12 PROCEDURE — 6370000000 HC RX 637 (ALT 250 FOR IP): Performed by: INTERNAL MEDICINE

## 2023-12-12 PROCEDURE — 93458 L HRT ARTERY/VENTRICLE ANGIO: CPT | Performed by: INTERNAL MEDICINE

## 2023-12-12 PROCEDURE — 2500000003 HC RX 250 WO HCPCS: Performed by: INTERNAL MEDICINE

## 2023-12-12 PROCEDURE — 96374 THER/PROPH/DIAG INJ IV PUSH: CPT

## 2023-12-12 PROCEDURE — G0378 HOSPITAL OBSERVATION PER HR: HCPCS

## 2023-12-12 PROCEDURE — 93005 ELECTROCARDIOGRAM TRACING: CPT | Performed by: INTERNAL MEDICINE

## 2023-12-12 PROCEDURE — C1769 GUIDE WIRE: HCPCS | Performed by: INTERNAL MEDICINE

## 2023-12-12 PROCEDURE — 82962 GLUCOSE BLOOD TEST: CPT

## 2023-12-12 PROCEDURE — 6360000002 HC RX W HCPCS: Performed by: INTERNAL MEDICINE

## 2023-12-12 PROCEDURE — 6370000000 HC RX 637 (ALT 250 FOR IP)

## 2023-12-12 PROCEDURE — G0378 HOSPITAL OBSERVATION PER HR: HCPCS | Performed by: INTERNAL MEDICINE

## 2023-12-12 PROCEDURE — 6370000000 HC RX 637 (ALT 250 FOR IP): Performed by: CLINICAL NURSE SPECIALIST

## 2023-12-12 PROCEDURE — 2709999900 HC NON-CHARGEABLE SUPPLY: Performed by: INTERNAL MEDICINE

## 2023-12-12 PROCEDURE — 7100000010 HC PHASE II RECOVERY - FIRST 15 MIN: Performed by: INTERNAL MEDICINE

## 2023-12-12 PROCEDURE — 6360000004 HC RX CONTRAST MEDICATION: Performed by: INTERNAL MEDICINE

## 2023-12-12 PROCEDURE — 99222 1ST HOSP IP/OBS MODERATE 55: CPT | Performed by: INTERNAL MEDICINE

## 2023-12-12 PROCEDURE — C1894 INTRO/SHEATH, NON-LASER: HCPCS | Performed by: INTERNAL MEDICINE

## 2023-12-12 PROCEDURE — 99152 MOD SED SAME PHYS/QHP 5/>YRS: CPT | Performed by: INTERNAL MEDICINE

## 2023-12-12 PROCEDURE — 2580000003 HC RX 258: Performed by: INTERNAL MEDICINE

## 2023-12-12 RX ORDER — HEPARIN SODIUM 10000 [USP'U]/ML
5000 INJECTION, SOLUTION INTRAVENOUS; SUBCUTANEOUS EVERY 8 HOURS
Status: DISCONTINUED | OUTPATIENT
Start: 2023-12-12 | End: 2023-12-13 | Stop reason: HOSPADM

## 2023-12-12 RX ORDER — LABETALOL HYDROCHLORIDE 5 MG/ML
INJECTION, SOLUTION INTRAVENOUS PRN
Status: DISCONTINUED | OUTPATIENT
Start: 2023-12-12 | End: 2023-12-12 | Stop reason: HOSPADM

## 2023-12-12 RX ORDER — SODIUM CHLORIDE 0.9 % (FLUSH) 0.9 %
5-40 SYRINGE (ML) INJECTION EVERY 12 HOURS SCHEDULED
Status: DISCONTINUED | OUTPATIENT
Start: 2023-12-12 | End: 2023-12-13 | Stop reason: HOSPADM

## 2023-12-12 RX ORDER — VALSARTAN 40 MG/1
40 TABLET ORAL DAILY
Status: ON HOLD | COMMUNITY
End: 2023-12-13 | Stop reason: HOSPADM

## 2023-12-12 RX ORDER — SODIUM CHLORIDE 9 MG/ML
INJECTION, SOLUTION INTRAVENOUS PRN
Status: DISCONTINUED | OUTPATIENT
Start: 2023-12-12 | End: 2023-12-13 | Stop reason: HOSPADM

## 2023-12-12 RX ORDER — NIFEDIPINE 30 MG/1
90 TABLET, EXTENDED RELEASE ORAL DAILY
Status: DISCONTINUED | OUTPATIENT
Start: 2023-12-12 | End: 2023-12-13 | Stop reason: HOSPADM

## 2023-12-12 RX ORDER — CLONIDINE HYDROCHLORIDE 0.1 MG/1
0.3 TABLET ORAL 3 TIMES DAILY
Status: DISCONTINUED | OUTPATIENT
Start: 2023-12-12 | End: 2023-12-13 | Stop reason: HOSPADM

## 2023-12-12 RX ORDER — DIPHENHYDRAMINE HCL 25 MG
25 TABLET ORAL 2 TIMES DAILY PRN
Status: DISCONTINUED | OUTPATIENT
Start: 2023-12-12 | End: 2023-12-12

## 2023-12-12 RX ORDER — DIPHENHYDRAMINE HCL 25 MG
25 TABLET ORAL EVERY 8 HOURS PRN
Status: DISCONTINUED | OUTPATIENT
Start: 2023-12-12 | End: 2023-12-13

## 2023-12-12 RX ORDER — INSULIN LISPRO 100 [IU]/ML
0-4 INJECTION, SOLUTION INTRAVENOUS; SUBCUTANEOUS
Status: DISCONTINUED | OUTPATIENT
Start: 2023-12-12 | End: 2023-12-13 | Stop reason: HOSPADM

## 2023-12-12 RX ORDER — LIDOCAINE HCL/PF 100 MG/5ML
SYRINGE (ML) INJECTION PRN
Status: DISCONTINUED | OUTPATIENT
Start: 2023-12-12 | End: 2023-12-12 | Stop reason: HOSPADM

## 2023-12-12 RX ORDER — ASPIRIN 325 MG
325 TABLET ORAL ONCE
Status: COMPLETED | OUTPATIENT
Start: 2023-12-12 | End: 2023-12-12

## 2023-12-12 RX ORDER — ONDANSETRON 4 MG/1
4 TABLET, ORALLY DISINTEGRATING ORAL EVERY 8 HOURS PRN
Status: DISCONTINUED | OUTPATIENT
Start: 2023-12-12 | End: 2023-12-13 | Stop reason: HOSPADM

## 2023-12-12 RX ORDER — LOSARTAN POTASSIUM 50 MG/1
150 TABLET ORAL EVERY EVENING
Status: DISCONTINUED | OUTPATIENT
Start: 2023-12-12 | End: 2023-12-12

## 2023-12-12 RX ORDER — HYDRALAZINE HYDROCHLORIDE 20 MG/ML
INJECTION INTRAMUSCULAR; INTRAVENOUS PRN
Status: DISCONTINUED | OUTPATIENT
Start: 2023-12-12 | End: 2023-12-12 | Stop reason: HOSPADM

## 2023-12-12 RX ORDER — POLYETHYLENE GLYCOL 3350 17 G/17G
17 POWDER, FOR SOLUTION ORAL DAILY PRN
Status: DISCONTINUED | OUTPATIENT
Start: 2023-12-12 | End: 2023-12-13 | Stop reason: HOSPADM

## 2023-12-12 RX ORDER — HYDRALAZINE HYDROCHLORIDE 50 MG/1
50 TABLET, FILM COATED ORAL 3 TIMES DAILY
Status: DISCONTINUED | OUTPATIENT
Start: 2023-12-12 | End: 2023-12-13 | Stop reason: HOSPADM

## 2023-12-12 RX ORDER — INSULIN LISPRO 100 [IU]/ML
0-4 INJECTION, SOLUTION INTRAVENOUS; SUBCUTANEOUS NIGHTLY
Status: DISCONTINUED | OUTPATIENT
Start: 2023-12-12 | End: 2023-12-13 | Stop reason: HOSPADM

## 2023-12-12 RX ORDER — OXYCODONE HYDROCHLORIDE AND ACETAMINOPHEN 5; 325 MG/1; MG/1
1 TABLET ORAL EVERY 8 HOURS PRN
Status: DISCONTINUED | OUTPATIENT
Start: 2023-12-12 | End: 2023-12-13 | Stop reason: HOSPADM

## 2023-12-12 RX ORDER — MAGNESIUM SULFATE IN WATER 40 MG/ML
2000 INJECTION, SOLUTION INTRAVENOUS PRN
Status: DISCONTINUED | OUTPATIENT
Start: 2023-12-12 | End: 2023-12-12

## 2023-12-12 RX ORDER — DEXTROSE MONOHYDRATE 100 MG/ML
INJECTION, SOLUTION INTRAVENOUS CONTINUOUS PRN
Status: DISCONTINUED | OUTPATIENT
Start: 2023-12-12 | End: 2023-12-13 | Stop reason: HOSPADM

## 2023-12-12 RX ORDER — CARVEDILOL 25 MG/1
25 TABLET ORAL 2 TIMES DAILY WITH MEALS
COMMUNITY

## 2023-12-12 RX ORDER — SODIUM CHLORIDE 0.9 % (FLUSH) 0.9 %
5-40 SYRINGE (ML) INJECTION PRN
Status: DISCONTINUED | OUTPATIENT
Start: 2023-12-12 | End: 2023-12-13 | Stop reason: HOSPADM

## 2023-12-12 RX ORDER — NIFEDIPINE 60 MG/1
60 TABLET, FILM COATED, EXTENDED RELEASE ORAL DAILY
Status: DISCONTINUED | OUTPATIENT
Start: 2023-12-12 | End: 2023-12-12 | Stop reason: DRUGHIGH

## 2023-12-12 RX ORDER — ONDANSETRON 2 MG/ML
4 INJECTION INTRAMUSCULAR; INTRAVENOUS EVERY 6 HOURS PRN
Status: DISCONTINUED | OUTPATIENT
Start: 2023-12-12 | End: 2023-12-13 | Stop reason: HOSPADM

## 2023-12-12 RX ORDER — MIDAZOLAM HYDROCHLORIDE 1 MG/ML
INJECTION INTRAMUSCULAR; INTRAVENOUS PRN
Status: DISCONTINUED | OUTPATIENT
Start: 2023-12-12 | End: 2023-12-12 | Stop reason: HOSPADM

## 2023-12-12 RX ORDER — ACETAMINOPHEN 325 MG/1
650 TABLET ORAL EVERY 4 HOURS PRN
Status: DISCONTINUED | OUTPATIENT
Start: 2023-12-12 | End: 2023-12-13 | Stop reason: HOSPADM

## 2023-12-12 RX ORDER — TRAZODONE HYDROCHLORIDE 50 MG/1
50 TABLET ORAL NIGHTLY
Status: DISCONTINUED | OUTPATIENT
Start: 2023-12-12 | End: 2023-12-13 | Stop reason: HOSPADM

## 2023-12-12 RX ORDER — FENTANYL CITRATE 50 UG/ML
INJECTION, SOLUTION INTRAMUSCULAR; INTRAVENOUS PRN
Status: DISCONTINUED | OUTPATIENT
Start: 2023-12-12 | End: 2023-12-12 | Stop reason: HOSPADM

## 2023-12-12 RX ORDER — CARVEDILOL 6.25 MG/1
6.25 TABLET ORAL 2 TIMES DAILY WITH MEALS
Status: DISCONTINUED | OUTPATIENT
Start: 2023-12-12 | End: 2023-12-13

## 2023-12-12 RX ORDER — LANOLIN ALCOHOL/MO/W.PET/CERES
3 CREAM (GRAM) TOPICAL NIGHTLY
Status: DISCONTINUED | OUTPATIENT
Start: 2023-12-12 | End: 2023-12-13 | Stop reason: HOSPADM

## 2023-12-12 RX ORDER — DIPHENHYDRAMINE HYDROCHLORIDE 50 MG/ML
INJECTION INTRAMUSCULAR; INTRAVENOUS PRN
Status: DISCONTINUED | OUTPATIENT
Start: 2023-12-12 | End: 2023-12-12 | Stop reason: HOSPADM

## 2023-12-12 RX ORDER — ATORVASTATIN CALCIUM 10 MG/1
10 TABLET, FILM COATED ORAL DAILY
COMMUNITY

## 2023-12-12 RX ORDER — NITROGLYCERIN 20 MG/100ML
INJECTION INTRAVENOUS PRN
Status: DISCONTINUED | OUTPATIENT
Start: 2023-12-12 | End: 2023-12-12 | Stop reason: HOSPADM

## 2023-12-12 RX ORDER — POTASSIUM CHLORIDE 7.45 MG/ML
10 INJECTION INTRAVENOUS PRN
Status: DISCONTINUED | OUTPATIENT
Start: 2023-12-12 | End: 2023-12-12

## 2023-12-12 RX ORDER — ISOSORBIDE DINITRATE 20 MG/1
30 TABLET ORAL 3 TIMES DAILY
COMMUNITY

## 2023-12-12 RX ORDER — HEPARIN SODIUM 10000 [USP'U]/ML
INJECTION, SOLUTION INTRAVENOUS; SUBCUTANEOUS PRN
Status: DISCONTINUED | OUTPATIENT
Start: 2023-12-12 | End: 2023-12-12 | Stop reason: HOSPADM

## 2023-12-12 RX ORDER — ISOSORBIDE MONONITRATE 30 MG/1
30 TABLET, EXTENDED RELEASE ORAL DAILY
Status: DISCONTINUED | OUTPATIENT
Start: 2023-12-12 | End: 2023-12-12

## 2023-12-12 RX ORDER — POTASSIUM CHLORIDE 20 MEQ/1
40 TABLET, EXTENDED RELEASE ORAL PRN
Status: DISCONTINUED | OUTPATIENT
Start: 2023-12-12 | End: 2023-12-12

## 2023-12-12 RX ORDER — VIT B COMP NO.3/FOLIC/C/BIOTIN 1 MG-60 MG
1 TABLET ORAL
COMMUNITY

## 2023-12-12 RX ORDER — ENOXAPARIN SODIUM 100 MG/ML
40 INJECTION SUBCUTANEOUS DAILY
Status: DISCONTINUED | OUTPATIENT
Start: 2023-12-12 | End: 2023-12-12

## 2023-12-12 RX ORDER — DIPHENHYDRAMINE HYDROCHLORIDE 50 MG/ML
25 INJECTION INTRAMUSCULAR; INTRAVENOUS ONCE
Status: COMPLETED | OUTPATIENT
Start: 2023-12-12 | End: 2023-12-12

## 2023-12-12 RX ADMIN — CLONIDINE HYDROCHLORIDE 0.3 MG: 0.1 TABLET ORAL at 13:06

## 2023-12-12 RX ADMIN — ISOSORBIDE MONONITRATE 30 MG: 30 TABLET, EXTENDED RELEASE ORAL at 13:06

## 2023-12-12 RX ADMIN — TRAZODONE HYDROCHLORIDE 50 MG: 50 TABLET ORAL at 20:44

## 2023-12-12 RX ADMIN — OXYCODONE AND ACETAMINOPHEN 1 TABLET: 5; 325 TABLET ORAL at 21:11

## 2023-12-12 RX ADMIN — SODIUM CHLORIDE, PRESERVATIVE FREE 10 ML: 5 INJECTION INTRAVENOUS at 20:46

## 2023-12-12 RX ADMIN — DIPHENHYDRAMINE HYDROCHLORIDE 25 MG: 25 TABLET ORAL at 21:11

## 2023-12-12 RX ADMIN — CARVEDILOL 6.25 MG: 6.25 TABLET, FILM COATED ORAL at 13:06

## 2023-12-12 RX ADMIN — SACUBITRIL AND VALSARTAN 1 TABLET: 24; 26 TABLET, FILM COATED ORAL at 13:06

## 2023-12-12 RX ADMIN — MELATONIN 3 MG ORAL TABLET 3 MG: 3 TABLET ORAL at 20:44

## 2023-12-12 RX ADMIN — CARVEDILOL 6.25 MG: 6.25 TABLET, FILM COATED ORAL at 18:01

## 2023-12-12 RX ADMIN — CLONIDINE HYDROCHLORIDE 0.3 MG: 0.1 TABLET ORAL at 20:43

## 2023-12-12 RX ADMIN — NIFEDIPINE 90 MG: 30 TABLET, EXTENDED RELEASE ORAL at 20:43

## 2023-12-12 RX ADMIN — DIPHENHYDRAMINE HYDROCHLORIDE 25 MG: 50 INJECTION INTRAMUSCULAR; INTRAVENOUS at 14:15

## 2023-12-12 RX ADMIN — ASPIRIN 325 MG ORAL TABLET 325 MG: 325 PILL ORAL at 06:39

## 2023-12-12 RX ADMIN — HYDRALAZINE HYDROCHLORIDE 50 MG: 50 TABLET, FILM COATED ORAL at 20:44

## 2023-12-12 RX ADMIN — HYDRALAZINE HYDROCHLORIDE 50 MG: 50 TABLET, FILM COATED ORAL at 13:06

## 2023-12-12 ASSESSMENT — PAIN DESCRIPTION - DESCRIPTORS: DESCRIPTORS: THROBBING

## 2023-12-12 ASSESSMENT — PAIN DESCRIPTION - LOCATION: LOCATION: ARM

## 2023-12-12 ASSESSMENT — PAIN SCALES - GENERAL
PAINLEVEL_OUTOF10: 0
PAINLEVEL_OUTOF10: 9

## 2023-12-12 ASSESSMENT — PAIN DESCRIPTION - ORIENTATION: ORIENTATION: LEFT

## 2023-12-12 NOTE — FLOWSHEET NOTE
12/12/23 1238   Belongings   Dental Appliances None   Vision - Corrective Lenses Eyeglasses; At bedside   Hearing Aid None   Clothing Pants; Shirt; Undergarments;Socks; Footwear;Jacket/Coat; At bedside   Jewelry None   Body Piercings Removed No   Electronic Devices Cell Phone;; At bedside   Weapons (Notify Protective Services/Security) None   Other Valuables At home   Home Medications None   Valuables Given To Patient   Provide Name(s) of Who Valuable(s) Were Given To o gab   Orientation to Room   Patient/Responsible Party informed of Rights and Responsibilities Yes   Orientation to Room Performed Yes

## 2023-12-12 NOTE — CARE COORDINATION
12/12/23 Transition of care: patient is observation after having a heart cath completed. He is a dialysis patient at [facility] on MWF at 5:15am. He has a left arm fistula. He follows with internal medicine. Will complete full assessment when patient able to participate. Electronically signed by Kellie Pemberton RN CM on 12/12/2023 at 3:21 PM      Addendum:  call placed to [facility] at [phone] and spoke with Saman Aponte. Notified of admission to the hospital. Saman Aponte requesting call when patient is discharged.  Electronically signed by Kellie Pemberton RN CM on 12/12/2023 at 3:23 PM

## 2023-12-12 NOTE — CONSULTS
[facility]  Nephrology Consult Note    Patient's Name: Alise Stevens    Reason for Consult: HD patient    History Obtained From:  patient, past medical records, and EMR    History of Present Illness:    Alise Stevens is a 34 y.o. male with a past medical history of CHF, CKD, hypothyroidism, and hypertension. He presented as a transfer from [facility] for cardiac catheterization. He underwent a left heart cath today. Vital signs on 12/12 includes temperature 97.8, respirations 15, pulse 82, /112, he was 100% SpO2. There is no lab data to review at the time of this note. Nephrology has been consulted to see the patient. He dialyzes on a MWF schedule via left arm access. At present, patient was seen and examined. He reports that he was having tachycardia prior to hospitalization. He denies any chest pain or shortness of breath. He denies any nausea, vomiting, or diarrhea. He denies any cough or sore throat.      PMH:    Past Medical History:   Diagnosis Date    (HFpEF) heart failure with preserved ejection fraction ([address])     stage III    Anxiety 9/19/2015    AVF (arteriovenous fistula) ([address]) 4/30/2018    Chronic diastolic (congestive) heart failure ([address]) 7/14/2021    Concerns of Amyloidosis on TTE 8/2021    Chronic kidney disease     CKD since early childhood per pt and on HD ~ 11 years    Depression     Encounter regarding vascular access for dialysis for ESRD ([address]) 4/30/2018    History of ruptured Berry aneurysm ([address]) 10/20/2015    Hypertension     on meds for ~ 11 years    Hypothyroidism     Intracranial hemorrhage ([address])     was d/t ruptured aneurysm - pt underwent neurosurgery for clipping of the aneurysm    Neutropenia ([address])     Noncompliance w/medication treatment due to intermit use of medication 11/3/2015    Pre-transplant evaluation for kidney transplant 4/5/2017       Past Surgical History:   Procedure Laterality Date    BRAIN ANEURYSM SURGERY Right 3-4 years ago    Intracranial aneurysm

## 2023-12-13 VITALS
SYSTOLIC BLOOD PRESSURE: 169 MMHG | WEIGHT: 102.73 LBS | HEIGHT: 66 IN | BODY MASS INDEX: 16.51 KG/M2 | TEMPERATURE: 97.3 F | OXYGEN SATURATION: 99 % | HEART RATE: 83 BPM | DIASTOLIC BLOOD PRESSURE: 98 MMHG | RESPIRATION RATE: 18 BRPM

## 2023-12-13 LAB
ALBUMIN SERPL-MCNC: 3.2 G/DL (ref 3.5–5.2)
ALP SERPL-CCNC: 63 U/L (ref 40–129)
ALT SERPL-CCNC: 23 U/L (ref 0–40)
ANION GAP SERPL CALCULATED.3IONS-SCNC: 14 MMOL/L (ref 7–16)
AST SERPL-CCNC: 24 U/L (ref 0–39)
BASOPHILS # BLD: 0.07 K/UL (ref 0–0.2)
BASOPHILS NFR BLD: 1 % (ref 0–2)
BILIRUB SERPL-MCNC: 0.2 MG/DL (ref 0–1.2)
BUN SERPL-MCNC: 37 MG/DL (ref 6–20)
CALCIUM SERPL-MCNC: 8.4 MG/DL (ref 8.6–10.2)
CHLORIDE SERPL-SCNC: 101 MMOL/L (ref 98–107)
CO2 SERPL-SCNC: 23 MMOL/L (ref 22–29)
CREAT SERPL-MCNC: 7.7 MG/DL (ref 0.7–1.2)
EOSINOPHIL # BLD: 1.07 K/UL (ref 0.05–0.5)
EOSINOPHILS RELATIVE PERCENT: 15 % (ref 0–6)
ERYTHROCYTE [DISTWIDTH] IN BLOOD BY AUTOMATED COUNT: 15.5 % (ref 11.5–15)
GFR SERPL CREATININE-BSD FRML MDRD: 8 ML/MIN/1.73M2
GLUCOSE BLD-MCNC: 108 MG/DL (ref 74–99)
GLUCOSE BLD-MCNC: 109 MG/DL (ref 74–99)
GLUCOSE SERPL-MCNC: 107 MG/DL (ref 74–99)
HCT VFR BLD AUTO: 25.3 % (ref 37–54)
HGB BLD-MCNC: 7.9 G/DL (ref 12.5–16.5)
IMM GRANULOCYTES # BLD AUTO: <0.03 K/UL (ref 0–0.58)
IMM GRANULOCYTES NFR BLD: 0 % (ref 0–5)
LYMPHOCYTES NFR BLD: 1.66 K/UL (ref 1.5–4)
LYMPHOCYTES RELATIVE PERCENT: 23 % (ref 20–42)
MAGNESIUM SERPL-MCNC: 2.8 MG/DL (ref 1.6–2.6)
MCH RBC QN AUTO: 30.4 PG (ref 26–35)
MCHC RBC AUTO-ENTMCNC: 31.2 G/DL (ref 32–34.5)
MCV RBC AUTO: 97.3 FL (ref 80–99.9)
MONOCYTES NFR BLD: 0.73 K/UL (ref 0.1–0.95)
MONOCYTES NFR BLD: 10 % (ref 2–12)
NEUTROPHILS NFR BLD: 51 % (ref 43–80)
NEUTS SEG NFR BLD: 3.68 K/UL (ref 1.8–7.3)
PHOSPHATE SERPL-MCNC: 5.5 MG/DL (ref 2.5–4.5)
PLATELET # BLD AUTO: 255 K/UL (ref 130–450)
PMV BLD AUTO: 9.2 FL (ref 7–12)
POTASSIUM SERPL-SCNC: 4.9 MMOL/L (ref 3.5–5)
PROT SERPL-MCNC: 5.4 G/DL (ref 6.4–8.3)
PTH-INTACT SERPL-MCNC: 211.9 PG/ML (ref 15–65)
RBC # BLD AUTO: 2.6 M/UL (ref 3.8–5.8)
SODIUM SERPL-SCNC: 138 MMOL/L (ref 132–146)
WBC OTHER # BLD: 7.2 K/UL (ref 4.5–11.5)

## 2023-12-13 PROCEDURE — 6360000002 HC RX W HCPCS: Performed by: INTERNAL MEDICINE

## 2023-12-13 PROCEDURE — 99214 OFFICE O/P EST MOD 30 MIN: CPT | Performed by: INTERNAL MEDICINE

## 2023-12-13 PROCEDURE — 82962 GLUCOSE BLOOD TEST: CPT

## 2023-12-13 PROCEDURE — 2700000000 HC OXYGEN THERAPY PER DAY

## 2023-12-13 PROCEDURE — 90935 HEMODIALYSIS ONE EVALUATION: CPT

## 2023-12-13 PROCEDURE — 85025 COMPLETE CBC W/AUTO DIFF WBC: CPT

## 2023-12-13 PROCEDURE — G0378 HOSPITAL OBSERVATION PER HR: HCPCS

## 2023-12-13 PROCEDURE — 6370000000 HC RX 637 (ALT 250 FOR IP): Performed by: PHYSICIAN ASSISTANT

## 2023-12-13 PROCEDURE — 6370000000 HC RX 637 (ALT 250 FOR IP)

## 2023-12-13 PROCEDURE — 83735 ASSAY OF MAGNESIUM: CPT

## 2023-12-13 PROCEDURE — 80053 COMPREHEN METABOLIC PANEL: CPT

## 2023-12-13 PROCEDURE — 83970 ASSAY OF PARATHORMONE: CPT

## 2023-12-13 PROCEDURE — 6370000000 HC RX 637 (ALT 250 FOR IP): Performed by: INTERNAL MEDICINE

## 2023-12-13 PROCEDURE — 2500000003 HC RX 250 WO HCPCS: Performed by: INTERNAL MEDICINE

## 2023-12-13 PROCEDURE — 96376 TX/PRO/DX INJ SAME DRUG ADON: CPT

## 2023-12-13 PROCEDURE — 84100 ASSAY OF PHOSPHORUS: CPT

## 2023-12-13 PROCEDURE — 96375 TX/PRO/DX INJ NEW DRUG ADDON: CPT

## 2023-12-13 PROCEDURE — 2580000003 HC RX 258: Performed by: INTERNAL MEDICINE

## 2023-12-13 PROCEDURE — 6370000000 HC RX 637 (ALT 250 FOR IP): Performed by: CLINICAL NURSE SPECIALIST

## 2023-12-13 RX ORDER — METOPROLOL TARTRATE 1 MG/ML
5 INJECTION, SOLUTION INTRAVENOUS EVERY 5 MIN PRN
Status: DISCONTINUED | OUTPATIENT
Start: 2023-12-13 | End: 2023-12-13 | Stop reason: HOSPADM

## 2023-12-13 RX ORDER — LOSARTAN POTASSIUM 100 MG/1
150 TABLET ORAL EVERY EVENING
Qty: 135 TABLET | Refills: 0 | Status: SHIPPED | OUTPATIENT
Start: 2023-12-13 | End: 2024-03-12

## 2023-12-13 RX ORDER — NIFEDIPINE 30 MG/1
90 TABLET, EXTENDED RELEASE ORAL DAILY
Qty: 30 TABLET | Refills: 3 | Status: SHIPPED | OUTPATIENT
Start: 2023-12-14

## 2023-12-13 RX ORDER — CARVEDILOL 6.25 MG/1
12.5 TABLET ORAL 2 TIMES DAILY WITH MEALS
Status: DISCONTINUED | OUTPATIENT
Start: 2023-12-13 | End: 2023-12-13 | Stop reason: HOSPADM

## 2023-12-13 RX ORDER — DIPHENHYDRAMINE HYDROCHLORIDE 50 MG/ML
25 INJECTION INTRAMUSCULAR; INTRAVENOUS ONCE
Status: COMPLETED | OUTPATIENT
Start: 2023-12-13 | End: 2023-12-13

## 2023-12-13 RX ORDER — DIPHENHYDRAMINE HCL 25 MG
50 TABLET ORAL EVERY 6 HOURS PRN
Status: DISCONTINUED | OUTPATIENT
Start: 2023-12-13 | End: 2023-12-13 | Stop reason: HOSPADM

## 2023-12-13 RX ADMIN — SACUBITRIL AND VALSARTAN 1 TABLET: 24; 26 TABLET, FILM COATED ORAL at 08:00

## 2023-12-13 RX ADMIN — CLONIDINE HYDROCHLORIDE 0.3 MG: 0.1 TABLET ORAL at 08:00

## 2023-12-13 RX ADMIN — DIPHENHYDRAMINE HYDROCHLORIDE 25 MG: 25 TABLET ORAL at 07:13

## 2023-12-13 RX ADMIN — OXYCODONE AND ACETAMINOPHEN 1 TABLET: 5; 325 TABLET ORAL at 17:37

## 2023-12-13 RX ADMIN — SODIUM CHLORIDE, PRESERVATIVE FREE 10 ML: 5 INJECTION INTRAVENOUS at 08:07

## 2023-12-13 RX ADMIN — CARVEDILOL 12.5 MG: 6.25 TABLET, FILM COATED ORAL at 17:56

## 2023-12-13 RX ADMIN — CARVEDILOL 6.25 MG: 6.25 TABLET, FILM COATED ORAL at 08:00

## 2023-12-13 RX ADMIN — OXYCODONE AND ACETAMINOPHEN 1 TABLET: 5; 325 TABLET ORAL at 06:08

## 2023-12-13 RX ADMIN — HYDRALAZINE HYDROCHLORIDE 50 MG: 50 TABLET, FILM COATED ORAL at 08:00

## 2023-12-13 RX ADMIN — HYDRALAZINE HYDROCHLORIDE 50 MG: 50 TABLET, FILM COATED ORAL at 17:49

## 2023-12-13 RX ADMIN — DIPHENHYDRAMINE HYDROCHLORIDE 25 MG: 50 INJECTION INTRAMUSCULAR; INTRAVENOUS at 15:35

## 2023-12-13 RX ADMIN — METOPROLOL TARTRATE 5 MG: 5 INJECTION INTRAVENOUS at 12:29

## 2023-12-13 RX ADMIN — NIFEDIPINE 90 MG: 30 TABLET, EXTENDED RELEASE ORAL at 10:16

## 2023-12-13 ASSESSMENT — PAIN SCALES - GENERAL
PAINLEVEL_OUTOF10: 9
PAINLEVEL_OUTOF10: 3
PAINLEVEL_OUTOF10: 5
PAINLEVEL_OUTOF10: 10

## 2023-12-13 ASSESSMENT — PAIN DESCRIPTION - ORIENTATION
ORIENTATION: LEFT

## 2023-12-13 ASSESSMENT — PAIN DESCRIPTION - DESCRIPTORS
DESCRIPTORS: THROBBING
DESCRIPTORS: DISCOMFORT
DESCRIPTORS: ACHING;DISCOMFORT

## 2023-12-13 ASSESSMENT — PAIN - FUNCTIONAL ASSESSMENT: PAIN_FUNCTIONAL_ASSESSMENT: ACTIVITIES ARE NOT PREVENTED

## 2023-12-13 ASSESSMENT — PAIN DESCRIPTION - LOCATION
LOCATION: ARM

## 2023-12-13 NOTE — PLAN OF CARE
Entresto was dced and losartan was resumed per day attending (Dr. Maria R Leon) due to prior auth issue. Patient will follow up with his PCP and cardiology for GDMT optimization.      Electronically signed by Oscar Najera MD on 12/13/23 at 6:55 PM EST

## 2023-12-13 NOTE — CARE COORDINATION
12/13/23, SW was informed by floor RN that patient will need a ride home later today once done with dialysis. Patient will not be done with dialysis till later today since he just went down around 1pm.  Informed RN that insurance will need to be called at the following number when ride needs set [phone] (Provide A Ride). The policy # of patient is [address] Transport from [place] will need to be informed to contact the floor 15 minutes out so that the patient can have time to be taken down to the [employer]. SW to follow.       Jose Overall, [facility] Case Management  [phone]

## 2023-12-13 NOTE — PLAN OF CARE
Problem: Chronic Conditions and Co-morbidities  Goal: Patient's chronic conditions and co-morbidity symptoms are monitored and maintained or improved  12/13/2023 0835 by Buck Higginbotham RN  Outcome: Progressing     Problem: Discharge Planning  Goal: Discharge to home or other facility with appropriate resources  12/13/2023 0835 by Buck Higginbotham RN  Outcome: Progressing     Problem: Pain  Goal: Verbalizes/displays adequate comfort level or baseline comfort level  12/13/2023 0835 by Buck Higginbotham RN  Outcome: Progressing     Problem: Safety - Adult  Goal: Free from fall injury  12/13/2023 0835 by Buck Higginbotham RN  Outcome: Progressing     Problem: ABCDS Injury Assessment  Goal: Absence of physical injury  12/13/2023 0835 by Buck Higginbotham RN  Outcome: Progressing

## 2023-12-13 NOTE — DISCHARGE SUMMARY
patients <25years of age. eGFR results are calculated without a race factor using the 2021 CKD-EPI equation. Careful clinical correlation is recommended, particularly when comparing to results   calculated using previous equations. The CKD-EPI equation is less accurate in patients with extremes of muscle mass, extra-renal   metabolism of creatine, excessive creatine ingestion, or following therapy that affects   renal tubular secretion.     Calcium                                       Date: 12/13/2023  Value: 8.4 (L)     Ref range: 8.6 - 10.2 mg/dL   Status: Final  Total Protein                                 Date: 12/13/2023  Value: 5.4 (L)     Ref range: 6.4 - 8.3 g/dL     Status: Final  Albumin                                       Date: 12/13/2023  Value: 3.2 (L)     Ref range: 3.5 - 5.2 g/dL     Status: Final  Total Bilirubin                               Date: 12/13/2023  Value: 0.2         Ref range: 0.0 - 1.2 mg/dL    Status: Final  Alkaline Phosphatase                          Date: 12/13/2023  Value: 63          Ref range: 40 - 129 U/L       Status: Final  ALT                                           Date: 12/13/2023  Value: 23          Ref range: 0 - 40 U/L         Status: Final  AST                                           Date: 12/13/2023  Value: 24          Ref range: 0 - 39 U/L         Status: Final  Magnesium                                     Date: 12/13/2023  Value: 2.8 (H)     Ref range: 1.6 - 2.6 mg/dL    Status: Final  Pth Intact                                    Date: 12/13/2023  Value: 211.9 (H)   Ref range: 15 - 65 pg/mL      Status: Final  POC Glucose                                   Date: 12/13/2023  Value: 108 (H)     Ref range: 74 - 99 mg/dL      Status: Final  POC Glucose                                   Date: 12/13/2023  Value: 109 (H)     Ref range: 74 - 99 mg/dL      Status: Final  ------------    Radiology last 7 days:  XR CHEST PORTABLE    Result Date:

## 2023-12-13 NOTE — PLAN OF CARE
Problem: Chronic Conditions and Co-morbidities  Goal: Patient's chronic conditions and co-morbidity symptoms are monitored and maintained or improved  12/13/2023 1815 by Latoya Hsu RN  Outcome: Completed     Problem: Discharge Planning  Goal: Discharge to home or other facility with appropriate resources  12/13/2023 1815 by Latoya Hsu RN  Outcome: Completed     Problem: Pain  Goal: Verbalizes/displays adequate comfort level or baseline comfort level  12/13/2023 1815 by Latoya Hsu RN  Outcome: Completed     Problem: Safety - Adult  Goal: Free from fall injury  12/13/2023 1815 by Latoya Hsu RN  Outcome: Completed     Problem: ABCDS Injury Assessment  Goal: Absence of physical injury  12/13/2023 1815 by Latoya Hsu RN  Outcome: Completed

## 2023-12-14 NOTE — PROGRESS NOTES
4 Eyes Skin Assessment     NAME:  Candance Blood  YOB: 1985  MEDICAL RECORD NUMBER:  [id number]    The patient is being assessed for  Admission    I agree that at least one RN has performed a thorough Head to Toe Skin Assessment on the patient. ALL assessment sites listed below have been assessed. Areas assessed by both nurses:    Head, Face, Ears, Shoulders, Back, Chest, Arms, Elbows, Hands, Sacrum. Buttock, Coccyx, Ischium, Legs. Feet and Heels, and Under Medical Devices         Does the Patient have a Wound?  No noted wound(s)       Bimal Prevention initiated by RN: No  Wound Care Orders initiated by RN: No    Pressure Injury (Stage 3,4, Unstageable, DTI, NWPT, and Complex wounds) if present, place Wound referral order by RN under : No    New Ostomies, if present place, Ostomy referral order under : No     Nurse 1 eSignature: Electronically signed by Serena Brannon RN on 12/12/23 at 4:32 PM EST    **SHARE this note so that the co-signing nurse can place an eSignature**    Nurse 2 eSignature: Electronically signed by Davon Albert RN on 12/12/23 at 4:33 PM EST
Adán Jessica to ask if patient is cleared from renal standpoint. Awaiting response.
Dr dash aware of blood pressure.
Dr. Sage Lab notified of patients /98 LLE, 201/103 RLE.
Heart monitor removed, cleaned, and returned to nurses station. IV removed. Discharge instructions given, all questions answered at this time.
Humza Martinez Board, he is okay for discharge from renal standpoint.
Jessika Torres that the patient is having continued itching and is requesting IV benadryl to alleviate his itching. Dr. Vicki márquez. New orders received.
Monitor dcd cleaned and placed in slot in nurses station. Post cardiac cath groin instructions reviewed with patient and handout given. Info given on cardiac diet. Meds reviewed with patient and the importance of taking aspirin and plavix as ordered stressed to patient. Patient taken down in wheelchair with all belongings that were documented that she came in with.
Patient known to Dr. Shonna Chapa, will defer consult to their group. Thank you!
RN called dialysis for a scheduled time today for dialysis, they said 12:30-1 pm today.
RN spoke to Dr. Selin Zavaleta regarding the patient not having entresto prior authorization done. Dr. Charisse Medeiros came to unit and canceled the entresto order for discharge and placed patient back on losartan.
Vasc band weaned per protocol. Site soft cleaned with alcohol dsd applied with opsite applied over that. Ulnar pulse 2 plus.
dextrose bolus 10% 125 mL  125 mL IntraVENous PRN Papo Pascal MD        Or    dextrose bolus 10% 250 mL  250 mL IntraVENous PRN Papo Pascal MD        glucagon injection 1 mg  1 mg SubCUTAneous PRN Papo Pascal MD        dextrose 10 % infusion   IntraVENous Continuous PRN Papo Pascal MD        heparin (porcine) injection 5,000 Units  5,000 Units SubCUTAneous Q8H Papo Pascal MD        NIFEdipine (PROCARDIA XL) extended release tablet 90 mg  90 mg Oral Daily Papo Pascal MD   90 mg at 12/13/23 1016    traZODone (DESYREL) tablet 50 mg  50 mg Oral Nightly Papo Pascal MD   50 mg at 12/12/23 2044    sacubitril-valsartan (ENTRESTO) 24-26 MG per tablet 1 tablet  1 tablet Oral BID Papo Pascal MD   1 tablet at 12/13/23 0800    oxyCODONE-acetaminophen (PERCOCET) 5-325 MG per tablet 1 tablet  1 tablet Oral Q8H PRN KENDRA Davis - CNP   1 tablet at 12/13/23 3217     Recent Complaints:  Review of Systems  Vitals:    12/13/23 1205   BP: (!) 195/98   Pulse: 73   Resp:    Temp:    SpO2:      Physical Exam  Constitutional:       General: He is not in acute distress. Appearance: Normal appearance. Cardiovascular:      Rate and Rhythm: Normal rate and regular rhythm. Pulmonary:      Effort: Pulmonary effort is normal.      Breath sounds: Normal breath sounds. Principal Problem:    S/P cardiac catheterization  Active Problems:    End-stage renal disease on hemodialysis (HCC)    Nonischemic cardiomyopathy (HCC)    Status post cardiac catheterization    Resistant hypertension  Resolved Problems:    * No resolved hospital problems. *      Plan:  Heart cath did not show significant lesions  Plan was to discharge him after dialysis. His blood pressures are significantly elevated. I gave him a dose of metoprolol 5 mg x 1 with the possibility of a repeat dose in 5 minutes  He is experiencing intense pruritus. Benadryl 50 mg was given x 1.   I will reevaluate him later in the day for
exposed extremities  Ext: No edema ; left arm (+) bruit/thrill  Neuro: Awake, answers questions appropriately    Data:    No results for input(s): \"WBC\", \"HGB\", \"HCT\", \"MCV\", \"PLT\" in the last 72 hours. No results for input(s): \"NA\", \"K\", \"CL\", \"CO2\", \"GLU\", \"CREATININE\", \"BUN\", \"LABGLOM\", \"GLUCOSE\", \"CALCIUM\", \"PHOS\", \"MG\" in the last 72 hours. Vit D, 25-Hydroxy   Date Value Ref Range Status   01/23/2022 17 (L) 30 - 100 ng/mL Final     Comment:     <20 ng/mL. ........... Jo Muff Deficient  20-30 ng/mL. ......... Jo Muff Insufficient   ng/mL. ........ Jo Muff Sufficient  >100 ng/mL. .......... Jo Muff Toxic         PTH   Date Value Ref Range Status   01/05/2019 418 (H) 15 - 65 pg/mL Final       No results for input(s): \"ALT\", \"AST\", \"ALKPHOS\", \"BILITOT\", \"BILIDIR\" in the last 72 hours. No results for input(s): \"LABALBU\" in the last 72 hours. Ferritin   Date Value Ref Range Status   01/27/2022 4,215 ng/mL Final     Comment:     FERRITIN Reference Ranges:  Adult Males   20 - 60 years:    30 - 400 ng/mL  Adult females 16 - 61 years:    15 - 150 ng/mL  Adults greater than 60 years:   no established reference range  Pediatrics:                     no established reference range       Iron   Date Value Ref Range Status   09/06/2021 33 (L) 59 - 158 mcg/dL Final     TIBC   Date Value Ref Range Status   09/06/2021 202 (L) 250 - 450 mcg/dL Final       Vitamin B-12   Date Value Ref Range Status   04/10/2022 829 211 - 946 pg/mL Final       Folate   Date Value Ref Range Status   04/10/2022 18.0 4.8 - 24.2 ng/mL Final       No results found for: \"VOL\", \"APPEARANCE\", \"COLORU\", \"LABSPEC\", \"LABPH\", \"LEUKBLD\", \"NITRU\", \"GLUCOSEU\", \"KETUA\", \"UROBILINOGEN\", \"BILIRUBINUR\", \"OCBU\"    No results found for: \"LUZ MARIA\", \"CREURRAN\", \"OSMOU\"    No components found for: \"URIC\"    No results found for: \"LIPIDPAN\"    Assessment and Plans:    ESRD on HD   Outpatient schedule MWF via left arm   Monitor labs  Continue HD MWF while inpatient - HD today     2.  Hypertension
pressure control. Continue Hydralazine 50 mg TID and titrate up if needed for blood pressure control. Continue Entresto 24/26 mg BID, titrate as needed for BP control  Continue other antihypertensive medications the same, continue to titrate as needed for optimal BP control  Aggressive risk factor modification  Cardiology will sign off, please call with any questions or concerns  Above A&P discussed and made in collaboration with Dr. Araceli Reese. Further recommendations to follow as per him      NOTE: This report was transcribed using voice recognition software. Every effort was made to ensure accuracy; however, inadvertent computerized transcription errors may be present. Queta Morris, [address] [place] [address],[address] Cardiology    Electronically signed by Makenna Perera PA-C on 12/13/2023 at 9:32 AM     Patient chart reviewed. His telemetry, vital signs, medications, and laboratory data were all reviewed. I agree with the above assessment and plan made in collaboration with Makenna Perera PA-C.     Jesús Vazquez MD

## 2023-12-15 LAB
EKG ATRIAL RATE: 75 BPM
EKG P AXIS: 73 DEGREES
EKG P-R INTERVAL: 192 MS
EKG Q-T INTERVAL: 426 MS
EKG QRS DURATION: 88 MS
EKG QTC CALCULATION (BAZETT): 475 MS
EKG R AXIS: 61 DEGREES
EKG T AXIS: 97 DEGREES
EKG VENTRICULAR RATE: 75 BPM

## 2024-09-03 NOTE — PROGRESS NOTES
[address] [place] [address] [facility]  [facility]  Progress Note  CC: has dry cough/ HCAP  Face to face encounter   SUBJECTIVE:  Patient is tolerating medications. No reported adverse drug reactions. ROS: No nausea, vomiting, diarrhea. No shortness of breath or cough. On room air. No fevers.   Afebrile  Seen on hd  bp improved  Medications:  Scheduled Meds:   labetalol  300 mg Oral TID    amoxicillin-clavulanate  600 mg Oral 2 times per day    predniSONE  40 mg Oral Daily    NIFEdipine  90 mg Oral Daily    cinacalcet  30 mg Oral Daily    cloNIDine  0.3 mg Oral TID    losartan  100 mg Oral QPM    pantoprazole  40 mg Oral Daily    sevelamer  1,600 mg Oral TID WC    sodium chloride flush  5-40 mL Intravenous 2 times per day    heparin (porcine)  5,000 Units Subcutaneous 3 times per day     Continuous Infusions:   dextrose      sodium chloride       PRN Meds:glucose, dextrose, glucagon (rDNA), dextrose, hydrALAZINE, sodium chloride flush, sodium chloride, ondansetron **OR** ondansetron, polyethylene glycol, acetaminophen **OR** acetaminophen, labetalol, diphenhydrAMINE  OBJECTIVE:  Patient Vitals for the past 24 hrs:   BP Temp Temp src Pulse Resp SpO2 Weight   08/06/21 0959 128/73 -- -- 96 -- -- --   08/06/21 0930 137/78 -- -- 98 -- -- --   08/06/21 0900 (!) 146/90 -- -- 93 -- -- --   08/06/21 0830 (!) 161/95 -- -- 87 -- -- --   08/06/21 0800 (!) 154/96 -- -- 90 -- -- --   08/06/21 0730 (!) 146/92 -- -- 88 -- -- --   08/06/21 0700 (!) 151/93 -- -- 87 -- -- --   08/06/21 0645 (!) 151/87 -- -- 89 -- -- --   08/06/21 0635 (!) 149/88 97.6 °F (36.4 °C) -- 89 14 -- 116 lb 2.9 oz (52.7 kg)   08/06/21 0415 (!) 158/72 -- -- -- -- -- --   08/05/21 2245 (!) 171/111 98.2 °F (36.8 °C) Temporal 93 18 94 % --   08/05/21 2000 (!) 195/115 97.5 °F (36.4 °C) Temporal 97 19 97 % --   08/05/21 1541 (!) 180/114 -- -- -- -- -- --   08/05/21 1530 (!) 176/114 97.2 °F (36.2 °C) Temporal 95 -- 98 % --   08/05/21 1345 (!) 190/102 -- -- -- -- -- --   08/05/21 1100 (!) 190/99 -- -- -- -- -- --     Constitutional: The patient is awake, alert, and oriented. Thin. Sitting up in bed-   Skin: Warm and dry. No rashes were noted. Head: Eyes show round, and reactive pupils. No jaundice. Mouth: Moist mucous membranes, no ulcerations, no thrush. Neck: Supple to movements. No lymphadenopathy. Chest: No use of accessory muscles to breathe. Symmetrical expansion. Auscultation reveals diminished to right. Cardiovascular: S1 and S2 are rhythmic and regular. No murmurs appreciated. Abdomen: Positive bowel sounds to auscultation. Benign to palpation. Extremities: No clubbing, no cyanosis, no edema.   Left fistula     Laboratory and Tests Review:  Lab Results   Component Value Date    WBC 12.2 (H) 08/06/2021    WBC 7.1 08/05/2021    WBC 5.6 08/04/2021    HGB 8.2 (L) 08/06/2021    HCT 25.7 (L) 08/06/2021    MCV 94.8 08/06/2021     08/06/2021     Lab Results   Component Value Date    NEUTROABS 9.79 (H) 08/06/2021    NEUTROABS 5.58 08/05/2021    NEUTROABS 3.14 08/04/2021     Lab Results   Component Value Date    CRP 16.4 (H) 07/17/2021    CRP 6.6 (H) 02/01/2021    CRP 9.0 (H) 01/03/2019     Lab Results   Component Value Date    SEDRATE 94 (H) 07/15/2021    SEDRATE 39 (H) 02/01/2021    SEDRATE 37 (H) 10/21/2015     Lab Results   Component Value Date    ALT 32 07/31/2021    AST 39 07/31/2021    ALKPHOS 82 07/31/2021    BILITOT 0.6 07/31/2021     Lab Results   Component Value Date     08/06/2021    K 5.3 08/06/2021    K 4.6 07/14/2021    CL 94 08/06/2021    CO2 23 08/06/2021    BUN 62 08/06/2021    CREATININE 9.1 08/06/2021    GFRAA 8 08/06/2021    LABGLOM 8 08/06/2021    GLUCOSE 149 08/06/2021    PROT 7.2 07/31/2021    LABALBU 3.6 07/31/2021    CALCIUM 9.7 08/06/2021    BILITOT 0.6 07/31/2021    ALKPHOS 82 07/31/2021    AST 39 07/31/2021    ALT 32 07/31/2021     Radiology:  Reviewed     Microbiology:   Respiratory array panel- negative   IGG wnl  cd4 425  BAL-normal carol  ASSESSMENT:  HCAP- right lower lobe pneumonia  Volume overload   ESRD on HD    PLAN:  Continue Augmentin p.o.- Day #7/10  Check cultures from the BAL-oral carol  Pulmonary following; passed swallowing study   On solu-medrol   Monitor labs  [id number] [address] for discharge-med rec complete    Brittany Dao, APRN - CNP  10:20 AM  8/6/2021   Pt seen and examined. Above discussed agree with advanced practice nurse. Labs, cultures, and radiographs reviewed. Face to Face encounter occurred. Changes made as necessary.      Tangela Ryan MD Kait

## (undated) DEVICE — GUIDEWIRE VASC L260CM DIA0.035IN PTFE MOD S STL COIL PLAT

## (undated) DEVICE — CANNULA NSL CANN NSL L25FT TBNG AD O2 SUP SFT UC

## (undated) DEVICE — ANGIOGRAPHIC CATHETER: Brand: EXPO™

## (undated) DEVICE — SURGICAL PROCEDURE PACK BRONCH

## (undated) DEVICE — BAND COMPR L21CM SHT CLR PLAS HEMSTAT EXT HK AND LOOP RETEN

## (undated) DEVICE — SOLUTION IRRIG 500ML 0.9% SOD CHL USP POUR PLAS BTL

## (undated) DEVICE — FORCEPS BX L160CM JAW DIA2.4MM YEL L CAP W/ NDL DISP RAD

## (undated) DEVICE — KIT MFLD ISOLATN NACL CNTRST PRT TBNG SPIK W/ PRSS TRNSDUC

## (undated) DEVICE — PACK SURG CARDIAC CATH

## (undated) DEVICE — GLIDESHEATH SLENDER ACCESS KIT: Brand: GLIDESHEATH SLENDER

## (undated) DEVICE — CANNULA NSL ORAL AD FOR CAPNOFLEX CO2 O2 AIRLFE

## (undated) DEVICE — SINGLE USE BIOPSY VALVE MAJ-210: Brand: SINGLE USE BIOPSY VALVE (STERILE)

## (undated) DEVICE — SINGLE USE SUCTION VALVE MAJ-209: Brand: SINGLE USE SUCTION VALVE (STERILE)

## (undated) DEVICE — KIT ANGIO W/ AT P65 PREM HND CTRL FOR CNTRST DEL ANGIOTOUCH

## (undated) DEVICE — RADIFOCUS OPTITORQUE ANGIOGRAPHIC CATHETER: Brand: OPTITORQUE

## (undated) DEVICE — PAD, DEFIB, ADULT, RADIOTRAN, PHYSIO, LO: Brand: MEDLINE